# Patient Record
Sex: MALE | NOT HISPANIC OR LATINO | Employment: OTHER | URBAN - METROPOLITAN AREA
[De-identification: names, ages, dates, MRNs, and addresses within clinical notes are randomized per-mention and may not be internally consistent; named-entity substitution may affect disease eponyms.]

---

## 2019-06-04 ENCOUNTER — OFFICE VISIT (OUTPATIENT)
Dept: ORTHOPEDICS | Facility: CLINIC | Age: 74
End: 2019-06-04
Payer: COMMERCIAL

## 2019-06-04 DIAGNOSIS — M54.50 ACUTE BILATERAL LOW BACK PAIN WITHOUT SCIATICA: Primary | ICD-10-CM

## 2019-06-04 RX ORDER — PREDNISONE 20 MG/1
40 TABLET ORAL DAILY
Qty: 10 TABLET | Refills: 0 | Status: SHIPPED | OUTPATIENT
Start: 2019-06-04 | End: 2019-06-09

## 2019-06-04 RX ORDER — TIZANIDINE HYDROCHLORIDE 4 MG/1
4 CAPSULE, GELATIN COATED ORAL 3 TIMES DAILY PRN
Qty: 30 CAPSULE | Refills: 1 | Status: SHIPPED | OUTPATIENT
Start: 2019-06-04 | End: 2019-07-09

## 2019-06-04 NOTE — LETTER
6/4/2019       RE: Rogelio Marshall  [address]  [facility] [address]     Dear Colleague,    Thank you for referring your patient, Rogelio Marshall, to the [facility] SPORTS AND ORTHOPAEDIC WALK IN CLINIC at [facility]. Please see a copy of my visit note below.         NEW PATIENT INTAKE QUESTIONNAIRE  SPORTS & ORTHOPEDIC WALK-IN 6/4/2019    Primary Care Physician: Dr. Stevens    Where are the majority of your medical records? P    Reason for Visit:    What part of your body is injured / painful?  bilateral low back    What caused the injury /pain? Unsure    How long ago did your injury occur or pain begin? 2 weeks ago    What are your most bothersome symptoms? Pain    How would you characterize your symptom? aching and sharp    What makes your symptoms better? Rest    What makes your symptoms worse? Other: turning over while sleeping    Have you been previously seen for this problem? No    Medical History:    Medical History: NA    Have you had surgery on this body part before? No    Medications: none    Allergies: No known drug allergies    Family History of Medical Problems: NA    Previous Surgeries: NA    Social History:    Occupation: NA    Handedness: Right    Exercise: walking and biking    Review of Systems:    Have you recently had a a fever, chills, weight loss? No    Do you have any vision problems? No    Do you have any chest pain or edema? No    Do you have any shortness of breath or wheezing?  No    Do you have stomach problems? No    Do you have any numbness or focal weakness? No    Do you have diabetes? No    Do you have problems with bleeding or clotting? No    Do you have an rashes or other skin lesions? No    Communication:    How did you hear about us? return    Who else should know about this visit? NA       CHIEF COMPLAINT:  Pain of the Lower Back       HISTORY OF PRESENT ILLNESS  Mr. Marshall is a pleasant 74 year old year old male who presents to  clinic today with low back pain.  Rogelio experienced the slow onset of acute low back pain about 2 to 3 weeks ago, no clear inciting event that he can recall.  Pain is in his bilateral low back, radiating down from the inferior aspect of both hips.  Worse with forward bending, he denies any radiation down his legs.  He experienced a episode like this before, this resolved on its own.      Additional history: as documented    MEDICAL HISTORY  Patient Active Problem List   Diagnosis     Hypertension     Hyperlipidemia       Current Outpatient Medications   Medication Sig Dispense Refill     predniSONE (DELTASONE) 20 MG tablet Take 2 tablets (40 mg) by mouth daily for 5 days 10 tablet 0     tiZANidine (ZANAFLEX) 4 MG capsule Take 1 capsule (4 mg) by mouth 3 times daily as needed for muscle spasms 30 capsule 1       No Known Allergies    Family History   Problem Relation Age of Onset     C.A.D. Father          MI age 69     Hypertension Sister         obese     Family History Negative Brother        Additional medical/Social/Surgical histories reviewed in EPIC and updated as appropriate.        PHYSICAL EXAM  There were no vitals filed for this visit.  General  - normal appearance, in no obvious distress  CV  - normal peripheral perfusion  Pulm  - normal respiratory pattern, non-labored  Musculoskeletal - lumbar spine  - stance: slow to rise and sit  - inspection: normal bone and joint alignment, no obvious scoliosis  - palpation: bilateral lumbar paravertebral tenderness  - ROM: pain with bilateral rotation, flexion past 30 deg, extension  - strength: lower extremities 5/5 in all planes  Neuro  - patellar and Achilles DTRs 2+ bilaterally, no sensory or motor deficit, grossly normal coordination, normal muscle tone  Skin  - no ecchymosis, erythema, warmth, or induration, no obvious rash  Psych  - interactive, appropriate, normal mood and affect    ASSESSMENT & PLAN  Mr. Marshall is a 74 year old year old male who presents  to clinic today with acute low back pain.    We discussed conservative treatment for low back pain which includes physical therapy, muscle relaxers, anti-inflammatories.  I did prescribe him prednisone and tizanidine, he can take the tizanidine at night as helpful.  I am also referring him to physical therapy.    If no improvement in the next few weeks I would consider imaging.    It was a pleasure seeing Rogelio today.    Wan Victoria DO, CAQSM  Primary Care Sports Medicine

## 2019-06-04 NOTE — PROGRESS NOTES
NEW PATIENT INTAKE QUESTIONNAIRE  SPORTS & ORTHOPEDIC WALK-IN 6/4/2019    Primary Care Physician: Dr. Stevens    Where are the majority of your medical records? [facility]    Reason for Visit:    What part of your body is injured / painful?  bilateral low back    What caused the injury /pain? Unsure    How long ago did your injury occur or pain begin? 2 weeks ago    What are your most bothersome symptoms? Pain    How would you characterize your symptom? aching and sharp    What makes your symptoms better? Rest    What makes your symptoms worse? Other: turning over while sleeping    Have you been previously seen for this problem? No    Medical History:    Medical History: NA    Have you had surgery on this body part before? No    Medications: none    Allergies: No known drug allergies    Family History of Medical Problems: NA    Previous Surgeries: NA    Social History:    Occupation: NA    Handedness: Right    Exercise: walking and biking    Review of Systems:    Have you recently had a a fever, chills, weight loss? No    Do you have any vision problems? No    Do you have any chest pain or edema? No    Do you have any shortness of breath or wheezing?  No    Do you have stomach problems? No    Do you have any numbness or focal weakness? No    Do you have diabetes? No    Do you have problems with bleeding or clotting? No    Do you have an rashes or other skin lesions? No    Communication:    How did you hear about us? return    Who else should know about this visit? NA       CHIEF COMPLAINT:  Pain of the Lower Back       HISTORY OF PRESENT ILLNESS  Mr. Marshall is a pleasant 74 year old year old male who presents to clinic today with low back pain.  Rogelio experienced the slow onset of acute low back pain about 2 to 3 weeks ago, no clear inciting event that he can recall.  Pain is in his bilateral low back, radiating down from the inferior aspect of both hips.  Worse with forward bending, he denies any radiation down his  legs.  He experienced a episode like this before, this resolved on its own.      Additional history: as documented    MEDICAL HISTORY  Patient Active Problem List   Diagnosis     Hypertension     Hyperlipidemia       Current Outpatient Medications   Medication Sig Dispense Refill     predniSONE (DELTASONE) 20 MG tablet Take 2 tablets (40 mg) by mouth daily for 5 days 10 tablet 0     tiZANidine (ZANAFLEX) 4 MG capsule Take 1 capsule (4 mg) by mouth 3 times daily as needed for muscle spasms 30 capsule 1       No Known Allergies    Family History   Problem Relation Age of Onset     C.A.D. Father          MI age 69     Hypertension Sister         obese     Family History Negative Brother        Additional medical/Social/Surgical histories reviewed in [facility] and updated as appropriate.        PHYSICAL EXAM  There were no vitals filed for this visit.  General  - normal appearance, in no obvious distress  CV  - normal peripheral perfusion  Pulm  - normal respiratory pattern, non-labored  Musculoskeletal - lumbar spine  - stance: slow to rise and sit  - inspection: normal bone and joint alignment, no obvious scoliosis  - palpation: bilateral lumbar paravertebral tenderness  - ROM: pain with bilateral rotation, flexion past 30 deg, extension  - strength: lower extremities 5/5 in all planes  Neuro  - patellar and Achilles DTRs 2+ bilaterally, no sensory or motor deficit, grossly normal coordination, normal muscle tone  Skin  - no ecchymosis, erythema, warmth, or induration, no obvious rash  Psych  - interactive, appropriate, normal mood and affect             ASSESSMENT & PLAN  Mr. Marshall is a 74 year old year old male who presents to clinic today with acute low back pain.    We discussed conservative treatment for low back pain which includes physical therapy, muscle relaxers, anti-inflammatories.  I did prescribe him prednisone and tizanidine, he can take the tizanidine at night as helpful.  I am also referring him to  physical therapy.    If no improvement in the next few weeks I would consider imaging.    It was a pleasure seeing Rogelio today.    Wan Victoria DO, CAQSM  Primary Care Sports Medicine

## 2019-06-09 ENCOUNTER — ANCILLARY PROCEDURE (OUTPATIENT)
Dept: GENERAL RADIOLOGY | Facility: CLINIC | Age: 74
End: 2019-06-09
Attending: FAMILY MEDICINE
Payer: COMMERCIAL

## 2019-06-09 ENCOUNTER — OFFICE VISIT (OUTPATIENT)
Dept: ORTHOPEDICS | Facility: CLINIC | Age: 74
End: 2019-06-09
Payer: COMMERCIAL

## 2019-06-09 VITALS
SYSTOLIC BLOOD PRESSURE: 167 MMHG | BODY MASS INDEX: 26.75 KG/M2 | HEART RATE: 98 BPM | DIASTOLIC BLOOD PRESSURE: 95 MMHG | HEIGHT: 63 IN | WEIGHT: 151 LBS

## 2019-06-09 DIAGNOSIS — M54.50 ACUTE BILATERAL LOW BACK PAIN WITHOUT SCIATICA: Primary | ICD-10-CM

## 2019-06-09 DIAGNOSIS — M54.50 ACUTE BILATERAL LOW BACK PAIN WITHOUT SCIATICA: ICD-10-CM

## 2019-06-09 RX ORDER — DICLOFENAC SODIUM 75 MG/1
75 TABLET, DELAYED RELEASE ORAL 2 TIMES DAILY
Qty: 20 TABLET | Refills: 0 | Status: CANCELLED | OUTPATIENT
Start: 2019-06-09

## 2019-06-09 RX ORDER — DICLOFENAC SODIUM 75 MG/1
75 TABLET, DELAYED RELEASE ORAL 2 TIMES DAILY
Qty: 20 TABLET | Refills: 0 | Status: SHIPPED | OUTPATIENT
Start: 2019-06-09 | End: 2019-07-09

## 2019-06-09 ASSESSMENT — MIFFLIN-ST. JEOR: SCORE: 1330.06

## 2019-06-09 NOTE — LETTER
6/9/2019       RE: Rogelio Marshall  [address]  [facility] [address]     Dear Colleague,    Thank you for referring your patient, Rogelio Marshall, to the [facility] SPORTS AND ORTHOPAEDIC WALK IN CLINIC at [facility]. Please see a copy of my visit note below.          SPORTS & ORTHOPEDIC WALK-IN FOLLOW-UP VISIT 6/9/2019    Interval History: Pain has worsened/remained the same. Until last night, he has been able to sleep. Any position that requires the activation of his lumbar paraspinals will illicit pain. Even while sleeping, activating the paraspinals to turn over is painful    Interval History:     Follow up reason: He has taken his 2 medications, but they were not doing enough so he took acetaminophen in addition    Date of injury: 2.5 weeks ago    Date last seen: 6/4/19    Following Therapeutic Plan: Yes     Pain: Worsening    Function: Unchanged    Medical History:    Any recent changes to your medical history? No    Any new medication prescribed since last visit? No    Review of Systems:    Do you have fever, chills, weight loss? No    Do you have any vision problems? No    Do you have any chest pain or edema? No    Do you have any shortness of breath or wheezing?  No    Do you have stomach problems? No    Do you have any numbness or focal weakness? No    Do you have diabetes? No    Do you have problems with bleeding or clotting? No    Do you have an rashes or other skin lesions? No       ESTABLISHED PATIENT FOLLOW-UP:  RECHECK (LBP that is bilateral)    HISTORY OF PRESENT ILLNESS  Mr. Marshall is a pleasant 74 year old year old male who presents to clinic today for follow-up of acute low back pain. Patient notes pain began about 3 weeks ago.  No acute injury.  Aching/sharp in nature. Improved by rest.  No radicular symptoms, numbness or tingling. Saw Dr. Victoria on 6/4/19 who prescribed tizanidine and steroid medications.  He also has PT appt scheduled not completed.   "Severe exacerbation last night. Low back pain severe.  Felt like this ameliorated overnight, however.  Worried he would have to visit ED.    Notes he completed medrol pack and tizanidine, unsure but does not feel improved.    Additional medical/Social/Surgical histories reviewed in [facility] and updated as appropriate.    REVIEW OF SYSTEMS (6/9/2019)  CONSTITUTIONAL: Denies fever and weight loss  GASTROINTESTINAL: Denies abdominal pain, nausea, vomiting  MUSCULOSKELETAL: See HPI  SKIN: Denies any recent rash or lesion  NEUROLOGICAL: Denies numbness or focal weakness     PHYSICAL EXAM  BP (!) 167/95   Pulse 98   Ht 1.6 m (5' 3\")   Wt 68.5 kg (151 lb)   BMI 26.75 kg/m       General  - normal appearance, in no obvious distress  CV  - normal peripheral perfusion  Pulm  - normal respiratory pattern, non-labored  Musculoskeletal - lumbar spine  - stance: slow to rise and sit  - inspection: normal bone and joint alignment, no obvious scoliosis  - palpation: bilateral lumbar paravertebral tenderness  - ROM: pain with bilateral rotation, flexion past 30 deg, extension  - strength: lower extremities 5/5 in all planes  Neuro  - patellar and Achilles DTRs 2+ bilaterally, no sensory or motor deficit, grossly normal coordination, normal muscle tone  Skin  - no ecchymosis, erythema, warmth, or induration, no obvious rash  Psych  - interactive, appropriate, normal mood and affect    IMAGING : XR lumbar 3v. Final results and radiologist's interpretation, available in the [facility] health record. Images were reviewed with the patient/family members in the office today. My personal interpretation of the performed imaging is lumbar spondylosis. Moderate to severe disc narrowing L4-5 and L5-S1, listhesis of L5 on S1.     ASSESSMENT & PLAN  Mr. Marshall is a 74 year old year old male who presents to clinic today worsening bilateral low back pain without radiculopathy. Likely acute lumbar and QL spasm secondary to underlying degenerative " changes.    -Physical therapy referral  -Heat to low back discussed  -Diclofenac BID x 1 week  -Tizanidine at bedtime  -Acetaminophen PRN  -Follow up 2 weeks    It was a pleasure seeing Gabrielle Brooks DO, CAQSM  Primary Care Sports Medicine

## 2019-06-09 NOTE — PROGRESS NOTES
SPORTS & ORTHOPEDIC WALK-IN FOLLOW-UP VISIT 6/9/2019    Interval History: Pain has worsened/remained the same. Until last night, he has been able to sleep. Any position that requires the activation of his lumbar paraspinals will illicit pain. Even while sleeping, activating the paraspinals to turn over is painful    Interval History:     Follow up reason: He has taken his 2 medications, but they were not doing enough so he took acetaminophen in addition    Date of injury: 2.5 weeks ago    Date last seen: 6/4/19    Following Therapeutic Plan: Yes     Pain: Worsening    Function: Unchanged    Medical History:    Any recent changes to your medical history? No    Any new medication prescribed since last visit? No    Review of Systems:    Do you have fever, chills, weight loss? No    Do you have any vision problems? No    Do you have any chest pain or edema? No    Do you have any shortness of breath or wheezing?  No    Do you have stomach problems? No    Do you have any numbness or focal weakness? No    Do you have diabetes? No    Do you have problems with bleeding or clotting? No    Do you have an rashes or other skin lesions? No

## 2019-06-09 NOTE — PROGRESS NOTES
"ESTABLISHED PATIENT FOLLOW-UP:  RECHECK (LBP that is bilateral)       HISTORY OF PRESENT ILLNESS  Mr. Marshall is a pleasant 74 year old year old male who presents to clinic today for follow-up of acute low back pain. Patient notes pain began about 3 weeks ago.  No acute injury.  Aching/sharp in nature. Improved by rest.  No radicular symptoms, numbness or tingling. Saw Dr. Victoria on 6/4/19 who prescribed tizanidine and steroid medications.  He also has PT appt scheduled not completed.  Severe exacerbation last night. Low back pain severe.  Felt like this ameliorated overnight, however.  Worried he would have to visit ED.    Notes he completed medrol pack and tizanidine, unsure but does not feel improved.    Additional medical/Social/Surgical histories reviewed in [facility] and updated as appropriate.    REVIEW OF SYSTEMS (6/9/2019)  CONSTITUTIONAL: Denies fever and weight loss  GASTROINTESTINAL: Denies abdominal pain, nausea, vomiting  MUSCULOSKELETAL: See HPI  SKIN: Denies any recent rash or lesion  NEUROLOGICAL: Denies numbness or focal weakness     PHYSICAL EXAM  BP (!) 167/95   Pulse 98   Ht 1.6 m (5' 3\")   Wt 68.5 kg (151 lb)   BMI 26.75 kg/m      General  - normal appearance, in no obvious distress  CV  - normal peripheral perfusion  Pulm  - normal respiratory pattern, non-labored  Musculoskeletal - lumbar spine  - stance: slow to rise and sit  - inspection: normal bone and joint alignment, no obvious scoliosis  - palpation: bilateral lumbar paravertebral tenderness  - ROM: pain with bilateral rotation, flexion past 30 deg, extension  - strength: lower extremities 5/5 in all planes  Neuro  - patellar and Achilles DTRs 2+ bilaterally, no sensory or motor deficit, grossly normal coordination, normal muscle tone  Skin  - no ecchymosis, erythema, warmth, or induration, no obvious rash  Psych  - interactive, appropriate, normal mood and affect    IMAGING : XR lumbar 3v. Final results and radiologist's interpretation, " available in the [facility] health record. Images were reviewed with the patient/family members in the office today. My personal interpretation of the performed imaging is lumbar spondylosis. Moderate to severe disc narrowing L4-5 and L5-S1, listhesis of L5 on S1.     ASSESSMENT & PLAN  Mr. Marshall is a 74 year old year old male who presents to clinic today worsening bilateral low back pain without radiculopathy. Likely acute lumbar and QL spasm secondary to underlying degenerative changes.    -Physical therapy referral  -Heat to low back discussed  -Diclofenac BID x 1 week  -Tizanidine at bedtime  -Acetaminophen PRN  -Follow up 2 weeks    It was a pleasure seeing Gabrielle Brooks DO, CAQSM  Primary Care Sports Medicine

## 2019-06-09 NOTE — PATIENT INSTRUCTIONS
1. HEAT - Heating pad or thermacare heat wrap  2. Diclofenac twice/day  3. Physical therapy  4 Tizanidine one pill at night only  5. May still use acetaminophen    Low back pain     What is low back pain?    Low back pain is pain and stiffness in the lower back.  It is one of the most common reasons people miss work.      How does it occur?    Low back pain is usually caused when a ligament or muscle holding a vertebra in its proper position is strained.  Vertebrae are bones that make up the spinal column through which the spinal cord passes.  When these muscles or ligaments become weak, the spine loses its ability, resulting in pain.  Because nerves reach all parts of the body from the spinal cord, back problems can lead to pain or weakness in almost any part of the body.      Low back pain can occur if your job involves lifting and carrying heavy objects, or if you spend a lot of time sitting or standing in one position or bending over.  It can be caused by a fall or by unusually strenuous exercise.  It can be brought on by the tension and stress that causes headaches in some people.  It can even be brought on by violent sneezing or coughing.      People who are overweight may have low back pain because of the added stress on their back.      Back pain may occur when the muscles, joints, bones and connective tissues in the back become inflamed as a result of an infection or an immune system problem.  Arthritic disorders as well as some congenital and degenerative conditions may cause back pain.      Back pain accompanied by loss of bladder or bowel control, difficulty moving your legs, or numbness or tingling in your arms or legs may indicate an injury to your spine and nerves, which requires immediate medical treatment.      What are the symptoms?    Symptoms include:      Pain in the back or legs    Stiffness and limited motion    The pain may be continuous or may occur in certain positions.  It may be  aggravated by coughing, sneezing, bending, twisting, or straining during a bowel movement. The pain may occur in only one spot or may spread to other areas, most commonly down the buttocks and into the back of the thigh.     A low back strain typically does not produce pain past the knee into the calf or foot.  Tingling and numbness in the calf or foot may indicate a herniated disk or pinched nerve.      How is it diagnosed?    Your healthcare provider will review your medical history and examine you.  He or she may order X-rays.  In certain situations, a myelogram, CT scan, or MRI may be ordered.    How is it treated?    The early stages of back pain with muscle spasms should be treated with ice packs for 20-30 minutes every 4 to 6 hours for the first 2 to 3 days. You m ay lie on a frozen gel pack, crushed ice, or a bag of frozen peas.    The following are always to treat low back pain:      After the initial injury, applying heat from a heating pad or hot water bottle    Resting in bed on a firm mattress.  Often it helps to lie on your back with your knees raised.  However, isome people prefer to lie on their side with their knees bent.      Taking aspirin, ibuprofen, or other anti-inflammatory medications; muscle relaxants; or other pain medications if recommended by your healthcare provider (adults aged 65 years and older should not take non-steroidal anti-inflammatory medicine for more than 7 days without their healthcare provider's approval).    Having your back massaged by a trained person    Having traction, if recommended by your provider    Wearing a belt or corset to support your back    Talking with a counselor, if your back pain is related to tension caused by emotional problems.    Beginning a program of physical therapy, or exercising on your own.  Begin a regular exercise program to gently stretch and strengthen your muscles as soon as you can.  Your healthcare provider or physical therapist can  recommend exercises that will not only help you feel better but will strengthen your muscles and help avoid back trouble later.      When the pain subsides, ask your healthcare provider about starting an exercise program such as the following:       Exercise moderately every day, using stretching and warm-up exercises suggested by your provider or physical therapist.    Exercise vigorously for about 30 minutes two or three times a week by walking, swimming, using a stationary bicycle, or doing low-impact aerobics.    Participating regularly in an exercise program will not only help your back, it will also help keep you healthier overall.     How long will the effects last?      The effects of back pain last as long as the cause exists or until your body recovers from the strain, usually a day or two but sometimes weeks.     How can I take care of myself?    In addition to the treatment described above, keep in mind these suggestions:      Use an electric heating pad on a low setting (or a hot water bottle wrapped in a towel to avoid burning yourself) for 20 to 30 minutes.  Don't let the heating pad get too hot, and don't fall asleep with it.  You could get a burn.     Try putting an ice pack wrapped in a towel on your back for 20 minutes, one to four times a day.  Set an alarm to avoid frostbite from using the ice pack too long.    Put a pillow under your knees when you are lying down.    Sleep without a pillow under your head.    Lose weight if you are overweight.    Practice good posture.  Stand with your head up, shoulders straight and chest forward, weight balanced evenly on both feet, and pelvis tucked in.     Pain is the best way to  the pace you should set in increasing your activity and exercise.  Minor discomfort, stiffness, soreness, and mild aches need not interfere with activity.  However, limit your activity temporarily if:      your symptoms return    the pain increases when you are more  active    the pain increases within 24 hours after a new or higher level of activity    When can I return to my sport or activity?    The goal of rehabilitation is to return you to your sport or activity as soon as safely possible. If you return too soon you may worsen your injury, which could lead to permanent damage.  Everyone recovers from injury at different rate.  Return to your sport will be determined by how soon your back recovers, not by how many days or weeks it has been since your injury occurred.  In general, the longer you have symptoms before you start treatment, the longer it will take to get better.      It is important that you have fully recovered from your low back pain before you return to your sport or any strenuous activity.  You must be able to have the same range of motion that you had before your injury.  You must be able to run, jump and twist without pain.      What can I do to help prevent low back pain?    You can reduce the strain on your back by doing the following:      Don't push with your arms when you move a heavy object.  Turn around and push backwards so the strain is taken by your legs.     Whenever you sit, sit in a straight-backed chair and hold your spine against the back of the chair.     Bend your knees and hips and keep your back straight when you lift a heavy object.     Avoid lifting heavy objects higher than your waist    Hold packages you carry close to your body, with your arms bent.    Use a footrest for one foot when you stand or sit in one spot for a long time.  This keeps your back straight.    Bend your knees when you bend over.    Sit close to the pedals when you drive and use your seat belt and a hard backrest or pillow.     Lie on your side with your knees bent when you sleep or rest.  It may help to put a pillow between your knees.    Put a pillow under your knees when you sleep on your back.    Raise the foot of the bed 8 inches to discourage sleeping on  your stomach unless you have other problems that require that you keep your head elevated.      To rest your back, hold each of these positions for 5 minutes or longer:  Lie on your back, bend your knees, and put pillows under your knees.    Lie on your back, put a pillow under your neck, bend your knees to a 90-degree angle, and put your lower legs and feet on a chair.    Lie on your back, bend your knees, and bring one knee up to your chest and hold it there.  Repeat with the other knee, then bring both knees to your chest.  When holding your knee to your chest, grab your thigh rather than your lower leg to avoid over flexing your knee.           Exercises that stretch and strengthen the muscles of your abdomen and spine can help prevent back problems. Strong back and abdominal muscles help you keep good posture, with your spine in its correct position.    If your muscles are tight, take a warm shower or bath before doing the exercises. Exercise on a rug or mat. Wear loose clothing. Don t wear shoes. Stop doing any exercise that causes pain until you have talked with your healthcare provider.    Ask your provider or physical therapist to help you develop an exercise program. Ask your provider how many times a week you need to do the exercises. Remember to start slowly.    Excerpts from: The Sports Medicine Patient Advisor 3rd edition. Copyright 2010 [employer].

## 2019-06-10 ENCOUNTER — THERAPY VISIT (OUTPATIENT)
Dept: PHYSICAL THERAPY | Facility: CLINIC | Age: 74
End: 2019-06-10
Attending: FAMILY MEDICINE
Payer: COMMERCIAL

## 2019-06-10 DIAGNOSIS — M54.50 ACUTE BILATERAL LOW BACK PAIN WITHOUT SCIATICA: ICD-10-CM

## 2019-06-10 PROCEDURE — 97530 THERAPEUTIC ACTIVITIES: CPT | Mod: GP | Performed by: PHYSICAL THERAPIST

## 2019-06-10 PROCEDURE — 97110 THERAPEUTIC EXERCISES: CPT | Mod: GP | Performed by: PHYSICAL THERAPIST

## 2019-06-10 PROCEDURE — 97161 PT EVAL LOW COMPLEX 20 MIN: CPT | Mod: GP | Performed by: PHYSICAL THERAPIST

## 2019-06-10 NOTE — PROGRESS NOTES
[facility] Initial Evaluation  Subjective:    Rogelio Marshall is a 74 year old male with a lumbar condition.  Condition occurred with:  Insidious onset.    This is a new condition  5/21/19.    Patient reports pain:  Lumbar spine right and lumbar spine left.    Pain is described as aching and is constant   Associated symptoms:  Loss of motion/stiffness and loss of strength. Pain is worse in the A.M..  Symptoms are exacerbated by lying down, sitting and other (transition from sitting to standing) and relieved by NSAID's.  Since onset symptoms are gradually worsening.  Special tests:  X-ray.      General health as reported by patient is good.        Current medications:  Muscle relaxants and anti-inflammatory.  Current occupation is Retired.            Red flags:  None as reported by the patient.                        Objective:  System    Physical Exam    General     ROS  Posture: Lumbar flexion, rounded shoulders, forward head, thoracic kyphosis when seated.  Lumbar ROM:   ROM Pain?   Flexion Hands to ankles Yes: bilateral lumbar   Extension WNL No   R rotation WNL No   L rotation WNL No   R side bending Hand to mid thigh Yes: lumbar   L side bending Hand to mid thigh Yes: lumbar     Repeated Motions:  Flexion-  worsening  Extension- not done    Lumbar Myotomes/Strength:   Left Right   L1-L2 (hip flexion) 5/5 5/5   L3 (knee extension) 5/5 5/5   L4 (ankle dorsiflexion) 5/5 5/5   L5 (ankle eversion) 5/5 5/5   S1 (plantar flexion) 5/5 5/5   Hip extension 4+/5 4+/5   Hip abduction 4+/5 4+/5     Lumbar dermatomes:   Sensation?   L3 (medial knee) Normal   L4 (medial ankle) Normal   L5 (great toe) Normal   S1 (lateral foot) Normal     Palpation: Not assessed  Joint mobility:  Lumbar PA mobs- Not tested  Sacral PA mobs- Not tested  Special Tests:  Prone Instability: Not done  SLR: Not done  Slump test: Not done    Intense pain with log roll and transitioning from sidelying to sitting and sitting to  standing.    Assessment/Plan:    Patient is a 74 year old male with lumbar complaints.    Patient has the following significant findings with corresponding treatment plan.                Diagnosis 1:  Low back pain without radiculopathy  Pain -  hot/cold therapy, US, electric stimulation, mechanical traction, manual therapy, self management, education, directional preference exercise and home program  Decreased strength - therapeutic exercise, therapeutic activities and home program    Therapy Evaluation Codes:   1) History comprised of:   Personal factors that impact the plan of care:      Age.    Comorbidity factors that impact the plan of care are:      None.     Medications impacting care: Muscle relaxant.  2) Examination of Body Systems comprised of:   Body structures and functions that impact the plan of care:      Lumbar spine.   Activity limitations that impact the plan of care are:      Squatting/kneeling.  3) Clinical presentation characteristics are:   Stable/Uncomplicated.  4) Decision-Making    Low complexity using standardized patient assessment instrument and/or measureable assessment of functional outcome.  Cumulative Therapy Evaluation is: Low complexity.    Previous and current functional limitations:  (See Goal Flow Sheet for this information)    Short term and Long term goals: (See Goal Flow Sheet for this information)     Communication ability:  Patient appears to be able to clearly communicate and understand verbal and written communication and follow directions correctly.  Treatment Explanation - The following has been discussed with the patient:   RX ordered/plan of care  Anticipated outcomes  Possible risks and side effects  This patient would benefit from PT intervention to resume normal activities.   Rehab potential is good.    Frequency:  1 X week, once daily  Duration:  for 8 weeks  Discharge Plan:  Achieve all LTG.  Independent in home treatment program.  Reach maximal therapeutic  benefit.    Please refer to the daily flowsheet for treatment today, total treatment time and time spent performing 1:1 timed codes.

## 2019-06-12 ENCOUNTER — DOCUMENTATION ONLY (OUTPATIENT)
Dept: CARE COORDINATION | Facility: CLINIC | Age: 74
End: 2019-06-12

## 2019-06-17 ENCOUNTER — THERAPY VISIT (OUTPATIENT)
Dept: PHYSICAL THERAPY | Facility: CLINIC | Age: 74
End: 2019-06-17
Payer: COMMERCIAL

## 2019-06-17 DIAGNOSIS — M54.50 ACUTE BILATERAL LOW BACK PAIN WITHOUT SCIATICA: ICD-10-CM

## 2019-06-17 PROCEDURE — 97530 THERAPEUTIC ACTIVITIES: CPT | Mod: GP | Performed by: PHYSICAL THERAPIST

## 2019-06-17 PROCEDURE — 97110 THERAPEUTIC EXERCISES: CPT | Mod: GP | Performed by: PHYSICAL THERAPIST

## 2019-06-17 PROCEDURE — 97112 NEUROMUSCULAR REEDUCATION: CPT | Mod: GP | Performed by: PHYSICAL THERAPIST

## 2019-06-18 ENCOUNTER — OFFICE VISIT (OUTPATIENT)
Dept: ORTHOPEDICS | Facility: CLINIC | Age: 74
End: 2019-06-18
Payer: COMMERCIAL

## 2019-06-18 VITALS
WEIGHT: 151 LBS | DIASTOLIC BLOOD PRESSURE: 112 MMHG | HEIGHT: 63 IN | BODY MASS INDEX: 26.75 KG/M2 | SYSTOLIC BLOOD PRESSURE: 163 MMHG | HEART RATE: 86 BPM

## 2019-06-18 DIAGNOSIS — M54.50 ACUTE BILATERAL LOW BACK PAIN WITHOUT SCIATICA: Primary | ICD-10-CM

## 2019-06-18 RX ORDER — TRAMADOL HYDROCHLORIDE 50 MG/1
50 TABLET ORAL
Qty: 18 TABLET | Refills: 0 | Status: ON HOLD | OUTPATIENT
Start: 2019-06-18 | End: 2020-10-13

## 2019-06-18 ASSESSMENT — MIFFLIN-ST. JEOR: SCORE: 1330.06

## 2019-06-18 NOTE — LETTER
6/18/2019       RE: Rogelio Marshall  [address]  [facility] [id number]     Dear Colleague,    Thank you for referring your patient, Rogelio Marshall, to the [facility]TH[facility]ALK [facility] CLINIC at [facility]. Please see a copy of my visit note below.          SPORTS & ORTHOPEDIC WALK-IN FOLLOW-UP VISIT 6/18/2019    Sleeping is still quite difficult. Wakes up frequently.   Has been to 2 PT visits.   Heat made Sx worse.     Interval History:     Follow up reason: LBP (1 wk follow up)    Date of injury: 5/20/19  Is when he noticed the pain    Date last seen: 6/9/19    Following Therapeutic Plan: Yes     Pain: Unchanged (worse at night)    Function: Unchanged    Medical History:    Any recent changes to your medical history? No    Any new medication prescribed since last visit? No    Review of Systems:    Do you have fever, chills, weight loss? No    Do you have any vision problems? No    Do you have any chest pain or edema? No    Do you have any shortness of breath or wheezing?  No    Do you have stomach problems? No    Do you have any numbness or focal weakness? No    Do you have diabetes? No    Do you have problems with bleeding or clotting? No    Do you have an rashes or other skin lesions? No             ESTABLISHED PATIENT FOLLOW-UP:  Pain and RECHECK of the Lower Back and RECHECK (Back pain)       HISTORY OF PRESENT ILLNESS  Mr. Marshall is a pleasant 74 year old year old male who presents to clinic today for follow-up of bilateral low back pain without radiculopathy.    Follow up reason: LBP (1 wk follow up) persisting  Date of injury: 5/20/19  Is when he noticed the pain  Date last seen: 6/9/19  Following Therapeutic Plan: Yes   Pain: Unchanged (worse at night)  Function: Unchanged  Interval History: Patient has been participating in PT. X 2 visits thus far and compliant with exercises.  No appreciable improvement or worsening.  Does use ibuprofen now that  "diclofenac /tizanidine finished.  Takes with acetaminophen at times.  Notes he has been sleeping on a blow up mattress which leaks heavily, woke up and it was completely deflated.  This is on the floor.  Working to purchase new mattress via [email or web address] nearby.  No radicular symptoms. No weakness.    Additional medical/Social/Surgical histories reviewed in [facility] and updated as appropriate.    REVIEW OF SYSTEMS (6/18/2019)  CONSTITUTIONAL: Denies fever and weight loss  GASTROINTESTINAL: Denies abdominal pain, nausea, vomiting  MUSCULOSKELETAL: See HPI  SKIN: Denies any recent rash or lesion  NEUROLOGICAL: Denies numbness or focal weakness     PHYSICAL EXAM  BP (!) 163/112   Pulse 86   Ht 1.6 m (5' 3\")   Wt 68.5 kg (151 lb)   BMI 26.75 kg/m       General  - normal appearance, in no obvious distress  CV  - normal peripheral perfusion  Pulm  - normal respiratory pattern, non-labored  Musculoskeletal - lumbar spine  - stance: slow to rise and sit  - inspection: normal bone and joint alignment, thoracic kyphosis  - palpation: bilateral lumbar paravertebral tenderness /QL tenderness  - ROM: pain with bilateral rotation, flexion past 30 deg, extension, no pain with side bending or rotation  - strength: lower extremities 5/5 in all planes  Neuro  - patellar and Achilles DTRs 2+ bilaterally, no sensory or motor deficit, grossly normal coordination, normal muscle tone  Skin  - no ecchymosis, erythema, warmth, or induration, no obvious rash  Psych  - interactive, appropriate, normal mood and affect     ASSESSMENT & PLAN  Mr. Marshall is a 74 year old year old male who presents to clinic today for reevaluation of low back pain without radiculopathy. Suspected MSK with underlying facet degeneration.  I still think he will be a very good candidate for PT despite improvement in first two visits.  Encouraged continued PT.  Will adjust at bedtime medication adding tramadol to improve restful sleep.  Reiterated that he needs to " find a bed with appropriate lumbar support.    1. Acetaminophen 500 mg every 6 hours  2. Ibuprofen 400 mg every 6 hours  3. Tramadol prior to bed (30 min)  4. Continue PT/HEP  5. Purchase a new bed with lumbar support  6. Follow up 3 weeks    It was a pleasure seeing Gabrielle Brooks DO, [facility]  Primary Care Sports Medicine

## 2019-06-18 NOTE — PROGRESS NOTES
"ESTABLISHED PATIENT FOLLOW-UP:  Pain and RECHECK of the Lower Back and RECHECK (Back pain)       HISTORY OF PRESENT ILLNESS  Mr. Marshall is a pleasant 74 year old year old male who presents to clinic today for follow-up of bilateral low back pain without radiculopathy.    Follow up reason: LBP (1 wk follow up) persisting  Date of injury: 5/20/19  Is when he noticed the pain  Date last seen: 6/9/19  Following Therapeutic Plan: Yes   Pain: Unchanged (worse at night)  Function: Unchanged  Interval History: Patient has been participating in PT. X 2 visits thus far and compliant with exercises.  No appreciable improvement or worsening.  Does use ibuprofen now that diclofenac /tizanidine finished.  Takes with acetaminophen at times.  Notes he has been sleeping on a blow up mattress which leaks heavily, woke up and it was completely deflated.  This is on the floor.  Working to purchase new mattress via [email or web address] nearby.  No radicular symptoms. No weakness.    Additional medical/Social/Surgical histories reviewed in [facility] and updated as appropriate.    REVIEW OF SYSTEMS (6/18/2019)  CONSTITUTIONAL: Denies fever and weight loss  GASTROINTESTINAL: Denies abdominal pain, nausea, vomiting  MUSCULOSKELETAL: See HPI  SKIN: Denies any recent rash or lesion  NEUROLOGICAL: Denies numbness or focal weakness     PHYSICAL EXAM  BP (!) 163/112   Pulse 86   Ht 1.6 m (5' 3\")   Wt 68.5 kg (151 lb)   BMI 26.75 kg/m      General  - normal appearance, in no obvious distress  CV  - normal peripheral perfusion  Pulm  - normal respiratory pattern, non-labored  Musculoskeletal - lumbar spine  - stance: slow to rise and sit  - inspection: normal bone and joint alignment, thoracic kyphosis  - palpation: bilateral lumbar paravertebral tenderness/QL tenderness  - ROM: pain with bilateral rotation, flexion past 30 deg, extension, no pain with side bending or rotation  - strength: lower extremities 5/5 in all planes  Neuro  - patellar and " Achilles DTRs 2+ bilaterally, no sensory or motor deficit, grossly normal coordination, normal muscle tone  Skin  - no ecchymosis, erythema, warmth, or induration, no obvious rash  Psych  - interactive, appropriate, normal mood and affect     ASSESSMENT & PLAN  Mr. Marshall is a 74 year old year old male who presents to clinic today for reevaluation of low back pain without radiculopathy. Suspected MSK with underlying facet degeneration.  I still think he will be a very good candidate for PT despite improvement in first two visits.  Encouraged continued PT.  Will adjust at bedtime medication adding tramadol to improve restful sleep.  Reiterated that he needs to find a bed with appropriate lumbar support.    1. Acetaminophen 500 mg every 6 hours  2. Ibuprofen 400 mg every 6 hours  3. Tramadol prior to bed (30 min)  4. Continue PT/HEP  5. Purchase a new bed with lumbar support  6. Follow up 3 weeks    It was a pleasure seeing Gabrielle Brooks DO, [facility]  Primary Care Sports Medicine

## 2019-06-18 NOTE — PROGRESS NOTES
SPORTS & ORTHOPEDIC WALK-IN FOLLOW-UP VISIT 6/18/2019    Sleeping is still quite difficult. Wakes up frequently.   Has been to 2 PT visits.   Heat made Sx worse.     Interval History:     Follow up reason: LBP (1 wk follow up)    Date of injury: 5/20/19  Is when he noticed the pain    Date last seen: 6/9/19    Following Therapeutic Plan: Yes     Pain: Unchanged (worse at night)    Function: Unchanged    Medical History:    Any recent changes to your medical history? No    Any new medication prescribed since last visit? No    Review of Systems:    Do you have fever, chills, weight loss? No    Do you have any vision problems? No    Do you have any chest pain or edema? No    Do you have any shortness of breath or wheezing?  No    Do you have stomach problems? No    Do you have any numbness or focal weakness? No    Do you have diabetes? No    Do you have problems with bleeding or clotting? No    Do you have an rashes or other skin lesions? No

## 2019-06-18 NOTE — PATIENT INSTRUCTIONS
1. Acetaminophen 500 mg every 6 hours  2. Ibuprofen 400 mg every 6 hours  3. Tramadol prior to bed (30 min)  4. Continue PT  5. Purchase a new bed with lumbar support  6. Follow up 3 weeks

## 2019-06-26 ENCOUNTER — THERAPY VISIT (OUTPATIENT)
Dept: PHYSICAL THERAPY | Facility: CLINIC | Age: 74
End: 2019-06-26
Payer: COMMERCIAL

## 2019-06-26 DIAGNOSIS — M54.50 ACUTE BILATERAL LOW BACK PAIN WITHOUT SCIATICA: ICD-10-CM

## 2019-06-26 PROCEDURE — 97530 THERAPEUTIC ACTIVITIES: CPT | Mod: GP | Performed by: PHYSICAL THERAPIST

## 2019-06-26 PROCEDURE — 97110 THERAPEUTIC EXERCISES: CPT | Mod: GP | Performed by: PHYSICAL THERAPIST

## 2019-07-03 ENCOUNTER — THERAPY VISIT (OUTPATIENT)
Dept: PHYSICAL THERAPY | Facility: CLINIC | Age: 74
End: 2019-07-03
Payer: COMMERCIAL

## 2019-07-03 DIAGNOSIS — M54.50 ACUTE BILATERAL LOW BACK PAIN WITHOUT SCIATICA: ICD-10-CM

## 2019-07-03 PROCEDURE — 97530 THERAPEUTIC ACTIVITIES: CPT | Mod: GP | Performed by: PHYSICAL THERAPIST

## 2019-07-03 PROCEDURE — 97110 THERAPEUTIC EXERCISES: CPT | Mod: GP | Performed by: PHYSICAL THERAPIST

## 2019-07-09 ENCOUNTER — OFFICE VISIT (OUTPATIENT)
Dept: ORTHOPEDICS | Facility: CLINIC | Age: 74
End: 2019-07-09
Payer: COMMERCIAL

## 2019-07-09 VITALS
HEIGHT: 63 IN | BODY MASS INDEX: 24.98 KG/M2 | DIASTOLIC BLOOD PRESSURE: 112 MMHG | SYSTOLIC BLOOD PRESSURE: 163 MMHG | HEART RATE: 86 BPM | WEIGHT: 141 LBS

## 2019-07-09 DIAGNOSIS — M54.50 ACUTE BILATERAL LOW BACK PAIN WITHOUT SCIATICA: Primary | ICD-10-CM

## 2019-07-09 RX ORDER — TIZANIDINE HYDROCHLORIDE 4 MG/1
4 CAPSULE, GELATIN COATED ORAL
Qty: 15 CAPSULE | Refills: 1 | Status: ON HOLD | OUTPATIENT
Start: 2019-07-09 | End: 2020-10-13

## 2019-07-09 RX ORDER — DICLOFENAC SODIUM 75 MG/1
75 TABLET, DELAYED RELEASE ORAL 2 TIMES DAILY
Qty: 20 TABLET | Refills: 0 | Status: ON HOLD | OUTPATIENT
Start: 2019-07-09 | End: 2020-10-13

## 2019-07-09 ASSESSMENT — MIFFLIN-ST. JEOR: SCORE: 1284.7

## 2019-07-09 NOTE — PROGRESS NOTES
SPORTS & ORTHOPEDIC WALK-IN FOLLOW-UP VISIT 7/9/2019    Trying to use pain pills less.   The pain has changed from the original location. Original back muscle issues seem to feel better, but now he has trouble walking and notices the pain in another location.   Notices pain on the R side below his rib cage, as well as towards the front of his stomach (transverse abdominus area or hip flexor muscles)    Interval History:     Follow up reason: LBP has remained    Date of injury: NA    Date last seen: 6/18/19    Following Therapeutic Plan: Yes     Pain: Unchanged (different)    Function: Unchanged     Medical History:    Any recent changes to your medical history? No    Any new medication prescribed since last visit? No    Review of Systems:    Do you have fever, chills, weight loss? No    Do you have any vision problems? No    Do you have any chest pain or edema? No    Do you have any shortness of breath or wheezing?  No    Do you have stomach problems? No    Do you have any numbness or focal weakness? No    Do you have diabetes? No    Do you have problems with bleeding or clotting? No    Do you have an rashes or other skin lesions? No

## 2019-07-09 NOTE — PATIENT INSTRUCTIONS
Icy hot patch with lidocaine 4%  Continue Physical Therapy with Ashley  Back off on painful exercises involving stomach muscles  Try ice therapy; Ice packs or peas

## 2019-07-09 NOTE — LETTER
"7/9/2019       RE: Rogelio Marshall  [address]  [facility] [address]     Dear Colleague,    Thank you for referring your patient, Rogelio Marshall, to the [facility] SPORTS AND ORTHOPAEDIC WALK IN CLINIC at [facility]. Please see a copy of my visit note below.    ESTABLISHED PATIENT FOLLOW-UP:  Follow Up of the Lower Back    HISTORY OF PRESENT ILLNESS  Mr. Marshall is a pleasant 74 year old year old male who presents to clinic today for follow-up of low back pain.     Date of injury: 5/20/19  Date last seen: 6/9/19  Following Therapeutic Plan: Yes  Pain: Improving  Function: Improved walking capacity  Interval History:Patient has had additional two visits of PT.  Has been progressing.  Few exercises involving core work that do cause pain including resistance bands - holding in hand and flexing core.  Notes improvement of low back pain.  Easier to get out of bed.  Increased right sided flank pain however only with ambulation- prolonged walking.  Immediately resolves with resting, leaning on bicycle rack, or laying.  No pain at rest or when not engaging core muscles.  No longer needing any tramadol.    Purchased a new bed/box spring from [employer].  Has been helpful and no longer using inflatable mattress.    Additional medical/Social/Surgical histories reviewed in [employer] and updated as appropriate.    REVIEW OF SYSTEMS (7/9/2019)  CONSTITUTIONAL: Denies fever and weight loss  GASTROINTESTINAL: Denies abdominal pain, nausea, vomiting  MUSCULOSKELETAL: See HPI  SKIN: Denies any recent rash or lesion  NEUROLOGICAL: Denies numbness or focal weakness     PHYSICAL EXAM  BP (!) 163/112   Pulse 86   Ht 1.6 m (5' 3\")   Wt 64 kg (141 lb)   BMI 24.98 kg/m       General  - normal appearance, in no obvious distress  CV  - normal peripheral perfusion  Pulm  - normal respiratory pattern, non-labored  ABD  -Protuberant abdomen. Nontender to deep palpation, no guarding, no " rididity.  Musculoskeletal - lumbar spine  - stance: slow to rise and sit  - inspection: normal bone and joint alignment, thoracic kyphosis  - palpation: No tenderness to palpation of lumbar paraspinals or quadratus muscles. Focal region of tenderness to palpation at right lateral flank along external abdominal obliques.  Superficially tender.    - ROM: Painless bilateral rotation, flexion, extension, side bending or rotation. Limited range of motion however remains.  - strength: lower extremities 5/5 in all planes  Neuro  - patellar and Achilles DTRs 2+ bilaterally, no sensory or motor deficit, grossly normal coordination, normal muscle tone  Skin  - no ecchymosis, erythema, warmth, or induration, no obvious rash  Psych  - interactive, appropriate, normal mood and affect     ASSESSMENT & PLAN  Mr. Marshall is a 74 year old year old male who presents to clinic today for reevaluation of low back pain without radiculopathy. Continues to improve and progressing in PT as expected. Discussed continuing conservative measures. Additional strain of external abdominal obliquis.  Discussed following up PCP if pain persists without walking; possible intraabdominal pathology. Will consider MRI if no resolution in the future.    1. Acetaminophen PRN  2. Consider icy hot with lidocaine patch for oblique  3. Avoid painful core work   4. Continue PT/HEP  5. Ice therapy discussed  6. Follow up PRN 6 weeks    It was a pleasure seeing Gabrielle Brooks DO, [facility]  [facility] Medicine          SPORTS & ORTHOPEDIC WALK-IN FOLLOW-UP VISIT 7/9/2019    Trying to use pain pills less.   The pain has changed from the original location. Original back muscle issues seem to feel better, but now he has trouble walking and notices the pain in another location.   Notices pain on the R side below his rib cage, as well as towards the front of his stomach (transverse abdominus area or hip flexor muscles)    Interval History:     Follow up  reason: LBP has remained    Date of injury: NA    Date last seen: 6/18/19    Following Therapeutic Plan: Yes     Pain: Unchanged (different)    Function: Unchanged     Medical History:    Any recent changes to your medical history? No    Any new medication prescribed since last visit? No    Review of Systems:    Do you have fever, chills, weight loss? No    Do you have any vision problems? No    Do you have any chest pain or edema? No    Do you have any shortness of breath or wheezing?  No    Do you have stomach problems? No    Do you have any numbness or focal weakness? No    Do you have diabetes? No    Do you have problems with bleeding or clotting? No    Do you have an rashes or other skin lesions? No

## 2019-07-24 DIAGNOSIS — M54.50 ACUTE BILATERAL LOW BACK PAIN WITHOUT SCIATICA: ICD-10-CM

## 2019-08-19 PROBLEM — M54.50 ACUTE BILATERAL LOW BACK PAIN WITHOUT SCIATICA: Status: RESOLVED | Noted: 2019-06-10 | Resolved: 2019-08-19

## 2019-10-03 ENCOUNTER — HEALTH MAINTENANCE LETTER (OUTPATIENT)
Age: 74
End: 2019-10-03

## 2019-11-15 ENCOUNTER — OFFICE VISIT (OUTPATIENT)
Dept: ORTHOPEDICS | Facility: CLINIC | Age: 74
End: 2019-11-15
Payer: COMMERCIAL

## 2019-11-15 ENCOUNTER — ANCILLARY PROCEDURE (OUTPATIENT)
Dept: GENERAL RADIOLOGY | Facility: CLINIC | Age: 74
End: 2019-11-15
Attending: FAMILY MEDICINE
Payer: COMMERCIAL

## 2019-11-15 VITALS — BODY MASS INDEX: 25.34 KG/M2 | WEIGHT: 143 LBS | HEIGHT: 63 IN

## 2019-11-15 DIAGNOSIS — R07.81 RIB PAIN ON RIGHT SIDE: ICD-10-CM

## 2019-11-15 DIAGNOSIS — R07.81 RIB PAIN ON RIGHT SIDE: Primary | ICD-10-CM

## 2019-11-15 ASSESSMENT — MIFFLIN-ST. JEOR: SCORE: 1293.77

## 2019-11-15 NOTE — PATIENT INSTRUCTIONS
"1. Ice to right ribs  2. Avoid provoking activity / rolling over  3. Stretches  4. Diclofenac twice a day  5. Muscle relaxant at night (Tizanidine 4mg)    Follow up if persisting x 2 weeks.    What is rib dysfunction?    Twelve sets of ribs come together in the front at the breastbone (sternum) and in the back at each of the thoracic spine vertebrae to form the \"rib cage\".  The ribs move like a \"bucket handle\" with each breath we take.  When the ribs don't move, or move incorrectly, it's called rib dysfunction. Classic symptoms associated with rib dysfunctions include tenderness and pain in the ribs and/or pain with breathing.  The pain is usually located along the line of a specific rib or at the rib's junction with the thoracic spine vertebrae or at the sternum.      What causes injuries to this tissue?    Rib dysfunctions can occur because of falls, trauma, a hard cough or sneeze, and from muscle strains.     What is the best treatment approach?    Physical therapy can be helpful in determining the level of rib dysfunction and is often helpful in alleviating the pain.  More severe cases of rib dysfunction can benefit from injections to decrease pain or a consult with a physician (usually an orthopedic doctor) for diagnostic imaging (X-ray) to rule out a fracture.  "

## 2019-11-15 NOTE — PROGRESS NOTES
SPORTS & ORTHOPEDIC WALK-IN VISIT 11/15/2019    Primary Care Physician: Dr. Stevens    11/11/19 - When he began to notice R sided rib pain, thought that it was related to some chores he did earlier.  Has done light low back stretches, is concerned that he may have developed pain in his ribs due to this.    Does not recall a fall or injury to the right side of his ribs.    Reason for visit:     What part of your body is injured / painful?  right ribs    What caused the injury /pain? No inciting event     How long ago did your injury occur or pain begin? several days ago    What are your most bothersome symptoms? Pain    How would you characterize your symptom?  aching, stabbing and sharp    What makes your symptoms better? Rest    What makes your symptoms worse? Movement    Have you been previously seen for this problem? No    Medical History:    Any recent changes to your medical history? No    Any new medication prescribed since last visit? No    Have you had surgery on this body part before? No    Social History:    Occupation: hobbyist     Handedness: Right    Exercise: walking    Review of Systems:    Do you have fever, chills, weight loss? No    Do you have any vision problems? No    Do you have any chest pain or edema? No    Do you have any shortness of breath or wheezing?  No    Do you have stomach problems? No    Do you have any numbness or focal weakness? No    Do you have diabetes? No    Do you have problems with bleeding or clotting? No    Do you have an rashes or other skin lesions? No

## 2019-11-15 NOTE — LETTER
"11/15/2019       RE: Rogelio Marshall  [address]  [facility] [address]     Dear Colleague,    Thank you for referring your patient, Rogelio Marshall, to the [facility] SPORTS AND ORTHOPAEDIC WALK IN CLINIC at [facility]. Please see a copy of my visit note below.    [facility]  Orthopedics  Wan Brooks, DO  11/15/2019     Name: Rogelio Marshall  MRN: [id number]  Age: 74 year old  : 1945  Referring provider: Referred Self     Chief Complaint: Consult (Right sided rib pain)     Date of Injury: 11/15/2019    History of Present Illness:   Rogelio Marshall is a 74 year old male who presents today for evaluation. Patient states that he was experiencing mild right rib pain for the last few days, and then today he was walking and felt a \"crack\" in the right lateral ribs and endorsed subsequent pleuritic chest pain with deep inhalations. The patient also notes that he has been doing lumbar exercises where he has to roll side-to-side in his bed.  No burning or numbness. No other chest pain or shortness of breath. Of note, the patient still has about 10 diclofenacpills at home.    Review of Systems:   A 10-point review of systems was obtained and is negative except for as noted in the HPI.     Medications:     Current Outpatient Medications:      diclofenac (VOLTAREN) 75 MG EC tablet, Take 1 tablet (75 mg) by mouth 2 times daily, Disp: 20 tablet, Rfl: 0     tiZANidine (ZANAFLEX) 4 MG capsule, Take 1 capsule (4 mg) by mouth nightly as needed for muscle spasms, Disp: 15 capsule, Rfl: 1     traMADol (ULTRAM) 50 MG tablet, Take 1 tablet (50 mg) by mouth nightly as needed for breakthrough pain, Disp: 18 tablet, Rfl: 0    Allergies:  Patient has no known allergies.     Past Medical History:  HDL  HTN    Past Surgical History:  Hernia repair, inguinal RT/LT     Social History:  He denies tobacco use and admits to moderate alcohol use, 21 drinks a week.   Denies drug use.    Family " "History:  CAD: Father  HTN: Sister    Physical Examination:  Height 1.6 m (5' 3\"), weight 64.9 kg (143 lb).  General  - normal appearance, in no obvious distress  CV  - normal peripheral perfusion  Pulm  - normal respiratory pattern, non-labored, CTABL no W/R/R  Musculoskeletal - rib cage   - stance: normal gait without limp  - inspection: normal bone and joint alignment, no obvious scoliosis  - palpation: Non tender at right rib angles posteriorly. Exquisite tenderness to palpation at rib 6 at the mid-axillary line. No tenderness in the thoracic paraspinal region.   - ROM: No pain with resisted arm extension. Pain with left rotation. normal and painless flexion, extension, left and right sidebending of  thoracic spine  Neuro  - no sensory or motor deficit, grossly normal coordination, normal muscle tone  Skin  - no erythema, warmth, or induration, no obvious rash  Psych  - interactive, appropriate, normal mood and affect     Imaging:   Radiographs of the ribs and chest - 3 views (11/15/2019)  No evidence of fracture or lesion on the rib.     I have independently reviewed the above imaging studies; the results were discussed with the patient.     Assessment:   74 year old male with right-sided rib pain. Radiographs demonstrated no evidence of fracture. With no history of falls or trauma, I feel that rib dysfunction is more likely. I advised that he stops performing his lumbar exercises that require him to roll over onto that rib.     Plan:   -Prescribed Tizanidine QHS  -Take at-home diclofenac already has RX  -Provided at-home stretches  -Ice the area four times daily  -For any significant worsening chest pain or shortness of breath the patient should go to the emergency department.     I, Wan Brooks DO, have reviewed the above note and agree with the scribe's notation as written.    Scribe Disclosure:  I, Marcelo Andrade, am serving as a scribe to document services personally performed by Wan Brooks DO at this " visit, based upon the provider's statements to me. All documentation has been reviewed by the aforementioned provider prior to being entered into the official medical record.           SPORTS & ORTHOPEDIC WALK-IN VISIT 11/15/2019    Primary Care Physician: Dr. Stevens    11/11/19 - When he began to notice R sided rib pain, thought that it was related to some chores he did earlier.  Has done light low back stretches, is concerned that he may have developed pain in his ribs due to this.    Does not recall a fall or injury to the right side of his ribs.    Reason for visit:     What part of your body is injured / painful?  right ribs    What caused the injury /pain? No inciting event     How long ago did your injury occur or pain begin? several days ago    What are your most bothersome symptoms? Pain    How would you characterize your symptom?  aching, stabbing and sharp    What makes your symptoms better? Rest    What makes your symptoms worse? Movement    Have you been previously seen for this problem? No    Medical History:    Any recent changes to your medical history? No    Any new medication prescribed since last visit? No    Have you had surgery on this body part before? No    Social History:    Occupation: hobbyist     Handedness: Right    Exercise: walking    Review of Systems:    Do you have fever, chills, weight loss? No    Do you have any vision problems? No    Do you have any chest pain or edema? No    Do you have any shortness of breath or wheezing?  No    Do you have stomach problems? No    Do you have any numbness or focal weakness? No    Do you have diabetes? No    Do you have problems with bleeding or clotting? No    Do you have an rashes or other skin lesions? No

## 2019-11-15 NOTE — PROGRESS NOTES
"[facility]  Orthopedics  Wan Brooks, DO  11/15/2019     Name: Rogelio Marshall  MRN: [id number]  Age: 74 year old  : 1945  Referring provider: Referred Self     Chief Complaint: Consult (Right sided rib pain)     Date of Injury: 11/15/2019    History of Present Illness:   Rogelio Marshall is a 74 year old male who presents today for evaluation. Patient states that he was experiencing mild right rib pain for the last few days, and then today he was walking and felt a \"crack\" in the right lateral ribs and endorsed subsequent pleuritic chest pain with deep inhalations. The patient also notes that he has been doing lumbar exercises where he has to roll side-to-side in his bed.  No burning or numbness. No other chest pain or shortness of breath. Of note, the patient still has about 10 diclofenacpills at home.    Review of Systems:   A 10-point review of systems was obtained and is negative except for as noted in the HPI.     Medications:     Current Outpatient Medications:      diclofenac (VOLTAREN) 75 MG EC tablet, Take 1 tablet (75 mg) by mouth 2 times daily, Disp: 20 tablet, Rfl: 0     tiZANidine (ZANAFLEX) 4 MG capsule, Take 1 capsule (4 mg) by mouth nightly as needed for muscle spasms, Disp: 15 capsule, Rfl: 1     traMADol (ULTRAM) 50 MG tablet, Take 1 tablet (50 mg) by mouth nightly as needed for breakthrough pain, Disp: 18 tablet, Rfl: 0    Allergies:  Patient has no known allergies.     Past Medical History:  HDL  HTN    Past Surgical History:  Hernia repair, inguinal RT/LT     Social History:  He denies tobacco use and admits to moderate alcohol use, 21 drinks a week.   Denies drug use.    Family History:  CAD: Father  HTN: Sister    Physical Examination:  Height 1.6 m (5' 3\"), weight 64.9 kg (143 lb).  General  - normal appearance, in no obvious distress  CV  - normal peripheral perfusion  Pulm  - normal respiratory pattern, non-labored, CTABL no W/R/R  Musculoskeletal - rib cage   - stance: normal " gait without limp  - inspection: normal bone and joint alignment, no obvious scoliosis  - palpation: Non tender at right rib angles posteriorly. Exquisite tenderness to palpation at rib 6 at the mid-axillary line. No tenderness in the thoracic paraspinal region.   - ROM: No pain with resisted arm extension. Pain with left rotation. normal and painless flexion, extension, left and right sidebending of  thoracic spine  Neuro  - no sensory or motor deficit, grossly normal coordination, normal muscle tone  Skin  - no erythema, warmth, or induration, no obvious rash  Psych  - interactive, appropriate, normal mood and affect     Imaging:   Radiographs of the ribs and chest - 3 views (11/15/2019)  No evidence of fracture or lesion on the rib.     I have independently reviewed the above imaging studies; the results were discussed with the patient.     Assessment:   74 year old male with right-sided rib pain. Radiographs demonstrated no evidence of fracture. With no history of falls or trauma, I feel that rib dysfunction is more likely. I advised that he stops performing his lumbar exercises that require him to roll over onto that rib.     Plan:   -Prescribed Tizanidine QHS  -Take at-home diclofenac already has RX  -Provided at-home stretches  -Ice the area four times daily  -For any significant worsening chest pain or shortness of breath the patient should go to the emergency department.     IWan DO, have reviewed the above note and agree with the scribe's notation as written.    Scribe Disclosure:  Marcelo TREVIÑO, am serving as a scribe to document services personally performed by Wan Brooks DO at this visit, based upon the provider's statements to me. All documentation has been reviewed by the aforementioned provider prior to being entered into the official medical record.

## 2020-02-08 ENCOUNTER — HEALTH MAINTENANCE LETTER (OUTPATIENT)
Age: 75
End: 2020-02-08

## 2020-09-14 ENCOUNTER — OFFICE VISIT (OUTPATIENT)
Dept: ORTHOPEDICS | Facility: CLINIC | Age: 75
End: 2020-09-14
Payer: COMMERCIAL

## 2020-09-14 VITALS — WEIGHT: 145 LBS | HEIGHT: 61 IN | BODY MASS INDEX: 27.38 KG/M2

## 2020-09-14 DIAGNOSIS — M84.48XA PATHOLOGICAL FRACTURE OF LUMBAR VERTEBRA, INITIAL ENCOUNTER: Primary | ICD-10-CM

## 2020-09-14 DIAGNOSIS — M54.50 ACUTE BILATERAL LOW BACK PAIN WITHOUT SCIATICA: ICD-10-CM

## 2020-09-14 RX ORDER — TRAMADOL HYDROCHLORIDE 50 MG/1
50 TABLET ORAL EVERY 6 HOURS PRN
Qty: 20 TABLET | Refills: 0 | Status: ON HOLD | OUTPATIENT
Start: 2020-09-14 | End: 2020-10-13

## 2020-09-14 ASSESSMENT — PAIN SCALES - GENERAL: PAINLEVEL: SEVERE PAIN (6)

## 2020-09-14 ASSESSMENT — MIFFLIN-ST. JEOR: SCORE: 1266.1

## 2020-09-14 NOTE — LETTER
9/14/2020         RE: Rogelio Marshall  [address]  [facility] [address]        Dear Colleague,    Thank you for referring your patient, Rogelio Marshall, to the [facility] SPORTS AND ORTHOPAEDIC WALK IN CLINIC. Please see a copy of my visit note below.          SPORTS & ORTHOPEDIC WALK-IN VISIT 9/14/2020    Primary Care Physician:      Reason for visit:     What part of your body is injured / painful?  bilateral low back    What caused the injury /pain? No inciting event     How long ago did your injury occur or pain begin? several years ago    What are your most bothersome symptoms? Pain    How would you characterize your symptom?  Aching/dull/ sometimes spasms    What makes your symptoms better? nothing    What makes your symptoms worse? Movement    Have you been previously seen for this problem? No    Medical History:    Any recent changes to your medical history? No    Any new medication prescribed since last visit? No    Have you had surgery on this body part before? No    Social History:    Occupation: retired    Handedness: Right    Exercise: None    Review of Systems:    Do you have fever, chills, weight loss? No    Do you have any vision problems? No    Do you have any chest pain or edema? No    Do you have any shortness of breath or wheezing?  No    Do you have stomach problems? No    Do you have any numbness or focal weakness? No    Do you have diabetes? No    Do you have problems with bleeding or clotting? No    Do you have an rashes or other skin lesions? No           ESTABLISHED PATIENT ENCOUNTER:  Pain of the Lower Back and Back Pain (low back pain chronicly over 1 year)       HISTORY OF PRESENT ILLNESS  Mr. Marshall is a pleasant 75 year old year old male past medical history of HLD, HTN who presents to clinic today for discussion of acute on chronic low back pain.  He was seen and evaluated recently at [facility] where MRI was ordered.  He notes acutely worsening lumbar back  "pain in the past several months.  Limited flexion and movement.  Difficulty getting up from seated position. Denies radicular symptoms into lower legs. Denies weakness, numbness, or bowel/bladder dysfunction.    He has been seen in our office in 2019 for low back pain.  XR revealing degenerative changes.  He was treated with PT at that time and did improve for several months.      What part of your body is injured / painful?  bilateral low back    What caused the injury /pain? No inciting event     How long ago did your injury occur or pain begin? several years ago    What are your most bothersome symptoms? Pain    How would you characterize your symptom?  Aching/dull/ sometimes spasms    What makes your symptoms better? nothing    What makes your symptoms worse? Movement    Have you been previously seen for this problem? No    Additional medical/Social/Surgical histories reviewed in [facility] and updated as appropriate.    REVIEW OF SYSTEMS (9/14/2020)  CONSTITUTIONAL: Denies fever and weight loss  GASTROINTESTINAL: Denies abdominal pain, nausea, vomiting  MUSCULOSKELETAL: See HPI  SKIN: Denies any recent rash or lesion  NEUROLOGICAL: Denies numbness or focal weakness    Past Medical History:   Diagnosis Date     Hyperlipidemia      Hypertension           PHYSICAL EXAM  Ht 1.549 m (5' 1\")   Wt 65.8 kg (145 lb)   BMI 27.40 kg/m      General  - normal appearance, in no obvious distress  HEENT  - conjunctivae not injected, moist mucous membranes  CV  - normal peripheral perfusion  Pulm  - normal respiratory pattern, non-labored  Musculoskeletal - lumbar spine  - stance: slow to rise and sit  - inspection: normal bone and joint alignment, no obvious scoliosis  - palpation: bilateral lumbar paravertebral spasm, tenderness diffusely greatest at region of L3. No tenderness at SI joints /sacrum.  - ROM: pain with bilateral rotation, flexion past 30 deg, extension - limited in all planes  - strength: lower extremities 5/5 in " all planes  - special tests:  (-) straight leg raise bilaterally  (-) slump test  (+) Pain posteriorly with pelvic compression  Neuro  - no sensory or motor deficit, grossly normal coordination, normal muscle tone  Skin  - no ecchymosis, erythema, warmth, or induration, no obvious rash  Psych  - interactive, appropriate, normal mood and affect    IMAGING :  INDICATION:  Low back pain. Spinal stenosis. Neurogenic claudication.    TECHNIQUE:  Noncontrast sagittal and axial T1-T2 and sagittal stir sequences are provided. No comparisons.    FINDINGS:  There is extensive mottled appearance to the marrow space of the visualized thoracolumbar spine. There is notable signal abnormality scattered throughout the sacrum and iliac crests with prominent irregularities seen on the left in the lateral left sacral ala. Serpiginous irregularity seen within the lateral aspect of the left sacral ala that appears worrisome for possible underlying pathologic fracture. Severe compression fractures of L3 and T10 vertebral bodies with mild compression fracture of the superior T12 vertebral body. There is very mild edema within the L3 vertebral body. Overall these fractures all appear to be pathologic in nature. Mild lumbar scoliosis. Mild grade 1 anterolisthesis of L5 on S1 due to chronic bilateral L5 spondylolysis. Remainder of the lumbar spine demonstrates normal overall stature and alignment. Conus is within normal limits.  T9-T10, T2 10-11: Central canal neural foramina are patent.  T11-T12 and T12-L1: No central canal or foraminal narrowing.  L1-2: Unremarkable.  L2-3: Mild to moderate bilateral facet arthropathy is mild retropulsion of posterior fracture fragment results in mild central canal narrowing with no foraminal narrowing.  L3-4: Mild underlying disc bulge results in no significant central canal or foraminal narrowing.  L4-5: Mild broad-based posterior disc bulge moderate bilateral facet arthropathy results in mild right  foraminal narrowing with no left foraminal narrowing. Mild central canal narrowing.   L5-S1:  The anterolisthesis of L5 on S1 with mild unroofing of the intervertebral disc space results in moderate to severe bilateral foraminal narrowing. Moderate to severe central canal narrowing due to prominence of the epidural fat.    IMPRESSION:  1. Extensive marrow signal abnormality scattered throughout the thoracolumbar spine as well as the sacrum and iliac crests that appear most worrisome for diffuse osseous metastasis.  2. Pathologic appearing compression fractures of L3, T12 and L1 with subacute appearing changes of the L3 vertebral body.  3. Suspicious irregularity of the lateral aspect of the left sacral ala that may represent an underlying pathological fracture.  4. Mild grade 1 anterolisthesis of L5 on S1 due to chronic bilateral L5 spondylolysis with resultant moderate to severe bilateral foraminal narrowing and compression of the exiting L5 nerve roots. There is moderate to severe central canal narrowing at this level as well due to extensive prominence of the epidural fat.  5. Milder degenerative changes within the remainder of the lumbar spine as outlined above.    Dictated by Sterling Dale MD @ 9/11/2020 7:08:11 PM     ASSESSMENT & PLAN  Mr. Marshall is a 75 year old year old male who presents to clinic today with acute on chronic low back pain.  MRI has been ordered at [facility] which we have report of today.  Concern for pathologic fractures and bony metastasis, possible fracture of left sacral ala.  Concern for cancer as possible cause of compression fractures and back pain discussed. Will continue work-up with guidance of oncology team.     - Obtain imaging from [facility] as soon as clinic opens tomorrow  - Referral to Dr. Stevens to consider workup of possible cancer diagnosis.  - Tramadol sparingly PRN breakthrough, discussed avoidance with alcohol  - Discuss with spine surgery team regarding need for additional  imaging and pain relieving treatment options such as LSO bracing prior to referral.   -Follow up: [email or web address] if he does not answer phone tomorrow    It was a pleasure seeing Rogelio.    Wan Brooks DO, [facility]  Primary Care Sports Medicine    Addendum:Rogelio's care was reviewed with Dr. Wolf Stevens in office today.  Will work to fit patient in the upcoming weeks to investigate possible cancer diagnosis.    I spent a total of 25 minutes face-to-face with Rogelio Marshall during today's office visit.  Over 50% of this time was spent counseling the patient and/or coordinating care regarding possible diagnosis of cancer and metastatic lesions, pain control and further workup.  See note for details.

## 2020-09-14 NOTE — PROGRESS NOTES
SPORTS & ORTHOPEDIC WALK-IN VISIT 9/14/2020    Primary Care Physician:      Reason for visit:     What part of your body is injured / painful?  bilateral low back    What caused the injury /pain? No inciting event     How long ago did your injury occur or pain begin? several years ago    What are your most bothersome symptoms? Pain    How would you characterize your symptom?  Aching/dull/ sometimes spasms    What makes your symptoms better? nothing    What makes your symptoms worse? Movement    Have you been previously seen for this problem? No    Medical History:    Any recent changes to your medical history? No    Any new medication prescribed since last visit? No    Have you had surgery on this body part before? No    Social History:    Occupation: retired    Handedness: Right    Exercise: None    Review of Systems:    Do you have fever, chills, weight loss? No    Do you have any vision problems? No    Do you have any chest pain or edema? No    Do you have any shortness of breath or wheezing?  No    Do you have stomach problems? No    Do you have any numbness or focal weakness? No    Do you have diabetes? No    Do you have problems with bleeding or clotting? No    Do you have an rashes or other skin lesions? No

## 2020-09-14 NOTE — PROGRESS NOTES
"ESTABLISHED PATIENT ENCOUNTER:  Pain of the Lower Back and Back Pain (low back pain chronicly over 1 year)       HISTORY OF PRESENT ILLNESS  Mr. Marshall is a pleasant 75 year old year old male past medical history of HLD, HTN who presents to clinic today for discussion of acute on chronic low back pain.  He was seen and evaluated recently at [facility] where MRI was ordered.  He notes acutely worsening lumbar back pain in the past several months.  Limited flexion and movement.  Difficulty getting up from seated position. Denies radicular symptoms into lower legs. Denies weakness, numbness, or bowel/bladder dysfunction.    He has been seen in our office in 2019 for low back pain.  XR revealing degenerative changes.  He was treated with PT at that time and did improve for several months.      What part of your body is injured / painful?  bilateral low back    What caused the injury /pain? No inciting event     How long ago did your injury occur or pain begin? several years ago    What are your most bothersome symptoms? Pain    How would you characterize your symptom?  Aching/dull/ sometimes spasms    What makes your symptoms better? nothing    What makes your symptoms worse? Movement    Have you been previously seen for this problem? No    Additional medical/Social/Surgical histories reviewed in [facility] and updated as appropriate.    REVIEW OF SYSTEMS (9/14/2020)  CONSTITUTIONAL: Denies fever and weight loss  GASTROINTESTINAL: Denies abdominal pain, nausea, vomiting  MUSCULOSKELETAL: See HPI  SKIN: Denies any recent rash or lesion  NEUROLOGICAL: Denies numbness or focal weakness    Past Medical History:   Diagnosis Date     Hyperlipidemia      Hypertension           PHYSICAL EXAM  Ht 1.549 m (5' 1\")   Wt 65.8 kg (145 lb)   BMI 27.40 kg/m      General  - normal appearance, in no obvious distress  HEENT  - conjunctivae not injected, moist mucous membranes  CV  - normal peripheral perfusion  Pulm  - normal " respiratory pattern, non-labored  Musculoskeletal - lumbar spine  - stance: slow to rise and sit  - inspection: normal bone and joint alignment, no obvious scoliosis  - palpation: bilateral lumbar paravertebral spasm, tenderness diffusely greatest at region of L3. No tenderness at SI joints /sacrum.  - ROM: pain with bilateral rotation, flexion past 30 deg, extension - limited in all planes  - strength: lower extremities 5/5 in all planes  - special tests:  (-) straight leg raise bilaterally  (-) slump test  (+) Pain posteriorly with pelvic compression  Neuro  - no sensory or motor deficit, grossly normal coordination, normal muscle tone  Skin  - no ecchymosis, erythema, warmth, or induration, no obvious rash  Psych  - interactive, appropriate, normal mood and affect    IMAGING :  INDICATION:  Low back pain. Spinal stenosis. Neurogenic claudication.    TECHNIQUE:  Noncontrast sagittal and axial T1-T2 and sagittal stir sequences are provided. No comparisons.    FINDINGS:  There is extensive mottled appearance to the marrow space of the visualized thoracolumbar spine. There is notable signal abnormality scattered throughout the sacrum and iliac crests with prominent irregularities seen on the left in the lateral left sacral ala. Serpiginous irregularity seen within the lateral aspect of the left sacral ala that appears worrisome for possible underlying pathologic fracture. Severe compression fractures of L3 and T10 vertebral bodies with mild compression fracture of the superior T12 vertebral body. There is very mild edema within the L3 vertebral body. Overall these fractures all appear to be pathologic in nature. Mild lumbar scoliosis. Mild grade 1 anterolisthesis of L5 on S1 due to chronic bilateral L5 spondylolysis. Remainder of the lumbar spine demonstrates normal overall stature and alignment. Conus is within normal limits.  T9-T10, T2 10-11: Central canal neural foramina are patent.  T11-T12 and T12-L1: No  central canal or foraminal narrowing.  L1-2: Unremarkable.  L2-3: Mild to moderate bilateral facet arthropathy is mild retropulsion of posterior fracture fragment results in mild central canal narrowing with no foraminal narrowing.  L3-4: Mild underlying disc bulge results in no significant central canal or foraminal narrowing.  L4-5: Mild broad-based posterior disc bulge moderate bilateral facet arthropathy results in mild right foraminal narrowing with no left foraminal narrowing. Mild central canal narrowing.   L5-S1:  The anterolisthesis of L5 on S1 with mild unroofing of the intervertebral disc space results in moderate to severe bilateral foraminal narrowing. Moderate to severe central canal narrowing due to prominence of the epidural fat.    IMPRESSION:  1. Extensive marrow signal abnormality scattered throughout the thoracolumbar spine as well as the sacrum and iliac crests that appear most worrisome for diffuse osseous metastasis.  2. Pathologic appearing compression fractures of L3, T12 and L1 with subacute appearing changes of the L3 vertebral body.  3. Suspicious irregularity of the lateral aspect of the left sacral ala that may represent an underlying pathological fracture.  4. Mild grade 1 anterolisthesis of L5 on S1 due to chronic bilateral L5 spondylolysis with resultant moderate to severe bilateral foraminal narrowing and compression of the exiting L5 nerve roots. There is moderate to severe central canal narrowing at this level as well due to extensive prominence of the epidural fat.  5. Milder degenerative changes within the remainder of the lumbar spine as outlined above.    Dictated by Sterling Dale MD @ 9/11/2020 7:08:11 PM     ASSESSMENT & PLAN  Mr. Marshall is a 75 year old year old male who presents to clinic today with acute on chronic low back pain.  MRI has been ordered at [facility] which we have report of today.  Concern for pathologic fractures and bony metastasis, possible fracture of left  sacral ala.  Concern for cancer as possible cause of compression fractures and back pain discussed. Will continue work-up with guidance of oncology team.     - Obtain imaging from [facility] as soon as clinic opens tomorrow  - Referral to Dr. Stevens to consider workup of possible cancer diagnosis.  - Tramadol sparingly PRN breakthrough, discussed avoidance with alcohol  - Discuss with spine surgery team regarding need for additional imaging and pain relieving treatment options such as LSO bracing prior to referral.   -Follow up: [email or web address] if he does not answer phone tomorrow    It was a pleasure seeing Rogelio.    Wan Brooks DO, [facility]  Primary Care Sports Medicine    Addendum:Rogelio's care was reviewed with Dr. Wolf Stevens in office today.  Will work to fit patient in the upcoming weeks to investigate possible cancer diagnosis.    I spent a total of 25 minutes face-to-face with Rogelio Marshall during today's office visit.  Over 50% of this time was spent counseling the patient and/or coordinating care regarding possible diagnosis of cancer and metastatic lesions, pain control and further workup.  See note for details.

## 2020-09-15 ENCOUNTER — TELEPHONE (OUTPATIENT)
Dept: INTERNAL MEDICINE | Facility: CLINIC | Age: 75
End: 2020-09-15

## 2020-09-15 NOTE — TELEPHONE ENCOUNTER
Tried to call patient but number listed ([phone]) is not a working number and emergency contact ([phone]) invalid.     Dr. Stevens CHERYL spot on 9/28/20 @ 11:30 is OK, if patient is available.  Dr. Brooks requested that patient see Dr. Stevens for possible rib dysfunction, per Alyssia.

## 2020-09-16 DIAGNOSIS — M54.50 ACUTE BILATERAL LOW BACK PAIN WITHOUT SCIATICA: Primary | ICD-10-CM

## 2020-09-16 NOTE — PROGRESS NOTES
Per Dr. Foley's request, RN placed order for thoracic and lumbar CT.  RN then called patient but the phone number is not a valid one. RN tried to reach out to his sister, also not valid number.   If patient calls back, please get his most updated number and notify him of Dr. Foley's plan and assist patient with the proper scheduling.  Thank you.    Nayeli Mccollum RN

## 2020-09-17 ENCOUNTER — TELEPHONE (OUTPATIENT)
Dept: ORTHOPEDICS | Facility: CLINIC | Age: 75
End: 2020-09-17

## 2020-09-17 NOTE — TELEPHONE ENCOUNTER
RN called and left patient message.  Also e-mailed patient with information.    Nayeli Mccollum RN

## 2020-09-18 ENCOUNTER — TELEPHONE (OUTPATIENT)
Dept: ORTHOPEDICS | Facility: CLINIC | Age: 75
End: 2020-09-18

## 2020-09-18 ENCOUNTER — ANCILLARY PROCEDURE (OUTPATIENT)
Dept: CT IMAGING | Facility: CLINIC | Age: 75
End: 2020-09-18
Attending: ORTHOPAEDIC SURGERY
Payer: COMMERCIAL

## 2020-09-18 DIAGNOSIS — M54.50 ACUTE BILATERAL LOW BACK PAIN WITHOUT SCIATICA: ICD-10-CM

## 2020-09-18 LAB
RADIOLOGIST FLAGS: ABNORMAL
RADIOLOGIST FLAGS: ABNORMAL

## 2020-09-21 NOTE — TELEPHONE ENCOUNTER
RN notified Dr. Foley of this incidental finding and awaiting Dr. Foley's plans.    Nayeli Mccollum RN

## 2020-09-24 ENCOUNTER — MYC MEDICAL ADVICE (OUTPATIENT)
Dept: INTERNAL MEDICINE | Facility: CLINIC | Age: 75
End: 2020-09-24

## 2020-09-24 ENCOUNTER — TELEPHONE (OUTPATIENT)
Dept: INTERNAL MEDICINE | Facility: CLINIC | Age: 75
End: 2020-09-24

## 2020-09-24 NOTE — TELEPHONE ENCOUNTER
Patient contacted clinic via e-mail to confirm appointment on 9/28 at 11:30 am.    Ekaterina Bloom RN ([facility])

## 2020-09-24 NOTE — TELEPHONE ENCOUNTER
Called [phone] and left patient voice message to schedule next available with PCP Dr Stevens per Dr Brooks for workup of possible primary cancer. Phone went to voice message machine. If PCP has no opening, schedule with another provider with sooner openings. Ok'd virtual as well. Send patient MyChart on 9/15 to update phone numbers as patient was active.

## 2020-09-25 NOTE — TELEPHONE ENCOUNTER
RECORDS RECEIVED FROM: low back pain / xrays done / [facility]   DATE RECEIVED: Oct 6, 2020     NOTES STATUS DETAILS   OFFICE NOTE from referring provider Internal    OFFICE NOTE from other specialist N/A    DISCHARGE SUMMARY from hospital N/A    DISCHARGE REPORT from the ER N/A    OPERATIVE REPORT N/A    MEDICATION LIST Internal    IMPLANT RECORD/STICKER N/A    LABS     CBC/DIFF N/A    CULTURES N/A    INJECTIONS DONE IN RADIOLOGY N/A    MRI Received [facility]:  9/11/20 - MRI Spine Lumbar WO   CT SCAN Internal    XRAYS (IMAGES & REPORTS) Internal    TUMOR     PATHOLOGY  Slides & report N/A    09/25/20   8:57 AM   Complete  Kerline Mccollum CMA

## 2020-09-28 ENCOUNTER — OFFICE VISIT (OUTPATIENT)
Dept: INTERNAL MEDICINE | Facility: CLINIC | Age: 75
End: 2020-09-28
Payer: COMMERCIAL

## 2020-09-28 VITALS
BODY MASS INDEX: 27.38 KG/M2 | OXYGEN SATURATION: 100 % | WEIGHT: 145 LBS | SYSTOLIC BLOOD PRESSURE: 182 MMHG | DIASTOLIC BLOOD PRESSURE: 99 MMHG | TEMPERATURE: 98 F | HEIGHT: 61 IN | RESPIRATION RATE: 16 BRPM | HEART RATE: 86 BPM

## 2020-09-28 DIAGNOSIS — M81.0 OSTEOPOROSIS, UNSPECIFIED OSTEOPOROSIS TYPE, UNSPECIFIED PATHOLOGICAL FRACTURE PRESENCE: Primary | ICD-10-CM

## 2020-09-28 DIAGNOSIS — M48.50XS PATHOLOGICAL COMPRESSION FRACTURE OF VERTEBRA, SEQUELA: ICD-10-CM

## 2020-09-28 DIAGNOSIS — R03.0 WHITE COAT SYNDROME WITHOUT DIAGNOSIS OF HYPERTENSION: ICD-10-CM

## 2020-09-28 DIAGNOSIS — R93.89 ABNORMAL FINDINGS ON DIAGNOSTIC IMAGING OF OTHER SPECIFIED BODY STRUCTURES: ICD-10-CM

## 2020-09-28 DIAGNOSIS — R93.7 ABNORMAL CT OF SPINE: ICD-10-CM

## 2020-09-28 RX ORDER — IBUPROFEN 200 MG
200 TABLET ORAL EVERY 4 HOURS PRN
Status: ON HOLD | COMMUNITY
End: 2020-10-13

## 2020-09-28 ASSESSMENT — PAIN SCALES - GENERAL: PAINLEVEL: MILD PAIN (3)

## 2020-09-28 ASSESSMENT — MIFFLIN-ST. JEOR: SCORE: 1266.1

## 2020-09-28 NOTE — PATIENT INSTRUCTIONS
[facility] Medication Refill Request Information:  * Please contact your pharmacy regarding ANY request for medication refills.  ** [facility] Prescription Fax = [phone]  * Please allow 3 business days for routine medication refills.  * Please allow 5 business days for controlled substance medication refills.     [facility] Test Result notification information:  *You will be notified with in 7-10 days of your appointment day regarding the results of your test.  If you are on MyChart you will be notified as soon as the provider has reviewed the results and signed off on them.    [facility]: [phone]

## 2020-09-28 NOTE — PROGRESS NOTES
HPI  75-year-old presents today because of abnormal CT scan.  He reports a 15-month history of back pain.  This is been progressive in severity and certainly interferes with his movement and ambulation.  He has to move and stand slowly and gingerly because of the pain.  Pain is largely confined to the back does not radiate into the legs.  He did have an injury in which he fell couple of months ago.  He has been seen by chiropractic physical therapy and orthopedics.  Despite this he has had no improvement in pain and the recent CT scan showed extensive osteoporotic compression fractures with suggestion of possible myeloma.  Otherwise he is doing well he has no fever chills sweats abdominal pain rash or other complaints.  He has had no improvement in the pain with tramadol or tizanidine but does feel that ibuprofen offers him the best relief.  Past Medical History:   Diagnosis Date     Hyperlipidemia      Hypertension      Past Surgical History:   Procedure Laterality Date     HERNIA REPAIR, INGUINAL RT/LT      needed post-op intestinal repair     ORIF left clavical       Family History   Problem Relation Age of Onset     C.A.D. Father          MI age 69     Hypertension Sister         obese     Family History Negative Brother      Social History     Socioeconomic History     Marital status: Single     Spouse name: None     Number of children: None     Years of education: None     Highest education level: None   Occupational History     Occupation: retired   Social Needs     Financial resource strain: None     Food insecurity     Worry: None     Inability: None     Transportation needs     Medical: None     Non-medical: None   Tobacco Use     Smoking status: Former Smoker     Packs/day: 0.10     Years: 30.00     Pack years: 3.00     Types: Cigarettes     Smokeless tobacco: Never Used   Substance and Sexual Activity     Alcohol use: Yes     Alcohol/week: 17.5 standard drinks     Types: 21 drink(s) per week     Drug  "use: None     Sexual activity: None   Lifestyle     Physical activity     Days per week: None     Minutes per session: None     Stress: None   Relationships     Social connections     Talks on phone: None     Gets together: None     Attends [employer] service: None     Active member of club or organization: None     Attends meetings of clubs or organizations: None     Relationship status: None     Intimate partner violence     Fear of current or ex partner: None     Emotionally abused: None     Physically abused: None     Forced sexual activity: None   Other Topics Concern      Service Not Asked     Blood Transfusions Not Asked     Caffeine Concern Not Asked     Occupational Exposure Not Asked     Hobby Hazards Not Asked     Sleep Concern Not Asked     Stress Concern Not Asked     Weight Concern Not Asked     Special Diet Not Asked     Back Care Not Asked     Exercise Yes     Comment: bikes daily     Bike Helmet Not Asked     Seat Belt Not Asked     Self-Exams Not Asked   Social History Narrative     None       Exam:  BP (!) 182/99   Pulse 86   Temp 98  F (36.7  C) (Oral)   Resp 16   Ht 1.549 m (5' 1\")   Wt 65.8 kg (145 lb)   SpO2 100%   BMI 27.40 kg/m    145 lbs 0 oz  Physical Exam   The patient is alert, oriented with a clear sensorium.   Skin shows no lesions or rashes and good turgor.   Head is normocephalic and atraumatic.   Eyes show PERRLA   Ears show normal lt TM, cerumen on right.   Mouth shows clear oral mucosa.   Neck shows no nodes, thyromegaly or bruits.   Back is tender over the lower lumbar spine with restricted flexion and extension related to pain negative straight leg raising bilaterally.  Lungs are clear to percussion and auscultation.   Heart shows normal S1 and S2 without murmur or gallop.   Abdomen is obese, soft and nontender with large reducible umbilical hernia without masses or organomegaly.   Genitalia show normal testes. No evidence of inguinal hernia.  Rectal shows small " smooth prostate without nodules or masses.  Extremities show no edema and no evidence of active synovitis.   Neurologic examination shows cranial nerves II-XII intact. Motor shows 5/5 strength. Reflexes are symmetric.       ASSESSMENT  1 extensive compression fractures possible pathologic compression fractures  2 hypertension needs further evaluation  3 hyperlipidemia    Plan  Reevaluate him for myeloma with a bone survey urine protein electrophoresis and immunopheresis.  On the have him get a 24-hour blood pressure monitor.    This note was completed using Dragon voice recognition software.      Wolf Stevens MD  General Internal Medicine  [facility]  [phone]

## 2020-09-28 NOTE — NURSING NOTE
Chief Complaint   Patient presents with     Back Pain     Patient comes in to discuss lower back pain.          Conor Ley MA on 9/28/2020 at 11:49 AM

## 2020-09-29 ENCOUNTER — HOSPITAL ENCOUNTER (OUTPATIENT)
Dept: GENERAL RADIOLOGY | Facility: CLINIC | Age: 75
End: 2020-09-29
Attending: INTERNAL MEDICINE
Payer: COMMERCIAL

## 2020-09-29 ENCOUNTER — HOSPITAL ENCOUNTER (OUTPATIENT)
Dept: CARDIOLOGY | Facility: CLINIC | Age: 75
End: 2020-09-29
Attending: INTERNAL MEDICINE
Payer: COMMERCIAL

## 2020-09-29 DIAGNOSIS — R93.7 ABNORMAL CT OF SPINE: ICD-10-CM

## 2020-09-29 DIAGNOSIS — R03.0 WHITE COAT SYNDROME WITHOUT DIAGNOSIS OF HYPERTENSION: ICD-10-CM

## 2020-09-29 PROCEDURE — 93786 AMBL BP MNTR W/SW REC ONLY: CPT

## 2020-09-29 PROCEDURE — 77075 RADEX OSSEOUS SURVEY COMPL: CPT

## 2020-09-29 PROCEDURE — 93790 AMBL BP MNTR W/SW I&R: CPT | Performed by: INTERNAL MEDICINE

## 2020-10-03 DIAGNOSIS — C90.00 MULTIPLE MYELOMA, REMISSION STATUS UNSPECIFIED (H): Primary | ICD-10-CM

## 2020-10-05 DIAGNOSIS — M81.0 OSTEOPOROSIS, UNSPECIFIED OSTEOPOROSIS TYPE, UNSPECIFIED PATHOLOGICAL FRACTURE PRESENCE: ICD-10-CM

## 2020-10-05 DIAGNOSIS — R93.89 ABNORMAL FINDINGS ON DIAGNOSTIC IMAGING OF OTHER SPECIFIED BODY STRUCTURES: ICD-10-CM

## 2020-10-05 DIAGNOSIS — M48.50XS PATHOLOGICAL COMPRESSION FRACTURE OF VERTEBRA, SEQUELA: ICD-10-CM

## 2020-10-05 DIAGNOSIS — M54.50 ACUTE BILATERAL LOW BACK PAIN WITHOUT SCIATICA: Primary | ICD-10-CM

## 2020-10-05 DIAGNOSIS — R03.0 WHITE COAT SYNDROME WITHOUT DIAGNOSIS OF HYPERTENSION: ICD-10-CM

## 2020-10-05 LAB
ALBUMIN SERPL-MCNC: 3 G/DL (ref 3.4–5)
ALP SERPL-CCNC: 84 U/L (ref 40–150)
ALT SERPL W P-5'-P-CCNC: 18 U/L (ref 0–70)
ANION GAP SERPL CALCULATED.3IONS-SCNC: 8 MMOL/L (ref 3–14)
AST SERPL W P-5'-P-CCNC: 22 U/L (ref 0–45)
BASOPHILS # BLD AUTO: 0.1 10E9/L (ref 0–0.2)
BASOPHILS NFR BLD AUTO: 0.8 %
BILIRUB SERPL-MCNC: 0.4 MG/DL (ref 0.2–1.3)
BUN SERPL-MCNC: 38 MG/DL (ref 7–30)
CALCIUM SERPL-MCNC: 9 MG/DL (ref 8.5–10.1)
CHLORIDE SERPL-SCNC: 111 MMOL/L (ref 94–109)
CO2 SERPL-SCNC: 15 MMOL/L (ref 20–32)
CREAT SERPL-MCNC: 1.72 MG/DL (ref 0.66–1.25)
CRP SERPL-MCNC: 23.3 MG/L (ref 0–8)
DIFFERENTIAL METHOD BLD: ABNORMAL
EOSINOPHIL # BLD AUTO: 0.1 10E9/L (ref 0–0.7)
EOSINOPHIL NFR BLD AUTO: 2 %
ERYTHROCYTE [DISTWIDTH] IN BLOOD BY AUTOMATED COUNT: 12.8 % (ref 10–15)
ERYTHROCYTE [SEDIMENTATION RATE] IN BLOOD BY WESTERGREN METHOD: 17 MM/H (ref 0–20)
GFR SERPL CREATININE-BSD FRML MDRD: 38 ML/MIN/{1.73_M2}
GLUCOSE SERPL-MCNC: 96 MG/DL (ref 70–99)
HCT VFR BLD AUTO: 35.8 % (ref 40–53)
HGB BLD-MCNC: 11.7 G/DL (ref 13.3–17.7)
IMM GRANULOCYTES # BLD: 0 10E9/L (ref 0–0.4)
IMM GRANULOCYTES NFR BLD: 0.6 %
LYMPHOCYTES # BLD AUTO: 1.3 10E9/L (ref 0.8–5.3)
LYMPHOCYTES NFR BLD AUTO: 20.2 %
MCH RBC QN AUTO: 32.9 PG (ref 26.5–33)
MCHC RBC AUTO-ENTMCNC: 32.7 G/DL (ref 31.5–36.5)
MCV RBC AUTO: 101 FL (ref 78–100)
MONOCYTES # BLD AUTO: 0.4 10E9/L (ref 0–1.3)
MONOCYTES NFR BLD AUTO: 5.6 %
NEUTROPHILS # BLD AUTO: 4.7 10E9/L (ref 1.6–8.3)
NEUTROPHILS NFR BLD AUTO: 70.8 %
NRBC # BLD AUTO: 0 10*3/UL
NRBC BLD AUTO-RTO: 0 /100
PLATELET # BLD AUTO: 354 10E9/L (ref 150–450)
POTASSIUM SERPL-SCNC: 3.6 MMOL/L (ref 3.4–5.3)
PROT SERPL-MCNC: 12.1 G/DL (ref 6.8–8.8)
RBC # BLD AUTO: 3.56 10E12/L (ref 4.4–5.9)
SODIUM SERPL-SCNC: 134 MMOL/L (ref 133–144)
TSH SERPL DL<=0.005 MIU/L-ACNC: 2.69 MU/L (ref 0.4–4)
WBC # BLD AUTO: 6.6 10E9/L (ref 4–11)

## 2020-10-05 PROCEDURE — 99000 SPECIMEN HANDLING OFFICE-LAB: CPT | Performed by: PATHOLOGY

## 2020-10-05 PROCEDURE — 84166 PROTEIN E-PHORESIS/URINE/CSF: CPT | Mod: 90 | Performed by: PATHOLOGY

## 2020-10-05 PROCEDURE — 82784 ASSAY IGA/IGD/IGG/IGM EACH: CPT | Mod: 90 | Performed by: PATHOLOGY

## 2020-10-05 PROCEDURE — 36415 COLL VENOUS BLD VENIPUNCTURE: CPT | Performed by: PATHOLOGY

## 2020-10-05 PROCEDURE — 84270 ASSAY OF SEX HORMONE GLOBUL: CPT | Mod: 90 | Performed by: PATHOLOGY

## 2020-10-05 PROCEDURE — 99N1036 PR STATISTIC TOTAL PROTEIN: Mod: 90 | Performed by: PATHOLOGY

## 2020-10-05 PROCEDURE — 86334 IMMUNOFIX E-PHORESIS SERUM: CPT | Mod: 90 | Performed by: PATHOLOGY

## 2020-10-05 PROCEDURE — 84403 ASSAY OF TOTAL TESTOSTERONE: CPT | Mod: 90 | Performed by: PATHOLOGY

## 2020-10-05 PROCEDURE — 86335 IMMUNFIX E-PHORSIS/URINE/CSF: CPT | Mod: 90 | Performed by: PATHOLOGY

## 2020-10-05 PROCEDURE — 84443 ASSAY THYROID STIM HORMONE: CPT | Performed by: PATHOLOGY

## 2020-10-05 PROCEDURE — 80053 COMPREHEN METABOLIC PANEL: CPT | Performed by: PATHOLOGY

## 2020-10-05 PROCEDURE — 82306 VITAMIN D 25 HYDROXY: CPT | Mod: 90 | Performed by: PATHOLOGY

## 2020-10-05 PROCEDURE — 84165 PROTEIN E-PHORESIS SERUM: CPT | Mod: 90 | Performed by: PATHOLOGY

## 2020-10-05 PROCEDURE — 86140 C-REACTIVE PROTEIN: CPT | Performed by: PATHOLOGY

## 2020-10-05 PROCEDURE — 85652 RBC SED RATE AUTOMATED: CPT | Performed by: PATHOLOGY

## 2020-10-05 PROCEDURE — 85025 COMPLETE CBC W/AUTO DIFF WBC: CPT | Mod: GZ | Performed by: PATHOLOGY

## 2020-10-05 PROCEDURE — 83883 ASSAY NEPHELOMETRY NOT SPEC: CPT | Mod: 90 | Performed by: PATHOLOGY

## 2020-10-06 ENCOUNTER — PRE VISIT (OUTPATIENT)
Dept: ORTHOPEDICS | Facility: CLINIC | Age: 75
End: 2020-10-06

## 2020-10-06 ENCOUNTER — OFFICE VISIT (OUTPATIENT)
Dept: ORTHOPEDICS | Facility: CLINIC | Age: 75
End: 2020-10-06
Payer: COMMERCIAL

## 2020-10-06 ENCOUNTER — ANCILLARY PROCEDURE (OUTPATIENT)
Dept: GENERAL RADIOLOGY | Facility: CLINIC | Age: 75
End: 2020-10-06
Attending: ORTHOPAEDIC SURGERY
Payer: COMMERCIAL

## 2020-10-06 DIAGNOSIS — M54.50 ACUTE BILATERAL LOW BACK PAIN WITHOUT SCIATICA: Primary | ICD-10-CM

## 2020-10-06 DIAGNOSIS — Z13.820 SCREENING FOR OSTEOPOROSIS: ICD-10-CM

## 2020-10-06 DIAGNOSIS — M81.0 AGE-RELATED OSTEOPOROSIS WITHOUT CURRENT PATHOLOGICAL FRACTURE: ICD-10-CM

## 2020-10-06 LAB
ALBUMIN MFR UR ELPH: 14.5 %
ALBUMIN SERPL ELPH-MCNC: 4.2 G/DL (ref 3.7–5.1)
ALPHA1 GLOB MFR UR ELPH: 12 %
ALPHA1 GLOB SERPL ELPH-MCNC: 0.4 G/DL (ref 0.2–0.4)
ALPHA2 GLOB MFR UR ELPH: 24.7 %
ALPHA2 GLOB SERPL ELPH-MCNC: 0.9 G/DL (ref 0.5–0.9)
B-GLOBULIN MFR UR ELPH: 29.1 %
B-GLOBULIN SERPL ELPH-MCNC: 0.8 G/DL (ref 0.6–1)
DEPRECATED CALCIDIOL+CALCIFEROL SERPL-MC: >155 UG/L (ref 20–75)
GAMMA GLOB MFR UR ELPH: 19.7 %
GAMMA GLOB SERPL ELPH-MCNC: 5.8 G/DL (ref 0.7–1.6)
IGA SERPL-MCNC: 37 MG/DL (ref 84–499)
IGG SERPL-MCNC: 7353 MG/DL (ref 610–1616)
IGM SERPL-MCNC: 17 MG/DL (ref 35–242)
M PROTEIN MFR UR ELPH: 16.2 %
M PROTEIN SERPL ELPH-MCNC: 5.3 G/DL
PROT ELPH PNL UR ELPH: NORMAL
PROT PATTERN SERPL ELPH-IMP: ABNORMAL
PROT PATTERN SERPL IFE-IMP: ABNORMAL
PROT PATTERN UR ELPH-IMP: ABNORMAL
TOTAL LIGHT CHAINS UR: NORMAL

## 2020-10-06 PROCEDURE — 72110 X-RAY EXAM L-2 SPINE 4/>VWS: CPT | Performed by: RADIOLOGY

## 2020-10-06 PROCEDURE — 99204 OFFICE O/P NEW MOD 45 MIN: CPT | Performed by: ORTHOPAEDIC SURGERY

## 2020-10-06 NOTE — NURSING NOTE
Reason For Visit:   Chief Complaint   Patient presents with     Consult     low back pain for about a year and a half.        Primary MD: Wolf Stevens  Ref. MD: Self     ?  No  Date of surgery: none   Type of surgery: none .  Smoker: No  Request smoking cessation information: No    There were no vitals taken for this visit.         Oswestry (RANDA) Questionnaire    OSWESTRY DISABILITY INDEX 6/10/2019   Count 8   Sum 10   Oswestry Score (%) 25   Some recent data might be hidden            Neck Disability Index (NDI) Questionnaire    No flowsheet data found.                Promis 10 Assessment    No flowsheet data found.             Jorge Luis Landaverde, ATC

## 2020-10-06 NOTE — PROGRESS NOTES
Spine Surgery Consultation    REFERRING PHYSICIAN: No ref. provider found   PRIMARY CARE PHYSICIAN: Wolf Stevens           Chief Complaint:   Consult (low back pain for about a year and a half. )      History of Present Illness:  Symptom Profile Including: location of symptoms, onset, severity, exacerbating/alleviating factors, previous treatments:        Rogelio Marshall is a 75 year old male who presents today for diffuse back pains which have been worsening over the last few months.  He feels like it is difficult to stand and walk.  He saw my partner Dr. Garcia couple of weeks ago who obtained MRI and CT scan which are concerning for pathologic fractures.  He saw Dr. Stevens in internal medicine about a week and a half or week ago in late September, and a work-up has been initiated for possible multiple myeloma.    On my interview today his main concern is back pain and difficulty standing and walking.  He denies any new onset weakness numbness tingling or bowel or bladder issues or radicular complaints.         Past Medical History:     Past Medical History:   Diagnosis Date     Hyperlipidemia      Hypertension             Past Surgical History:     Past Surgical History:   Procedure Laterality Date     HERNIA REPAIR, INGUINAL RT/LT      needed post-op intestinal repair     ORIF left clavical              Social History:     Social History     Tobacco Use     Smoking status: Former Smoker     Packs/day: 0.10     Years: 30.00     Pack years: 3.00     Types: Cigarettes     Smokeless tobacco: Never Used   Substance Use Topics     Alcohol use: Yes     Alcohol/week: 17.5 standard drinks     Types: 21 drink(s) per week            Family History:     Family History   Problem Relation Age of Onset     C.A.D. Father          MI age 69     Hypertension Sister         obese     Family History Negative Brother             Allergies:   No Known Allergies         Medications:     Current Outpatient  Medications   Medication     diclofenac (VOLTAREN) 75 MG EC tablet     ibuprofen (ADVIL/MOTRIN) 200 MG tablet     tiZANidine (ZANAFLEX) 4 MG capsule     tiZANidine (ZANAFLEX) 4 MG tablet     traMADol (ULTRAM) 50 MG tablet     traMADol (ULTRAM) 50 MG tablet     No current facility-administered medications for this visit.              Review of Systems:     A 10 point ROS was performed and reviewed. Specific responses to these questions are noted at the end of the document.         Physical Exam:   Vitals: There were no vitals taken for this visit.  Constitutional: awake, alert, cooperative, no apparent distress, appears stated age.  Frail appearing.  Eyes: The sclera are white.  Ears, Nose, Throat: The trachea is midline.  Psychiatric: The patient has a normal affect.  Respiratory: breathing non-labored  Cardiovascular: The extremities are warm and perfused.  Skin: no obvious rashes or lesions.  Musculoskeletal, Neurologic, and Spine:          Lumbar Spine:    Appearance - No gross stepoffs or deformities    Motor -     L2-3: Hip flexion 5/5 R and 5/5 L strength          L3/4:  Knee extension R 5/5 and L 5/5 strength         L4/5:  Foot dorsiflexion R 5/5 L 5/5 and       EHL dorsiflexion R 5/5 L 5/5 strength         S1:  Plantarflexion/Peroneal Muscles  R 5/5 and L 5/5 strength    Sensation: intact to light touch L3-S1 distribution BLE    Hip Exam:  No pain with hip log roll and no tenderness over the greater trochanters.    Alignment:  Patient stands with a positive standing sagittal balance.           Imaging:   We ordered and independently reviewed new radiographs at this clinic visit. The results were discussed with the patient.  Findings include:      Lumbar radiographs October 6, 2020 show multiple compression fractures noted at L3 L2 T12 T11 and T10.    CT scan thoracic and lumbar spine shows multiple pathologic appearing compression fractures    MRI September 11, 2020 shows multiple pathologic appearing  compression fractures             Assessment and Plan:   Assessment:  75 year old male with multiple pathologic appearing compression fractures and back pain, no obvious neurologic compromise.     Plan:  1. The patient is appropriately being worked up for possible oncologic diagnosis by his primary medical team.  I told him I would reach out to them.  Due to the diffuse nature of his multiple fractures I do not think there is a good surgical option at this time.  Instrumented fusion would have almost certain instrumentation failure due to his poor bone quality, and I do not think vertebroplasty is a good option given the fact that there are multiple fractures diffusely throughout the spine.  So I do not think surgery is an option for him at this time.  I am going to refer him to the pain clinic for nonoperative management.  I am also going to provide a referral to Dr. Preston for bone health evaluation and suspected osteoporosis due to his pathologic diagnosis.      Respectfully,  Yo Foley MD  Spine Surgery  [facility]

## 2020-10-06 NOTE — LETTER
10/6/2020         RE: Rogelio Marshall  [address]  [place] [address]        Dear Colleague,    Thank you for referring your patient, Rogelio Marshall, to the [facility] [place]. Please see a copy of my visit note below.    Spine Surgery Consultation    REFERRING PHYSICIAN: No ref. provider found   PRIMARY CARE PHYSICIAN: Wolf Stevens           Chief Complaint:   Consult (low back pain for about a year and a half. )      History of Present Illness:  Symptom Profile Including: location of symptoms, onset, severity, exacerbating/alleviating factors, previous treatments:        Rogelio Marshall is a 75 year old male who presents today for diffuse back pains which have been worsening over the last few months.  He feels like it is difficult to stand and walk.  He saw my partner Dr. Garcia couple of weeks ago who obtained MRI and CT scan which are concerning for pathologic fractures.  He saw Dr. Stevens in internal medicine about a week and a half or week ago in late September, and a work-up has been initiated for possible multiple myeloma.    On my interview today his main concern is back pain and difficulty standing and walking.  He denies any new onset weakness numbness tingling or bowel or bladder issues or radicular complaints.         Past Medical History:     Past Medical History:   Diagnosis Date     Hyperlipidemia      Hypertension             Past Surgical History:     Past Surgical History:   Procedure Laterality Date     HERNIA REPAIR, INGUINAL RT/LT      needed post-op intestinal repair     ORIF left clavical              Social History:     Social History     Tobacco Use     Smoking status: Former Smoker     Packs/day: 0.10     Years: 30.00     Pack years: 3.00     Types: Cigarettes     Smokeless tobacco: Never Used   Substance Use Topics     Alcohol use: Yes     Alcohol/week: 17.5 standard drinks     Types: 21 drink(s) per week            Family  History:     Family History   Problem Relation Age of Onset     LONI Father          MI age 69     Hypertension Sister         obese     Family History Negative Brother             Allergies:   No Known Allergies         Medications:     Current Outpatient Medications   Medication     diclofenac (VOLTAREN) 75 MG EC tablet     ibuprofen (ADVIL/MOTRIN) 200 MG tablet     tiZANidine (ZANAFLEX) 4 MG capsule     tiZANidine (ZANAFLEX) 4 MG tablet     traMADol (ULTRAM) 50 MG tablet     traMADol (ULTRAM) 50 MG tablet     No current facility-administered medications for this visit.              Review of Systems:     A 10 point ROS was performed and reviewed. Specific responses to these questions are noted at the end of the document.         Physical Exam:   Vitals: There were no vitals taken for this visit.  Constitutional: awake, alert, cooperative, no apparent distress, appears stated age.  Frail appearing.  Eyes: The sclera are white.  Ears, Nose, Throat: The trachea is midline.  Psychiatric: The patient has a normal affect.  Respiratory: breathing non-labored  Cardiovascular: The extremities are warm and perfused.  Skin: no obvious rashes or lesions.  Musculoskeletal, Neurologic, and Spine:          Lumbar Spine:    Appearance - No gross stepoffs or deformities    Motor -     L2-3: Hip flexion 5/5 R and 5/5 L strength          L3/4:  Knee extension R 5/5 and L 5/5 strength         L4/5:  Foot dorsiflexion R 5/5 L 5/5 and       EHL dorsiflexion R 5/5 L 5/5 strength         S1:  Plantarflexion/Peroneal Muscles  R 5/5 and L 5/5 strength    Sensation: intact to light touch L3-S1 distribution BLE    Hip Exam:  No pain with hip log roll and no tenderness over the greater trochanters.    Alignment:  Patient stands with a positive standing sagittal balance.           Imaging:   We ordered and independently reviewed new radiographs at this clinic visit. The results were discussed with the patient.  Findings  include:      Lumbar radiographs October 6, 2020 show multiple compression fractures noted at L3 L2 T12 T11 and T10.    CT scan thoracic and lumbar spine shows multiple pathologic appearing compression fractures    MRI September 11, 2020 shows multiple pathologic appearing compression fractures             Assessment and Plan:   Assessment:  75 year old male with multiple pathologic appearing compression fractures and back pain, no obvious neurologic compromise.     Plan:  1. The patient is appropriately being worked up for possible oncologic diagnosis by his primary medical team.  I told him I would reach out to them.  Due to the diffuse nature of his multiple fractures I do not think there is a good surgical option at this time.  Instrumented fusion would have almost certain instrumentation failure due to his poor bone quality, and I do not think vertebroplasty is a good option given the fact that there are multiple fractures diffusely throughout the spine.  So I do not think surgery is an option for him at this time.  I am going to refer him to the pain clinic for nonoperative management.  I am also going to provide a referral to Dr. Preston for bone health evaluation and suspected osteoporosis due to his pathologic diagnosis.      Respectfully,  Yo Foley MD  Spine Surgery  [facility]

## 2020-10-07 ENCOUNTER — ANCILLARY PROCEDURE (OUTPATIENT)
Dept: BONE DENSITY | Facility: CLINIC | Age: 75
End: 2020-10-07
Attending: ORTHOPAEDIC SURGERY
Payer: COMMERCIAL

## 2020-10-07 ENCOUNTER — PATIENT OUTREACH (OUTPATIENT)
Dept: ONCOLOGY | Facility: CLINIC | Age: 75
End: 2020-10-07

## 2020-10-07 DIAGNOSIS — Z13.820 SCREENING FOR OSTEOPOROSIS: ICD-10-CM

## 2020-10-07 DIAGNOSIS — M81.0 AGE-RELATED OSTEOPOROSIS WITHOUT CURRENT PATHOLOGICAL FRACTURE: ICD-10-CM

## 2020-10-07 LAB
SHBG SERPL-SCNC: 49 NMOL/L (ref 11–80)
TESTOST FREE SERPL-MCNC: 5.5 NG/DL (ref 4.7–24.4)
TESTOST SERPL-MCNC: 356 NG/DL (ref 240–950)

## 2020-10-07 PROCEDURE — 77080 DXA BONE DENSITY AXIAL: CPT | Performed by: INTERNAL MEDICINE

## 2020-10-07 PROCEDURE — 99207 DX WRIST/HEEL/RADIUS: CPT | Performed by: ORTHOPAEDIC SURGERY

## 2020-10-07 NOTE — PROGRESS NOTES
New Patient Oncology Nurse Navigator Note     Referring provider:   Wolf Stevens MD [facility]     Referring Clinic/Organization: [facility]     Referred to: Malignant Hematology    Requested provider (if applicable): First available - did not specify     Referral Received: 10/03/20       Evaluation for : multiple myeloma     Clinical History (per Nurse review of records provided):  recent labs and imaging, back pain    Clinical Assessment / Barriers to Care (Per Nurse):    OUTGOING CALLS x 5. Unable to reach pt or emergency contact, none of the numbers are working numbers. [email or web address] message sent to pt re: same, requesting updated phone numbers or for him to call to schedule, number below.      Records Location: [facility]     Referral updates and Plan:   October 7, 2020 needs onc appt asap, unable to reach pt. [email or web address] message to pt and routing to referring provider.   Callback number below.    Lala Rodas, RN, BSN, OCN  RN Care Coordinator / Oncology Nurse Navigator   [facility]  [phone]

## 2020-10-08 ENCOUNTER — TELEPHONE (OUTPATIENT)
Dept: ONCOLOGY | Facility: CLINIC | Age: 75
End: 2020-10-08

## 2020-10-08 NOTE — TELEPHONE ENCOUNTER
I called both patients number and his sister who is the emergency contact and none of the numbers are working. Will send a letter.

## 2020-10-09 ENCOUNTER — HOSPITAL ENCOUNTER (OUTPATIENT)
Facility: CLINIC | Age: 75
Setting detail: OBSERVATION
Discharge: NURSING, REHAB OR LONG-TERM CARE | End: 2020-10-13
Attending: FAMILY MEDICINE | Admitting: FAMILY MEDICINE
Payer: COMMERCIAL

## 2020-10-09 DIAGNOSIS — R26.2 DIFFICULTY WALKING: ICD-10-CM

## 2020-10-09 DIAGNOSIS — I10 ESSENTIAL HYPERTENSION, BENIGN: ICD-10-CM

## 2020-10-09 DIAGNOSIS — M48.56XA COLLAPSE OF LUMBAR VERTEBRA, INITIAL ENCOUNTER (H): ICD-10-CM

## 2020-10-09 DIAGNOSIS — R79.89 ELEVATED SERUM CREATININE: ICD-10-CM

## 2020-10-09 DIAGNOSIS — Z20.828 CONTACT WITH AND (SUSPECTED) EXPOSURE TO OTHER VIRAL COMMUNICABLE DISEASES: ICD-10-CM

## 2020-10-09 DIAGNOSIS — M54.50 CHRONIC MIDLINE LOW BACK PAIN WITHOUT SCIATICA: ICD-10-CM

## 2020-10-09 DIAGNOSIS — C90.00 MULTIPLE MYELOMA NOT HAVING ACHIEVED REMISSION (H): ICD-10-CM

## 2020-10-09 DIAGNOSIS — M48.50XS PATHOLOGIC COMPRESSION FRACTURE OF SPINE, SEQUELA: ICD-10-CM

## 2020-10-09 DIAGNOSIS — G89.29 CHRONIC MIDLINE LOW BACK PAIN WITHOUT SCIATICA: ICD-10-CM

## 2020-10-09 DIAGNOSIS — N17.9 AKI (ACUTE KIDNEY INJURY) (H): ICD-10-CM

## 2020-10-09 LAB
ALBUMIN SERPL-MCNC: 2.9 G/DL (ref 3.4–5)
ALBUMIN UR-MCNC: 30 MG/DL
ALP SERPL-CCNC: 88 U/L (ref 40–150)
ALT SERPL W P-5'-P-CCNC: 16 U/L (ref 0–70)
ANION GAP SERPL CALCULATED.3IONS-SCNC: 12 MMOL/L (ref 3–14)
APPEARANCE UR: CLEAR
APTT PPP: 29 SEC (ref 22–37)
AST SERPL W P-5'-P-CCNC: 25 U/L (ref 0–45)
BASOPHILS # BLD AUTO: 0 10E9/L (ref 0–0.2)
BASOPHILS NFR BLD AUTO: 0.6 %
BILIRUB SERPL-MCNC: 0.4 MG/DL (ref 0.2–1.3)
BILIRUB UR QL STRIP: NEGATIVE
BUN SERPL-MCNC: 43 MG/DL (ref 7–30)
CALCIUM SERPL-MCNC: 9.3 MG/DL (ref 8.5–10.1)
CHLORIDE SERPL-SCNC: 113 MMOL/L (ref 94–109)
CK SERPL-CCNC: 74 U/L (ref 30–300)
CO2 SERPL-SCNC: 15 MMOL/L (ref 20–32)
COLOR UR AUTO: YELLOW
CREAT SERPL-MCNC: 2.19 MG/DL (ref 0.66–1.25)
CRP SERPL-MCNC: 28 MG/L (ref 0–8)
DIFFERENTIAL METHOD BLD: ABNORMAL
EOSINOPHIL # BLD AUTO: 0 10E9/L (ref 0–0.7)
EOSINOPHIL NFR BLD AUTO: 0.6 %
ERYTHROCYTE [DISTWIDTH] IN BLOOD BY AUTOMATED COUNT: 12.8 % (ref 10–15)
ERYTHROCYTE [SEDIMENTATION RATE] IN BLOOD BY WESTERGREN METHOD: 15 MM/H (ref 0–20)
GFR SERPL CREATININE-BSD FRML MDRD: 29 ML/MIN/{1.73_M2}
GLUCOSE SERPL-MCNC: 74 MG/DL (ref 70–99)
GLUCOSE UR STRIP-MCNC: NEGATIVE MG/DL
HCT VFR BLD AUTO: 32.1 % (ref 40–53)
HGB BLD-MCNC: 10.4 G/DL (ref 13.3–17.7)
HGB UR QL STRIP: ABNORMAL
HYALINE CASTS #/AREA URNS LPF: 1 /LPF (ref 0–2)
IMM GRANULOCYTES # BLD: 0 10E9/L (ref 0–0.4)
IMM GRANULOCYTES NFR BLD: 0.5 %
INR PPP: 1.14 (ref 0.86–1.14)
KETONES UR STRIP-MCNC: 40 MG/DL
LEUKOCYTE ESTERASE UR QL STRIP: NEGATIVE
LYMPHOCYTES # BLD AUTO: 1 10E9/L (ref 0.8–5.3)
LYMPHOCYTES NFR BLD AUTO: 15.9 %
MAGNESIUM SERPL-MCNC: 2.2 MG/DL (ref 1.6–2.3)
MCH RBC QN AUTO: 32.8 PG (ref 26.5–33)
MCHC RBC AUTO-ENTMCNC: 32.4 G/DL (ref 31.5–36.5)
MCV RBC AUTO: 101 FL (ref 78–100)
MONOCYTES # BLD AUTO: 0.3 10E9/L (ref 0–1.3)
MONOCYTES NFR BLD AUTO: 5.3 %
MUCOUS THREADS #/AREA URNS LPF: PRESENT /LPF
NEUTROPHILS # BLD AUTO: 4.9 10E9/L (ref 1.6–8.3)
NEUTROPHILS NFR BLD AUTO: 77.1 %
NITRATE UR QL: NEGATIVE
NRBC # BLD AUTO: 0 10*3/UL
NRBC BLD AUTO-RTO: 0 /100
PH UR STRIP: 5.5 PH (ref 5–7)
PHOSPHATE SERPL-MCNC: 4.1 MG/DL (ref 2.5–4.5)
PLATELET # BLD AUTO: 308 10E9/L (ref 150–450)
POTASSIUM SERPL-SCNC: 3.7 MMOL/L (ref 3.4–5.3)
PROT SERPL-MCNC: 12 G/DL (ref 6.8–8.8)
RBC # BLD AUTO: 3.17 10E12/L (ref 4.4–5.9)
RBC #/AREA URNS AUTO: <1 /HPF (ref 0–2)
SODIUM SERPL-SCNC: 140 MMOL/L (ref 133–144)
SOURCE: ABNORMAL
SP GR UR STRIP: 1.01 (ref 1–1.03)
SQUAMOUS #/AREA URNS AUTO: 1 /HPF (ref 0–1)
TRANS CELLS #/AREA URNS HPF: <1 /HPF (ref 0–1)
UROBILINOGEN UR STRIP-MCNC: NORMAL MG/DL (ref 0–2)
WBC # BLD AUTO: 6.4 10E9/L (ref 4–11)
WBC #/AREA URNS AUTO: 3 /HPF (ref 0–5)

## 2020-10-09 PROCEDURE — 86140 C-REACTIVE PROTEIN: CPT | Performed by: FAMILY MEDICINE

## 2020-10-09 PROCEDURE — 83735 ASSAY OF MAGNESIUM: CPT | Performed by: FAMILY MEDICINE

## 2020-10-09 PROCEDURE — 999N000127 HC STATISTIC PERIPHERAL IV START W US GUIDANCE

## 2020-10-09 PROCEDURE — 96361 HYDRATE IV INFUSION ADD-ON: CPT

## 2020-10-09 PROCEDURE — 250N000013 HC RX MED GY IP 250 OP 250 PS 637: Performed by: NURSE PRACTITIONER

## 2020-10-09 PROCEDURE — 81001 URINALYSIS AUTO W/SCOPE: CPT | Performed by: FAMILY MEDICINE

## 2020-10-09 PROCEDURE — 85610 PROTHROMBIN TIME: CPT | Performed by: FAMILY MEDICINE

## 2020-10-09 PROCEDURE — G0378 HOSPITAL OBSERVATION PER HR: HCPCS

## 2020-10-09 PROCEDURE — U0003 INFECTIOUS AGENT DETECTION BY NUCLEIC ACID (DNA OR RNA); SEVERE ACUTE RESPIRATORY SYNDROME CORONAVIRUS 2 (SARS-COV-2) (CORONAVIRUS DISEASE [COVID-19]), AMPLIFIED PROBE TECHNIQUE, MAKING USE OF HIGH THROUGHPUT TECHNOLOGIES AS DESCRIBED BY CMS-2020-01-R: HCPCS | Performed by: NURSE PRACTITIONER

## 2020-10-09 PROCEDURE — 84100 ASSAY OF PHOSPHORUS: CPT | Performed by: FAMILY MEDICINE

## 2020-10-09 PROCEDURE — 80053 COMPREHEN METABOLIC PANEL: CPT | Performed by: FAMILY MEDICINE

## 2020-10-09 PROCEDURE — 85025 COMPLETE CBC W/AUTO DIFF WBC: CPT | Performed by: FAMILY MEDICINE

## 2020-10-09 PROCEDURE — 82550 ASSAY OF CK (CPK): CPT | Performed by: FAMILY MEDICINE

## 2020-10-09 PROCEDURE — 258N000003 HC RX IP 258 OP 636: Performed by: NURSE PRACTITIONER

## 2020-10-09 PROCEDURE — 99220 PR INITIAL OBSERVATION CARE,LEVEL III: CPT | Performed by: NURSE PRACTITIONER

## 2020-10-09 PROCEDURE — C9803 HOPD COVID-19 SPEC COLLECT: HCPCS | Performed by: FAMILY MEDICINE

## 2020-10-09 PROCEDURE — 85652 RBC SED RATE AUTOMATED: CPT | Performed by: FAMILY MEDICINE

## 2020-10-09 PROCEDURE — 99285 EMERGENCY DEPT VISIT HI MDM: CPT | Mod: 25 | Performed by: FAMILY MEDICINE

## 2020-10-09 PROCEDURE — 85730 THROMBOPLASTIN TIME PARTIAL: CPT | Performed by: FAMILY MEDICINE

## 2020-10-09 RX ORDER — IBUPROFEN 600 MG/1
600 TABLET, FILM COATED ORAL EVERY 4 HOURS PRN
Status: CANCELLED | OUTPATIENT
Start: 2020-10-09

## 2020-10-09 RX ORDER — OXYCODONE HCL 5 MG/5 ML
5 SOLUTION, ORAL ORAL EVERY 4 HOURS PRN
Status: DISCONTINUED | OUTPATIENT
Start: 2020-10-09 | End: 2020-10-09

## 2020-10-09 RX ORDER — METHOCARBAMOL 500 MG/1
500 TABLET, FILM COATED ORAL 4 TIMES DAILY
Status: DISCONTINUED | OUTPATIENT
Start: 2020-10-09 | End: 2020-10-13 | Stop reason: HOSPADM

## 2020-10-09 RX ORDER — NALOXONE HYDROCHLORIDE 0.4 MG/ML
.1-.4 INJECTION, SOLUTION INTRAMUSCULAR; INTRAVENOUS; SUBCUTANEOUS
Status: DISCONTINUED | OUTPATIENT
Start: 2020-10-09 | End: 2020-10-13 | Stop reason: HOSPADM

## 2020-10-09 RX ORDER — ACETAMINOPHEN 650 MG/1
650 SUPPOSITORY RECTAL EVERY 4 HOURS PRN
Status: DISCONTINUED | OUTPATIENT
Start: 2020-10-09 | End: 2020-10-13 | Stop reason: HOSPADM

## 2020-10-09 RX ORDER — LIDOCAINE 4 G/G
2 PATCH TOPICAL
Status: DISCONTINUED | OUTPATIENT
Start: 2020-10-09 | End: 2020-10-13 | Stop reason: HOSPADM

## 2020-10-09 RX ORDER — SODIUM CHLORIDE 9 MG/ML
INJECTION, SOLUTION INTRAVENOUS CONTINUOUS
Status: DISCONTINUED | OUTPATIENT
Start: 2020-10-09 | End: 2020-10-10

## 2020-10-09 RX ORDER — ACETAMINOPHEN 325 MG/1
975 TABLET ORAL EVERY 4 HOURS
Status: DISCONTINUED | OUTPATIENT
Start: 2020-10-09 | End: 2020-10-09

## 2020-10-09 RX ORDER — ONDANSETRON 4 MG/1
4 TABLET, ORALLY DISINTEGRATING ORAL EVERY 6 HOURS PRN
Status: DISCONTINUED | OUTPATIENT
Start: 2020-10-09 | End: 2020-10-13 | Stop reason: HOSPADM

## 2020-10-09 RX ORDER — OXYCODONE HYDROCHLORIDE 5 MG/1
5 TABLET ORAL EVERY 4 HOURS PRN
Status: DISCONTINUED | OUTPATIENT
Start: 2020-10-09 | End: 2020-10-13 | Stop reason: HOSPADM

## 2020-10-09 RX ORDER — GABAPENTIN 100 MG/1
100 CAPSULE ORAL 3 TIMES DAILY
Status: DISCONTINUED | OUTPATIENT
Start: 2020-10-09 | End: 2020-10-13 | Stop reason: HOSPADM

## 2020-10-09 RX ORDER — ONDANSETRON 2 MG/ML
4 INJECTION INTRAMUSCULAR; INTRAVENOUS EVERY 6 HOURS PRN
Status: DISCONTINUED | OUTPATIENT
Start: 2020-10-09 | End: 2020-10-13 | Stop reason: HOSPADM

## 2020-10-09 RX ORDER — TRAMADOL HYDROCHLORIDE 50 MG/1
50-100 TABLET ORAL EVERY 6 HOURS PRN
Status: DISCONTINUED | OUTPATIENT
Start: 2020-10-09 | End: 2020-10-09

## 2020-10-09 RX ORDER — ACETAMINOPHEN 325 MG/1
975 TABLET ORAL EVERY 6 HOURS
Status: DISCONTINUED | OUTPATIENT
Start: 2020-10-09 | End: 2020-10-11

## 2020-10-09 RX ORDER — LIDOCAINE 40 MG/G
CREAM TOPICAL
Status: DISCONTINUED | OUTPATIENT
Start: 2020-10-09 | End: 2020-10-13 | Stop reason: HOSPADM

## 2020-10-09 RX ADMIN — METHOCARBAMOL 500 MG: 500 TABLET, FILM COATED ORAL at 20:19

## 2020-10-09 RX ADMIN — METHOCARBAMOL 500 MG: 500 TABLET, FILM COATED ORAL at 16:39

## 2020-10-09 RX ADMIN — SODIUM CHLORIDE: 9 INJECTION, SOLUTION INTRAVENOUS at 17:42

## 2020-10-09 RX ADMIN — LIDOCAINE 2 PATCH: 560 PATCH PERCUTANEOUS; TOPICAL; TRANSDERMAL at 16:40

## 2020-10-09 RX ADMIN — GABAPENTIN 100 MG: 100 CAPSULE ORAL at 16:39

## 2020-10-09 RX ADMIN — ACETAMINOPHEN 975 MG: 325 TABLET, FILM COATED ORAL at 22:26

## 2020-10-09 RX ADMIN — GABAPENTIN 100 MG: 100 CAPSULE ORAL at 20:19

## 2020-10-09 RX ADMIN — ACETAMINOPHEN 975 MG: 325 TABLET, FILM COATED ORAL at 16:39

## 2020-10-09 ASSESSMENT — ENCOUNTER SYMPTOMS
BLOOD IN STOOL: 0
ACTIVITY CHANGE: 1
TROUBLE SWALLOWING: 0
EYE REDNESS: 0
HEADACHES: 0
ARTHRALGIAS: 1
COLOR CHANGE: 0
FATIGUE: 1
FEVER: 0
BRUISES/BLEEDS EASILY: 0
CONFUSION: 0
ABDOMINAL PAIN: 0
WEAKNESS: 1
DECREASED CONCENTRATION: 1
DIFFICULTY URINATING: 0
NECK STIFFNESS: 0
DYSPHORIC MOOD: 1
CONSTIPATION: 0
SHORTNESS OF BREATH: 0
BACK PAIN: 1

## 2020-10-09 ASSESSMENT — MIFFLIN-ST. JEOR
SCORE: 1243.42
SCORE: 1218.38

## 2020-10-09 NOTE — H&P
[facility]     History and Physical - Emergency Department Observation Unit,       Date of Admission:  10/9/2020    Assessment & Plan   Rogelio Marshall is a 75 year old male admitted on 10/9/2020. He has a history of past medical history significant for hypertension, hyperlipidemia and previous SBO (2010) presents to ED with inability to perform ADLS and manage back pain      1. Diffuse back pain:   2. Multiple pathologic appearing compression fractures:Patient has been worked up for his back pain for 1 1/2 years.ost recent CT scan showed extensive osteoporotic compression fractures with suggestion of possible myeloma. He was seen by Spine surgeon, Dr. Foley who reported due to the patient's poor bone quality, he is not a good candidate for surgery. The patient is working with his PCP for further diagnosis of his multiple pathologic appearing compression fractures. He has been managing his pain with his primary care doctor as well as orthopedics. He has trialed Ultram and Tizanadine without improvement. He reports not trialing this medications for very long. Hereports taking Ibuprofen every 6 hours with relief. He has been moving and stand slowly and gingerly because of the pain. He has been using a jacobo to get around in his house as well as furniture crawling.  Pain is in his lower back occasionally radiate into his hips when he's walking. He states that the pain is relieved by laying flat.  He denies any radiation of pain to other areas of his body.  He denies any fever or bladder changes. He notes decrease in appetite and has not had normal bowel movements due to both the pain and not eating.  In the ED: Vitals:BP:170/980 Pulse: 94Temp: 98.4 Resp: 94 SP02:100 %Labs: Na 140, K 3.7 Cr: 2.19 (1.72), GFR 29, BUN 43, LFTS normal, CRP 28, BG 74, WBC 6.4, Hgb 10.4, Plt 308, INR 1.14,   Medications: none Imaging: none Consults: none Plan:Admit to ED observation for pain  "control and also further evaluation assessment of home needs for ambulation and will continue recommended outpatient work-up for etiologies of these fractures that may be pathological for either osteoporosis or potentially some other oncological disorder.  Discussed pain management with pain team and recommended the following:  - Scheduled Tylenol 975 mg q 4 hours  - Stop Ibuprofen due to HALLIE  - Lidocaine patches alternate with Menthol patches  - Gabapentin 100mg TID (titrate slowly)   - Robaxin 500mg QID  - Oxycodone 5 mg q 4 hours prn  - PT/OT  - SW consult  - Ua pending  - COVID pending    3. HTN: Has not started on any medication. Will trial pain management to assess if blood pressure is related to pain.     4. HALLIE: May be related to possible MM diagnosis/Poor oral intake.  - NS at 100/hr  -stop Ibuprofen  - Repeat BMP in am    5. Decreased appetite: Patient reports decrease in appetite. Attributes this to Ibuprofen. Denies any heartburn.   - Nutrition consult  - Zofran prn     Diet:   Regular diet/ IVF  DVT Prophylaxis: Low Risk/Ambulatory with no VTE prophylaxis indicated  Dangelo Catheter: not present  Code Status:   Full         Disposition Plan   Expected discharge: Tomorrow, recommended to prior living arrangement once adequate pain management/ tolerating PO medications.  Entered: JIMBO King CNP 10/09/2020, 2:27 PM     The patient's care was discussed with the Bedside Nurse, Patient and Dr. Matthews Team.    JIMBO King CNP  [facility]   Contact information available via [facility] Paging/Directory      ______________________________________________________________________    Chief Complaint   Lower back pain    History is obtained from the patient    History of Present Illness   Per ED note, \"The history is provided by the patient and medical records.      Rogelio Marshall is a 75 year old male past medical history significant for hypertension, " "hyperlipidemia and previous SBO (2010).  The patient's medical record, on 10/6/2020, the patient was seen by an orthopedic specialist, Dr. Foley, stating that he has had diffuse back pain which is been worsening over the last few months but has had them for the last few years.  He states that it is difficult to stand and walk.  A few weeks ago, the patient had an MRI and CT which are concerning for pathologic fractures.  He saw Dr. Stevens in internal medicine about a week and a half or week ago in late September, and a work-up has been initiated for possible multiple myeloma.  During his orthopedic visit he was referred to oncology for further evaluation as they do not believe there is a good surgical option at this time.  Over the last 2 days, he states that it has been even harder to ambulate and continue managing his ADLs.  He states that he wants resources to help him with ambulation and explore further options to improve his symptoms.  He states that the pain is relieved by laying flat.  He denies any radiation of pain to other areas of his body.  He denies any fever or bowel changes.     Today, the patient presents to the ED for evaluation of his continuing back pain and difficulty ambulating.  He states that he wants resources in his home and further evaluation to determine the cause of his back pain and treatment options along with pain management.\"    Imaging history:     10/7/20 DX Hip/Pelvis/Spine completed      9/29/20 Bone survey shows: Extensive lytic lesions throughout the axial and    appendicular skeleton compatible with multiple myeloma. Multiple  thoracic and lumbar compression deformities better seen on recent CT.      9/29/20 CT thoracic/lumbar spine: 1. Extensively osteolytic appearance of the spine, pelvis, and ribs   with largest lesion in the left sacral ala, highly suspicious for multiple myeloma.Multiple pathologic thoracic and lumbar vertebral compression  deformities including a mild " L2 superior endplate compression deformity that is new since 2020. Multilevel thoracic and lumbar  spondylosis, greatest at L5-S1 where there is severe bilateral neural foraminal stenosis.      Review of Systems    The 10 point Review of Systems is negative other than noted in the HPI or here. Back pain    Past Medical History    I have reviewed this patient's medical history and updated it with pertinent information if needed.   Past Medical History:   Diagnosis Date     Hyperlipidemia      Hypertension        Past Surgical History   I have reviewed this patient's surgical history and updated it with pertinent information if needed.  Past Surgical History:   Procedure Laterality Date     HERNIA REPAIR, INGUINAL RT/LT      needed post-op intestinal repair     ORIF left clavical         Social History   I have reviewed this patient's social history and updated it with pertinent information if needed.  Social History     Tobacco Use     Smoking status: Former Smoker     Packs/day: 0.10     Years: 30.00     Pack years: 3.00     Types: Cigarettes     Smokeless tobacco: Never Used   Substance Use Topics     Alcohol use: Yes     Alcohol/week: 17.5 standard drinks     Types: 21 drink(s) per week     Drug use: None       Family History   I have reviewed this patient's family history and updated it with pertinent information if needed.  Family History   Problem Relation Age of Onset     C.A.D. Father          MI age 69     Hypertension Sister         obese     Family History Negative Brother        Prior to Admission Medications   Prior to Admission Medications   Prescriptions Last Dose Informant Patient Reported? Taking?   diclofenac (VOLTAREN) 75 MG EC tablet   No No   Sig: Take 1 tablet (75 mg) by mouth 2 times daily   Patient not taking: Reported on 2020   ibuprofen (ADVIL/MOTRIN) 200 MG tablet 10/9/2020 at Unknown time  Yes Yes   Sig: Take 200 mg by mouth every 4 hours as needed for mild pain   tiZANidine  (ZANAFLEX) 4 MG capsule   No No   Sig: Take 1 capsule (4 mg) by mouth nightly as needed for muscle spasms   Patient not taking: Reported on 9/14/2020   tiZANidine (ZANAFLEX) 4 MG tablet   No No   Sig: Take 1 tablet (4 mg) by mouth 3 times daily   Patient not taking: Reported on 9/14/2020   traMADol (ULTRAM) 50 MG tablet   No No   Sig: Take 1 tablet (50 mg) by mouth nightly as needed for breakthrough pain   Patient not taking: Reported on 9/14/2020   traMADol (ULTRAM) 50 MG tablet 10/9/2020 at Unknown time  No Yes   Sig: Take 1 tablet (50 mg) by mouth every 6 hours as needed for breakthrough pain      Facility-Administered Medications: None     Allergies   No Known Allergies    Physical Exam   Vital Signs: Temp: 98.7  F (37.1  C) Temp src: Oral BP: (!) 173/99 Pulse: 97   Resp: 16 SpO2: 95 % O2 Device: None (Room air)    Weight: 140 lbs 0 oz    Constitutional: healthy, alert and no distress   Head: Normocephalic. No masses, lesions, tenderness or abnormalities   Neck: Neck supple. No adenopathy. Thyroid symmetric, normal size,, Carotids without bruits.   ENT: ENT exam normal, no neck nodes or sinus tenderness   Cardiovascular: RRR. No murmurs, clicks gallops or rub   Respiratory: . Good diaphragmatic excursion. Lungs clear   Gastrointestinal: Abdomen soft,nontender. BS normal. No masses, organomegaly.   : Deferred   Musculoskeletal: extremities normal- no gross deformities noted, gait normal and normal muscle tone   Skin: no suspicious lesions or rashes   Neurologic: Gait normal. Reflexes normal and symmetric. Sensation grossly WNL.   Psychiatric: mentation appears normal and affect normal/bright   Hematologic/Lymphatic/Immunologic: normal ant/post cervical, axillary, supraclavicular and inguinal     Data   Data reviewed today: I reviewed all medications, new labs and imaging results over the last 24 hours.    Recent Labs   Lab 10/09/20  1405 10/05/20  1500   WBC 6.4 6.6   HGB 10.4* 11.7*   * 101*     354   INR 1.14  --     134   POTASSIUM 3.7 3.6   CHLORIDE 113* 111*   CO2 15* 15*   BUN 43* 38*   CR 2.19* 1.72*   ANIONGAP 12 8   MIRI 9.3 9.0   GLC 74 96   ALBUMIN 2.9* 3.0*   PROTTOTAL 12.0* 12.1*   BILITOTAL 0.4 0.4   ALKPHOS 88 84   ALT 16 18   AST 25 22     Most Recent 3 CBC's:  Recent Labs   Lab Test 10/09/20  1405 10/05/20  1500   WBC 6.4 6.6   HGB 10.4* 11.7*   * 101*    354     Most Recent 3 BMP's:  Recent Labs   Lab Test 10/09/20  1405 10/05/20  1500    134   POTASSIUM 3.7 3.6   CHLORIDE 113* 111*   CO2 15* 15*   BUN 43* 38*   CR 2.19* 1.72*   ANIONGAP 12 8   MIRI 9.3 9.0   GLC 74 96     Most Recent 2 LFT's:  Recent Labs   Lab Test 10/09/20  1405 10/05/20  1500   AST 25 22   ALT 16 18   ALKPHOS 88 84   BILITOTAL 0.4 0.4     No results found for this or any previous visit (from the past 24 hour(s)).

## 2020-10-09 NOTE — PLAN OF CARE
"Observation Goals    - Adequate pain control on oral analgesia: Not Met, pt on scheduled tylenol, scheduled Robaxin, Scheduled Gabapentin, Lidocaine patches on lower back.      - Vital Signs normal or at patient baseline: Not met, BP elevated. Provider aware likely related to pain. BP improved slightly with pain medication administration    - Tolerating oral intake to maintain hydration: In progress, IV hydration running     - Diagnostic tests and consults completed and resulted: Not Met, PT/OT consult, pain consult, SW consult, Nutrition consult pending     - Cleared for discharge from consultants' standpoint if involved in care: Not Met    - Safe disposition plan has been identified: Not Met    - Return to baseline functional status: Not Met, pt states pain has affected his ability to perform ADL's     BP (!) 152/86 (BP Location: Left arm)   Pulse 94   Temp 98.4  F (36.9  C) (Oral)   Resp 17   Ht 1.549 m (5' 1\")   Wt 61 kg (134 lb 7.7 oz)   SpO2 100%   BMI 25.41 kg/m        "

## 2020-10-09 NOTE — ED PROVIDER NOTES
ED Provider Note  [facility]      History     Chief Complaint   Patient presents with     Back Pain     Back pain secondary to bone cancer diagnosis. Difficulty functioning/ambulating at home.     The history is provided by the patient and medical records.     Rogelio Marshall is a 75 year old male past medical history significant for hypertension, hyperlipidemia and previous SBO (2010).  The patient's medical record, on 10/6/2020, the patient was seen by an orthopedic specialist, Dr. Foley, stating that he has had diffuse back pain which is been worsening over the last few months but has had them for the last few years.  He states that it is difficult to stand and walk.  A few weeks ago, the patient had an MRI and CT which are concerning for pathologic fractures.  He saw Dr. Stevens in internal medicine about a week and a half or week ago in late September, and a work-up has been initiated for possible multiple myeloma.  During his orthopedic visit he was referred to oncology for further evaluation as they do not believe there is a good surgical option at this time.  Over the last 2 days, he states that it has been even harder to ambulate and continue managing his ADLs.  He states that he wants resources to help him with ambulation and explore further options to improve his symptoms.  He states that the pain is relieved by laying flat.  He denies any radiation of pain to other areas of his body.  He denies any fever or bowel changes.    Today, the patient presents to the ED for evaluation of his continuing back pain and difficulty ambulating.  He states that he wants resources in his home and further evaluation to determine the cause of his back pain and treatment options along with pain management.    Past Medical History  Past Medical History:   Diagnosis Date     Hyperlipidemia      Hypertension      Past Surgical History:   Procedure Laterality Date     HERNIA REPAIR, INGUINAL  "RT/LT      needed post-op intestinal repair     ORIF left clavical       No current outpatient medications on file.    No Known Allergies  Family History  Family History   Problem Relation Age of Onset     C.A.D. Father          MI age 69     Hypertension Sister         obese     Family History Negative Brother      Social History   Social History     Tobacco Use     Smoking status: Former Smoker     Packs/day: 0.10     Years: 30.00     Pack years: 3.00     Types: Cigarettes     Smokeless tobacco: Never Used   Substance Use Topics     Alcohol use: Yes     Alcohol/week: 17.5 standard drinks     Types: 21 drink(s) per week     Drug use: None      Past medical history, past surgical history, medications, allergies, family history, and social history were reviewed with the patient. No additional pertinent items.       Review of Systems   Constitutional: Positive for activity change and fatigue. Negative for fever.        Positive for decrease in ambulation.   HENT: Negative for congestion and trouble swallowing.    Eyes: Negative for redness.   Respiratory: Negative for shortness of breath.    Cardiovascular: Negative for chest pain.   Gastrointestinal: Negative for abdominal pain, blood in stool and constipation.   Genitourinary: Negative for difficulty urinating.   Musculoskeletal: Positive for arthralgias, back pain and gait problem. Negative for neck stiffness.   Skin: Negative for color change and rash.   Allergic/Immunologic: Negative for immunocompromised state.   Neurological: Positive for weakness. Negative for syncope and headaches.   Hematological: Does not bruise/bleed easily.   Psychiatric/Behavioral: Positive for decreased concentration and dysphoric mood. Negative for confusion.   All other systems reviewed and are negative.    Physical Exam   BP: (!) 168/96  Pulse: 105  Temp: 98.7  F (37.1  C)  Resp: 16  Height: 154.9 cm (5' 1\")  Weight: 63.5 kg (140 lb)  SpO2: 93 %  Physical Exam  Vitals signs and " nursing note reviewed.   Constitutional:       General: He is in acute distress.      Appearance: He is well-developed. He is not ill-appearing or diaphoretic.      Comments: In general patient is comfortable lying flat pain only with movement   HENT:      Head: Normocephalic and atraumatic.      Nose: Nose normal.      Mouth/Throat:      Pharynx: Oropharynx is clear.   Eyes:      General: No scleral icterus.     Extraocular Movements: Extraocular movements intact.      Conjunctiva/sclera: Conjunctivae normal.      Pupils: Pupils are equal, round, and reactive to light.   Neck:      Musculoskeletal: Normal range of motion and neck supple. No neck rigidity or muscular tenderness.   Cardiovascular:      Rate and Rhythm: Normal rate.   Pulmonary:      Effort: No respiratory distress.   Abdominal:      General: There is no distension.      Palpations: Abdomen is soft. There is no mass.      Tenderness: There is no abdominal tenderness.   Musculoskeletal:         General: Tenderness present.      Comments: Pain exacerbated low back with raising of legs   Skin:     General: Skin is warm and dry.      Capillary Refill: Capillary refill takes less than 2 seconds.      Findings: No rash.   Neurological:      General: No focal deficit present.      Mental Status: He is alert and oriented to person, place, and time. Mental status is at baseline.   Psychiatric:      Comments: Mildly flattened otherwise appropriate           ED Course      Procedures         Patient value in the ER otherwise pleasant.  Discussed with medicine also feels this point patient is here primarily for pain control and assistance with home activities and will continue outpatient evaluation of the cause potentially oncology related of his back pain.  Labs are drawn reviewed reviewed although somewhat pending.  Patient's creatinine noted to be 2.19 along with a BUN of 43..  Other labs reviewed below.  Plan at this point will be admitted to ED observation  for adequate pain control and further evaluation for ambulation and home assistance needed.  Patient at this point can continue outpatient work-up with his primary doctor regarding etiology etc. agrees with plan.                     Results for orders placed or performed during the hospital encounter of 10/09/20   CBC with platelets differential     Status: Abnormal   Result Value Ref Range    WBC 6.4 4.0 - 11.0 10e9/L    RBC Count 3.17 (L) 4.4 - 5.9 10e12/L    Hemoglobin 10.4 (L) 13.3 - 17.7 g/dL    Hematocrit 32.1 (L) 40.0 - 53.0 %     (H) 78 - 100 fl    MCH 32.8 26.5 - 33.0 pg    MCHC 32.4 31.5 - 36.5 g/dL    RDW 12.8 10.0 - 15.0 %    Platelet Count 308 150 - 450 10e9/L    Diff Method Automated Method     % Neutrophils 77.1 %    % Lymphocytes 15.9 %    % Monocytes 5.3 %    % Eosinophils 0.6 %    % Basophils 0.6 %    % Immature Granulocytes 0.5 %    Nucleated RBCs 0 0 /100    Absolute Neutrophil 4.9 1.6 - 8.3 10e9/L    Absolute Lymphocytes 1.0 0.8 - 5.3 10e9/L    Absolute Monocytes 0.3 0.0 - 1.3 10e9/L    Absolute Eosinophils 0.0 0.0 - 0.7 10e9/L    Absolute Basophils 0.0 0.0 - 0.2 10e9/L    Abs Immature Granulocytes 0.0 0 - 0.4 10e9/L    Absolute Nucleated RBC 0.0    CRP inflammation     Status: Abnormal   Result Value Ref Range    CRP Inflammation 28.0 (H) 0.0 - 8.0 mg/L   Erythrocyte sedimentation rate auto     Status: None   Result Value Ref Range    Sed Rate 15 0 - 20 mm/h   INR     Status: None   Result Value Ref Range    INR 1.14 0.86 - 1.14   Partial thromboplastin time     Status: None   Result Value Ref Range    PTT 29 22 - 37 sec   Magnesium     Status: None   Result Value Ref Range    Magnesium 2.2 1.6 - 2.3 mg/dL   Comprehensive metabolic panel     Status: Abnormal   Result Value Ref Range    Sodium 140 133 - 144 mmol/L    Potassium 3.7 3.4 - 5.3 mmol/L    Chloride 113 (H) 94 - 109 mmol/L    Carbon Dioxide 15 (L) 20 - 32 mmol/L    Anion Gap 12 3 - 14 mmol/L    Glucose 74 70 - 99 mg/dL    Urea  Nitrogen 43 (H) 7 - 30 mg/dL    Creatinine 2.19 (H) 0.66 - 1.25 mg/dL    GFR Estimate 29 (L) >60 mL/min/[1.73_m2]    GFR Estimate If Black 33 (L) >60 mL/min/[1.73_m2]    Calcium 9.3 8.5 - 10.1 mg/dL    Bilirubin Total 0.4 0.2 - 1.3 mg/dL    Albumin 2.9 (L) 3.4 - 5.0 g/dL    Protein Total 12.0 (H) 6.8 - 8.8 g/dL    Alkaline Phosphatase 88 40 - 150 U/L    ALT 16 0 - 70 U/L    AST 25 0 - 45 U/L   Phosphorus     Status: None   Result Value Ref Range    Phosphorus 4.1 2.5 - 4.5 mg/dL   CK total     Status: None   Result Value Ref Range    CK Total 74 30 - 300 U/L   UA with Microscopic reflex to Culture     Status: Abnormal    Specimen: Urine Midstream; Midstream Urine   Result Value Ref Range    Color Urine Yellow     Appearance Urine Clear     Glucose Urine Negative NEG^Negative mg/dL    Bilirubin Urine Negative NEG^Negative    Ketones Urine 40 (A) NEG^Negative mg/dL    Specific Gravity Urine 1.015 1.003 - 1.035    Blood Urine Trace (A) NEG^Negative    pH Urine 5.5 5.0 - 7.0 pH    Protein Albumin Urine 30 (A) NEG^Negative mg/dL    Urobilinogen mg/dL Normal 0.0 - 2.0 mg/dL    Nitrite Urine Negative NEG^Negative    Leukocyte Esterase Urine Negative NEG^Negative    Source Midstream Urine     WBC Urine 3 0 - 5 /HPF    RBC Urine <1 0 - 2 /HPF    Squamous Epithelial /HPF Urine 1 0 - 1 /HPF    Transitional Epi <1 0 - 1 /HPF    Mucous Urine Present (A) NEG^Negative /LPF    Hyaline Casts 1 0 - 2 /LPF     Medications   lidocaine 1 % 0.1-1 mL (has no administration in time range)   lidocaine (LMX4) cream (has no administration in time range)   sodium chloride (PF) 0.9% PF flush 3 mL (has no administration in time range)   sodium chloride (PF) 0.9% PF flush 3 mL (3 mLs Intracatheter Not Given 10/9/20 7618)   lidocaine 1 % 0.1-1 mL (has no administration in time range)   lidocaine (LMX4) cream (has no administration in time range)   sodium chloride (PF) 0.9% PF flush 3 mL (has no administration in time range)   sodium chloride (PF)  0.9% PF flush 3 mL (3 mLs Intracatheter Given 10/9/20 1641)   ondansetron (ZOFRAN-ODT) ODT tab 4 mg (has no administration in time range)     Or   ondansetron (ZOFRAN) injection 4 mg (has no administration in time range)   acetaminophen (TYLENOL) Suppository 650 mg (has no administration in time range)   naloxone (NARCAN) injection 0.1-0.4 mg (has no administration in time range)   Lidocaine (LIDOCARE) 4 % Patch 2 patch (2 patches Transdermal Patch/Med Applied 10/9/20 1640)     And   lidocaine patch in PLACE ( Transdermal Patch in Place 10/9/20 1641)   methocarbamol (ROBAXIN) tablet 500 mg (500 mg Oral Given 10/9/20 1639)   gabapentin (NEURONTIN) capsule 100 mg (100 mg Oral Given 10/9/20 1639)   menthol (ICY HOT) 5 % patch 1 patch (has no administration in time range)     And   menthol (ICY HOT) Patch in Place (has no administration in time range)   sodium chloride 0.9% infusion ( Intravenous New Bag 10/9/20 1742)   oxyCODONE (ROXICODONE) tablet 5 mg (has no administration in time range)   acetaminophen (TYLENOL) tablet 975 mg (has no administration in time range)        Assessments & Plan (with Medical Decision Making)  75-year-old male with pathologic compression fractures increasing back pain difficulty ambulating not bowel or bladder weakness issues.  Patient currently being worked up for potential oncological cause.  Presented the ER with difficulties ambulating at this point discussed with medicine will plan admit to ED observation for pain control and also further evaluation assessment of home needs for ambulation and will continue recommend work-up for etiologies of these fractures that may be pathological for either osteoporosis or potentially some other oncological disorder.  patient agrees with plan.             I have reviewed the nursing notes. I have reviewed the findings, diagnosis, plan and need for follow up with the patient.    Current Discharge Medication List          Final diagnoses:   Chronic  midline low back pain without sciatica   Difficulty walking   Pathologic compression fracture of spine, sequela   Elevated serum creatinine       --  I, Michele Ahuja, am serving as a trained medical scribe to document services personally performed by Peter Matthews MD, based on the provider's statements to me.     IPeter MD, was physically present and have reviewed and verified the accuracy of this note documented by Michele Ahuja.    Peter Matthews MD  [facility] EMERGENCY DEPARTMENT  10/9/2020    This note was created at least in part by the use of dragon voice dictation system. Inadvertent typographical errors may still exist.  Peter Matthews MD.    Patient evaluated in the emergency department during the COVID-19 pandemic period. Careful attention to patients safety was addressed throughout the evaluation. Evaluation and treatment management was initiated with disposition made efficiently and appropriate as possible to minimize any risk of potential exposure to patient during this evaluation.       Peter Matthews MD  10/09/20 1914

## 2020-10-10 ENCOUNTER — APPOINTMENT (OUTPATIENT)
Dept: CT IMAGING | Facility: CLINIC | Age: 75
End: 2020-10-10
Attending: NURSE PRACTITIONER
Payer: COMMERCIAL

## 2020-10-10 ENCOUNTER — APPOINTMENT (OUTPATIENT)
Dept: PHYSICAL THERAPY | Facility: CLINIC | Age: 75
End: 2020-10-10
Attending: NURSE PRACTITIONER
Payer: COMMERCIAL

## 2020-10-10 LAB
ANION GAP SERPL CALCULATED.3IONS-SCNC: 5 MMOL/L (ref 3–14)
BASOPHILS # BLD AUTO: 0.1 10E9/L (ref 0–0.2)
BASOPHILS NFR BLD AUTO: 0.8 %
BUN SERPL-MCNC: 33 MG/DL (ref 7–30)
CALCIUM SERPL-MCNC: 7.2 MG/DL (ref 8.5–10.1)
CHLORIDE SERPL-SCNC: 120 MMOL/L (ref 94–109)
CO2 SERPL-SCNC: 16 MMOL/L (ref 20–32)
CREAT SERPL-MCNC: 1.56 MG/DL (ref 0.66–1.25)
DIFFERENTIAL METHOD BLD: ABNORMAL
EOSINOPHIL # BLD AUTO: 0.3 10E9/L (ref 0–0.7)
EOSINOPHIL NFR BLD AUTO: 4.4 %
ERYTHROCYTE [DISTWIDTH] IN BLOOD BY AUTOMATED COUNT: 12.9 % (ref 10–15)
GFR SERPL CREATININE-BSD FRML MDRD: 43 ML/MIN/{1.73_M2}
GLUCOSE SERPL-MCNC: 70 MG/DL (ref 70–99)
HCT VFR BLD AUTO: 29.5 % (ref 40–53)
HGB BLD-MCNC: 9.5 G/DL (ref 13.3–17.7)
IMM GRANULOCYTES # BLD: 0 10E9/L (ref 0–0.4)
IMM GRANULOCYTES NFR BLD: 0.3 %
LABORATORY COMMENT REPORT: NORMAL
LDH SERPL L TO P-CCNC: 149 U/L (ref 85–227)
LYMPHOCYTES # BLD AUTO: 1.1 10E9/L (ref 0.8–5.3)
LYMPHOCYTES NFR BLD AUTO: 19 %
MCH RBC QN AUTO: 32.8 PG (ref 26.5–33)
MCHC RBC AUTO-ENTMCNC: 32.2 G/DL (ref 31.5–36.5)
MCV RBC AUTO: 102 FL (ref 78–100)
MONOCYTES # BLD AUTO: 0.5 10E9/L (ref 0–1.3)
MONOCYTES NFR BLD AUTO: 7.6 %
NEUTROPHILS # BLD AUTO: 4 10E9/L (ref 1.6–8.3)
NEUTROPHILS NFR BLD AUTO: 67.9 %
NRBC # BLD AUTO: 0 10*3/UL
NRBC BLD AUTO-RTO: 0 /100
PLATELET # BLD AUTO: 266 10E9/L (ref 150–450)
POTASSIUM SERPL-SCNC: 3 MMOL/L (ref 3.4–5.3)
POTASSIUM SERPL-SCNC: 3.8 MMOL/L (ref 3.4–5.3)
RBC # BLD AUTO: 2.9 10E12/L (ref 4.4–5.9)
SARS-COV-2 RNA SPEC QL NAA+PROBE: NEGATIVE
SARS-COV-2 RNA SPEC QL NAA+PROBE: NORMAL
SODIUM SERPL-SCNC: 141 MMOL/L (ref 133–144)
SPECIMEN SOURCE: NORMAL
SPECIMEN SOURCE: NORMAL
WBC # BLD AUTO: 5.9 10E9/L (ref 4–11)

## 2020-10-10 PROCEDURE — 96361 HYDRATE IV INFUSION ADD-ON: CPT

## 2020-10-10 PROCEDURE — 250N000011 HC RX IP 250 OP 636: Performed by: NURSE PRACTITIONER

## 2020-10-10 PROCEDURE — 36415 COLL VENOUS BLD VENIPUNCTURE: CPT | Performed by: NURSE PRACTITIONER

## 2020-10-10 PROCEDURE — 250N000013 HC RX MED GY IP 250 OP 250 PS 637: Performed by: NURSE PRACTITIONER

## 2020-10-10 PROCEDURE — 82232 ASSAY OF BETA-2 PROTEIN: CPT | Performed by: NURSE PRACTITIONER

## 2020-10-10 PROCEDURE — 99226 PR SUBSEQUENT OBSERVATION CARE,LEVEL III: CPT | Performed by: FAMILY MEDICINE

## 2020-10-10 PROCEDURE — G0378 HOSPITAL OBSERVATION PER HR: HCPCS

## 2020-10-10 PROCEDURE — 36415 COLL VENOUS BLD VENIPUNCTURE: CPT | Performed by: EMERGENCY MEDICINE

## 2020-10-10 PROCEDURE — 85025 COMPLETE CBC W/AUTO DIFF WBC: CPT | Performed by: NURSE PRACTITIONER

## 2020-10-10 PROCEDURE — 258N000003 HC RX IP 258 OP 636: Performed by: NURSE PRACTITIONER

## 2020-10-10 PROCEDURE — 72131 CT LUMBAR SPINE W/O DYE: CPT | Mod: 26 | Performed by: RADIOLOGY

## 2020-10-10 PROCEDURE — 72128 CT CHEST SPINE W/O DYE: CPT

## 2020-10-10 PROCEDURE — 80048 BASIC METABOLIC PNL TOTAL CA: CPT | Performed by: NURSE PRACTITIONER

## 2020-10-10 PROCEDURE — 84132 ASSAY OF SERUM POTASSIUM: CPT | Performed by: EMERGENCY MEDICINE

## 2020-10-10 PROCEDURE — 83615 LACTATE (LD) (LDH) ENZYME: CPT | Performed by: NURSE PRACTITIONER

## 2020-10-10 PROCEDURE — 72128 CT CHEST SPINE W/O DYE: CPT | Mod: 26 | Performed by: RADIOLOGY

## 2020-10-10 PROCEDURE — 72131 CT LUMBAR SPINE W/O DYE: CPT

## 2020-10-10 PROCEDURE — 97161 PT EVAL LOW COMPLEX 20 MIN: CPT | Mod: GP | Performed by: PHYSICAL THERAPIST

## 2020-10-10 RX ORDER — POTASSIUM CHLORIDE 29.8 MG/ML
20 INJECTION INTRAVENOUS
Status: DISCONTINUED | OUTPATIENT
Start: 2020-10-10 | End: 2020-10-12

## 2020-10-10 RX ORDER — POLYETHYLENE GLYCOL 3350 17 G/17G
17 POWDER, FOR SOLUTION ORAL DAILY
Status: DISCONTINUED | OUTPATIENT
Start: 2020-10-10 | End: 2020-10-13 | Stop reason: HOSPADM

## 2020-10-10 RX ORDER — POTASSIUM CHLORIDE 1.5 G/1.58G
20-40 POWDER, FOR SOLUTION ORAL
Status: DISCONTINUED | OUTPATIENT
Start: 2020-10-10 | End: 2020-10-12

## 2020-10-10 RX ORDER — POTASSIUM CL/LIDO/0.9 % NACL 10MEQ/0.1L
10 INTRAVENOUS SOLUTION, PIGGYBACK (ML) INTRAVENOUS
Status: DISCONTINUED | OUTPATIENT
Start: 2020-10-10 | End: 2020-10-12

## 2020-10-10 RX ORDER — POTASSIUM CHLORIDE 7.45 MG/ML
10 INJECTION INTRAVENOUS
Status: DISCONTINUED | OUTPATIENT
Start: 2020-10-10 | End: 2020-10-12

## 2020-10-10 RX ORDER — CALCIUM CARBONATE 500(1250)
500 TABLET ORAL
Status: DISCONTINUED | OUTPATIENT
Start: 2020-10-10 | End: 2020-10-13 | Stop reason: HOSPADM

## 2020-10-10 RX ORDER — AMOXICILLIN 250 MG
2 CAPSULE ORAL 2 TIMES DAILY
Status: DISCONTINUED | OUTPATIENT
Start: 2020-10-10 | End: 2020-10-13 | Stop reason: HOSPADM

## 2020-10-10 RX ORDER — POTASSIUM CHLORIDE 750 MG/1
20-40 TABLET, EXTENDED RELEASE ORAL
Status: DISCONTINUED | OUTPATIENT
Start: 2020-10-10 | End: 2020-10-12

## 2020-10-10 RX ORDER — SODIUM CHLORIDE, SODIUM LACTATE, POTASSIUM CHLORIDE, CALCIUM CHLORIDE 600; 310; 30; 20 MG/100ML; MG/100ML; MG/100ML; MG/100ML
INJECTION, SOLUTION INTRAVENOUS CONTINUOUS
Status: DISCONTINUED | OUTPATIENT
Start: 2020-10-10 | End: 2020-10-13 | Stop reason: HOSPADM

## 2020-10-10 RX ADMIN — OXYCODONE HYDROCHLORIDE 5 MG: 5 TABLET ORAL at 17:10

## 2020-10-10 RX ADMIN — SODIUM CHLORIDE: 9 INJECTION, SOLUTION INTRAVENOUS at 03:13

## 2020-10-10 RX ADMIN — POTASSIUM CHLORIDE 40 MEQ: 750 TABLET, EXTENDED RELEASE ORAL at 10:15

## 2020-10-10 RX ADMIN — GABAPENTIN 100 MG: 100 CAPSULE ORAL at 19:59

## 2020-10-10 RX ADMIN — METHOCARBAMOL 500 MG: 500 TABLET, FILM COATED ORAL at 16:47

## 2020-10-10 RX ADMIN — GABAPENTIN 100 MG: 100 CAPSULE ORAL at 08:02

## 2020-10-10 RX ADMIN — ACETAMINOPHEN 975 MG: 325 TABLET, FILM COATED ORAL at 23:54

## 2020-10-10 RX ADMIN — METHOCARBAMOL 500 MG: 500 TABLET, FILM COATED ORAL at 08:01

## 2020-10-10 RX ADMIN — SODIUM CHLORIDE, POTASSIUM CHLORIDE, SODIUM LACTATE AND CALCIUM CHLORIDE: 600; 310; 30; 20 INJECTION, SOLUTION INTRAVENOUS at 22:30

## 2020-10-10 RX ADMIN — POTASSIUM CHLORIDE 20 MEQ: 750 TABLET, EXTENDED RELEASE ORAL at 11:39

## 2020-10-10 RX ADMIN — ACETAMINOPHEN 975 MG: 325 TABLET, FILM COATED ORAL at 11:39

## 2020-10-10 RX ADMIN — ACETAMINOPHEN 975 MG: 325 TABLET, FILM COATED ORAL at 06:07

## 2020-10-10 RX ADMIN — CALCIUM 500 MG: 500 TABLET ORAL at 11:54

## 2020-10-10 RX ADMIN — DOCUSATE SODIUM 50 MG AND SENNOSIDES 8.6 MG 2 TABLET: 8.6; 5 TABLET, FILM COATED ORAL at 20:00

## 2020-10-10 RX ADMIN — METHOCARBAMOL 500 MG: 500 TABLET, FILM COATED ORAL at 11:54

## 2020-10-10 RX ADMIN — CALCIUM 500 MG: 500 TABLET ORAL at 18:28

## 2020-10-10 RX ADMIN — DOCUSATE SODIUM 50 MG AND SENNOSIDES 8.6 MG 2 TABLET: 8.6; 5 TABLET, FILM COATED ORAL at 11:54

## 2020-10-10 RX ADMIN — SODIUM CHLORIDE, POTASSIUM CHLORIDE, SODIUM LACTATE AND CALCIUM CHLORIDE: 600; 310; 30; 20 INJECTION, SOLUTION INTRAVENOUS at 10:16

## 2020-10-10 RX ADMIN — PAMIDRONATE DISODIUM 60 MG: 9 INJECTION INTRAVENOUS at 11:40

## 2020-10-10 RX ADMIN — GABAPENTIN 100 MG: 100 CAPSULE ORAL at 14:08

## 2020-10-10 RX ADMIN — ACETAMINOPHEN 975 MG: 325 TABLET, FILM COATED ORAL at 16:47

## 2020-10-10 RX ADMIN — METHOCARBAMOL 500 MG: 500 TABLET, FILM COATED ORAL at 19:59

## 2020-10-10 RX ADMIN — OXYCODONE HYDROCHLORIDE 5 MG: 5 TABLET ORAL at 08:13

## 2020-10-10 NOTE — PROGRESS NOTES
"S: 72-year-old male admitted to observation by myself yesterday.  Patient has increasing lower back pain most likely due to multiple myeloma but no definitive diagnosis yet.  Patient presented for pain control and more assistance at home for ambulation issues etc. neurologically intact otherwise.  On medications etc. patient still having a fair amount of pain with movement in the low back area no bowel or bladder problems etc.  No fever chills otherwise.  Patient was able to sleep last night though he states he is able to lay flat and roll to the side but when he tries to sit up he has extreme amount of pain.  O:BP (!) 148/82   Pulse 63   Temp 97.8  F (36.6  C) (Oral)   Resp 16   Ht 1.549 m (5' 1\")   Wt 61 kg (134 lb 7.7 oz)   SpO2 99%   BMI 25.41 kg/m    In general patient though alert and oriented is not writhing in pain at this point his abdomen is benign and soft normal cardiopulmonary status.  Patient complains of lower back pain localized without radicular symptoms neurologically intact otherwise at this point.    Results for orders placed or performed during the hospital encounter of 10/09/20   CBC with platelets differential     Status: Abnormal   Result Value Ref Range    WBC 6.4 4.0 - 11.0 10e9/L    RBC Count 3.17 (L) 4.4 - 5.9 10e12/L    Hemoglobin 10.4 (L) 13.3 - 17.7 g/dL    Hematocrit 32.1 (L) 40.0 - 53.0 %     (H) 78 - 100 fl    MCH 32.8 26.5 - 33.0 pg    MCHC 32.4 31.5 - 36.5 g/dL    RDW 12.8 10.0 - 15.0 %    Platelet Count 308 150 - 450 10e9/L    Diff Method Automated Method     % Neutrophils 77.1 %    % Lymphocytes 15.9 %    % Monocytes 5.3 %    % Eosinophils 0.6 %    % Basophils 0.6 %    % Immature Granulocytes 0.5 %    Nucleated RBCs 0 0 /100    Absolute Neutrophil 4.9 1.6 - 8.3 10e9/L    Absolute Lymphocytes 1.0 0.8 - 5.3 10e9/L    Absolute Monocytes 0.3 0.0 - 1.3 10e9/L    Absolute Eosinophils 0.0 0.0 - 0.7 10e9/L    Absolute Basophils 0.0 0.0 - 0.2 10e9/L    Abs Immature " Granulocytes 0.0 0 - 0.4 10e9/L    Absolute Nucleated RBC 0.0    CRP inflammation     Status: Abnormal   Result Value Ref Range    CRP Inflammation 28.0 (H) 0.0 - 8.0 mg/L   Erythrocyte sedimentation rate auto     Status: None   Result Value Ref Range    Sed Rate 15 0 - 20 mm/h   INR     Status: None   Result Value Ref Range    INR 1.14 0.86 - 1.14   Partial thromboplastin time     Status: None   Result Value Ref Range    PTT 29 22 - 37 sec   Magnesium     Status: None   Result Value Ref Range    Magnesium 2.2 1.6 - 2.3 mg/dL   Comprehensive metabolic panel     Status: Abnormal   Result Value Ref Range    Sodium 140 133 - 144 mmol/L    Potassium 3.7 3.4 - 5.3 mmol/L    Chloride 113 (H) 94 - 109 mmol/L    Carbon Dioxide 15 (L) 20 - 32 mmol/L    Anion Gap 12 3 - 14 mmol/L    Glucose 74 70 - 99 mg/dL    Urea Nitrogen 43 (H) 7 - 30 mg/dL    Creatinine 2.19 (H) 0.66 - 1.25 mg/dL    GFR Estimate 29 (L) >60 mL/min/[1.73_m2]    GFR Estimate If Black 33 (L) >60 mL/min/[1.73_m2]    Calcium 9.3 8.5 - 10.1 mg/dL    Bilirubin Total 0.4 0.2 - 1.3 mg/dL    Albumin 2.9 (L) 3.4 - 5.0 g/dL    Protein Total 12.0 (H) 6.8 - 8.8 g/dL    Alkaline Phosphatase 88 40 - 150 U/L    ALT 16 0 - 70 U/L    AST 25 0 - 45 U/L   Phosphorus     Status: None   Result Value Ref Range    Phosphorus 4.1 2.5 - 4.5 mg/dL   CK total     Status: None   Result Value Ref Range    CK Total 74 30 - 300 U/L   UA with Microscopic reflex to Culture     Status: Abnormal    Specimen: Urine Midstream; Midstream Urine   Result Value Ref Range    Color Urine Yellow     Appearance Urine Clear     Glucose Urine Negative NEG^Negative mg/dL    Bilirubin Urine Negative NEG^Negative    Ketones Urine 40 (A) NEG^Negative mg/dL    Specific Gravity Urine 1.015 1.003 - 1.035    Blood Urine Trace (A) NEG^Negative    pH Urine 5.5 5.0 - 7.0 pH    Protein Albumin Urine 30 (A) NEG^Negative mg/dL    Urobilinogen mg/dL Normal 0.0 - 2.0 mg/dL    Nitrite Urine Negative NEG^Negative     Leukocyte Esterase Urine Negative NEG^Negative    Source Midstream Urine     WBC Urine 3 0 - 5 /HPF    RBC Urine <1 0 - 2 /HPF    Squamous Epithelial /HPF Urine 1 0 - 1 /HPF    Transitional Epi <1 0 - 1 /HPF    Mucous Urine Present (A) NEG^Negative /LPF    Hyaline Casts 1 0 - 2 /LPF   Asymptomatic COVID-19 Virus (Coronavirus) by PCR     Status: None    Specimen: Nasopharyngeal   Result Value Ref Range    COVID-19 Virus PCR to [facility] - Source Nasopharyngeal     COVID-19 Virus PCR to [facility] - Result       Test received-See reflex to IDDL test SARS CoV2 (COVID-19) Virus RT-PCR   CBC with platelets differential     Status: Abnormal   Result Value Ref Range    WBC 5.9 4.0 - 11.0 10e9/L    RBC Count 2.90 (L) 4.4 - 5.9 10e12/L    Hemoglobin 9.5 (L) 13.3 - 17.7 g/dL    Hematocrit 29.5 (L) 40.0 - 53.0 %     (H) 78 - 100 fl    MCH 32.8 26.5 - 33.0 pg    MCHC 32.2 31.5 - 36.5 g/dL    RDW 12.9 10.0 - 15.0 %    Platelet Count 266 150 - 450 10e9/L    Diff Method Automated Method     % Neutrophils 67.9 %    % Lymphocytes 19.0 %    % Monocytes 7.6 %    % Eosinophils 4.4 %    % Basophils 0.8 %    % Immature Granulocytes 0.3 %    Nucleated RBCs 0 0 /100    Absolute Neutrophil 4.0 1.6 - 8.3 10e9/L    Absolute Lymphocytes 1.1 0.8 - 5.3 10e9/L    Absolute Monocytes 0.5 0.0 - 1.3 10e9/L    Absolute Eosinophils 0.3 0.0 - 0.7 10e9/L    Absolute Basophils 0.1 0.0 - 0.2 10e9/L    Abs Immature Granulocytes 0.0 0 - 0.4 10e9/L    Absolute Nucleated RBC 0.0    Basic metabolic panel     Status: Abnormal   Result Value Ref Range    Sodium 141 133 - 144 mmol/L    Potassium 3.0 (L) 3.4 - 5.3 mmol/L    Chloride 120 (H) 94 - 109 mmol/L    Carbon Dioxide 16 (L) 20 - 32 mmol/L    Anion Gap 5 3 - 14 mmol/L    Glucose 70 70 - 99 mg/dL    Urea Nitrogen 33 (H) 7 - 30 mg/dL    Creatinine 1.56 (H) 0.66 - 1.25 mg/dL    GFR Estimate 43 (L) >60 mL/min/[1.73_m2]    GFR Estimate If Black 50 (L) >60 mL/min/[1.73_m2]    Calcium 7.2 (L) 8.5 - 10.1 mg/dL    Hematology Adult IP Consult: Patient to be seen: Routine within 24 hrs; Call back #: [phone]; Back Pain, concern for pathologic fracture, Being worked up for MM as out-patient; Consultant may enter orders: Yes     Status: None ()    Rafita Hurtado MD     10/10/2020  9:58 AM  Brief note,     I was paged from his nurse practitioner this morning to discuss   his dispo plan.  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  SPEP with immunofixation showed M spike related to   IgG-kappa light chain.    Patient presented to the ED for back pain and hematology was   paged to help in his management.  Patient had a referral to hematology/[place] [facility]  Scheduling tried to call him to set up an   appointment however the could not get a hold of him.    I looked at his chart and I reviewed his labs and I talked to my   attending.  Bone biopsy is needed to confirm the diagnosis   however it is not urgent procedure and likely will be on Tuesday.    For now if patient wants to go home:  Please to give 1 dose of biphosphonate (pamidronate or   Zometa/zoledronic acid) in addition to calcium 500 mg 3 times   daily.  Patient has to call clinic on Monday morning to set up an   appointment  Also, we recommend Neurosurgery to see him in the ED if not done   already.     Please call us if any questions or concerns!    Rafita Gaona MD  Hematology&Oncology Fellow  Pager: [phone]           A: Low back pain pathological fractures with compression fracture most likely concern for multiple myeloma.  Not definitive diagnosis yet.     Elevated creatinine now improved with hydration.  P: We will continue pain management also have PT evaluate the patient will discuss with oncology regarding further recommendations.  Would consider other things such as possible bracing to help symptoms versus other treatment and then  further recommendations for follow-up work-up for still definitive diagnosis.

## 2020-10-10 NOTE — PROGRESS NOTES
"   10/10/20 1000   Quick Adds   Type of Visit Initial PT Evaluation       Present no   Living Environment   People in home alone   Current Living Arrangements apartment   Home Accessibility no concerns   Transportation Anticipated car, drives self   Self-Care   Usual Activity Tolerance fair   Current Activity Tolerance poor   Regular Exercise No   Equipment Currently Used at Home none  (uses a 4 wheeled \"cart\" for gait when pain is bad)   Disability/Function   Hearing Difficulty or Deaf no   Difficulty Communicating no   Difficulty Eating/Swallowing no   Walking or Climbing Stairs Difficulty yes   Dressing/Bathing Difficulty yes   Toileting yes   Fall history within last six months no   Change in Functional Status Since Onset of Current Illness/Injury   (difficulty with ADLs prior to increased pain/admit)   General Information   Onset of Illness/Injury or Date of Surgery 10/09/20   Referring Physician Monika Dean CNP   Patient/Family Therapy Goals Statement (PT) states he cannot care for himself at home   Pertinent History of Current Problem (include personal factors and/or comorbidities that impact the POC) Rogelio Marshall is a 75 year old male admitted on 10/9/2020. He has a history of past medical history significant for hypertension, hyperlipidemia and previous SBO (2010) presents to ED with inability to perform ADLS and manage back pain   Cognition   Orientation Status (Cognition) oriented x 4   Pain Assessment   Patient Currently in Pain Yes, see Vital Sign flowsheet   Posture    Posture Forward head position;Protracted shoulders   Range of Motion (ROM)   ROM Comment no functional deficits observed   Strength   Strength Comments B LE grossly at least 4/5   Bed Mobility   Rolling Right [place] (Bed Mobility) minimum assist (75% patient effort)   Supine-Sit [place] (Bed Mobility) minimum assist (75% patient effort)   Sit-Supine [place] (Bed Mobility) minimum assist (75% " "patient effort)   Assistive Device (Bed Mobility) bed rails   Sit-Stand Transfer   Sit-Stand [place] (Transfers) contact guard;other (see comments)  (needed UE support of FWW)   Gait/Stairs (Locomotion)   [place] Level (Gait) minimum assist (75% patient effort)   Assistive Device (Gait) walker, front-wheeled   Distance in Feet (Required for LE Total Joints) 30   Comment (Gait/Stairs) very slow helen, decreased step length   Balance   Balance Comments needs UE support to maintain standing balance   Sensory Examination   Sensory Perception Comments no deficits observed   Clinical Impression   Criteria for Skilled Therapeutic Intervention evaluation only   PT Diagnosis (PT) impaired functional mobility in setting of pain and deconditioning   Influenced by the following impairments pain, impaired balance, decreased activity tolerance   Functional limitations due to impairments impaired transfers, impaired gait   Clinical Presentation Evolving/Changing   Clinical Presentation Rationale clinical judgment   Clinical Decision Making (Complexity) low complexity   Therapy Frequency (PT) Evaluation only   Risk & Benefits of therapy have been explained evaluation/treatment results reviewed   PT Discharge Planning    PT Discharge Recommendation (DC Rec) Transitional Care Facility   PT Rationale for DC Rec dependence with functional mobility, falls risk, inability to care for himself    PT Brief overview of current status  patient required min assist to transfer in/out of bed. ambulation limited to 30' with FWW and min assist.  took patient 15-20 min and min assist to transfer OOB, ambulate 30' and return to supine.  patient states he had increasing difficulty taking care of himself well before pain brought him to ED.    [facility] AM-PAC  \"6 Clicks\" V.2 Basic Mobility Inpatient Short Form   1. Turning from your back to your side while in a flat bed without using bedrails? 3 - A Little   2. Moving from lying on " your back to sitting on the side of a flat bed without using bedrails? 3 - A Little   3. Moving to and from a bed to a chair (including a wheelchair)? 3 - A Little   4. Standing up from a chair using your arms (e.g., wheelchair, or bedside chair)? 3 - A Little   5. To walk in hospital room? 3 - A Little   6. Climbing 3-5 steps with a railing? 2 - A Lot   Basic Mobility Raw Score (Score out of 24.Lower scores equate to lower levels of function) 17   Total Evaluation Time   Total Evaluation Time (Minutes) 35

## 2020-10-10 NOTE — PROGRESS NOTES
[facility]  Emergency Department Observation Unit Daily Progress Note          Assessment & Plan:   Rogelio Marshall is a 75 year old male admitted on 10/9/2020. He has a history of past medical history significant for hypertension, hyperlipidemia and previous SBO (2010) presents to ED with inability to perform ADLS and manage back pain      ##Diffuse back pain:   ##Multiple pathologic appearing compression fractures:  ##Susptect New MM:   ##Disposition:   Patient has been worked up for his back pain for 1 1/2 years.  CT scan showed extensive osteoporotic compression fractures with suggestion of possible myeloma. He was seen by Spine surgeon, Dr. Foley who reported due to the patient's poor bone quality, he is not a good candidate for surgery. The patient is working with his PCP for further diagnosis of his multiple pathologic appearing compression fractures. He has been managing his pain with his primary care doctor as well as orthopedics. He has trialed Ultram and Tizanadine without improvement. He has been moving and stand slowly.  Pain is in his lower back occasionally radiate into his hips when he's walking. Pain improved by laying flat.  He denies any radiation of pain to other areas of his body.  No bowel or bladder changes. He notes decrease in appetite and has not had normal bowel movements due to both the pain and not eating.  HD stable, afebrile. He is admitted to observation for pain control and also further evaluation assessment of home needs for ambulation and will continue recommended outpatient work-up for etiologies of these fractures that may be pathological for either osteoporosis or potentially some other oncological disorder. Discussed with Hematology who recommended Calcium 500 mg's TID, pamidronate 60-90 mg's IV x 1, bone marrow biopsy early next week, and NSG consult.  Previous ANUP discussed pain management with pain team. This morning he notes ongoing  "pain but has not yet tried oxycodone     -Scheduled Tylenol 975 mg q 4 hours  - Hold Ibuprofen due to HALLIE  - Lidocaine patches alternate with Menthol patches  - Gabapentin 100mg TID (titrate slowly)   - Robaxin 500mg QID  - Oxycodone 5 mg q 4 hours prn  - PT/OT  - SW consult  - COVID pending  - NSG consult   - Senna and Miralax   - Hematology consulted, appreciate assist      ##HTN: Has not started on any medication. Will trial pain management to assess if blood pressure is related to pain.      ##HALLIE: Creatinine 0.76 on 7/13--> Creatinine 1.72 on 10/5--> 2.19 in the ED and 1.56 this am.  May be related to possible MM diagnosis/Poor oral intake.  - NS at 100/hr  - Trend  BMP in am     ##Decreased appetite: Patient reports decrease in appetite. Attributes this to Ibuprofen. Denies any heartburn.   - Nutrition consult  - Zofran prn    FEN: ADAT  Lines: PIV  Prophylaxis: Early ambulation          Consults:   Hematology  NSG  PT  OT  SW         Discharge Planning:   Pending pain management and PT/OT evaluation         Interval History:   Resting in bed. Notes ongoing back pain. Has not yet tried oxycodone.     ROS:   Constitutional: No fevers/chills.   Cardiovascular: No chest pain   Respiratory: No cough or SOB.   GI: No abdominal pain. No N/V.      : No urinary complaints.   Musculoskeletal: Denies pain.           Physical Exam:   BP (!) 148/82   Pulse 63   Temp 97.8  F (36.6  C) (Oral)   Resp 16   Ht 1.549 m (5' 1\")   Wt 61 kg (134 lb 7.7 oz)   SpO2 99%   BMI 25.41 kg/m       GENERAL: Alert and oriented x 3. NAD.   HEENT: Anicteric sclera. Mucous membranes moist.   CV: RRR. S1, S2. No murmurs appreciated.   RESPIRATORY: Effort normal. Lungs CTAB with no wheezing, rales, rhonchi.   GI: Abdomen soft and non distended with normoactive bowel sounds present in all quadrants. No tenderness, rebound, guarding.   NEUROLOGICAL: No focal deficits. Moves all extremities.    EXTREMITIES: No peripheral edema. Intact " bilateral pedal pulses.   SKIN: No jaundice. No rashes.     Medication list reviewed.   Today's labs and imaging were reviewed.     JIMBO Francisco, CNP  Emergency Department Observation Unit    Results for orders placed or performed during the hospital encounter of 10/09/20   CBC with platelets differential     Status: Abnormal   Result Value Ref Range    WBC 6.4 4.0 - 11.0 10e9/L    RBC Count 3.17 (L) 4.4 - 5.9 10e12/L    Hemoglobin 10.4 (L) 13.3 - 17.7 g/dL    Hematocrit 32.1 (L) 40.0 - 53.0 %     (H) 78 - 100 fl    MCH 32.8 26.5 - 33.0 pg    MCHC 32.4 31.5 - 36.5 g/dL    RDW 12.8 10.0 - 15.0 %    Platelet Count 308 150 - 450 10e9/L    Diff Method Automated Method     % Neutrophils 77.1 %    % Lymphocytes 15.9 %    % Monocytes 5.3 %    % Eosinophils 0.6 %    % Basophils 0.6 %    % Immature Granulocytes 0.5 %    Nucleated RBCs 0 0 /100    Absolute Neutrophil 4.9 1.6 - 8.3 10e9/L    Absolute Lymphocytes 1.0 0.8 - 5.3 10e9/L    Absolute Monocytes 0.3 0.0 - 1.3 10e9/L    Absolute Eosinophils 0.0 0.0 - 0.7 10e9/L    Absolute Basophils 0.0 0.0 - 0.2 10e9/L    Abs Immature Granulocytes 0.0 0 - 0.4 10e9/L    Absolute Nucleated RBC 0.0    CRP inflammation     Status: Abnormal   Result Value Ref Range    CRP Inflammation 28.0 (H) 0.0 - 8.0 mg/L   Erythrocyte sedimentation rate auto     Status: None   Result Value Ref Range    Sed Rate 15 0 - 20 mm/h   INR     Status: None   Result Value Ref Range    INR 1.14 0.86 - 1.14   Partial thromboplastin time     Status: None   Result Value Ref Range    PTT 29 22 - 37 sec   Magnesium     Status: None   Result Value Ref Range    Magnesium 2.2 1.6 - 2.3 mg/dL   Comprehensive metabolic panel     Status: Abnormal   Result Value Ref Range    Sodium 140 133 - 144 mmol/L    Potassium 3.7 3.4 - 5.3 mmol/L    Chloride 113 (H) 94 - 109 mmol/L    Carbon Dioxide 15 (L) 20 - 32 mmol/L    Anion Gap 12 3 - 14 mmol/L    Glucose 74 70 - 99 mg/dL    Urea Nitrogen 43 (H) 7 - 30 mg/dL     Creatinine 2.19 (H) 0.66 - 1.25 mg/dL    GFR Estimate 29 (L) >60 mL/min/[1.73_m2]    GFR Estimate If Black 33 (L) >60 mL/min/[1.73_m2]    Calcium 9.3 8.5 - 10.1 mg/dL    Bilirubin Total 0.4 0.2 - 1.3 mg/dL    Albumin 2.9 (L) 3.4 - 5.0 g/dL    Protein Total 12.0 (H) 6.8 - 8.8 g/dL    Alkaline Phosphatase 88 40 - 150 U/L    ALT 16 0 - 70 U/L    AST 25 0 - 45 U/L   Phosphorus     Status: None   Result Value Ref Range    Phosphorus 4.1 2.5 - 4.5 mg/dL   CK total     Status: None   Result Value Ref Range    CK Total 74 30 - 300 U/L   UA with Microscopic reflex to Culture     Status: Abnormal    Specimen: Urine Midstream; Midstream Urine   Result Value Ref Range    Color Urine Yellow     Appearance Urine Clear     Glucose Urine Negative NEG^Negative mg/dL    Bilirubin Urine Negative NEG^Negative    Ketones Urine 40 (A) NEG^Negative mg/dL    Specific Gravity Urine 1.015 1.003 - 1.035    Blood Urine Trace (A) NEG^Negative    pH Urine 5.5 5.0 - 7.0 pH    Protein Albumin Urine 30 (A) NEG^Negative mg/dL    Urobilinogen mg/dL Normal 0.0 - 2.0 mg/dL    Nitrite Urine Negative NEG^Negative    Leukocyte Esterase Urine Negative NEG^Negative    Source Midstream Urine     WBC Urine 3 0 - 5 /HPF    RBC Urine <1 0 - 2 /HPF    Squamous Epithelial /HPF Urine 1 0 - 1 /HPF    Transitional Epi <1 0 - 1 /HPF    Mucous Urine Present (A) NEG^Negative /LPF    Hyaline Casts 1 0 - 2 /LPF   Asymptomatic COVID-19 Virus (Coronavirus) by PCR     Status: None    Specimen: Nasopharyngeal   Result Value Ref Range    COVID-19 Virus PCR to [facility] - Source Nasopharyngeal     COVID-19 Virus PCR to [facility] - Result       Test received-See reflex to IDDL test SARS CoV2 (COVID-19) Virus RT-PCR   CBC with platelets differential     Status: Abnormal   Result Value Ref Range    WBC 5.9 4.0 - 11.0 10e9/L    RBC Count 2.90 (L) 4.4 - 5.9 10e12/L    Hemoglobin 9.5 (L) 13.3 - 17.7 g/dL    Hematocrit 29.5 (L) 40.0 - 53.0 %     (H) 78 - 100 fl    MCH 32.8 26.5 -  33.0 pg    MCHC 32.2 31.5 - 36.5 g/dL    RDW 12.9 10.0 - 15.0 %    Platelet Count 266 150 - 450 10e9/L    Diff Method Automated Method     % Neutrophils 67.9 %    % Lymphocytes 19.0 %    % Monocytes 7.6 %    % Eosinophils 4.4 %    % Basophils 0.8 %    % Immature Granulocytes 0.3 %    Nucleated RBCs 0 0 /100    Absolute Neutrophil 4.0 1.6 - 8.3 10e9/L    Absolute Lymphocytes 1.1 0.8 - 5.3 10e9/L    Absolute Monocytes 0.5 0.0 - 1.3 10e9/L    Absolute Eosinophils 0.3 0.0 - 0.7 10e9/L    Absolute Basophils 0.1 0.0 - 0.2 10e9/L    Abs Immature Granulocytes 0.0 0 - 0.4 10e9/L    Absolute Nucleated RBC 0.0    Basic metabolic panel     Status: Abnormal   Result Value Ref Range    Sodium 141 133 - 144 mmol/L    Potassium 3.0 (L) 3.4 - 5.3 mmol/L    Chloride 120 (H) 94 - 109 mmol/L    Carbon Dioxide 16 (L) 20 - 32 mmol/L    Anion Gap 5 3 - 14 mmol/L    Glucose 70 70 - 99 mg/dL    Urea Nitrogen 33 (H) 7 - 30 mg/dL    Creatinine 1.56 (H) 0.66 - 1.25 mg/dL    GFR Estimate 43 (L) >60 mL/min/[1.73_m2]    GFR Estimate If Black 50 (L) >60 mL/min/[1.73_m2]    Calcium 7.2 (L) 8.5 - 10.1 mg/dL   Hematology Adult IP Consult: Patient to be seen: Routine within 24 hrs; Call back #: [phone]; Back Pain, concern for pathologic fracture, Being worked up for MM as out-patient; Consultant may enter orders: Yes     Status: None ()    Narrative    Rafita Gaona MD     10/10/2020  9:58 AM  Brief note,     I was paged from his nurse practitioner this morning to discuss   his dispo plan.  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  SPEP with immunofixation showed M spike related to   IgG-kappa light chain.    Patient presented to the ED for back pain and hematology was   paged to help in his management.  Patient had a referral to hematology/oncology at [facility].  Scheduling tried to call him to set up an   appointment however the could not  get a hold of him.    I looked at his chart and I reviewed his labs and I talked to my   attending.  Bone biopsy is needed to confirm the diagnosis   however it is not urgent procedure and likely will be on Tuesday.    For now if patient wants to go home:  Please to give 1 dose of biphosphonate (pamidronate or   Zometa/zoledronic acid) in addition to calcium 500 mg 3 times   daily.  Patient has to call clinic on Monday morning to set up an   appointment  Also, we recommend Neurosurgery to see him in the ED if not done   already.     Please call us if any questions or concerns!    Rafita Gaona MD  Hematology&Oncology Fellow  Pager: [phone]

## 2020-10-10 NOTE — CONSULTS
"Care Management Initial Consult    General Information  Assessment completed with:: Patient,    Type of CM/SW Visit: Initial Assessment           Reason for Consult: discharge planning  Advance Care Planning:   Pt indicates he has no family or friends or anyone to speak on his behalf.  He states that healthcare staff had tried to reach his sister, [place], because they could not get a hold of him and her phone number was not viable.       Communication Assessment  Patient's communication style: spoken language (English or Bilingual)  Hearing Difficulty or Deaf: no      Cognitive  Cognitive/Neuro/Behavioral: WDL                      Living Environment:   People in home: alone     Current living Arrangements: apartment     (TCU recommended and pt is agreeable).  [facility].  This is an independent apartment for folks who are low-income.  Owned by [employer].    Family/Social Support:  Care provided by:    Provides care for:    Marital Status: Single  Who is your support system?: None     Description of Support System: Uninvolved  Description of Support System: Uninvolved  Lacks adequate physical care        Description of Support System: Uninvolved  Support Assessment: Lacks adequate physical care    Current Resources:   Skilled Home Care Services:  NA  Community Resources:  Pt states he has a [facility]  but does not know name or number.    Equipment currently used at home: none(uses a 4 wheeled \"cart\" for gait when pain is bad)    Employment:  Employment Status: retired          Lifestyle & Psychosocial Needs:        Socioeconomic History     Marital status: Single     Spouse name: Not on file     Number of children: Not on file     Years of education: Not on file     Highest education level: Not on file   Occupational History     Occupation: retired     Tobacco Use     Smoking status: Former Smoker     Packs/day: 0.10     Years: 30.00     Pack years: " 3.00     Types: Cigarettes     Smokeless tobacco: Never Used   Substance and Sexual Activity     Alcohol use: Yes     Alcohol/week: 17.5 standard drinks     Types: 21 drink(s) per week       Functional Status:  Prior to admission patient needed assistance:   Pt states he was having difficulty with walking and caring for himself at home.    Mental Health Status:  .WDL except  unkempt               Additional Information:  Pt flagged for needing a MOON form completed as well as SW consulted for discharge planning.    SW called pt over the phone, introduced self, role and reason for the call/visit.  Discussed that writer is calling rather than seeing him in person to reduce amount of staff in/out of his room.  He engaged easily over the phone but tells writer that he prefers in-person visits whenever possible.      Pt lives alone in an independent living apartment.  He tells writer he has no family or friends; no one to speak on his behalf.  He states that a couple of [facility]  have called him over the phone but he admits to not engaging with them as he prefers in-person visits.  We discussed that pt may be eligible for in-home supports that are arranged for him by the .  Pt voices understanding and admits that he was really unaware of their role.    We discussed PT's recommendation for a TCU.  Pt is not familiar with a TCU so informed pt of TCU level of care and locations.  He voices agreement to this recommendation stating that the reason he came to the hospital is because he was unable to care for himself at home.    The plan is for writer to give pt a list of TCU's for pt to review and then writer will call pt later.    Writer gave pt a list of TCU's from Medicare's website via pt's nurse, Katie.      1615:  SHAYNE called pt in his room.  He asks for more TCU options.  He's not sure which to choose as he's really not familiar with any.  Pt asks for more information about area TCU's.  SHAYNE  "will give pt an expanded list from Medicare.gov as well as print out some info pages from the [facility] book that have more detailed information about each facility.    1700:  SW received page from pt's nurse, Katie.  Katie states that Oncology just met with pt and shared with him the diagnosis of bone cancer.  Pt has questions for SW in relation to the SNF's.  SW met with pt at his bedside.  He confirms he was just given the diagnosis of bone cancer and states \"I might not live up to 5 years.\"  He states that the Oncologist has recommended a bone biopsy at the [facility] and states he will need help making this appointment as well as transport to and from the biopsy.  Writer reassured pt that staff can assist pt in making this appointment as well as that medical transport is available (and covered by insurance).  Writer stated that the SNF staff can arrange transport for him as well as arrange any other appointments.    He states that he is anticipating that his condition may not get any better thus he is thinking he may need long-term care.  He is also thinking about having his brother be his 'executor of his estate'.  Pt tells writer that his brother, Dutch Marshall lives in [place], [place], and was the executor of his parent's estate so \"he knows how to do it\".  Pt also states he does not want to contact his brother himself but rather wants a staff person to contact his brother, update him and ask him to be the executor of his estate.    Pt states he (pt) has been estranged from his siblings for sometime.  He states he does not have \"family loyalty\" and that he was the \"rebel\" in the family.    Pt states he feels his pain regimen needs to be improved before feeling ready to discharge.  He states this latest pain med he took was \"the best one yet\".    SW informed pt that SW will see him tomorrow to choose 3-5 SNF's to make referrals to.    SW updated Rafy, NP, as well as wonder if " Palliative Consult for pain management and goals of care is appropriate?  Writer did not bring this up with the pt in my interaction today but thought it may benefit him.  He reports that his only support his healthcare staff at this time and given his reflective nature, he may benefit from a holistic approach to care.    Of note, quick search on the Internet produces result for Dutch Marshall.  [address], [address]  [place]  [address]  [phone]      MORGAN Foster  Social Work Services  Emergency Department & Obs  [phone] phone  [phone] pager  8:00am-8:30pm Mon-Sat    On-call pager, [phone], 4:00pm to midnight        MITESH JIMENEZ

## 2020-10-10 NOTE — PLAN OF CARE
OT OBS    OT holding, PT evaluated and rec TCU as pt reports pain and deficit with completing transfers and ADLs safely.  Limited activity tolerance for IADLs such as cooking.   Will hold OT evaluation for now as pt is observation and appropriate disc recommendations have been made.

## 2020-10-10 NOTE — PLAN OF CARE
"- Adequate pain control on oral analgesia: No  - Vital Signs normal or at patient baseline: Yes  - Tolerating oral intake to maintain hydration: In progress  - Diagnostic tests and consults completed and resulted: No  - Cleared for discharge from consultants' standpoint if involved in care: No  - Safe disposition plan has been identified: No  - Return to baseline functional status: No    BP (!) 149/80 (BP Location: Right arm)   Pulse 89   Temp 98.4  F (36.9  C) (Oral)   Resp 18   Ht 1.549 m (5' 1\")   Wt 61 kg (134 lb 7.7 oz)   SpO2 100%   BMI 25.41 kg/m      "

## 2020-10-10 NOTE — PROGRESS NOTES
CLINICAL NUTRITION SERVICES - ASSESSMENT NOTE     Nutrition Prescription    RECOMMENDATIONS FOR MDs/PROVIDERS TO ORDER:  --Recommend a MVI with minerals supplement.    Malnutrition Status:    Severe malnutrition in the context of acute illness    Recommendations already ordered by Registered Dietitian (RD):  --Boost Plus chocolate TID between meals; also ordered 2 to come up for pt to try/keep in room.  --Encouraged small, more frequent nutrient-dense meals.    Future/Additional Recommendations:  --Provided handout in discharge instructions for increasing kcal/protein.  --Monitor PO intake, supplement tolerance, weight trends.      REASON FOR ASSESSMENT  Rogelio Marshall is a/an 75 year old male assessed by the dietitian for Provider Order - Decreased appetite, i.e eating one egg a day, working up for cancer diagnosis.    NUTRITION HISTORY  PMH HTN, HLD, and previous SBO (2010) presents to ED with inability to perform ADLS and manage back pain. CT scan showed extensive osteoporotic compression fractures with suggestion of possible myeloma. Patient reports decrease in appetite. Attributes this to Ibuprofen.    Brief hematology note in for consult, bone biopsy scheduled for Tuesday to confirm diagnosis.     GI: constipation, unsure of last BM.    Visited with pt over the phone. Pt states he has been taking ibuprofen regularly due to back pain and is wondering if this is causing pt's reduced appetite. Discussed how pain can reduce appetite. Pt reports cooking and walking has been more and more difficult, which is a reason he came to the hospital. As a result, pt has been eating less. Pt states his appetite is a little better in the hospital and reports eating 50% of the chicken stir joseph and 100% of potato soup for dinner last night, which is more than he's eaten at a meal in quite some time.    Pt states he is eating more frozen and pre-packaged meals that are easier to prepare. Pt has never tried Ensure or Boost  "supplements. Encouraged pt try incorporating oral nutrition supplements with meals if he's unable to eat/prepare much (re: pt mentioned eating Ramen noodles as they are easy to prepare). Pt agreed to try Boost Plus @ room temperature (doesn't like cold foods/drinks).     CURRENT NUTRITION ORDERS  Diet: regular  Intake/Tolerance: % x 2 meals    LABS  K+ 3.0 (L <- 3.7)  Cr 1.56 (H <- 2.19)  Mg++ 2.2 (WNL)  Phos 4.1 (WNL)  Vit D >155 (H)    MEDICATIONS  Calcium carbonate 500 mg TID  Miralax  Senna-docusate  LR @ 100 ml/hr    ANTHROPOMETRICS  Height: 154.9 cm (5' 1\")   Most Recent Weight: 61 kg (134 lb 7.7 oz)    IBW: 51 kg   BMI: Overweight BMI 25-29.9  Weight History: 7% wt loss x 3-4 weeks - significant   Wt Readings from Last 30 Encounters:   10/09/20 61 kg (134 lb 7.7 oz)   09/28/20 65.8 kg (145 lb)   09/14/20 65.8 kg (145 lb)   11/15/19 64.9 kg (143 lb)   07/09/19 64 kg (141 lb)   06/18/19 68.5 kg (151 lb)   06/09/19 68.5 kg (151 lb)   01/26/16 73.2 kg (161 lb 6.4 oz)   05/24/13 68.5 kg (151 lb 1.6 oz)   12/12/12 69 kg (152 lb 3.2 oz)   09/04/12 67.9 kg (149 lb 12.8 oz)   06/22/12 69.4 kg (153 lb)     Dosing Weight: 61 kg lowest weight this admission    ASSESSED NUTRITION NEEDS  Estimated Energy Needs: 9807-4165 kcals/day (25 - 30 kcals/kg)  Justification: Maintenance  Estimated Protein Needs: 73-92 grams protein/day (1.2 - 1.5 grams of pro/kg)  Justification: Increased needs and Repletion  Estimated Fluid Needs: (1 mL/kcal)   Justification: Maintenance and Per provider pending fluid status    PHYSICAL FINDINGS  Unable to obtain in-person nutrition history or nutrition focused physical assessment (NFPA) from patient as the number of staff going into rooms is restricted to limit exposure and to minimize use of PPE.    MALNUTRITION  % Intake: </= 50% for >/= 5 days (severe)  % Weight Loss: > 5% in 1 month (severe)  Subcutaneous Fat Loss: Unable to assess  Muscle Loss: Unable to assess  Fluid " Accumulation/Edema: none noted  Malnutrition Diagnosis: Severe malnutrition in the context of acute illness    NUTRITION DIAGNOSIS  Inadequate oral intake related to poor appetite and pain inhibiting ability to prepare and cook meals as evidenced by patient report of eating <50% of meals and significant 7% weight loss x 1 month.      INTERVENTIONS  Implementation  Nutrition Education: RD role, high kcal/high protein diet recommendations, oral nutrition supplements, small/frequent meals.    Medical food supplement therapy     Goals  Patient to consume % of nutritionally adequate meal trays TID, or the equivalent with supplements/snacks.     Monitoring/Evaluation  Progress toward goals will be monitored and evaluated per protocol.    Cat He, MS, RD, LD, CNSC  Weekend Pager: [phone]  7A RD Pager: [phone]

## 2020-10-10 NOTE — PLAN OF CARE
- Adequate pain control on oral analgesia. Somewhat. Reports 3/10 pain at rest.   - Vital Signs normal or at patient baseline. Yes  - Tolerating oral intake to maintain hydration. Yes  - Diagnostic tests and consults completed and resulted. Yes. Oncology recommends bone marrow biopsy outpatient  - Cleared for discharge from consultants' standpoint if involved in care. No  - Safe disposition plan has been identified. No. PT recommending TCU, social work provided patient with list of TCU options.   - Return to baseline functional status. Yes    VSS on RA. 3/10 lower back pain at rest. Scheduled medications and oxycodone 5mg x2. CT lumbar and thoracic spine performed. Lactated ringers infusing 100ml/hr. 3.0 K replaced per protocol, recheck 3.8. Tolerating regular diet. No BM, senna given in AM. PT consulted, recommending TCU. Social work providing patient with TCU options. Will continue with POC, meeting observation goals.

## 2020-10-10 NOTE — CONSULTS
Brief note,     I was paged from his nurse practitioner this morning to discuss his dispo plan.  Briefly, Rogelio Marshall is a 75-year-old gentleman with lytic lesions on his axial skeleton on CT scan 9/18/2020 suspicious to multiple myeloma.  SPEP with immunofixation showed M spike related to IgG-kappa light chain.    Patient presented to the ED for back pain and hematology was paged to help in his management.  Patient had a referral to hematology/oncology at [facility].  Scheduling tried to call him to set up an appointment however the could not get a hold of him.    I looked at his chart and I reviewed his labs and I talked to my attending.  Bone biopsy is needed to confirm the diagnosis however it is not urgent procedure and likely will be on Tuesday.    For now if patient wants to go home:  Please to give 1 dose of biphosphonate (pamidronate or Zometa/zoledronic acid) in addition to calcium 500 mg 3 times daily.  Patient has to call clinic on Monday morning to set up an appointment  Also, we recommend Neurosurgery to see him in the ED if not done already.     Please call us if any questions or concerns!    Rafita Gaona MD  Hematology&Oncology Fellow  Pager: [phone]

## 2020-10-10 NOTE — DISCHARGE INSTRUCTIONS
Suggestions for Increasing Calories and Protein  -Several small meals a day are easier to eat and digest than three large ones. Space meals about 2 to 3 hours apart to maximize comfort.  -Stop eating 2 to 3 hours before bed and sleep with your head elevated if gastric reflux (GERD) and heartburn are Problems.  -Do not eat your favorite foods if you are feeling bad. Save them for when you feel good!  -Eat breakfast-type foods at any meal. Eggs are usually easy to eat and are great any time of the day. (The same goes for pancakes and waffles.)  -Eat when you feel hungry. Most people have the greatest appetite in the morning because they have not eaten all night. If this is the best meal for you, then pile on those calories and other nutrients in the morning and at lunch. Then you can have a smaller dinner without losing total calories for the day.  -Eat leftovers or nutritious snacks in the afternoon and early evening to round out your day.  -Try homemade or commercially prepared nutrition bars and puddings, as well as calorie- and protein-rich liquid nutritional supplements.    Save Room for Calorie-Rich Food!  -Drink most fluids between meals instead of with meals. (It is fine to have a sip to help swallow food at meal time.) Fluids (which usually have fewer calories and nutrients than solid food) can take up valuable space in your stomach.    Copyright Academy of Nutrition and Dietetics. This handout may be duplicated for client education. Page 2/2    Foods Recommended  High-Protein Foods  Milk products  -Add cheese to toast, crackers, sandwiches, baked potatoes, vegetables, soups, noodles, meat, and fruit. Use reduced-fat (2%) or whole milk in place of water when cooking cereal and cream soups. Include cream sauces on vegetables and pasta. Add powdered milk to cream soups and mashed potatoes.     Eggs   -Have hard-cooked eggs readily available in the refrigerator. Chop and add to salads, casseroles, soups, and  vegetables. Make a quick egg salad. All eggs should be well cooked to avoid the risk of harmful bacteria.    Meats, poultry, and fish  -Add leftover cooked meats to soups, casseroles, salads, and omelets. Make dip by mixing diced, chopped, or shredded meat with sour cream and spices.    Beans, legumes, nuts, and seeds  -Sprinkle nuts and seeds on cereals, fruit, and desserts such as ice cream, pudding, and custard. Also serve nuts and seeds on vegetables, salads, and pasta. Spread peanut butter on toast, bread, English muffins, and fruit, or blend it in a milk shake. Add beans and peas to salads, soups, casseroles, and vegetable dishes.    High-Calorie Foods  Butter, margarine, and oils  -Melt butter or margarine over potatoes, rice, pasta, and cooked vegetables. Add melted butter or margarine into soups and casseroles and spread on bread for sandwiches before spreading sandwich spread or peanut butter. Sauté or stir-joseph vegetables, meats, chicken and fish such as shrimp/scallops in olive or canola oil. A variety of oils add calories and can be used to marinate meat, chicken, or fish.    Milk products  -Add whipping cream to desserts, pancakes, waffles, fruit, and hot chocolate, and fold it into soups and casseroles. Add sour cream to baked potatoes and vegetables.     Salad dressing  -Use regular (not low-fat or diet) mayonnaise and salad dressing on sandwiches and in dips with vegetables and fruit.    Sweets   -Add jelly and honey to bread and crackers. Add jam to fruit and ice cream and as a topping over cake.    Oral Nutrition Supplements:  Look for any that contain at least 250-350 calories and 10+ grams of protein       --Examples: Boost Plus, Ensure Plus, Ensure Enlive, or any store-brand equivalent.  Products can be purchased online or from most grocery stores and super centers ([employer], [address]).        --You can also try [place] Instant Breakfast mixed with 2% or whole milk, or have  chocolate pudding in between meals.

## 2020-10-10 NOTE — PLAN OF CARE
- Adequate pain control on oral analgesia. Yes  - Vital Signs normal or at patient baseline. Yes  - Tolerating oral intake to maintain hydration. Yes  - Diagnostic tests and consults completed and resulted. Yes  - Cleared for discharge from consultants' standpoint if involved in care. No  - Safe disposition plan has been identified. No  - Return to baseline functional status. Yes

## 2020-10-10 NOTE — CONSULTS
[facility]       NEUROSURGERY CONSULTATION NOTE    This consultation was requested by the ED Obs service.    Reason for Consultation: Back pain    HPI: Rogelio Marshall is a 75 year old male who was seen for back pain in the setting of known compression fractures; he is being worked up for multiple myeloma. He states he has had the same pain that started about a year and a half ago and has progressively gotten worse. It feels like a gnawing pain that is in his lower back. It has slowly progressed to the point where it is affecting his mobility and ADLs. He has seen ortho spine as an outpatient for his known compression fractures. He has no other neuro concerns, outside of his back pain.      PAST MEDICAL HISTORY:   Past Medical History:   Diagnosis Date     Hyperlipidemia      Hypertension        PAST SURGICAL HISTORY:   Past Surgical History:   Procedure Laterality Date     HERNIA REPAIR, INGUINAL RT/LT      needed post-op intestinal repair     ORIF left clavical         FAMILY HISTORY:   Family History   Problem Relation Age of Onset     C.A.D. Father          MI age 69     Hypertension Sister         obese     Family History Negative Brother        SOCIAL HISTORY:   Social History     Tobacco Use     Smoking status: Former Smoker     Packs/day: 0.10     Years: 30.00     Pack years: 3.00     Types: Cigarettes     Smokeless tobacco: Never Used   Substance Use Topics     Alcohol use: Yes     Alcohol/week: 17.5 standard drinks     Types: 21 drink(s) per week       MEDICATIONS:  Medications Prior to Admission   Medication Sig Dispense Refill Last Dose     ibuprofen (ADVIL/MOTRIN) 200 MG tablet Take 200 mg by mouth every 4 hours as needed for mild pain   10/9/2020 at Unknown time     traMADol (ULTRAM) 50 MG tablet Take 1 tablet (50 mg) by mouth every 6 hours as needed for breakthrough pain 20 tablet 0 10/9/2020 at Unknown time     diclofenac (VOLTAREN) 75 MG EC tablet  "Take 1 tablet (75 mg) by mouth 2 times daily (Patient not taking: Reported on 9/14/2020) 20 tablet 0      tiZANidine (ZANAFLEX) 4 MG capsule Take 1 capsule (4 mg) by mouth nightly as needed for muscle spasms (Patient not taking: Reported on 9/14/2020) 15 capsule 1      tiZANidine (ZANAFLEX) 4 MG tablet Take 1 tablet (4 mg) by mouth 3 times daily (Patient not taking: Reported on 9/14/2020) 10 tablet 0      traMADol (ULTRAM) 50 MG tablet Take 1 tablet (50 mg) by mouth nightly as needed for breakthrough pain (Patient not taking: Reported on 9/14/2020) 18 tablet 0        Allergies:  No Known Allergies    ROS: 10 point ROS were all negative except for pertinent positives noted in my HPI.    Physical exam:   Blood pressure (!) 157/87, pulse 71, temperature 98.8  F (37.1  C), temperature source Oral, resp. rate 16, height 1.549 m (5' 1\"), weight 61 kg (134 lb 7.7 oz), SpO2 98 %.  CV: HR and BP as noted above  PULM: breathing comfortably  ABD: soft, non-distended  NEUROLOGIC:  -- Awake; Alert; oriented x 3  -- Follows commands briskly  -- Speech fluent, spontaneous. No aphasia or dysarthria.  -- no gaze preference. No apparent hemineglect.  Cranial Nerves:  -- PERRL 3-2mm bilat and brisk, extraocular movements intact  -- face symmetrical, tongue midline  -- palate elevates symmetrically, uvula midline  -- hearing grossly intact bilat  -- Trapezii 5/5 strength bilat symmetric    Motor:  Normal bulk / tone; no tremor, rigidity, or bradykinesia.  No muscle wasting or fasciculations     Delt Bi Tri Hand Flexion/  Extension Iliopsoas Quadriceps Hamstrings Tibialis Anterior Gastroc    C5 C6 C7 C8/T1 L2 L3 L4-S1 L4 S1   R 5 5 5 5 5 5 5 5 5   L 5 5 5 5 5 5 5 5 5   Sensory:  intact to LT x 4 extremities; low back pain worse when pressing on the paraspinal area in his lower back, not worse by pressing midline.    Reflexes:     Bi BR Katie Pat Ach Bab    C5-6 C6 UMN L2-4 S1 UMN   R 2+ 2+ Norm 2+ 2+ Norm   L 2+ 2+ Norm 2+ 2+ Norm "     Gait: Deferred      LABS:  Recent Labs   Lab 10/10/20  0758 10/09/20  1405 10/05/20  1500    140 134   POTASSIUM 3.0* 3.7 3.6   CHLORIDE 120* 113* 111*   CO2 16* 15* 15*   ANIONGAP 5 12 8   GLC 70 74 96   BUN 33* 43* 38*   CR 1.56* 2.19* 1.72*   MIRI 7.2* 9.3 9.0       Recent Labs   Lab 10/10/20  0758   WBC 5.9   RBC 2.90*   HGB 9.5*   HCT 29.5*   *   MCH 32.8   MCHC 32.2   RDW 12.9          IMAGING:  None this admission    ASSESSMENT:  75 year old male multiple compression fractures who has been assessed by Dr. Foley of the orthopedic service. He has progressive low back pain, and no new neurologic deficits. Intact on exam.    RECOMMENDATIONS:  No neurosurgical intervention indicated at this time   CT lumbar and thoracic spine to assess for changes compared to the 9/18 scans    The patient was discussed with Dr. Mendoza, neurosurgery chief resident, and Dr. Comer, neurosurgery staff, and they agree with the above.    Abdelrahman Lee MD  Neurosurgery Resident, PGY-2    Please contact neurosurgery resident on call with questions.    Dial * * [phone], enter [phone] when prompted.

## 2020-10-11 LAB
ALBUMIN SERPL-MCNC: 2.2 G/DL (ref 3.4–5)
ALP SERPL-CCNC: 70 U/L (ref 40–150)
ALT SERPL W P-5'-P-CCNC: 12 U/L (ref 0–70)
ANION GAP SERPL CALCULATED.3IONS-SCNC: 7 MMOL/L (ref 3–14)
AST SERPL W P-5'-P-CCNC: 19 U/L (ref 0–45)
BASOPHILS # BLD AUTO: 0 10E9/L (ref 0–0.2)
BASOPHILS NFR BLD AUTO: 0.7 %
BILIRUB SERPL-MCNC: 0.4 MG/DL (ref 0.2–1.3)
BUN SERPL-MCNC: 20 MG/DL (ref 7–30)
CALCIUM SERPL-MCNC: 8.5 MG/DL (ref 8.5–10.1)
CHLORIDE SERPL-SCNC: 115 MMOL/L (ref 94–109)
CO2 SERPL-SCNC: 17 MMOL/L (ref 20–32)
CREAT SERPL-MCNC: 1.1 MG/DL (ref 0.66–1.25)
DIFFERENTIAL METHOD BLD: ABNORMAL
EOSINOPHIL # BLD AUTO: 0.2 10E9/L (ref 0–0.7)
EOSINOPHIL NFR BLD AUTO: 4.4 %
ERYTHROCYTE [DISTWIDTH] IN BLOOD BY AUTOMATED COUNT: 13 % (ref 10–15)
GFR SERPL CREATININE-BSD FRML MDRD: 66 ML/MIN/{1.73_M2}
GLUCOSE SERPL-MCNC: 86 MG/DL (ref 70–99)
HCT VFR BLD AUTO: 30.5 % (ref 40–53)
HGB BLD-MCNC: 10.1 G/DL (ref 13.3–17.7)
IMM GRANULOCYTES # BLD: 0 10E9/L (ref 0–0.4)
IMM GRANULOCYTES NFR BLD: 0.4 %
LYMPHOCYTES # BLD AUTO: 0.6 10E9/L (ref 0.8–5.3)
LYMPHOCYTES NFR BLD AUTO: 10.7 %
MAGNESIUM SERPL-MCNC: 1.6 MG/DL (ref 1.6–2.3)
MCH RBC QN AUTO: 33.4 PG (ref 26.5–33)
MCHC RBC AUTO-ENTMCNC: 33.1 G/DL (ref 31.5–36.5)
MCV RBC AUTO: 101 FL (ref 78–100)
MONOCYTES # BLD AUTO: 0.3 10E9/L (ref 0–1.3)
MONOCYTES NFR BLD AUTO: 5 %
NEUTROPHILS # BLD AUTO: 4.3 10E9/L (ref 1.6–8.3)
NEUTROPHILS NFR BLD AUTO: 78.8 %
NRBC # BLD AUTO: 0 10*3/UL
NRBC BLD AUTO-RTO: 0 /100
PHOSPHATE SERPL-MCNC: 2.2 MG/DL (ref 2.5–4.5)
PLATELET # BLD AUTO: 270 10E9/L (ref 150–450)
POTASSIUM SERPL-SCNC: 3.9 MMOL/L (ref 3.4–5.3)
PROT SERPL-MCNC: 9.4 G/DL (ref 6.8–8.8)
RBC # BLD AUTO: 3.02 10E12/L (ref 4.4–5.9)
SODIUM SERPL-SCNC: 139 MMOL/L (ref 133–144)
WBC # BLD AUTO: 5.4 10E9/L (ref 4–11)

## 2020-10-11 PROCEDURE — 999N000111 HC STATISTIC OT IP EVAL DEFER: Performed by: OCCUPATIONAL THERAPIST

## 2020-10-11 PROCEDURE — 80053 COMPREHEN METABOLIC PANEL: CPT | Performed by: PHYSICIAN ASSISTANT

## 2020-10-11 PROCEDURE — 250N000013 HC RX MED GY IP 250 OP 250 PS 637: Performed by: NURSE PRACTITIONER

## 2020-10-11 PROCEDURE — 84100 ASSAY OF PHOSPHORUS: CPT | Performed by: PHYSICIAN ASSISTANT

## 2020-10-11 PROCEDURE — 85025 COMPLETE CBC W/AUTO DIFF WBC: CPT | Performed by: PHYSICIAN ASSISTANT

## 2020-10-11 PROCEDURE — 258N000003 HC RX IP 258 OP 636: Performed by: NURSE PRACTITIONER

## 2020-10-11 PROCEDURE — G0378 HOSPITAL OBSERVATION PER HR: HCPCS

## 2020-10-11 PROCEDURE — 99225 PR SUBSEQUENT OBSERVATION CARE,LEVEL II: CPT | Performed by: INTERNAL MEDICINE

## 2020-10-11 PROCEDURE — 36415 COLL VENOUS BLD VENIPUNCTURE: CPT | Performed by: PHYSICIAN ASSISTANT

## 2020-10-11 PROCEDURE — 999N000127 HC STATISTIC PERIPHERAL IV START W US GUIDANCE

## 2020-10-11 PROCEDURE — 83735 ASSAY OF MAGNESIUM: CPT | Performed by: PHYSICIAN ASSISTANT

## 2020-10-11 RX ORDER — ACETAMINOPHEN 325 MG/1
975 TABLET ORAL 3 TIMES DAILY
Status: DISCONTINUED | OUTPATIENT
Start: 2020-10-11 | End: 2020-10-13 | Stop reason: HOSPADM

## 2020-10-11 RX ADMIN — METHOCARBAMOL 500 MG: 500 TABLET, FILM COATED ORAL at 13:03

## 2020-10-11 RX ADMIN — GABAPENTIN 100 MG: 100 CAPSULE ORAL at 19:58

## 2020-10-11 RX ADMIN — LIDOCAINE 2 PATCH: 560 PATCH PERCUTANEOUS; TOPICAL; TRANSDERMAL at 07:48

## 2020-10-11 RX ADMIN — DOCUSATE SODIUM 50 MG AND SENNOSIDES 8.6 MG 2 TABLET: 8.6; 5 TABLET, FILM COATED ORAL at 07:48

## 2020-10-11 RX ADMIN — POLYETHYLENE GLYCOL 3350 17 G: 17 POWDER, FOR SOLUTION ORAL at 07:48

## 2020-10-11 RX ADMIN — ACETAMINOPHEN 975 MG: 325 TABLET, FILM COATED ORAL at 06:30

## 2020-10-11 RX ADMIN — CALCIUM 500 MG: 500 TABLET ORAL at 07:48

## 2020-10-11 RX ADMIN — OXYCODONE HYDROCHLORIDE 5 MG: 5 TABLET ORAL at 06:30

## 2020-10-11 RX ADMIN — GABAPENTIN 100 MG: 100 CAPSULE ORAL at 07:48

## 2020-10-11 RX ADMIN — CALCIUM 500 MG: 500 TABLET ORAL at 13:03

## 2020-10-11 RX ADMIN — OXYCODONE HYDROCHLORIDE 5 MG: 5 TABLET ORAL at 15:54

## 2020-10-11 RX ADMIN — METHOCARBAMOL 500 MG: 500 TABLET, FILM COATED ORAL at 19:58

## 2020-10-11 RX ADMIN — GABAPENTIN 100 MG: 100 CAPSULE ORAL at 13:03

## 2020-10-11 RX ADMIN — SODIUM CHLORIDE, POTASSIUM CHLORIDE, SODIUM LACTATE AND CALCIUM CHLORIDE: 600; 310; 30; 20 INJECTION, SOLUTION INTRAVENOUS at 07:54

## 2020-10-11 RX ADMIN — ACETAMINOPHEN 975 MG: 325 TABLET, FILM COATED ORAL at 16:06

## 2020-10-11 RX ADMIN — METHOCARBAMOL 500 MG: 500 TABLET, FILM COATED ORAL at 18:56

## 2020-10-11 RX ADMIN — SODIUM CHLORIDE, POTASSIUM CHLORIDE, SODIUM LACTATE AND CALCIUM CHLORIDE: 600; 310; 30; 20 INJECTION, SOLUTION INTRAVENOUS at 13:08

## 2020-10-11 RX ADMIN — METHOCARBAMOL 500 MG: 500 TABLET, FILM COATED ORAL at 07:48

## 2020-10-11 RX ADMIN — OXYCODONE HYDROCHLORIDE 5 MG: 5 TABLET ORAL at 19:58

## 2020-10-11 RX ADMIN — SODIUM CHLORIDE, POTASSIUM CHLORIDE, SODIUM LACTATE AND CALCIUM CHLORIDE: 600; 310; 30; 20 INJECTION, SOLUTION INTRAVENOUS at 18:58

## 2020-10-11 RX ADMIN — ACETAMINOPHEN 975 MG: 325 TABLET, FILM COATED ORAL at 19:58

## 2020-10-11 RX ADMIN — CALCIUM 500 MG: 500 TABLET ORAL at 18:58

## 2020-10-11 ASSESSMENT — ENCOUNTER SYMPTOMS
PAIN SEVERITY NOW: MILD
SUBJECTIVE PATIENT PAIN CONTROL: WELL CONTROLLED
DIETARY ISSUES: ADEQUATE INTAKE
SUBJECTIVE PAIN PROGRESSION: IMPROVING
ACTIVITY IMPAIRMENT: IMPAIRED DUE TO PAIN

## 2020-10-11 NOTE — PROGRESS NOTES
"Care Management Discharge Note    Discharge Planning:  Expected Discharge Date:  10/12     Concerns to be Addressed:   Discharge planning      Anticipated Discharge Disposition:  TCU tbd      Patient/family educated on Medicare website which has current facility and service quality ratings:  yes  Referrals Placed by CM/SW:  Yaron Hicks                                                         [facility]    Education Provided on the Discharge Plan:  SW discussed SNF and TCU, the differences between them and answered questions about assisted living. Pt has a wish for a  private room, which SW informed him was unlikely. Pt prefers quiet, and enjoys reading.   Patient/Family in Agreement with the Plan:  Pt states he is in agreement with TCU and/or SNF stay.     Disposition Comments:  TCU/SNF to be determined. Referrals sent.     Selected Continued Care - Admitted Since 10/9/2020    No services have been selected for the patient.         Additional Information:  Pt has built up a rapport with his apt manager, who encouraged pt to be seen in ED. Pt wishes to wait for contact to be initiated with his brother until a further time. Pt stated \"maybe in a week or so \"      Simona Perez [facility]  Social Work On call   [phone]  4a, 4c, 4e, 5a, 5b Pager [phone]  6a,6b,6c,6d Pager [phone]  5c,7a,7b,7c,7d Pager [phone]    1600-midnight  Pager [phone]    "

## 2020-10-11 NOTE — PROGRESS NOTES
[id number]    Neuro: A&Ox4.   Cardiac: Afebrile, VSS.   Respiratory: RA   GI/: Voiding spontaneously. 1 BM this shift.   Diet/appetite: Tolerating high call protein diet. Denies nausea.  Activity: Up with 1 assist and walker to commode     Pain: . C/o back pain- oxy available, scheduled robaxin and tylenol administered  Skin: No new deficits noted.  Lines: PIV infusing MIVF at 100cc/hr   Replacement: Replaced K today, recheck 3.8.     Pt states that he doesn't have any support system, and would possibly like for a  to get in contact with his brother to become POA for him. He stated that he is embarrassed to call him and tell him he has an incurable disease. Pt states he will be willing to talk to his home  that he is connected with.   Pt has been resting comfortably throughout shift, will continue to monitor and follow plan of care.

## 2020-10-11 NOTE — PROGRESS NOTES
"Rogelio Marshall is a 75 year old male patient.  1. Chronic midline low back pain without sciatica    2. Difficulty walking    3. Pathologic compression fracture of spine, sequela    4. Elevated serum creatinine      Past Medical History:   Diagnosis Date     Hyperlipidemia      Hypertension      No current outpatient medications on file.     No Known Allergies  Active Problems:    Difficulty walking    Pathologic compression fracture of spine, sequela    Chronic midline low back pain without sciatica    Blood pressure (!) 160/99, pulse 77, temperature 99  F (37.2  C), temperature source Oral, resp. rate 18, height 1.549 m (5' 1\"), weight 61 kg (134 lb 7.7 oz), SpO2 100 %.    Subjective:  Symptoms:  Improved.  (Back pain due to pathologic fx).    Diet:  Adequate intake.    Activity level: Impaired due to pain.    Pain:  He complains of pain that is mild.  He reports pain is improving.  Pain is well controlled.      Objective:  General Appearance:  Comfortable.    Vital signs: (most recent): Blood pressure (!) 160/99, pulse 77, temperature 99  F (37.2  C), temperature source Oral, resp. rate 18, height 1.549 m (5' 1\"), weight 61 kg (134 lb 7.7 oz), SpO2 100 %.  Vital signs are normal.    Output: Producing urine.    HEENT: Normal HEENT exam.    Lungs:  Normal effort and normal respiratory rate.  Breath sounds clear to auscultation.    Heart: Normal rate.  Regular rhythm.  S1 normal and S2 normal.    Abdomen: Abdomen is soft.  Bowel sounds are normal.   There is no abdominal tenderness.     Extremities: Normal range of motion.    Pulses: Distal pulses are intact.    Neurological: Patient is alert.    Pupils:  Pupils are equal, round, and reactive to light.    Skin:  Warm.      Assessment:    Condition: In stable condition.  Improving.   (75 yom presents with back pain and declining ADL, found to have multiple pathologic fxs and further work up Dx'ed to have Myeloma. Heme input appreciated-awaiting bone marrow bx/asp. " Pain controled with narcotic pain meds.     Awaiting TCU placement.).       Marya Alcala MD, MD  10/11/2020    The pt was seen and examined by myself. The case was reviewed and the plan was discussed with the ANUP.

## 2020-10-11 NOTE — PROGRESS NOTES
I spoke with SUSANA Reyes from ED Obs today.  Fortunately, their pain plan appears to be succeeding (she has just seen Rogelio); and will plan to cancel intpt/obs consult as see as outpatient.    Hill Foley MD  Palliative Care  pager [phone]    Inpatient Team Consult Pager [phone] (answered 8am-430pm M-F) - ok to text page via [employer] / After-Hours Answering Service [phone] / Palliative Clinic in the [facility] at the [facility] - [phone] (scheduling); [phone] (triage).

## 2020-10-11 NOTE — PROGRESS NOTES
"OBS Goals:    Adequate pain control on oral analgesia: Met: Scheduled Tylenol and prn Oxy.     - Vital Signs normal or at patient baseline. Met, BP (!) 148/81 (BP Location: Right arm)   Pulse 69   Temp 98  F (36.7  C) (Oral)   Resp 18   Ht 1.549 m (5' 1\")   Wt 61 kg (134 lb 7.7 oz)   SpO2 98%   BMI 25.41 kg/m      - Tolerating oral intake to maintain hydration. Met    - Diagnostic tests and consults completed and resulted. Met. Oncology recommends bone marrow biopsy outpatient. Palliative care consult in place.    - Cleared for discharge from consultants' standpoint if involved in care. Not met       - Safe disposition plan has been identified: Pending. PT recommending TCU, social work provided patient with list of TCU options.     - Return to baseline functional status. Met  "

## 2020-10-11 NOTE — PROGRESS NOTES
"OBS Goals:    Adequate pain control on oral analgesia: Met: Scheduled Tylenol, robaxin, lidocaine patches and prn Oxy.     - Vital Signs normal or at patient baseline. Met, BP (!) 160/99 (BP Location: Right arm)   Pulse 77   Temp 99  F (37.2  C) (Oral)   Resp 18   Ht 1.549 m (5' 1\")   Wt 61 kg (134 lb 7.7 oz)   SpO2 100%   BMI 25.41 kg/m      - Tolerating oral intake to maintain hydration. Met, also on IVMF    - Diagnostic tests and consults completed and resulted. Met. Oncology recommends bone marrow biopsy outpatient. Palliative care consult in place.    - Cleared for discharge from consultants' standpoint if involved in care. Not met       - Safe disposition plan has been identified: Pending. PT recommending TCU, social work provided patient with list of TCU options.     - Return to baseline functional status. Not met  "

## 2020-10-11 NOTE — PROGRESS NOTES
[facility]  Emergency Department Observation Unit Daily Progress Note          Assessment & Plan:   Rogelio Marshall is a 75 year old male with past medical history significant for hypertension, hyperlipidemia and previous SBO (2010) presents to ED with inability to perform ADLS and manage back pain.       ##Diffuse back pain:   ##Multiple pathologic compression fractures:  ##New Diagnosis MM:   ##Disposition:   Patient has been worked up by PCP for back pain. CT T and L Spine 9/18 showed extensive osteoporotic compression fractures with suggestion of possible myeloma. He was seen by Spine surgeon, Dr. Foley who reported due to the patient's poor bone quality, he is not a good candidate for surgery. PTA the patient was working with his PCP for further diagnosis of his multiple pathologic appearing compression fractures. However, presented to the ED with intractable pain and inability to perform ADL's. No bowel or bladder changes. HD stable, afebrile. He is admitted to observation for pain control and safe disposition. Hematology was consulted yesterday who did discuss new diagnosis with the patient. Hematology recommended Calcium 500 mg's TID, pamidronate 60-90 mg's IV x 1, bone marrow biopsy early next week, and NSG consult.  NSG recommended repeat CT, which appears stable. Also, consider brace for comfort. Previous ANUP discussed pain management with pain team on 10/9. This morning he notes his pain has improved.    -Resume Scheduled Tylenol 975 mg TID   - Hold Ibuprofen due to HALLIE (although improving)  - Lidocaine patches alternate with Menthol patches  - Gabapentin 100mg TID (titrate slowly)   - Robaxin 500mg QID  - Oxycodone 5 mg q 4 hours prn  - PT reccommended TCU, the patient is concerned he might need SNF after TCU   - s/p pamidronate 60 mg's IV (10/10)  - Palliative care consult placed, discussed with team who agree with current pain regimen. Given good pain control  "can defer for out-patient  - SW consulted to assist with TCU placement   - COVID is negative   - NSG consulted and have signed off, appreciate assist  - Senna and Miralax   - Hematology consulted, appreciate assist   - Orthotics consult to trial brace for comfort      ##HTN: Has not started on any medication. Continue pain management, if remains elevated as pain improved consider medications.     ##HALLIE: Creatinine 0.76 on 7/13--> Creatinine 1.72 on 10/5--> 2.19 (10/9)--> 1.56 (10/10)--> 1.10 (10/11). Suspect that this is related to MM diagnosis and Poor oral intake.  - LR at 100/hr  - Trend  BMP in am     ##Decreased appetite: Patient reports decrease in appetite. Attributes this to Ibuprofen. Denies any heartburn.   - Nutrition consulted  - Zofran prn    FEN: ADAT  Lines: PIV  Prophylaxis: Early ambulation, did order Lovenox. However, with BM biopsy likely early next week, will hold, SCD while in bed         Consults:   Hematology  NSG  PT  OT  SW  Nutrition  Orthotics         Discharge Planning:   Pending pain management and PT/OT evaluation         Interval History:   Resting in bed. Notes improved back pain.     ROS:   Constitutional: No fevers/chills.   Cardiovascular: No chest pain   Respiratory: No cough or SOB.   GI: No abdominal pain. No N/V.      : No urinary complaints.             Physical Exam:   BP (!) 160/99 (BP Location: Right arm)   Pulse 77   Temp 99  F (37.2  C) (Oral)   Resp 18   Ht 1.549 m (5' 1\")   Wt 61 kg (134 lb 7.7 oz)   SpO2 100%   BMI 25.41 kg/m       GENERAL: Alert and oriented x 3. NAD.   HEENT: Anicteric sclera. Mucous membranes moist.   CV: RRR. S1, S2. No murmurs appreciated.   RESPIRATORY: Effort normal. Lungs CTAB with no wheezing, rales, rhonchi.   GI: Abdomen soft and non distended with normoactive bowel sounds present in all quadrants. No tenderness, rebound, guarding.   NEUROLOGICAL: No focal deficits. Moves all extremities.    EXTREMITIES: No peripheral edema. Intact " bilateral pedal pulses.   SKIN: No jaundice. No rashes.     Medication list reviewed.   Today's labs and imaging were reviewed.     JIMBO Francisco, CNP  Emergency Department Observation Unit    Results for orders placed or performed during the hospital encounter of 10/09/20   CT Lumbar Spine w/o Contrast     Status: None    Narrative    Thoracic and Lumbar spine CT without contrast    History: Diffuse back pain and previous finding concerning for  multiple myeloma, neurosurgery evaluating for changes.    Comparison: CT thoracic and lumbar spine dated 9/18/2020    Technique: Axial, coronal, and sagittal multiplanar reconstructions  obtained from acquisition of thoracic and lumbar spine CT scan .    Findings:  Thoracic spine:   Diffuse osteolytic appearance of the spine and bilateral ribs. The  dextrosclerosis centered at lower thoracic spine. No significant  change in dehiscence of T5 anterior cortex. Unchanged greater than 90%  height loss in T10 vertebral body with vertebra plana appearance.  Compression deformity of T12 upper endplate and T1 anterior vertebral  body, also unchanged.    There is no acute fracture or subluxation.     There is no prevertebral mass or edema. There is no high-grade spinal  canal or foraminal stenosis at any level. No soft tissue abnormality  in the visualized paraspinous tissues anteriorly. Overall no  significant change from 9/18/2020.    Lumbar Spine:  Again noted extensive osteolytic appearance of lumbar sacral spine and  pelvic bones.   Coronal levoscoliosis centered at mid lumbar spine. Unchanged grade 1  anterolisthesis of L5 on S1. There is no acute fracture or  subluxation. There are 5 type lumbar vertebra, coming down from T1.     Multilevel decompression deformity most prominent at L2 and L3 with  approximately 30% and 80% vertebral body height loss, overall not  significantly changed from prior.     Multilevel degenerative changes. On a level by level basis;     L1-L2:  Superior L2 compression deformity. Bilateral facet hypertrophy  and ligamentum flavum thickening. Mild to moderate spinal canal  stenosis. Mild bilateral neural foraminal narrowing.    L2-L3:  Severe L3 compression deformity. Bilateral facet hypertrophy  and ligamentum flavum thickening. Mild spinal canal narrowing. Mild  bilateral neural foraminal narrowing.    L3-L4: Posterior disc bulge. Bilateral facet hypertrophy and  ligamentum flavum thickening. Mild spinal canal narrowing. Mild to  moderate bilateral neural foraminal stenosis.    L4-L5: Posterior disc bulge, bilateral facet hypertrophy, and  ligamentum flavum thickening. Mild spinal canal narrowing. Moderate  right and mild left neural foraminal stenosis.    L5-S1: Circumferential disc osteophyte complex and bilateral facet  hypertrophy. Mild spinal canal narrowing. Severe bilateral neural  foraminal stenosis.    The visualized adjacent paraspinous tissues are grossly within normal  limits.  Degenerative changes of the lumbar spine including osteophytic  spurring and endplate irregularity and sclerosis changes.       Impression    Impression:   1.  Since 9/18/2020, there is no significant change in diffuse  osteolytic appearance of spine, pelvis and ribs, suspicious for  multiple myeloma. No significant change in compression deformities in  the thoracic and lumbar spine. No new fracture.  2. Lumbar spondylosis most prominent at L5-S1 with severe bilateral  neuroforaminal stenosis.    I have personally reviewed the examination and initial interpretation  and I agree with the findings.    LIBRA NEUMANN MD   CT Thoracic Spine w/o Contrast     Status: None    Narrative    Thoracic and Lumbar spine CT without contrast    History: Diffuse back pain and previous finding concerning for  multiple myeloma, neurosurgery evaluating for changes.    Comparison: CT thoracic and lumbar spine dated 9/18/2020    Technique: Axial, coronal, and sagittal multiplanar  reconstructions  obtained from acquisition of thoracic and lumbar spine CT scan .    Findings:  Thoracic spine:   Diffuse osteolytic appearance of the spine and bilateral ribs. The  dextrosclerosis centered at lower thoracic spine. No significant  change in dehiscence of T5 anterior cortex. Unchanged greater than 90%  height loss in T10 vertebral body with vertebra plana appearance.  Compression deformity of T12 upper endplate and T1 anterior vertebral  body, also unchanged.    There is no acute fracture or subluxation.     There is no prevertebral mass or edema. There is no high-grade spinal  canal or foraminal stenosis at any level. No soft tissue abnormality  in the visualized paraspinous tissues anteriorly. Overall no  significant change from 9/18/2020.    Lumbar Spine:  Again noted extensive osteolytic appearance of lumbar sacral spine and  pelvic bones.   Coronal levoscoliosis centered at mid lumbar spine. Unchanged grade 1  anterolisthesis of L5 on S1. There is no acute fracture or  subluxation. There are 5 type lumbar vertebra, coming down from T1.     Multilevel decompression deformity most prominent at L2 and L3 with  approximately 30% and 80% vertebral body height loss, overall not  significantly changed from prior.     Multilevel degenerative changes. On a level by level basis;     L1-L2: Superior L2 compression deformity. Bilateral facet hypertrophy  and ligamentum flavum thickening. Mild to moderate spinal canal  stenosis. Mild bilateral neural foraminal narrowing.    L2-L3:  Severe L3 compression deformity. Bilateral facet hypertrophy  and ligamentum flavum thickening. Mild spinal canal narrowing. Mild  bilateral neural foraminal narrowing.    L3-L4: Posterior disc bulge. Bilateral facet hypertrophy and  ligamentum flavum thickening. Mild spinal canal narrowing. Mild to  moderate bilateral neural foraminal stenosis.    L4-L5: Posterior disc bulge, bilateral facet hypertrophy, and  ligamentum flavum  thickening. Mild spinal canal narrowing. Moderate  right and mild left neural foraminal stenosis.    L5-S1: Circumferential disc osteophyte complex and bilateral facet  hypertrophy. Mild spinal canal narrowing. Severe bilateral neural  foraminal stenosis.    The visualized adjacent paraspinous tissues are grossly within normal  limits.  Degenerative changes of the lumbar spine including osteophytic  spurring and endplate irregularity and sclerosis changes.       Impression    Impression:   1.  Since 9/18/2020, there is no significant change in diffuse  osteolytic appearance of spine, pelvis and ribs, suspicious for  multiple myeloma. No significant change in compression deformities in  the thoracic and lumbar spine. No new fracture.  2. Lumbar spondylosis most prominent at L5-S1 with severe bilateral  neuroforaminal stenosis.    I have personally reviewed the examination and initial interpretation  and I agree with the findings.    LIBRA NEUMANN MD   CBC with platelets differential     Status: Abnormal   Result Value Ref Range    WBC 6.4 4.0 - 11.0 10e9/L    RBC Count 3.17 (L) 4.4 - 5.9 10e12/L    Hemoglobin 10.4 (L) 13.3 - 17.7 g/dL    Hematocrit 32.1 (L) 40.0 - 53.0 %     (H) 78 - 100 fl    MCH 32.8 26.5 - 33.0 pg    MCHC 32.4 31.5 - 36.5 g/dL    RDW 12.8 10.0 - 15.0 %    Platelet Count 308 150 - 450 10e9/L    Diff Method Automated Method     % Neutrophils 77.1 %    % Lymphocytes 15.9 %    % Monocytes 5.3 %    % Eosinophils 0.6 %    % Basophils 0.6 %    % Immature Granulocytes 0.5 %    Nucleated RBCs 0 0 /100    Absolute Neutrophil 4.9 1.6 - 8.3 10e9/L    Absolute Lymphocytes 1.0 0.8 - 5.3 10e9/L    Absolute Monocytes 0.3 0.0 - 1.3 10e9/L    Absolute Eosinophils 0.0 0.0 - 0.7 10e9/L    Absolute Basophils 0.0 0.0 - 0.2 10e9/L    Abs Immature Granulocytes 0.0 0 - 0.4 10e9/L    Absolute Nucleated RBC 0.0    CRP inflammation     Status: Abnormal   Result Value Ref Range    CRP Inflammation 28.0 (H) 0.0 - 8.0  mg/L   Erythrocyte sedimentation rate auto     Status: None   Result Value Ref Range    Sed Rate 15 0 - 20 mm/h   INR     Status: None   Result Value Ref Range    INR 1.14 0.86 - 1.14   Partial thromboplastin time     Status: None   Result Value Ref Range    PTT 29 22 - 37 sec   Magnesium     Status: None   Result Value Ref Range    Magnesium 2.2 1.6 - 2.3 mg/dL   Comprehensive metabolic panel     Status: Abnormal   Result Value Ref Range    Sodium 140 133 - 144 mmol/L    Potassium 3.7 3.4 - 5.3 mmol/L    Chloride 113 (H) 94 - 109 mmol/L    Carbon Dioxide 15 (L) 20 - 32 mmol/L    Anion Gap 12 3 - 14 mmol/L    Glucose 74 70 - 99 mg/dL    Urea Nitrogen 43 (H) 7 - 30 mg/dL    Creatinine 2.19 (H) 0.66 - 1.25 mg/dL    GFR Estimate 29 (L) >60 mL/min/[1.73_m2]    GFR Estimate If Black 33 (L) >60 mL/min/[1.73_m2]    Calcium 9.3 8.5 - 10.1 mg/dL    Bilirubin Total 0.4 0.2 - 1.3 mg/dL    Albumin 2.9 (L) 3.4 - 5.0 g/dL    Protein Total 12.0 (H) 6.8 - 8.8 g/dL    Alkaline Phosphatase 88 40 - 150 U/L    ALT 16 0 - 70 U/L    AST 25 0 - 45 U/L   Phosphorus     Status: None   Result Value Ref Range    Phosphorus 4.1 2.5 - 4.5 mg/dL   CK total     Status: None   Result Value Ref Range    CK Total 74 30 - 300 U/L   UA with Microscopic reflex to Culture     Status: Abnormal    Specimen: Urine Midstream; Midstream Urine   Result Value Ref Range    Color Urine Yellow     Appearance Urine Clear     Glucose Urine Negative NEG^Negative mg/dL    Bilirubin Urine Negative NEG^Negative    Ketones Urine 40 (A) NEG^Negative mg/dL    Specific Gravity Urine 1.015 1.003 - 1.035    Blood Urine Trace (A) NEG^Negative    pH Urine 5.5 5.0 - 7.0 pH    Protein Albumin Urine 30 (A) NEG^Negative mg/dL    Urobilinogen mg/dL Normal 0.0 - 2.0 mg/dL    Nitrite Urine Negative NEG^Negative    Leukocyte Esterase Urine Negative NEG^Negative    Source Midstream Urine     WBC Urine 3 0 - 5 /HPF    RBC Urine <1 0 - 2 /HPF    Squamous Epithelial /HPF Urine 1 0 - 1  /HPF    Transitional Epi <1 0 - 1 /HPF    Mucous Urine Present (A) NEG^Negative /LPF    Hyaline Casts 1 0 - 2 /LPF   Asymptomatic COVID-19 Virus (Coronavirus) by PCR     Status: None    Specimen: Nasopharyngeal   Result Value Ref Range    COVID-19 Virus PCR to [facility] - Source Nasopharyngeal     COVID-19 Virus PCR to [facility] - Result       Test received-See reflex to IDDL test SARS CoV2 (COVID-19) Virus RT-PCR   SARS-CoV-2 COVID-19 Virus (Coronavirus) RT-PCR Nasopharyngeal     Status: None    Specimen: Nasopharyngeal   Result Value Ref Range    SARS-CoV-2 Virus Specimen Source Nasopharyngeal     SARS-CoV-2 PCR Result NEGATIVE     SARS-CoV-2 PCR Comment       Testing was performed using the [employer] SARS-CoV-2 Assay on the [employer] Instrument System.   Additional information about this Emergency Use Authorization (EUA) assay can be found via   the Lab Guide.     CBC with platelets differential     Status: Abnormal   Result Value Ref Range    WBC 5.9 4.0 - 11.0 10e9/L    RBC Count 2.90 (L) 4.4 - 5.9 10e12/L    Hemoglobin 9.5 (L) 13.3 - 17.7 g/dL    Hematocrit 29.5 (L) 40.0 - 53.0 %     (H) 78 - 100 fl    MCH 32.8 26.5 - 33.0 pg    MCHC 32.2 31.5 - 36.5 g/dL    RDW 12.9 10.0 - 15.0 %    Platelet Count 266 150 - 450 10e9/L    Diff Method Automated Method     % Neutrophils 67.9 %    % Lymphocytes 19.0 %    % Monocytes 7.6 %    % Eosinophils 4.4 %    % Basophils 0.8 %    % Immature Granulocytes 0.3 %    Nucleated RBCs 0 0 /100    Absolute Neutrophil 4.0 1.6 - 8.3 10e9/L    Absolute Lymphocytes 1.1 0.8 - 5.3 10e9/L    Absolute Monocytes 0.5 0.0 - 1.3 10e9/L    Absolute Eosinophils 0.3 0.0 - 0.7 10e9/L    Absolute Basophils 0.1 0.0 - 0.2 10e9/L    Abs Immature Granulocytes 0.0 0 - 0.4 10e9/L    Absolute Nucleated RBC 0.0    Basic metabolic panel     Status: Abnormal   Result Value Ref Range    Sodium 141 133 - 144 mmol/L    Potassium 3.0 (L) 3.4 - 5.3 mmol/L    Chloride 120 (H) 94 - 109 mmol/L    Carbon Dioxide 16 (L)  20 - 32 mmol/L    Anion Gap 5 3 - 14 mmol/L    Glucose 70 70 - 99 mg/dL    Urea Nitrogen 33 (H) 7 - 30 mg/dL    Creatinine 1.56 (H) 0.66 - 1.25 mg/dL    GFR Estimate 43 (L) >60 mL/min/[1.73_m2]    GFR Estimate If Black 50 (L) >60 mL/min/[1.73_m2]    Calcium 7.2 (L) 8.5 - 10.1 mg/dL   Potassium     Status: None   Result Value Ref Range    Potassium 3.8 3.4 - 5.3 mmol/L   Lactate Dehydrogenase     Status: None   Result Value Ref Range    Lactate Dehydrogenase 149 85 - 227 U/L   Comprehensive metabolic panel     Status: Abnormal   Result Value Ref Range    Sodium 139 133 - 144 mmol/L    Potassium 3.9 3.4 - 5.3 mmol/L    Chloride 115 (H) 94 - 109 mmol/L    Carbon Dioxide 17 (L) 20 - 32 mmol/L    Anion Gap 7 3 - 14 mmol/L    Glucose 86 70 - 99 mg/dL    Urea Nitrogen 20 7 - 30 mg/dL    Creatinine 1.10 0.66 - 1.25 mg/dL    GFR Estimate 66 >60 mL/min/[1.73_m2]    GFR Estimate If Black 76 >60 mL/min/[1.73_m2]    Calcium 8.5 8.5 - 10.1 mg/dL    Bilirubin Total 0.4 0.2 - 1.3 mg/dL    Albumin 2.2 (L) 3.4 - 5.0 g/dL    Protein Total 9.4 (H) 6.8 - 8.8 g/dL    Alkaline Phosphatase 70 40 - 150 U/L    ALT 12 0 - 70 U/L    AST 19 0 - 45 U/L   CBC with platelets differential     Status: Abnormal   Result Value Ref Range    WBC 5.4 4.0 - 11.0 10e9/L    RBC Count 3.02 (L) 4.4 - 5.9 10e12/L    Hemoglobin 10.1 (L) 13.3 - 17.7 g/dL    Hematocrit 30.5 (L) 40.0 - 53.0 %     (H) 78 - 100 fl    MCH 33.4 (H) 26.5 - 33.0 pg    MCHC 33.1 31.5 - 36.5 g/dL    RDW 13.0 10.0 - 15.0 %    Platelet Count 270 150 - 450 10e9/L    Diff Method Automated Method     % Neutrophils 78.8 %    % Lymphocytes 10.7 %    % Monocytes 5.0 %    % Eosinophils 4.4 %    % Basophils 0.7 %    % Immature Granulocytes 0.4 %    Nucleated RBCs 0 0 /100    Absolute Neutrophil 4.3 1.6 - 8.3 10e9/L    Absolute Lymphocytes 0.6 (L) 0.8 - 5.3 10e9/L    Absolute Monocytes 0.3 0.0 - 1.3 10e9/L    Absolute Eosinophils 0.2 0.0 - 0.7 10e9/L    Absolute Basophils 0.0 0.0 - 0.2 10e9/L  "   Abs Immature Granulocytes 0.0 0 - 0.4 10e9/L    Absolute Nucleated RBC 0.0    Magnesium     Status: None   Result Value Ref Range    Magnesium 1.6 1.6 - 2.3 mg/dL   Phosphorus     Status: Abnormal   Result Value Ref Range    Phosphorus 2.2 (L) 2.5 - 4.5 mg/dL   Social Work IP Consult     Status: None ()    Jos Carroll, MSW     10/10/2020  6:18 PM  Care Management Initial Consult    General Information  Assessment completed with:: Patient,    Type of CM/SW Visit: Initial Assessment           Reason for Consult: discharge planning  Advance Care Planning:   Pt indicates he has no family or friends   or anyone to speak on his behalf.  He states that healthcare   staff had tried to reach his sister, [place], because they could   not get a hold of him and her phone number was not viable.       Communication Assessment  Patient's communication style: spoken language (English or   Bilingual)  Hearing Difficulty or Deaf: no      Cognitive  Cognitive/Neuro/Behavioral: WDL                      Living Environment:   People in home: alone     Current living Arrangements: apartment     (TCU recommended and   pt is agreeable).  [facility].  This is an independent apartment for folks who are   low-income.  Owned by [employer].    Family/Social Support:  Care provided by:    Provides care for:    Marital Status: Single  Who is your support system?: None     Description of Support System: Uninvolved  Description of Support   System: Uninvolved  Lacks adequate physical care        Description of Support System: Uninvolved  Support Assessment: Lacks adequate physical care    Current Resources:   Skilled Home Care Services:  NA  Community Resources:  Pt states he has a [facility]    but does not know name or number.    Equipment currently used at home: none(uses a 4 wheeled \"cart\"   for gait when pain is bad)    Employment:  Employment Status: retired   "        Lifestyle & Psychosocial Needs:        Socioeconomic History     Marital status: Single     Spouse name: Not on file     Number of children: Not on file     Years of education: Not on file     Highest education level: Not on file   Occupational History     Occupation: retired     Tobacco Use     Smoking status: Former Smoker     Packs/day: 0.10     Years: 30.00     Pack years: 3.00     Types: Cigarettes     Smokeless tobacco: Never Used   Substance and Sexual Activity     Alcohol use: Yes     Alcohol/week: 17.5 standard drinks     Types: 21 drink(s) per week       Functional Status:  Prior to admission patient needed assistance:   Pt states he was   having difficulty with walking and caring for himself at home.    Mental Health Status:  .WDL except  unkempt               Additional Information:  Pt flagged for needing a MOON form completed as well as SW   consulted for discharge planning.    SW called pt over the phone, introduced self, role and reason for   the call/visit.  Discussed that writer is calling rather than   seeing him in person to reduce amount of staff in/out of his   room.  He engaged easily over the phone but tells writer that he   prefers in-person visits whenever possible.      Pt lives alone in an independent living apartment.  He tells   writer he has no family or friends; no one to speak on his   behalf.  He states that a couple of [facility]  have   called him over the phone but he admits to not engaging with them   as he prefers in-person visits.  We discussed that pt may be   eligible for in-home supports that are arranged for him by the   .  Pt voices understanding and admits that he was   really unaware of their role.    We discussed PT's recommendation for a TCU.  Pt is not familiar   with a TCU so informed pt of TCU level of care and locations.  He   voices agreement to this recommendation stating that the reason   he came to the hospital is because he was  "unable to care for   himself at home.    The plan is for writer to give pt a list of TCU's for pt to   review and then writer will call pt later.    Writer gave pt a list of TCU's from Medicare's website via pt's   nurse, Katie.      1615:  SW called pt in his room.  He asks for more TCU options.    He's not sure which to choose as he's really not familiar with   any.  Pt asks for more information about area TCU's.  SW will   give pt an expanded list from Medicare.gov as well as print out   some info pages from the [facility] book that have more   detailed information about each facility.    1700:  SW received page from pt's nurse, Katie.  Katie   states that Oncology just met with pt and shared with him the   diagnosis of bone cancer.  Pt has questions for SW in relation to   the SNF's.  SW met with pt at his bedside.  He confirms he was just given the   diagnosis of bone cancer and states \"I might not live up to 5   years.\"  He states that the Oncologist has recommended a bone   biopsy at the [facility] and states he will need   help making this appointment as well as transport to and from the   biopsy.  Writer reassured pt that staff can assist pt in making   this appointment as well as that medical transport is available   (and covered by insurance).  Writer stated that the SNF staff can   arrange transport for him as well as arrange any other   appointments.    He states that he is anticipating that his condition may not get   any better thus he is thinking he may need long-term care.  He is   also thinking about having his brother be his 'executor of his   estate'.  Pt tells writer that his brother, Dutch Marshall   lives in [place], [place], and was the executor of his parent's estate   so \"he knows how to do it\".  Pt also states he does not want to   contact his brother himself but rather wants a staff person to   contact his brother, update him and ask him to be the executor " "of   his estate.    Pt states he (pt) has been estranged from his siblings for   sometime.  He states he does not have \"family loyalty\" and that   he was the \"rebel\" in the family.    Pt states he feels his pain regimen needs to be improved before   feeling ready to discharge.  He states this latest pain med he   took was \"the best one yet\".    SW informed pt that SW will see him tomorrow to choose 3-5 SNF's   to make referrals to.    SHAYNE updated SUSANA Hillman, as well as wonder if Palliative Consult   for pain management and goals of care is appropriate?  Writer did   not bring this up with the pt in my interaction today but thought   it may benefit him.  He reports that his only support his   healthcare staff at this time and given his reflective nature, he   may benefit from a holistic approach to care.    Of note, quick search on the Internet produces result for Dutch Marshall.  [address], [address]  [place]  [address]  [phone]      MORGAN Foster  Social Work Services  Emergency Department & Obs  [phone] phone  [phone] pager  8:00am-8:30pm Mon-Sat    On-call pager, [phone], 4:00pm to midnight        MITESH JIMENEZ         Hematology Adult IP Consult: Patient to be seen: Routine within 24 hrs; Call back #: [phone]; Back Pain, concern for pathologic fracture, Being worked up for MM as out-patient; Consultant may enter orders: Yes     Status: None ()    Narrative    Rafita Gaona MD     10/10/2020  9:58 AM  Brief note,     I was paged from his nurse practitioner this morning to discuss   his dispo plan.  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  SPEP with immunofixation showed M spike related to   IgG-kappa light chain.    Patient presented to the ED for back pain and hematology was   paged to help in his management.  Patient had a referral to hematology/oncology at [facility]. "  Scheduling tried to call him to set up an   appointment however the could not get a hold of him.    I looked at his chart and I reviewed his labs and I talked to my   attending.  Bone biopsy is needed to confirm the diagnosis   however it is not urgent procedure and likely will be on Tuesday.    For now if patient wants to go home:  Please to give 1 dose of biphosphonate (pamidronate or   Zometa/zoledronic acid) in addition to calcium 500 mg 3 times   daily.  Patient has to call clinic on Monday morning to set up an   appointment  Also, we recommend Neurosurgery to see him in the ED if not done   already.     Please call us if any questions or concerns!    Rafita Gaona MD  Hematology&Oncology Fellow  Pager: [phone]       Neurosurgery Adult IP Consult: Patient to be seen: Routine within 24 hrs; Call back #: [phone]; Back pain, Suspected new diagnosis MM, multiple thoracic and lumbar compression deformities; Consultant may enter orders: Yes; Requesting provider? Othe...     Status: None ()    Narrative    Abdelrahman Lee MD     10/10/2020  9:15 PM  [facility]       NEUROSURGERY CONSULTATION NOTE    This consultation was requested by the ED Obs service.    Reason for Consultation: Back pain    HPI: Rogelio Marshall is a 75 year old male who was seen for back   pain in the setting of known compression fractures; he is being   worked up for multiple myeloma. He states he has had the same   pain that started about a year and a half ago and has   progressively gotten worse. It feels like a gnawing pain that is   in his lower back. It has slowly progressed to the point where it   is affecting his mobility and ADLs. He has seen ortho spine as an   outpatient for his known compression fractures. He has no other   neuro concerns, outside of his back pain.      PAST MEDICAL HISTORY:   Past Medical History:   Diagnosis Date     Hyperlipidemia      Hypertension         PAST SURGICAL HISTORY:   Past Surgical History:   Procedure Laterality Date     HERNIA REPAIR, INGUINAL RT/LT      needed post-op intestinal repair     ORIF left clavical         FAMILY HISTORY:   Family History   Problem Relation Age of Onset     C.A.D. Father          MI age 69     Hypertension Sister         obese     Family History Negative Brother        SOCIAL HISTORY:   Social History     Tobacco Use     Smoking status: Former Smoker     Packs/day: 0.10     Years: 30.00     Pack years: 3.00     Types: Cigarettes     Smokeless tobacco: Never Used   Substance Use Topics     Alcohol use: Yes     Alcohol/week: 17.5 standard drinks     Types: 21 drink(s) per week       MEDICATIONS:  Medications Prior to Admission   Medication Sig Dispense Refill Last Dose     ibuprofen (ADVIL/MOTRIN) 200 MG tablet Take 200 mg by mouth   every 4 hours as needed for mild pain   10/9/2020 at Unknown time       traMADol (ULTRAM) 50 MG tablet Take 1 tablet (50 mg) by mouth   every 6 hours as needed for breakthrough pain 20 tablet 0   10/9/2020 at Unknown time     diclofenac (VOLTAREN) 75 MG EC tablet Take 1 tablet (75 mg) by   mouth 2 times daily (Patient not taking: Reported on 2020)   20 tablet 0      tiZANidine (ZANAFLEX) 4 MG capsule Take 1 capsule (4 mg) by   mouth nightly as needed for muscle spasms (Patient not taking:   Reported on 2020) 15 capsule 1      tiZANidine (ZANAFLEX) 4 MG tablet Take 1 tablet (4 mg) by mouth   3 times daily (Patient not taking: Reported on 2020) 10   tablet 0      traMADol (ULTRAM) 50 MG tablet Take 1 tablet (50 mg) by mouth   nightly as needed for breakthrough pain (Patient not taking:   Reported on 2020) 18 tablet 0        Allergies:  No Known Allergies    ROS: 10 point ROS were all negative except for pertinent   positives noted in my HPI.    Physical exam:   Blood pressure (!) 157/87, pulse 71, temperature 98.8  F (37.1    C), temperature source Oral, resp. rate 16,  "height 1.549 m (5'   1\"), weight 61 kg (134 lb 7.7 oz), SpO2 98 %.  CV: HR and BP as noted above  PULM: breathing comfortably  ABD: soft, non-distended  NEUROLOGIC:  -- Awake; Alert; oriented x 3  -- Follows commands briskly  -- Speech fluent, spontaneous. No aphasia or dysarthria.  -- no gaze preference. No apparent hemineglect.  Cranial Nerves:  -- PERRL 3-2mm bilat and brisk, extraocular movements intact  -- face symmetrical, tongue midline  -- palate elevates symmetrically, uvula midline  -- hearing grossly intact bilat  -- Trapezii 5/5 strength bilat symmetric    Motor:  Normal bulk / tone; no tremor, rigidity, or bradykinesia.  No   muscle wasting or fasciculations     Delt Bi Tri Hand Flexion/  Extension Iliopsoas Quadriceps Hamstrings Tibialis Anterior   Gastroc    C5 C6 C7 C8/T1 L2 L3 L4-S1 L4 S1   R 5 5 5 5 5 5 5 5 5   L 5 5 5 5 5 5 5 5 5   Sensory:  intact to LT x 4 extremities; low back pain worse when   pressing on the paraspinal area in his lower back, not worse by   pressing midline.    Reflexes:     Bi BR Katie Pat Ach Bab    C5-6 C6 UMN L2-4 S1 UMN   R 2+ 2+ Norm 2+ 2+ Norm   L 2+ 2+ Norm 2+ 2+ Norm     Gait: Deferred      LABS:  Recent Labs   Lab 10/10/20  0758 10/09/20  1405 10/05/20  1500    140 134   POTASSIUM 3.0* 3.7 3.6   CHLORIDE 120* 113* 111*   CO2 16* 15* 15*   ANIONGAP 5 12 8   GLC 70 74 96   BUN 33* 43* 38*   CR 1.56* 2.19* 1.72*   MIRI 7.2* 9.3 9.0       Recent Labs   Lab 10/10/20  0758   WBC 5.9   RBC 2.90*   HGB 9.5*   HCT 29.5*   *   MCH 32.8   MCHC 32.2   RDW 12.9          IMAGING:  None this admission    ASSESSMENT:  75 year old male multiple compression fractures who has been   assessed by Dr. Foley of the orthopedic service. He has   progressive low back pain, and no new neurologic deficits. Intact   on exam.    RECOMMENDATIONS:  No neurosurgical intervention indicated at this time   CT lumbar and thoracic spine to assess for changes compared to   the 9/18 " scans    The patient was discussed with Dr. Mendoza, neurosurgery chief   resident, and Dr. Comer, neurosurgery staff, and they agree with   the above.    Abdelrahman Lee MD  Neurosurgery Resident, PGY-2    Please contact neurosurgery resident on call with questions.    Dial * * [phone], enter [phone] when prompted.

## 2020-10-11 NOTE — PLAN OF CARE
Deferring OT consult.  Acknowledge order placed for OT eval and treat.  Upon chart review and discussion with PT no IP OT needs at this time. PT evaled and recommended  TCU. Given discharge plan has been established and LOS may be short, OT will hold on eval and skilled intervention at this time.  If LOS increases or functional status further declines, please re-consult OT. Completing orders

## 2020-10-11 NOTE — PROGRESS NOTES
OBS Goals:    - Adequate pain control on oral analgesia: Met: only requiring PO oxy    - Vital Signs normal or at patient baseline. Met  - Tolerating oral intake to maintain hydration. Met  - Diagnostic tests and consults completed and resulted. Met. Oncology recommends bone marrow biopsy outpatient  - Cleared for discharge from consultants' standpoint if involved in care. Not met   - Safe disposition plan has been identified: Pending. PT recommending TCU, social work provided patient with list of TCU options.   - Return to baseline functional status. Met

## 2020-10-11 NOTE — PROGRESS NOTES
"OBS Goals:    Adequate pain control on oral analgesia: Met: Scheduled Tylenol, robaxin, lidocaine patches and prn Oxy.     - Vital Signs normal or at patient baseline. Met, BP (!) 159/89 (BP Location: Right arm)   Pulse 71   Temp 98.8  F (37.1  C) (Oral)   Resp 18   Ht 1.549 m (5' 1\")   Wt 61 kg (134 lb 7.7 oz)   SpO2 100%   BMI 25.41 kg/m      - Tolerating oral intake to maintain hydration. Met, also on IVMF    - Diagnostic tests and consults completed and resulted. Met. Oncology recommends bone marrow biopsy outpatient. Palliative care consult in place.    - Cleared for discharge from consultants' standpoint if involved in care. Not met       - Safe disposition plan has been identified: Pending. PT recommending TCU, social work provided patient with list of TCU options.     - Return to baseline functional status. Not met  "

## 2020-10-11 NOTE — PROGRESS NOTES
OBS Goals:    Adequate pain control on oral analgesia: Met: only requiring PO oxy  and on scheduled Tylenol     - Vital Signs normal or at patient baseline. Met    - Tolerating oral intake to maintain hydration. Met    - Diagnostic tests and consults completed and resulted. Met. Oncology recommends bone marrow biopsy outpatient    - Cleared for discharge from consultants' standpoint if involved in care. Not met      - Safe disposition plan has been identified: Pending. PT recommending TCU, social work provided patient with list of TCU options.     - Return to baseline functional status. Met

## 2020-10-11 NOTE — PROGRESS NOTES
Brief Neurosurgery Progress Note    No neurosurgical intervention needed. Repeat CT scans stable. Defer to Dr. Foley's note for management of the fractures. Reinforcing the recommendation to improve bone health and osteoporosis. Defer to primary team for pain management. Can consider brace for comfort. Neurosurgery to sign off at this time.    Abdelrahman Lee MD  Neurosurgery Resident, PGY-2    Please contact neurosurgery resident on call with questions.    Dial * * [phone], enter [phone] when prompted.

## 2020-10-12 LAB
ANION GAP SERPL CALCULATED.3IONS-SCNC: 4 MMOL/L (ref 3–14)
B2 MICROGLOB SERPL-MCNC: 3.6 MG/L
BUN SERPL-MCNC: 10 MG/DL (ref 7–30)
CALCIUM SERPL-MCNC: 8 MG/DL (ref 8.5–10.1)
CHLORIDE SERPL-SCNC: 110 MMOL/L (ref 94–109)
CO2 SERPL-SCNC: 24 MMOL/L (ref 20–32)
CREAT SERPL-MCNC: 1.05 MG/DL (ref 0.66–1.25)
ERYTHROCYTE [DISTWIDTH] IN BLOOD BY AUTOMATED COUNT: 12.9 % (ref 10–15)
GFR SERPL CREATININE-BSD FRML MDRD: 70 ML/MIN/{1.73_M2}
GLUCOSE SERPL-MCNC: 123 MG/DL (ref 70–99)
HCT VFR BLD AUTO: 28.1 % (ref 40–53)
HGB BLD-MCNC: 9.4 G/DL (ref 13.3–17.7)
MAGNESIUM SERPL-MCNC: 1.3 MG/DL (ref 1.6–2.3)
MCH RBC QN AUTO: 32.9 PG (ref 26.5–33)
MCHC RBC AUTO-ENTMCNC: 33.5 G/DL (ref 31.5–36.5)
MCV RBC AUTO: 98 FL (ref 78–100)
PHOSPHATE SERPL-MCNC: 2 MG/DL (ref 2.5–4.5)
PLATELET # BLD AUTO: 241 10E9/L (ref 150–450)
POTASSIUM SERPL-SCNC: 3.2 MMOL/L (ref 3.4–5.3)
RBC # BLD AUTO: 2.86 10E12/L (ref 4.4–5.9)
SODIUM SERPL-SCNC: 138 MMOL/L (ref 133–144)
WBC # BLD AUTO: 4.4 10E9/L (ref 4–11)

## 2020-10-12 PROCEDURE — 85027 COMPLETE CBC AUTOMATED: CPT | Performed by: NURSE PRACTITIONER

## 2020-10-12 PROCEDURE — 36415 COLL VENOUS BLD VENIPUNCTURE: CPT | Performed by: NURSE PRACTITIONER

## 2020-10-12 PROCEDURE — 84100 ASSAY OF PHOSPHORUS: CPT | Performed by: NURSE PRACTITIONER

## 2020-10-12 PROCEDURE — 250N000009 HC RX 250: Performed by: NURSE PRACTITIONER

## 2020-10-12 PROCEDURE — 96374 THER/PROPH/DIAG INJ IV PUSH: CPT

## 2020-10-12 PROCEDURE — 99224 PR SUBSEQUENT OBSERVATION CARE,LEVEL I: CPT | Performed by: NURSE PRACTITIONER

## 2020-10-12 PROCEDURE — 999N000128 HC STATISTIC PERIPHERAL IV START W/O US GUIDANCE

## 2020-10-12 PROCEDURE — 80048 BASIC METABOLIC PNL TOTAL CA: CPT | Performed by: NURSE PRACTITIONER

## 2020-10-12 PROCEDURE — 250N000011 HC RX IP 250 OP 636: Performed by: NURSE PRACTITIONER

## 2020-10-12 PROCEDURE — 96375 TX/PRO/DX INJ NEW DRUG ADDON: CPT

## 2020-10-12 PROCEDURE — G0378 HOSPITAL OBSERVATION PER HR: HCPCS

## 2020-10-12 PROCEDURE — 83735 ASSAY OF MAGNESIUM: CPT | Performed by: NURSE PRACTITIONER

## 2020-10-12 PROCEDURE — 258N000003 HC RX IP 258 OP 636: Performed by: NURSE PRACTITIONER

## 2020-10-12 PROCEDURE — 250N000013 HC RX MED GY IP 250 OP 250 PS 637: Performed by: NURSE PRACTITIONER

## 2020-10-12 RX ORDER — IBUPROFEN 600 MG/1
600 TABLET, FILM COATED ORAL EVERY 6 HOURS PRN
Status: DISCONTINUED | OUTPATIENT
Start: 2020-10-12 | End: 2020-10-12

## 2020-10-12 RX ORDER — IBUPROFEN 600 MG/1
600 TABLET, FILM COATED ORAL EVERY 6 HOURS
Status: DISCONTINUED | OUTPATIENT
Start: 2020-10-12 | End: 2020-10-13 | Stop reason: HOSPADM

## 2020-10-12 RX ORDER — POTASSIUM CHLORIDE 750 MG/1
20-40 TABLET, EXTENDED RELEASE ORAL
Status: DISCONTINUED | OUTPATIENT
Start: 2020-10-12 | End: 2020-10-13 | Stop reason: HOSPADM

## 2020-10-12 RX ORDER — POTASSIUM CHLORIDE 1.5 G/1.58G
20-40 POWDER, FOR SOLUTION ORAL
Status: DISCONTINUED | OUTPATIENT
Start: 2020-10-12 | End: 2020-10-13 | Stop reason: HOSPADM

## 2020-10-12 RX ORDER — POTASSIUM CHLORIDE 29.8 MG/ML
20 INJECTION INTRAVENOUS
Status: DISCONTINUED | OUTPATIENT
Start: 2020-10-12 | End: 2020-10-13 | Stop reason: HOSPADM

## 2020-10-12 RX ORDER — POTASSIUM CHLORIDE 7.45 MG/ML
10 INJECTION INTRAVENOUS
Status: DISCONTINUED | OUTPATIENT
Start: 2020-10-12 | End: 2020-10-13 | Stop reason: HOSPADM

## 2020-10-12 RX ORDER — MAGNESIUM SULFATE HEPTAHYDRATE 40 MG/ML
4 INJECTION, SOLUTION INTRAVENOUS EVERY 4 HOURS PRN
Status: DISCONTINUED | OUTPATIENT
Start: 2020-10-12 | End: 2020-10-13 | Stop reason: HOSPADM

## 2020-10-12 RX ORDER — POTASSIUM CL/LIDO/0.9 % NACL 10MEQ/0.1L
10 INTRAVENOUS SOLUTION, PIGGYBACK (ML) INTRAVENOUS
Status: DISCONTINUED | OUTPATIENT
Start: 2020-10-12 | End: 2020-10-13 | Stop reason: HOSPADM

## 2020-10-12 RX ADMIN — SODIUM CHLORIDE, POTASSIUM CHLORIDE, SODIUM LACTATE AND CALCIUM CHLORIDE: 600; 310; 30; 20 INJECTION, SOLUTION INTRAVENOUS at 05:12

## 2020-10-12 RX ADMIN — MAGNESIUM SULFATE IN WATER 4 G: 40 INJECTION, SOLUTION INTRAVENOUS at 13:42

## 2020-10-12 RX ADMIN — OXYCODONE HYDROCHLORIDE 5 MG: 5 TABLET ORAL at 02:33

## 2020-10-12 RX ADMIN — CALCIUM 500 MG: 500 TABLET ORAL at 13:03

## 2020-10-12 RX ADMIN — IBUPROFEN 600 MG: 600 TABLET ORAL at 22:18

## 2020-10-12 RX ADMIN — IBUPROFEN 600 MG: 600 TABLET ORAL at 10:22

## 2020-10-12 RX ADMIN — POTASSIUM CHLORIDE 40 MEQ: 750 TABLET, EXTENDED RELEASE ORAL at 13:41

## 2020-10-12 RX ADMIN — ACETAMINOPHEN 975 MG: 325 TABLET, FILM COATED ORAL at 07:42

## 2020-10-12 RX ADMIN — ACETAMINOPHEN 975 MG: 325 TABLET, FILM COATED ORAL at 19:53

## 2020-10-12 RX ADMIN — SODIUM CHLORIDE, POTASSIUM CHLORIDE, SODIUM LACTATE AND CALCIUM CHLORIDE: 600; 310; 30; 20 INJECTION, SOLUTION INTRAVENOUS at 22:26

## 2020-10-12 RX ADMIN — CALCIUM 500 MG: 500 TABLET ORAL at 07:41

## 2020-10-12 RX ADMIN — OXYCODONE HYDROCHLORIDE 5 MG: 5 TABLET ORAL at 07:40

## 2020-10-12 RX ADMIN — CALCIUM 500 MG: 500 TABLET ORAL at 19:53

## 2020-10-12 RX ADMIN — OXYCODONE HYDROCHLORIDE 5 MG: 5 TABLET ORAL at 19:53

## 2020-10-12 RX ADMIN — LIDOCAINE 2 PATCH: 560 PATCH PERCUTANEOUS; TOPICAL; TRANSDERMAL at 10:22

## 2020-10-12 RX ADMIN — GABAPENTIN 100 MG: 100 CAPSULE ORAL at 07:41

## 2020-10-12 RX ADMIN — POTASSIUM CHLORIDE 20 MEQ: 750 TABLET, EXTENDED RELEASE ORAL at 15:56

## 2020-10-12 RX ADMIN — ACETAMINOPHEN 975 MG: 325 TABLET, FILM COATED ORAL at 14:17

## 2020-10-12 RX ADMIN — METHOCARBAMOL 500 MG: 500 TABLET, FILM COATED ORAL at 07:40

## 2020-10-12 RX ADMIN — METHOCARBAMOL 500 MG: 500 TABLET, FILM COATED ORAL at 13:03

## 2020-10-12 RX ADMIN — GABAPENTIN 100 MG: 100 CAPSULE ORAL at 19:54

## 2020-10-12 RX ADMIN — IBUPROFEN 600 MG: 600 TABLET ORAL at 15:56

## 2020-10-12 RX ADMIN — METHOCARBAMOL 500 MG: 500 TABLET, FILM COATED ORAL at 19:53

## 2020-10-12 RX ADMIN — OXYCODONE HYDROCHLORIDE 5 MG: 5 TABLET ORAL at 13:03

## 2020-10-12 RX ADMIN — METHOCARBAMOL 500 MG: 500 TABLET, FILM COATED ORAL at 15:56

## 2020-10-12 RX ADMIN — GABAPENTIN 100 MG: 100 CAPSULE ORAL at 14:17

## 2020-10-12 RX ADMIN — POTASSIUM PHOSPHATE, MONOBASIC AND POTASSIUM PHOSPHATE, DIBASIC 15 MMOL: 224; 236 INJECTION, SOLUTION INTRAVENOUS at 15:49

## 2020-10-12 ASSESSMENT — ENCOUNTER SYMPTOMS
SUBJECTIVE PATIENT PAIN CONTROL: WELL CONTROLLED
DIETARY ISSUES: ADEQUATE INTAKE
PAIN SEVERITY NOW: MILD
SUBJECTIVE PAIN PROGRESSION: IMPROVING
ACTIVITY IMPAIRMENT: IMPAIRED DUE TO PAIN

## 2020-10-12 NOTE — PLAN OF CARE
Outpatient/Observation goals to be met before discharge home:     - Adequate pain control on oral analgesia. -met, feels that oxycodone and tylenol are helping to control the pain  - Vital Signs normal or at patient baseline. -met  - Tolerating oral intake to maintain hydration. -tolerating PO intake, LR running @ 100ml/hr  - Diagnostic tests and consults completed and resulted -met  - Cleared for discharge from consultants' standpoint if involved in care -met, awaiting TCU   - Safe disposition plan has been identified -met, awaiting placement at TCU  - Return to baseline functional status -not met, feels pain is more controlled and is able to ambulate but needs ongoing pain management/rehab     -pt is A&O, able to make needs known, walked in the hallways today, voiding and had bm today x2, abdomen soft and non tender, ate ~75% of dinner and drinking fluids, able to turn ind but does require some assistance repositioning in bed, up with a walker and 1-2 assist, pain is at a manageable level with scheduled tylenol and prn oxycodone, VSS on RA, will continue to monitor

## 2020-10-12 NOTE — CONSULTS
Care Management Follow Up Note    Length of Stay (days) 1    Patient plan of care discussed at Interdisciplinary Rounds: yes  Expected Discharge Date:    Concerns to be Addressed: all concerns addressed in this encounter, care coordination/care conferences, discharge planning  Issues in scheduling Hem/Onc follow up appointment    Anticipated Discharge Disposition: SNF - Please refer to  note for additional information -  Anticipated Discharge Services: Outpatient follow up    Anticipated Discharge DME:  None    Plan:  Notified by SUSANA Valente ED/OBS that care team anticipates discharge today however pt will need to have hem/onc follow up.  Hem/Onc saw pt while in ED/OBS and noted that clinic attempted to reach pt however was not succesful.  Per appointment notes Hem/Onc clinc attempted to reach patient however unable to reach pt via #'s listed and a [facility] chart message was sent with no response.    Spoke with Jose Francisco  who confirmed appointment for 11/4 but will send a message to the team requesting review for a sooner appointment.          Spoke with pt via phone.  Introduced RNCC role.  Per pt he has a cellphone but no landline.  Pt confirmed his cellphone # is [phone].  Per pt he doesn't use his cellphone often.  Pt confirmed he has access to mychart but again does not generally access the mychart option.  Pt informed writer that he will discharge to a rehab facility today and is concerned that no one will be able to reach him about his appointment.  Writer agreed to update outpatient hem/onc clinic of anticipated plan for discharge.  Per  notes pt will discharge to [facility] [phone].     Reviewed above with SUSANA Valente.  Pt will need bone marrow biopsy done prior to hem/onc appointment.  CTS handoff sent to assist patient in coordinating  Outpatient follow up and biopsy.      Charlotte Bennett RN

## 2020-10-12 NOTE — PLAN OF CARE
"Observation goals - Acute Traumatic pain PRIOR TO DISCHARGE     Comments: List all goals to be met before discharge home:   - Adequate pain control on oral analgesia. YES  - Vital Signs normal or at patient baseline. YES /80 (BP Location: Right arm)   Pulse 79   Temp 98.5  F (36.9  C) (Oral)   Resp 16   Ht 1.549 m (5' 1\")   Wt 61 kg (134 lb 7.7 oz)   SpO2 96%   BMI 25.41 kg/m         - Tolerating oral intake to maintain hydration. YES: has IVF running also  - Diagnostic tests and consults completed and resulted YES  - Cleared for discharge from consultants' standpoint if involved in care YES: TCU accepted, ride at 1100 tomorrow  - Safe disposition plan has been identified YES: waiting on TCU placement  - Return to baseline functional status YES: pt is at his current baseline, will need continued PT and rehab  - Nurse to notify provider when observation goals have been met and patient is ready for discharge.       "

## 2020-10-12 NOTE — PLAN OF CARE
"Observation goals - Acute Traumatic pain PRIOR TO DISCHARGE     Comments: List all goals to be met before discharge home:   - Adequate pain control on oral analgesia. YES  - Vital Signs normal or at patient baseline. PENDING: pt slightly hypertensive BP (!) 160/74 (BP Location: Right arm)   Pulse 87   Temp 99.8  F (37.7  C) (Oral)   Resp 16   Ht 1.549 m (5' 1\")   Wt 61 kg (134 lb 7.7 oz)   SpO2 98%   BMI 25.41 kg/m       - Tolerating oral intake to maintain hydration. YES: has IVF running  - Diagnostic tests and consults completed and resulted YES  - Cleared for discharge from consultants' standpoint if involved in care YES: TCU placement  - Safe disposition plan has been identified YES: waiting on TCU placement  - Return to baseline functional status PENDING  - Nurse to notify provider when observation goals have been met and patient is ready for discharge.       "

## 2020-10-12 NOTE — PLAN OF CARE
"     Observation goals - Acute Traumatic pain PRIOR TO DISCHARGE     Comments: List all goals to be met before discharge home:   - Adequate pain control on oral analgesia. YES  - Vital Signs normal or at patient baseline. YES /80 (BP Location: Right arm)   Pulse 79   Temp 98.5  F (36.9  C) (Oral)   Resp 16   Ht 1.549 m (5' 1\")   Wt 61 kg (134 lb 7.7 oz)   SpO2 96%   BMI 25.41 kg/m          - Tolerating oral intake to maintain hydration. YES: has IVF running also  - Diagnostic tests and consults completed and resulted YES  - Cleared for discharge from consultants' standpoint if involved in care YES: TCU accepted, ride at 1100 tomorrow  - Safe disposition plan has been identified YES: waiting on TCU placement  - Return to baseline functional status YES: pt is at his current baseline, will need continued PT and rehab  - Nurse to notify provider when observation goals have been met and patient is ready for discharge.           "

## 2020-10-12 NOTE — PLAN OF CARE
Outpatient/Observation goals to be met before discharge home:     - Adequate pain control on oral analgesia. -met, pt feels that oxycodone and tylenol are helping to control the pain  - Vital Signs normal or at patient baseline. -met  - Tolerating oral intake to maintain hydration. -tolerating PO intake, LR running @ 100ml/hr  - Diagnostic tests and consults completed and resulted -met  - Cleared for discharge from consultants' standpoint if involved in care -met, awaiting TCU   - Safe disposition plan has been identified -met, awaiting placement at TCU  - Return to baseline functional status -not met, feels pain is more controlled and is able to ambulate but needs ongoing pain management/rehab     -pt is A&O, able to make needs known, walked in the hallways today, voiding and had bm today x2, abdomen soft and non tender, ate ~75% of dinner and drinking fluids, able to turn ind but does require some assistance repositioning in bed, up with a walker and 1-2 assist, pain is at a manageable level with scheduled tylenol and prn oxycodone, BP intermittently elevated but stable and with pain meds has improved, will continue to monitor

## 2020-10-12 NOTE — PROGRESS NOTES
Care Management Follow Up Note    Length of Stay (days) 1    Patient plan of care discussed at Interdisciplinary Rounds: yes  Expected Discharge Date:  10/12/20  Concerns to be Addressed: TCU placement    Anticipated Discharge Disposition:  TCU TBD  Anticipated Discharge Services:  Transportation  Anticipated Discharge DME:  NA    Plan:  SW called [facility] [phone] and left VM with admissions requesting phone call back. SW received phone call from Greta who will review referral.   SW called [facility] [phone] and spoke with admissions. Per admissions, they do not have anything available.   SW called [facility] [phone] and they are reviewing referral.   SW called [facility] [phone] and spoke with admissions. They do not have any available beds and anticipate there will be beds available midweek if pt is still looking for TCU at that time.   SHAYNE called [facility] [phone] and per VM there are not any available beds, however SW left message requesting call back regarding bed availability.     Addend 1015: SW received phone call from Rosey in admissions at [facility] stating they are able to accept for admission to TCU, however Rosey will call this SW once it is known if they can accept today or tomorrow pending other admissions to facility.    Addend 1100: SHAYNE met with pt at bedside to introduce self and role on treatment team. SW updated pt on discharge plan. Pt is in agreement with discharge plan. Pt reports he is supposed to have a a bone biopsy tomorrow.   SHAYNE updated ED OBS ANUP re: bone biopsy.  SW received phone call from Rosey in admissions at [facility] stating they can accept pt for admission tomorrow and would prefer an early morning discharge around 11:00. SHAYNE will confirm with Rosey once ride is arranged.   PAS completed: [id number]    SW received phone call from pt's Rahul Burrell [phone] requesting phone call  back.   SHAYNE called Ashanti anita to update her on the discharge.   SW called [facility] [phone], wheelchair ride arranged for tomorrow 10/13/20 at 11:00.     SW received VM from admissions requesting pt's social security number.   SW met with pt to update him on the discharge plan. Pt is in agreement. Pt provided SW with social security number.   SW called [facility] admissions [phone] and provided discharge time and social security number.     MITESH Tinsley, [facility]  Adult Acute Care   Ph:[phone]  Pager [phone]

## 2020-10-12 NOTE — PLAN OF CARE
Outpatient/Observation goals to be met before discharge home:     - Adequate pain control on oral analgesia. -met, pt feels that oxycodone and tylenol are helping to control the pain  - Vital Signs normal or at patient baseline. -met  - Tolerating oral intake to maintain hydration. -tolerating PO intake, LR running @ 100ml/hr  - Diagnostic tests and consults completed and resulted -met  - Cleared for discharge from consultants' standpoint if involved in care -met, awaiting TCU   - Safe disposition plan has been identified -met, awaiting placement at TCU  - Return to baseline functional status -not met, feels pain is more controlled and is able to ambulate but needs ongoing pain management/rehab

## 2020-10-12 NOTE — PROGRESS NOTES
[facility]     Medicine Progress Note - Emergency Department Observation Unit       Date of Admission:  10/9/2020  Assessment and plan:   Rogelio Marshall is a 75 year old male with past medical history significant for hypertension, hyperlipidemia and previous SBO (2010) presents to ED with inability to perform ADLS and manage back pain.         ##Diffuse back pain:   ##Multiple pathologic compression fractures:  ##New Diagnosis MM:   ##Disposition:   Patient has been worked up by PCP for back pain. CT T and L Spine 9/18 showed extensive osteoporotic compression fractures with suggestion of possible myeloma. He was seen by Spine surgeon, Dr. Foley who reported due to the patient's poor bone quality, he is not a good candidate for surgery. PTA the patient was working with his PCP for further diagnosis of his multiple pathologic appearing compression fractures. However, presented to the ED with intractable pain and inability to perform ADL's. No bowel or bladder changes. HD stable, afebrile. Hematology was consulted 10/11 who did discuss new diagnosis with the patient. Hematology recommended Calcium 500 mg's TID, pamidronate 60-90 mg's IV x 1, bone marrow biopsy early next week, and NSG consult.  NSG recommended repeat CT, which appears stable. Also, consider brace for comfort. Discussed pain management with pain team on 10/9. This morning he notes his pain has improved.  Bone biopsy is needed to confirm the diagnosis however it is not urgent procedure and likely will be on Tuesday. Hematology consulted and recommended  1 dose of biphosphonate (pamidronate or Zometa/zoledronic acid) given 10/10 in addition to calcium 500 mg 3 times daily. Patient to call clinic on Monday morning to set up an appointment for hematology.Orthotics consulted and patient declined brace.  Palliative care consult placed, discussed with team who agree with current pain regimen. Given good pain  control can defer for outpatient   -Resume Scheduled Tylenol 975 mg TID and Ibuprofen 600 mg q 6 hours  - Lidocaine patches alternate with Menthol patches  - Gabapentin 100mg TID (titrate slowly)   - Robaxin 500mg QID  - Oxycodone 5 mg q 4 hours prn  - PT recommended TCU,   - s/p pamidronate 60 mg's IV (10/10)  calcium 500 mg 3 times daily  - SW consulted to assist with TCU placement   - COVID is negative   - Senna and Miralax      ##HTN: Appears related to pain as BP is in normal range when pain is managed.   ##HALLIE: Creatinine 0.76 on 7/13--> Creatinine 1.72 on 10/5--> 2.19 (10/9)--> 1.56 (10/10)--> 1.10 (10/11) today 1.05  - LR at 100/hr       ##Decreased appetite: Patient reports decrease in appetite. He was seen by nutrition who recommended: Boost Plus chocolate TID between meals; also ordered 2 to come up for pt to try/keep in room.Encouraged small, more frequent nutrient-dense meals.  - Zofran prn         Diet: Regular Diet Adult  Snacks/Supplements Adult: Boost Plus; Between Meals    DVT Prophylaxis: Low Risk/Ambulatory with no VTE prophylaxis indicated  Dangelo Catheter: not present  Code Status: Full Code           Disposition Plan   Expected discharge: Tomorrow, recommended to transitional care unit once safe disposition plan/ TCU bed available.  Entered: JIMBO King CNP 10/12/2020, 10:59 AM       The patient's care was discussed with the Attending Physician, Dr. Alcala, Bedside Nurse, Care Coordinator/ and Patient.    JIMBO King CNP  [facility]   Contact information available via [facility] Paging/Directory    ______________________________________________________________________    Interval History   Pain management overnight    Data reviewed today: I reviewed all medications, new labs and imaging results over the last 24 hours.    Physical Exam   Vital Signs: Temp: 99.8  F (37.7  C) Temp src: Oral BP: (!) 155/84 Pulse: 88   Resp:  16 SpO2: 98 % O2 Device: None (Room air)    Weight: 134 lbs 7.69 oz  GENERAL: Alert and oriented x 3. NAD.   HEENT: Anicteric sclera. Mucous membranes moist.   CV: RRR. S1, S2. No murmurs appreciated.   RESPIRATORY: Effort normal. Lungs CTAB with no wheezing, rales, rhonchi.   GI: Abdomen soft and non distended with normoactive bowel sounds present in all quadrants. No tenderness, rebound, guarding.   NEUROLOGICAL: No focal deficits. Moves all extremities.    EXTREMITIES: No peripheral edema. Intact bilateral pedal pulses.   SKIN: No jaundice. No rashes.     Data   Recent Labs   Lab 10/12/20  1152 10/11/20  0708 10/10/20  1615 10/10/20  0758 10/09/20  1405   WBC 4.4 5.4  --  5.9 6.4   HGB 9.4* 10.1*  --  9.5* 10.4*   MCV 98 101*  --  102* 101*    270  --  266 308   INR  --   --   --   --  1.14    139  --  141 140   POTASSIUM 3.2* 3.9 3.8 3.0* 3.7   CHLORIDE 110* 115*  --  120* 113*   CO2 24 17*  --  16* 15*   BUN 10 20  --  33* 43*   CR 1.05 1.10  --  1.56* 2.19*   ANIONGAP 4 7  --  5 12   MIRI 8.0* 8.5  --  7.2* 9.3   * 86  --  70 74   ALBUMIN  --  2.2*  --   --  2.9*   PROTTOTAL  --  9.4*  --   --  12.0*   BILITOTAL  --  0.4  --   --  0.4   ALKPHOS  --  70  --   --  88   ALT  --  12  --   --  16   AST  --  19  --   --  25     No results found for this or any previous visit (from the past 24 hour(s)).  Medications     lactated ringers 100 mL/hr at 10/12/20 0512       acetaminophen  975 mg Oral TID     calcium carbonate (OS-MIRI) 500 mg (elemental) tablet  500 mg Oral TID w/meals     gabapentin  100 mg Oral TID     ibuprofen  600 mg Oral Q6H     lidocaine  2 patch Transdermal Q24H    And     lidocaine   Transdermal Q8H     menthol  1 patch Topical Q24H    And     menthol   Transdermal Q8H     methocarbamol  500 mg Oral 4x Daily     polyethylene glycol  17 g Oral Daily     senna-docusate  2 tablet Oral BID     sodium chloride (PF)  3 mL Intracatheter Q8H     sodium chloride (PF)  3 mL Intracatheter  Q8H

## 2020-10-12 NOTE — PROGRESS NOTES
I saw patient today in [facility]. Patient declined the TLSO at this time. I did not provide a brace at this time. Patient and I did discuss bracing options.   Filiberto Liu     used

## 2020-10-12 NOTE — PROGRESS NOTES
"Rogelio Marshall is a 75 year old male patient.  1. Multiple myeloma not having achieved remission (H)    2. Chronic midline low back pain without sciatica    3. Difficulty walking    4. Pathologic compression fracture of spine, sequela    5. Elevated serum creatinine      Past Medical History:   Diagnosis Date     Hyperlipidemia      Hypertension      No current outpatient medications on file.     No Known Allergies  Active Problems:    Difficulty walking    Pathologic compression fracture of spine, sequela    Chronic midline low back pain without sciatica    Blood pressure (!) 160/74, pulse 87, temperature 99.8  F (37.7  C), temperature source Oral, resp. rate 16, height 1.549 m (5' 1\"), weight 61 kg (134 lb 7.7 oz), SpO2 98 %.    Subjective:  Symptoms:  Improved.  (Back pain).    Diet:  Adequate intake.    Activity level: Impaired due to pain.    Pain:  He complains of pain that is mild.  He reports pain is improving.  Pain is well controlled.      Objective:  General Appearance:  Comfortable.    Vital signs: (most recent): Blood pressure (!) 160/74, pulse 87, temperature 99.8  F (37.7  C), temperature source Oral, resp. rate 16, height 1.549 m (5' 1\"), weight 61 kg (134 lb 7.7 oz), SpO2 98 %.  Vital signs are normal.    Output: Producing urine.    HEENT: Normal HEENT exam.    Lungs:  Normal effort and normal respiratory rate.  Breath sounds clear to auscultation.    Heart: Normal rate.  Regular rhythm.  S1 normal and S2 normal.    Abdomen: Abdomen is soft.  Bowel sounds are normal.   There is no abdominal tenderness.     Extremities: Normal range of motion.    Pulses: Distal pulses are intact.    Neurological: Patient is alert.    Pupils:  Pupils are equal, round, and reactive to light.    Skin:  Warm.      Assessment:    Condition: In stable condition.  Improving.   (75 yom with newly suspected Myeloma, BM pending, improving pain control, ambulate with walker.    Awaiting TCU placement.).       Marya Alcala" MD GIULIANO  10/12/2020    The pt was seen and examined by myself. The case was reviewed and the plan was discussed with the ANUP.

## 2020-10-13 ENCOUNTER — AMBULATORY - HEALTHEAST (OUTPATIENT)
Dept: GERIATRICS | Facility: CLINIC | Age: 75
End: 2020-10-13

## 2020-10-13 ENCOUNTER — PATIENT OUTREACH (OUTPATIENT)
Dept: CARE COORDINATION | Facility: CLINIC | Age: 75
End: 2020-10-13

## 2020-10-13 VITALS
HEIGHT: 61 IN | WEIGHT: 134.48 LBS | RESPIRATION RATE: 16 BRPM | BODY MASS INDEX: 25.39 KG/M2 | HEART RATE: 68 BPM | TEMPERATURE: 97.9 F | SYSTOLIC BLOOD PRESSURE: 133 MMHG | OXYGEN SATURATION: 99 % | DIASTOLIC BLOOD PRESSURE: 93 MMHG

## 2020-10-13 DIAGNOSIS — D47.2 MONOCLONAL GAMMOPATHY: ICD-10-CM

## 2020-10-13 DIAGNOSIS — M48.50XS PATHOLOGIC COMPRESSION FRACTURE OF SPINE, SEQUELA: Primary | ICD-10-CM

## 2020-10-13 LAB
MAGNESIUM SERPL-MCNC: 2.4 MG/DL (ref 1.6–2.3)
PHOSPHATE SERPL-MCNC: 2.7 MG/DL (ref 2.5–4.5)
POTASSIUM SERPL-SCNC: 4.4 MMOL/L (ref 3.4–5.3)

## 2020-10-13 PROCEDURE — 83735 ASSAY OF MAGNESIUM: CPT | Performed by: NURSE PRACTITIONER

## 2020-10-13 PROCEDURE — 250N000013 HC RX MED GY IP 250 OP 250 PS 637: Performed by: NURSE PRACTITIONER

## 2020-10-13 PROCEDURE — 36415 COLL VENOUS BLD VENIPUNCTURE: CPT | Performed by: NURSE PRACTITIONER

## 2020-10-13 PROCEDURE — 258N000003 HC RX IP 258 OP 636: Performed by: NURSE PRACTITIONER

## 2020-10-13 PROCEDURE — G0378 HOSPITAL OBSERVATION PER HR: HCPCS

## 2020-10-13 PROCEDURE — 84100 ASSAY OF PHOSPHORUS: CPT | Performed by: NURSE PRACTITIONER

## 2020-10-13 PROCEDURE — 84132 ASSAY OF SERUM POTASSIUM: CPT | Performed by: NURSE PRACTITIONER

## 2020-10-13 PROCEDURE — 99217 PR OBSERVATION CARE DISCHARGE: CPT | Performed by: INTERNAL MEDICINE

## 2020-10-13 RX ORDER — CALCIUM CARBONATE 500(1250)
500 TABLET ORAL
OUTPATIENT
Start: 2020-10-13 | End: 2024-08-08

## 2020-10-13 RX ORDER — ACETAMINOPHEN 325 MG/1
975 TABLET ORAL 3 TIMES DAILY
DISCHARGE
Start: 2020-10-13 | End: 2020-12-08

## 2020-10-13 RX ORDER — METHOCARBAMOL 500 MG/1
500 TABLET, FILM COATED ORAL 4 TIMES DAILY
DISCHARGE
Start: 2020-10-13 | End: 2020-12-08

## 2020-10-13 RX ORDER — GABAPENTIN 100 MG/1
100 CAPSULE ORAL 3 TIMES DAILY
Status: ON HOLD | DISCHARGE
Start: 2020-10-13 | End: 2020-12-04

## 2020-10-13 RX ORDER — CALCIUM CARBONATE 500(1250)
500 TABLET ORAL
DISCHARGE
Start: 2020-10-13 | End: 2020-10-13

## 2020-10-13 RX ORDER — METHOCARBAMOL 500 MG/1
500 TABLET, FILM COATED ORAL 4 TIMES DAILY
DISCHARGE
Start: 2020-10-13 | End: 2020-10-13

## 2020-10-13 RX ORDER — LIDOCAINE 4 G/G
2 PATCH TOPICAL EVERY 24 HOURS
DISCHARGE
Start: 2020-10-13 | End: 2020-10-13

## 2020-10-13 RX ORDER — POLYETHYLENE GLYCOL 3350 17 G/17G
17 POWDER, FOR SOLUTION ORAL DAILY
DISCHARGE
Start: 2020-10-14

## 2020-10-13 RX ORDER — POLYETHYLENE GLYCOL 3350 17 G/17G
17 POWDER, FOR SOLUTION ORAL DAILY
DISCHARGE
Start: 2020-10-14 | End: 2020-10-13

## 2020-10-13 RX ORDER — ACETAMINOPHEN 325 MG/1
975 TABLET ORAL 3 TIMES DAILY
DISCHARGE
Start: 2020-10-13 | End: 2020-10-13

## 2020-10-13 RX ORDER — IBUPROFEN 600 MG/1
600 TABLET, FILM COATED ORAL EVERY 6 HOURS
DISCHARGE
Start: 2020-10-13 | End: 2020-10-13

## 2020-10-13 RX ORDER — OXYCODONE HYDROCHLORIDE 5 MG/1
5 TABLET ORAL EVERY 4 HOURS PRN
Qty: 20 TABLET | Refills: 0 | Status: SHIPPED | OUTPATIENT
Start: 2020-10-13 | End: 2020-10-13

## 2020-10-13 RX ORDER — LIDOCAINE 4 G/G
2 PATCH TOPICAL EVERY 24 HOURS
Status: ON HOLD | DISCHARGE
Start: 2020-10-13 | End: 2020-11-27

## 2020-10-13 RX ORDER — IBUPROFEN 600 MG/1
600 TABLET, FILM COATED ORAL EVERY 6 HOURS
DISCHARGE
Start: 2020-10-13 | End: 2021-02-17

## 2020-10-13 RX ORDER — OXYCODONE HYDROCHLORIDE 5 MG/1
5 TABLET ORAL EVERY 4 HOURS PRN
Qty: 20 TABLET | Refills: 0 | Status: SHIPPED | DISCHARGE
Start: 2020-10-13 | End: 2020-11-26

## 2020-10-13 RX ORDER — GABAPENTIN 100 MG/1
100 CAPSULE ORAL 3 TIMES DAILY
DISCHARGE
Start: 2020-10-13 | End: 2020-10-13

## 2020-10-13 RX ORDER — CALCIUM CARBONATE 500(1250)
500 TABLET ORAL
DISCHARGE
Start: 2020-10-13 | End: 2020-12-08

## 2020-10-13 RX ORDER — ONDANSETRON 4 MG/1
4 TABLET, ORALLY DISINTEGRATING ORAL EVERY 6 HOURS PRN
DISCHARGE
Start: 2020-10-13

## 2020-10-13 RX ADMIN — GABAPENTIN 100 MG: 100 CAPSULE ORAL at 07:46

## 2020-10-13 RX ADMIN — METHOCARBAMOL 500 MG: 500 TABLET, FILM COATED ORAL at 07:46

## 2020-10-13 RX ADMIN — IBUPROFEN 600 MG: 600 TABLET ORAL at 04:39

## 2020-10-13 RX ADMIN — DOCUSATE SODIUM 50 MG AND SENNOSIDES 8.6 MG 2 TABLET: 8.6; 5 TABLET, FILM COATED ORAL at 07:46

## 2020-10-13 RX ADMIN — LIDOCAINE 2 PATCH: 560 PATCH PERCUTANEOUS; TOPICAL; TRANSDERMAL at 07:39

## 2020-10-13 RX ADMIN — SODIUM CHLORIDE, POTASSIUM CHLORIDE, SODIUM LACTATE AND CALCIUM CHLORIDE: 600; 310; 30; 20 INJECTION, SOLUTION INTRAVENOUS at 07:37

## 2020-10-13 RX ADMIN — OXYCODONE HYDROCHLORIDE 5 MG: 5 TABLET ORAL at 10:52

## 2020-10-13 RX ADMIN — OXYCODONE HYDROCHLORIDE 5 MG: 5 TABLET ORAL at 06:51

## 2020-10-13 RX ADMIN — POLYETHYLENE GLYCOL 3350 17 G: 17 POWDER, FOR SOLUTION ORAL at 07:42

## 2020-10-13 RX ADMIN — ACETAMINOPHEN 975 MG: 325 TABLET, FILM COATED ORAL at 07:46

## 2020-10-13 RX ADMIN — IBUPROFEN 600 MG: 600 TABLET ORAL at 09:40

## 2020-10-13 RX ADMIN — OXYCODONE HYDROCHLORIDE 5 MG: 5 TABLET ORAL at 01:03

## 2020-10-13 RX ADMIN — CALCIUM 500 MG: 500 TABLET ORAL at 07:46

## 2020-10-13 ASSESSMENT — ENCOUNTER SYMPTOMS
SUBJECTIVE PAIN PROGRESSION: IMPROVING
ACTIVITY IMPAIRMENT: IMPAIRED DUE TO PAIN
PAIN SEVERITY NOW: MILD
SUBJECTIVE PATIENT PAIN CONTROL: WELL CONTROLLED
DIETARY ISSUES: ADEQUATE INTAKE

## 2020-10-13 NOTE — PLAN OF CARE
Observation Goals:  - Adequate pain control on oral analgesia. Yes  - Vital Signs normal or at patient baseline. Yes  - Tolerating oral intake to maintain hydration. Yes. Also receiving iv fluids  - Diagnostic tests and consults completed and resulted. No  - Cleared for discharge from consultants' standpoint if involved in care. Yes  - Safe disposition plan has been identified. Yes  - Return to baseline functional status. Yes

## 2020-10-13 NOTE — DISCHARGE SUMMARY
[facility]   Hospitalist Discharge Summary      Date of Admission:  10/9/2020  Date of Discharge:  10/13/2020  Discharging Provider: JIMBO King CNP  Discharge Team: Emergency Department Observation Unit    Discharge Diagnoses   MM  Pain    Follow-ups Needed After Discharge   Follow-up Appointments     Follow Up and recommended labs and tests      Follow up with your primary care doctor in one week for hospital follow   up. Repeat CBC and BMP    Follow up with hematology as scheduled on November 4th.         {Additional follow-up instructions/to-do's for PCP    :Hospital follow up    Unresulted Labs Ordered in the Past 30 Days of this Admission     No orders found from 9/9/2020 to 10/10/2020.      These results will be followed up by PCP    Discharge Disposition   Discharged to home  Condition at discharge: Stable      Hospital Course   Rogelio Marshall is a 75 year old male with past medical history significant for hypertension, hyperlipidemia and previous SBO (2010) presents to ED with inability to perform ADLS and manage back pain.         ##Diffuse back pain:   ##Multiple pathologic compression fractures:  ##New Diagnosis MM:   ##Disposition:   Patient has been worked up by PCP for back pain. CT T and L Spine 9/18 showed extensive osteoporotic compression fractures with suggestion of possible myeloma. He was seen by Spine surgeon, Dr. Foley who reported due to the patient's poor bone quality, he is not a good candidate for surgery. PTA the patient was working with his PCP for further diagnosis of his multiple pathologic appearing compression fractures. However, presented to the ED with intractable pain and inability to perform ADL's. No bowel or bladder changes. HD stable, afebrile. Hematology was consulted 10/11 who did discuss new diagnosis with the patient. Hematology recommended Calcium 500 mg's TID, pamidronate 60-90 mg's IV x 1, bone marrow biopsy  early next week, and NSG consult.  NSG recommended repeat CT, which appears stable. Also, consider brace for comfort. Discussed pain management with pain team on 10/9. This morning he notes his pain has improved.  Bone biopsy is needed to confirm the diagnosis however it is not urgent procedure and likely will be on Tuesday. Hematology consulted and recommended  1 dose of biphosphonate (pamidronate or Zometa/zoledronic acid) given 10/10 in addition to calcium 500 mg 3 times daily. Patient to call clinic on Monday morning to set up an appointment for hematology.Orthotics consulted and patient declined brace.  Palliative care consult placed, discussed with team who agree with current pain regimen. Given good pain control can defer for outpatient. Resume Scheduled Tylenol 975 mg TID and Ibuprofen 600 mg q 6 hours. Lidocaine patches alternate with Menthol patches Gabapentin 100mg TID Robaxin 500mg QID Oxycodone 5 mg q 4 hours prn PT recommended TCU and patient was accepted. s/p pamidronate 60 mg's IV (10/10)- calcium 500 mg 3 times daily.  COVID is negative. Patient was continued on stool softeners.        ##HTN: Appears related to pain as BP is in normal range when pain is managed.   ##HALLIE: Creatinine 0.76 on 7/13--> Creatinine 1.72 on 10/5--> 2.19 (10/9)--> 1.56 (10/10)--> 1.10 (10/11) 10/12) 1.05          ##Decreased appetite: Patient reports decrease in appetite. He was seen by nutrition who recommended: Boost Plus chocolate TID between meals; also ordered 2 to come up for pt to try/keep in room.Encouraged small, more frequent nutrient-dense meals.  - Zofran prn            Consultations This Hospital Stay   PAIN MANAGEMENT ADULT IP CONSULT  PHYSICAL THERAPY ADULT IP CONSULT  PHYSICAL THERAPY ADULT IP CONSULT  OCCUPATIONAL THERAPY ADULT IP CONSULT  NUTRITION SERVICES ADULT IP CONSULT  SOCIAL WORK IP CONSULT  VASCULAR ACCESS CARE ADULT IP CONSULT  HEMATOLOGY ADULT IP CONSULT  NEUROSURGERY ADULT IP CONSULT  PALLIATIVE  CARE ADULT IP CONSULT  VASCULAR ACCESS CARE ADULT IP CONSULT  ORTHOSIS BRACE IP CONSULT  VASCULAR ACCESS CARE ADULT IP CONSULT  CARE MANAGEMENT / SOCIAL WORK IP CONSULT  PHYSICAL THERAPY ADULT IP CONSULT  OCCUPATIONAL THERAPY ADULT IP CONSULT    Code Status   Full Code    Time Spent on this Encounter   I, JIMBO King CNP, personally saw the patient today and spent less than or equal to 30 minutes discharging this patient.       JIMBO King CNP  [facility] [address] [facility]  [address] [place] [id number]  Phone: [phone]  Fax: [phone]  ______________________________________________________________________    Physical Exam   Vital Signs: Temp: 97.9  F (36.6  C) Temp src: Oral BP: (!) 133/93 Pulse: 68   Resp: 16 SpO2: 99 % O2 Device: None (Room air)    Weight: 134 lbs 7.69 oz  GENERAL: Alert and oriented x 3. NAD.   HEENT: Anicteric sclera. Mucous membranes moist.   CV: RRR. S1, S2. No murmurs appreciated.   RESPIRATORY: Effort normal. Lungs CTAB with no wheezing, rales, rhonchi.   GI: Abdomen soft and non distended with normoactive bowel sounds present in all quadrants. No tenderness, rebound, guarding.   NEUROLOGICAL: No focal deficits. Moves all extremities.    EXTREMITIES: No peripheral edema. Intact bilateral pedal pulses.   SKIN: No jaundice. No rashes.        Primary Care Physician   Wolf Stevens    Discharge Orders      BONE MARROW BIOPSY     CBC with platelets    Last Lab Result: Hemoglobin (g/dL)       Date                     Value                 10/12/2020               9.4 (L)          ----------     Basic metabolic panel     Palliative Care Referral      Care Coordination Referral      General info for SNF    Length of Stay Estimate: Short Term Care: Estimated # of Days <30  Condition at Discharge: Stable  Level of care:skilled   Rehabilitation Potential: Excellent  Admission H&P remains valid and up-to-date: Yes  Recent  Chemotherapy: N/A  Use Nursing Home Standing Orders: Yes     Mantoux instructions    Give two-step Mantoux (PPD) Per Facility Policy Yes     Activity - Up with assistive device     Reason for your hospital stay    Pain from  extensive osteoporotic compression fractures with suggestion of possible myeloma.     Follow Up and recommended labs and tests    Follow up with your primary care doctor in one week for hospital follow up. Repeat CBC and BMP    Follow up with hematology as scheduled on November 4th.     Physical Therapy Adult Consult    Evaluate and treat as clinically indicated.    Reason:   extensive osteoporotic compression fractures with suggestion of possible myeloma.     Occupational Therapy Adult Consult    Evaluate and treat as clinically indicated.    Reason:   extensive osteoporotic compression fractures with suggestion of possible myeloma.     Fall precautions     Advance Diet as Tolerated    Follow this diet upon discharge: Regular       Significant Results and Procedures   Results for orders placed or performed during the hospital encounter of 10/09/20   CT Lumbar Spine w/o Contrast    Narrative    Thoracic and Lumbar spine CT without contrast    History: Diffuse back pain and previous finding concerning for  multiple myeloma, neurosurgery evaluating for changes.    Comparison: CT thoracic and lumbar spine dated 9/18/2020    Technique: Axial, coronal, and sagittal multiplanar reconstructions  obtained from acquisition of thoracic and lumbar spine CT scan .    Findings:  Thoracic spine:   Diffuse osteolytic appearance of the spine and bilateral ribs. The  dextrosclerosis centered at lower thoracic spine. No significant  change in dehiscence of T5 anterior cortex. Unchanged greater than 90%  height loss in T10 vertebral body with vertebra plana appearance.  Compression deformity of T12 upper endplate and T1 anterior vertebral  body, also unchanged.    There is no acute fracture or subluxation.      There is no prevertebral mass or edema. There is no high-grade spinal  canal or foraminal stenosis at any level. No soft tissue abnormality  in the visualized paraspinous tissues anteriorly. Overall no  significant change from 9/18/2020.    Lumbar Spine:  Again noted extensive osteolytic appearance of lumbar sacral spine and  pelvic bones.   Coronal levoscoliosis centered at mid lumbar spine. Unchanged grade 1  anterolisthesis of L5 on S1. There is no acute fracture or  subluxation. There are 5 type lumbar vertebra, coming down from T1.     Multilevel decompression deformity most prominent at L2 and L3 with  approximately 30% and 80% vertebral body height loss, overall not  significantly changed from prior.     Multilevel degenerative changes. On a level by level basis;     L1-L2: Superior L2 compression deformity. Bilateral facet hypertrophy  and ligamentum flavum thickening. Mild to moderate spinal canal  stenosis. Mild bilateral neural foraminal narrowing.    L2-L3:  Severe L3 compression deformity. Bilateral facet hypertrophy  and ligamentum flavum thickening. Mild spinal canal narrowing. Mild  bilateral neural foraminal narrowing.    L3-L4: Posterior disc bulge. Bilateral facet hypertrophy and  ligamentum flavum thickening. Mild spinal canal narrowing. Mild to  moderate bilateral neural foraminal stenosis.    L4-L5: Posterior disc bulge, bilateral facet hypertrophy, and  ligamentum flavum thickening. Mild spinal canal narrowing. Moderate  right and mild left neural foraminal stenosis.    L5-S1: Circumferential disc osteophyte complex and bilateral facet  hypertrophy. Mild spinal canal narrowing. Severe bilateral neural  foraminal stenosis.    The visualized adjacent paraspinous tissues are grossly within normal  limits.  Degenerative changes of the lumbar spine including osteophytic  spurring and endplate irregularity and sclerosis changes.       Impression    Impression:   1.  Since 9/18/2020, there is no  significant change in diffuse  osteolytic appearance of spine, pelvis and ribs, suspicious for  multiple myeloma. No significant change in compression deformities in  the thoracic and lumbar spine. No new fracture.  2. Lumbar spondylosis most prominent at L5-S1 with severe bilateral  neuroforaminal stenosis.    I have personally reviewed the examination and initial interpretation  and I agree with the findings.    LIBRA NEUMANN MD   CT Thoracic Spine w/o Contrast    Narrative    Thoracic and Lumbar spine CT without contrast    History: Diffuse back pain and previous finding concerning for  multiple myeloma, neurosurgery evaluating for changes.    Comparison: CT thoracic and lumbar spine dated 9/18/2020    Technique: Axial, coronal, and sagittal multiplanar reconstructions  obtained from acquisition of thoracic and lumbar spine CT scan .    Findings:  Thoracic spine:   Diffuse osteolytic appearance of the spine and bilateral ribs. The  dextrosclerosis centered at lower thoracic spine. No significant  change in dehiscence of T5 anterior cortex. Unchanged greater than 90%  height loss in T10 vertebral body with vertebra plana appearance.  Compression deformity of T12 upper endplate and T1 anterior vertebral  body, also unchanged.    There is no acute fracture or subluxation.     There is no prevertebral mass or edema. There is no high-grade spinal  canal or foraminal stenosis at any level. No soft tissue abnormality  in the visualized paraspinous tissues anteriorly. Overall no  significant change from 9/18/2020.    Lumbar Spine:  Again noted extensive osteolytic appearance of lumbar sacral spine and  pelvic bones.   Coronal levoscoliosis centered at mid lumbar spine. Unchanged grade 1  anterolisthesis of L5 on S1. There is no acute fracture or  subluxation. There are 5 type lumbar vertebra, coming down from T1.     Multilevel decompression deformity most prominent at L2 and L3 with  approximately 30% and 80%  vertebral body height loss, overall not  significantly changed from prior.     Multilevel degenerative changes. On a level by level basis;     L1-L2: Superior L2 compression deformity. Bilateral facet hypertrophy  and ligamentum flavum thickening. Mild to moderate spinal canal  stenosis. Mild bilateral neural foraminal narrowing.    L2-L3:  Severe L3 compression deformity. Bilateral facet hypertrophy  and ligamentum flavum thickening. Mild spinal canal narrowing. Mild  bilateral neural foraminal narrowing.    L3-L4: Posterior disc bulge. Bilateral facet hypertrophy and  ligamentum flavum thickening. Mild spinal canal narrowing. Mild to  moderate bilateral neural foraminal stenosis.    L4-L5: Posterior disc bulge, bilateral facet hypertrophy, and  ligamentum flavum thickening. Mild spinal canal narrowing. Moderate  right and mild left neural foraminal stenosis.    L5-S1: Circumferential disc osteophyte complex and bilateral facet  hypertrophy. Mild spinal canal narrowing. Severe bilateral neural  foraminal stenosis.    The visualized adjacent paraspinous tissues are grossly within normal  limits.  Degenerative changes of the lumbar spine including osteophytic  spurring and endplate irregularity and sclerosis changes.       Impression    Impression:   1.  Since 9/18/2020, there is no significant change in diffuse  osteolytic appearance of spine, pelvis and ribs, suspicious for  multiple myeloma. No significant change in compression deformities in  the thoracic and lumbar spine. No new fracture.  2. Lumbar spondylosis most prominent at L5-S1 with severe bilateral  neuroforaminal stenosis.    I have personally reviewed the examination and initial interpretation  and I agree with the findings.    LIBRA NEUMANN MD       Discharge Medications   Discharge Medication List as of 10/13/2020 10:00 AM      START taking these medications    Details   ondansetron (ZOFRAN-ODT) 4 MG ODT tab Take 1 tablet (4 mg) by mouth every 6  hours as needed for nausea or vomiting, Transitional      acetaminophen (TYLENOL) 325 MG tablet Take 3 tablets (975 mg) by mouth 3 times daily, Transitional      calcium carbonate 500 mg, elemental, (OSCAL) 500 MG tablet Take 1 tablet (500 mg) by mouth 3 times daily (with meals), Transitional      gabapentin (NEURONTIN) 100 MG capsule Take 1 capsule (100 mg) by mouth 3 times daily, Transitional      !! ibuprofen (ADVIL/MOTRIN) 600 MG tablet Take 1 tablet (600 mg) by mouth every 6 hours, Transitional      Lidocaine (LIDOCARE) 4 % Patch Place 2 patches onto the skin every 24 hours To prevent lidocaine toxicity, patient should be patch free for 12 hrs daily.Transitional      menthol (ICY HOT) 5 % PTCH Apply 1 patch topically every 24 hours Alternate with Lidocaine patch, Transitional      methocarbamol (ROBAXIN) 500 MG tablet Take 1 tablet (500 mg) by mouth 4 times daily, Transitional      oxyCODONE (ROXICODONE) 5 MG tablet Take 1 tablet (5 mg) by mouth every 4 hours as needed for moderate to severe pain, Disp-20 tablet, R-0, Local Print      polyethylene glycol (MIRALAX) 17 g packet Take 17 g by mouth daily, Transitional       !! - Potential duplicate medications found. Please discuss with provider.      CONTINUE these medications which have NOT CHANGED    Details   diclofenac (VOLTAREN) 75 MG EC tablet Take 1 tablet (75 mg) by mouth 2 times daily, Disp-20 tablet, R-0, E-Prescribe      !! ibuprofen (ADVIL/MOTRIN) 200 MG tablet Take 200 mg by mouth every 4 hours as needed for mild pain, Historical      tiZANidine (ZANAFLEX) 4 MG capsule Take 1 capsule (4 mg) by mouth nightly as needed for muscle spasms, Disp-15 capsule, R-1, E-Prescribe      tiZANidine (ZANAFLEX) 4 MG tablet Take 1 tablet (4 mg) by mouth 3 times daily, Disp-10 tablet, R-0, E-Prescribe      !! traMADol (ULTRAM) 50 MG tablet Take 1 tablet (50 mg) by mouth every 6 hours as needed for breakthrough pain, Disp-20 tablet,R-0, E-Prescribe      !! traMADol  (ULTRAM) 50 MG tablet Take 1 tablet (50 mg) by mouth nightly as needed for breakthrough pain, Disp-18 tablet, R-0, Local Print       !! - Potential duplicate medications found. Please discuss with provider.        Allergies   No Known Allergies

## 2020-10-13 NOTE — PROGRESS NOTES
This patient is part of a shared saving program and qualifies for care coordination through their primary care clinic.  Will route message to the primary care/family medicine clinic pool for further follow-up .        Anticipated Discharge Disposition: [address] [address] ph: [phone] f: [phone]     Patient will be discharged to care facility  No further calls were made at this time

## 2020-10-13 NOTE — PROGRESS NOTES
Patient will need to schedule a hospital follow up appointment. Sis Andre LPN 10/13/2020 10:19 AM

## 2020-10-13 NOTE — PROGRESS NOTES
Care Management Discharge Note    Discharge Planning:  Expected Discharge Date: 10/13/20     Concerns to be Addressed: all concerns addressed in this encounter, care coordination/care conferences, discharge planning  Issues in scheduling Hem/Onc follow up appointment    Anticipated Discharge Disposition: [address] [place] [address] ph: [phone] f: [phone]   Anticipated Discharge Services: Transportation  Anticipated Discharge DME:  NA    Patient/family educated on Medicare website which has current facility and service quality ratings: YES  Referrals Placed by CM/SW: See previous note  Education Provided on the Discharge Plan: YES  Patient/Family in Agreement with the Plan:  YES    Pre-Admission Screening (PAS) online form has been completed.  The Level of Care (LOC) is:  Determined  Confirmation Code is:  [id number]  Patient/caregiver informed of referral to [facility] for Pre-Admission Screening for skilled nursing facility (SNF) placement and to expect a phone call post discharge from SNF.     Additional Services/Equipment Arranged:  SHAYNE arranged [employer] [phone] w/c ride for 1100.      Patient / Family response to discharge plan:  Pt is in agreement with the discharge plan.      Persons notified of above discharge plan:  Pt, bedside RN, charge RN, NST, ED OBS ANUP, and TCU admissions.     Staff Discharge Instructions:  Please fax discharge orders and signed hard scripts for any controlled substances.  Please print a packet and send with patient.     CTS Handoff completed:  YES    Medicare Notice of Rights provided to the patient/family:  NO - on OBS not needed    MITESH Tinsley, [facility]  Adult Acute Care   Ph:[phone]  Pager [phone]

## 2020-10-13 NOTE — PROGRESS NOTES
Rogelio Marshall is a 75 year old male with past medical history significant for hypertension, hyperlipidemia and previous SBO (2010) presents to ED with inability to perform ADLS and manage back pain.         ##Diffuse back pain:   ##Multiple pathologic compression fractures:  ##New Diagnosis MM:   ##Disposition:   Patient has been worked up by PCP for back pain. CT T and L Spine 9/18 showed extensive osteoporotic compression fractures with suggestion of possible myeloma. He was seen by Spine surgeon, Dr. Foley who reported due to the patient's poor bone quality, he is not a good candidate for surgery. PTA the patient was working with his PCP for further diagnosis of his multiple pathologic appearing compression fractures. However, presented to the ED with intractable pain and inability to perform ADL's. No bowel or bladder changes. HD stable, afebrile. Hematology was consulted 10/11 who did discuss new diagnosis with the patient. Hematology recommended Calcium 500 mg's TID, pamidronate 60-90 mg's IV x 1, bone marrow biopsy early next week, and NSG consult.  NSG recommended repeat CT, which appears stable. Also, consider brace for comfort. Discussed pain management with pain team on 10/9. This morning he notes his pain has improved.  Bone biopsy is needed to confirm the diagnosis however it is not urgent procedure and likely will be on Tuesday. Hematology consulted and recommended  1 dose of biphosphonate (pamidronate or Zometa/zoledronic acid) given 10/10 in addition to calcium 500 mg 3 times daily. Patient to call clinic on Monday morning to set up an appointment for hematology.Orthotics consulted and patient declined brace.  Palliative care consult placed, discussed with team who agree with current pain regimen. Given good pain control can defer for outpatient   -Resume Scheduled Tylenol 975 mg TID and Ibuprofen 600 mg q 6 hours  - Lidocaine patches alternate with Menthol patches  - Gabapentin 100mg TID  (titrate slowly)   - Robaxin 500mg QID  - Oxycodone 5 mg q 4 hours prn  - PT recommended TCU  - s/p pamidronate 60 mg's IV (10/10)- calcium 500 mg 3 times daily  - SW consulted to assist with TCU placement   - COVID is negative   - Senna and Miralax      ##HTN: Appears related to pain as BP is in normal range when pain is managed.   ##HALLIE: Creatinine 0.76 on 7/13--> Creatinine 1.72 on 10/5--> 2.19 (10/9)--> 1.56 (10/10)--> 1.10 (10/11) today 1.05  - LR at 100/hr        ##Decreased appetite: Patient reports decrease in appetite. He was seen by nutrition who recommended: Boost Plus chocolate TID between meals; also ordered 2 to come up for pt to try/keep in room.Encouraged small, more frequent nutrient-dense meals.  - Zofran prn

## 2020-10-13 NOTE — PLAN OF CARE
- Adequate pain control on oral analgesia. Yes  - Vital Signs normal or at patient baseline. Yes  - Tolerating oral intake to maintain hydration. Yes. Also receiving iv fluids  - Diagnostic tests and consults completed and resulted. No  - Cleared for discharge from consultants' standpoint if involved in care. No  - Safe disposition plan has been identified. Yes  - Return to baseline functional status. Yes

## 2020-10-13 NOTE — PROGRESS NOTES
Pt was given Oxycodone prior to discharge per request to help with pain/discomfort with the transfer. Belongings packed and had it ready, security called to bring pt's meds. EMS came and to take pt to TCU, belongings and meds given as well as the admission packet, IV removed. Pt discharged.

## 2020-10-15 ENCOUNTER — AMBULATORY - HEALTHEAST (OUTPATIENT)
Dept: ADMINISTRATIVE | Facility: CLINIC | Age: 75
End: 2020-10-15

## 2020-10-15 ENCOUNTER — OFFICE VISIT - HEALTHEAST (OUTPATIENT)
Dept: GERIATRICS | Facility: CLINIC | Age: 75
End: 2020-10-15

## 2020-10-15 DIAGNOSIS — I10 ESSENTIAL HYPERTENSION: ICD-10-CM

## 2020-10-15 DIAGNOSIS — C90.00 MULTIPLE MYELOMA NOT HAVING ACHIEVED REMISSION (H): ICD-10-CM

## 2020-10-15 DIAGNOSIS — K59.03 DRUG-INDUCED CONSTIPATION: ICD-10-CM

## 2020-10-15 DIAGNOSIS — E78.5 HYPERLIPIDEMIA, UNSPECIFIED HYPERLIPIDEMIA TYPE: ICD-10-CM

## 2020-10-15 DIAGNOSIS — M48.50XS PATHOLOGIC COMPRESSION FRACTURE OF SPINE, SEQUELA: ICD-10-CM

## 2020-10-15 DIAGNOSIS — R63.0 LOSS OF APPETITE: ICD-10-CM

## 2020-10-15 DIAGNOSIS — G89.29 CHRONIC MIDLINE LOW BACK PAIN WITHOUT SCIATICA: ICD-10-CM

## 2020-10-15 DIAGNOSIS — M54.50 CHRONIC MIDLINE LOW BACK PAIN WITHOUT SCIATICA: ICD-10-CM

## 2020-10-15 ASSESSMENT — MIFFLIN-ST. JEOR: SCORE: 1233.43

## 2020-10-19 ENCOUNTER — RECORDS - HEALTHEAST (OUTPATIENT)
Dept: LAB | Facility: CLINIC | Age: 75
End: 2020-10-19

## 2020-10-19 ENCOUNTER — TELEPHONE (OUTPATIENT)
Dept: ORTHOPEDICS | Facility: CLINIC | Age: 75
End: 2020-10-19

## 2020-10-19 ENCOUNTER — OFFICE VISIT - HEALTHEAST (OUTPATIENT)
Dept: GERIATRICS | Facility: CLINIC | Age: 75
End: 2020-10-19

## 2020-10-19 DIAGNOSIS — M48.50XS PATHOLOGIC COMPRESSION FRACTURE OF SPINE, SEQUELA: ICD-10-CM

## 2020-10-19 DIAGNOSIS — R53.1 WEAKNESS: ICD-10-CM

## 2020-10-19 DIAGNOSIS — I10 ESSENTIAL HYPERTENSION: ICD-10-CM

## 2020-10-19 DIAGNOSIS — K59.00 CONSTIPATION, UNSPECIFIED CONSTIPATION TYPE: ICD-10-CM

## 2020-10-19 ASSESSMENT — MIFFLIN-ST. JEOR: SCORE: 1237.51

## 2020-10-19 NOTE — TELEPHONE ENCOUNTER
RN called and left a message to patient. Patient has a history of not answering his phone.   RN received request from Dr. Foley to reach out to patient that he needs to see his oncology for a follow up with Dr. Quiroga.  RN expressed that to patient in the voicemail and that patient has an upcoming visit with oncology on 10/23. RN impressed upon to patient that he does not miss these visits.    Nayeli Mccollum RN

## 2020-10-20 ENCOUNTER — COMMUNICATION - HEALTHEAST (OUTPATIENT)
Dept: GERIATRICS | Facility: CLINIC | Age: 75
End: 2020-10-20

## 2020-10-20 DIAGNOSIS — G89.29 CHRONIC MIDLINE LOW BACK PAIN WITHOUT SCIATICA: ICD-10-CM

## 2020-10-20 DIAGNOSIS — M54.50 CHRONIC MIDLINE LOW BACK PAIN WITHOUT SCIATICA: ICD-10-CM

## 2020-10-20 LAB
ANION GAP SERPL CALCULATED.3IONS-SCNC: 6 MMOL/L (ref 5–18)
BUN SERPL-MCNC: 23 MG/DL (ref 8–28)
CALCIUM SERPL-MCNC: 8.5 MG/DL (ref 8.5–10.5)
CHLORIDE BLD-SCNC: 107 MMOL/L (ref 98–107)
CO2 SERPL-SCNC: 21 MMOL/L (ref 22–31)
CREAT SERPL-MCNC: 1.24 MG/DL (ref 0.7–1.3)
ERYTHROCYTE [DISTWIDTH] IN BLOOD BY AUTOMATED COUNT: 13.6 % (ref 11–14.5)
GFR SERPL CREATININE-BSD FRML MDRD: 57 ML/MIN/1.73M2
GLUCOSE BLD-MCNC: 83 MG/DL (ref 70–125)
HCT VFR BLD AUTO: 32.2 % (ref 40–54)
HGB BLD-MCNC: 10.4 G/DL (ref 14–18)
MCH RBC QN AUTO: 32.7 PG (ref 27–34)
MCHC RBC AUTO-ENTMCNC: 32.3 G/DL (ref 32–36)
MCV RBC AUTO: 101 FL (ref 80–100)
PLATELET # BLD AUTO: 456 THOU/UL (ref 140–440)
PMV BLD AUTO: 9.5 FL (ref 8.5–12.5)
POTASSIUM BLD-SCNC: 4 MMOL/L (ref 3.5–5)
RBC # BLD AUTO: 3.18 MILL/UL (ref 4.4–6.2)
SODIUM SERPL-SCNC: 134 MMOL/L (ref 136–145)
WBC: 6.2 THOU/UL (ref 4–11)

## 2020-10-22 ENCOUNTER — OFFICE VISIT - HEALTHEAST (OUTPATIENT)
Dept: GERIATRICS | Facility: CLINIC | Age: 75
End: 2020-10-22

## 2020-10-22 DIAGNOSIS — M48.50XS PATHOLOGIC COMPRESSION FRACTURE OF SPINE, SEQUELA: ICD-10-CM

## 2020-10-22 DIAGNOSIS — R53.1 WEAKNESS: ICD-10-CM

## 2020-10-22 DIAGNOSIS — C90.00 MULTIPLE MYELOMA NOT HAVING ACHIEVED REMISSION (H): ICD-10-CM

## 2020-10-22 DIAGNOSIS — K59.03 DRUG-INDUCED CONSTIPATION: ICD-10-CM

## 2020-10-22 ASSESSMENT — MIFFLIN-ST. JEOR: SCORE: 1210.29

## 2020-10-22 NOTE — PROGRESS NOTES
BMT ONC Adult Bone Marrow Biopsy Procedure Note  October 22, 2020  There were no vitals taken for this visit.     Learning needs assessment complete within 12 months? 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 Informed consent was obtained. Following the administration of Midazolam as pre-medication, patient was placed in the prone position and prepped and draped in a sterile manner. Approximately 10 cc of 1% Lidocaine was used over the left posterior iliac spine. Following this a 3 mm incision was made. Trephine bone marrow core(s) was (were) obtained from the [facility]. Bone marrow aspirates were obtained from the [facility]. Aspirates were sent for morphology, immunophenotyping, cytogenetics and molecular diagnostics Process/HOLD DNA. A total of approximately 30 ml of marrow was aspirated. Following this procedure a sterile dressing was applied to the bone marrow biopsy site(s). The patient was placed in the supine position to maintain pressure on the biopsy site. Post-procedure wound care instructions were given.     Complications: NO    Pre-procedural pain: 0 out of 10 on the numeric pain rating scale.     Procedural pain: 1 out of 10 on the numeric pain rating scale.     Post-procedural pain assessment: 0 out of 10 on the numeric pain rating scale.     Interventions: NO    Length of procedure:20 minutes or less      Procedure performed by: Dayan Velasco PA-C

## 2020-10-23 ENCOUNTER — OFFICE VISIT - HEALTHEAST (OUTPATIENT)
Dept: GERIATRICS | Facility: CLINIC | Age: 75
End: 2020-10-23

## 2020-10-23 ENCOUNTER — OFFICE VISIT (OUTPATIENT)
Dept: ONCOLOGY | Facility: CLINIC | Age: 75
End: 2020-10-23
Attending: PHYSICIAN ASSISTANT
Payer: COMMERCIAL

## 2020-10-23 ENCOUNTER — APPOINTMENT (OUTPATIENT)
Dept: LAB | Facility: CLINIC | Age: 75
End: 2020-10-23
Attending: INTERNAL MEDICINE
Payer: COMMERCIAL

## 2020-10-23 VITALS
DIASTOLIC BLOOD PRESSURE: 79 MMHG | HEART RATE: 76 BPM | OXYGEN SATURATION: 95 % | BODY MASS INDEX: 25.89 KG/M2 | SYSTOLIC BLOOD PRESSURE: 135 MMHG | RESPIRATION RATE: 18 BRPM | TEMPERATURE: 98 F | WEIGHT: 137 LBS

## 2020-10-23 DIAGNOSIS — C90.00 MULTIPLE MYELOMA NOT HAVING ACHIEVED REMISSION (H): ICD-10-CM

## 2020-10-23 DIAGNOSIS — D47.2 MONOCLONAL GAMMOPATHY: ICD-10-CM

## 2020-10-23 DIAGNOSIS — M48.50XS PATHOLOGIC COMPRESSION FRACTURE OF SPINE, SEQUELA: ICD-10-CM

## 2020-10-23 LAB
ANION GAP SERPL CALCULATED.3IONS-SCNC: 2 MMOL/L (ref 3–14)
BUN SERPL-MCNC: 20 MG/DL (ref 7–30)
CALCIUM SERPL-MCNC: 7.5 MG/DL (ref 8.5–10.1)
CHLORIDE SERPL-SCNC: 110 MMOL/L (ref 94–109)
CO2 SERPL-SCNC: 22 MMOL/L (ref 20–32)
COPATH REPORT: NORMAL
CREAT SERPL-MCNC: 1.14 MG/DL (ref 0.66–1.25)
ERYTHROCYTE [DISTWIDTH] IN BLOOD BY AUTOMATED COUNT: 13.7 % (ref 10–15)
GFR SERPL CREATININE-BSD FRML MDRD: 63 ML/MIN/{1.73_M2}
GLUCOSE SERPL-MCNC: 108 MG/DL (ref 70–99)
HCT VFR BLD AUTO: 33.4 % (ref 40–53)
HGB BLD-MCNC: 11.1 G/DL (ref 13.3–17.7)
MCH RBC QN AUTO: 33.1 PG (ref 26.5–33)
MCHC RBC AUTO-ENTMCNC: 33.2 G/DL (ref 31.5–36.5)
MCV RBC AUTO: 100 FL (ref 78–100)
PLATELET # BLD AUTO: 496 10E9/L (ref 150–450)
POTASSIUM SERPL-SCNC: 4 MMOL/L (ref 3.4–5.3)
RBC # BLD AUTO: 3.35 10E12/L (ref 4.4–5.9)
SODIUM SERPL-SCNC: 135 MMOL/L (ref 133–144)
WBC # BLD AUTO: 5.3 10E9/L (ref 4–11)

## 2020-10-23 PROCEDURE — 85097 BONE MARROW INTERPRETATION: CPT | Performed by: PATHOLOGY

## 2020-10-23 PROCEDURE — 88313 SPECIAL STAINS GROUP 2: CPT | Mod: 26 | Performed by: PATHOLOGY

## 2020-10-23 PROCEDURE — 85027 COMPLETE CBC AUTOMATED: CPT | Performed by: NURSE PRACTITIONER

## 2020-10-23 PROCEDURE — 88275 CYTOGENETICS 100-300: CPT | Mod: TC,91 | Performed by: INTERNAL MEDICINE

## 2020-10-23 PROCEDURE — 88237 TISSUE CULTURE BONE MARROW: CPT | Mod: TC | Performed by: INTERNAL MEDICINE

## 2020-10-23 PROCEDURE — 38222 DX BONE MARROW BX & ASPIR: CPT | Performed by: INTERNAL MEDICINE

## 2020-10-23 PROCEDURE — 88264 CHROMOSOME ANALYSIS 20-25: CPT | Mod: TC | Performed by: INTERNAL MEDICINE

## 2020-10-23 PROCEDURE — 88184 FLOWCYTOMETRY/ TC 1 MARKER: CPT | Mod: TC | Performed by: INTERNAL MEDICINE

## 2020-10-23 PROCEDURE — 88341 IMHCHEM/IMCYTCHM EA ADD ANTB: CPT | Mod: TC | Performed by: INTERNAL MEDICINE

## 2020-10-23 PROCEDURE — 250N000011 HC RX IP 250 OP 636: Performed by: PHYSICIAN ASSISTANT

## 2020-10-23 PROCEDURE — 88311 DECALCIFY TISSUE: CPT | Mod: TC | Performed by: INTERNAL MEDICINE

## 2020-10-23 PROCEDURE — 88185 FLOWCYTOMETRY/TC ADD-ON: CPT | Mod: TC | Performed by: INTERNAL MEDICINE

## 2020-10-23 PROCEDURE — 999N001126 HC STATISTIC BONE MARROW INTERP TC 85097: Performed by: INTERNAL MEDICINE

## 2020-10-23 PROCEDURE — 88342 IMHCHEM/IMCYTCHM 1ST ANTB: CPT | Mod: 26 | Performed by: PATHOLOGY

## 2020-10-23 PROCEDURE — 88311 DECALCIFY TISSUE: CPT | Mod: 26 | Performed by: PATHOLOGY

## 2020-10-23 PROCEDURE — 88341 IMHCHEM/IMCYTCHM EA ADD ANTB: CPT | Mod: 26 | Performed by: PATHOLOGY

## 2020-10-23 PROCEDURE — 88271 CYTOGENETICS DNA PROBE: CPT | Performed by: INTERNAL MEDICINE

## 2020-10-23 PROCEDURE — 80048 BASIC METABOLIC PNL TOTAL CA: CPT | Performed by: NURSE PRACTITIONER

## 2020-10-23 PROCEDURE — 85060 BLOOD SMEAR INTERPRETATION: CPT | Performed by: PATHOLOGY

## 2020-10-23 PROCEDURE — 88342 IMHCHEM/IMCYTCHM 1ST ANTB: CPT | Mod: TC,XU | Performed by: INTERNAL MEDICINE

## 2020-10-23 PROCEDURE — 999N001135 HC STATISTIC FLOW INT 2-8 ABY TC 88187: Performed by: INTERNAL MEDICINE

## 2020-10-23 PROCEDURE — 96374 THER/PROPH/DIAG INJ IV PUSH: CPT | Mod: 59

## 2020-10-23 PROCEDURE — 88161 CYTOPATH SMEAR OTHER SOURCE: CPT | Mod: TC | Performed by: INTERNAL MEDICINE

## 2020-10-23 PROCEDURE — 38222 DX BONE MARROW BX & ASPIR: CPT | Performed by: PHYSICIAN ASSISTANT

## 2020-10-23 PROCEDURE — 88305 TISSUE EXAM BY PATHOLOGIST: CPT | Mod: 26 | Performed by: PATHOLOGY

## 2020-10-23 PROCEDURE — 88291 CYTO/MOLECULAR REPORT: CPT | Performed by: MEDICAL GENETICS

## 2020-10-23 PROCEDURE — 999N001086 HC STATISTIC MORPHOLOGY W/INTERP HEMEPATH TC 85060: Performed by: INTERNAL MEDICINE

## 2020-10-23 PROCEDURE — 999N001022 HC STATISTIC H-SPHEME PROCESS B/S: Performed by: INTERNAL MEDICINE

## 2020-10-23 PROCEDURE — 88305 TISSUE EXAM BY PATHOLOGIST: CPT | Mod: TC | Performed by: INTERNAL MEDICINE

## 2020-10-23 PROCEDURE — 999N001102 HC STATISTIC DNA PROCESS AND HOLD: Performed by: INTERNAL MEDICINE

## 2020-10-23 PROCEDURE — 88280 CHROMOSOME KARYOTYPE STUDY: CPT | Mod: TC | Performed by: INTERNAL MEDICINE

## 2020-10-23 PROCEDURE — 88187 FLOWCYTOMETRY/READ 2-8: CPT | Mod: 26 | Performed by: PHYSICIAN ASSISTANT

## 2020-10-23 PROCEDURE — 88313 SPECIAL STAINS GROUP 2: CPT | Mod: TC | Performed by: INTERNAL MEDICINE

## 2020-10-23 RX ORDER — AMOXICILLIN 250 MG
1 CAPSULE ORAL DAILY PRN
COMMUNITY

## 2020-10-23 RX ORDER — HYDROCHLOROTHIAZIDE 12.5 MG/1
12.5 CAPSULE ORAL DAILY
COMMUNITY
End: 2021-02-17

## 2020-10-23 RX ORDER — OMEPRAZOLE 10 MG/1
10 CAPSULE, DELAYED RELEASE ORAL DAILY
COMMUNITY
End: 2021-01-13 | Stop reason: DRUGHIGH

## 2020-10-23 RX ADMIN — MIDAZOLAM HYDROCHLORIDE 1 MG: 2 INJECTION, SOLUTION INTRAMUSCULAR; INTRAVENOUS at 08:56

## 2020-10-23 ASSESSMENT — PAIN SCALES - GENERAL: PAINLEVEL: MILD PAIN (3)

## 2020-10-23 NOTE — NURSING NOTE
Patient supine for 30 minutes following biopsy. After 30 minutes, dressing clean, dry and intact. Vital signs stable. See DOC flow sheets for details. Left ambulatory with family member.    Provider order received to administer Versed 1mg IVP as premed for BMBX. Procedural consent discussed and pt's signature obtained.  Allergies reviewed.  PT currently alert and oriented to plan of care.  Pt lying prone in stretcher.  Call light w/in reach.  Provider and  at bedside.    Drug Administration Record    Drug Name: Versed  Dose: 1mg  Route Administered: IV  NDC#: [id number]  Amount of waste (mL): 1ml  Reason for waste: single dose vial

## 2020-10-23 NOTE — LETTER
10/23/2020         RE: Rogelio Marshall  [address]  [facility] [address]        Dear Colleague,    Thank you for referring your patient, Rogelio Marshall, to the [facility]. Please see a copy of my visit note below.    BMT ONC Adult Bone Marrow Biopsy Procedure Note  October 22, 2020  There were no vitals taken for this visit.     Learning needs assessment complete within 12 months? Unknown     DIAGNOSIS: Suspected multiple myleoma     PROCEDURE: Unilateral Bone Marrow Biopsy and Unilateral Aspirate    LOCATION: [facility] 2nd Floor    Patient s identification was positively verified by verbal identification and invasive procedure safety checklist was completed. Informed consent was obtained. Following the administration of Midazolam as pre-medication, patient was placed in the prone position and prepped and draped in a sterile manner. Approximately 10 cc of 1% Lidocaine was used over the left posterior iliac spine. Following this a 3 mm incision was made. Trephine bone marrow core(s) was (were) obtained from the [facility]. Bone marrow aspirates were obtained from the [facility]. Aspirates were sent for morphology, immunophenotyping, cytogenetics and molecular diagnostics Process/HOLD DNA. A total of approximately 30 ml of marrow was aspirated. Following this procedure a sterile dressing was applied to the bone marrow biopsy site(s). The patient was placed in the supine position to maintain pressure on the biopsy site. Post-procedure wound care instructions were given.     Complications: NO    Pre-procedural pain: 0 out of 10 on the numeric pain rating scale.     Procedural pain: 1 out of 10 on the numeric pain rating scale.     Post-procedural pain assessment: 0 out of 10 on the numeric pain rating scale.     Interventions: NO    Length of procedure:20 minutes or less      Procedure performed by: Dayan Velasco PA-C         Again, thank you for allowing me to participate in the care of  your patient.        Sincerely,        Dayan Velasco PA-C

## 2020-10-25 ENCOUNTER — RECORDS - HEALTHEAST (OUTPATIENT)
Dept: LAB | Facility: CLINIC | Age: 75
End: 2020-10-25

## 2020-10-26 ENCOUNTER — OFFICE VISIT - HEALTHEAST (OUTPATIENT)
Dept: GERIATRICS | Facility: CLINIC | Age: 75
End: 2020-10-26

## 2020-10-26 DIAGNOSIS — M48.50XS PATHOLOGIC COMPRESSION FRACTURE OF SPINE, SEQUELA: ICD-10-CM

## 2020-10-26 DIAGNOSIS — I10 ESSENTIAL HYPERTENSION: ICD-10-CM

## 2020-10-26 DIAGNOSIS — R53.1 WEAKNESS: ICD-10-CM

## 2020-10-26 DIAGNOSIS — C90.00 MULTIPLE MYELOMA NOT HAVING ACHIEVED REMISSION (H): ICD-10-CM

## 2020-10-26 LAB
ERYTHROCYTE [DISTWIDTH] IN BLOOD BY AUTOMATED COUNT: 13.5 % (ref 11–14.5)
HCT VFR BLD AUTO: 28.3 % (ref 40–54)
HGB BLD-MCNC: 9.2 G/DL (ref 14–18)
MCH RBC QN AUTO: 33.2 PG (ref 27–34)
MCHC RBC AUTO-ENTMCNC: 32.5 G/DL (ref 32–36)
MCV RBC AUTO: 102 FL (ref 80–100)
PLATELET # BLD AUTO: 412 THOU/UL (ref 140–440)
PMV BLD AUTO: 9.9 FL (ref 8.5–12.5)
RBC # BLD AUTO: 2.77 MILL/UL (ref 4.4–6.2)
WBC: 5.4 THOU/UL (ref 4–11)

## 2020-10-26 ASSESSMENT — MIFFLIN-ST. JEOR: SCORE: 1210.29

## 2020-10-27 LAB — COPATH REPORT: NORMAL

## 2020-10-28 ENCOUNTER — RECORDS - HEALTHEAST (OUTPATIENT)
Dept: LAB | Facility: CLINIC | Age: 75
End: 2020-10-28

## 2020-10-28 LAB — COPATH REPORT: NORMAL

## 2020-10-28 NOTE — TELEPHONE ENCOUNTER
DIAGNOSIS: Acute bilateral low back pain without sciatica referred by Dr. Foley from    APPOINTMENT DATE: 11/11/2020   NOTES STATUS DETAILS   OFFICE NOTE from referring provider [facility]:  10/6/20 - ORTHO OV with Dr. Foley   OFFICE NOTE from other specialist Care Everywhere/[facility]:  10/23/20 - ONC OV with Dr. Velasco  9/28/20 - PCC OV with Dr. Stevens  9/14/20 - SPRT OV with Dr. Brooks  6/4/19 - SPRT OV with Dr. Julien Velásquez - Rehab:  6/10/19 to 8/19/19 - PT OV with Ashley Edwards PT    [facility]:  10/15/20 to 10/26/20 - Skilled Care OVs with Jessica Evangelista NP   DISCHARGE SUMMARY from hospital Internal [facility]:  10/13/20 - Admission Discharge with Dr. Her   DISCHARGE REPORT from the ER N/A    OPERATIVE REPORT N/A    MEDICATION LIST Internal    MRI Received [facility]:  9/11/20 - MRI Lumbar Spine   CT SCAN [facility]:  10/10/20 - CT Lumbar Spine  10/10/20 - CT Thoracic Spine  9/18/20 - CT Thoracic Spine  9/18/20 - CT Lumbar Spine   XRAYS (IMAGES & REPORTS) [facility]:  10/6/20 - XR Lumbar Spine  6/9/19 - XR Lumbar Spine

## 2020-10-29 ENCOUNTER — MEDICAL CORRESPONDENCE (OUTPATIENT)
Dept: HEALTH INFORMATION MANAGEMENT | Facility: CLINIC | Age: 75
End: 2020-10-29

## 2020-10-29 LAB
ANION GAP SERPL CALCULATED.3IONS-SCNC: 5 MMOL/L (ref 5–18)
BUN SERPL-MCNC: 25 MG/DL (ref 8–28)
CALCIUM SERPL-MCNC: 8.3 MG/DL (ref 8.5–10.5)
CHLORIDE BLD-SCNC: 108 MMOL/L (ref 98–107)
CO2 SERPL-SCNC: 21 MMOL/L (ref 22–31)
CREAT SERPL-MCNC: 1.31 MG/DL (ref 0.7–1.3)
GFR SERPL CREATININE-BSD FRML MDRD: 54 ML/MIN/1.73M2
GLUCOSE BLD-MCNC: 72 MG/DL (ref 70–125)
POTASSIUM BLD-SCNC: 4.1 MMOL/L (ref 3.5–5)
SODIUM SERPL-SCNC: 134 MMOL/L (ref 136–145)

## 2020-11-01 NOTE — PROGRESS NOTES
Malignant Hematology Consult    Reason for consult: Myeloma         Assessment and Recommendation:   74 yo man with multiple myeloma.  He has not had his M protein checked in blood and we should do that prior to initiating treatment. I doubt he would be a candidate for ASCT as he is 75 and his performance status has been impacted by his fractures. I plan to treat with VRD. We discussed that possible side effects include infections, low blood counts, blood clots, shingles reactivation, secondary cancers, mood disturbances, hyperglycemia, and insomnia.     Levofloxacin 250 mg every day,  mg every day, and acyclovir 400 mg BID for prophylaxis.I told him not to take his vitamins at the same time as levofloxacin.    He also is at high risk for more compression fractures. Vitamin D was replete when checked last month. He got a dose of pamidronate. He needs a dental evaluation to get started on zometa in the future. He will see dentistry as he has not had a routine check up for awhile.    Angela Sweeney MD PhD             History of Present Illness:   This patient is a 75 year old man with lower back pain over a year. He has known compression fractures and bone scan reveals multiple lytic lesions throughout his appendicular and axial skeleton. His Beta 2 microglobulin was 3.6 on 10/10/2020. Kappa chains were 73.6 on 10/5/2020 with K/L ratio of 89.9. M spike of 16.2 in urine on 10/10. Bone marrow biopsy on 10/23/2020 showed trilineage hematopoiesis and 50-60% kappa restricted plasma cells. Flow cytometry showed 20 % plasma cells which express CD19, CD38, CD45 and monotypic cytoplasmic kappa immunoglobulin light chains but lack CD20 and CD56. Cytogenetics pending. FISH shows IGH-CCND1 fusion (81%; 30% had loss of IGH component from one fusion signal).      Today he reports he is feeling well overall. He mostly wanted to discuss the supplements he is taking, glucosamine, Vit K2 ,Zinc/Mg, Milk thistle (Sillimarin) and  "his prior career in visual arts. He is using a walker due to his compression fractures.He is still wearing a brace for stability. He denies any new bone pains, lumps, or bumps.             Past Medical History:   HTN  HLD  Compression fractures          Past Surgical History:    ORIF left clavicle  Inguinal hernia repair         Social History:   Worked in visual arts  Former tobacco smoker         Family History:   Father- of heart attack  No blood cancers in family         Review of Systems:     A comprehensive ROS was performed and is negative except as noted above         Physical Exam:     BP (!) 155/93   Pulse 105   Temp 97.7  F (36.5  C) (Oral)   Resp 18   Ht 1.549 m (5' 1\")   Wt 63.3 kg (139 lb 8 oz)   SpO2 97%   BMI 26.36 kg/m    Constitutional: NAD  Eyes: no scleral icterus  ENT: no oral lesions, right upper maxilla has a missing tooth  Lymph: no cervical, supraclavicular, axillary LAD  Pulm: CTAB  CV: RRR, no m/r/g  GI: bowel sounds present, soft and nontender to palpation  MSK: No edema in the extremities  Neuro: alert, conversant, normal gait  Psych: appropriate mood and affect         Data:   CT lumbar spine 10/10/2020  1.  Since 2020, there is no significant change in diffuse  osteolytic appearance of spine, pelvis and ribs, suspicious for  multiple myeloma. No significant change in compression deformities in  the thoracic and lumbar spine. No new fracture.  2. Lumbar spondylosis most prominent at L5-S1 with severe bilateral  neuroforaminal stenosis.    Bone scan 2020  IMPRESSION: Extensive lytic lesions throughout the axial and  appendicular skeleton compatible with multiple myeloma. Multiple  thoracic and lumbar compression deformities better seen on recent CT.      Angela Sweeney MD    "

## 2020-11-02 ENCOUNTER — OFFICE VISIT - HEALTHEAST (OUTPATIENT)
Dept: GERIATRICS | Facility: CLINIC | Age: 75
End: 2020-11-02

## 2020-11-02 DIAGNOSIS — M54.50 CHRONIC MIDLINE LOW BACK PAIN WITHOUT SCIATICA: ICD-10-CM

## 2020-11-02 DIAGNOSIS — M48.50XS PATHOLOGIC COMPRESSION FRACTURE OF SPINE, SEQUELA: ICD-10-CM

## 2020-11-02 DIAGNOSIS — F10.20 UNCOMPLICATED ALCOHOL DEPENDENCE (H): ICD-10-CM

## 2020-11-02 DIAGNOSIS — G89.29 CHRONIC MIDLINE LOW BACK PAIN WITHOUT SCIATICA: ICD-10-CM

## 2020-11-02 DIAGNOSIS — C90.00 MULTIPLE MYELOMA NOT HAVING ACHIEVED REMISSION (H): ICD-10-CM

## 2020-11-02 DIAGNOSIS — I10 ESSENTIAL HYPERTENSION: ICD-10-CM

## 2020-11-02 LAB — COPATH REPORT: NORMAL

## 2020-11-02 ASSESSMENT — MIFFLIN-ST. JEOR: SCORE: 1213.92

## 2020-11-02 NOTE — PROGRESS NOTES
Telephone call to the client's nursing home for initial health risk assessment.  An  was not needed.    Current situation/living environment  Client is currently in a TCU after a hospitalization for increase in back pain. Client was found to most likely have multiple myeloma and was discharged to a TCU where he has been for the past 3 weeks. He will be going home 11/3/20 and has follow-ups with PCP and oncology.    Activities of daily living (ADL)/instrumental activities of daily living (IADL) and functional issues  Client needs help with the following ADL's: dressing  Client needs help with the following IADL's: shopping, cooking, housekeeping and laundry  Client states he is unable to perform the above due to back pain. Client was working with OT to be able to get his shoes and socks on by himself. He is agreeable to homemaking services for assistance with IADLs. He is also agreeable to home delivered meals.       Health concerns for today  New multiple myeloma diagnosis and increased back pain. Client has established care with oncology.   Has patient fallen 2 or more times in the last year? No  Has patient fallen with injury in the last year? No    Cognition/mental health  Client is A&O x3. He denies any MH symptoms or diagnoses.    STARS/Med Adherence  Client is non-compliant with the following quality measures: Colon cancer screening  Comments: education efforts    Client's Plan of Care consists of:  Homemaker services (8 hours per week), Life line and Specialized supplies and equipment (HHS, bath bench, over the bed table)    Ashanti Zepeda RN  [facility] Care Coordinator  [phone]

## 2020-11-04 ENCOUNTER — AMBULATORY - HEALTHEAST (OUTPATIENT)
Dept: GERIATRICS | Facility: CLINIC | Age: 75
End: 2020-11-04

## 2020-11-04 ENCOUNTER — TELEPHONE (OUTPATIENT)
Dept: ONCOLOGY | Facility: CLINIC | Age: 75
End: 2020-11-04

## 2020-11-04 ENCOUNTER — ONCOLOGY VISIT (OUTPATIENT)
Dept: ONCOLOGY | Facility: CLINIC | Age: 75
End: 2020-11-04
Attending: INTERNAL MEDICINE
Payer: COMMERCIAL

## 2020-11-04 VITALS
HEIGHT: 61 IN | SYSTOLIC BLOOD PRESSURE: 155 MMHG | HEART RATE: 105 BPM | DIASTOLIC BLOOD PRESSURE: 93 MMHG | TEMPERATURE: 97.7 F | WEIGHT: 139.5 LBS | BODY MASS INDEX: 26.34 KG/M2 | RESPIRATION RATE: 18 BRPM | OXYGEN SATURATION: 97 %

## 2020-11-04 DIAGNOSIS — C90.00 MULTIPLE MYELOMA, REMISSION STATUS UNSPECIFIED (H): Primary | ICD-10-CM

## 2020-11-04 LAB
ALBUMIN SERPL-MCNC: 3.2 G/DL (ref 3.4–5)
ALP SERPL-CCNC: 114 U/L (ref 40–150)
ALT SERPL W P-5'-P-CCNC: <21 U/L (ref 0–70)
ANION GAP SERPL CALCULATED.3IONS-SCNC: 5 MMOL/L (ref 3–14)
AST SERPL W P-5'-P-CCNC: 22 U/L (ref 0–45)
BASOPHILS # BLD AUTO: 0.1 10E9/L (ref 0–0.2)
BASOPHILS NFR BLD AUTO: 1 %
BILIRUB SERPL-MCNC: <0.4 MG/DL (ref 0.2–1.3)
BUN SERPL-MCNC: 33 MG/DL (ref 7–30)
CALCIUM SERPL-MCNC: 8.8 MG/DL (ref 8.5–10.1)
CHLORIDE SERPL-SCNC: 110 MMOL/L (ref 94–109)
CO2 SERPL-SCNC: 21 MMOL/L (ref 20–32)
CREAT SERPL-MCNC: 1.44 MG/DL (ref 0.66–1.25)
DIFFERENTIAL METHOD BLD: ABNORMAL
EOSINOPHIL # BLD AUTO: 0.2 10E9/L (ref 0–0.7)
EOSINOPHIL NFR BLD AUTO: 2.6 %
ERYTHROCYTE [DISTWIDTH] IN BLOOD BY AUTOMATED COUNT: 14 % (ref 10–15)
GFR SERPL CREATININE-BSD FRML MDRD: 48 ML/MIN/{1.73_M2}
GLUCOSE SERPL-MCNC: 103 MG/DL (ref 70–99)
HCT VFR BLD AUTO: 33.5 % (ref 40–53)
HGB BLD-MCNC: 10.2 G/DL (ref 13.3–17.7)
IMM GRANULOCYTES # BLD: 0 10E9/L (ref 0–0.4)
IMM GRANULOCYTES NFR BLD: 0.5 %
LYMPHOCYTES # BLD AUTO: 1.6 10E9/L (ref 0.8–5.3)
LYMPHOCYTES NFR BLD AUTO: 25.3 %
MCH RBC QN AUTO: 33.7 PG (ref 26.5–33)
MCHC RBC AUTO-ENTMCNC: 30.4 G/DL (ref 31.5–36.5)
MCV RBC AUTO: 111 FL (ref 78–100)
MONOCYTES # BLD AUTO: 0.5 10E9/L (ref 0–1.3)
MONOCYTES NFR BLD AUTO: 8.2 %
NEUTROPHILS # BLD AUTO: 3.9 10E9/L (ref 1.6–8.3)
NEUTROPHILS NFR BLD AUTO: 62.4 %
NRBC # BLD AUTO: 0 10*3/UL
NRBC BLD AUTO-RTO: 0 /100
PLATELET # BLD AUTO: 378 10E9/L (ref 150–450)
POTASSIUM SERPL-SCNC: 4.2 MMOL/L (ref 3.4–5.3)
PROT SERPL-MCNC: 11.3 G/DL (ref 6.8–8.8)
RBC # BLD AUTO: 3.03 10E12/L (ref 4.4–5.9)
SODIUM SERPL-SCNC: 136 MMOL/L (ref 133–144)
WBC # BLD AUTO: 6.2 10E9/L (ref 4–11)

## 2020-11-04 PROCEDURE — 84165 PROTEIN E-PHORESIS SERUM: CPT | Mod: 26 | Performed by: STUDENT IN AN ORGANIZED HEALTH CARE EDUCATION/TRAINING PROGRAM

## 2020-11-04 PROCEDURE — 82784 ASSAY IGA/IGD/IGG/IGM EACH: CPT | Performed by: STUDENT IN AN ORGANIZED HEALTH CARE EDUCATION/TRAINING PROGRAM

## 2020-11-04 PROCEDURE — G0463 HOSPITAL OUTPT CLINIC VISIT: HCPCS

## 2020-11-04 PROCEDURE — 86334 IMMUNOFIX E-PHORESIS SERUM: CPT | Performed by: STUDENT IN AN ORGANIZED HEALTH CARE EDUCATION/TRAINING PROGRAM

## 2020-11-04 PROCEDURE — 80053 COMPREHEN METABOLIC PANEL: CPT | Performed by: STUDENT IN AN ORGANIZED HEALTH CARE EDUCATION/TRAINING PROGRAM

## 2020-11-04 PROCEDURE — 84165 PROTEIN E-PHORESIS SERUM: CPT | Mod: TC | Performed by: STUDENT IN AN ORGANIZED HEALTH CARE EDUCATION/TRAINING PROGRAM

## 2020-11-04 PROCEDURE — 86334 IMMUNOFIX E-PHORESIS SERUM: CPT | Mod: TC | Performed by: STUDENT IN AN ORGANIZED HEALTH CARE EDUCATION/TRAINING PROGRAM

## 2020-11-04 PROCEDURE — 36415 COLL VENOUS BLD VENIPUNCTURE: CPT

## 2020-11-04 PROCEDURE — 99205 OFFICE O/P NEW HI 60 MIN: CPT | Performed by: STUDENT IN AN ORGANIZED HEALTH CARE EDUCATION/TRAINING PROGRAM

## 2020-11-04 PROCEDURE — 999N001036 HC STATISTIC TOTAL PROTEIN: Performed by: STUDENT IN AN ORGANIZED HEALTH CARE EDUCATION/TRAINING PROGRAM

## 2020-11-04 PROCEDURE — 85025 COMPLETE CBC W/AUTO DIFF WBC: CPT | Performed by: STUDENT IN AN ORGANIZED HEALTH CARE EDUCATION/TRAINING PROGRAM

## 2020-11-04 RX ORDER — NALOXONE HYDROCHLORIDE 0.4 MG/ML
.1-.4 INJECTION, SOLUTION INTRAMUSCULAR; INTRAVENOUS; SUBCUTANEOUS
Status: CANCELLED | OUTPATIENT
Start: 2020-11-29

## 2020-11-04 RX ORDER — SODIUM CHLORIDE 9 MG/ML
1000 INJECTION, SOLUTION INTRAVENOUS CONTINUOUS PRN
Status: CANCELLED
Start: 2020-11-11

## 2020-11-04 RX ORDER — LORAZEPAM 2 MG/ML
0.5 INJECTION INTRAMUSCULAR EVERY 4 HOURS PRN
Status: CANCELLED
Start: 2020-11-23

## 2020-11-04 RX ORDER — SODIUM CHLORIDE 9 MG/ML
1000 INJECTION, SOLUTION INTRAVENOUS CONTINUOUS PRN
Status: CANCELLED
Start: 2020-11-26

## 2020-11-04 RX ORDER — SODIUM CHLORIDE 9 MG/ML
1000 INJECTION, SOLUTION INTRAVENOUS CONTINUOUS PRN
Status: CANCELLED
Start: 2020-11-23

## 2020-11-04 RX ORDER — ALBUTEROL SULFATE 90 UG/1
1-2 AEROSOL, METERED RESPIRATORY (INHALATION)
Status: CANCELLED
Start: 2020-11-11

## 2020-11-04 RX ORDER — EPINEPHRINE 1 MG/ML
0.3 INJECTION, SOLUTION INTRAMUSCULAR; SUBCUTANEOUS EVERY 5 MIN PRN
Status: CANCELLED | OUTPATIENT
Start: 2020-11-26

## 2020-11-04 RX ORDER — DIPHENHYDRAMINE HYDROCHLORIDE 50 MG/ML
50 INJECTION INTRAMUSCULAR; INTRAVENOUS
Status: CANCELLED
Start: 2020-11-11

## 2020-11-04 RX ORDER — ALBUTEROL SULFATE 0.83 MG/ML
2.5 SOLUTION RESPIRATORY (INHALATION)
Status: CANCELLED | OUTPATIENT
Start: 2020-11-29

## 2020-11-04 RX ORDER — MEPERIDINE HYDROCHLORIDE 25 MG/ML
25 INJECTION INTRAMUSCULAR; INTRAVENOUS; SUBCUTANEOUS EVERY 30 MIN PRN
Status: CANCELLED | OUTPATIENT
Start: 2020-11-29

## 2020-11-04 RX ORDER — SODIUM CHLORIDE 9 MG/ML
1000 INJECTION, SOLUTION INTRAVENOUS CONTINUOUS PRN
Status: CANCELLED
Start: 2020-11-29

## 2020-11-04 RX ORDER — METHYLPREDNISOLONE SODIUM SUCCINATE 125 MG/2ML
125 INJECTION, POWDER, LYOPHILIZED, FOR SOLUTION INTRAMUSCULAR; INTRAVENOUS
Status: CANCELLED
Start: 2020-11-29

## 2020-11-04 RX ORDER — LORAZEPAM 2 MG/ML
0.5 INJECTION INTRAMUSCULAR EVERY 4 HOURS PRN
Status: CANCELLED
Start: 2020-11-11

## 2020-11-04 RX ORDER — NALOXONE HYDROCHLORIDE 0.4 MG/ML
.1-.4 INJECTION, SOLUTION INTRAMUSCULAR; INTRAVENOUS; SUBCUTANEOUS
Status: CANCELLED | OUTPATIENT
Start: 2020-11-26

## 2020-11-04 RX ORDER — DIPHENHYDRAMINE HYDROCHLORIDE 50 MG/ML
50 INJECTION INTRAMUSCULAR; INTRAVENOUS
Status: CANCELLED
Start: 2020-11-26

## 2020-11-04 RX ORDER — ALBUTEROL SULFATE 90 UG/1
1-2 AEROSOL, METERED RESPIRATORY (INHALATION)
Status: CANCELLED
Start: 2020-11-23

## 2020-11-04 RX ORDER — ALBUTEROL SULFATE 90 UG/1
1-2 AEROSOL, METERED RESPIRATORY (INHALATION)
Status: CANCELLED
Start: 2020-11-26

## 2020-11-04 RX ORDER — ALBUTEROL SULFATE 90 UG/1
1-2 AEROSOL, METERED RESPIRATORY (INHALATION)
Status: CANCELLED
Start: 2020-11-29

## 2020-11-04 RX ORDER — MEPERIDINE HYDROCHLORIDE 25 MG/ML
25 INJECTION INTRAMUSCULAR; INTRAVENOUS; SUBCUTANEOUS EVERY 30 MIN PRN
Status: CANCELLED | OUTPATIENT
Start: 2020-11-26

## 2020-11-04 RX ORDER — EPINEPHRINE 1 MG/ML
0.3 INJECTION, SOLUTION INTRAMUSCULAR; SUBCUTANEOUS EVERY 5 MIN PRN
Status: CANCELLED | OUTPATIENT
Start: 2020-11-23

## 2020-11-04 RX ORDER — OXYCODONE HYDROCHLORIDE 5 MG/1
5 TABLET ORAL EVERY 6 HOURS PRN
Qty: 60 TABLET | Refills: 0 | Status: ON HOLD | OUTPATIENT
Start: 2020-11-04 | End: 2020-12-04

## 2020-11-04 RX ORDER — LORAZEPAM 2 MG/ML
0.5 INJECTION INTRAMUSCULAR EVERY 4 HOURS PRN
Status: CANCELLED
Start: 2020-11-26

## 2020-11-04 RX ORDER — NALOXONE HYDROCHLORIDE 0.4 MG/ML
.1-.4 INJECTION, SOLUTION INTRAMUSCULAR; INTRAVENOUS; SUBCUTANEOUS
Status: CANCELLED | OUTPATIENT
Start: 2020-11-11

## 2020-11-04 RX ORDER — MEPERIDINE HYDROCHLORIDE 25 MG/ML
25 INJECTION INTRAMUSCULAR; INTRAVENOUS; SUBCUTANEOUS EVERY 30 MIN PRN
Status: CANCELLED | OUTPATIENT
Start: 2020-11-23

## 2020-11-04 RX ORDER — LORAZEPAM 2 MG/ML
0.5 INJECTION INTRAMUSCULAR EVERY 4 HOURS PRN
Status: CANCELLED
Start: 2020-11-29

## 2020-11-04 RX ORDER — EPINEPHRINE 1 MG/ML
0.3 INJECTION, SOLUTION INTRAMUSCULAR; SUBCUTANEOUS EVERY 5 MIN PRN
Status: CANCELLED | OUTPATIENT
Start: 2020-11-11

## 2020-11-04 RX ORDER — DIPHENHYDRAMINE HYDROCHLORIDE 50 MG/ML
50 INJECTION INTRAMUSCULAR; INTRAVENOUS
Status: CANCELLED
Start: 2020-11-23

## 2020-11-04 RX ORDER — ALBUTEROL SULFATE 0.83 MG/ML
2.5 SOLUTION RESPIRATORY (INHALATION)
Status: CANCELLED | OUTPATIENT
Start: 2020-11-11

## 2020-11-04 RX ORDER — POLYETHYLENE GLYCOL 3350 17 G/17G
POWDER, FOR SOLUTION ORAL DAILY
COMMUNITY
Start: 2020-11-03 | End: 2020-11-04

## 2020-11-04 RX ORDER — METHYLPREDNISOLONE SODIUM SUCCINATE 125 MG/2ML
125 INJECTION, POWDER, LYOPHILIZED, FOR SOLUTION INTRAMUSCULAR; INTRAVENOUS
Status: CANCELLED
Start: 2020-11-26

## 2020-11-04 RX ORDER — METHYLPREDNISOLONE SODIUM SUCCINATE 125 MG/2ML
125 INJECTION, POWDER, LYOPHILIZED, FOR SOLUTION INTRAMUSCULAR; INTRAVENOUS
Status: CANCELLED
Start: 2020-11-11

## 2020-11-04 RX ORDER — MEPERIDINE HYDROCHLORIDE 25 MG/ML
25 INJECTION INTRAMUSCULAR; INTRAVENOUS; SUBCUTANEOUS EVERY 30 MIN PRN
Status: CANCELLED | OUTPATIENT
Start: 2020-11-11

## 2020-11-04 RX ORDER — EPINEPHRINE 1 MG/ML
0.3 INJECTION, SOLUTION INTRAMUSCULAR; SUBCUTANEOUS EVERY 5 MIN PRN
Status: CANCELLED | OUTPATIENT
Start: 2020-11-29

## 2020-11-04 RX ORDER — ONDANSETRON 4 MG/1
TABLET, FILM COATED ORAL PRN
COMMUNITY
Start: 2020-11-03 | End: 2020-11-04

## 2020-11-04 RX ORDER — METHYLPREDNISOLONE SODIUM SUCCINATE 125 MG/2ML
125 INJECTION, POWDER, LYOPHILIZED, FOR SOLUTION INTRAMUSCULAR; INTRAVENOUS
Status: CANCELLED
Start: 2020-11-23

## 2020-11-04 RX ORDER — ALBUTEROL SULFATE 0.83 MG/ML
2.5 SOLUTION RESPIRATORY (INHALATION)
Status: CANCELLED | OUTPATIENT
Start: 2020-11-26

## 2020-11-04 RX ORDER — ACYCLOVIR 400 MG/1
400 TABLET ORAL EVERY 12 HOURS
Qty: 60 TABLET | Refills: 4 | Status: SHIPPED | OUTPATIENT
Start: 2020-11-04 | End: 2020-11-26

## 2020-11-04 RX ORDER — NALOXONE HYDROCHLORIDE 0.4 MG/ML
.1-.4 INJECTION, SOLUTION INTRAMUSCULAR; INTRAVENOUS; SUBCUTANEOUS
Status: CANCELLED | OUTPATIENT
Start: 2020-11-23

## 2020-11-04 RX ORDER — ALBUTEROL SULFATE 0.83 MG/ML
2.5 SOLUTION RESPIRATORY (INHALATION)
Status: CANCELLED | OUTPATIENT
Start: 2020-11-23

## 2020-11-04 RX ORDER — DIPHENHYDRAMINE HYDROCHLORIDE 50 MG/ML
50 INJECTION INTRAMUSCULAR; INTRAVENOUS
Status: CANCELLED
Start: 2020-11-29

## 2020-11-04 RX ORDER — LEVOFLOXACIN 250 MG/1
250 TABLET, FILM COATED ORAL DAILY
Qty: 30 TABLET | Refills: 4 | Status: ON HOLD | OUTPATIENT
Start: 2020-11-04 | End: 2020-12-04

## 2020-11-04 RX ORDER — ASPIRIN 325 MG
325 TABLET ORAL DAILY
Qty: 30 TABLET | Refills: 4 | Status: SHIPPED | OUTPATIENT
Start: 2020-11-04 | End: 2021-01-13

## 2020-11-04 ASSESSMENT — MIFFLIN-ST. JEOR: SCORE: 1241.15

## 2020-11-04 NOTE — TELEPHONE ENCOUNTER
PA Initiation    Medication: Revlimid - Submitted  Insurance Company: [facility] - Phone [phone] Fax [phone]  Pharmacy Filling the Rx:    Filling Pharmacy Phone:    Filling Pharmacy Fax:    Start Date: 11/4/2020        Codie Hart CPhT  [facility]  Oncology Pharmacy Liaison  [email or web address]  Phone: [phone]  Fax: [phone]

## 2020-11-04 NOTE — LETTER
11/4/2020         RE: Rogelio Marshall  [address]  [facility] [address]        Dear Colleague,    Thank you for referring your patient, Rogelio Marshall, to the [facility]. Please see a copy of my visit note below.      Malignant Hematology Consult    Reason for consult: Myeloma         Assessment and Recommendation:   76 yo man with multiple myeloma.  He has not had his M protein checked in blood and we should do that prior to initiating treatment. I doubt he would be a candidate for ASCT as he is 75 and his performance status has been impacted by his fractures. I plan to treat with VRD. We discussed that possible side effects include infections, low blood counts, blood clots, shingles reactivation, secondary cancers, mood disturbances, hyperglycemia, and insomnia.     Levofloxacin 250 mg every day,  mg every day, and acyclovir 400 mg BID for prophylaxis.I told him not to take his vitamins at the same time as levofloxacin.    He also is at high risk for more compression fractures. Vitamin D was replete when checked last month. He got a dose of pamidronate. He needs a dental evaluation to get started on zometa in the future. He will see dentistry as he has not had a routine check up for awhile.    Angela Sweeney MD PhD             History of Present Illness:   This patient is a 75 year old man with lower back pain over a year. He has known compression fractures and bone scan reveals multiple lytic lesions throughout his appendicular and axial skeleton. His Beta 2 microglobulin was 3.6 on 10/10/2020. Kappa chains were 73.6 on 10/5/2020 with K/L ratio of 89.9. M spike of 16.2 in urine on 10/10. Bone marrow biopsy on 10/23/2020 showed trilineage hematopoiesis and 50-60% kappa restricted plasma cells. Flow cytometry showed 20 % plasma cells which express CD19, CD38, CD45 and monotypic cytoplasmic kappa immunoglobulin light chains but lack CD20 and CD56. Cytogenetics  "pending. FISH shows IGH-CCND1 fusion (81%; 30% had loss of IGH component from one fusion signal).      Today he reports he is feeling well overall. He mostly wanted to discuss the supplements he is taking, glucosamine, Vit K2 ,Zinc/Mg, Milk thistle (Sillimarin) and his prior career in visual arts. He is using a walker due to his compression fractures.He is still wearing a brace for stability. He denies any new bone pains, lumps, or bumps.             Past Medical History:   HTN  HLD  Compression fractures          Past Surgical History:    ORIF left clavicle  Inguinal hernia repair         Social History:   Worked in visual arts  Former tobacco smoker         Family History:   Father- of heart attack  No blood cancers in family         Review of Systems:     A comprehensive ROS was performed and is negative except as noted above         Physical Exam:     BP (!) 155/93   Pulse 105   Temp 97.7  F (36.5  C) (Oral)   Resp 18   Ht 1.549 m (5' 1\")   Wt 63.3 kg (139 lb 8 oz)   SpO2 97%   BMI 26.36 kg/m    Constitutional: NAD  Eyes: no scleral icterus  ENT: no oral lesions, right upper maxilla has a missing tooth  Lymph: no cervical, supraclavicular, axillary LAD  Pulm: CTAB  CV: RRR, no m/r/g  GI: bowel sounds present, soft and nontender to palpation  MSK: No edema in the extremities  Neuro: alert, conversant, normal gait  Psych: appropriate mood and affect         Data:   CT lumbar spine 10/10/2020  1.  Since 2020, there is no significant change in diffuse  osteolytic appearance of spine, pelvis and ribs, suspicious for  multiple myeloma. No significant change in compression deformities in  the thoracic and lumbar spine. No new fracture.  2. Lumbar spondylosis most prominent at L5-S1 with severe bilateral  neuroforaminal stenosis.    Bone scan 2020  IMPRESSION: Extensive lytic lesions throughout the axial and  appendicular skeleton compatible with multiple myeloma. Multiple  thoracic and lumbar " compression deformities better seen on recent CT.      Angela Sweeney MD        Again, thank you for allowing me to participate in the care of your patient.        Sincerely,        Angela Sweeney MD

## 2020-11-04 NOTE — TELEPHONE ENCOUNTER
Prior Authorization Approval    Authorization Effective Date: 11/4/2020  Authorization Expiration Date: 11/4/2023  Medication: Revlimid - APPROVED  Approved Dose/Quantity:     Reference #:     Insurance Company: [employer] - Phone [phone] Fax [phone]  Expected CoPay:       CoPay Card Available:      Foundation Assistance Needed:    Which Pharmacy is filling the prescription (Not needed for infusion/clinic administered):    Pharmacy Notified:    Patient Notified:          Codie Hart CPhT  [facility]  Oncology Pharmacy Liaison  [email or web address]  Phone: [phone]  Fax: [phone]

## 2020-11-04 NOTE — NURSING NOTE
"  Oncology Rooming Note    November 4, 2020 1:07 PM   Rogelio Marshall is a 75 year old male who presents for:    Chief Complaint   Patient presents with     New Patient     Multiple myeloma, remission status unspecified (H)      Initial Vitals: BP (!) 155/93   Pulse 105   Temp 97.7  F (36.5  C) (Oral)   Resp 18   Ht 1.549 m (5' 1\")   Wt 63.3 kg (139 lb 8 oz)   SpO2 97%   BMI 26.36 kg/m   Estimated body mass index is 26.36 kg/m  as calculated from the following:    Height as of this encounter: 1.549 m (5' 1\").    Weight as of this encounter: 63.3 kg (139 lb 8 oz). Body surface area is 1.65 meters squared.  Data Unavailable Comment: Data Unavailable   No LMP for male patient.  Allergies reviewed: Yes  Medications reviewed: Yes    Medications: Medication refills not needed today.  Pharmacy name entered into [employer]:    [facility] [place], [place] - [address]  WRITTEN PRESCRIPTION REQUESTED  [facility] [address] IN [employer] - CLOSED - [place], [place] - [address] [place] [address]    Clinical concerns: New patient.       Lala Saha MA            "

## 2020-11-04 NOTE — NURSING NOTE
[facility] by [facility], received and sent to lab for processing.    Alyssia Vu CMA ([facility])

## 2020-11-05 ENCOUNTER — TELEPHONE (OUTPATIENT)
Dept: INTERNAL MEDICINE | Facility: CLINIC | Age: 75
End: 2020-11-05

## 2020-11-05 ENCOUNTER — MEDICAL CORRESPONDENCE (OUTPATIENT)
Dept: HEALTH INFORMATION MANAGEMENT | Facility: CLINIC | Age: 75
End: 2020-11-05

## 2020-11-05 LAB
ALBUMIN SERPL ELPH-MCNC: 4.4 G/DL (ref 3.7–5.1)
ALPHA1 GLOB SERPL ELPH-MCNC: 0.4 G/DL (ref 0.2–0.4)
ALPHA2 GLOB SERPL ELPH-MCNC: 0.8 G/DL (ref 0.5–0.9)
B-GLOBULIN SERPL ELPH-MCNC: 0.8 G/DL (ref 0.6–1)
GAMMA GLOB SERPL ELPH-MCNC: 4.9 G/DL (ref 0.7–1.6)
IGA SERPL-MCNC: 24 MG/DL (ref 84–499)
IGG SERPL-MCNC: 6205 MG/DL (ref 610–1616)
IGM SERPL-MCNC: 13 MG/DL (ref 35–242)
M PROTEIN SERPL ELPH-MCNC: 4.6 G/DL
PROT PATTERN SERPL ELPH-IMP: ABNORMAL
PROT PATTERN SERPL IFE-IMP: ABNORMAL

## 2020-11-05 NOTE — TELEPHONE ENCOUNTER
[facility] now requests orders and shares plan of care/discharge summaries for some patients through [employer].  Please REPLY TO THIS MESSAGE OR ROUTE BACK TO THE AUTHOR in order to give authorization for orders when needed.  This is considered a verbal order, you will still receive a faxed copy of orders for signature.  Thank you for your assistance in improving collaboration for our patients.    ORDERS REQUESTED    Skilled nurse 1x/week x 1, 2x/week x 2, 1x/week x 2, 3 PRN  OT and PT to eval and treat    Please address folowing medication concern:    1. Patient does not have prescribed Prilosec 10 mg every day at start of care, would you like to prescribed? Patient uses [facility].    FY regarding the following medications:    1. Patient reports not using tinazidine any longer  2. Patient using 200 mg ibuprofen instead of ordered 600 mg to equil equivilant  3. Patient not taking any medications as directed since return home, writer assisted with medication set up and will continue to assess compliance    Cristine Bermudez RN

## 2020-11-06 ENCOUNTER — PATIENT OUTREACH (OUTPATIENT)
Dept: ONCOLOGY | Facility: CLINIC | Age: 75
End: 2020-11-06

## 2020-11-06 NOTE — TELEPHONE ENCOUNTER
Ok for home care order listed below. Patient will need to keep appointment on 11/9/20 to discuss medications and to also get medication refills, as the patient has been in the hospital and skilled care since we last saw patient and we do not have up to date record of patients medication list and what patient is or is not taking, so we are not able to advise on patients medication until appointment with provider in [facility].   Sis Andre LPN 11/6/2020 12:29 PM

## 2020-11-06 NOTE — PROGRESS NOTES
Oncology RN Care Coordination Note:     Dr. Sweeney requested assistance with a Disability Parking Certificate for Mr. Marshall.  This writer completed the form with the exception of his 's license.  I attempted to reach Rogelio on his mobile and home phone numbers, he didn't answer either phone.  His cell phone voicemail isn't set up and the home phone voicemail is full.  I was unable to reach Rogelio at this time.      When Dr. Sweeney completes this form, we will mail it to Rogelio with the instructions to add his 's license number to it.      Cristine Redding RN BSN   [facility]  Nurse Coordinator

## 2020-11-07 ENCOUNTER — HEALTH MAINTENANCE LETTER (OUTPATIENT)
Age: 75
End: 2020-11-07

## 2020-11-09 ENCOUNTER — OFFICE VISIT (OUTPATIENT)
Dept: INTERNAL MEDICINE | Facility: CLINIC | Age: 75
End: 2020-11-09
Payer: COMMERCIAL

## 2020-11-09 VITALS
DIASTOLIC BLOOD PRESSURE: 95 MMHG | BODY MASS INDEX: 26.45 KG/M2 | RESPIRATION RATE: 16 BRPM | HEART RATE: 92 BPM | SYSTOLIC BLOOD PRESSURE: 158 MMHG | OXYGEN SATURATION: 99 % | HEIGHT: 61 IN | WEIGHT: 140.1 LBS | TEMPERATURE: 98 F

## 2020-11-09 DIAGNOSIS — M48.50XS PATHOLOGICAL COMPRESSION FRACTURE OF VERTEBRA, SEQUELA: ICD-10-CM

## 2020-11-09 DIAGNOSIS — C90.00 MULTIPLE MYELOMA, REMISSION STATUS UNSPECIFIED (H): Primary | ICD-10-CM

## 2020-11-09 DIAGNOSIS — I10 ESSENTIAL HYPERTENSION: ICD-10-CM

## 2020-11-09 PROCEDURE — 99214 OFFICE O/P EST MOD 30 MIN: CPT | Performed by: INTERNAL MEDICINE

## 2020-11-09 RX ORDER — LISINOPRIL 20 MG/1
20 TABLET ORAL DAILY
Qty: 30 TABLET | Refills: 3 | Status: SHIPPED | OUTPATIENT
Start: 2020-11-09 | End: 2021-04-07

## 2020-11-09 ASSESSMENT — MIFFLIN-ST. JEOR: SCORE: 1243.87

## 2020-11-09 ASSESSMENT — PAIN SCALES - GENERAL: PAINLEVEL: MILD PAIN (3)

## 2020-11-09 NOTE — NURSING NOTE
Chief Complaint   Patient presents with     Back Pain     Patient comes in to discuss low back pain.         Conor Ley MA on 11/9/2020 at 10:34 AM

## 2020-11-09 NOTE — PATIENT INSTRUCTIONS
Do not take your vitamins at the same time as you take the levofloxacin.    Please make an appointment to see the dentist.        [facility] Medication Refill Request Information:  * Please contact your pharmacy regarding ANY request for medication refills.  ** [facility] Prescription Fax = [phone]  * Please allow 3 business days for routine medication refills.  * Please allow 5 business days for controlled substance medication refills.     [facility] Test Result notification information:  *You will be notified with in 7-10 days of your appointment day regarding the results of your test.  If you are on MyChart you will be notified as soon as the provider has reviewed the results and signed off on them.    [facility]: [phone]

## 2020-11-09 NOTE — PROGRESS NOTES
Chief complaint:  Rogelio Marshall is a 75 year old male presents for   Chief Complaint   Patient presents with     Back Pain     Patient comes in to discuss low back pain.      SUBJECTIVE:    Rogelio is a 75 year old male with past history of hyperlipidemia and hypertension who presents for follow-up after a TCU stay for multiple vertebral fractures secondary to multiple myeloma.     He presents today for the following concerns:     1. Multiple Myeloma   Rogelio saw his oncologist (Dr. Sweeney) 5 days ago where they discussed a plan to start him on VRD therapy with supplemental viral/clotting/bacterial prophylaxis of acyclovir, aspirin, and levofloxacin. He has not yet started his oncology therapy regimen, but desires to start as soon as possible.     2. Vertebral Fractures   Rogelio states that he feels that his pain from his vertebral fractures is well-controlled. He is taking oxycodone (1x/daily), tylenol, acetaminophen, and gabapentin. He also believes that PT and OT have helped with his pain as well as returning to activities of daily living. He is using a walker for mobilization. He is interested in having a handicap permit.     3. Blood Pressure   During his TCU stay, Rogelio had consistently elevated blood pressures. Since his stay, he has not been checking his blood pressures at home. He was prescribed hydrochlorothiazide while inpatient, which he states he is taking regularly. Rogelio denies any headaches, vision changes, or chest pain.     Medications and allergies were reviewed by me today.     Review Of Systems  Eyes: no vision change   Cardiovascular: no chest pain   Musculoskeletal: significant back pain   Neuro: no headache   Psych: no sleep problems    Patient Active Problem List    Diagnosis Date Noted     Multiple myeloma, remission status unspecified (H) 11/04/2020     Priority: Medium     Difficulty walking 10/09/2020     Priority: Medium     Pathologic compression fracture of spine, sequela 10/09/2020      "Priority: Medium     Chronic midline low back pain without sciatica 10/09/2020     Priority: Medium     Hyperlipidemia      Priority: Medium     Hypertension      Priority: Medium       PHYSICAL EXAM:  BP (!) 158/95   Pulse 92   Temp 98  F (36.7  C) (Oral)   Resp 16   Ht 1.549 m (5' 1\")   Wt 63.5 kg (140 lb 1.6 oz)   SpO2 99%   BMI 26.47 kg/m    Constitutional: no distress, comfortable, pleasant   Eyes: anicteric   Musculoskeletal: uses worker to get around   Skin: no concerning lesions, no jaundice   Psychological: appropriate mood       ASSESSMENT/PLAN:  Rogelio was seen today for back pain.    Diagnoses and all orders for this visit:    Essential hypertension  Rogelio's blood pressure is not adequately controlled with hydrochlorothiazide as evidenced by repeat high blood pressures at recent visits and blood pressure today of 158/95. Will continue hydrochlorothiazide and add lisinopril.   -     lisinopril (ZESTRIL) 20 MG tablet; Take 1 tablet (20 mg) by mouth daily    Pathological compression fracture of vertebra, sequela  Discussed seeing pain specialist for pain management. Referral already in--encouraged Rogelio to follow-up. Completed form for handicap parking.     Multiple myeloma, remission status unspecified (H)  Advised to start prophylaxis of acyclovir, aspirin, and levofloxacin. Encouraged to see a dentist before starting Zometa. Advised to follow-up with oncology for VRD treatment.       Katja Wong, MS3     I, Filiberto Mata, was present with the medical student who participated in the service and in the documentation of the note.  I have verified the history and personally performed the physical exam and medical decision making.  I agree with the assessment and plan of care as documented in the note.        Filiberto Mata MD, FACP      "

## 2020-11-10 ENCOUNTER — TELEPHONE (OUTPATIENT)
Dept: ONCOLOGY | Facility: CLINIC | Age: 75
End: 2020-11-10

## 2020-11-10 ENCOUNTER — TELEPHONE (OUTPATIENT)
Dept: INTERNAL MEDICINE | Facility: CLINIC | Age: 75
End: 2020-11-10

## 2020-11-10 DIAGNOSIS — Z79.899 ENCOUNTER FOR LONG-TERM (CURRENT) USE OF MEDICATIONS: ICD-10-CM

## 2020-11-10 DIAGNOSIS — C90.00 MULTIPLE MYELOMA, REMISSION STATUS UNSPECIFIED (H): Primary | ICD-10-CM

## 2020-11-10 RX ORDER — LENALIDOMIDE 25 MG/1
25 CAPSULE ORAL DAILY
Qty: 14 CAPSULE | Refills: 0 | Status: ON HOLD | OUTPATIENT
Start: 2020-11-10 | End: 2020-12-04

## 2020-11-10 RX ORDER — DEXAMETHASONE 4 MG/1
40 TABLET ORAL
Qty: 30 TABLET | Refills: 0 | Status: ON HOLD | OUTPATIENT
Start: 2020-11-10 | End: 2020-12-04

## 2020-11-10 NOTE — TELEPHONE ENCOUNTER
Oral Chemotherapy Monitoring Program   Medication: Revlimid  Rx: 25mg PO daily on days 1 through 14 of 14 day cycle   Auth #: [id number]   Risk Category: Adult Male  Routine survey questions reviewed.   Rx to be Escribed to [facility]    Codie Hart  [facility]  Oncology Pharmacy Liaison   [email or web address]  [phone] (phone)  [phone] (fax)

## 2020-11-10 NOTE — TELEPHONE ENCOUNTER
Requesting OT home care orders 1wk3 to address HEP, ADLs/IADLs and DME needs.    [facility] now requests orders and shares plan of care/discharge summaries for some patients through [employer].  Please REPLY TO THIS MESSAGE OR ROUTE BACK TO THE AUTHOR in order to give authorization for orders when needed.  This is considered a verbal order, you will still receive a faxed copy of orders for signature.  Thank you for your assistance in improving collaboration for our patients.    Agree  Wolf Stevens MD

## 2020-11-10 NOTE — TELEPHONE ENCOUNTER
"Oral Chemotherapy Monitoring Program    Lab Monitoring Plan  CBC with Velcade injections, and q2wks for 3 months, then monthly, CMP monthly  Subjective/Objective:  Rogelio Marshall is a 75 year old male contacted by phone for an initial visit for oral chemotherapy education.  Pt is scheduled to start VRD on 11/16/2020.    ORAL CHEMOTHERAPY 11/10/2020   Assessment Type New Teach   Diagnosis Code Multiple Myeloma   Providers Dr. Sweeney   Clinic Name[facility]   Drug Name Revlimid (Lenalidomide)   Dose 25 mg   Current Schedule Daily   Cycle Details 2 weeks on, 1 week off   Planned next cycle start date 11/16/2020   Any new drug interactions? No   Is the dose as ordered appropriate for the patient? Yes       Last PHQ-2 Score on record:   PHQ-2 ( 1999 Pfizer) 9/28/2020 11/15/2019   Q1: Little interest or pleasure in doing things 0 0   Q2: Feeling down, depressed or hopeless 0 0   PHQ-2 Score 0 0     Vitals:  BP:   BP Readings from Last 1 Encounters:   11/09/20 (!) 158/95     Wt Readings from Last 1 Encounters:   11/09/20 63.5 kg (140 lb 1.6 oz)     Estimated body surface area is 1.65 meters squared as calculated from the following:    Height as of 11/9/20: 1.549 m (5' 1\").    Weight as of 11/9/20: 63.5 kg (140 lb 1.6 oz).    Labs:  _  Result Component Current Result Ref Range   Sodium 136 (11/4/2020) 133 - 144 mmol/L     _  Result Component Current Result Ref Range   Potassium 4.2 (11/4/2020) 3.4 - 5.3 mmol/L     _  Result Component Current Result Ref Range   Calcium 8.8 (11/4/2020) 8.5 - 10.1 mg/dL     _  Result Component Current Result Ref Range   Magnesium 2.4 (H) (10/13/2020) 1.6 - 2.3 mg/dL     _  Result Component Current Result Ref Range   Phosphorus 2.7 (10/13/2020) 2.5 - 4.5 mg/dL     _  Result Component Current Result Ref Range   Albumin 3.2 (L) (11/4/2020) 3.4 - 5.0 g/dL     _  Result Component Current Result Ref Range   Urea Nitrogen 33 (H) (11/4/2020) 7 - 30 mg/dL     _  Result Component Current Result " Ref Range   Creatinine 1.44 (H) (11/4/2020) 0.66 - 1.25 mg/dL     _  Result Component Current Result Ref Range   AST 22 (11/4/2020) 0 - 45 U/L     _  Result Component Current Result Ref Range   ALT <21 (11/4/2020) 0 - 70 U/L     _  Result Component Current Result Ref Range   Bilirubin Total <0.4 (11/4/2020) 0.2 - 1.3 mg/dL     _  Result Component Current Result Ref Range   WBC 6.2 (11/4/2020) 4.0 - 11.0 10e9/L     _  Result Component Current Result Ref Range   Hemoglobin 10.2 (L) (11/4/2020) 13.3 - 17.7 g/dL     _  Result Component Current Result Ref Range   Platelet Count 378 (11/4/2020) 150 - 450 10e9/L     _  Result Component Current Result Ref Range   Absolute Neutrophil 3.9 (11/4/2020) 1.6 - 8.3 10e9/L     Assessment:  Patient is appropriate to start therapy.    Plan:  Basic chemotherapy teaching was reviewed with the patient including indication, start date of therapy, dose, administration, adverse effects, missed doses, food and drug interactions, monitoring, side effect management, office contact information, and safe handling. Written materials were provided and all questions answered.    Follow-Up:  11/16 Labs  11/23 initial assessment     Grace Myhre, PharmD  Hematology/Oncology Pharmacist  [facility]  [facility]  [phone]

## 2020-11-11 ENCOUNTER — PRE VISIT (OUTPATIENT)
Dept: ORTHOPEDICS | Facility: CLINIC | Age: 75
End: 2020-11-11

## 2020-11-12 DIAGNOSIS — C90.00 MULTIPLE MYELOMA, REMISSION STATUS UNSPECIFIED (H): ICD-10-CM

## 2020-11-14 DIAGNOSIS — C90.00 MULTIPLE MYELOMA, REMISSION STATUS UNSPECIFIED (H): ICD-10-CM

## 2020-11-14 DIAGNOSIS — Z79.899 ENCOUNTER FOR LONG-TERM (CURRENT) USE OF MEDICATIONS: ICD-10-CM

## 2020-11-14 LAB
ALBUMIN SERPL-MCNC: 3.2 G/DL (ref 3.4–5)
ALP SERPL-CCNC: 84 U/L (ref 40–150)
ALT SERPL W P-5'-P-CCNC: 16 U/L (ref 0–70)
ANION GAP SERPL CALCULATED.3IONS-SCNC: 3 MMOL/L (ref 3–14)
AST SERPL W P-5'-P-CCNC: 17 U/L (ref 0–45)
BASOPHILS # BLD AUTO: 0.1 10E9/L (ref 0–0.2)
BASOPHILS NFR BLD AUTO: 0.7 %
BILIRUB SERPL-MCNC: 0.4 MG/DL (ref 0.2–1.3)
BUN SERPL-MCNC: 35 MG/DL (ref 7–30)
CALCIUM SERPL-MCNC: 8.7 MG/DL (ref 8.5–10.1)
CHLORIDE SERPL-SCNC: 103 MMOL/L (ref 94–109)
CO2 SERPL-SCNC: 24 MMOL/L (ref 20–32)
CREAT SERPL-MCNC: 1.28 MG/DL (ref 0.66–1.25)
DIFFERENTIAL METHOD BLD: ABNORMAL
EOSINOPHIL # BLD AUTO: 0.5 10E9/L (ref 0–0.7)
EOSINOPHIL NFR BLD AUTO: 7.2 %
ERYTHROCYTE [DISTWIDTH] IN BLOOD BY AUTOMATED COUNT: 13.7 % (ref 10–15)
GFR SERPL CREATININE-BSD FRML MDRD: 55 ML/MIN/{1.73_M2}
GLUCOSE SERPL-MCNC: 107 MG/DL (ref 70–99)
HCT VFR BLD AUTO: 30.6 % (ref 40–53)
HGB BLD-MCNC: 9.8 G/DL (ref 13.3–17.7)
IMM GRANULOCYTES # BLD: 0 10E9/L (ref 0–0.4)
IMM GRANULOCYTES NFR BLD: 0.6 %
LYMPHOCYTES # BLD AUTO: 2.5 10E9/L (ref 0.8–5.3)
LYMPHOCYTES NFR BLD AUTO: 36.3 %
MCH RBC QN AUTO: 33.3 PG (ref 26.5–33)
MCHC RBC AUTO-ENTMCNC: 32 G/DL (ref 31.5–36.5)
MCV RBC AUTO: 104 FL (ref 78–100)
MONOCYTES # BLD AUTO: 0.7 10E9/L (ref 0–1.3)
MONOCYTES NFR BLD AUTO: 9.6 %
NEUTROPHILS # BLD AUTO: 3.2 10E9/L (ref 1.6–8.3)
NEUTROPHILS NFR BLD AUTO: 45.6 %
NRBC # BLD AUTO: 0 10*3/UL
NRBC BLD AUTO-RTO: 0 /100
PLATELET # BLD AUTO: 387 10E9/L (ref 150–450)
POTASSIUM SERPL-SCNC: 4 MMOL/L (ref 3.4–5.3)
PROT SERPL-MCNC: 11.7 G/DL (ref 6.8–8.8)
RBC # BLD AUTO: 2.94 10E12/L (ref 4.4–5.9)
SODIUM SERPL-SCNC: 130 MMOL/L (ref 133–144)
WBC # BLD AUTO: 7 10E9/L (ref 4–11)

## 2020-11-14 PROCEDURE — 36415 COLL VENOUS BLD VENIPUNCTURE: CPT | Performed by: PATHOLOGY

## 2020-11-14 PROCEDURE — 80053 COMPREHEN METABOLIC PANEL: CPT | Performed by: PATHOLOGY

## 2020-11-14 PROCEDURE — 85025 COMPLETE CBC W/AUTO DIFF WBC: CPT | Performed by: PATHOLOGY

## 2020-11-16 ENCOUNTER — MYC MEDICAL ADVICE (OUTPATIENT)
Dept: ONCOLOGY | Facility: CLINIC | Age: 75
End: 2020-11-16

## 2020-11-16 LAB — COPATH REPORT: NORMAL

## 2020-11-18 ENCOUNTER — PATIENT OUTREACH (OUTPATIENT)
Dept: ONCOLOGY | Facility: CLINIC | Age: 75
End: 2020-11-18

## 2020-11-18 NOTE — PROGRESS NOTES
Oncology RN Care Coordination Note:     Patient left a voicemail for Rogelio requesting he call the clinic back.  He missed his first day of Velcade earlier this week and he cancelled his labs via [employer] earlier today stating he had conflicting appointments.      Writer requested he call the clinic back to discuss rescheduling the lab appointment today.     Cristine Redding RN BSN   [facility]  Nurse Coordinator

## 2020-11-19 ENCOUNTER — APPOINTMENT (OUTPATIENT)
Dept: LAB | Facility: CLINIC | Age: 75
End: 2020-11-19
Attending: STUDENT IN AN ORGANIZED HEALTH CARE EDUCATION/TRAINING PROGRAM
Payer: COMMERCIAL

## 2020-11-19 ENCOUNTER — INFUSION THERAPY VISIT (OUTPATIENT)
Dept: ONCOLOGY | Facility: CLINIC | Age: 75
End: 2020-11-19
Attending: STUDENT IN AN ORGANIZED HEALTH CARE EDUCATION/TRAINING PROGRAM
Payer: COMMERCIAL

## 2020-11-19 VITALS
WEIGHT: 145 LBS | BODY MASS INDEX: 27.4 KG/M2 | SYSTOLIC BLOOD PRESSURE: 158 MMHG | RESPIRATION RATE: 16 BRPM | DIASTOLIC BLOOD PRESSURE: 95 MMHG | HEART RATE: 83 BPM | TEMPERATURE: 97.8 F | OXYGEN SATURATION: 97 %

## 2020-11-19 DIAGNOSIS — C90.00 MULTIPLE MYELOMA, REMISSION STATUS UNSPECIFIED (H): Primary | ICD-10-CM

## 2020-11-19 DIAGNOSIS — Z79.899 ENCOUNTER FOR LONG-TERM (CURRENT) USE OF MEDICATIONS: ICD-10-CM

## 2020-11-19 LAB
ALBUMIN SERPL-MCNC: 3 G/DL (ref 3.4–5)
ALP SERPL-CCNC: 78 U/L (ref 40–150)
ALT SERPL W P-5'-P-CCNC: 14 U/L (ref 0–70)
ANION GAP SERPL CALCULATED.3IONS-SCNC: 4 MMOL/L (ref 3–14)
AST SERPL W P-5'-P-CCNC: 15 U/L (ref 0–45)
BASOPHILS # BLD AUTO: 0.1 10E9/L (ref 0–0.2)
BASOPHILS NFR BLD AUTO: 0.6 %
BILIRUB SERPL-MCNC: 0.4 MG/DL (ref 0.2–1.3)
BUN SERPL-MCNC: 28 MG/DL (ref 7–30)
CALCIUM SERPL-MCNC: 9.4 MG/DL (ref 8.5–10.1)
CHLORIDE SERPL-SCNC: 104 MMOL/L (ref 94–109)
CO2 SERPL-SCNC: 25 MMOL/L (ref 20–32)
CREAT SERPL-MCNC: 1.28 MG/DL (ref 0.66–1.25)
DIFFERENTIAL METHOD BLD: ABNORMAL
EOSINOPHIL # BLD AUTO: 0.2 10E9/L (ref 0–0.7)
EOSINOPHIL NFR BLD AUTO: 2.8 %
ERYTHROCYTE [DISTWIDTH] IN BLOOD BY AUTOMATED COUNT: 14.3 % (ref 10–15)
GFR SERPL CREATININE-BSD FRML MDRD: 55 ML/MIN/{1.73_M2}
GLUCOSE SERPL-MCNC: 100 MG/DL (ref 70–99)
HCT VFR BLD AUTO: 27.8 % (ref 40–53)
HGB BLD-MCNC: 9 G/DL (ref 13.3–17.7)
IMM GRANULOCYTES # BLD: 0.1 10E9/L (ref 0–0.4)
IMM GRANULOCYTES NFR BLD: 0.6 %
LYMPHOCYTES # BLD AUTO: 2.1 10E9/L (ref 0.8–5.3)
LYMPHOCYTES NFR BLD AUTO: 26 %
MCH RBC QN AUTO: 33.2 PG (ref 26.5–33)
MCHC RBC AUTO-ENTMCNC: 32.4 G/DL (ref 31.5–36.5)
MCV RBC AUTO: 103 FL (ref 78–100)
MONOCYTES # BLD AUTO: 0.6 10E9/L (ref 0–1.3)
MONOCYTES NFR BLD AUTO: 6.8 %
NEUTROPHILS # BLD AUTO: 5.1 10E9/L (ref 1.6–8.3)
NEUTROPHILS NFR BLD AUTO: 63.2 %
NRBC # BLD AUTO: 0 10*3/UL
NRBC BLD AUTO-RTO: 0 /100
PLATELET # BLD AUTO: 360 10E9/L (ref 150–450)
POTASSIUM SERPL-SCNC: 4.1 MMOL/L (ref 3.4–5.3)
PROT SERPL-MCNC: 11.2 G/DL (ref 6.8–8.8)
RBC # BLD AUTO: 2.71 10E12/L (ref 4.4–5.9)
SODIUM SERPL-SCNC: 132 MMOL/L (ref 133–144)
WBC # BLD AUTO: 8.1 10E9/L (ref 4–11)

## 2020-11-19 PROCEDURE — 250N000011 HC RX IP 250 OP 636: Mod: JW | Performed by: STUDENT IN AN ORGANIZED HEALTH CARE EDUCATION/TRAINING PROGRAM

## 2020-11-19 PROCEDURE — 85025 COMPLETE CBC W/AUTO DIFF WBC: CPT | Performed by: STUDENT IN AN ORGANIZED HEALTH CARE EDUCATION/TRAINING PROGRAM

## 2020-11-19 PROCEDURE — 80053 COMPREHEN METABOLIC PANEL: CPT | Performed by: STUDENT IN AN ORGANIZED HEALTH CARE EDUCATION/TRAINING PROGRAM

## 2020-11-19 PROCEDURE — 36415 COLL VENOUS BLD VENIPUNCTURE: CPT

## 2020-11-19 PROCEDURE — 96401 CHEMO ANTI-NEOPL SQ/IM: CPT

## 2020-11-19 RX ORDER — PROCHLORPERAZINE MALEATE 10 MG
10 TABLET ORAL EVERY 6 HOURS PRN
Qty: 30 TABLET | Refills: 3 | Status: SHIPPED | OUTPATIENT
Start: 2020-11-19 | End: 2020-12-09

## 2020-11-19 RX ORDER — ACYCLOVIR 400 MG/1
400 TABLET ORAL 2 TIMES DAILY
Qty: 60 TABLET | Refills: 5 | Status: SHIPPED | OUTPATIENT
Start: 2020-11-19 | End: 2020-12-09

## 2020-11-19 RX ORDER — LORAZEPAM 0.5 MG/1
0.5 TABLET ORAL EVERY 4 HOURS PRN
Qty: 30 TABLET | Refills: 3 | Status: SHIPPED | OUTPATIENT
Start: 2020-11-19 | End: 2020-12-09

## 2020-11-19 RX ADMIN — BORTEZOMIB 2.1 MG: 3.5 INJECTION, POWDER, LYOPHILIZED, FOR SOLUTION INTRAVENOUS; SUBCUTANEOUS at 17:00

## 2020-11-19 ASSESSMENT — PAIN SCALES - GENERAL: PAINLEVEL: MILD PAIN (3)

## 2020-11-19 NOTE — NURSING NOTE
Chief Complaint   Patient presents with     Blood Draw     Labs drawn via  by RN in lab. VS taken.      Labs drawn via venipuncture. Vital signs taken. Checked into next appointment.   Brittney Bedoya RN

## 2020-11-19 NOTE — PATIENT INSTRUCTIONS
Contact Numbers    [facility] [place]: [phone]  [facility] [place] and after hours / weekends / holidays: [phone]      Please call the triage or after hours line if you experience a temperature greater than or equal to 100.4, shaking chills, have uncontrolled nausea, vomiting and/or diarrhea, dizziness, shortness of breath, chest pain, bleeding, unexplained bruising, or if you have any other new/concerning symptoms, questions or concerns.      If you are having any concerning symptoms or wish to speak to a provider before your next infusion visit, please call your care coordinator or triage to notify them so we can adequately serve you.     If you need a refill on a narcotic prescription or other medication, please call before your infusion appointment.       November 2020 Sunday Monday Tuesday Wednesday Thursday Friday Saturday   1     2     3     4    UMP NEW  12:45 PM   (60 min.)   Angela Sweeney MD   [facility] 5     6     7       8     9    UMP RETURN  10:15 AM   (30 min.)   Filiberto Mata MD   [facility] [place] 10     11     12    COVID PCR   3:30 PM   (10 min.)    LAB   [facility] [place] 13     14    LAB  11:30 AM   (15 min.)    LAB   [facility] [place]   15     16    UMP ONC INFUSION 60   3:00 PM   (60 min.)    ONCOLOGY INFUSION   [facility] 17     18     19    LAB PERIPHERAL   3:30 PM   (15 min.)   [facility] DRAW   [facility]    UMP ONC INFUSION 60   3:30 PM   (60 min.)    ONCOLOGY INFUSION   [facility] 20     21       22     23    UMP ONC INFUSION 60   9:30 AM   (60 min.)    ONCOLOGY INFUSION   [facility] 24     25     26     27     28    LAB   9:00 AM   (15 min.)    LAB   [facility]   29     30    UMP ONC INFUSION 60   2:30 PM   (60 min.)     ONCOLOGY INFUSION   [facility]                                      December 2020 Sunday Monday Tuesday Wednesday Thursday Friday Saturday             1     2     3     4     5       6     7     8     9    TELEPHONE VISIT RETURN   3:00 PM   (30 min.)   Angela Sweeney MD   [facility] 10     11     12       13     14     15     16     17     18     19       20     21     22     23     24     25     26       27     28     29     30     31                              Recent Results (from the past 24 hour(s))   Comprehensive metabolic panel    Collection Time: 11/19/20  3:36 PM   Result Value Ref Range    Sodium 132 (L) 133 - 144 mmol/L    Potassium 4.1 3.4 - 5.3 mmol/L    Chloride 104 94 - 109 mmol/L    Carbon Dioxide 25 20 - 32 mmol/L    Anion Gap 4 3 - 14 mmol/L    Glucose 100 (H) 70 - 99 mg/dL    Urea Nitrogen 28 7 - 30 mg/dL    Creatinine 1.28 (H) 0.66 - 1.25 mg/dL    GFR Estimate 55 (L) >60 mL/min/[1.73_m2]    GFR Estimate If Black 64 >60 mL/min/[1.73_m2]    Calcium 9.4 8.5 - 10.1 mg/dL    Bilirubin Total 0.4 0.2 - 1.3 mg/dL    Albumin 3.0 (L) 3.4 - 5.0 g/dL    Protein Total 11.2 (H) 6.8 - 8.8 g/dL    Alkaline Phosphatase 78 40 - 150 U/L    ALT 14 0 - 70 U/L    AST 15 0 - 45 U/L   *CBC with platelets differential    Collection Time: 11/19/20  3:36 PM   Result Value Ref Range    WBC 8.1 4.0 - 11.0 10e9/L    RBC Count 2.71 (L) 4.4 - 5.9 10e12/L    Hemoglobin 9.0 (L) 13.3 - 17.7 g/dL    Hematocrit 27.8 (L) 40.0 - 53.0 %     (H) 78 - 100 fl    MCH 33.2 (H) 26.5 - 33.0 pg    MCHC 32.4 31.5 - 36.5 g/dL    RDW 14.3 10.0 - 15.0 %    Platelet Count 360 150 - 450 10e9/L    Diff Method Automated Method     % Neutrophils 63.2 %    % Lymphocytes 26.0 %    % Monocytes 6.8 %    % Eosinophils 2.8 %    % Basophils 0.6 %    % Immature Granulocytes 0.6 %    Nucleated RBCs 0 0 /100    Absolute Neutrophil 5.1 1.6 - 8.3 10e9/L    Absolute Lymphocytes 2.1 0.8 -  5.3 10e9/L    Absolute Monocytes 0.6 0.0 - 1.3 10e9/L    Absolute Eosinophils 0.2 0.0 - 0.7 10e9/L    Absolute Basophils 0.1 0.0 - 0.2 10e9/L    Abs Immature Granulocytes 0.1 0 - 0.4 10e9/L    Absolute Nucleated RBC 0.0

## 2020-11-19 NOTE — PROGRESS NOTES
"Infusion Nursing Note:  Rogelio Marshall presents today for Cycle 1 Day 1 Velcade.    Patient seen by provider today: No   present during visit today: Not Applicable.    Note: Patient new to oncology infusion room and is receiving Velcade for the first time. Pt oriented to infusion room and call light. New patient teaching done previously. Pt received new pt folder prior to coming to infusion. Writer reinforced chemotherapy teaching/side effects and schedule. Patient with many questions today about chemotherapy regimen, oral medication schedule/purpose, goals of therapy. This RN educated pt regarding Velcade and provided written handout from [employer]. Pharmacy to infusion bay to provide education regarding all new oral medications. Patient instructed to talk with provider regarding goals of therapy and future monitoring. Patient verbalized understanding of all information given today and reported feeling \"better\" about starting this therapy after questions were answered.    Patient instructed to call triage with questions/concerns or if he/she has chills and/or temperature greater than or equal to 100.4. Triage number: [phone]; After hours, weekends, or holidays, call [phone] and ask for oncology doctor on call. This RN provided pt with a thermometer today.     Pain: mild pain in low back, denies need for intervention from this RN    Intravenous Access:  Labs drawn without difficulty.    Treatment Conditions:  Lab Results   Component Value Date    HGB 9.0 11/19/2020     Lab Results   Component Value Date    WBC 8.1 11/19/2020      Lab Results   Component Value Date    ANEU 5.1 11/19/2020     Lab Results   Component Value Date     11/19/2020      Lab Results   Component Value Date     11/19/2020                   Lab Results   Component Value Date    POTASSIUM 4.1 11/19/2020           Lab Results   Component Value Date                  Lab Results   Component Value Date    CR " 1.28 11/19/2020                   Lab Results   Component Value Date    MIRI 9.4 11/19/2020                Lab Results   Component Value Date    BILITOTAL 0.4 11/19/2020           Lab Results   Component Value Date    ALBUMIN 3.0 11/19/2020                    Lab Results   Component Value Date    ALT 14 11/19/2020           Lab Results   Component Value Date    AST 15 11/19/2020       Results reviewed, labs MET treatment parameters, ok to proceed with treatment.      Post Infusion Assessment:  Patient tolerated injection to right abdomen without incident.       Discharge Plan:   Prescription refills given for Ativan, Compazine, Decadron, acyclovir. Revlimid will be coming from [facility].  Copy of AVS reviewed with patient and/or family.  Patient will return 11/23 for next appointment. Future appointments beyond 11/23 need to be rescheduled due to pt missing Day 1 appt earlier this week. Inbox sent to Cristine Redding RNCC, to follow up with pt regarding future schedule.   Patient discharged in stable condition accompanied by: self.  Departure Mode: Ambulatory with walker.    ROBIN HU RN

## 2020-11-22 ENCOUNTER — TELEPHONE (OUTPATIENT)
Dept: INTERNAL MEDICINE | Facility: CLINIC | Age: 75
End: 2020-11-22

## 2020-11-22 NOTE — TELEPHONE ENCOUNTER
[facility] now requests orders and shares plan of care/discharge summaries for some patients through [employer].  Please REPLY TO THIS MESSAGE OR ROUTE BACK TO THE AUTHOR in order to give authorization for orders when needed.  This is considered a verbal order, you will still receive a faxed copy of orders for signature.  Thank you for your assistance in improving collaboration for our patients.    ORDER    Dr Stevens,     I saw the patient on Friday 11/20/20 for his weekly medication set up. The patient has Omeprazole 10mg- take 1 capsule daily on his medication list that he reported was prescribed when he was in the hospital. He states that he never took the medication since he was home and does not feel that he needs it.     Patient is asking for approval to discontinue medication and removing from his medication list. Are you ok with this request?    Thank you,   Praveena BAILON LPN  [phone]  [email or web address]    Agree  Wolf Stevens MD

## 2020-11-23 ENCOUNTER — INFUSION THERAPY VISIT (OUTPATIENT)
Dept: ONCOLOGY | Facility: CLINIC | Age: 75
DRG: 542 | End: 2020-11-23
Attending: STUDENT IN AN ORGANIZED HEALTH CARE EDUCATION/TRAINING PROGRAM
Payer: COMMERCIAL

## 2020-11-23 ENCOUNTER — TELEPHONE (OUTPATIENT)
Dept: ONCOLOGY | Facility: CLINIC | Age: 75
End: 2020-11-23

## 2020-11-23 VITALS
HEART RATE: 96 BPM | OXYGEN SATURATION: 99 % | RESPIRATION RATE: 18 BRPM | TEMPERATURE: 97.6 F | DIASTOLIC BLOOD PRESSURE: 80 MMHG | SYSTOLIC BLOOD PRESSURE: 126 MMHG

## 2020-11-23 DIAGNOSIS — C90.00 MULTIPLE MYELOMA, REMISSION STATUS UNSPECIFIED (H): Primary | ICD-10-CM

## 2020-11-23 PROCEDURE — 99207 PR NO BILLABLE SERVICE THIS VISIT: CPT

## 2020-11-23 PROCEDURE — 250N000011 HC RX IP 250 OP 636: Mod: JW | Performed by: STUDENT IN AN ORGANIZED HEALTH CARE EDUCATION/TRAINING PROGRAM

## 2020-11-23 PROCEDURE — 96401 CHEMO ANTI-NEOPL SQ/IM: CPT

## 2020-11-23 RX ADMIN — BORTEZOMIB 2.1 MG: 3.5 INJECTION, POWDER, LYOPHILIZED, FOR SOLUTION INTRAVENOUS; SUBCUTANEOUS at 10:22

## 2020-11-23 NOTE — PROGRESS NOTES
Infusion Nursing Note:  Rogelio Marshall presents today for C1D4 Velcade subcutaneous.    Patient seen by provider today: No   present during visit today: Not Applicable.    Note: Patient reports feeling well today. Denies fever/chills. Denies nausea/vomiting nor chest and abdominal discomfort. No new concerns made. Otherwise well.        Intravenous Access:  No Intravenous access/labs at this visit.    Treatment Conditions:  Not Applicable.    TORB: 11/23/20/Angela Sweeney Md/Irina Griffni RN/ Keep lab appointment on Saturday 11/28, for CBC.     Post Infusion Assessment:  Patient tolerated one Velcade  injection without incident.  Site patent and intact, free from redness, edema or discomfort.  No evidence of extravasations.       Discharge Plan:   Patient declined prescription refills. Has enough supply of Revlimid.   Discharge instructions reviewed with: Patient.  Patient and/or family verbalized understanding of discharge instructions and all questions answered.  Copy of AVS reviewed with patient and/or family.  Patient will return 11/26/20 for next appointment.  Patient discharged in stable condition accompanied by: self.  Departure Mode: Ambulatory and walker.  Face to Face time: 5.    GEORGE MORIN, OSVALDO

## 2020-11-23 NOTE — PATIENT INSTRUCTIONS
Contact Numbers  [facility]: [phone]    After Hours:  [phone]  Triage: [phone]    Please call the [facility] Triage line if you experience a temperature greater than or equal to 100.5, shaking chills, have uncontrolled nausea, vomiting and/or diarrhea, dizziness, shortness of breath, chest pain, bleeding, unexplained bruising, or if you have any other new/concerning symptoms, questions or concerns.     If it is after hours, weekends, or holidays, please call the main hospital  at  [phone] and ask to speak to the Oncology doctor on call.     If you are having any concerning symptoms or wish to speak to a provider before your next infusion visit, please call your care coordinator or triage to notify them so we can adequately serve you.     If you need a refill on a narcotic prescription or other medication, please call triage before your infusion appointment.         November 2020 Sunday Monday Tuesday Wednesday Thursday Friday Saturday   1     2     3     4    UMP NEW  12:45 PM   (60 min.)   Angela Sweeney MD   [facility] 5     6     7       8     9    UMP RETURN  10:15 AM   (30 min.)   Filiberto Mata MD   [facility] [place] 10     11     12    COVID PCR   3:30 PM   (10 min.)    LAB   [facility] [place] 13     14    LAB  11:30 AM   (15 min.)    LAB   [facility]   15     16    UMP ONC INFUSION 60   3:00 PM   (60 min.)   [facility] ONCOLOGY INFUSION   [facility] 17     18     19    LAB PERIPHERAL   3:30 PM   (15 min.)   [facility] LAB DRAW   [facility]    UMP ONC INFUSION 60   3:30 PM   (60 min.)    ONCOLOGY INFUSION   [facility] 20     21       22     23    UMP ONC INFUSION 60   9:30 AM   (60 min.)    ONCOLOGY INFUSION   [facility] 24     25     26    UMP  ONC INFUSION 60   9:00 AM   (60 min.)   UC ONCOLOGY INFUSION   [facility] 27     28    LAB   9:00 AM   (15 min.)    LAB   [facility] [place]   29     30    [facility] ONC INFUSION 60   2:30 PM   (60 min.)    ONCOLOGY INFUSION   [facility]                                      December 2020 Sunday Monday Tuesday Wednesday Thursday Friday Saturday             1     2     3     4     5       6     7     8     9    TELEPHONE VISIT RETURN   3:00 PM   (30 min.)   Angela Sweeney MD   [facility] 10     11     12       13     14     15     16     17     18     19       20     21     22     23     24     25     26       27     28     29     30     31                               Lab Results:  No results found for this or any previous visit (from the past 12 hour(s)).

## 2020-11-23 NOTE — TELEPHONE ENCOUNTER
Oral Chemotherapy Monitoring Program     Placed call to patient in follow up of Revlimid oral chemotherapy. Patient missed his first day of Velcade on 11/16, and then received it on 11/19.  Thus, he should not have started the Revlimid until 11/19.  Attempted to reach patient for additional clarification regarding his dosing schedule, to determine when he started taking the Revlimid, and to answer any questions he may have about his regimen.    Left message requesting call back. No drug names were mentioned. Will update when response received.     Grace Myhre, PharmD  Hematology/Oncology Clinical Pharmacist  [facility]  [facility]  [phone]

## 2020-11-24 ENCOUNTER — MEDICAL CORRESPONDENCE (OUTPATIENT)
Dept: HEALTH INFORMATION MANAGEMENT | Facility: CLINIC | Age: 75
End: 2020-11-24

## 2020-11-24 ENCOUNTER — TELEPHONE (OUTPATIENT)
Dept: ONCOLOGY | Facility: CLINIC | Age: 75
End: 2020-11-24

## 2020-11-24 DIAGNOSIS — C90.00 MULTIPLE MYELOMA, REMISSION STATUS UNSPECIFIED (H): Primary | ICD-10-CM

## 2020-11-24 PROCEDURE — G0180 MD CERTIFICATION HHA PATIENT: HCPCS | Performed by: INTERNAL MEDICINE

## 2020-11-24 RX ORDER — METHYLPREDNISOLONE SODIUM SUCCINATE 125 MG/2ML
125 INJECTION, POWDER, LYOPHILIZED, FOR SOLUTION INTRAMUSCULAR; INTRAVENOUS
Status: CANCELLED
Start: 2020-12-20

## 2020-11-24 RX ORDER — SODIUM CHLORIDE 9 MG/ML
1000 INJECTION, SOLUTION INTRAVENOUS CONTINUOUS PRN
Status: CANCELLED
Start: 2020-12-17

## 2020-11-24 RX ORDER — DIPHENHYDRAMINE HYDROCHLORIDE 50 MG/ML
50 INJECTION INTRAMUSCULAR; INTRAVENOUS
Status: CANCELLED
Start: 2020-12-17

## 2020-11-24 RX ORDER — ALBUTEROL SULFATE 0.83 MG/ML
2.5 SOLUTION RESPIRATORY (INHALATION)
Status: CANCELLED | OUTPATIENT
Start: 2020-12-13

## 2020-11-24 RX ORDER — MEPERIDINE HYDROCHLORIDE 25 MG/ML
25 INJECTION INTRAMUSCULAR; INTRAVENOUS; SUBCUTANEOUS EVERY 30 MIN PRN
Status: CANCELLED | OUTPATIENT
Start: 2020-12-17

## 2020-11-24 RX ORDER — MEPERIDINE HYDROCHLORIDE 25 MG/ML
25 INJECTION INTRAMUSCULAR; INTRAVENOUS; SUBCUTANEOUS EVERY 30 MIN PRN
Status: CANCELLED | OUTPATIENT
Start: 2020-12-20

## 2020-11-24 RX ORDER — DIPHENHYDRAMINE HYDROCHLORIDE 50 MG/ML
50 INJECTION INTRAMUSCULAR; INTRAVENOUS
Status: CANCELLED
Start: 2020-12-20

## 2020-11-24 RX ORDER — DIPHENHYDRAMINE HYDROCHLORIDE 50 MG/ML
50 INJECTION INTRAMUSCULAR; INTRAVENOUS
Status: CANCELLED
Start: 2020-12-10

## 2020-11-24 RX ORDER — SODIUM CHLORIDE 9 MG/ML
1000 INJECTION, SOLUTION INTRAVENOUS CONTINUOUS PRN
Status: CANCELLED
Start: 2020-12-20

## 2020-11-24 RX ORDER — EPINEPHRINE 1 MG/ML
0.3 INJECTION, SOLUTION INTRAMUSCULAR; SUBCUTANEOUS EVERY 5 MIN PRN
Status: CANCELLED | OUTPATIENT
Start: 2020-12-10

## 2020-11-24 RX ORDER — LORAZEPAM 2 MG/ML
0.5 INJECTION INTRAMUSCULAR EVERY 4 HOURS PRN
Status: CANCELLED
Start: 2020-12-17

## 2020-11-24 RX ORDER — NALOXONE HYDROCHLORIDE 0.4 MG/ML
.1-.4 INJECTION, SOLUTION INTRAMUSCULAR; INTRAVENOUS; SUBCUTANEOUS
Status: CANCELLED | OUTPATIENT
Start: 2020-12-20

## 2020-11-24 RX ORDER — NALOXONE HYDROCHLORIDE 0.4 MG/ML
.1-.4 INJECTION, SOLUTION INTRAMUSCULAR; INTRAVENOUS; SUBCUTANEOUS
Status: CANCELLED | OUTPATIENT
Start: 2020-12-13

## 2020-11-24 RX ORDER — ALBUTEROL SULFATE 90 UG/1
1-2 AEROSOL, METERED RESPIRATORY (INHALATION)
Status: CANCELLED
Start: 2020-12-10

## 2020-11-24 RX ORDER — EPINEPHRINE 1 MG/ML
0.3 INJECTION, SOLUTION INTRAMUSCULAR; SUBCUTANEOUS EVERY 5 MIN PRN
Status: CANCELLED | OUTPATIENT
Start: 2020-12-17

## 2020-11-24 RX ORDER — NALOXONE HYDROCHLORIDE 0.4 MG/ML
.1-.4 INJECTION, SOLUTION INTRAMUSCULAR; INTRAVENOUS; SUBCUTANEOUS
Status: CANCELLED | OUTPATIENT
Start: 2020-12-10

## 2020-11-24 RX ORDER — METHYLPREDNISOLONE SODIUM SUCCINATE 125 MG/2ML
125 INJECTION, POWDER, LYOPHILIZED, FOR SOLUTION INTRAMUSCULAR; INTRAVENOUS
Status: CANCELLED
Start: 2020-12-13

## 2020-11-24 RX ORDER — ALBUTEROL SULFATE 0.83 MG/ML
2.5 SOLUTION RESPIRATORY (INHALATION)
Status: CANCELLED | OUTPATIENT
Start: 2020-12-20

## 2020-11-24 RX ORDER — MEPERIDINE HYDROCHLORIDE 25 MG/ML
25 INJECTION INTRAMUSCULAR; INTRAVENOUS; SUBCUTANEOUS EVERY 30 MIN PRN
Status: CANCELLED | OUTPATIENT
Start: 2020-12-10

## 2020-11-24 RX ORDER — ALBUTEROL SULFATE 0.83 MG/ML
2.5 SOLUTION RESPIRATORY (INHALATION)
Status: CANCELLED | OUTPATIENT
Start: 2020-12-10

## 2020-11-24 RX ORDER — EPINEPHRINE 1 MG/ML
0.3 INJECTION, SOLUTION INTRAMUSCULAR; SUBCUTANEOUS EVERY 5 MIN PRN
Status: CANCELLED | OUTPATIENT
Start: 2020-12-13

## 2020-11-24 RX ORDER — MEPERIDINE HYDROCHLORIDE 25 MG/ML
25 INJECTION INTRAMUSCULAR; INTRAVENOUS; SUBCUTANEOUS EVERY 30 MIN PRN
Status: CANCELLED | OUTPATIENT
Start: 2020-12-13

## 2020-11-24 RX ORDER — ALBUTEROL SULFATE 90 UG/1
1-2 AEROSOL, METERED RESPIRATORY (INHALATION)
Status: CANCELLED
Start: 2020-12-13

## 2020-11-24 RX ORDER — LORAZEPAM 2 MG/ML
0.5 INJECTION INTRAMUSCULAR EVERY 4 HOURS PRN
Status: CANCELLED
Start: 2020-12-10

## 2020-11-24 RX ORDER — ALBUTEROL SULFATE 90 UG/1
1-2 AEROSOL, METERED RESPIRATORY (INHALATION)
Status: CANCELLED
Start: 2020-12-20

## 2020-11-24 RX ORDER — LORAZEPAM 2 MG/ML
0.5 INJECTION INTRAMUSCULAR EVERY 4 HOURS PRN
Status: CANCELLED
Start: 2020-12-20

## 2020-11-24 RX ORDER — METHYLPREDNISOLONE SODIUM SUCCINATE 125 MG/2ML
125 INJECTION, POWDER, LYOPHILIZED, FOR SOLUTION INTRAMUSCULAR; INTRAVENOUS
Status: CANCELLED
Start: 2020-12-10

## 2020-11-24 RX ORDER — METHYLPREDNISOLONE SODIUM SUCCINATE 125 MG/2ML
125 INJECTION, POWDER, LYOPHILIZED, FOR SOLUTION INTRAMUSCULAR; INTRAVENOUS
Status: CANCELLED
Start: 2020-12-17

## 2020-11-24 RX ORDER — EPINEPHRINE 1 MG/ML
0.3 INJECTION, SOLUTION INTRAMUSCULAR; SUBCUTANEOUS EVERY 5 MIN PRN
Status: CANCELLED | OUTPATIENT
Start: 2020-12-20

## 2020-11-24 RX ORDER — ALBUTEROL SULFATE 90 UG/1
1-2 AEROSOL, METERED RESPIRATORY (INHALATION)
Status: CANCELLED
Start: 2020-12-17

## 2020-11-24 RX ORDER — SODIUM CHLORIDE 9 MG/ML
1000 INJECTION, SOLUTION INTRAVENOUS CONTINUOUS PRN
Status: CANCELLED
Start: 2020-12-13

## 2020-11-24 RX ORDER — ALBUTEROL SULFATE 0.83 MG/ML
2.5 SOLUTION RESPIRATORY (INHALATION)
Status: CANCELLED | OUTPATIENT
Start: 2020-12-17

## 2020-11-24 RX ORDER — DIPHENHYDRAMINE HYDROCHLORIDE 50 MG/ML
50 INJECTION INTRAMUSCULAR; INTRAVENOUS
Status: CANCELLED
Start: 2020-12-13

## 2020-11-24 RX ORDER — SODIUM CHLORIDE 9 MG/ML
1000 INJECTION, SOLUTION INTRAVENOUS CONTINUOUS PRN
Status: CANCELLED
Start: 2020-12-10

## 2020-11-24 RX ORDER — LORAZEPAM 2 MG/ML
0.5 INJECTION INTRAMUSCULAR EVERY 4 HOURS PRN
Status: CANCELLED
Start: 2020-12-13

## 2020-11-24 RX ORDER — NALOXONE HYDROCHLORIDE 0.4 MG/ML
.1-.4 INJECTION, SOLUTION INTRAMUSCULAR; INTRAVENOUS; SUBCUTANEOUS
Status: CANCELLED | OUTPATIENT
Start: 2020-12-17

## 2020-11-24 NOTE — ORAL ONC MGMT
Oral Chemotherapy Monitoring Program    No clinically significant DDI found, drugs checked 11/24/2020 include: Acetaminophen -Acyclovir (Systemic) -Aspirin -Calcium Carbonate -Cholecalciferol -DexAMETHasone (Systemic) -Gabapentin -HydroCHLOROthiazide -Ibuprofen -Lenalidomide -LevoFLOXacin (Systemic) -Lidocaine (Systemic) -Lisinopril -LORazepam -Mentholatum [OTC] -Methocarbamol -Omeprazole -Ondansetron -OxyCODONE -Polyethylene Glycol 3350 -Prochlorperazine -Senna -Docusate -TiZANidine      Subjective/Objective:  Rogelio Marshall is a 75 year old male contacted by phone for a follow-up visit for oral chemotherapy.     ORAL CHEMOTHERAPY 11/10/2020 11/16/2020 11/24/2020   Assessment Type New Teach Lab Monitoring Initial Follow up   Diagnosis Code Multiple Myeloma Multiple Myeloma Multiple Myeloma   Providers Dr. Patel Sweeney   [facility]   Drug Name Revlimid (Lenalidomide) Revlimid (Lenalidomide) Revlimid (Lenalidomide)   Dose 25 mg 25 mg 25 mg   Current Schedule Daily Daily Daily   Cycle Details 2 weeks on, 1 week off 2 weeks on, 1 week off 2 weeks on, 1 week off   Start Date of Last Cycle - - 11/17/2020   Planned next cycle start date 11/16/2020 - -   Doses missed in last 2 weeks - - 0   Adherence Assessment - - Adherent   Adverse Effects - - Constipation   Constipation - - Resolved due to intervention   Pharmacist Intervention(constipation) - - Yes   Intervention(s) - - Patient education;OTC recommendation   Any new drug interactions? No - No   Is the dose as ordered appropriate for the patient? Yes - Yes       Last PHQ-2 Score on record:   PHQ-2 ( 1999 Pfizer) 9/28/2020 11/15/2019   Q1: Little interest or pleasure in doing things 0 0   Q2: Feeling down, depressed or hopeless 0 0   PHQ-2 Score 0 0       Vitals:  BP:   BP Readings from Last 1 Encounters:   11/23/20 126/80     Wt Readings from Last 1 Encounters:   11/19/20 65.8 kg (145 lb)     Estimated body surface  "area is 1.68 meters squared as calculated from the following:    Height as of 11/9/20: 1.549 m (5' 1\").    Weight as of 11/19/20: 65.8 kg (145 lb).    Labs:  _  Result Component Current Result Ref Range   Sodium 132 (L) (11/19/2020) 133 - 144 mmol/L     _  Result Component Current Result Ref Range   Potassium 4.1 (11/19/2020) 3.4 - 5.3 mmol/L     _  Result Component Current Result Ref Range   Calcium 9.4 (11/19/2020) 8.5 - 10.1 mg/dL     No results found for Mag within last 30 days.     No results found for Phos within last 30 days.     _  Result Component Current Result Ref Range   Albumin 3.0 (L) (11/19/2020) 3.4 - 5.0 g/dL     _  Result Component Current Result Ref Range   Urea Nitrogen 28 (11/19/2020) 7 - 30 mg/dL     _  Result Component Current Result Ref Range   Creatinine 1.28 (H) (11/19/2020) 0.66 - 1.25 mg/dL     _  Result Component Current Result Ref Range   AST 15 (11/19/2020) 0 - 45 U/L     _  Result Component Current Result Ref Range   ALT 14 (11/19/2020) 0 - 70 U/L     _  Result Component Current Result Ref Range   Bilirubin Total 0.4 (11/19/2020) 0.2 - 1.3 mg/dL     _  Result Component Current Result Ref Range   WBC 8.1 (11/19/2020) 4.0 - 11.0 10e9/L     _  Result Component Current Result Ref Range   Hemoglobin 9.0 (L) (11/19/2020) 13.3 - 17.7 g/dL     _  Result Component Current Result Ref Range   Platelet Count 360 (11/19/2020) 150 - 450 10e9/L     _  Result Component Current Result Ref Range   Absolute Neutrophil 5.1 (11/19/2020) 1.6 - 8.3 10e9/L         Assessment/Plan:  Rogelio said he started taking Revlimid on 11/17/2020. On around 11/20/2020 he started to have some constipation and he started taking Senna which resolved it. He denied any rash or nausea. He said he is not sure how much water he drinks per day.     Plan to continue Revlimid. Recommended Rogelio drink 64 ounces of water per day and walk for 30 minutes per day. Rogelio expressed understanding and agreement with this plan. He thanked me for " the call.    Follow-Up:  Pending office visit on 12/9/2020.    Refill Due:  For start of cycle 2, pending office visit on 12/9/2020.    Hill ParnellD  [facility]  [phone]  November 24, 2020

## 2020-11-25 NOTE — PROGRESS NOTES
Infusion Nursing Note:  Rogelio Marshall presents today for Day 8 Cycle 1 Velcade  Patient seen by provider today: No   present during visit today: Not Applicable.    Note: Patient presents to infusion feeling ok. Yesterday, patient states he developed decreased appetite, abdominal ache, and hiccups after eating lunch. Symptoms since then have improved. No nausea or vomiting noted. Otherwise, patient denies acute complaints or concerns needing to be addressed today. Specifically, patient denies s/s of infection such as fever, shortness of breath, cough, chest pain, sore throat, or changes in taste/smell. Patient states he is taking his 40mg Decadron weekly with specific dates on 11/16 and 11/23    Patient will have labs drawn on 11/26 with Day 8 Velcade and patient is scheduled for a lab only appointment on 11/28    TORB. 11/25/2020. 1500. Dr. Sweeney. Jori Delvin RN. Cancel 11/28 lab only appointment and draw labs prior to 11/30 Day 11 appointment.  Labs are to be done x2 in each cycle.     Day 1 Velcade on 11/19 and Day 8 Velcade 11/26 labs were drawn.    IB message received on 11/25/2020 at 1538 from Dr. Sweeney: No labs needed on 11/30.    11/30 lab appointment canceled.  Labs are ordered Day 1 and Day 8 per treatment plan for next cycle.         Intravenous Access:  No Intravenous access at this visit.    Treatment Conditions:  Lab Results   Component Value Date    HGB 9.5 11/26/2020     Lab Results   Component Value Date    WBC 10.5 11/26/2020      Lab Results   Component Value Date    ANEU 7.7 11/26/2020     Lab Results   Component Value Date     11/26/2020      Results reviewed, labs MET treatment parameters, ok to proceed with treatment.      Post Infusion Assessment:  Patient tolerated 1 subcutaneous Velcade injection via RLQ of abdomen without incident.       Discharge Plan:   Patient declined prescription refills.  Discharge instructions reviewed with: Patient.  Patient and/or family  verbalized understanding of discharge instructions and all questions answered.  Copy of AVS reviewed with patient and/or family.  Patient will return 11/30 for next appointment.  Patient discharged in stable condition accompanied by: self.  Departure Mode: Ambulatory.  Face to Face time: 0 minutes.    Jori Devlin RN

## 2020-11-26 ENCOUNTER — APPOINTMENT (OUTPATIENT)
Dept: CT IMAGING | Facility: CLINIC | Age: 75
DRG: 542 | End: 2020-11-26
Attending: EMERGENCY MEDICINE
Payer: COMMERCIAL

## 2020-11-26 ENCOUNTER — DOCUMENTATION ONLY (OUTPATIENT)
Dept: FAMILY MEDICINE | Facility: CLINIC | Age: 75
End: 2020-11-26

## 2020-11-26 ENCOUNTER — APPOINTMENT (OUTPATIENT)
Dept: GENERAL RADIOLOGY | Facility: CLINIC | Age: 75
DRG: 542 | End: 2020-11-26
Attending: EMERGENCY MEDICINE
Payer: COMMERCIAL

## 2020-11-26 ENCOUNTER — HOSPITAL ENCOUNTER (INPATIENT)
Facility: CLINIC | Age: 75
LOS: 8 days | Discharge: HOME HEALTH | DRG: 542 | End: 2020-12-04
Attending: EMERGENCY MEDICINE | Admitting: SURGERY
Payer: COMMERCIAL

## 2020-11-26 ENCOUNTER — INFUSION THERAPY VISIT (OUTPATIENT)
Dept: ONCOLOGY | Facility: CLINIC | Age: 75
DRG: 542 | End: 2020-11-26
Attending: STUDENT IN AN ORGANIZED HEALTH CARE EDUCATION/TRAINING PROGRAM
Payer: COMMERCIAL

## 2020-11-26 VITALS
WEIGHT: 144.4 LBS | DIASTOLIC BLOOD PRESSURE: 90 MMHG | TEMPERATURE: 98 F | BODY MASS INDEX: 27.28 KG/M2 | RESPIRATION RATE: 16 BRPM | OXYGEN SATURATION: 97 % | SYSTOLIC BLOOD PRESSURE: 152 MMHG | HEART RATE: 97 BPM

## 2020-11-26 DIAGNOSIS — C90.00 MULTIPLE MYELOMA, REMISSION STATUS UNSPECIFIED (H): ICD-10-CM

## 2020-11-26 DIAGNOSIS — S22.009A CLOSED FRACTURE OF THORACIC VERTEBRA, UNSPECIFIED FRACTURE MORPHOLOGY, UNSPECIFIED THORACIC VERTEBRAL LEVEL, INITIAL ENCOUNTER (H): ICD-10-CM

## 2020-11-26 DIAGNOSIS — Z20.828 CONTACT WITH AND (SUSPECTED) EXPOSURE TO OTHER VIRAL COMMUNICABLE DISEASES: ICD-10-CM

## 2020-11-26 DIAGNOSIS — R21 RASH AND NONSPECIFIC SKIN ERUPTION: ICD-10-CM

## 2020-11-26 DIAGNOSIS — J18.9 PNEUMONIA OF BOTH LUNGS DUE TO INFECTIOUS ORGANISM, UNSPECIFIED PART OF LUNG: ICD-10-CM

## 2020-11-26 DIAGNOSIS — C90.00 MULTIPLE MYELOMA NOT HAVING ACHIEVED REMISSION (H): ICD-10-CM

## 2020-11-26 DIAGNOSIS — Z79.899 ENCOUNTER FOR LONG-TERM (CURRENT) USE OF MEDICATIONS: ICD-10-CM

## 2020-11-26 DIAGNOSIS — S22.41XA CLOSED FRACTURE OF MULTIPLE RIBS OF RIGHT SIDE, INITIAL ENCOUNTER: Primary | ICD-10-CM

## 2020-11-26 DIAGNOSIS — C90.00 MULTIPLE MYELOMA, REMISSION STATUS UNSPECIFIED (H): Primary | ICD-10-CM

## 2020-11-26 DIAGNOSIS — M48.50XS PATHOLOGIC COMPRESSION FRACTURE OF SPINE, SEQUELA: ICD-10-CM
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
## 2020-11-26 DIAGNOSIS — R55 SYNCOPE, UNSPECIFIED SYNCOPE TYPE: ICD-10-CM

## 2020-11-26 LAB
ALBUMIN SERPL-MCNC: 2.7 G/DL (ref 3.4–5)
ALP SERPL-CCNC: 83 U/L (ref 40–150)
ALT SERPL W P-5'-P-CCNC: 22 U/L (ref 0–70)
ANION GAP SERPL CALCULATED.3IONS-SCNC: 7 MMOL/L (ref 3–14)
AST SERPL W P-5'-P-CCNC: 4 U/L (ref 0–45)
BASOPHILS # BLD AUTO: 0 10E9/L (ref 0–0.2)
BASOPHILS # BLD AUTO: 0 10E9/L (ref 0–0.2)
BASOPHILS NFR BLD AUTO: 0.2 %
BASOPHILS NFR BLD AUTO: 0.2 %
BILIRUB SERPL-MCNC: 0.3 MG/DL (ref 0.2–1.3)
BUN SERPL-MCNC: 44 MG/DL (ref 7–30)
CALCIUM SERPL-MCNC: 8.9 MG/DL (ref 8.5–10.1)
CHLORIDE SERPL-SCNC: 107 MMOL/L (ref 94–109)
CO2 SERPL-SCNC: 22 MMOL/L (ref 20–32)
CREAT SERPL-MCNC: 1.59 MG/DL (ref 0.66–1.25)
DIFFERENTIAL METHOD BLD: ABNORMAL
DIFFERENTIAL METHOD BLD: ABNORMAL
EOSINOPHIL # BLD AUTO: 0.1 10E9/L (ref 0–0.7)
EOSINOPHIL # BLD AUTO: 0.2 10E9/L (ref 0–0.7)
EOSINOPHIL NFR BLD AUTO: 0.7 %
EOSINOPHIL NFR BLD AUTO: 3 %
ERYTHROCYTE [DISTWIDTH] IN BLOOD BY AUTOMATED COUNT: 14.7 % (ref 10–15)
ERYTHROCYTE [DISTWIDTH] IN BLOOD BY AUTOMATED COUNT: 15 % (ref 10–15)
GFR SERPL CREATININE-BSD FRML MDRD: 42 ML/MIN/{1.73_M2}
GLUCOSE SERPL-MCNC: 106 MG/DL (ref 70–99)
HCT VFR BLD AUTO: 29.7 % (ref 40–53)
HCT VFR BLD AUTO: 29.9 % (ref 40–53)
HGB BLD-MCNC: 9.3 G/DL (ref 13.3–17.7)
HGB BLD-MCNC: 9.5 G/DL (ref 13.3–17.7)
IMM GRANULOCYTES # BLD: 0.1 10E9/L (ref 0–0.4)
IMM GRANULOCYTES # BLD: 0.5 10E9/L (ref 0–0.4)
IMM GRANULOCYTES NFR BLD: 1.6 %
IMM GRANULOCYTES NFR BLD: 4.7 %
LYMPHOCYTES # BLD AUTO: 0.4 10E9/L (ref 0.8–5.3)
LYMPHOCYTES # BLD AUTO: 1.6 10E9/L (ref 0.8–5.3)
LYMPHOCYTES NFR BLD AUTO: 15.6 %
LYMPHOCYTES NFR BLD AUTO: 7.7 %
MCH RBC QN AUTO: 33.1 PG (ref 26.5–33)
MCH RBC QN AUTO: 33.3 PG (ref 26.5–33)
MCHC RBC AUTO-ENTMCNC: 31.1 G/DL (ref 31.5–36.5)
MCHC RBC AUTO-ENTMCNC: 32 G/DL (ref 31.5–36.5)
MCV RBC AUTO: 104 FL (ref 78–100)
MCV RBC AUTO: 106 FL (ref 78–100)
MONOCYTES # BLD AUTO: 0.1 10E9/L (ref 0–1.3)
MONOCYTES # BLD AUTO: 0.6 10E9/L (ref 0–1.3)
MONOCYTES NFR BLD AUTO: 1.3 %
MONOCYTES NFR BLD AUTO: 5.9 %
NEUTROPHILS # BLD AUTO: 4.8 10E9/L (ref 1.6–8.3)
NEUTROPHILS # BLD AUTO: 7.7 10E9/L (ref 1.6–8.3)
NEUTROPHILS NFR BLD AUTO: 72.9 %
NEUTROPHILS NFR BLD AUTO: 86.2 %
NRBC # BLD AUTO: 0 10*3/UL
NRBC # BLD AUTO: 0 10*3/UL
NRBC BLD AUTO-RTO: 0 /100
NRBC BLD AUTO-RTO: 1 /100
PLATELET # BLD AUTO: 239 10E9/L (ref 150–450)
PLATELET # BLD AUTO: 266 10E9/L (ref 150–450)
POTASSIUM SERPL-SCNC: 3.5 MMOL/L (ref 3.4–5.3)
PROT SERPL-MCNC: 9 G/DL (ref 6.8–8.8)
RBC # BLD AUTO: 2.81 10E12/L (ref 4.4–5.9)
RBC # BLD AUTO: 2.85 10E12/L (ref 4.4–5.9)
SODIUM SERPL-SCNC: 136 MMOL/L (ref 133–144)
TROPONIN I SERPL-MCNC: <0.015 UG/L (ref 0–0.04)
WBC # BLD AUTO: 10.5 10E9/L (ref 4–11)
WBC # BLD AUTO: 5.6 10E9/L (ref 4–11)

## 2020-11-26 PROCEDURE — 74177 CT ABD & PELVIS W/CONTRAST: CPT | Mod: 26 | Performed by: RADIOLOGY

## 2020-11-26 PROCEDURE — 93308 TTE F-UP OR LMTD: CPT | Performed by: EMERGENCY MEDICINE

## 2020-11-26 PROCEDURE — 93308 TTE F-UP OR LMTD: CPT | Mod: 26 | Performed by: EMERGENCY MEDICINE

## 2020-11-26 PROCEDURE — U0003 INFECTIOUS AGENT DETECTION BY NUCLEIC ACID (DNA OR RNA); SEVERE ACUTE RESPIRATORY SYNDROME CORONAVIRUS 2 (SARS-COV-2) (CORONAVIRUS DISEASE [COVID-19]), AMPLIFIED PROBE TECHNIQUE, MAKING USE OF HIGH THROUGHPUT TECHNOLOGIES AS DESCRIBED BY CMS-2020-01-R: HCPCS | Performed by: EMERGENCY MEDICINE

## 2020-11-26 PROCEDURE — 93010 ELECTROCARDIOGRAM REPORT: CPT | Performed by: EMERGENCY MEDICINE

## 2020-11-26 PROCEDURE — 99207 CT THORACIC SPINE W/O CONTRAST: CPT | Mod: 26 | Performed by: RADIOLOGY

## 2020-11-26 PROCEDURE — 72131 CT LUMBAR SPINE W/O DYE: CPT | Mod: 26 | Performed by: RADIOLOGY

## 2020-11-26 PROCEDURE — 76604 US EXAM CHEST: CPT | Performed by: EMERGENCY MEDICINE

## 2020-11-26 PROCEDURE — 250N000013 HC RX MED GY IP 250 OP 250 PS 637: Performed by: EMERGENCY MEDICINE

## 2020-11-26 PROCEDURE — 71045 X-RAY EXAM CHEST 1 VIEW: CPT

## 2020-11-26 PROCEDURE — 71260 CT THORAX DX C+: CPT

## 2020-11-26 PROCEDURE — 71045 X-RAY EXAM CHEST 1 VIEW: CPT | Mod: 26 | Performed by: RADIOLOGY

## 2020-11-26 PROCEDURE — 80053 COMPREHEN METABOLIC PANEL: CPT | Performed by: EMERGENCY MEDICINE

## 2020-11-26 PROCEDURE — 84484 ASSAY OF TROPONIN QUANT: CPT | Performed by: EMERGENCY MEDICINE

## 2020-11-26 PROCEDURE — 76705 ECHO EXAM OF ABDOMEN: CPT | Performed by: EMERGENCY MEDICINE

## 2020-11-26 PROCEDURE — 76705 ECHO EXAM OF ABDOMEN: CPT | Mod: 26 | Performed by: EMERGENCY MEDICINE

## 2020-11-26 PROCEDURE — 93005 ELECTROCARDIOGRAM TRACING: CPT | Performed by: EMERGENCY MEDICINE

## 2020-11-26 PROCEDURE — 72072 X-RAY EXAM THORAC SPINE 3VWS: CPT

## 2020-11-26 PROCEDURE — 72131 CT LUMBAR SPINE W/O DYE: CPT

## 2020-11-26 PROCEDURE — 99285 EMERGENCY DEPT VISIT HI MDM: CPT | Mod: 25 | Performed by: EMERGENCY MEDICINE

## 2020-11-26 PROCEDURE — 120N000002 HC R&B MED SURG/OB UMMC

## 2020-11-26 PROCEDURE — 250N000011 HC RX IP 250 OP 636: Performed by: STUDENT IN AN ORGANIZED HEALTH CARE EDUCATION/TRAINING PROGRAM

## 2020-11-26 PROCEDURE — 85025 COMPLETE CBC W/AUTO DIFF WBC: CPT | Performed by: EMERGENCY MEDICINE

## 2020-11-26 PROCEDURE — 83605 ASSAY OF LACTIC ACID: CPT | Performed by: EMERGENCY MEDICINE

## 2020-11-26 PROCEDURE — 72125 CT NECK SPINE W/O DYE: CPT | Mod: 26 | Performed by: STUDENT IN AN ORGANIZED HEALTH CARE EDUCATION/TRAINING PROGRAM

## 2020-11-26 PROCEDURE — 70450 CT HEAD/BRAIN W/O DYE: CPT | Mod: 26 | Performed by: STUDENT IN AN ORGANIZED HEALTH CARE EDUCATION/TRAINING PROGRAM

## 2020-11-26 PROCEDURE — 76604 US EXAM CHEST: CPT | Mod: 26 | Performed by: EMERGENCY MEDICINE

## 2020-11-26 PROCEDURE — 72072 X-RAY EXAM THORAC SPINE 3VWS: CPT | Mod: 26 | Performed by: RADIOLOGY

## 2020-11-26 PROCEDURE — 72125 CT NECK SPINE W/O DYE: CPT

## 2020-11-26 PROCEDURE — 70450 CT HEAD/BRAIN W/O DYE: CPT

## 2020-11-26 PROCEDURE — C9803 HOPD COVID-19 SPEC COLLECT: HCPCS | Performed by: EMERGENCY MEDICINE

## 2020-11-26 PROCEDURE — 85025 COMPLETE CBC W/AUTO DIFF WBC: CPT | Performed by: STUDENT IN AN ORGANIZED HEALTH CARE EDUCATION/TRAINING PROGRAM

## 2020-11-26 PROCEDURE — 72128 CT CHEST SPINE W/O DYE: CPT

## 2020-11-26 PROCEDURE — 250N000011 HC RX IP 250 OP 636: Performed by: EMERGENCY MEDICINE

## 2020-11-26 PROCEDURE — 71260 CT THORAX DX C+: CPT | Mod: 26 | Performed by: RADIOLOGY

## 2020-11-26 RX ORDER — ACETAMINOPHEN 325 MG/1
650 TABLET ORAL ONCE
Status: COMPLETED | OUTPATIENT
Start: 2020-11-26 | End: 2020-11-26

## 2020-11-26 RX ORDER — IOPAMIDOL 755 MG/ML
85 INJECTION, SOLUTION INTRAVASCULAR ONCE
Status: COMPLETED | OUTPATIENT
Start: 2020-11-26 | End: 2020-11-26

## 2020-11-26 RX ORDER — HYDROMORPHONE HYDROCHLORIDE 1 MG/ML
0.3 INJECTION, SOLUTION INTRAMUSCULAR; INTRAVENOUS; SUBCUTANEOUS ONCE
Status: DISCONTINUED | OUTPATIENT
Start: 2020-11-26 | End: 2020-12-04 | Stop reason: HOSPADM

## 2020-11-26 RX ADMIN — BORTEZOMIB 2.1 MG: 3.5 INJECTION, POWDER, LYOPHILIZED, FOR SOLUTION INTRAVENOUS; SUBCUTANEOUS at 09:59

## 2020-11-26 RX ADMIN — ACETAMINOPHEN 650 MG: 325 TABLET, FILM COATED ORAL at 22:23

## 2020-11-26 RX ADMIN — IOPAMIDOL 85 ML: 755 INJECTION, SOLUTION INTRAVENOUS at 23:08

## 2020-11-26 ASSESSMENT — ENCOUNTER SYMPTOMS
NAUSEA: 1
VOMITING: 1
HEADACHES: 0
LIGHT-HEADEDNESS: 1
BACK PAIN: 1

## 2020-11-26 ASSESSMENT — MIFFLIN-ST. JEOR: SCORE: 1266.1

## 2020-11-26 NOTE — PATIENT INSTRUCTIONS
[facility] Triage and after hours / weekends / holidays:  [phone]    Please call the triage or after hours line if you experience a temperature greater than or equal to 100.5, shaking chills, have uncontrolled nausea, vomiting and/or diarrhea, dizziness, shortness of breath, chest pain, bleeding, unexplained bruising, or if you have any other new/concerning symptoms, questions or concerns.      If you are having any concerning symptoms or wish to speak to a provider before your next infusion visit, please call your care coordinator or triage to notify them so we can adequately serve you.     If you need a refill on a narcotic prescription or other medication, please call before your infusion appointment.                 November 2020 Sunday Monday Tuesday Wednesday Thursday Friday Saturday   1     2     3     4    [facility] NEW  12:45 PM   (60 min.)   Angela Sweeney MD   [facility] 5     6     7       8     9    UMP RETURN  10:15 AM   (30 min.)   Filiberto Mata MD   [facility] [place] 10     11     12    COVID PCR   3:30 PM   (10 min.)    LAB   [facility] [place] 13     14    LAB  11:30 AM   (15 min.)    LAB   [facility] [place]   15     16    UMP ONC INFUSION 60   3:00 PM   (60 min.)    ONCOLOGY INFUSION   [facility] 17     18     19    LAB PERIPHERAL   3:30 PM   (15 min.)   [facility] LAB DRAW   [facility]    [facility] ONC INFUSION 60   3:30 PM   (60 min.)    ONCOLOGY INFUSION   [facility] 20     21       22     23    [facility] ONC INFUSION 60   9:30 AM   (60 min.)    ONCOLOGY INFUSION   [facility] 24     25     26    [facility] ONC INFUSION 60   9:00 AM   (60 min.)    ONCOLOGY INFUSION   [facility] 27     28       29     30    LAB PERIPHERAL   2:00 PM   (15 min.)   [facility]  LAB DRAW   [facility]    [facility] ONC INFUSION 60   2:30 PM   (60 min.)   [facility] ONCOLOGY INFUSION   [facility]                                      December 2020 Sunday Monday Tuesday Wednesday Thursday Friday Saturday             1     2     3     4     5       6     7     8     9    LAB PERIPHERAL  10:45 AM   (15 min.)   [facility] DRAW   [facility]    TELEPHONE VISIT RETURN   3:00 PM   (30 min.)   Angela Sweeney MD   [facility] 10    [facility] ONC INFUSION 60   1:00 PM   (60 min.)   [facility] ONCOLOGY INFUSION   [facility] 11     12       13     14    [facility] ONC INFUSION 60   9:30 AM   (60 min.)   [facility] ONCOLOGY INFUSION   [facility] 15     16     17     18     19       20     21     22     23     24     25     26       27     28     29     30     31                               Lab Results:  Recent Results (from the past 12 hour(s))   CBC with platelets differential    Collection Time: 11/26/20  9:17 AM   Result Value Ref Range    WBC 10.5 4.0 - 11.0 10e9/L    RBC Count 2.85 (L) 4.4 - 5.9 10e12/L    Hemoglobin 9.5 (L) 13.3 - 17.7 g/dL    Hematocrit 29.7 (L) 40.0 - 53.0 %     (H) 78 - 100 fl    MCH 33.3 (H) 26.5 - 33.0 pg    MCHC 32.0 31.5 - 36.5 g/dL    RDW 14.7 10.0 - 15.0 %    Platelet Count 266 150 - 450 10e9/L    Diff Method Automated Method     % Neutrophils 72.9 %    % Lymphocytes 15.6 %    % Monocytes 5.9 %    % Eosinophils 0.7 %    % Basophils 0.2 %    % Immature Granulocytes 4.7 %    Nucleated RBCs 0 0 /100    Absolute Neutrophil 7.7 1.6 - 8.3 10e9/L    Absolute Lymphocytes 1.6 0.8 - 5.3 10e9/L    Absolute Monocytes 0.6 0.0 - 1.3 10e9/L    Absolute Eosinophils 0.1 0.0 - 0.7 10e9/L    Absolute Basophils 0.0 0.0 - 0.2 10e9/L    Abs Immature Granulocytes 0.5 (H) 0 - 0.4 10e9/L    Absolute Nucleated RBC 0.0

## 2020-11-26 NOTE — LETTER
Health Information Management Services               Recipient:  [facility] Admissions        Sender:  MITESH Chau, LICSW. Phone: [phone], Pager: [phone]        Date: December 2, 2020  Patient Name:  Rogelio Marshall  Routing Message:    Please assess JEROMY for your TCU, he could be ready for discharge Friday. I am waiting to get details on his chemo plan for sure so will let you know when I have that.         The documents accompanying this notice contain confidential information belonging to the sender.  This information is intended only for the use of the individual or entity named above.  The authorized recipient of this information is prohibited from disclosing this information to any other party and is required to destroy the information after its stated need has been fulfilled, unless otherwise required by state law.      If you are not the intended recipient, you are hereby notified that any disclosure, copy, distribution or action taken in reliance on the contents of these documents is strictly prohibited.  If you have received this document in error, please return it by fax to [phone] with a note on the cover sheet explaining why you are returning it (e.g. not your patient, not your provider, etc.).  If you need further assistance, please call [facility] Centralized Transcription at [phone].  Documents may also be returned by mail to [employer], , [address], [place], [place] [address].

## 2020-11-27 ENCOUNTER — ANESTHESIA EVENT (OUTPATIENT)
Dept: MEDSURG UNIT | Facility: CLINIC | Age: 75
DRG: 542 | End: 2020-11-27
Payer: COMMERCIAL

## 2020-11-27 ENCOUNTER — ANCILLARY PROCEDURE (OUTPATIENT)
Dept: ULTRASOUND IMAGING | Facility: CLINIC | Age: 75
End: 2020-11-27
Payer: COMMERCIAL

## 2020-11-27 ENCOUNTER — ANESTHESIA (OUTPATIENT)
Dept: MEDSURG UNIT | Facility: CLINIC | Age: 75
DRG: 542 | End: 2020-11-27
Payer: COMMERCIAL

## 2020-11-27 LAB
ANION GAP SERPL CALCULATED.3IONS-SCNC: 5 MMOL/L (ref 3–14)
BUN SERPL-MCNC: 37 MG/DL (ref 7–30)
CALCIUM SERPL-MCNC: 7.4 MG/DL (ref 8.5–10.1)
CHLORIDE SERPL-SCNC: 114 MMOL/L (ref 94–109)
CO2 SERPL-SCNC: 20 MMOL/L (ref 20–32)
CREAT SERPL-MCNC: 1.35 MG/DL (ref 0.66–1.25)
GFR SERPL CREATININE-BSD FRML MDRD: 51 ML/MIN/{1.73_M2}
GLUCOSE SERPL-MCNC: 87 MG/DL (ref 70–99)
INTERPRETATION ECG - MUSE: NORMAL
LABORATORY COMMENT REPORT: NORMAL
LACTATE BLD-SCNC: 2 MMOL/L (ref 0.7–2)
MAGNESIUM SERPL-MCNC: 2 MG/DL (ref 1.6–2.3)
PHOSPHATE SERPL-MCNC: 3.3 MG/DL (ref 2.5–4.5)
POTASSIUM SERPL-SCNC: 3.9 MMOL/L (ref 3.4–5.3)
SARS-COV-2 RNA SPEC QL NAA+PROBE: NEGATIVE
SARS-COV-2 RNA SPEC QL NAA+PROBE: NORMAL
SODIUM SERPL-SCNC: 140 MMOL/L (ref 133–144)
SPECIMEN SOURCE: NORMAL
SPECIMEN SOURCE: NORMAL

## 2020-11-27 PROCEDURE — 250N000013 HC RX MED GY IP 250 OP 250 PS 637: Performed by: PHYSICIAN ASSISTANT

## 2020-11-27 PROCEDURE — 271N000002 HC RX 271: Performed by: STUDENT IN AN ORGANIZED HEALTH CARE EDUCATION/TRAINING PROGRAM

## 2020-11-27 PROCEDURE — 999N000157 HC STATISTIC RCP TIME EA 10 MIN

## 2020-11-27 PROCEDURE — 99221 1ST HOSP IP/OBS SF/LOW 40: CPT | Mod: AI | Performed by: SURGERY

## 2020-11-27 PROCEDURE — 258N000003 HC RX IP 258 OP 636: Performed by: EMERGENCY MEDICINE

## 2020-11-27 PROCEDURE — 250N000011 HC RX IP 250 OP 636: Performed by: STUDENT IN AN ORGANIZED HEALTH CARE EDUCATION/TRAINING PROGRAM

## 2020-11-27 PROCEDURE — 99222 1ST HOSP IP/OBS MODERATE 55: CPT | Mod: GC | Performed by: INTERNAL MEDICINE

## 2020-11-27 PROCEDURE — 250N000013 HC RX MED GY IP 250 OP 250 PS 637: Performed by: STUDENT IN AN ORGANIZED HEALTH CARE EDUCATION/TRAINING PROGRAM

## 2020-11-27 PROCEDURE — 250N000011 HC RX IP 250 OP 636

## 2020-11-27 PROCEDURE — 83735 ASSAY OF MAGNESIUM: CPT | Performed by: STUDENT IN AN ORGANIZED HEALTH CARE EDUCATION/TRAINING PROGRAM

## 2020-11-27 PROCEDURE — 250N000009 HC RX 250: Performed by: STUDENT IN AN ORGANIZED HEALTH CARE EDUCATION/TRAINING PROGRAM

## 2020-11-27 PROCEDURE — 258N000003 HC RX IP 258 OP 636: Performed by: STUDENT IN AN ORGANIZED HEALTH CARE EDUCATION/TRAINING PROGRAM

## 2020-11-27 PROCEDURE — 36415 COLL VENOUS BLD VENIPUNCTURE: CPT | Performed by: STUDENT IN AN ORGANIZED HEALTH CARE EDUCATION/TRAINING PROGRAM

## 2020-11-27 PROCEDURE — 120N000002 HC R&B MED SURG/OB UMMC

## 2020-11-27 PROCEDURE — 80048 BASIC METABOLIC PNL TOTAL CA: CPT | Performed by: STUDENT IN AN ORGANIZED HEALTH CARE EDUCATION/TRAINING PROGRAM

## 2020-11-27 PROCEDURE — 84100 ASSAY OF PHOSPHORUS: CPT | Performed by: STUDENT IN AN ORGANIZED HEALTH CARE EDUCATION/TRAINING PROGRAM

## 2020-11-27 PROCEDURE — 99232 SBSQ HOSP IP/OBS MODERATE 35: CPT | Performed by: PHYSICIAN ASSISTANT

## 2020-11-27 PROCEDURE — 250N000013 HC RX MED GY IP 250 OP 250 PS 637: Performed by: SURGERY

## 2020-11-27 PROCEDURE — 96360 HYDRATION IV INFUSION INIT: CPT | Mod: 59 | Performed by: EMERGENCY MEDICINE

## 2020-11-27 RX ORDER — AMOXICILLIN 250 MG
1-2 CAPSULE ORAL 2 TIMES DAILY PRN
Status: DISCONTINUED | OUTPATIENT
Start: 2020-11-27 | End: 2020-11-28

## 2020-11-27 RX ORDER — LEVOFLOXACIN 250 MG/1
250 TABLET, FILM COATED ORAL DAILY
Status: DISCONTINUED | OUTPATIENT
Start: 2020-11-27 | End: 2020-12-04 | Stop reason: HOSPADM

## 2020-11-27 RX ORDER — POLYETHYLENE GLYCOL 3350 17 G/17G
17 POWDER, FOR SOLUTION ORAL DAILY
Status: DISCONTINUED | OUTPATIENT
Start: 2020-11-27 | End: 2020-12-04 | Stop reason: HOSPADM

## 2020-11-27 RX ORDER — LIDOCAINE 4 G/G
1 PATCH TOPICAL EVERY 24 HOURS
Status: DISCONTINUED | OUTPATIENT
Start: 2020-11-27 | End: 2020-11-27 | Stop reason: ALTCHOICE

## 2020-11-27 RX ORDER — OXYCODONE HYDROCHLORIDE 10 MG/1
10-20 TABLET ORAL EVERY 4 HOURS PRN
Status: DISCONTINUED | OUTPATIENT
Start: 2020-11-27 | End: 2020-12-02

## 2020-11-27 RX ORDER — ONDANSETRON 4 MG/1
4 TABLET, ORALLY DISINTEGRATING ORAL EVERY 6 HOURS PRN
Status: DISCONTINUED | OUTPATIENT
Start: 2020-11-27 | End: 2020-12-04 | Stop reason: HOSPADM

## 2020-11-27 RX ORDER — ACETAMINOPHEN 325 MG/1
975 TABLET ORAL EVERY 8 HOURS
Status: DISCONTINUED | OUTPATIENT
Start: 2020-11-27 | End: 2020-12-04 | Stop reason: HOSPADM

## 2020-11-27 RX ORDER — LIDOCAINE 4 G/G
1-3 PATCH TOPICAL EVERY 24 HOURS
Status: DISCONTINUED | OUTPATIENT
Start: 2020-11-27 | End: 2020-12-04 | Stop reason: HOSPADM

## 2020-11-27 RX ORDER — ACETAMINOPHEN 325 MG/1
975 TABLET ORAL EVERY 8 HOURS
Status: DISCONTINUED | OUTPATIENT
Start: 2020-11-27 | End: 2020-11-27

## 2020-11-27 RX ORDER — BUPIVACAINE HYDROCHLORIDE 2.5 MG/ML
INJECTION, SOLUTION EPIDURAL; INFILTRATION; INTRACAUDAL PRN
OUTPATIENT
Start: 2020-11-27

## 2020-11-27 RX ORDER — DEXTROSE MONOHYDRATE, SODIUM CHLORIDE, AND POTASSIUM CHLORIDE 50; 1.49; 4.5 G/1000ML; G/1000ML; G/1000ML
INJECTION, SOLUTION INTRAVENOUS CONTINUOUS
Status: DISCONTINUED | OUTPATIENT
Start: 2020-11-27 | End: 2020-12-01

## 2020-11-27 RX ORDER — FENTANYL CITRATE 50 UG/ML
25-50 INJECTION, SOLUTION INTRAMUSCULAR; INTRAVENOUS
Status: COMPLETED | OUTPATIENT
Start: 2020-11-27 | End: 2020-11-27

## 2020-11-27 RX ORDER — GABAPENTIN 100 MG/1
100 CAPSULE ORAL 3 TIMES DAILY
Status: DISCONTINUED | OUTPATIENT
Start: 2020-11-27 | End: 2020-11-27

## 2020-11-27 RX ORDER — METHOCARBAMOL 500 MG/1
500 TABLET, FILM COATED ORAL EVERY 6 HOURS PRN
Status: ACTIVE | OUTPATIENT
Start: 2020-11-27 | End: 2020-11-30

## 2020-11-27 RX ORDER — NALOXONE HYDROCHLORIDE 0.4 MG/ML
.1-.4 INJECTION, SOLUTION INTRAMUSCULAR; INTRAVENOUS; SUBCUTANEOUS
Status: DISCONTINUED | OUTPATIENT
Start: 2020-11-27 | End: 2020-12-04 | Stop reason: HOSPADM

## 2020-11-27 RX ORDER — OMEPRAZOLE 10 MG/1
10 CAPSULE, DELAYED RELEASE ORAL DAILY
Status: DISCONTINUED | OUTPATIENT
Start: 2020-11-27 | End: 2020-12-04 | Stop reason: HOSPADM

## 2020-11-27 RX ORDER — VITAMIN B COMPLEX
25 TABLET ORAL DAILY
Status: DISCONTINUED | OUTPATIENT
Start: 2020-11-27 | End: 2020-12-04 | Stop reason: HOSPADM

## 2020-11-27 RX ORDER — CALCIUM CARBONATE 500(1250)
500 TABLET ORAL
Status: DISCONTINUED | OUTPATIENT
Start: 2020-11-27 | End: 2020-12-04 | Stop reason: HOSPADM

## 2020-11-27 RX ORDER — METHOCARBAMOL 500 MG/1
500 TABLET, FILM COATED ORAL 4 TIMES DAILY
Status: DISCONTINUED | OUTPATIENT
Start: 2020-11-27 | End: 2020-12-04 | Stop reason: HOSPADM

## 2020-11-27 RX ORDER — GABAPENTIN 100 MG/1
100 CAPSULE ORAL 3 TIMES DAILY
Status: DISCONTINUED | OUTPATIENT
Start: 2020-11-27 | End: 2020-11-28

## 2020-11-27 RX ORDER — HYDROCHLOROTHIAZIDE 12.5 MG/1
12.5 CAPSULE ORAL DAILY
Status: DISCONTINUED | OUTPATIENT
Start: 2020-11-27 | End: 2020-12-04 | Stop reason: HOSPADM

## 2020-11-27 RX ORDER — LORAZEPAM 0.5 MG/1
0.5 TABLET ORAL EVERY 4 HOURS PRN
Status: DISCONTINUED | OUTPATIENT
Start: 2020-11-27 | End: 2020-12-04 | Stop reason: HOSPADM

## 2020-11-27 RX ORDER — FENTANYL CITRATE 50 UG/ML
INJECTION, SOLUTION INTRAMUSCULAR; INTRAVENOUS
Status: COMPLETED
Start: 2020-11-27 | End: 2020-11-27

## 2020-11-27 RX ORDER — ACYCLOVIR 400 MG/1
400 TABLET ORAL 2 TIMES DAILY
Status: DISCONTINUED | OUTPATIENT
Start: 2020-11-27 | End: 2020-12-04 | Stop reason: HOSPADM

## 2020-11-27 RX ADMIN — BUPIVACAINE HYDROCHLORIDE 30 ML: 2.5 INJECTION, SOLUTION EPIDURAL; INFILTRATION; INTRACAUDAL; PERINEURAL at 11:05

## 2020-11-27 RX ADMIN — SODIUM CHLORIDE 1000 ML: 9 INJECTION, SOLUTION INTRAVENOUS at 03:25

## 2020-11-27 RX ADMIN — GABAPENTIN 100 MG: 100 CAPSULE ORAL at 13:40

## 2020-11-27 RX ADMIN — POTASSIUM CHLORIDE, DEXTROSE MONOHYDRATE AND SODIUM CHLORIDE: 150; 5; 450 INJECTION, SOLUTION INTRAVENOUS at 04:57

## 2020-11-27 RX ADMIN — OXYCODONE HYDROCHLORIDE 10 MG: 10 TABLET ORAL at 21:46

## 2020-11-27 RX ADMIN — LIDOCAINE 1 PATCH: 560 PATCH PERCUTANEOUS; TOPICAL; TRANSDERMAL at 05:38

## 2020-11-27 RX ADMIN — ACYCLOVIR 400 MG: 400 TABLET ORAL at 20:27

## 2020-11-27 RX ADMIN — METHOCARBAMOL 500 MG: 500 TABLET, FILM COATED ORAL at 13:41

## 2020-11-27 RX ADMIN — LIDOCAINE 1 PATCH: 560 PATCH PERCUTANEOUS; TOPICAL; TRANSDERMAL at 13:51

## 2020-11-27 RX ADMIN — ENOXAPARIN SODIUM 30 MG: 30 INJECTION SUBCUTANEOUS at 12:20

## 2020-11-27 RX ADMIN — METHOCARBAMOL 500 MG: 500 TABLET, FILM COATED ORAL at 20:26

## 2020-11-27 RX ADMIN — ACETAMINOPHEN 975 MG: 325 TABLET, FILM COATED ORAL at 20:26

## 2020-11-27 RX ADMIN — SODIUM CHLORIDE 1000 ML: 9 INJECTION, SOLUTION INTRAVENOUS at 01:03

## 2020-11-27 RX ADMIN — LEVOFLOXACIN 250 MG: 250 TABLET, FILM COATED ORAL at 15:07

## 2020-11-27 RX ADMIN — CALCIUM 500 MG: 500 TABLET ORAL at 17:41

## 2020-11-27 RX ADMIN — ACETAMINOPHEN 975 MG: 325 TABLET, FILM COATED ORAL at 04:06

## 2020-11-27 RX ADMIN — ENOXAPARIN SODIUM 30 MG: 30 INJECTION SUBCUTANEOUS at 20:26

## 2020-11-27 RX ADMIN — Medication 2.5 MG: at 08:36

## 2020-11-27 RX ADMIN — GABAPENTIN 100 MG: 100 CAPSULE ORAL at 20:26

## 2020-11-27 RX ADMIN — ACETAMINOPHEN 975 MG: 325 TABLET, FILM COATED ORAL at 12:19

## 2020-11-27 RX ADMIN — Medication 25 MCG: at 13:41

## 2020-11-27 RX ADMIN — POLYETHYLENE GLYCOL 3350 17 G: 17 POWDER, FOR SOLUTION ORAL at 13:44

## 2020-11-27 RX ADMIN — Medication 2.5 MG: at 04:06

## 2020-11-27 RX ADMIN — HYDROCHLOROTHIAZIDE 12.5 MG: 12.5 CAPSULE, GELATIN COATED ORAL at 13:41

## 2020-11-27 RX ADMIN — OMEPRAZOLE 10 MG: 10 CAPSULE, DELAYED RELEASE ORAL at 14:06

## 2020-11-27 RX ADMIN — CALCIUM 500 MG: 500 TABLET ORAL at 13:41

## 2020-11-27 RX ADMIN — FENTANYL CITRATE 25 MCG: 50 INJECTION, SOLUTION INTRAMUSCULAR; INTRAVENOUS at 10:45

## 2020-11-27 RX ADMIN — Medication: at 15:38

## 2020-11-27 RX ADMIN — FENTANYL CITRATE 25 MCG: 50 INJECTION, SOLUTION INTRAMUSCULAR; INTRAVENOUS at 10:55

## 2020-11-27 RX ADMIN — METHOCARBAMOL 500 MG: 500 TABLET, FILM COATED ORAL at 17:41

## 2020-11-27 RX ADMIN — GABAPENTIN 100 MG: 100 CAPSULE ORAL at 08:36

## 2020-11-27 ASSESSMENT — ACTIVITIES OF DAILY LIVING (ADL)
ADLS_ACUITY_SCORE: 19
ADLS_ACUITY_SCORE: 18
ADLS_ACUITY_SCORE: 22

## 2020-11-27 NOTE — ED PROVIDER NOTES
History     Chief Complaint   Patient presents with     Fall     The history is provided by the patient and medical records.     Rogelio Marshall is a 75-year-old male with a past medical history notable for multiple myeloma status post Revlimid on 11/17/2020 who presents the ED with back pain after fall.  Patient reports that he felt the urge to vomit.  He went to the bathroom but before having any emesis, he felt very lightheaded.  He states that he fell backwards, is unsure if he fully lost consciousness.  He did strike his head and his back.  He notes severe upper back pain since the fall.  This is near the midline but slightly toward the right.  No headache, nausea somewhat improved since the syncopal episode.  Patient denies other areas of injury.  He reports fairly normal state of health in the past few days.      This part of the medical record was transcribed by Michele Ahuja Medical Scribe, from a dictation done by Henrique Francois MD.     Past Medical History  Past Medical History:   Diagnosis Date     Hyperlipidemia      Hypertension      Past Surgical History:   Procedure Laterality Date     HERNIA REPAIR, INGUINAL RT/LT      needed post-op intestinal repair     ORIF left clavical            acetaminophen (TYLENOL) 325 MG tablet       acyclovir (ZOVIRAX) 400 MG tablet       aspirin (ASA) 325 MG tablet       calcium carbonate 500 mg, elemental, (OSCAL) 500 MG tablet       cholecalciferol (VITAMIN D3) 25 mcg (1000 units) capsule       dexamethasone (DECADRON) 4 MG tablet       gabapentin (NEURONTIN) 100 MG capsule       hydrochlorothiazide (MICROZIDE) 12.5 MG capsule       ibuprofen (ADVIL/MOTRIN) 600 MG tablet       LENalidomide (REVLIMID) 25 MG CAPS capsule       levofloxacin (LEVAQUIN) 250 MG tablet       Lidocaine (LIDOCARE) 4 % Patch       lisinopril (ZESTRIL) 20 MG tablet       LORazepam (ATIVAN) 0.5 MG tablet       menthol (ICY HOT) 5 % PTCH       methocarbamol (ROBAXIN) 500 MG tablet        "omeprazole (PRILOSEC) 10 MG DR capsule       ondansetron (ZOFRAN-ODT) 4 MG ODT tab       oxyCODONE (ROXICODONE) 5 MG tablet       polyethylene glycol (MIRALAX) 17 g packet       prochlorperazine (COMPAZINE) 10 MG tablet       senna-docusate (SENOKOT-S/PERICOLACE) 8.6-50 MG tablet       tiZANidine (ZANAFLEX) 4 MG tablet      Allergies   Allergen Reactions     Other Drug Allergy (See Comments)      tobasco sauce ---caused red spots      Family History  Family History   Problem Relation Age of Onset     C.A.D. Father          MI age 69     Hypertension Sister         obese     Family History Negative Brother      Social History   Social History     Tobacco Use     Smoking status: Former Smoker     Packs/day: 0.10     Years: 30.00     Pack years: 3.00     Types: Cigarettes     Smokeless tobacco: Never Used   Substance Use Topics     Alcohol use: Yes     Alcohol/week: 17.5 standard drinks     Types: 21 drink(s) per week     Drug use: None      Past medical history, past surgical history, medications, allergies, family history, and social history were reviewed with the patient. No additional pertinent items.      Review of Systems   Gastrointestinal: Positive for nausea and vomiting.   Musculoskeletal: Positive for back pain.   Neurological: Positive for light-headedness. Negative for headaches.   All other systems reviewed and are negative.    A complete review of systems was performed with pertinent positives and negatives noted in the HPI, and all other systems negative.    Physical Exam   BP: (!) 79/46  Pulse: 80  Temp: 97.7  F (36.5  C)  Resp: 18  Height: 154.9 cm (5' 1\")  Weight: 65.8 kg (145 lb)  SpO2: 93 %    Physical Exam  General: uncomfortable appearing. Appears stated age.   HENT: MMM, no oropharyngeal lesions. No scalp hematoma.   Eyes: PERRL, normal sclerae  Neck: non-tender, supple  Cardio: regular rate. Regular rhythm. Extremities well perfused  Resp: Normal work of breathing, clear breath " sounds  Chest/Back: no visual signs of trauma, tenderness of right thoracic paraspinal area > midline.   Abdomen: no tenderness, non-distended, no rebound, no guarding  Neuro: alert and fully oriented. CN II-XII grossly intact. Grossly normal strength and sensation in all extremities.   MSK: no deformities.   Integumentary/Skin: no rash visualized, normal color  Psych: normal affect, normal behavior    ED Course      Procedures  Results for orders placed during the hospital encounter of 11/26/20   POC US ECHO LIMITED    Impression Limited Bedside ED Cardiac Ultrasound  PROCEDURE: PERFORMED BY: Dr. Henrique Francois MD  INDICATIONS/SYMPTOM:  syncope  PROBE: Cardiac phased array probe  BODY LOCATION: Chest (cardiac)  FINDINGS: The ultrasound was performed utilizing the subcostal, parasternal long axis, parasternal short axis and apical 4 chamber views.  Grossly normal LV contractility. No RV dilation visualized. No pericardial effusion visualized but with very limited subxiphoid views due to poor patient tolerance. IVC grossly normal in diameter with ~50% respiratory variability.   INTERPRETATION:    Grossly normal LV contractility. No pericardial effusion visualized. No RV dilation. IVC size and variability consistent with normovolemia.   IMAGE DOCUMENTATION: Images were archived to PACs system.    Limited Bedside eFAST Ultrasound:  PROCEDURE: PERFORMED BY: Dr. Henrique Francois MD  INDICATIONS:  Trauma, suspicion of intraperitoneal hemorrhage and/or pneumothorax  PROBE:  Phased array probe  BODY LOCATION: Abdomen, bilateral lung, cardiac  FINDINGS: No free fluid in the peritoneum visualized, no fluid at the lung bases visualized, no pericardial effusion visualized, bilateral lung sliding intact.   INTERPRETATION: No ultrasound evidence of hemoperitoneum, hemothorax, pneumothorax, nor pericardial effusion.   IMAGE DOCUMENTATION: Images were archived to PACs system.               EKG Interpretation:      Interpreted  by Henrique Francois MD  Time reviewed: 2021  Symptoms at time of EKG: chest pain   Rhythm: normal sinus   Rate: Normal  Axis: Normal  Ectopy: none  Conduction: normal  ST Segments/ T Waves: No acute ischemic changes  Q Waves: III  Comparison to prior: No old EKG available    Clinical Impression: Q and T inversion isolated to III, no other signs of acute ischemia            Labs Ordered and Resulted from Time of ED Arrival Up to the Time of Departure from the ED   CBC WITH PLATELETS DIFFERENTIAL - Abnormal; Notable for the following components:       Result Value    RBC Count 2.81 (*)     Hemoglobin 9.3 (*)     Hematocrit 29.9 (*)      (*)     MCH 33.1 (*)     MCHC 31.1 (*)     Nucleated RBCs 1 (*)     Absolute Lymphocytes 0.4 (*)     All other components within normal limits   COMPREHENSIVE METABOLIC PANEL - Abnormal; Notable for the following components:    Glucose 106 (*)     Urea Nitrogen 44 (*)     Creatinine 1.59 (*)     GFR Estimate 42 (*)     GFR Estimate If Black 49 (*)     Albumin 2.7 (*)     Protein Total 9.0 (*)     All other components within normal limits   TROPONIN I   LACTIC ACID WHOLE BLOOD   COVID-19 VIRUS (CORONAVIRUS) BY PCR   SARS-COV-2 (COVID-19) VIRUS RT-PCR   CARDIAC CONTINUOUS MONITORING     CT Chest/Abdomen/Pelvis w Contrast   Final Result   IMPRESSION:   1.  Extensive osteolytic skeletal lesions concerning for myeloma or diffuse metastatic disease.   2.  Pathologic fractures right third through fifth ribs and multiple thoracolumbar vertebral body fractures.   3.  Trace right pleural effusion and subsegmental atelectasis in the bases.   4.  Atherosclerotic disease.   5.  Sigmoid diverticulosis.      Lumbar spine CT w/o contrast   Final Result   IMPRESSION:      Thoracic spine:   1.  Extensive lucencies throughout the thoracic osseous structures compatible with patient's diagnosis of multiple myeloma. No acute thoracic spine fracture.      2.  Stable mild compressions of T1, T5 and  marked stable chronic compressions of T10 and T12.      3.  No high-grade canal narrowing.      4.  Right third through fifth rib fractures. Correlate with chest CT.      5.  Marked bilateral T10-T11 and left T11-T12 foraminal narrowing.      Lumbar spine:   1.  Extensive lucencies throughout the lumbar and pelvic osseous structures compatible with patient's diagnosis of multiple myeloma. No acute lumbar spine fracture or significant change compared to 10/10/2020.      2.  Stable moderate chronic L2 and marked chronic L3 compressions.      3.  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      4.  Marked L4-L5 and L5-S1 degenerative disc disease.      5.  Moderate to marked lower lumbar degenerative foraminal narrowing.         CT Thoracic Spine w/o Contrast   Final Result   IMPRESSION:      Thoracic spine:   1.  Extensive lucencies throughout the thoracic osseous structures compatible with patient's diagnosis of multiple myeloma. No acute thoracic spine fracture.      2.  Stable mild compressions of T1, T5 and marked stable chronic compressions of T10 and T12.      3.  No high-grade canal narrowing.      4.  Right third through fifth rib fractures. Correlate with chest CT.      5.  Marked bilateral T10-T11 and left T11-T12 foraminal narrowing.      Lumbar spine:   1.  Extensive lucencies throughout the lumbar and pelvic osseous structures compatible with patient's diagnosis of multiple myeloma. No acute lumbar spine fracture or significant change compared to 10/10/2020.      2.  Stable moderate chronic L2 and marked chronic L3 compressions.      3.  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      4.  Marked L4-L5 and L5-S1 degenerative disc disease.      5.  Moderate to marked lower lumbar degenerative foraminal narrowing.         Cervical spine CT w/o contrast   Final Result   Impression:      Diffuse lytic lesions throughout the cervical spine and occipital bone   compatible  with underlying multiple myeloma. No overt acute fracture   or subluxation.   2. Bilateral moderate to severe foraminal narrowing at C5-C6.      HERMILA YU MD      Head CT w/o contrast   Final Result   Impression:    1. No acute intracranial pathology.   2. Multiple radiolucent foci throughout the calvarium consistent with   known history of multiple myeloma. No acute fracture.      I have personally reviewed the examination and initial interpretation   and I agree with the findings.      HERMILA YU MD      XR Chest 1 View   Final Result   Impression:    1. Newly visualized mildly displaced fractures of the lateral right   fourth and fifth ribs with suspected additional mildly displaced   fracture of the posterior right fourth rib.   2. Grossly stable compression deformities of the T10 and T12 vertebral   bodies.   3. Abnormal osteolytic appearance of the bones is better demonstrated   on recent thoracic spine CT.   4. Minimal left basilar opacities likely represent atelectasis.      EDITH SPIVEY MD      Thoracic spine XR, 3 views   Final Result   IMPRESSION: There are 12 rib-bearing thoracic vertebral segments. A severe T10 compression fracture deformity has undergone further compression compared to chest x-ray 11/15/2019. There is vertebral plana configuration. There is a moderate superior    endplate T12 compression deformity which is also more conspicuous than on comparison exam.      The thoracic vertebral bodies at the cervicothoracic junction and involving the upper thoracic spine are difficult to visualize given technique and extreme osteopenia. No other definite evidence of thoracic compression fracture, however.      There is mild kyphosis noted at the T10 level.      Additionally, there is evidence of moderate superior endplate L2 compression deformity which has developed since prior chest radiograph.      Mild dextroconvex thoracolumbar scoliosis.       POC US ECHO LIMITED   Final Result    Limited Bedside ED Cardiac Ultrasound   PROCEDURE: PERFORMED BY: Dr. Henrique Francois MD   INDICATIONS/SYMPTOM:  syncope   PROBE: Cardiac phased array probe   BODY LOCATION: Chest (cardiac)   FINDINGS: The ultrasound was performed utilizing the subcostal, parasternal long axis, parasternal short axis and apical 4 chamber views.   Grossly normal LV contractility. No RV dilation visualized. No pericardial effusion visualized but with very limited subxiphoid views due to poor patient tolerance. IVC grossly normal in diameter with ~50% respiratory variability.    INTERPRETATION:    Grossly normal LV contractility. No pericardial effusion visualized. No RV dilation. IVC size and variability consistent with normovolemia.    IMAGE DOCUMENTATION: Images were archived to PACs system.      Limited Bedside eFAST Ultrasound:   PROCEDURE: PERFORMED BY: Dr. Henrique Francois MD   INDICATIONS:  Trauma, suspicion of intraperitoneal hemorrhage and/or pneumothorax   PROBE:  Phased array probe   BODY LOCATION: Abdomen, bilateral lung, cardiac   FINDINGS: No free fluid in the peritoneum visualized, no fluid at the lung bases visualized, no pericardial effusion visualized, bilateral lung sliding intact.    INTERPRETATION: No ultrasound evidence of hemoperitoneum, hemothorax, pneumothorax, nor pericardial effusion.    IMAGE DOCUMENTATION: Images were archived to PACs system.                Assessments & Plan (with Medical Decision Making)   Patient presenting with thoracic back pain in the context of a fall due to his syncopal episode and multiple myeloma.  Patient did have one episode of vomiting while in the ED.  Exam notable for thoracic midline and right paraspinal tenderness.  Nurse notes reviewed.  Initial differential diagnosis includes not limited to fracture, contusion, intracranial hemorrhage, paroxysmal arrhythmia, syncope, ACS.    CT head and C-spine demonstrate no acute pathology.  X-rays of the chest and thoracic spine  demonstrates R 4th and 5th rib fractures, progression of thoracic spinal compression fractures. eFAST negative for hemoperitoneum, hemothorax, pneumothorax, and hemopericardium.     EKG shows normal sinus rhythm without evidence of acute ischemia, troponin negative.  Hemoglobin at baseline - 9.3.  Electrolytes unremarkable, creatinine 1.59, mildly above baseline of 1.3-1.4.    This part of the medical record was transcribed by Ricky Graham Scribe, from a dictation done by Henrique Francois MD.     Trauma and neurosurgery were consulted. NSG recommended CT T and L spines. CT C/A/P with T and L spines ordered. Trauma recommended admission to their service.     In the ED, the patient's symptoms were managed with hydromorphone, with improvement in symptoms upon reassessment.     Patient had some borderline hypotensive BPs. This is unlikely to be due to the traumatic injuries given the imaging findings. BP was fluid responses, 1 L NS given x2.     After counseling on the diagnosis, work-up, and treatment plan, the patient was admitted to trauma.      Final diagnoses:   Closed fracture of multiple ribs of right side, initial encounter   Closed fracture of thoracic vertebra, unspecified fracture morphology, unspecified thoracic vertebral level, initial encounter (H)   Syncope, unspecified syncope type     New Prescriptions    No medications on file       --  Henrique Francois MD   Emergency Medicine   [facility] EMERGENCY DEPARTMENT  11/26/2020     Henrique Francois MD  11/27/20 0225

## 2020-11-27 NOTE — ED NOTES
[facility]    ED Nurse to Floor Handoff     Rogelio Marshall is a 75 year old male who speaks English and lives alone,  in a home  They arrived in the ED by ambulance from home    ED Chief Complaint: Fall    ED Dx;   Final diagnoses:   Closed fracture of multiple ribs of right side, initial encounter   Closed fracture of thoracic vertebra, unspecified fracture morphology, unspecified thoracic vertebral level, initial encounter (H)   Syncope, unspecified syncope type         Needed?: No    Allergies:   Allergies   Allergen Reactions     Other Drug Allergy (See Comments)      tobasco sauce ---caused red spots    .  Past Medical Hx:   Past Medical History:   Diagnosis Date     Hyperlipidemia      Hypertension       Baseline Mental status: WDL  Current Mental Status changes: at basesline    Infection present or suspected this encounter: no  Sepsis suspected: No  Isolation type: No active isolations  Patient tested for COVID 19 prior to admission: YES     Activity level - Baseline/Home:  Walker  Activity Level - Current:   Unknown    Bariatric equipment needed?: No    In the ED these meds were given:   Medications   HYDROmorphone (PF) (DILAUDID) injection 0.3 mg (has no administration in time range)   acetaminophen (TYLENOL) tablet 650 mg (650 mg Oral Given 11/26/20 2223)   iopamidol (ISOVUE-370) solution 85 mL (85 mLs Intravenous Given 11/26/20 2308)   sodium chloride (PF) 0.9% PF flush 90 mL (90 mLs Intravenous Given 11/26/20 2309)       Drips running?  No    Home pump  No    Current LDAs  Peripheral IV 11/26/20 Right Upper forearm (Active)   Site Assessment WDL 11/26/20 2003   Line Status Saline locked 11/26/20 2003   Number of days: 0       Labs results:   Labs Ordered and Resulted from Time of ED Arrival Up to the Time of Departure from the ED   CBC WITH PLATELETS DIFFERENTIAL - Abnormal; Notable for the following components:       Result Value    RBC Count  2.81 (*)     Hemoglobin 9.3 (*)     Hematocrit 29.9 (*)      (*)     MCH 33.1 (*)     MCHC 31.1 (*)     Nucleated RBCs 1 (*)     Absolute Lymphocytes 0.4 (*)     All other components within normal limits   COMPREHENSIVE METABOLIC PANEL - Abnormal; Notable for the following components:    Glucose 106 (*)     Urea Nitrogen 44 (*)     Creatinine 1.59 (*)     GFR Estimate 42 (*)     GFR Estimate If Black 49 (*)     Albumin 2.7 (*)     Protein Total 9.0 (*)     All other components within normal limits   TROPONIN I   LACTIC ACID WHOLE BLOOD   COVID-19 VIRUS (CORONAVIRUS) BY PCR   CARDIAC CONTINUOUS MONITORING       Imaging Studies:   Recent Results (from the past 24 hour(s))   Thoracic spine XR, 3 views    Narrative    EXAM: XR THORACIC SPINE 3 VW  LOCATION: [facility]  DATE/TIME: 11/26/2020 8:23 PM    INDICATION: Fall with back pain.  COMPARISON: CT thoracic spine 10/10/2020 is not currently available. Chest x-ray 11/15/2019.      Impression    IMPRESSION: There are 12 rib-bearing thoracic vertebral segments. A severe T10 compression fracture deformity has undergone further compression compared to chest x-ray 11/15/2019. There is vertebral plana configuration. There is a moderate superior   endplate T12 compression deformity which is also more conspicuous than on comparison exam.    The thoracic vertebral bodies at the cervicothoracic junction and involving the upper thoracic spine are difficult to visualize given technique and extreme osteopenia. No other definite evidence of thoracic compression fracture, however.    There is mild kyphosis noted at the T10 level.    Additionally, there is evidence of moderate superior endplate L2 compression deformity which has developed since prior chest radiograph.    Mild dextroconvex thoracolumbar scoliosis.    XR Chest 1 View    Narrative    Exam: XR CHEST 1 VW, 11/26/2020 9:18 PM    Indication: Fall, R thoracic back pain, maximal just right of  midline    Comparison: 11/15/2019    Findings:   A single supine AP view of the chest was obtained with the patient  rotated. The cardiomediastinal silhouette is unchanged. Mildly  decreased lung volumes. No pneumothorax. Biapical fibrosis. Increased  left basilar opacities. Newly visualized minimally displaced fractures  of the lateral fourth and fifth ribs, with questionable additional  fracture the posterior right fourth rib. Unchanged osteopenic/lytic  appearance of the bones with compression deformities of the T10 and  T12 vertebral bodies. Surgical screw is seen in the lateral left  clavicle. The visualized upper abdomen is unremarkable.      Impression    Impression:   1. Newly visualized mildly displaced fractures of the lateral right  fourth and fifth ribs with suspected additional mildly displaced  fracture of the posterior right fourth rib.  2. Grossly stable compression deformities of the T10 and T12 vertebral  bodies.  3. Abnormal osteolytic appearance of the bones is better demonstrated  on recent thoracic spine CT.  4. Minimal left basilar opacities likely represent atelectasis.    EDITH SPIVEY MD   Head CT w/o contrast    Narrative    CT HEAD W/O CONTRAST 11/26/2020 9:28 PM    Provided History: fall, head injury, distracting injury  ICD-10:    Comparison: None.    Technique: Using multidetector thin collimation helical acquisition  technique, axial, coronal and sagittal CT images from the skull base  to the vertex were obtained without intravenous contrast.     Findings:      No intracranial hemorrhage. No mass effect. No midline shift. No  extra-axial fluid collection. The gray to white matter differentiation  of the cerebral hemispheres is preserved. Ventricles are proportionate  to the sulci. No sulcal effacement..  The basal cisterns are patent.    Thickening of the maxillary sinuses, left frontal sinus and ethmoid  air cells. The mastoid air cells are clear. Orbits appear  unremarkable.    Multiple radiolucent foci throughout the calvarium consistent with  history of multiple myeloma. No acute fracture.      Impression    Impression:   1. No acute intracranial pathology.  2. Multiple radiolucent foci throughout the calvarium consistent with  known history of multiple myeloma. No acute fracture.    I have personally reviewed the examination and initial interpretation  and I agree with the findings.    HERMILA YU MD   Cervical spine CT w/o contrast    Narrative    CT CERVICAL SPINE W/O CONTRAST 11/26/2020 9:29 PM    History: fall, occipital head injury, distracting injury, fall,  occipital head injury, distracting injury    Comparison:  Thoracic and lumbar spine CT 10/10/2020    Technique: Using multidetector thin collimation helical acquisition  technique, axial, coronal and sagittal 2-3 mm thickness CT images of  the cervical spine were obtained without intravenous contrast.    Findings:  Diffuse demineralization the cervical spine. Diffuse  punched-out lesions throughout the cervical spine and occipital lobes  compatible with multiple myeloma. Normal alignment. No obvious  vertebral body height loss or pathologic fracture. Disc height  narrowing at C3-4, C5-6 and C6-7.     The lateral masses of C1 appear normally aligned on C2. The normal  cervical lordotic curvature is preserved. Cervical vertebral alignment  is within normal limits. There is no evidence of fracture or  significant prevertebral soft tissue swelling.  No significant disc  height narrowing at any level.    Findings on a level by level basis are as follows:    C2-3:  No spinal canal or neuroforaminal stenosis.    C3-4:  Left degenerative facet hypertrophy and uncovertebral joint  hypertrophy. Moderate stenosis of the left neural foramen. Mild  narrowing of the right neural foramen. Patent spinal canal. C4-5:  No  spinal canal or neuroforaminal stenosis.    C5-6:  Bilateral facet hypertrophy and uncovertebral joint  "hypertrophy  moderate to severe narrowing of the neural foramina without spinal  canal stenosis. Spinal canal or neuroforaminal stenosis.    C6-7:  Mild foraminal narrowing bilaterally secondary to uncovertebral  joint hypertrophy. No spinal canal narrowing.    C7-T1: No spinal canal or neuroforaminal stenosis.    No abnormality is noted of the visualized paraspinous tissues.      Impression    Impression:    Diffuse lytic lesions throughout the cervical spine and occipital bone  compatible with underlying multiple myeloma. No overt acute fracture  or subluxation.  2. Bilateral moderate to severe foraminal narrowing at C5-C6.    HERMILA YU MD       Recent vital signs:   BP 91/55   Pulse 98   Temp 97.7  F (36.5  C) (Oral)   Resp 23   Ht 1.549 m (5' 1\")   Wt 65.8 kg (145 lb)   SpO2 96%   BMI 27.40 kg/m      Promise Coma Scale Score: 15 (11/26/20 2222)       Cardiac Rhythm: Normal Sinus  Pt needs tele? yes  Skin/wound Issues: None    Code Status: Full Code    Pain control: fair    Nausea control: fair    Abnormal labs/tests/findings requiring intervention: see results, CT    Family present during ED course? No   Family Comments/Social Situation comments:     Tasks needing completion: None    Marquise Parisi, RN  [phone] [facility]      "

## 2020-11-27 NOTE — PLAN OF CARE
"Blood pressure 114/52, pulse 85, temperature 96.7  F (35.9  C), temperature source Oral, resp. rate 18, height 1.549 m (5' 1\"), weight 65.8 kg (145 lb), SpO2 98 %.    Status:Admitted 11/26 w/ T10 compression fracture, Superior endplate T12 compression. Hx; Multiple Myolemma .  Neuro:A/O x 4. Calm and cooperative with cares.  Pain: Shoulder and neck pain  managed with schedule Robaxin, Tylenol, and Continuous Ropivacaine 14ml/hr.   GI/: Good po intake. Need assistant with meals d/t pain at times. Voiding adequate amount using urinal.  Skin: Intact  Lines: PIV MIVF infusing at 100cc/hr.  Drain/device:Epidural catheter site CDI, no leaking noted. No s/s of toxicity. VSS.  Activity: Up as tolerate. Increase HOB slowly per patient tolerance.  Plan: Continue to monitor pain and intervene as ordered. Update trauma team with any changed or concerns.      Problem: Adult Inpatient Plan of Care  Goal: Absence of Hospital-Acquired Illness or Injury  Intervention: Identify and Manage Fall Risk  Recent Flowsheet Documentation  Taken 11/27/2020 0830 by Linnea Hilliard, RN  Safety Promotion/Fall Prevention: (bed rest. no activity) --     Problem: Adult Inpatient Plan of Care  Goal: Absence of Hospital-Acquired Illness or Injury  Intervention: Prevent Skin Injury  Recent Flowsheet Documentation  Taken 11/27/2020 0830 by Linnea Hilliard, RN  Body Position: log-rolled     "

## 2020-11-27 NOTE — CONSULTS
Hematology Consult Note   Date of Service: 11/27/2020    Patient: Rogelio Marshall  MRN: [id number]  Admission Date: 11/26/2020  Hospital Day # 1   Primary Outpatient Hematologist: Dr. Mukherjee    Reason for Consult: MM      History of Present Illness:    76 y/o M w/ hx of HTN, HLD and multiple myeloma, s/p C1D8 Valcade on 11/26/2020 presents to the [facility] ED after a fall, resulted with right upper thoracic pain. Patient lives alone. He had bad appetite and had not been eating/drinking well. He went to shop yesterday for some cookies. He then has some nausea and took 1 Zofran then vomited it out. He took another nausea medication which is likely compazine. He had a fainting feeling but did not loss consciousness.  He went to bathroom because he did not feel well with nausea feeling. He noticed that he did not have the strength to get up from his position leaning over the toilette and he fell backward onto his back. He denies any fever, chills, no bowel/urine problem.     He was noticed to be hypotensive with BP as low as 62/41, improving with NS bolus. He is on IVF and feels better.     He is admitted to trauma service with CT findings of lucid bone lesion throughout the whole spine.   C spine- No overt acute fracture or subluxation.  Thoracic spine: T1, T5, T10 and T12 compression fracture, and right third through fifth rib fractures.   Lumbar spine: moderate chronic L2 and marked chronic L3 compressions.    Hematologic History:  This patient is a 75 year old man with lower back pain over a year. He has known compression fractures and bone scan reveals multiple lytic lesions throughout his appendicular and axial skeleton.    - His Beta 2 microglobulin was 3.6 on 10/10/2020.    - Kappa chains were 73.6 on 10/5/2020 with K/L ratio of 89.9. M spike of 16.2 in urine on 10/10.    - Bone marrow biopsy on 10/23/2020 showed trilineage hematopoiesis and 50-60% kappa restricted plasma cells.    - Flow cytometry showed 20 %  plasma cells which express CD19, CD38, CD45 and monotypic cytoplasmic kappa immunoglobulin light chains but lack CD20 and CD56. Cytogenetics pending.    - FISH shows IGH-CCND1 fusion (81%; 30% had loss of IGH component from one fusion signal).      He was started on  VRD on    - Velcade 2.1mg days 1, 4, 8, 11   - Revlimid 25 mg daily for 14 days on, 7 days off   - Dexamethasone 40mg Days 1, 8, 15.    - so far he received Day 8 Cycle 1 Velcade on 11/26/2020    Review of Systems: Pertinent positive and negative systems described in HPI; the remainder of the 14 systems are negative    Past Medical History:  Past Medical History:   Diagnosis Date     Hyperlipidemia      Hypertension        Past Surgical History:  Past Surgical History:   Procedure Laterality Date     HERNIA REPAIR, INGUINAL RT/LT      needed post-op intestinal repair     ORIF left clavical         Social History:  Social History     Socioeconomic History     Marital status: Single     Spouse name: None     Number of children: None     Years of education: None     Highest education level: None   Occupational History     Occupation: retired   Social Needs     Financial resource strain: None     Food insecurity     Worry: None     Inability: None     Transportation needs     Medical: None     Non-medical: None   Tobacco Use     Smoking status: Former Smoker     Packs/day: 0.10     Years: 30.00     Pack years: 3.00     Types: Cigarettes     Smokeless tobacco: Never Used   Substance and Sexual Activity     Alcohol use: Yes     Alcohol/week: 17.5 standard drinks     Types: 21 drink(s) per week     Drug use: None     Sexual activity: None   Lifestyle     Physical activity     Days per week: None     Minutes per session: None     Stress: None   Relationships     Social connections     Talks on phone: None     Gets together: None     Attends Orthodox service: None     Active member of club or organization: None     Attends meetings of clubs or organizations:  None     Relationship status: None     Intimate partner violence     Fear of current or ex partner: None     Emotionally abused: None     Physically abused: None     Forced sexual activity: None   Other Topics Concern      Service Not Asked     Blood Transfusions Not Asked     Caffeine Concern Not Asked     Occupational Exposure Not Asked     Hobby Hazards Not Asked     Sleep Concern Not Asked     Stress Concern Not Asked     Weight Concern Not Asked     Special Diet Not Asked     Back Care Not Asked     Exercise Yes     Comment: bikes daily     Bike Helmet Not Asked     Seat Belt Not Asked     Self-Exams Not Asked   Social History Narrative     None        Family History  Family History   Problem Relation Age of Onset     C.A.D. Father          MI age 69     Hypertension Sister         obese     Family History Negative Brother        Outpatient Medications:       [COMPLETED] bortezomib (VELCADE) subcutaneous inj 2.1 mg         acetaminophen (TYLENOL) 325 MG tablet, Take 3 tablets (975 mg) by mouth 3 times daily       acyclovir (ZOVIRAX) 400 MG tablet, Take 1 tablet (400 mg) by mouth 2 times daily Viral Prophylaxis.       aspirin (ASA) 325 MG tablet, Take 1 tablet (325 mg) by mouth daily       calcium carbonate 500 mg, elemental, (OSCAL) 500 MG tablet, Take 1 tablet (500 mg) by mouth 3 times daily (with meals)       cholecalciferol (VITAMIN D3) 25 mcg (1000 units) capsule, Take 1 capsule by mouth daily       dexamethasone (DECADRON) 4 MG tablet, Take 10 tablets (40 mg) by mouth every 7 days for 3 doses Days 1, 8, and 15.       gabapentin (NEURONTIN) 100 MG capsule, Take 1 capsule (100 mg) by mouth 3 times daily       hydrochlorothiazide (MICROZIDE) 12.5 MG capsule, Take 12.5 mg by mouth daily       ibuprofen (ADVIL/MOTRIN) 600 MG tablet, Take 1 tablet (600 mg) by mouth every 6 hours       LENalidomide (REVLIMID) 25 MG CAPS capsule, Take 1 capsule (25 mg) by mouth daily for 14 days Days 1 through 14.       " levofloxacin (LEVAQUIN) 250 MG tablet, Take 1 tablet (250 mg) by mouth daily       Lidocaine (LIDOCARE) 4 % Patch, Place 2 patches onto the skin every 24 hours Alternate with Icy hot patch. To prevent lidocaine toxicity, patient should be patch free for 12 hrs daily. (Patient not taking: Reported on 11/4/2020)       lisinopril (ZESTRIL) 20 MG tablet, Take 1 tablet (20 mg) by mouth daily       LORazepam (ATIVAN) 0.5 MG tablet, Take 1 tablet (0.5 mg) by mouth every 4 hours as needed (Anxiety, Nausea/Vomiting or Sleep) (Patient not taking: Reported on 11/26/2020)       menthol (ICY HOT) 5 % PTCH, Apply 1 patch topically every 24 hours Alternate with Lidocaine patch (Patient not taking: Reported on 11/26/2020)       methocarbamol (ROBAXIN) 500 MG tablet, Take 1 tablet (500 mg) by mouth 4 times daily       omeprazole (PRILOSEC) 10 MG DR capsule, Take 10 mg by mouth daily       ondansetron (ZOFRAN-ODT) 4 MG ODT tab, Take 1 tablet (4 mg) by mouth every 6 hours as needed for nausea or vomiting (Patient not taking: Reported on 11/4/2020)       oxyCODONE (ROXICODONE) 5 MG tablet, Take 1 tablet (5 mg) by mouth every 6 hours as needed for severe pain       polyethylene glycol (MIRALAX) 17 g packet, Take 17 g by mouth daily       prochlorperazine (COMPAZINE) 10 MG tablet, Take 1 tablet (10 mg) by mouth every 6 hours as needed (Nausea/Vomiting) (Patient not taking: Reported on 11/26/2020)       senna-docusate (SENOKOT-S/PERICOLACE) 8.6-50 MG tablet, Take 1 tablet by mouth       tiZANidine (ZANAFLEX) 4 MG tablet, Take 4 mg by mouth nightly as needed         Physical Exam:    BP 96/59 (BP Location: Left arm)   Pulse 82   Temp 96.8  F (36  C) (Oral)   Resp 18   Ht 1.549 m (5' 1\")   Wt 65.8 kg (145 lb)   SpO2 100%   BMI 27.40 kg/m    Gen: Well appearing, in NAD  CV: Normal rate, regular rhythm. No m/r/g  Pulm: CTABL  Abd: Soft, nt/nd  Ext: No lower extremity edema  Skin: dry   Neuro: Alert and answering questions " appropriately.     Labs & Studies: I personally reviewed the following studies:  ROUTINE LABS (Last four results):  CMP  Recent Labs   Lab 11/26/20 2006      POTASSIUM 3.5   CHLORIDE 107   CO2 22   ANIONGAP 7   *   BUN 44*   CR 1.59*   GFRESTIMATED 42*   GFRESTBLACK 49*   MIRI 8.9   PROTTOTAL 9.0*   ALBUMIN 2.7*   BILITOTAL 0.3   ALKPHOS 83   AST 4   ALT 22     CBC  Recent Labs   Lab 11/26/20 2006 11/26/20  0917   WBC 5.6 10.5   RBC 2.81* 2.85*   HGB 9.3* 9.5*   HCT 29.9* 29.7*   * 104*   MCH 33.1* 33.3*   MCHC 31.1* 32.0   RDW 15.0 14.7    266     INRNo lab results found in last 7 days.    Assessment & Plan:   74 y/o M w/ hx of HTN, HLD and multiple myeloma, s/p C1D8 Valcade on 11/26/2020 presents to the [facility] ED after a fall, resulted with right upper thoracic pain, from new right third through fifth rib fractures. He has multiple old compression fracture including T1, T5, T10 and T12, L2 and L3.     #  Fall: based on the history patient is likely has orthostatic hypotension/dehydration  from poor po intake and possible side effect from Compazine. The risk of infection is low since he is neutropenic. He had CT chest which did not show acute change. We could check blood culture and UA.     # N/V due to chemo: resolved now. Patient is instructed to take zofran prior to his chemo day and use is prn and avoid Compazine.     # MM: as to his MM treatment, he only received C1D8 Valcade yesterday. He did not carry his Revlimid with him. I tried to call his apartment wo success. His RN was instructed to keep calling and once we have Revlimid I will order it. It will also be ok to hold Revlimid if it not available sooner since he has been ill and Revlimid is known to have central nervous system side effect including: Fatigue (11% to 34%), dizziness (20%), and headache (9% to 20%).   - his chemo plan is    - Velcade 2.1mg days 1, 4, 8, 11 (due on 11/29)   - Revlimid 25 mg daily for 14 days on, 7  days off   - Dexamethasone 40mg Days 1, 8, 15 (due on 12/3)   - so far he received Day 8 Cycle 1 Velcade on 11/26/2020    Recommendations:   - check blood culture x2 and UA  - check orthostatic hypotension q8h  - continue IVF with goal of correct hypotension  - pain control per primary team  - we will help with f/u plan when he is close to be discharged.     Patient was seen and plan of care was discussed with attending physician Dr. Yuen.     We will continue to follow up with you. Please don't hesitate to contact the Fellow On-Call with questions.    Artie Wilson MD, PhD  Hematology/Oncology   Pager: [phone]    I have seen, interviewed, and examined the patient independently.  I have reviewed the vital signs and labs.  This note reflects my assessment and plan.    Adm after unexplained fall and has hypotension    1. He does not have revlimid with him and we can not provide it in hospital. However, given fall and hypotension, would HOLD REVLIMID anyhow.    2. BP improving with fluids, which is reassuring. Would consider blood cultures and urine cultures to r/o infection.    Kizzy Yuen MD/PhD

## 2020-11-27 NOTE — PROGRESS NOTES
"CLINICAL NUTRITION SERVICES - ASSESSMENT NOTE     Nutrition Prescription    RECOMMENDATIONS FOR MDs/PROVIDERS TO ORDER:  Consider re-checking vitamin D levels.  Recent levels in October were elevated (>155).  Toxic vitamin D levels were associated with more falls and fractures in some studies.     Recommendations already ordered by Registered Dietitian (RD):  Boost Plus chocolate with meals    Future/Additional Recommendations:  Encourage ordering meals TID.       REASON FOR ASSESSMENT  Rogelio Mrashall is a/an 75 year old male assessed by the dietitian for Provider Order - trauma patient with multiple rib fractures    NUTRITION HISTORY  Patient seen last month during hospital admission, tried chocolate Boost Plus TID with meals.     CURRENT NUTRITION ORDERS  Diet: Regular    IVF with D5 @ 100 ml/hr - providing 120 grams dextrose, 408 kcal/day  Intake/Tolerance: Good PO intake noted.  Needing some assistance with meals d/t pain.     LABS  Vitamin D >155 (elevated) - 10/5/20    MEDICATIONS  Vitamin D3   Calcium carbonate 500 mg TID  Miralax, Senokot    ANTHROPOMETRICS  Height: 154.9 cm (5' 1\")  Most Recent Weight: 65.8 kg (145 lb)    IBW: 50.9 kg  BMI: Overweight BMI 25-29.9  Weight History: Weight fairly stable over the last year per chart review.  Dosing Weight: 55 kg (adj wt based on IBW of 50.9 kg and actual wt of 65.8 kg)    ASSESSED NUTRITION NEEDS  Estimated Energy Needs: 1298-5851 kcals/day (25 - 30 kcals/kg)  Justification: Maintenance  Estimated Protein Needs: 55-66 grams protein/day (1 - 1.2 grams of pro/kg)  Justification: Maintenance and Repletion  Estimated Fluid Needs: 1 mL/kcal   Justification: Maintenance    PHYSICAL FINDINGS  See malnutrition section below.    MALNUTRITION  % Intake: Unable to assess  % Weight Loss: None noted  Subcutaneous Fat Loss: Unable to assess  Muscle Loss: Unable to assess  Fluid Accumulation/Edema: Unable to assess  Malnutrition Diagnosis: Unable to determine due to " incomplete information    NUTRITION DIAGNOSIS  Predicted inadequate nutrient intake (kcal/pro) related to pain associated with rib fractures    INTERVENTIONS  Implementation  Nutrition Education: Unable to complete due to patient busy with nursing cares and later sleeping during visit.    Medical food supplement therapy     Goals  Patient to consume % of nutritionally adequate meal trays TID, or the equivalent with supplements/snacks.     Monitoring/Evaluation  Progress toward goals will be monitored and evaluated per protocol.    Lala Dumont MS, RD, LD  Pager [phone]

## 2020-11-27 NOTE — ANESTHESIA PREPROCEDURE EVALUATION
"Anesthesia Pre-Procedure Evaluation    Patient: Rogelio Marshall   MRN:     [id number] Gender:   male   Age:    75 year old :      1945        Preoperative Diagnosis: * No pre-op diagnosis entered *   * No procedures listed *     LABS:  CBC:   Lab Results   Component Value Date    WBC 5.6 2020    WBC 10.5 2020    HGB 9.3 (L) 2020    HGB 9.5 (L) 2020    HCT 29.9 (L) 2020    HCT 29.7 (L) 2020     2020     2020     BMP:   Lab Results   Component Value Date     2020     2020    POTASSIUM 3.9 2020    POTASSIUM 3.5 2020    CHLORIDE 114 (H) 2020    CHLORIDE 107 2020    CO2 20 2020    CO2 22 2020    BUN 37 (H) 2020    BUN 44 (H) 2020    CR 1.35 (H) 2020    CR 1.59 (H) 2020    GLC 87 2020     (H) 2020     COAGS:   Lab Results   Component Value Date    PTT 29 10/09/2020    INR 1.14 10/09/2020     POC: No results found for: BGM, HCG, HCGS  OTHER:   Lab Results   Component Value Date    LACT 2.0 2020    MIRI 7.4 (L) 2020    PHOS 3.3 2020    MAG 2.0 2020    ALBUMIN 2.7 (L) 2020    PROTTOTAL 9.0 (H) 2020    ALT 22 2020    AST 4 2020    ALKPHOS 83 2020    BILITOTAL 0.3 2020    TSH 2.69 10/05/2020    CRP 28.0 (H) 10/09/2020    SED 15 10/09/2020        Preop Vitals    BP Readings from Last 3 Encounters:   20 103/55   20 (!) 152/90   20 126/80    Pulse Readings from Last 3 Encounters:   20 78   20 97   20 96      Resp Readings from Last 3 Encounters:   20 18   20 16   20 18    SpO2 Readings from Last 3 Encounters:   20 96%   20 97%   20 99%      Temp Readings from Last 1 Encounters:   20 36  C (96.8  F) (Oral)    Ht Readings from Last 1 Encounters:   20 1.549 m (5' 1\")      Wt Readings from Last 1 Encounters:   20 65.8 " "kg (145 lb)    Estimated body mass index is 27.4 kg/m  as calculated from the following:    Height as of this encounter: 1.549 m (5' 1\").    Weight as of this encounter: 65.8 kg (145 lb).     LDA:  Peripheral IV 11/26/20 Right Upper forearm (Active)   Site Assessment WDL 11/27/20 0530   Line Status Infusing 11/27/20 0530   Phlebitis Scale 0-->no symptoms 11/27/20 0530   Infiltration Scale 0 11/27/20 0530   Infiltration Site Treatment Method  None 11/27/20 0530   If infiltrated, was a Vesicant infusing? No 11/27/20 0530   Number of days: 1        Past Medical History:   Diagnosis Date     Hyperlipidemia      Hypertension       Past Surgical History:   Procedure Laterality Date     HERNIA REPAIR, INGUINAL RT/LT      needed post-op intestinal repair     ORIF left clavical        Allergies   Allergen Reactions     Other Drug Allergy (See Comments)      tobasco sauce ---caused red spots         Anesthesia Evaluation     . Pt has had prior anesthetic.            ROS/MED HX    ENT/Pulmonary:  - neg pulmonary ROS     Neurologic:  - neg neurologic ROS     Cardiovascular:     (+) hypertension----. : . . . :. .       METS/Exercise Tolerance:     Hematologic:  - neg hematologic  ROS       Musculoskeletal:   (+)  other musculoskeletal- Right T3-5 rib fractures, thoracolumbar vertebral body fractures      GI/Hepatic:  - neg GI/hepatic ROS       Renal/Genitourinary:     (+) chronic renal disease, type: ARF,       Endo:  - neg endo ROS       Psychiatric:  - neg psychiatric ROS       Infectious Disease: Comment: COVID pending - neg infectious disease ROS       Malignancy:   (+) Malignancy   Multiple myeloma        Other:    (+) no H/O Chronic Pain,no other significant disability                    [facility]AL[facility] AN PHYSICAL EXAM    Assessment:   ASA SCORE: 3    H&P: History and physical reviewed and following examination; no interval change.   Smoking Status:  Non-Smoker/Unknown        Plan:   Anes. Type:  Peripheral Nerve Block     Block " Details: Catheter; E. Spinae   Pre-Medication: None   Induction:  N/a   Airway: Native Airway   Access/Monitoring: PIV   Maintenance: N/a     Postop Plan:   Postop Pain: Regional  Postop Sedation/Airway: Not planned  Disposition: Inpatient/Admit     PONV Management:  NO PONV Prophylaxis Required     CONSENT: Direct conversation   Plan and risks discussed with: Patient   Blood Products: Consent Deferred (Minimal Blood Loss)                   Ledy Alegria MD

## 2020-11-27 NOTE — PROGRESS NOTES
Neurosurgery progress note    S: denies back pain    O:  Temp:  [96.8  F (36  C)-97.7  F (36.5  C)] 96.8  F (36  C)  Pulse:  [] 78  Resp:  [13-30] 18  BP: ()/(41-62) 103/55  SpO2:  [91 %-100 %] 96 %    Exam:  Motor:  Normal bulk / tone; no tremor, rigidity, or bradykinesia.  No muscle wasting or fasciculations       Delt Bi Tri Hand Flexion/  Extension Iliopsoas Quadriceps Hamstrings Tibialis Anterior Gastroc     C5 C6 C7 C8/T1 L2 L3 L4-S1 L4 S1   R 5 5 5 5 5 5 5 5 5   L 5 5 5 5 5 5 5 5 5   Sensory:  intact to LT x 4 extremities         Reflexes: no clonus       Bi BR Katie Pat Bab     C5-6 C6 UMN L2-4 UMN   R 2+ 2+ Norm 2+ Norm   L 2+ 2+ Norm 2+ Norm      ASSESSMENT:  76 y/o M with know spinal compression fractures in the setting of osteopenia and multiple myeloma presents with progression of vertebral compression fractures and rip fractures after a fall but neurologically intact.        RECOMMENDATIONS:  - No neurosurgical intervention indicated at this time   - pain management for rip fractures.  - no brace needed given chronic nature of compression fractures and what appears to be asymptomatic statust  - osteopenia treatment  - MM workup  - Mri C spine wo when able        Deacon Omlaley MD  Neurosurgery    Please contact neurosurgery resident on call with questions.    Dial * * [phone], enter [phone] when prompted

## 2020-11-27 NOTE — PLAN OF CARE
"/58   Pulse 96   Temp 97.4  F (36.3  C) (Oral)   Resp 18   Ht 1.549 m (5' 1\")   Wt 65.8 kg (145 lb)   SpO2 97%   BMI 27.40 kg/m      [id number]   Status: Hx of Multiple Myolemma admitted w/ T10 compression fracture, Superior endplate T12 compression  Neuros: A&O x3, intermittent confused to time. CMS intact, pulses weak but palpable in BLE   Cardiac: WDL. Intermittently tachy   Respiratory: Shortness of breath w/ deep inhalation   GI/: Voiding spontaneously in urinal. +Bs, +Flatus. Last BM 11/25  Diet/Nausea: Regular diet, no nausea  Skin: Skin assessment performed w/ Tahmina SALDANA RN. Noted punctures on abdomen. Redness blanchable on back   Lines: R PIV infusing MIVF @ 100 mL/hr. 1 L infused upon arrival     Labs: Reviewed   Pain: R shoulder + Rib cage pain   Activity: T&L precautions followed. Log rolled w/ repositioning w/ okay from MD for slight elevation in bed height   Plan: Continue plan of care    "

## 2020-11-27 NOTE — PLAN OF CARE
PT orders received for PT evaluation and treatment: rib fracture.  Per nursing pt not appropriate for PT evaluation today and pt is currently on strict bedrest.  Will initiate PT evaluation when pt is appropriate.

## 2020-11-27 NOTE — CONSULTS
[facility]       NEUROSURGERY CONSULTATION    This consultation was requested by Dr. Francois from the ED service.      Reason for Consultation: fall and spinal compression fracture    HPI:  76 y/o M w/ hx of HTN, HLD and multiple myeloma presents to the [facility] ED after a fall, now complaining of right upper thoracic pain. The patient states that he was going to the bathroom because he did not feel well. He noticed that he did not have the strength to get up from his position leaning over the toilette and he fell backward onto his back. He does not think that he lost consciousness.  He now endorses right thoracic pain that worsens with inhalation and exhalation. It also substantially increases with movement. Tylenol, which he received in the ED, had only minimal impact.      He denies headaches, weakness, LOC, numbness/weakness/paresthesias in extremities, changes in sensation, taste, smell, nor trouble speaking or other neurologic symptoms.    PAST MEDICAL HISTORY:   Past Medical History:   Diagnosis Date     Hyperlipidemia      Hypertension        PAST SURGICAL HISTORY:   Past Surgical History:   Procedure Laterality Date     HERNIA REPAIR, INGUINAL RT/LT      needed post-op intestinal repair     ORIF left clavical         FAMILY HISTORY:   Family History   Problem Relation Age of Onset     C.A.D. Father          MI age 69     Hypertension Sister         obese     Family History Negative Brother        SOCIAL HISTORY:   Social History     Tobacco Use     Smoking status: Former Smoker     Packs/day: 0.10     Years: 30.00     Pack years: 3.00     Types: Cigarettes     Smokeless tobacco: Never Used   Substance Use Topics     Alcohol use: Yes     Alcohol/week: 17.5 standard drinks     Types: 21 drink(s) per week       MEDICATIONS:  No current outpatient medications on file.       Allergies:  Allergies   Allergen Reactions     Other Drug Allergy (See Comments)       "tobasco sauce ---caused red spots        ROS: 10 point ROS of systems including Constitutional, Eyes, Respiratory, Cardiovascular, Gastroenterology, Genitourinary, Integumentary, Muscularskeletal, Psychiatric were all negative except for pertinent positives noted in my HPI.    Physical exam:   Blood pressure 96/59, pulse 82, temperature 96.8  F (36  C), temperature source Oral, resp. rate 18, height 1.549 m (5' 1\"), weight 65.8 kg (145 lb), SpO2 100 %.  General: awake and alert  HEENT: atraumatic; no neck pain on palpation, can rotate head freely without pain  PULM: breathing comfortably on room air  NEUROLOGIC:  -- Awake; Alert; oriented x 3  -- Follows commands briskly  -- Speech fluent, spontaneous. No aphasia or dysarthria.  -- no gaze preference. No apparent hemineglect.  Cranial Nerves:  -- PERRL 3-2mm bilat and brisk, extraocular movements intact  -- face symmetrical, tongue midline  -- sensory V1-V3 intact bilaterally  -- palate elevates symmetrically, uvula midline  -- hearing grossly intact bilat  -- Trapezii 5/5 strength bilat symmetric    Motor:  Normal bulk / tone; no tremor, rigidity, or bradykinesia.  No muscle wasting or fasciculations     Delt Bi Tri Hand Flexion/  Extension Iliopsoas Quadriceps Hamstrings Tibialis Anterior Gastroc    C5 C6 C7 C8/T1 L2 L3 L4-S1 L4 S1   R 5 5 5 5 5 5 5 5 5   L 5 5 5 5 5 5 5 5 5   Sensory:  intact to LT x 4 extremities       Reflexes: no clonus       Bi BR Katie Pat Bab     C5-6 C6 UMN L2-4 UMN   R 2+ 2+ Norm 2+ Norm   L 2+ 2+ Norm 2+ Norm      Gait: deferred      IMAGING:  Head CT 11/26/2020  Impression:   1. No acute intracranial pathology.  2. Multiple radiolucent foci throughout the calvarium consistent with known history of multiple myeloma. No acute fracture.      Cervical CT 11/26/2020  Impression: Diffuse lytic lesions throughout the cervical spine and occipital bone compatible with underlying multiple myeloma. No overt acute fracture  or subluxation.  2. " Bilateral moderate to severe foraminal narrowing at C5-C6.    Thoracic spine XR 11/26/2020  IMPRESSION: There are 12 rib-bearing thoracic vertebral segments. A severe T10 compression fracture deformity has undergone further compression compared to chest x-ray 11/15/2019. There is vertebral plana configuration. There is a moderate superior endplate T12 compression deformity which is also more conspicuous than on comparison exam.     The thoracic vertebral bodies at the cervicothoracic junction and involving the upper thoracic spine are difficult to visualize given technique and extreme osteopenia. No other definite evidence of thoracic compression fracture, however.     There is mild kyphosis noted at the T10 level.     Additionally, there is evidence of moderate superior endplate L2 compression deformity which has developed since prior chest radiograph.     Mild dextroconvex thoracolumbar scoliosis.       Thoracic/Lumbar CT 11/26/2020  IMPRESSION:  Thoracic spine:  1.  Extensive lucencies throughout the thoracic osseous structures compatible with patient's diagnosis of multiple myeloma. No acute thoracic spine fracture.     2.  Stable mild compressions of T1, T5 and marked stable chronic compressions of T10 and T12.     3.  No high-grade canal narrowing.     4.  Right third through fifth rib fractures. Correlate with chest CT.     5.  Marked bilateral T10-T11 and left T11-T12 foraminal narrowing.     Lumbar spine:  1.  Extensive lucencies throughout the lumbar and pelvic osseous structures compatible with patient's diagnosis of multiple myeloma. No acute lumbar spine fracture or significant change compared to 10/10/2020.     2.  Stable moderate chronic L2 and marked chronic L3 compressions.     3.  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     4.  Marked L4-L5 and L5-S1 degenerative disc disease.     5.  Moderate to marked lower lumbar degenerative foraminal narrowing.        LABS:   Last  Comprehensive Metabolic Panel:  Sodium   Date Value Ref Range Status   11/26/2020 136 133 - 144 mmol/L Final     Potassium   Date Value Ref Range Status   11/26/2020 3.5 3.4 - 5.3 mmol/L Final     Chloride   Date Value Ref Range Status   11/26/2020 107 94 - 109 mmol/L Final     Carbon Dioxide   Date Value Ref Range Status   11/26/2020 22 20 - 32 mmol/L Final     Anion Gap   Date Value Ref Range Status   11/26/2020 7 3 - 14 mmol/L Final     Glucose   Date Value Ref Range Status   11/26/2020 106 (H) 70 - 99 mg/dL Final     Urea Nitrogen   Date Value Ref Range Status   11/26/2020 44 (H) 7 - 30 mg/dL Final     Creatinine   Date Value Ref Range Status   11/26/2020 1.59 (H) 0.66 - 1.25 mg/dL Final     GFR Estimate   Date Value Ref Range Status   11/26/2020 42 (L) >60 mL/min/[1.73_m2] Final     Comment:     Non  GFR Calc  Starting 12/18/2018, serum creatinine based estimated GFR (eGFR) will be   calculated using the Chronic Kidney Disease Epidemiology Collaboration   (CKD-EPI) equation.       Calcium   Date Value Ref Range Status   11/26/2020 8.9 8.5 - 10.1 mg/dL Final     Lab Results   Component Value Date    WBC 5.6 11/26/2020     Lab Results   Component Value Date    RBC 2.81 11/26/2020     Lab Results   Component Value Date    HGB 9.3 11/26/2020     Lab Results   Component Value Date    HCT 29.9 11/26/2020     Lab Results   Component Value Date     11/26/2020     Lab Results   Component Value Date    MCH 33.1 11/26/2020     Lab Results   Component Value Date    MCHC 31.1 11/26/2020     Lab Results   Component Value Date    RDW 15.0 11/26/2020     Lab Results   Component Value Date     11/26/2020     INR   Date Value Ref Range Status   10/09/2020 1.14 0.86 - 1.14 Final      PTT   Date Value Ref Range Status   10/09/2020 29 22 - 37 sec Final        ASSESSMENT:  76 y/o M with know spinal compression fractures in the setting of osteopenia and multiple myeloma presents with progression of  vertebral compression fractures and rip fractures after a fall but neurologically intact.      RECOMMENDATIONS:  - No neurosurgical intervention indicated at this time   - pain management for rip fractures.  - no brace needed given chronic nature of compression fractures and what appears to be asymptomatic statust  - osteopenia treatment  - Neurosurgery to sign off      Geoffrey Triplett MD, PhD  Neurosurgery Resident, PGY-2    The patient was discussed with Dr. Bermudez, neurosurgery chief resident.

## 2020-11-27 NOTE — H&P
[facility]     History and Physical / Consult note: Trauma Service       Date of Admission:  11/26/2020    Time of Admission/Consult Request (page/call): 10:30 PM  Time of my evaluation: 10:50 PM  Consulting services:  Neurosurgery - Emergent consult (within 30 mins): Called by ED  Heme Onc    Assessment & Plan   Trauma mechanism :Fall  Time/date of injury:  7:00PM 11/26/2020    Known Injuries:  Mildly displaced fractures of the lateral right fourth and fifth ribs with suspected additional mildly displaced fracture of the posterior right fourth rib.    Other diagnoses:   1. Multiple Myloma  2. T10 compression fracture  3. Superior endplate T12 compression    Plan:  1. Admit to Trauma  2. Follow-up neurosurgery recommendations  3. TLSO for comfort  4. Consult Heme Onc in AM  5. Pain control   6. Supplemental PRN  7. Pulmonary toilet  8. Lovenox   9. Monitor for withdrawal  10. Tertiary exam in AM  11. Follow-up COVID test  12. MIVFs    Code status: Full code    ETOH: no more than 2 beers a night, less recently    Primary Care Physician   Wolf Stevens    Chief Complaint   Pain right chest and back    History is obtained from the patient    History of Present Illness   Rogelio Marshall is a 75 year old male who presents with right sided and posterior chest pain after a fall this evening. He has multiple myeloma S/P Revlimid on 11/17. He felt nauseated, went to the bathroom, felt lightheaded, and fell. He hit his posterior head. Unsure of LOC. Denies headache or blurry vision. Denies current nausea. Denies SOB/CP. He has right sided  and posterior chest pain worse with arm movement.    Past Medical History    I have reviewed this patient's medical history and updated it with pertinent information if needed.   Past Medical History:   Diagnosis Date     Hyperlipidemia      Hypertension      Past Surgical History   I have reviewed this patient's surgical history and  updated it with pertinent information if needed.  Past Surgical History:   Procedure Laterality Date     HERNIA REPAIR, INGUINAL RT/LT      needed post-op intestinal repair     ORIF left clavical       Prior to Admission Medications   Prior to Admission Medications   Prescriptions Last Dose Informant Patient Reported? Taking?   LENalidomide (REVLIMID) 25 MG CAPS capsule   No No   Sig: Take 1 capsule (25 mg) by mouth daily for 14 days Days 1 through 14.   LORazepam (ATIVAN) 0.5 MG tablet   No No   Sig: Take 1 tablet (0.5 mg) by mouth every 4 hours as needed (Anxiety, Nausea/Vomiting or Sleep)   Patient not taking: Reported on 11/26/2020   Lidocaine (LIDOCARE) 4 % Patch   No No   Sig: Place 2 patches onto the skin every 24 hours Alternate with Icy hot patch. To prevent lidocaine toxicity, patient should be patch free for 12 hrs daily.   Patient not taking: Reported on 11/4/2020   acetaminophen (TYLENOL) 325 MG tablet   No No   Sig: Take 3 tablets (975 mg) by mouth 3 times daily   acyclovir (ZOVIRAX) 400 MG tablet   No No   Sig: Take 1 tablet (400 mg) by mouth 2 times daily Viral Prophylaxis.   aspirin (ASA) 325 MG tablet   No No   Sig: Take 1 tablet (325 mg) by mouth daily   calcium carbonate 500 mg, elemental, (OSCAL) 500 MG tablet   No No   Sig: Take 1 tablet (500 mg) by mouth 3 times daily (with meals)   cholecalciferol (VITAMIN D3) 25 mcg (1000 units) capsule   Yes No   Sig: Take 1 capsule by mouth daily   dexamethasone (DECADRON) 4 MG tablet   No No   Sig: Take 10 tablets (40 mg) by mouth every 7 days for 3 doses Days 1, 8, and 15.   gabapentin (NEURONTIN) 100 MG capsule   No No   Sig: Take 1 capsule (100 mg) by mouth 3 times daily   hydrochlorothiazide (MICROZIDE) 12.5 MG capsule   Yes No   Sig: Take 12.5 mg by mouth daily   ibuprofen (ADVIL/MOTRIN) 600 MG tablet   No No   Sig: Take 1 tablet (600 mg) by mouth every 6 hours   levofloxacin (LEVAQUIN) 250 MG tablet   No No   Sig: Take 1 tablet (250 mg) by mouth  daily   lisinopril (ZESTRIL) 20 MG tablet   No No   Sig: Take 1 tablet (20 mg) by mouth daily   menthol (ICY HOT) 5 % PTCH   No No   Sig: Apply 1 patch topically every 24 hours Alternate with Lidocaine patch   Patient not taking: Reported on 11/26/2020   methocarbamol (ROBAXIN) 500 MG tablet   No No   Sig: Take 1 tablet (500 mg) by mouth 4 times daily   omeprazole (PRILOSEC) 10 MG DR capsule   Yes No   Sig: Take 10 mg by mouth daily   ondansetron (ZOFRAN-ODT) 4 MG ODT tab   No No   Sig: Take 1 tablet (4 mg) by mouth every 6 hours as needed for nausea or vomiting   Patient not taking: Reported on 11/4/2020   oxyCODONE (ROXICODONE) 5 MG tablet   No No   Sig: Take 1 tablet (5 mg) by mouth every 6 hours as needed for severe pain   polyethylene glycol (MIRALAX) 17 g packet   No No   Sig: Take 17 g by mouth daily   prochlorperazine (COMPAZINE) 10 MG tablet   No No   Sig: Take 1 tablet (10 mg) by mouth every 6 hours as needed (Nausea/Vomiting)   Patient not taking: Reported on 11/26/2020   senna-docusate (SENOKOT-S/PERICOLACE) 8.6-50 MG tablet   Yes No   Sig: Take 1 tablet by mouth   tiZANidine (ZANAFLEX) 4 MG tablet   Yes No   Sig: Take 4 mg by mouth nightly as needed      Facility-Administered Medications: None     Allergies   Allergies   Allergen Reactions     Other Drug Allergy (See Comments)      tobasco sauce ---caused red spots        Social History   Social History     Socioeconomic History     Marital status: Single     Spouse name: Not on file     Number of children: Not on file     Years of education: Not on file     Highest education level: Not on file   Occupational History     Occupation: retired   Social Needs     Financial resource strain: Not on file     Food insecurity     Worry: Not on file     Inability: Not on file     Transportation needs     Medical: Not on file     Non-medical: Not on file   Tobacco Use     Smoking status: Former Smoker     Packs/day: 0.10     Years: 30.00     Pack years: 3.00      Types: Cigarettes     Smokeless tobacco: Never Used   Substance and Sexual Activity     Alcohol use: Yes     Alcohol/week: 17.5 standard drinks     Types: 21 drink(s) per week     Drug use: Not on file     Sexual activity: Not on file   Lifestyle     Physical activity     Days per week: Not on file     Minutes per session: Not on file     Stress: Not on file   Relationships     Social connections     Talks on phone: Not on file     Gets together: Not on file     Attends [employer] service: Not on file     Active member of club or organization: Not on file     Attends meetings of clubs or organizations: Not on file     Relationship status: Not on file     Intimate partner violence     Fear of current or ex partner: Not on file     Emotionally abused: Not on file     Physically abused: Not on file     Forced sexual activity: Not on file   Other Topics Concern      Service Not Asked     Blood Transfusions Not Asked     Caffeine Concern Not Asked     Occupational Exposure Not Asked     Hobby Hazards Not Asked     Sleep Concern Not Asked     Stress Concern Not Asked     Weight Concern Not Asked     Special Diet Not Asked     Back Care Not Asked     Exercise Yes     Comment: bikes daily     Bike Helmet Not Asked     Seat Belt Not Asked     Self-Exams Not Asked   Social History Narrative     Not on file       Family History   Family history reviewed with patient and is noncontributory.    Review of Systems   CONSTITUTIONAL: No fever, chills, sweats, fatigue   EYES: no visual blurring, no double vision or visual loss  ENT: no decrease in hearing, no tinnitus, no vertigo, no hoarseness  RESPIRATORY: no shortness of breath, no cough, no sputum   CARDIOVASCULAR: no palpitations, no chest  pain, no exertional chest pain or pressure  GASTROINTESTINAL: no nausea or vomiting, or abd pain  GENITOURINARY: no dysuria, no frequency or hesitancy, no hematuria  MUSCULOSKELETAL: no weakness, no redness, no swelling, no joint pain,    SKIN: no rashes, ecchymoses, abrasions or lacerations  NEUROLOGIC: no numbness or tingling of hands, no numbness or tingling  of feet, no syncope, no tremors or weakness  PSYCHIATRIC: no sleep disturbances, no anxiety or depression    Physical Exam   Temp: 97.7  F (36.5  C) Temp src: Oral BP: 91/55 Pulse: 98   Resp: 23 SpO2: 96 % O2 Device: None (Room air)    Vital Signs with Ranges  Temp:  [97.7  F (36.5  C)-98  F (36.7  C)] 97.7  F (36.5  C)  Pulse:  [] 98  Resp:  [16-30] 23  BP: ()/(41-90) 91/55  SpO2:  [91 %-99 %] 96 % 145 lbs 0 oz    Primary Survey:  Airway: patient talking  Breathing: symmetric respiratory effort bilaterally  Circulation: central pulses present and peripheral pulses present  Disability: Pupils - left 4 mm and brisk, right 4 mm and brisk     Promise Coma Scale - Total 15/15  Eye Response (E): 4  4= spontaneous,  3= to verbal/voice, 2=  to pain, 1= No response   Verbal Response (V): 5   5= Orientated, converses,  4= Confused, converses, 3= Inappropriate words,  2= Incomprehensible sounds,  1=No response   Motor Response (M): 6   6= Obeys commands, 5= Localizes to pain, 4= Withdrawal to pain, 3=Fexion to pain, 2= Extension to pain, 1= No response    Secondary Survey:  General: alert, oriented to person, place, time  Head: atraumatic, normocephalic, trachea midline  Eyes: PERRLA, pupils 3mm, EOMI, corneas and conjunctivae clear  Ears: pearly grey bilateral TMs and non-inflamed external ear canals  Nose: nares patent, no drainage, nasal septum non-tender  Mouth/Throat: no exudates or erythema,  no dental tenderness or malocclusions, no tongue lacerations  Neck: no cervical collar present. No midline posterior tenderness, full AROM without pain or tenderness   Chest/Pulmonary: normal respiratory rate and rhythm,  bilateral clear breath sounds, no wheezes, rales or rhonchi, no chest wall tenderness or deformities,   Cardiovascular: S1, S2,  normal and regular rate and rhythm, no  murmurs  Abdomen: soft, non-tender, no guarding, no rebound tenderness and no tenderness to palpation, reducible umbilical hernia NT  :pelvis stable to lateral compression, , no midline tenderness, no sacral tenderness,  no step-offs and no abrasions or contusions  Musculoskel/Extremities: normal extremities, full AROM of major joints without tenderness, edema, erythema, ecchymosis, or abrasions.  Hand: no gross deformities of hands or fingers. Full AROM of hand and fingers in flexion and extension.  strength equal and symmetric.   Skin: no rashes, laceration, ecchymosis, skin warm and dry.   Neuro: PERRLA, alert, oriented x 3. CN II-XII grossly intact. No focal deficits. Strength 5/5 x 4 extremities.  Sensation intact.  Psychiatric: affect/mood normal, cooperative, normal judgement/insight and memory intact  # Pain Assessment:  Current Pain Score 10/13/2020   Patient currently in pain? yes   Pain score (0-10) -   - Rogelio is experiencing pain due to rib fracture. Pain management was discussed and the plan was created in a collaborative fashion.  Rogelio's response to the current recommendations: compliant  - Opioid regimen: percocet   - Response to opioid medications: Reduction of symptoms   - Bowel regimen: senna        Data   UA RESULTS:  Recent Labs   Lab Test 10/09/20  1744   COLOR Yellow   APPEARANCE Clear   URINEGLC Negative   URINEBILI Negative   URINEKETONE 40*   SG 1.015   UBLD Trace*   URINEPH 5.5   PROTEIN 30*   NITRITE Negative   LEUKEST Negative   RBCU <1   WBCU 3      Results for orders placed or performed during the hospital encounter of 11/26/20 (from the past 24 hour(s))   CBC with platelets differential   Result Value Ref Range    WBC 5.6 4.0 - 11.0 10e9/L    RBC Count 2.81 (L) 4.4 - 5.9 10e12/L    Hemoglobin 9.3 (L) 13.3 - 17.7 g/dL    Hematocrit 29.9 (L) 40.0 - 53.0 %     (H) 78 - 100 fl    MCH 33.1 (H) 26.5 - 33.0 pg    MCHC 31.1 (L) 31.5 - 36.5 g/dL    RDW 15.0 10.0 - 15.0 %     Platelet Count 239 150 - 450 10e9/L    Diff Method Automated Method     % Neutrophils 86.2 %    % Lymphocytes 7.7 %    % Monocytes 1.3 %    % Eosinophils 3.0 %    % Basophils 0.2 %    % Immature Granulocytes 1.6 %    Nucleated RBCs 1 (H) 0 /100    Absolute Neutrophil 4.8 1.6 - 8.3 10e9/L    Absolute Lymphocytes 0.4 (L) 0.8 - 5.3 10e9/L    Absolute Monocytes 0.1 0.0 - 1.3 10e9/L    Absolute Eosinophils 0.2 0.0 - 0.7 10e9/L    Absolute Basophils 0.0 0.0 - 0.2 10e9/L    Abs Immature Granulocytes 0.1 0 - 0.4 10e9/L    Absolute Nucleated RBC 0.0    Comprehensive metabolic panel   Result Value Ref Range    Sodium 136 133 - 144 mmol/L    Potassium 3.5 3.4 - 5.3 mmol/L    Chloride 107 94 - 109 mmol/L    Carbon Dioxide 22 20 - 32 mmol/L    Anion Gap 7 3 - 14 mmol/L    Glucose 106 (H) 70 - 99 mg/dL    Urea Nitrogen 44 (H) 7 - 30 mg/dL    Creatinine 1.59 (H) 0.66 - 1.25 mg/dL    GFR Estimate 42 (L) >60 mL/min/[1.73_m2]    GFR Estimate If Black 49 (L) >60 mL/min/[1.73_m2]    Calcium 8.9 8.5 - 10.1 mg/dL    Bilirubin Total 0.3 0.2 - 1.3 mg/dL    Albumin 2.7 (L) 3.4 - 5.0 g/dL    Protein Total 9.0 (H) 6.8 - 8.8 g/dL    Alkaline Phosphatase 83 40 - 150 U/L    ALT 22 0 - 70 U/L    AST 4 0 - 45 U/L   Troponin I   Result Value Ref Range    Troponin I ES <0.015 0.000 - 0.045 ug/L   EKG 12 lead   Result Value Ref Range    Interpretation ECG Click View Image link to view waveform and result    Thoracic spine XR, 3 views    Narrative    EXAM: XR THORACIC SPINE 3 VW  LOCATION: [facility]  DATE/TIME: 11/26/2020 8:23 PM    INDICATION: Fall with back pain.  COMPARISON: CT thoracic spine 10/10/2020 is not currently available. Chest x-ray 11/15/2019.      Impression    IMPRESSION: There are 12 rib-bearing thoracic vertebral segments. A severe T10 compression fracture deformity has undergone further compression compared to chest x-ray 11/15/2019. There is vertebral plana configuration. There is a moderate superior   endplate T12  compression deformity which is also more conspicuous than on comparison exam.    The thoracic vertebral bodies at the cervicothoracic junction and involving the upper thoracic spine are difficult to visualize given technique and extreme osteopenia. No other definite evidence of thoracic compression fracture, however.    There is mild kyphosis noted at the T10 level.    Additionally, there is evidence of moderate superior endplate L2 compression deformity which has developed since prior chest radiograph.    Mild dextroconvex thoracolumbar scoliosis.    XR Chest 1 View    Narrative    Exam: XR CHEST 1 VW, 11/26/2020 9:18 PM    Indication: Fall, R thoracic back pain, maximal just right of midline    Comparison: 11/15/2019    Findings:   A single supine AP view of the chest was obtained with the patient  rotated. The cardiomediastinal silhouette is unchanged. Mildly  decreased lung volumes. No pneumothorax. Biapical fibrosis. Increased  left basilar opacities. Newly visualized minimally displaced fractures  of the lateral fourth and fifth ribs, with questionable additional  fracture the posterior right fourth rib. Unchanged osteopenic/lytic  appearance of the bones with compression deformities of the T10 and  T12 vertebral bodies. Surgical screw is seen in the lateral left  clavicle. The visualized upper abdomen is unremarkable.      Impression    Impression:   1. Newly visualized mildly displaced fractures of the lateral right  fourth and fifth ribs with suspected additional mildly displaced  fracture of the posterior right fourth rib.  2. Grossly stable compression deformities of the T10 and T12 vertebral  bodies.  3. Abnormal osteolytic appearance of the bones is better demonstrated  on recent thoracic spine CT.  4. Minimal left basilar opacities likely represent atelectasis.    EDITH SPIVEY MD   Head CT w/o contrast    Narrative    CT HEAD W/O CONTRAST 11/26/2020 9:28 PM    Provided History: fall, head injury,  distracting injury  ICD-10:    Comparison: None.    Technique: Using multidetector thin collimation helical acquisition  technique, axial, coronal and sagittal CT images from the skull base  to the vertex were obtained without intravenous contrast.     Findings:      No intracranial hemorrhage. No mass effect. No midline shift. No  extra-axial fluid collection. The gray to white matter differentiation  of the cerebral hemispheres is preserved. Ventricles are proportionate  to the sulci. No sulcal effacement..  The basal cisterns are patent.    Thickening of the maxillary sinuses, left frontal sinus and ethmoid  air cells. The mastoid air cells are clear. Orbits appear  unremarkable.   Multiple radiolucent foci throughout the calvarium consistent with  history of multiple myeloma. No acute fracture.      Impression    Impression:   1. No acute intracranial pathology.  2. Multiple radiolucent foci throughout the calvarium consistent with  known history of multiple myeloma. No acute fracture.    I have personally reviewed the examination and initial interpretation  and I agree with the findings.    HERMILA UY MD   Cervical spine CT w/o contrast    Narrative    CT CERVICAL SPINE W/O CONTRAST 11/26/2020 9:29 PM    History: fall, occipital head injury, distracting injury, fall,  occipital head injury, distracting injury    Comparison:  Thoracic and lumbar spine CT 10/10/2020    Technique: Using multidetector thin collimation helical acquisition  technique, axial, coronal and sagittal 2-3 mm thickness CT images of  the cervical spine were obtained without intravenous contrast.    Findings:  Diffuse demineralization the cervical spine. Diffuse  punched-out lesions throughout the cervical spine and occipital lobes  compatible with multiple myeloma. Normal alignment. No obvious  vertebral body height loss or pathologic fracture. Disc height  narrowing at C3-4, C5-6 and C6-7.     The lateral masses of C1 appear normally  aligned on C2. The normal  cervical lordotic curvature is preserved. Cervical vertebral alignment  is within normal limits. There is no evidence of fracture or  significant prevertebral soft tissue swelling.  No significant disc  height narrowing at any level.    Findings on a level by level basis are as follows:    C2-3:  No spinal canal or neuroforaminal stenosis.    C3-4:  Left degenerative facet hypertrophy and uncovertebral joint  hypertrophy. Moderate stenosis of the left neural foramen. Mild  narrowing of the right neural foramen. Patent spinal canal. C4-5:  No  spinal canal or neuroforaminal stenosis.    C5-6:  Bilateral facet hypertrophy and uncovertebral joint hypertrophy  moderate to severe narrowing of the neural foramina without spinal  canal stenosis. Spinal canal or neuroforaminal stenosis.    C6-7:  Mild foraminal narrowing bilaterally secondary to uncovertebral  joint hypertrophy. No spinal canal narrowing.    C7-T1: No spinal canal or neuroforaminal stenosis.    No abnormality is noted of the visualized paraspinous tissues.      Impression    Impression:    Diffuse lytic lesions throughout the cervical spine and occipital bone  compatible with underlying multiple myeloma. No overt acute fracture  or subluxation.  2. Bilateral moderate to severe foraminal narrowing at C5-C6.    HERMILA YU MD       Studies:  Cervical spine CT w/o contrast   Final Result   Impression:      Diffuse lytic lesions throughout the cervical spine and occipital bone   compatible with underlying multiple myeloma. No overt acute fracture   or subluxation.   2. Bilateral moderate to severe foraminal narrowing at C5-C6.      HERMILA YU MD      Head CT w/o contrast   Final Result   Impression:    1. No acute intracranial pathology.   2. Multiple radiolucent foci throughout the calvarium consistent with   known history of multiple myeloma. No acute fracture.      I have personally reviewed the examination and initial  interpretation   and I agree with the findings.      HERMILA YU MD      XR Chest 1 View   Final Result   Impression:    1. Newly visualized mildly displaced fractures of the lateral right   fourth and fifth ribs with suspected additional mildly displaced   fracture of the posterior right fourth rib.   2. Grossly stable compression deformities of the T10 and T12 vertebral   bodies.   3. Abnormal osteolytic appearance of the bones is better demonstrated   on recent thoracic spine CT.   4. Minimal left basilar opacities likely represent atelectasis.      EDITH SPIVEY MD      Thoracic spine XR, 3 views   Final Result   IMPRESSION: There are 12 rib-bearing thoracic vertebral segments. A severe T10 compression fracture deformity has undergone further compression compared to chest x-ray 11/15/2019. There is vertebral plana configuration. There is a moderate superior    endplate T12 compression deformity which is also more conspicuous than on comparison exam.      The thoracic vertebral bodies at the cervicothoracic junction and involving the upper thoracic spine are difficult to visualize given technique and extreme osteopenia. No other definite evidence of thoracic compression fracture, however.      There is mild kyphosis noted at the T10 level.      Additionally, there is evidence of moderate superior endplate L2 compression deformity which has developed since prior chest radiograph.      Mild dextroconvex thoracolumbar scoliosis.       POC US ECHO LIMITED    (Results Pending)   CT Chest/Abdomen/Pelvis w Contrast    (Results Pending)   CT Thoracic Spine w/o Contrast    (Results Pending)   Lumbar spine CT w/o contrast    (Results Pending)     Discussed with trauma staff on call, Dr. Andrzej Koo MD  [facility]

## 2020-11-27 NOTE — PROGRESS NOTES
[facility]   Tertiary Survey Progress Note     Date of Service: 11/27/2020    Trauma Mechanism: Fall   Date of Injury: 11/26/20  Known Injuries:  1. Right 4 & 5 rib fx      Assessment & Plan 76 y/o male s/p fall from standing. On 11/17, he went to the bathroom feeling nauseated. Then he felt lightheaded and fell. Unknown LOC.   Neuro/Pain/Psych:  # Fall   - Head CT 11/26: No intracranial pathology.     # Acute on chronic pain   - continued PTA gabapentin 100mg tid, Robaxin   - Scheduled: Tylenol 975mg, lidoderm, Oxycodone 10-20  - RAPS: ES cath inserted 11/27.     # Anxiety   - continued PTA: ativan 0.5 q4hrs prn,   - Maintain circadian rhythm.  Lights on during the day.  Off at night, minimize cares at night.  OOB during the day.    Pulmonary:  # Rib fractures   - Supplemental oxygen to keep saturation above 92 %.  - Incentive spirometer while awake     Cardiovascular:    # Hypotension in the setting of hypertension, improved with fluids    # Hyperlipidemia   - Monitor hemodynamic status.  - Trop: neg  - Holding PTA: , lisinopril 20    GI/Nutrition:    # GERD  - Continued PTA: omeprazole, Miralax    Renal/ Fluids/Electrolytes:  # HALLIE on CKD  - Creatinine: 1.59. Previous 1.28 on 11/19  - Dex 5% and 0.45%NaCl +KCL 20 for IV fluid hydration.   - Required 2 L bolus since admission   - electrolyte replacement protocol in place.     Endocrine:  # Stress hyperglycemia, mild  - No management indication.      Infectious disease:   - COVID neg 10/9. Test 11/26: pending   - Lactic Acid: 2.0  - WBC: 5.6  - Prophylaxis (see hem): acyclovir 400 bid, levofloxacin     Hematology:    # Multiple Myeloma with extensive osteolytic lesions  - Revlimid, last dose  11/17  - Consult Hem/onc    # Anemia of critical illness    - Hgb 9.3. Monitor and trend.   - Threshold for transfusion if hgb <7.0 or signs/symptoms of hypoperfusion.     # DVT Prophylaxis: Lovenox, 30mg bid      Musculoskeletal:  # s/p ORIF left clavicle   # Chronic T10 compression fx  # Chronic T12 superior endplate compression fracture   # Chronic L2 and L3 compression fracture   # Right Rib 4-5 lateral mildly displaced fx  # Right Rib 4 mildly displaced posterior fx  # Weakness and deconditioning of critical illness   - NSGY: No surgical intervention. Osteopenia tx, no brace needed for asymptomatic. Plan f/u MRI C-spine wo if patient is still here in a couple of days or as an OP.   - Physical and occupational therapy consults.    Skin:  - dilgent cares to prevent skin breakdown and wound formation.      Lines/ tubes/ drains:  - PIV, ES cath    General Cares:    PPI/H2 blocker:  PPI   DVT prophylaxis: Lovenox    Bowel Regimen/Date of last stool: in place   Pulmonary toilet: IS   ETOH screen completed yes               Frailty Score: 2   Lines / drains: Dangelo cath remains 2nd to immobility and need for strict I&O    Code status:  Full      Discharge goals:     Adequate pain management: in process     VSS x24 hours: in process     Hemoglobin stable x 48 hours: in process     Ambulating safely and/or therapy evals complete: in process     Drains/lines removed or plan in place to manage: in process     Teaching done:     Other:  Expected D/C date: TBD      Interval History   No acute events.    Review of Systems   Skin: negative  Eyes: negative  Ears/Nose/Throat: negative  Respiratory: Shortness of breath and dyspnea on exertion,   Cardiovascular: negative  Gastrointestinal: negative  Genitourinary: negative  Musculoskeletal: positive for fracture and lytic bone lesions   Neurologic: negative  Psychiatric: negative  Hematologic/Lymphatic/Immunologic: positive for multiplier my   Endocrine: negative     Physical Exam   [place] Coma Scale - Total 15/15    Frailty Questionnaire: To be done for all patients age 60+  F (Fatigue): Is the patient easily fatigued? YES = 1  R (Resistance): Is the patient unable to walk one flight  of stairs? YES = 1  A (Ambulation): Is the patient unable to walk one block? NO = 0  I  (Illness): Does the patient have more than five illnesses? YES = 1  L (Loss of weight): Has the patient lost more than 5% of weight in the past 6 months. NO = 0  Lost five pounds or more in the last 3 months without trying? AND/OR Unintended weight loss?  Does the patient have difficulty performing housework such as washing windows or scrubbing floors? AND Activity in a typical 24-hour day- No moderate or vigorous activity    Score: 3      Physical Exam  Constitutional: Awake, alert, cooperative, distress from pain.  Eyes: Lids and lashes normal, pupils equal, round and reactive to light, extra ocular muscles intact, sclera clear, conjunctiva normal.  HENT: Normocephalic, atraumatic  Respiratory: No increased work of breathing, pain with inspiration  Cardiovascular:  regular rate and rhythm,   GI: Normal bowel sounds, abdomen soft, non-distended, non-tender, no guarding  Skin:  Normal skin color, no redness, warmth, or swelling, no ecchymosis, no abrasions, and no jaundice.  Musculoskeletal: There is no redness, warmth, or swelling of the joints.  Pedal pulse palpated.  Neurologic: Awake, alert, oriented. Cranial nerves II-XII are grossly intact.  Strength and sensory is intact. No focal deficits.  Neuropsychiatric: Mildly anxious, alert, affect appropriate to situation, oriented, thought process normal.    Temp: 97.4  F (36.3  C) Temp src: Oral BP: 105/58 Pulse: 96   Resp: 18 SpO2: 97 % O2 Device: None (Room air)    Vitals:    11/26/20 2014   Weight: 65.8 kg (145 lb)     Vital Signs with Ranges  Temp:  [97.4  F (36.3  C)-98  F (36.7  C)] 97.4  F (36.3  C)  Pulse:  [] 96  Resp:  [13-30] 18  BP: ()/(41-90) 105/58  SpO2:  [91 %-99 %] 97 %  I/O last 3 completed shifts:  In: 1300 [P.O.:100; I.V.:1200]  Out: 450 [Urine:450]      LLOA Uriostegui  To contact the trauma service use job code pager [phone],   Numeric texts or  alpha text through AMCOM

## 2020-11-27 NOTE — ANESTHESIA PROCEDURE NOTES
Peripheral Nerve Block Procedure Note      Staff -   Anesthesiologist:  Hiren Greenberg MD  Resident/Fellow: Ledy Alegria MD  Performed By: resident and anesthesiologist  Procedure performed by resident/CRNA in presence of a teaching physician.    Location: Floor  Procedure Start/Stop TImes:      11/27/2020 10:45 AM     11/27/2020 11:15 AM    patient identified, IV checked, site marked, risks and benefits discussed, informed consent, monitors and equipment checked, pre-op evaluation and at physician/surgeon's request      Correct Patient: Yes      Correct Position: Yes      Correct Site: Yes      Correct Procedure: Yes      Correct Laterality:  Yes    Site Marked:  Yes  Procedure details:     Procedure:  Erector spinae    ASA:  3    Laterality:  Right    Position:  Left Lateral Decubitus    Sterile Prep: chloraprep, patient draped, mask and sterile gloves      Local skin infiltration:  1% lidocaine    amount (mL):  2    Insertion Site:  T4-5    Needle:  Touhy needle    Needle gauge:  17    Needle length (inches):  3.13    Catheter gauge:  19    Catheter threaded easily: Yes      Threaded to cm at skin:  15    Ultrasound: Yes      Ultrasound used to identify targeted nerve, plexus, or vascular structure and placed a needle adjacent to it      Permanent Image entered into patiient's record      Abnormal pain on injection: No      Blood Aspirated: No      Paresthesias:  No    Bleeding at site: No      Bolus via:  Catheter    Infusion Method:  Continuous Infusion    Blood aspirated via catheter: No      Secured:  Dermabond and Tegaderm    Complications:  None  Assessment/Narrative:     Injection made incrementally with aspirations every (mL):  5     Right erector spinae catheter placement

## 2020-11-27 NOTE — ED TRIAGE NOTES
Pt BIBA from home for fall.  Pt was using the bathroom felt light headed and nauseated when he fell backwards.  Denies LOC.  States nausea subsided after.  Reports R lower back pain.      Hx  Multiple Myelomma, hx fractures, currently on chemo

## 2020-11-28 ENCOUNTER — APPOINTMENT (OUTPATIENT)
Dept: GENERAL RADIOLOGY | Facility: CLINIC | Age: 75
DRG: 542 | End: 2020-11-28
Attending: STUDENT IN AN ORGANIZED HEALTH CARE EDUCATION/TRAINING PROGRAM
Payer: COMMERCIAL

## 2020-11-28 ENCOUNTER — APPOINTMENT (OUTPATIENT)
Dept: PHYSICAL THERAPY | Facility: CLINIC | Age: 75
DRG: 542 | End: 2020-11-28
Attending: STUDENT IN AN ORGANIZED HEALTH CARE EDUCATION/TRAINING PROGRAM
Payer: COMMERCIAL

## 2020-11-28 ENCOUNTER — APPOINTMENT (OUTPATIENT)
Dept: OCCUPATIONAL THERAPY | Facility: CLINIC | Age: 75
DRG: 542 | End: 2020-11-28
Attending: STUDENT IN AN ORGANIZED HEALTH CARE EDUCATION/TRAINING PROGRAM
Payer: COMMERCIAL

## 2020-11-28 LAB
ANION GAP SERPL CALCULATED.3IONS-SCNC: 4 MMOL/L (ref 3–14)
BUN SERPL-MCNC: 21 MG/DL (ref 7–30)
CALCIUM SERPL-MCNC: 7.7 MG/DL (ref 8.5–10.1)
CHLORIDE SERPL-SCNC: 114 MMOL/L (ref 94–109)
CO2 SERPL-SCNC: 20 MMOL/L (ref 20–32)
CREAT SERPL-MCNC: 0.99 MG/DL (ref 0.66–1.25)
GFR SERPL CREATININE-BSD FRML MDRD: 75 ML/MIN/{1.73_M2}
GLUCOSE BLDC GLUCOMTR-MCNC: 101 MG/DL (ref 70–99)
GLUCOSE SERPL-MCNC: 88 MG/DL (ref 70–99)
MAGNESIUM SERPL-MCNC: 2.1 MG/DL (ref 1.6–2.3)
MAGNESIUM SERPL-MCNC: 2.1 MG/DL (ref 1.6–2.3)
PHOSPHATE SERPL-MCNC: 1.4 MG/DL (ref 2.5–4.5)
PHOSPHATE SERPL-MCNC: 2 MG/DL (ref 2.5–4.5)
POTASSIUM SERPL-SCNC: 3.9 MMOL/L (ref 3.4–5.3)
POTASSIUM SERPL-SCNC: 4 MMOL/L (ref 3.4–5.3)
SODIUM SERPL-SCNC: 138 MMOL/L (ref 133–144)

## 2020-11-28 PROCEDURE — 99233 SBSQ HOSP IP/OBS HIGH 50: CPT | Mod: GC | Performed by: INTERNAL MEDICINE

## 2020-11-28 PROCEDURE — 97116 GAIT TRAINING THERAPY: CPT | Mod: GP | Performed by: PHYSICAL THERAPIST

## 2020-11-28 PROCEDURE — 84132 ASSAY OF SERUM POTASSIUM: CPT | Performed by: PHYSICIAN ASSISTANT

## 2020-11-28 PROCEDURE — 83735 ASSAY OF MAGNESIUM: CPT | Performed by: PHYSICIAN ASSISTANT

## 2020-11-28 PROCEDURE — 84100 ASSAY OF PHOSPHORUS: CPT | Performed by: INTERNAL MEDICINE

## 2020-11-28 PROCEDURE — 999N001017 HC STATISTIC GLUCOSE BY METER IP

## 2020-11-28 PROCEDURE — 258N000003 HC RX IP 258 OP 636: Performed by: STUDENT IN AN ORGANIZED HEALTH CARE EDUCATION/TRAINING PROGRAM

## 2020-11-28 PROCEDURE — 120N000002 HC R&B MED SURG/OB UMMC

## 2020-11-28 PROCEDURE — 94150 VITAL CAPACITY TEST: CPT

## 2020-11-28 PROCEDURE — 36415 COLL VENOUS BLD VENIPUNCTURE: CPT | Performed by: PHYSICIAN ASSISTANT

## 2020-11-28 PROCEDURE — 258N000003 HC RX IP 258 OP 636: Performed by: INTERNAL MEDICINE

## 2020-11-28 PROCEDURE — 97165 OT EVAL LOW COMPLEX 30 MIN: CPT | Mod: GO

## 2020-11-28 PROCEDURE — 99232 SBSQ HOSP IP/OBS MODERATE 35: CPT | Performed by: PHYSICIAN ASSISTANT

## 2020-11-28 PROCEDURE — 250N000013 HC RX MED GY IP 250 OP 250 PS 637: Performed by: PHYSICIAN ASSISTANT

## 2020-11-28 PROCEDURE — 97161 PT EVAL LOW COMPLEX 20 MIN: CPT | Mod: GP | Performed by: PHYSICAL THERAPIST

## 2020-11-28 PROCEDURE — 250N000011 HC RX IP 250 OP 636: Performed by: PHYSICIAN ASSISTANT

## 2020-11-28 PROCEDURE — 84100 ASSAY OF PHOSPHORUS: CPT | Performed by: STUDENT IN AN ORGANIZED HEALTH CARE EDUCATION/TRAINING PROGRAM

## 2020-11-28 PROCEDURE — 36415 COLL VENOUS BLD VENIPUNCTURE: CPT | Performed by: STUDENT IN AN ORGANIZED HEALTH CARE EDUCATION/TRAINING PROGRAM

## 2020-11-28 PROCEDURE — 84100 ASSAY OF PHOSPHORUS: CPT | Performed by: PHYSICIAN ASSISTANT

## 2020-11-28 PROCEDURE — 80048 BASIC METABOLIC PNL TOTAL CA: CPT | Performed by: STUDENT IN AN ORGANIZED HEALTH CARE EDUCATION/TRAINING PROGRAM

## 2020-11-28 PROCEDURE — 71045 X-RAY EXAM CHEST 1 VIEW: CPT

## 2020-11-28 PROCEDURE — 97530 THERAPEUTIC ACTIVITIES: CPT | Mod: GO

## 2020-11-28 PROCEDURE — 71045 X-RAY EXAM CHEST 1 VIEW: CPT | Mod: 26 | Performed by: RADIOLOGY

## 2020-11-28 PROCEDURE — 250N000009 HC RX 250: Performed by: INTERNAL MEDICINE

## 2020-11-28 PROCEDURE — 97530 THERAPEUTIC ACTIVITIES: CPT | Mod: GP | Performed by: PHYSICAL THERAPIST

## 2020-11-28 PROCEDURE — 36415 COLL VENOUS BLD VENIPUNCTURE: CPT | Performed by: INTERNAL MEDICINE

## 2020-11-28 PROCEDURE — 83735 ASSAY OF MAGNESIUM: CPT | Performed by: STUDENT IN AN ORGANIZED HEALTH CARE EDUCATION/TRAINING PROGRAM

## 2020-11-28 PROCEDURE — 250N000011 HC RX IP 250 OP 636: Performed by: STUDENT IN AN ORGANIZED HEALTH CARE EDUCATION/TRAINING PROGRAM

## 2020-11-28 PROCEDURE — 250N000013 HC RX MED GY IP 250 OP 250 PS 637: Performed by: SURGERY

## 2020-11-28 PROCEDURE — 999N000157 HC STATISTIC RCP TIME EA 10 MIN

## 2020-11-28 RX ORDER — AMOXICILLIN 250 MG
1-2 CAPSULE ORAL 2 TIMES DAILY
Status: DISCONTINUED | OUTPATIENT
Start: 2020-11-28 | End: 2020-12-04 | Stop reason: HOSPADM

## 2020-11-28 RX ORDER — LISINOPRIL 20 MG/1
20 TABLET ORAL DAILY
Status: DISCONTINUED | OUTPATIENT
Start: 2020-11-28 | End: 2020-12-04 | Stop reason: HOSPADM

## 2020-11-28 RX ORDER — GABAPENTIN 300 MG/1
300 CAPSULE ORAL 3 TIMES DAILY
Status: DISCONTINUED | OUTPATIENT
Start: 2020-11-28 | End: 2020-12-04 | Stop reason: HOSPADM

## 2020-11-28 RX ADMIN — ACYCLOVIR 400 MG: 400 TABLET ORAL at 19:42

## 2020-11-28 RX ADMIN — SENNOSIDES AND DOCUSATE SODIUM 2 TABLET: 8.6; 5 TABLET ORAL at 19:43

## 2020-11-28 RX ADMIN — OXYCODONE HYDROCHLORIDE 10 MG: 10 TABLET ORAL at 01:59

## 2020-11-28 RX ADMIN — Medication 25 MCG: at 09:11

## 2020-11-28 RX ADMIN — POLYETHYLENE GLYCOL 3350 17 G: 17 POWDER, FOR SOLUTION ORAL at 09:12

## 2020-11-28 RX ADMIN — HYDROCHLOROTHIAZIDE 12.5 MG: 12.5 CAPSULE, GELATIN COATED ORAL at 09:11

## 2020-11-28 RX ADMIN — ACETAMINOPHEN 975 MG: 325 TABLET, FILM COATED ORAL at 11:27

## 2020-11-28 RX ADMIN — LEVOFLOXACIN 250 MG: 250 TABLET, FILM COATED ORAL at 09:11

## 2020-11-28 RX ADMIN — ACETAMINOPHEN 975 MG: 325 TABLET, FILM COATED ORAL at 04:40

## 2020-11-28 RX ADMIN — POTASSIUM & SODIUM PHOSPHATES POWDER PACK 280-160-250 MG 1 PACKET: 280-160-250 PACK at 17:35

## 2020-11-28 RX ADMIN — GABAPENTIN 300 MG: 300 CAPSULE ORAL at 19:43

## 2020-11-28 RX ADMIN — ENOXAPARIN SODIUM 30 MG: 30 INJECTION SUBCUTANEOUS at 09:11

## 2020-11-28 RX ADMIN — POTASSIUM CHLORIDE, DEXTROSE MONOHYDRATE AND SODIUM CHLORIDE: 150; 5; 450 INJECTION, SOLUTION INTRAVENOUS at 10:25

## 2020-11-28 RX ADMIN — ACYCLOVIR 400 MG: 400 TABLET ORAL at 09:12

## 2020-11-28 RX ADMIN — ONDANSETRON 4 MG: 4 TABLET, ORALLY DISINTEGRATING ORAL at 13:32

## 2020-11-28 RX ADMIN — METHOCARBAMOL 500 MG: 500 TABLET, FILM COATED ORAL at 09:11

## 2020-11-28 RX ADMIN — METHOCARBAMOL 500 MG: 500 TABLET, FILM COATED ORAL at 11:27

## 2020-11-28 RX ADMIN — OMEPRAZOLE 10 MG: 10 CAPSULE, DELAYED RELEASE ORAL at 09:11

## 2020-11-28 RX ADMIN — LISINOPRIL 20 MG: 20 TABLET ORAL at 11:27

## 2020-11-28 RX ADMIN — ACETAMINOPHEN 975 MG: 325 TABLET, FILM COATED ORAL at 19:43

## 2020-11-28 RX ADMIN — CALCIUM 500 MG: 500 TABLET ORAL at 09:11

## 2020-11-28 RX ADMIN — ENOXAPARIN SODIUM 30 MG: 30 INJECTION SUBCUTANEOUS at 19:43

## 2020-11-28 RX ADMIN — SODIUM PHOSPHATE, MONOBASIC, MONOHYDRATE 15 MMOL: 276; 142 INJECTION, SOLUTION INTRAVENOUS at 13:32

## 2020-11-28 RX ADMIN — POTASSIUM & SODIUM PHOSPHATES POWDER PACK 280-160-250 MG 1 PACKET: 280-160-250 PACK at 11:27

## 2020-11-28 RX ADMIN — METHOCARBAMOL 500 MG: 500 TABLET, FILM COATED ORAL at 19:43

## 2020-11-28 RX ADMIN — OXYCODONE HYDROCHLORIDE 10 MG: 10 TABLET ORAL at 19:42

## 2020-11-28 RX ADMIN — CALCIUM 500 MG: 500 TABLET ORAL at 17:35

## 2020-11-28 RX ADMIN — POTASSIUM CHLORIDE, DEXTROSE MONOHYDRATE AND SODIUM CHLORIDE: 150; 5; 450 INJECTION, SOLUTION INTRAVENOUS at 00:01

## 2020-11-28 RX ADMIN — CALCIUM 500 MG: 500 TABLET ORAL at 11:27

## 2020-11-28 RX ADMIN — SENNOSIDES AND DOCUSATE SODIUM 2 TABLET: 8.6; 5 TABLET ORAL at 11:27

## 2020-11-28 RX ADMIN — GABAPENTIN 300 MG: 300 CAPSULE ORAL at 13:32

## 2020-11-28 RX ADMIN — GABAPENTIN 300 MG: 300 CAPSULE ORAL at 09:11

## 2020-11-28 ASSESSMENT — ACTIVITIES OF DAILY LIVING (ADL)
ADLS_ACUITY_SCORE: 18
ADLS_ACUITY_SCORE: 19
ADLS_ACUITY_SCORE: 18
ADLS_ACUITY_SCORE: 19
ADLS_ACUITY_SCORE: 18
ADLS_ACUITY_SCORE: 19

## 2020-11-28 NOTE — PROGRESS NOTES
REGIONAL ANESTHESIA PAIN SERVICE CONTINUOUS NERVE INFUSION NOTE  Rogelio Marshall is a 75 year old male with thoracic rib and vertebral body fracture after fall with placement of right T4-5 erector spinae (ES) catheter for pain control.    Subjective and Interval History: Overnight events: none.  Patient seen at 0920.  Received bolus at 09:20. Reports pain control improved with nerve block continuous infusion and current analgesics (see below).  Still having significant pain with movement but also notes significant improvement at rest.      Pain Intensity using Numerical Rating Scale (NRS):  3/10 at rest and 10/10 with activity      Antithrombotic/Thrombolytic Therapy ordered:  30 mg Lovenox q12 hr    Analgesic Medications:   Medications related to Pain Management (From now, onward)    Start     Dose/Rate Route Frequency Ordered Stop    11/28/20 0830  gabapentin (NEURONTIN) capsule 300 mg      300 mg Oral 3 TIMES DAILY 11/28/20 0817      11/27/20 1400  lidocaine patch in PLACE       Transdermal EVERY 8 HOURS 11/27/20 1334      11/27/20 1330  Lidocaine (LIDOCARE) 4 % Patch 1-3 patch      1-3 patch  over 12 Hours Transdermal EVERY 24 HOURS 11/27/20 1304      11/27/20 1330  methocarbamol (ROBAXIN) tablet 500 mg      500 mg Oral 4 TIMES DAILY 11/27/20 1306      11/27/20 1330  polyethylene glycol (MIRALAX) Packet 17 g      17 g Oral DAILY 11/27/20 1306      11/27/20 1310  oxyCODONE IR (ROXICODONE) tablet 10-20 mg      10-20 mg Oral EVERY 4 HOURS PRN 11/27/20 1310      11/27/20 1305  senna-docusate (SENOKOT-S/PERICOLACE) 8.6-50 MG per tablet 1-2 tablet      1-2 tablet Oral 2 TIMES DAILY PRN 11/27/20 1306      11/27/20 1304  LORazepam (ATIVAN) tablet 0.5 mg      0.5 mg Oral EVERY 4 HOURS PRN 11/27/20 1306      11/27/20 1200  ropivacaine 0.2% (NAROPIN) 750 mL in ON-Q C-Bloc select flow ([id number] holds 600-750 mL) single cath disposable pump      14 mL/hr  Irrigation CONTINUOUS 11/27/20 1133      11/27/20 0430  acetaminophen  (TYLENOL) tablet 975 mg      975 mg Oral EVERY 8 HOURS 11/27/20 0302      11/27/20 0251  methocarbamol (ROBAXIN) tablet 500 mg      500 mg Oral EVERY 6 HOURS PRN 11/27/20 0251 11/30/20 0250    11/26/20 2015  HYDROmorphone (PF) (DILAUDID) injection 0.3 mg      0.3 mg Intravenous ONCE 11/26/20 2010             Objective:  Lab results  Recent Labs   Lab Test 11/26/20 2006   WBC 5.6   RBC 2.81*   HGB 9.3*   HCT 29.9*   *   MCH 33.1*   MCHC 31.1*   RDW 15.0          Lab Results   Component Value Date    INR 1.14 10/09/2020       Vitals:    Temp:  [35.6  C (96  F)-36.2  C (97.2  F)] 35.6  C (96  F)  Pulse:  [78-97] 78  Resp:  [16-20] 16  BP: ()/(46-78) 138/74  SpO2:  [96 %-100 %] 98 %    Exam:   GEN: alert and no distress  NEURO: moving all extremities spontaneously and to command  SKIN: right erector spinae (ES) catheter site with dressing c/d/i, no tenderness, erythema, heme, edema      Assessment and Plan:     Patient is receiving improved analgesia with current multimodal therapy including right T4-5 erector spinae (ES) catheter with infusion of ropivacaine 0.2% at 14 mL/hr. No evidence of adverse side effects related to local anesthetic.     BOLUS GIVEN @ 09:20, 10 mL 0.25% bupivacaine.  RN in room and aware of monitoring BP q10 min x 3 after bolus.     - continue 0.2% ropivacaine infusion rate at 14 mL/hr  - antithrombotic/thrombolytic therapy Lovenox 30 mg BID ordered. Please contact RAPS (#[phone]) prior to any medication changes  - will continue to follow and adjust as needed    - discussed plan with attending anesthesiologist    Rogelio Messina III, MD  Regional Anesthesia Pain Service  11/28/2020 8:35 AM    RAPS Contact Info (24 hour job code pager is the last 4 digits) For in-house use only:   Job code ID: [email or web address] [id number]   [place] 0586  [facility] 0[address]  [place] phone: dial * * * [phone], enter jobcode ID, then enter call-back number.    Text: Use AMCOM on the Intranet <Paging/Directory> tab and enter  Jobcode ID.   If no call back at any time, contact the hospital  and ask for RAPS attending or backup

## 2020-11-28 NOTE — PROGRESS NOTES
Neurosurgery progress note    S: denies back pain    O:  Temp:  [96  F (35.6  C)-97.2  F (36.2  C)] 96  F (35.6  C)  Pulse:  [78-97] 78  Resp:  [16-20] 16  BP: ()/(46-78) 138/74  SpO2:  [96 %-100 %] 98 %    Exam:  Motor:  Normal bulk / tone; no tremor, rigidity, or bradykinesia.  No muscle wasting or fasciculations       Delt Bi Tri Hand Flexion/  Extension Iliopsoas Quadriceps Hamstrings Tibialis Anterior Gastroc     C5 C6 C7 C8/T1 L2 L3 L4-S1 L4 S1   R 5 5 5 5 5 5 5 5 5   L 5 5 5 5 5 5 5 5 5   Sensory:  intact to LT x 4 extremities         Reflexes: no clonus       Bi BR Katie Pat Bab     C5-6 C6 UMN L2-4 UMN   R 2+ 2+ Norm 2+ Norm   L 2+ 2+ Norm 2+ Norm      ASSESSMENT:  76 y/o M with know spinal compression fractures in the setting of osteopenia and multiple myeloma presents with progression of vertebral compression fractures and rip fractures after a fall but neurologically intact.        RECOMMENDATIONS:  - No neurosurgical intervention indicated at this time   - pain management for rip fractures.  - no brace needed given chronic nature of compression fractures and what appears to be asymptomatic statust  - osteopenia treatment  - MM workup  - Patient expressed his disinterest in cervical MRI  - Neurosurgery to sign off, patient may obtain MRI C spine wo as outpatient and follow up with neurosurgery clinic        Geoffrey Triplett MD, PhD  Neurosurgery Resident PGY-2      Please contact neurosurgery resident on call with questions.    Dial * * [phone], enter [phone] when prompted

## 2020-11-28 NOTE — PROGRESS NOTES
Hematology Consult Note   Date of Service: 11/28/2020    Patient: Rogelio Marshall  MRN: [id number]  Admission Date: 11/26/2020  Hospital Day # 2   Primary Outpatient Hematologist: Dr. Mukherjee    Reason for Consult: MM      History of Present Illness:    76 y/o M w/ hx of HTN, HLD and multiple myeloma, s/p C1D8 Valcade on 11/26/2020 presents to the [facility] ED after a fall, resulted with right upper thoracic pain. Patient lives alone. He had bad appetite and had not been eating/drinking well. He went to shop yesterday for some cookies. He then has some nausea and took 1 Zofran then vomited it out. He took another nausea medication which is likely compazine. He had a fainting feeling but did not loss consciousness.  He went to bathroom because he did not feel well with nausea feeling. He noticed that he did not have the strength to get up from his position leaning over the toilette and he fell backward onto his back. He denies any fever, chills, no bowel/urine problem.     He was noticed to be hypotensive with BP as low as 62/41, improving with NS bolus. He is on IVF and feels better.     He is admitted to trauma service with CT findings of lucid bone lesion throughout the whole spine.   C spine- No overt acute fracture or subluxation.  Thoracic spine: T1, T5, T10 and T12 compression fracture, and right third through fifth rib fractures.   Lumbar spine: moderate chronic L2 and marked chronic L3 compressions.    Hematologic History:  This patient is a 75 year old man with lower back pain over a year. He has known compression fractures and bone scan reveals multiple lytic lesions throughout his appendicular and axial skeleton.    - His Beta 2 microglobulin was 3.6 on 10/10/2020.    - Kappa chains were 73.6 on 10/5/2020 with K/L ratio of 89.9. M spike of 16.2 in urine on 10/10.    - Bone marrow biopsy on 10/23/2020 showed trilineage hematopoiesis and 50-60% kappa restricted plasma cells.    - Flow cytometry showed 20 %  plasma cells which express CD19, CD38, CD45 and monotypic cytoplasmic kappa immunoglobulin light chains but lack CD20 and CD56. Cytogenetics pending.    - FISH shows IGH-CCND1 fusion (81%; 30% had loss of IGH component from one fusion signal).      He was started on  VRD on    - Velcade 2.1mg days 1, 4, 8, 11   - Revlimid 25 mg daily for 14 days on, 7 days off   - Dexamethasone 40mg Days 1, 8, 15.    - so far he received Day 8 Cycle 1 Velcade on 11/26/2020    Interval History: no new complaints today, feels rib pain is well controlled and does not feel nauseated when we saw him today. No further hypotension and BP in good range with fluids    Review of Systems: Pertinent positive and negative systems described in interval history; the remainder of the 14 systems are negative    Past Medical History:  Past Medical History:   Diagnosis Date     Hyperlipidemia      Hypertension        Past Surgical History:  Past Surgical History:   Procedure Laterality Date     HERNIA REPAIR, INGUINAL RT/LT      needed post-op intestinal repair     ORIF left clavical         Social History:  Social History     Socioeconomic History     Marital status: Single     Spouse name: None     Number of children: None     Years of education: None     Highest education level: None   Occupational History     Occupation: retired   Social Needs     Financial resource strain: None     Food insecurity     Worry: None     Inability: None     Transportation needs     Medical: None     Non-medical: None   Tobacco Use     Smoking status: Former Smoker     Packs/day: 0.10     Years: 30.00     Pack years: 3.00     Types: Cigarettes     Smokeless tobacco: Never Used   Substance and Sexual Activity     Alcohol use: Yes     Alcohol/week: 17.5 standard drinks     Types: 21 drink(s) per week     Drug use: None     Sexual activity: None   Lifestyle     Physical activity     Days per week: None     Minutes per session: None     Stress: None   Relationships      Social connections     Talks on phone: None     Gets together: None     Attends Alevism service: None     Active member of club or organization: None     Attends meetings of clubs or organizations: None     Relationship status: None     Intimate partner violence     Fear of current or ex partner: None     Emotionally abused: None     Physically abused: None     Forced sexual activity: None   Other Topics Concern      Service Not Asked     Blood Transfusions Not Asked     Caffeine Concern Not Asked     Occupational Exposure Not Asked     Hobby Hazards Not Asked     Sleep Concern Not Asked     Stress Concern Not Asked     Weight Concern Not Asked     Special Diet Not Asked     Back Care Not Asked     Exercise Yes     Comment: bikes daily     Bike Helmet Not Asked     Seat Belt Not Asked     Self-Exams Not Asked   Social History Narrative     None        Family History  Family History   Problem Relation Age of Onset     C.A.D. Father          MI age 69     Hypertension Sister         obese     Family History Negative Brother        Outpatient Medications:  No current facility-administered medications on file prior to encounter.        acyclovir (ZOVIRAX) 400 MG tablet, Take 1 tablet (400 mg) by mouth 2 times daily Viral Prophylaxis.       aspirin (ASA) 325 MG tablet, Take 1 tablet (325 mg) by mouth daily       calcium carbonate 500 mg, elemental, (OSCAL) 500 MG tablet, Take 1 tablet (500 mg) by mouth 3 times daily (with meals) (Patient taking differently: Take 500 mg by mouth 2 times daily )       cholecalciferol (VITAMIN D3) 25 mcg (1000 units) capsule, Take 1 capsule by mouth daily       gabapentin (NEURONTIN) 100 MG capsule, Take 1 capsule (100 mg) by mouth 3 times daily       hydrochlorothiazide (MICROZIDE) 12.5 MG capsule, Take 12.5 mg by mouth daily       ibuprofen (ADVIL/MOTRIN) 600 MG tablet, Take 1 tablet (600 mg) by mouth every 6 hours       LENalidomide (REVLIMID) 25 MG CAPS capsule, Take 1  "capsule (25 mg) by mouth daily for 14 days Days 1 through 14.       levofloxacin (LEVAQUIN) 250 MG tablet, Take 1 tablet (250 mg) by mouth daily       lisinopril (ZESTRIL) 20 MG tablet, Take 1 tablet (20 mg) by mouth daily       methocarbamol (ROBAXIN) 500 MG tablet, Take 1 tablet (500 mg) by mouth 4 times daily       omeprazole (PRILOSEC) 10 MG DR capsule, Take 10 mg by mouth daily       ondansetron (ZOFRAN-ODT) 4 MG ODT tab, Take 1 tablet (4 mg) by mouth every 6 hours as needed for nausea or vomiting       oxyCODONE (ROXICODONE) 5 MG tablet, Take 1 tablet (5 mg) by mouth every 6 hours as needed for severe pain       polyethylene glycol (MIRALAX) 17 g packet, Take 17 g by mouth daily       senna-docusate (SENOKOT-S/PERICOLACE) 8.6-50 MG tablet, Take 1 tablet by mouth daily as needed        acetaminophen (TYLENOL) 325 MG tablet, Take 3 tablets (975 mg) by mouth 3 times daily       dexamethasone (DECADRON) 4 MG tablet, Take 10 tablets (40 mg) by mouth every 7 days for 3 doses Days 1, 8, and 15.       LORazepam (ATIVAN) 0.5 MG tablet, Take 1 tablet (0.5 mg) by mouth every 4 hours as needed (Anxiety, Nausea/Vomiting or Sleep) (Patient not taking: Reported on 11/26/2020)       prochlorperazine (COMPAZINE) 10 MG tablet, Take 1 tablet (10 mg) by mouth every 6 hours as needed (Nausea/Vomiting) (Patient not taking: Reported on 11/26/2020)         Physical Exam:    BP (!) 161/88 (BP Location: Right arm)   Pulse 92   Temp 95.5  F (35.3  C) (Oral)   Resp 16   Ht 1.549 m (5' 1\")   Wt 65.8 kg (145 lb)   SpO2 97%   BMI 27.40 kg/m    Gen: Well appearing, in NAD  CV: Normal rate, regular rhythm. No m/r/g  Pulm: CTABL  Abd: Soft, nt/nd  Ext: No lower extremity edema  Skin: dry   Neuro: Alert and answering questions appropriately.     Labs & Studies: I personally reviewed the following studies:  ROUTINE LABS (Last four results):  CMP  Recent Labs   Lab 11/28/20  0726 11/27/20  0707 11/26/20 2006    140 136   POTASSIUM " 4.0 3.9 3.5   CHLORIDE 114* 114* 107   CO2 20 20 22   ANIONGAP 4 5 7   GLC 88 87 106*   BUN 21 37* 44*   CR 0.99 1.35* 1.59*   GFRESTIMATED 75 51* 42*   GFRESTBLACK 87 60* 49*   MIRI 7.7* 7.4* 8.9   MAG 2.1 2.0  --    PHOS 2.0* 3.3  --    PROTTOTAL  --   --  9.0*   ALBUMIN  --   --  2.7*   BILITOTAL  --   --  0.3   ALKPHOS  --   --  83   AST  --   --  4   ALT  --   --  22     CBC  Recent Labs   Lab 11/26/20 2006 11/26/20  0917   WBC 5.6 10.5   RBC 2.81* 2.85*   HGB 9.3* 9.5*   HCT 29.9* 29.7*   * 104*   MCH 33.1* 33.3*   MCHC 31.1* 32.0   RDW 15.0 14.7    266     INRNo lab results found in last 7 days.    Assessment & Plan:   74 y/o M w/ hx of HTN, HLD and multiple myeloma, s/p C1D8 Valcade on 11/26/2020 presents to the [facility] ED after a fall, resulted with right upper thoracic pain, from new right third through fifth rib fractures. He has multiple old compression fracture including T1, T5, T10 and T12, L2 and L3.     #  Fall: based on the history patient is likely has orthostatic hypotension/dehydration  from poor po intake and possible side effect from Compazine. The risk of infection is low since he is neutropenic. He had CT chest which did not show acute change. We could check blood culture and UA if hypotensive again, but now with good range for >24 hrs     # N/V due to chemo: resolved now. Patient is instructed to take zofran prior to his chemo day and use is prn and avoid Compazine.     # MM: as to his MM treatment, he only received C1D8 Valcade yesterday. He did not carry his Revlimid with him. I recommend holding revlimid for now. Revlimid is known to have central nervous system side effect including: Fatigue (11% to 34%), dizziness (20%), and headache (9% to 20%). I am concerned that he is having a difficult time tolerating his chemo regimen. I would recomment holding the revlimid and reassessing his treatment plan. However, he is due for velcade tomorrow and she should get that (it is fairly  mild)  - his chemo plan is    - Velcade 2.1mg days 1, 4, 8, 11 (due on 11/29)-I would plan to give this inpatient   - Revlimid 25 mg daily for 14 days on, 7 days off. Hold this for now   - Dexamethasone 40mg Days 1, 8, 15 (due on 12/3)   - so far he received Day 8 Cycle 1 Velcade on 11/26/2020    Recommendations:   Velcade tomorrow  Hold REvlimid  - check blood culture x2 and UA if hypotensive again  - IVF prn hypotension  - pain control per primary team  - we will help with f/u plan when he is close to be discharged.       Kizzy Yuen MD/PhD

## 2020-11-28 NOTE — PHARMACY-ADMISSION MEDICATION HISTORY
"  Admission Medication History Completed by Pharmacy    See [facility] Admission Navigator for allergy information, preferred outpatient pharmacy, prior to admission medications and immunization status.     Medication History Sources:     Patient, Dispense Report    Changes made to PTA medication list (reason):    Added: None    Deleted:     Lidocaine 4% patch - 2 patches daily (patient not taking)    Menthol 5% patch - 1 patch daily (patient not taking)    Tizanidine 4 mg hs prn (old Rx, patient no longer taking)    Changed:     Senna-docusate - added prn    Additional Information:    Patient is knowledgeable about medication use, but notes that he generally takes what the nurse sets up for him. Patient has 3 steps of anti-nausea medications that he had never used prior to yesterday; yesterday, he took the \"first step\" which he believes is ondansetron, but vomited it back up and took a second ondansetron, after which he had his fall. Patient has never used prochlorperazine or lorazepam. Patient is concerned about being off schedule on Revlimid and also mentions being off schedule on dexamethasone, though he noted he is taking 1 week off of dexamethasone following 3 doses. Patient avoids taking calcium carbonate during second meal of the day because he read that it interacts with levofloxacin. Patient started taking omeprazole four days ago. Patient typically only takes oxycodone upon waking up, uses acetaminophen and ibuprofen if he has pain later in the day.    Prior to Admission medications    Medication Sig Last Dose Taking? Auth Provider   acyclovir (ZOVIRAX) 400 MG tablet Take 1 tablet (400 mg) by mouth 2 times daily Viral Prophylaxis. 11/26/2020 Yes Angela Sweeney MD   aspirin (ASA) 325 MG tablet Take 1 tablet (325 mg) by mouth daily 11/26/2020 Yes Angela Sweeney MD   calcium carbonate 500 mg, elemental, (OSCAL) 500 MG tablet Take 1 tablet (500 mg) by mouth 3 times daily (with meals)  Patient taking " differently: Take 500 mg by mouth 2 times daily  11/26/2020 Yes Monika Dean APRN CNP   cholecalciferol (VITAMIN D3) 25 mcg (1000 units) capsule Take 1 capsule by mouth daily 11/26/2020 Yes Reported, Patient   gabapentin (NEURONTIN) 100 MG capsule Take 1 capsule (100 mg) by mouth 3 times daily 11/26/2020 Yes Monika Dean APRN CNP   hydrochlorothiazide (MICROZIDE) 12.5 MG capsule Take 12.5 mg by mouth daily 11/26/2020 Yes Reported, Patient   ibuprofen (ADVIL/MOTRIN) 600 MG tablet Take 1 tablet (600 mg) by mouth every 6 hours 11/26/2020 Yes Monika Dean APRN CNP   LENalidomide (REVLIMID) 25 MG CAPS capsule Take 1 capsule (25 mg) by mouth daily for 14 days Days 1 through 14. 11/26/2020 at Noon Yes Angela Sweeney MD   levofloxacin (LEVAQUIN) 250 MG tablet Take 1 tablet (250 mg) by mouth daily 11/26/2020 at 2 PM Yes Angela Sweeney MD   lisinopril (ZESTRIL) 20 MG tablet Take 1 tablet (20 mg) by mouth daily 11/26/2020 Yes Filiberto Mata MD   methocarbamol (ROBAXIN) 500 MG tablet Take 1 tablet (500 mg) by mouth 4 times daily 11/26/2020 Yes Monika Dean APRN CNP   omeprazole (PRILOSEC) 10 MG DR capsule Take 10 mg by mouth daily 11/26/2020 Yes Reported, Patient   ondansetron (ZOFRAN-ODT) 4 MG ODT tab Take 1 tablet (4 mg) by mouth every 6 hours as needed for nausea or vomiting 11/26/2020 Yes Monika Dean APRN CNP   oxyCODONE (ROXICODONE) 5 MG tablet Take 1 tablet (5 mg) by mouth every 6 hours as needed for severe pain 11/26/2020 at AM Yes Angela Sweeney MD   polyethylene glycol (MIRALAX) 17 g packet Take 17 g by mouth daily 11/26/2020 Yes Monika Dean APRN CNP   senna-docusate (SENOKOT-S/PERICOLACE) 8.6-50 MG tablet Take 1 tablet by mouth daily as needed  Past Month Yes Reported, Patient   acetaminophen (TYLENOL) 325 MG tablet Take 3 tablets (975 mg) by mouth 3 times daily 11/26/2020  Monika Dean APRN CNP   dexamethasone (DECADRON) 4 MG tablet Take 10 tablets (40 mg) by mouth  every 7 days for 3 doses Days 1, 8, and 15.   Angela Sweeney MD   LORazepam (ATIVAN) 0.5 MG tablet Take 1 tablet (0.5 mg) by mouth every 4 hours as needed (Anxiety, Nausea/Vomiting or Sleep)  Patient not taking: Reported on 11/26/2020   Angela Sweeney MD   prochlorperazine (COMPAZINE) 10 MG tablet Take 1 tablet (10 mg) by mouth every 6 hours as needed (Nausea/Vomiting)  Patient not taking: Reported on 11/26/2020   Angela Sweeney MD       Date completed: 11/27/20    Medication history completed by: Krishna Marques

## 2020-11-28 NOTE — PROGRESS NOTES
11/28/20 1415   Quick Adds   Type of Visit Initial PT Evaluation   Living Environment   People in home alone   Current Living Arrangements apartment   Home Accessibility no concerns  (elevator)   Transportation Anticipated car, drives self   Living Environment Comments Pt reports he uses WW at home and in the community; however, when he feels strong some days he feels safe mobilizing w/out AD.    Self-Care   Usual Activity Tolerance moderate   Current Activity Tolerance poor   Regular Exercise No   Equipment Currently Used at Home walker, rolling   Disability/Function   Hearing Difficulty or Deaf no   Wear Glasses or Blind no   Walking or Climbing Stairs Difficulty no   Fall history within last six months yes   Number of times patient has fallen within last six months 1   General Information   Onset of Illness/Injury or Date of Surgery 11/26/20   Referring Physician MD Valentino Koo   Patient/Family Therapy Goals Statement (PT) pain control   Pertinent History of Current Problem (include personal factors and/or comorbidities that impact the POC) 76 y/o M with known spinal compression fractures (T1, T5, T10, T12, L2, L3) in the setting of osteopenia and multiple myeloma presents with progression of vertebral compression fractures and rib fractures (R 3-5th ribs) after a fall.   Existing Precautions/Restrictions fall;spinal   General Observations pt extremely hesistant to try any movement in therapy   Cognition   Orientation Status (Cognition) oriented x 3   Affect/Mental Status (Cognition) anxious   Follows Commands (Cognition) follows one-step commands;delayed response/completion;increased processing time needed;physical/tactile prompts required;repetition of directions required;verbal cues/prompting required   Safety Deficit (Cognition) judgment;insight into deficits/self-awareness   Pain Assessment   Patient Currently in Pain Yes, see Vital Sign flowsheet  (pain in ribs/back controlled at rest)    Integumentary/Edema   Integumentary/Edema Comments no deficits noted   Posture    Posture Comments sits w/ BUE support on bed, forward head   Range of Motion (ROM)   ROM Comment BLE AROM WFL   Strength   Strength Comments pt demos antigravity strength in BLEs - no MMT performed in setting of pain and spine precautions   Bed Mobility   Comment (Bed Mobility) Pt needing step by step instruction in spine precautions and logroll technique w/ min A supine > sit; mod A sit > supine at LEs   Transfers   Transfer Safety Comments Pt needing step by step cues for STS transfer technique to WW w/ CGA, very slow transition noted   Gait/Stairs (Locomotion)   Comment (Gait/Stairs) pt ambulated w/ WW 3 ft total, very tiny steps, hesistant to progress mobility, CGA   Balance   Balance Comments seated balance w/ 1 UE support fair   Clinical Impression   Criteria for Skilled Therapeutic Intervention yes, treatment indicated   PT Diagnosis (PT) impaired functional mobility   Influenced by the following impairments pain, fear of pain, fatigue, anxiety, impaired activity tolerance   Functional limitations due to impairments impaired bed mobility, transfers, gait, stairs   Clinical Presentation Stable/Uncomplicated   Clinical Presentation Rationale medical status   Clinical Decision Making (Complexity) low complexity   Therapy Frequency (PT) Daily   Predicted Duration of Therapy Intervention (days/wks) 12/3/2020   Planned Therapy Interventions (PT) balance training;bed mobility training;gait training;home exercise program;neuromuscular re-education;transfer training;strengthening;stair training   Risk & Benefits of therapy have been explained evaluation/treatment results reviewed;care plan/treatment goals reviewed;risks/benefits reviewed;participants voiced agreement with care plan;participants included;patient   PT Discharge Planning    PT Discharge Recommendation (DC Rec) Transitional Care Facility   PT Rationale for DC Rec pt very  hesistant to move, needs much time and encouragement; lives alone, currently needing Ax1 for safe movement   PT Brief overview of current status  min-mod A for bed mobility via logroll, exit on L side of bed, spine precautions, CGA for transfers, gait 3 ft w/ WW   Total Evaluation Time   Total Evaluation Time (Minutes) 10

## 2020-11-28 NOTE — PLAN OF CARE
"Vital signs:  Temp: 96.2  F (35.7  C) Temp src: Oral BP: 137/77 Pulse: 84   Resp: 20 SpO2: 97 % O2 Device: None (Room air)  Height: 154.9 cm (5' 1\") Weight: 65.8 kg (145 lb)    Per 12 hours: Afebrile. VSS. O2 sats high 90s on RA.   Activity: Patient refused repositioning due to right shoulder pain.   Neuros: A & O x4. Neuro intact.   Cardiac: WDL. Regular rate and rhythm.   Respiratory: LS clear. O2 sats high 90s on RA. Denies SOB at rest. Encouraged patient to hug pillow to chest when coughing or moving.   GI/: Abdomen rounded. BS+, passing flatus, no BM. Voiding frequently small amounts of clear yellow urine.   Diet: On regular diet.   Skin: patient refused skin assessment on back, refused to turn due to pain.   Lines: MIVF infusing via PIV.   Drains: None.   Labs: None.   Pain/nausea: PRN oxycodone 10mg x2, scheduled Tylenol, and scheduled Neurontin for good pain control. On-Q pump infusing at 14 mL/hr. Slept in between cares. Denies nausea.   New changes this shift: None.   Plan: Continue POC. Encouraged to ambulate and reposition.         "

## 2020-11-28 NOTE — PROGRESS NOTES
Patient mentioned that he don't have his wallet and emergency call button. We found the emergency call light in his room but not wallet. Security called and they didn't see his wallet.Charge nurse notified.

## 2020-11-28 NOTE — PROGRESS NOTES
[facility]   Trauma Service Progress Note    Date of Service: 11/28/2020    Trauma Mechanism: Fall   Date of Injury: 11/26/20  Known Injuries:  1. Right 4 & 5 rib fx      Assessment & Plan   74 y/o male s/p fall from standing. On 11/17, he went to the bathroom feeling nauseated. Then he felt lightheaded and fell. Unknown LOC.    Neuro/Pain/Psych:  # Fall   - Head CT 11/26: No intracranial pathology.      # Acute on chronic pain   - continued PTA gabapentin 100mg tid, Robaxin   - Scheduled: Tylenol 975mg, lidoderm,   - Prn: Oxycodone 10-20  - RAPS: ES cath inserted 11/27.      # Anxiety   - continued PTA: ativan 0.5 q4hrs prn,   - Maintain circadian rhythm.  Lights on during the day.  Off at night, minimize cares at night.  OOB during the day.     Pulmonary:  # Rib fractures   - Supplemental oxygen to keep saturation above 92 %.  - Incentive spirometer and flutter valve while awake      Cardiovascular:    # Hypotension in the setting of hypertension, improved with fluids    # Hyperlipidemia   - Monitor hemodynamic status.  - Trop: neg  - Holding PTA: ,   - Restarted Lisinopril 20 today     GI/Nutrition:    # GERD  - Continued PTA: omeprazole, Miralax     Renal/ Fluids/Electrolytes:  # HALLIE on CKD  - Creatinine: 1.59. Previous 1.28 on 11/19  - Dex 5% and 0.45%NaCl +KCL 20 for IV fluid hydration.   - Required 2 L bolus since admission     # Hypophosphatemia   - electrolyte replacement protocol in place.      Endocrine:  # Stress hyperglycemia, mild  - No management indication.       Infectious disease:   - COVID neg 10/9. Test 11/26: pending   - Prophylaxis (see hem): acyclovir 400 bid, levofloxacin      Hematology:    # Multiple Myeloma with extensive osteolytic lesions  - Revlimid, last dose  11/17  - Hem/Onc following      # Anemia of critical illness    - CBC tomorrow.  - Threshold for transfusion if hgb <7.0 or signs/symptoms of hypoperfusion.     # DVT  Prophylaxis: Lovenox 30mg bid      Musculoskeletal:  # s/p ORIF left clavicle   # Chronic T10 compression fx  # Chronic T12 superior endplate compression fracture   # Chronic L2 and L3 compression fracture   # Right Rib 4-5 lateral mildly displaced fx  # Right Rib 4 mildly displaced posterior fx  # Weakness and deconditioning of critical illness   - NSGY: No surgical intervention. Osteopenia tx, no brace needed for asymptomatic. Plan f/u MRI C-spine wo if patient is still here in a couple of days or as an OP.   - Physical and occupational therapy consults.     Skin:  - dilgent cares to prevent skin breakdown and wound formation  Lines/ tubes/ drains:  - PIV, ES cth     General Cares:    PPI/H2 blocker:  Omeprazole    DVT prophylaxis: Lovenox    Bowel Regimen/Date of last stool: In place    Pulmonary toilet: IS, Flutter valve    Lines / drains: Dangelo cath remains 2nd to immobility and need for strict I&O    Code status:  Full Code      Discharge goals:     Adequate pain management: in process     VSS x24 hours: yes    Hemoglobin stable x 48 hours: yes    Ambulating safely and/or therapy evals complete: in process     Drains/lines removed or plan in place to manage: yes    Teaching done: in process     Other:  Expected D/C date: 2 days if pain controlled and able to participate in PT    LOLA Uriostegui  To contact the trauma service use job code pager [phone],   Numeric texts or alpha text through [facility]     Interval History   No acute events overnight. Patient says his pain is controlled at this time. He has not participated in PT/OT yet, will re-evaluate pain management at that time. Able to breath without difficulty. Patient able to eat without difficulty and does not appear anxious today.    ROS x 8 negative with exception of those things listed in interval hx    Physical Exam   Temp: 96.2  F (35.7  C) Temp src: Oral BP: 137/77 Pulse: 84   Resp: 20 SpO2: 97 % O2 Device: None (Room air) Oxygen Delivery: 2  LPM  Vitals:    11/26/20 2014   Weight: 65.8 kg (145 lb)     Vital Signs with Ranges  Temp:  [96.2  F (35.7  C)-97.2  F (36.2  C)] 96.2  F (35.7  C)  Pulse:  [78-97] 84  Resp:  [16-20] 20  BP: ()/(46-78) 137/77  SpO2:  [96 %-100 %] 97 %  I/O last 3 completed shifts:  In: 2878.33 [P.O.:1580; I.V.:1298.33]  Out: 2270 [Urine:2270]    Promise Coma Scale - Total 15/15  Constitutional: Awake, alert, cooperative, no apparent distress.  Eyes: Lids and lashes normal, pupils equal, round and reactive to light, extra ocular muscles intact, sclera clear, conjunctiva normal.  HENT: Normocephalic, atraumatic  Respiratory: No increased work of breathing, no crackles or wheezing.  Cardiovascular:  regular rate and rhythm,   GI: mildly distended, abdomen soft, non-tender, no guarding  Skin:  Normal skin color, no redness, warmth, or swelling, no ecchymosis, no abrasions, and no jaundice.  Musculoskeletal: There is no redness, warmth, or swelling of the joints.  Pedal pulse palpated.  Neurologic: Awake, alert, oriented. Cranial nerves II-XII are grossly intact.  Strength and sensory is intact. No focal deficits.  Neuropsychiatric: Calm, normal eye contact, alert, affect appropriate to situation, oriented, thought process normal.

## 2020-11-29 ENCOUNTER — APPOINTMENT (OUTPATIENT)
Dept: GENERAL RADIOLOGY | Facility: CLINIC | Age: 75
DRG: 542 | End: 2020-11-29
Attending: STUDENT IN AN ORGANIZED HEALTH CARE EDUCATION/TRAINING PROGRAM
Payer: COMMERCIAL

## 2020-11-29 LAB
ALBUMIN SERPL-MCNC: 2 G/DL (ref 3.4–5)
ALBUMIN UR-MCNC: NEGATIVE MG/DL
ALP SERPL-CCNC: 70 U/L (ref 40–150)
ALT SERPL W P-5'-P-CCNC: 22 U/L (ref 0–70)
ANION GAP SERPL CALCULATED.3IONS-SCNC: 4 MMOL/L (ref 3–14)
APPEARANCE UR: CLEAR
AST SERPL W P-5'-P-CCNC: 18 U/L (ref 0–45)
BASOPHILS # BLD AUTO: 0 10E9/L (ref 0–0.2)
BASOPHILS NFR BLD AUTO: 0.2 %
BILIRUB SERPL-MCNC: 0.2 MG/DL (ref 0.2–1.3)
BILIRUB UR QL STRIP: NEGATIVE
BUN SERPL-MCNC: 11 MG/DL (ref 7–30)
CALCIUM SERPL-MCNC: 7.8 MG/DL (ref 8.5–10.1)
CHLORIDE SERPL-SCNC: 113 MMOL/L (ref 94–109)
CO2 SERPL-SCNC: 22 MMOL/L (ref 20–32)
COLOR UR AUTO: YELLOW
CREAT SERPL-MCNC: 0.95 MG/DL (ref 0.66–1.25)
DIFFERENTIAL METHOD BLD: ABNORMAL
EOSINOPHIL # BLD AUTO: 1 10E9/L (ref 0–0.7)
EOSINOPHIL NFR BLD AUTO: 11.9 %
ERYTHROCYTE [DISTWIDTH] IN BLOOD BY AUTOMATED COUNT: 15.2 % (ref 10–15)
ERYTHROCYTE [DISTWIDTH] IN BLOOD BY AUTOMATED COUNT: 15.2 % (ref 10–15)
GFR SERPL CREATININE-BSD FRML MDRD: 79 ML/MIN/{1.73_M2}
GLUCOSE BLDC GLUCOMTR-MCNC: 113 MG/DL (ref 70–99)
GLUCOSE SERPL-MCNC: 89 MG/DL (ref 70–99)
GLUCOSE UR STRIP-MCNC: NEGATIVE MG/DL
HCT VFR BLD AUTO: 24.5 % (ref 40–53)
HCT VFR BLD AUTO: 27.3 % (ref 40–53)
HGB BLD-MCNC: 7.9 G/DL (ref 13.3–17.7)
HGB BLD-MCNC: 8.7 G/DL (ref 13.3–17.7)
HGB UR QL STRIP: NEGATIVE
IMM GRANULOCYTES # BLD: 0.1 10E9/L (ref 0–0.4)
IMM GRANULOCYTES NFR BLD: 0.8 %
KETONES UR STRIP-MCNC: NEGATIVE MG/DL
LACTATE BLD-SCNC: 2 MMOL/L (ref 0.7–2)
LACTATE BLD-SCNC: 2 MMOL/L (ref 0.7–2)
LEUKOCYTE ESTERASE UR QL STRIP: NEGATIVE
LYMPHOCYTES # BLD AUTO: 1.3 10E9/L (ref 0.8–5.3)
LYMPHOCYTES NFR BLD AUTO: 14.5 %
MAGNESIUM SERPL-MCNC: 2 MG/DL (ref 1.6–2.3)
MCH RBC QN AUTO: 34.1 PG (ref 26.5–33)
MCH RBC QN AUTO: 34.1 PG (ref 26.5–33)
MCHC RBC AUTO-ENTMCNC: 31.9 G/DL (ref 31.5–36.5)
MCHC RBC AUTO-ENTMCNC: 32.2 G/DL (ref 31.5–36.5)
MCV RBC AUTO: 106 FL (ref 78–100)
MCV RBC AUTO: 107 FL (ref 78–100)
MONOCYTES # BLD AUTO: 0.8 10E9/L (ref 0–1.3)
MONOCYTES NFR BLD AUTO: 8.9 %
MUCOUS THREADS #/AREA URNS LPF: PRESENT /LPF
NEUTROPHILS # BLD AUTO: 5.6 10E9/L (ref 1.6–8.3)
NEUTROPHILS NFR BLD AUTO: 63.7 %
NITRATE UR QL: NEGATIVE
NRBC # BLD AUTO: 0 10*3/UL
NRBC BLD AUTO-RTO: 0 /100
PH UR STRIP: 5 PH (ref 5–7)
PHOSPHATE SERPL-MCNC: 2.3 MG/DL (ref 2.5–4.5)
PHOSPHATE SERPL-MCNC: 2.8 MG/DL (ref 2.5–4.5)
PHOSPHATE SERPL-MCNC: 3 MG/DL (ref 2.5–4.5)
PLATELET # BLD AUTO: 145 10E9/L (ref 150–450)
PLATELET # BLD AUTO: 147 10E9/L (ref 150–450)
POTASSIUM SERPL-SCNC: 4 MMOL/L (ref 3.4–5.3)
PROT SERPL-MCNC: 7.1 G/DL (ref 6.8–8.8)
RBC # BLD AUTO: 2.32 10E12/L (ref 4.4–5.9)
RBC # BLD AUTO: 2.55 10E12/L (ref 4.4–5.9)
RBC #/AREA URNS AUTO: 3 /HPF (ref 0–2)
SODIUM SERPL-SCNC: 139 MMOL/L (ref 133–144)
SOURCE: ABNORMAL
SP GR UR STRIP: 1.01 (ref 1–1.03)
TRANS CELLS #/AREA URNS HPF: <1 /HPF (ref 0–1)
UROBILINOGEN UR STRIP-MCNC: NORMAL MG/DL (ref 0–2)
WBC # BLD AUTO: 18.6 10E9/L (ref 4–11)
WBC # BLD AUTO: 8.8 10E9/L (ref 4–11)
WBC #/AREA URNS AUTO: 1 /HPF (ref 0–5)

## 2020-11-29 PROCEDURE — 250N000013 HC RX MED GY IP 250 OP 250 PS 637: Performed by: SURGERY

## 2020-11-29 PROCEDURE — 36415 COLL VENOUS BLD VENIPUNCTURE: CPT | Performed by: SURGERY

## 2020-11-29 PROCEDURE — 83605 ASSAY OF LACTIC ACID: CPT | Performed by: SURGERY

## 2020-11-29 PROCEDURE — 250N000011 HC RX IP 250 OP 636: Performed by: PHYSICIAN ASSISTANT

## 2020-11-29 PROCEDURE — 250N000011 HC RX IP 250 OP 636: Performed by: STUDENT IN AN ORGANIZED HEALTH CARE EDUCATION/TRAINING PROGRAM

## 2020-11-29 PROCEDURE — 83605 ASSAY OF LACTIC ACID: CPT | Performed by: STUDENT IN AN ORGANIZED HEALTH CARE EDUCATION/TRAINING PROGRAM

## 2020-11-29 PROCEDURE — 87040 BLOOD CULTURE FOR BACTERIA: CPT | Performed by: STUDENT IN AN ORGANIZED HEALTH CARE EDUCATION/TRAINING PROGRAM

## 2020-11-29 PROCEDURE — 258N000003 HC RX IP 258 OP 636: Performed by: SURGERY

## 2020-11-29 PROCEDURE — 83735 ASSAY OF MAGNESIUM: CPT

## 2020-11-29 PROCEDURE — 85025 COMPLETE CBC W/AUTO DIFF WBC: CPT

## 2020-11-29 PROCEDURE — 93010 ELECTROCARDIOGRAM REPORT: CPT | Performed by: INTERNAL MEDICINE

## 2020-11-29 PROCEDURE — 71045 X-RAY EXAM CHEST 1 VIEW: CPT

## 2020-11-29 PROCEDURE — 250N000011 HC RX IP 250 OP 636: Performed by: SURGERY

## 2020-11-29 PROCEDURE — 258N000003 HC RX IP 258 OP 636: Performed by: STUDENT IN AN ORGANIZED HEALTH CARE EDUCATION/TRAINING PROGRAM

## 2020-11-29 PROCEDURE — 99223 1ST HOSP IP/OBS HIGH 75: CPT | Performed by: INTERNAL MEDICINE

## 2020-11-29 PROCEDURE — 94150 VITAL CAPACITY TEST: CPT

## 2020-11-29 PROCEDURE — 71045 X-RAY EXAM CHEST 1 VIEW: CPT | Mod: 76

## 2020-11-29 PROCEDURE — 93005 ELECTROCARDIOGRAM TRACING: CPT

## 2020-11-29 PROCEDURE — 71045 X-RAY EXAM CHEST 1 VIEW: CPT | Mod: 26 | Performed by: RADIOLOGY

## 2020-11-29 PROCEDURE — 999N001017 HC STATISTIC GLUCOSE BY METER IP

## 2020-11-29 PROCEDURE — 81001 URINALYSIS AUTO W/SCOPE: CPT | Performed by: STUDENT IN AN ORGANIZED HEALTH CARE EDUCATION/TRAINING PROGRAM

## 2020-11-29 PROCEDURE — 999N000157 HC STATISTIC RCP TIME EA 10 MIN

## 2020-11-29 PROCEDURE — 36415 COLL VENOUS BLD VENIPUNCTURE: CPT | Performed by: STUDENT IN AN ORGANIZED HEALTH CARE EDUCATION/TRAINING PROGRAM

## 2020-11-29 PROCEDURE — 84100 ASSAY OF PHOSPHORUS: CPT | Performed by: SURGERY

## 2020-11-29 PROCEDURE — 250N000013 HC RX MED GY IP 250 OP 250 PS 637: Performed by: PHYSICIAN ASSISTANT

## 2020-11-29 PROCEDURE — 84100 ASSAY OF PHOSPHORUS: CPT

## 2020-11-29 PROCEDURE — 80053 COMPREHEN METABOLIC PANEL: CPT

## 2020-11-29 PROCEDURE — 250N000009 HC RX 250: Performed by: SURGERY

## 2020-11-29 PROCEDURE — 85027 COMPLETE CBC AUTOMATED: CPT | Performed by: STUDENT IN AN ORGANIZED HEALTH CARE EDUCATION/TRAINING PROGRAM

## 2020-11-29 PROCEDURE — 120N000002 HC R&B MED SURG/OB UMMC

## 2020-11-29 PROCEDURE — 36415 COLL VENOUS BLD VENIPUNCTURE: CPT

## 2020-11-29 PROCEDURE — 99233 SBSQ HOSP IP/OBS HIGH 50: CPT | Mod: GC | Performed by: INTERNAL MEDICINE

## 2020-11-29 PROCEDURE — 999N000105 HC STATISTIC NO DOCUMENTATION TO SUPPORT CHARGE

## 2020-11-29 RX ORDER — MAGNESIUM SULFATE HEPTAHYDRATE 40 MG/ML
2 INJECTION, SOLUTION INTRAVENOUS ONCE
Status: COMPLETED | OUTPATIENT
Start: 2020-11-29 | End: 2020-11-29

## 2020-11-29 RX ADMIN — HYDROCHLOROTHIAZIDE 12.5 MG: 12.5 CAPSULE, GELATIN COATED ORAL at 08:00

## 2020-11-29 RX ADMIN — ONDANSETRON 4 MG: 4 TABLET, ORALLY DISINTEGRATING ORAL at 17:06

## 2020-11-29 RX ADMIN — POLYETHYLENE GLYCOL 3350 17 G: 17 POWDER, FOR SOLUTION ORAL at 08:01

## 2020-11-29 RX ADMIN — CALCIUM 500 MG: 500 TABLET ORAL at 11:31

## 2020-11-29 RX ADMIN — ENOXAPARIN SODIUM 30 MG: 30 INJECTION SUBCUTANEOUS at 08:01

## 2020-11-29 RX ADMIN — OXYCODONE HYDROCHLORIDE 10 MG: 10 TABLET ORAL at 17:55

## 2020-11-29 RX ADMIN — POTASSIUM & SODIUM PHOSPHATES POWDER PACK 280-160-250 MG 1 PACKET: 280-160-250 PACK at 08:00

## 2020-11-29 RX ADMIN — OXYCODONE HYDROCHLORIDE 10 MG: 10 TABLET ORAL at 00:18

## 2020-11-29 RX ADMIN — SODIUM PHOSPHATE, MONOBASIC, MONOHYDRATE 9 MMOL: 276; 142 INJECTION, SOLUTION INTRAVENOUS at 02:32

## 2020-11-29 RX ADMIN — BORTEZOMIB 2.1 MG: 3.5 INJECTION, POWDER, LYOPHILIZED, FOR SOLUTION INTRAVENOUS; SUBCUTANEOUS at 12:39

## 2020-11-29 RX ADMIN — POTASSIUM CHLORIDE, DEXTROSE MONOHYDRATE AND SODIUM CHLORIDE: 150; 5; 450 INJECTION, SOLUTION INTRAVENOUS at 18:28

## 2020-11-29 RX ADMIN — MAGNESIUM SULFATE IN WATER 2 G: 40 INJECTION, SOLUTION INTRAVENOUS at 09:07

## 2020-11-29 RX ADMIN — ACETAMINOPHEN 975 MG: 325 TABLET, FILM COATED ORAL at 11:31

## 2020-11-29 RX ADMIN — OMEPRAZOLE 10 MG: 10 CAPSULE, DELAYED RELEASE ORAL at 08:00

## 2020-11-29 RX ADMIN — ACYCLOVIR 400 MG: 400 TABLET ORAL at 22:12

## 2020-11-29 RX ADMIN — METHOCARBAMOL 500 MG: 500 TABLET, FILM COATED ORAL at 08:01

## 2020-11-29 RX ADMIN — ACETAMINOPHEN 975 MG: 325 TABLET, FILM COATED ORAL at 20:40

## 2020-11-29 RX ADMIN — OXYCODONE HYDROCHLORIDE 10 MG: 10 TABLET ORAL at 06:57

## 2020-11-29 RX ADMIN — CALCIUM 500 MG: 500 TABLET ORAL at 08:01

## 2020-11-29 RX ADMIN — METHOCARBAMOL 500 MG: 500 TABLET, FILM COATED ORAL at 16:02

## 2020-11-29 RX ADMIN — POTASSIUM CHLORIDE, DEXTROSE MONOHYDRATE AND SODIUM CHLORIDE: 150; 5; 450 INJECTION, SOLUTION INTRAVENOUS at 00:18

## 2020-11-29 RX ADMIN — GABAPENTIN 300 MG: 300 CAPSULE ORAL at 22:11

## 2020-11-29 RX ADMIN — SENNOSIDES AND DOCUSATE SODIUM 2 TABLET: 8.6; 5 TABLET ORAL at 08:00

## 2020-11-29 RX ADMIN — PROCHLORPERAZINE EDISYLATE 5 MG: 5 INJECTION INTRAMUSCULAR; INTRAVENOUS at 18:34

## 2020-11-29 RX ADMIN — GABAPENTIN 300 MG: 300 CAPSULE ORAL at 14:39

## 2020-11-29 RX ADMIN — Medication 25 MCG: at 08:00

## 2020-11-29 RX ADMIN — ACETAMINOPHEN 975 MG: 325 TABLET, FILM COATED ORAL at 05:02

## 2020-11-29 RX ADMIN — ACYCLOVIR 400 MG: 400 TABLET ORAL at 08:01

## 2020-11-29 RX ADMIN — LISINOPRIL 20 MG: 20 TABLET ORAL at 08:01

## 2020-11-29 RX ADMIN — GABAPENTIN 300 MG: 300 CAPSULE ORAL at 08:01

## 2020-11-29 RX ADMIN — METHOCARBAMOL 500 MG: 500 TABLET, FILM COATED ORAL at 11:30

## 2020-11-29 RX ADMIN — LEVOFLOXACIN 250 MG: 250 TABLET, FILM COATED ORAL at 08:00

## 2020-11-29 ASSESSMENT — ACTIVITIES OF DAILY LIVING (ADL)
ADLS_ACUITY_SCORE: 19

## 2020-11-29 NOTE — PLAN OF CARE
"Vital signs:  Temp: 98.2  F (36.8  C) Temp src: Oral BP: (!) 143/82 Pulse: 84   Resp: 18 SpO2: 96 % O2 Device: None (Room air)  Height: 154.9 cm (5' 1\") Weight: 65.8 kg (145 lb)    Per 12 hours: patient afebrile. VSS.    Activity: Transferred to chair and back to bed with assist of one, walker, and transfer belt. Repositioned in bed independently.   Neuros: A & O x4. Neuro intact.   Cardiac: WDL. Regular rate and rhythm.   Respiratory: LS clear. O2 sats high 90s on RA. Denies SOB. Encouraged patient to hug pillow to chest when coughing or moving.   GI/: Abdomen rounded. BS+, passing flatus, no BM. Voiding frequently small amounts of clear yellow urine.   Diet: Ate 90% of dinner. Regular diet.   Skin: Dressings on abdomen c/d/I.   Lines: MIVF infusing via PIV.  Drains: None.   Labs: Phosphorus recheck 2.3, replaced 9mmol. Recheck this AM.   Pain/nausea: PRN oxycodone 10mg x2, scheduled Tylenol, and scheduled Neurontin for good right shoulder/chest pain control. On-Q pump infusing at 14 mL/hr. Slept in between cares. Denies nausea.   New changes this shift: None.   Plan: Continue POC. Encouraged to ambulate and reposition.  "

## 2020-11-29 NOTE — PROGRESS NOTES
[facility]AL [facility]IA PAIN SERV[facility] EPIDURAL NOTE  Rogelio Marshall is a 75 year old male CD #2 T4-T5 Right erector spinae catheter placement for rib fracture pain control.          SUBJECTIVE  Interval History:   Patient feeling well this morning. He does feel some discomfort around the lower scapular border on his right side. No issues taking deep breaths.         Antithrombotic/Thrombolytic Therapy ordered:  Lovenox 30 mg BID     Analgesic Medications:  Medications related to Pain Management (From now, onward)    Start     Dose/Rate Route Frequency Ordered Stop    11/28/20 1100  senna-docusate (SENOKOT-S/PERICOLACE) 8.6-50 MG per tablet 1-2 tablet      1-2 tablet Oral 2 TIMES DAILY 11/28/20 1032      11/28/20 1032  magnesium hydroxide (MILK OF MAGNESIA) suspension 30 mL      30 mL Oral DAILY PRN 11/28/20 1032      11/28/20 0830  gabapentin (NEURONTIN) capsule 300 mg      300 mg Oral 3 TIMES DAILY 11/28/20 0817      11/27/20 1400  lidocaine patch in PLACE       Transdermal EVERY 8 HOURS 11/27/20 1334      11/27/20 1330  Lidocaine (LIDOCARE) 4 % Patch 1-3 patch      1-3 patch  over 12 Hours Transdermal EVERY 24 HOURS 11/27/20 1304      11/27/20 1330  methocarbamol (ROBAXIN) tablet 500 mg      500 mg Oral 4 TIMES DAILY 11/27/20 1306      11/27/20 1330  polyethylene glycol (MIRALAX) Packet 17 g      17 g Oral DAILY 11/27/20 1306      11/27/20 1310  oxyCODONE IR (ROXICODONE) tablet 10-20 mg      10-20 mg Oral EVERY 4 HOURS PRN 11/27/20 1310      11/27/20 1304  LORazepam (ATIVAN) tablet 0.5 mg      0.5 mg Oral EVERY 4 HOURS PRN 11/27/20 1306      11/27/20 1200  ropivacaine 0.2% (NAROPIN) 750 mL in ON-Q C-Bloc select flow ([id number] holds 600-750 mL) single cath disposable pump      14 mL/hr  Irrigation CONTINUOUS 11/27/20 1133      11/27/20 0430  acetaminophen (TYLENOL) tablet 975 mg      975 mg Oral EVERY 8 HOURS 11/27/20 0302      11/27/20 0251  methocarbamol (ROBAXIN) tablet 500 mg      500 mg Oral EVERY 6 HOURS PRN  "11/27/20 0251 11/30/20 0250    11/26/20 2015  HYDROmorphone (PF) (DILAUDID) injection 0.3 mg      0.3 mg Intravenous ONCE 11/26/20 2010             OBJECTIVE  Lab Results:   Recent Labs   Lab Test 11/29/20  0730   WBC 8.8   RBC 2.55*   HGB 8.7*   HCT 27.3*   *   MCH 34.1*   MCHC 31.9   RDW 15.2*   *       Lab Results   Component Value Date    INR 1.14 10/09/2020       Vitals:    Temp:  [35.3  C (95.5  F)-37.3  C (99.1  F)] 37.2  C (98.9  F)  Pulse:  [84-94] 90  Resp:  [16-18] 18  BP: (142-161)/(65-88) 154/77  SpO2:  [95 %-98 %] 97 %  BP (!) 154/77 (BP Location: Left arm)   Pulse 90   Temp 37.2  C (98.9  F) (Oral)   Resp 18   Ht 1.549 m (5' 1\")   Wt 65.8 kg (145 lb)   SpO2 97%   BMI 27.40 kg/m         Exam:   GEN: alert, active and no distress  NEURO/MSK: Strength UE 5/5  and overall symmetric  SKIN: catheter site intact:no tenderness, erythema, heme, edema     ASSESSMENT/PLAN:    Patient is receiving adequate analgesia with current multimodal therapy including T4-5 erector spinae catheter with continuous infusion of Ropivacaine 0.2%  at 14mL/hr.  Motor function intact and adequate sensory block, meeting activity goals.  No evidence of adverse side effects related to local anesthetic.     - 0825 hrs: Bolus of 10 ml PF 0.25% Bupivacaine administered via Right erector spinae catheter.   - continue curren infusion Ropivacaine 0.2% at  14 mL/hour,   - antithrombotic/thrombolytic therapy as ordered. Please contact RAPS (#[phone]) prior to any medication changes  - will continue to follow and adjust as needed    - discussed plan with attending anesthesiologist    Aaron Alexis MD  [facility]est[facility] Pain Service  11/29/2020 9:06 AM    RAPS Contact Info (24 hour job code pager is the last 4 digits) For in-house use only:   [employer] phone: [place] [phone], [place] [phone], [facility] [phone], then enter call-back number.    Text: Use [employer] on the Intranet <Paging/Directory> tab and enter Jobcode ID. "   If no call back at any time, contact the hospital  and ask for RAPS attending or backup

## 2020-11-29 NOTE — PROGRESS NOTES
[facility]   Trauma Service Progress Note    Date of Service: 11/29/2020    Trauma Mechanism: Fall   Date of Injury: 11/26/20  Known Injuries:  1. Right 4 & 5 rib fx     Assessment & Plan  76 y/o male s/p fall from standing. On 11/17, he went to the bathroom feeling nauseated. Then he felt lightheaded and fell. Unknown LOC.    Neuro/Pain/Psych:  # Fall   - Head CT 11/26: No intracranial pathology.      # Acute on chronic pain   - continued PTA gabapentin 100mg tid, Robaxin   - Scheduled: Tylenol 975mg, lidoderm,   - Prn: Oxycodone 10-20  - RAPS: ES cath inserted 11/27.      # Anxiety   - continued PTA: ativan 0.5 q4hrs prn,   - Maintain circadian rhythm.  Lights on during the day.  Off at night, minimize cares at night.  OOB during the day.     Pulmonary:  # Rib fractures   - Supplemental oxygen to keep saturation above 92 %.  - Incentive spirometer and flutter valve while awake      Cardiovascular:    # Hypotension in the setting of hypertension, improved with fluids    # Hyperlipidemia   - Monitor hemodynamic status.  - Trop: neg  - Holding PTA: ,   - Restarted Lisinopril 20 today     GI/Nutrition:    # GERD  - Continued PTA: omeprazole, Miralax     Renal/ Fluids/Electrolytes:  # HALLIE on CKD  - Creatinine: 1.59. Previous 1.28 on 11/19  - Dex 5% and 0.45%NaCl +KCL 20 for IV fluid hydration.   - Required 2 L bolus since admission      # Hypophosphatemia   - electrolyte replacement protocol in place.      Endocrine:  # Stress hyperglycemia, mild  - No management indication.       Infectious disease:   - COVID neg 10/9. Test 11/26: neg   - Prophylaxis (see hem): acyclovir 400 bid, levofloxacin      Hematology:    # Multiple Myeloma with extensive osteolytic lesions  - Hem/Onc managing      # Anemia of critical illness    - Hgb: 8.7.   - Threshold for transfusion if hgb <7.0 or signs/symptoms of hypoperfusion.       # DVT Prophylaxis: Lovenox 30mg  bid      Musculoskeletal:  # s/p ORIF left clavicle   # Chronic T10 compression fx  # Chronic T12 superior endplate compression fracture   # Chronic L2 and L3 compression fracture   # Right Rib 4-5 lateral mildly displaced fx  # Right Rib 4 mildly displaced posterior fx  # Weakness and deconditioning of critical illness   - NSGY: No surgical intervention. Osteopenia tx, no brace needed for asymptomatic. Plan f/u MRI C-spine wo as an OP.   - Physical and occupational therapy consults.     Skin:  - dilgent cares to prevent skin breakdown and wound formation    Lines/ tubes/ drains:  - PIV, ES cath      General Cares:                 PPI/H2 blocker:  Omeprazole                 DVT prophylaxis: Lovenox                 Bowel Regimen/Date of last stool: In place                 Pulmonary toilet: IS, Flutter valve                 Lines / drains: Dangelo cath remains 2nd to immobility and need for strict I&O     Code status:  Full Code                   Discharge goals:     Adequate pain management: in process     VSS x24 hours: yes    Hemoglobin stable x 48 hours: yes    Ambulating safely and/or therapy evals complete: in process     Drains/lines removed or plan in place to manage: yes    Teaching done: in process     Other:  Expected D/C date: 2 days if pain controlled and participate in pt,     LOLA Uriostegui  To contact the trauma service use job code pager [phone],   Numeric texts or alpha text through [facility]     Interval History   Patient reluctant to participate in PT yesterday, and IS on both days of admission. I discussed with Palliative who will discuss this during her evaluation with him today.   During my eval with Dr. Del Toro, we also reemphasized the importance of IS, being OOB, and walking. He verbalized understanding.   ROS x 8 negative with exception of those things listed in interval hx    Physical Exam   Temp: 98.9  F (37.2  C) Temp src: Oral BP: (!) 154/77 Pulse: 90   Resp: 18 SpO2: 97 % O2 Device:  None (Room air)    Vitals:    11/26/20 2014   Weight: 65.8 kg (145 lb)     Vital Signs with Ranges  Temp:  [95.5  F (35.3  C)-99.1  F (37.3  C)] 98.9  F (37.2  C)  Pulse:  [84-94] 90  Resp:  [16-18] 18  BP: (142-161)/(65-88) 154/77  SpO2:  [95 %-98 %] 97 %  I/O last 3 completed shifts:  In: 2365 [P.O.:1360; I.V.:1005]  Out: 2375 [Urine:2375]    Promise Coma Scale - Total 15/15  Constitutional: Awake, alert, cooperative, no apparent distress.  Eyes: Lids and lashes normal, pupils equal, round and reactive to light, extra ocular muscles intact, sclera clear, conjunctiva normal.  HENT: Normocephalic, atraumatic  Respiratory: No increased work of breathing, decreased sound in lower fields. no crackles or wheezing.  Cardiovascular:  regular rate and rhythm,   GI: mildly distended, abdomen soft, non-tender, no guarding  Skin:  Normal skin color, no redness, warmth, or swelling, no ecchymosis, no abrasions, and no jaundice.  Musculoskeletal: There is no redness, warmth, or swelling of the joints.  Pedal pulse palpated.  Neurologic: Awake, alert, oriented. Cranial nerves II-XII are grossly intact.  Strength and sensory is intact. No focal deficits.  Neuropsychiatric: Calm, normal eye contact, alert, affect appropriate to situation, oriented, thought process normal.

## 2020-11-29 NOTE — PLAN OF CARE
VSS ex hypertensive  Neuro: WDL X anxious, rambles  Pain: onQ ball  CMS: intact  Cardiac: WDL  Resp: WDL  GI/: voiding in urinal, -BM  Wound: scattered bruising, multiple rib fractures and compression fractures  Access: PIV infusing phos replacement, recheck ordered  Activity: up asst X1  Nutrition: reg  Plan: Continue to monitor

## 2020-11-29 NOTE — PROGRESS NOTES
Hematology Consult Note   Date of Service: 11/29/2020    Patient: Rogelio Marshall  MRN: [id number]  Admission Date: 11/26/2020  Hospital Day # 3   Primary Outpatient Hematologist: Dr. Banerjee    Reason for Consult: MM      History of Present Illness:    74 y/o M w/ hx of HTN, HLD and multiple myeloma, s/p C1D8 Valcade on 11/26/2020 presents to the [facility] ED after a fall, resulted with right upper thoracic pain. Patient lives alone. He had bad appetite and had not been eating/drinking well. He has some nausea and took 1 Zofran then vomited it out. He had a fainting feeling but did not loss consciousness.  He went to bathroom because he did not feel well with nausea feeling. He noticed that he did not have the strength to get up from his position leaning over the toilette and he fell backward onto his back. He denies any fever, chills, no bowel/urine problem.     He was noticed to be hypotensive with BP as low as 62/41, improving with NS bolus. He is on IVF and feels better.     He is admitted to trauma service with CT findings of lucid bone lesion throughout the whole spine.   C spine- No overt acute fracture or subluxation.  Thoracic spine: T1, T5, T10 and T12 compression fracture, and right third through fifth rib fractures.   Lumbar spine: moderate chronic L2 and marked chronic L3 compressions.    Hematologic History:  This patient is a 75 year old man with lower back pain over a year. He has known compression fractures and bone scan reveals multiple lytic lesions throughout his appendicular and axial skeleton.    - His Beta 2 microglobulin was 3.6 on 10/10/2020.    - Kappa chains were 73.6 on 10/5/2020 with K/L ratio of 89.9. M spike of 16.2 in urine on 10/10.    - Bone marrow biopsy on 10/23/2020 showed trilineage hematopoiesis and 50-60% kappa restricted plasma cells.    - Flow cytometry showed 20 % plasma cells which express CD19, CD38, CD45 and monotypic cytoplasmic kappa immunoglobulin light chains but lack  CD20 and CD56. Cytogenetics pending.    - FISH shows IGH-CCND1 fusion (81%; 30% had loss of IGH component from one fusion signal).      He was started on  VRD on    - Velcade 2.1mg days 1, 4, 8, 11   - Revlimid 25 mg daily for 14 days on, 7 days off   - Dexamethasone 40mg Days 1, 8, 15.    - so far he received Day 8 Cycle 1 Velcade on 11/26/2020    Interval History: he is eating breakfast this morning and is not happy with how his breakfast was served. He was seen by anesthesiology. Afebrile. Pain is not fully controlled. Has 1 BM.    Review of Systems: Pertinent positive and negative systems described in interval history; the remainder of the 14 systems are negative    Past Medical History:  Past Medical History:   Diagnosis Date     Hyperlipidemia      Hypertension        Past Surgical History:  Past Surgical History:   Procedure Laterality Date     HERNIA REPAIR, INGUINAL RT/LT      needed post-op intestinal repair     ORIF left clavical         Social History:  Social History     Socioeconomic History     Marital status: Single     Spouse name: None     Number of children: None     Years of education: None     Highest education level: None   Occupational History     Occupation: retired   Social Needs     Financial resource strain: None     Food insecurity     Worry: None     Inability: None     Transportation needs     Medical: None     Non-medical: None   Tobacco Use     Smoking status: Former Smoker     Packs/day: 0.10     Years: 30.00     Pack years: 3.00     Types: Cigarettes     Smokeless tobacco: Never Used   Substance and Sexual Activity     Alcohol use: Yes     Alcohol/week: 17.5 standard drinks     Types: 21 drink(s) per week     Drug use: None     Sexual activity: None   Lifestyle     Physical activity     Days per week: None     Minutes per session: None     Stress: None   Relationships     Social connections     Talks on phone: None     Gets together: None     Attends Faith service: None      Active member of club or organization: None     Attends meetings of clubs or organizations: None     Relationship status: None     Intimate partner violence     Fear of current or ex partner: None     Emotionally abused: None     Physically abused: None     Forced sexual activity: None   Other Topics Concern      Service Not Asked     Blood Transfusions Not Asked     Caffeine Concern Not Asked     Occupational Exposure Not Asked     Hobby Hazards Not Asked     Sleep Concern Not Asked     Stress Concern Not Asked     Weight Concern Not Asked     Special Diet Not Asked     Back Care Not Asked     Exercise Yes     Comment: bikes daily     Bike Helmet Not Asked     Seat Belt Not Asked     Self-Exams Not Asked   Social History Narrative     None        Family History  Family History   Problem Relation Age of Onset     C.A.D. Father          MI age 69     Hypertension Sister         obese     Family History Negative Brother        Outpatient Medications:  No current facility-administered medications on file prior to encounter.        acyclovir (ZOVIRAX) 400 MG tablet, Take 1 tablet (400 mg) by mouth 2 times daily Viral Prophylaxis.       aspirin (ASA) 325 MG tablet, Take 1 tablet (325 mg) by mouth daily       calcium carbonate 500 mg, elemental, (OSCAL) 500 MG tablet, Take 1 tablet (500 mg) by mouth 3 times daily (with meals) (Patient taking differently: Take 500 mg by mouth 2 times daily )       cholecalciferol (VITAMIN D3) 25 mcg (1000 units) capsule, Take 1 capsule by mouth daily       gabapentin (NEURONTIN) 100 MG capsule, Take 1 capsule (100 mg) by mouth 3 times daily       hydrochlorothiazide (MICROZIDE) 12.5 MG capsule, Take 12.5 mg by mouth daily       ibuprofen (ADVIL/MOTRIN) 600 MG tablet, Take 1 tablet (600 mg) by mouth every 6 hours       LENalidomide (REVLIMID) 25 MG CAPS capsule, Take 1 capsule (25 mg) by mouth daily for 14 days Days 1 through 14.       levofloxacin (LEVAQUIN) 250 MG tablet, Take  "1 tablet (250 mg) by mouth daily       lisinopril (ZESTRIL) 20 MG tablet, Take 1 tablet (20 mg) by mouth daily       methocarbamol (ROBAXIN) 500 MG tablet, Take 1 tablet (500 mg) by mouth 4 times daily       omeprazole (PRILOSEC) 10 MG DR capsule, Take 10 mg by mouth daily       ondansetron (ZOFRAN-ODT) 4 MG ODT tab, Take 1 tablet (4 mg) by mouth every 6 hours as needed for nausea or vomiting       oxyCODONE (ROXICODONE) 5 MG tablet, Take 1 tablet (5 mg) by mouth every 6 hours as needed for severe pain       polyethylene glycol (MIRALAX) 17 g packet, Take 17 g by mouth daily       senna-docusate (SENOKOT-S/PERICOLACE) 8.6-50 MG tablet, Take 1 tablet by mouth daily as needed        acetaminophen (TYLENOL) 325 MG tablet, Take 3 tablets (975 mg) by mouth 3 times daily       dexamethasone (DECADRON) 4 MG tablet, Take 10 tablets (40 mg) by mouth every 7 days for 3 doses Days 1, 8, and 15.       LORazepam (ATIVAN) 0.5 MG tablet, Take 1 tablet (0.5 mg) by mouth every 4 hours as needed (Anxiety, Nausea/Vomiting or Sleep) (Patient not taking: Reported on 11/26/2020)       prochlorperazine (COMPAZINE) 10 MG tablet, Take 1 tablet (10 mg) by mouth every 6 hours as needed (Nausea/Vomiting) (Patient not taking: Reported on 11/26/2020)         Physical Exam:    BP (!) 143/82 (BP Location: Left arm)   Pulse 84   Temp 98.2  F (36.8  C) (Oral)   Resp 18   Ht 1.549 m (5' 1\")   Wt 65.8 kg (145 lb)   SpO2 96%   BMI 27.40 kg/m    Gen: Well appearing, in NAD  CV: Normal rate, regular rhythm. No m/r/g  Pulm: CTABL  Abd: Soft, nt/nd  Ext: No lower extremity edema  Skin: dry   Neuro: Alert and answering questions appropriately.     Labs & Studies: I personally reviewed the following studies:  ROUTINE LABS (Last four results):  CMP  Recent Labs   Lab 11/29/20  0730 11/28/20  2350 11/28/20  1221 11/28/20  0726 11/27/20  0707 11/26/20 2006 11/26/20 2006     --   --  138 140  --  136   POTASSIUM 4.0  --  3.9 4.0 3.9  --  3.5 "   CHLORIDE 113*  --   --  114* 114*  --  107   CO2 22  --   --  20 20  --  22   ANIONGAP 4  --   --  4 5  --  7   GLC 89  --   --  88 87  --  106*   BUN 11  --   --  21 37*  --  44*   CR 0.95  --   --  0.99 1.35*  --  1.59*   GFRESTIMATED 79  --   --  75 51*  --  42*   GFRESTBLACK >90  --   --  87 60*  --  49*   MIRI 7.8*  --   --  7.7* 7.4*  --  8.9   MAG 2.0  --  2.1 2.1 2.0  --   --    PHOS 3.0 2.3* 1.4* 2.0* 3.3   < >  --    PROTTOTAL 7.1  --   --   --   --   --  9.0*   ALBUMIN 2.0*  --   --   --   --   --  2.7*   BILITOTAL 0.2  --   --   --   --   --  0.3   ALKPHOS 70  --   --   --   --   --  83   AST 18  --   --   --   --   --  4   ALT 22  --   --   --   --   --  22    < > = values in this interval not displayed.     CBC  Recent Labs   Lab 11/29/20  0730 11/26/20 2006 11/26/20  0917   WBC 8.8 5.6 10.5   RBC 2.55* 2.81* 2.85*   HGB 8.7* 9.3* 9.5*   HCT 27.3* 29.9* 29.7*   * 106* 104*   MCH 34.1* 33.1* 33.3*   MCHC 31.9 31.1* 32.0   RDW 15.2* 15.0 14.7   * 239 266     INRNo lab results found in last 7 days.    Assessment & Plan:   74 y/o M w/ hx of HTN, HLD and multiple myeloma, s/p C1D8 Valcade on 11/26/2020 presents to the [facility] ED after a fall, resulted with right upper thoracic pain, from new right third through fifth rib fractures. He has multiple stable compression fracture including T1, T5, T10 and T12, L2 and L3.     #  Fall: based on the history patient is likely has orthostatic hypotension/dehydration  from poor po intake. The risk of infection is low since he is neutropenic. He had CT chest which did not show acute change. We could check blood culture and UA if hypotensive again, but now with good range for >24 hrs     # N/V due to chemo: resolved now. Patient is instructed to take zofran prior to his chemo day and use it prn and avoid Compazine (patient changed his story later and stays that he did not take it). He is instructed to bring his medication to clinic nest time to better  clarify the medication.     # MM: as to his MM treatment, he only received C1D8 Valcade. He did not carry his Revlimid with him. Revlimid is known to have central nervous system side effect including: Fatigue (11% to 34%), dizziness (20%), and headache (9% to 20%). I am concerned that he is having a difficult time tolerating his chemo regimen. I would recomment holding the revlimid and reassessing his treatment plan. However, he is due for velcade today and he should get that (it is fairly mild)  - his chemo plan is    - Velcade 2.1mg days 1, 4, 8, 11 (due on 11/29)-I would plan to give this inpatient, arranged   - Revlimid 25 mg daily for 14 days on, 7 days off. Hold this for now   - Dexamethasone 40mg Days 1, 8, 15 (due on 12/3)   - agree with palliative care consult   - Agree with TCU    Recommendations:   Velcade today, arranged  Hold REvlimid  - check blood culture x2 and UA if hypotensive again  - IVF prn hypotension  - pain control per primary team  - we will help with f/u plan when he is close to be discharged.   - primary team is updated.     Artie Wilson MD, PhD  Hematology/Oncology   Pager: [phone]      Patient is seen and discussed with Dr. Yuen, who agrees with the plan.     I have seen, interviewed, and examined the patient independently.  I have reviewed the vital signs and labs.  This note reflects my assessment and plan.    He is looking great, pain is well controlled.    We reviewed today causes for his fall (feeling lousy, most likely due to revlimid, leads to poor po, leads to dehydration, with possible contribution of sedating antiemetic (compazine))    Hold revlimid  Continue velcade, due today. We reviewed this plan with chemo pharmacist, Nessa Lawson, 7D charge nad the patient's primary team. Velcade is unlikely to make him feel miserable, but revlimid is likely to. Will see how he feels today after velcade. Will review MM management plan with primary oncologist tomorrow to consider  revlimid continuation, possible dose reduciton    Kizzy Yuen MD/PhD

## 2020-11-29 NOTE — PLAN OF CARE
VSS   Chemo med given today subq  Neuro: WDL X anxious, rambles  Pain: onQ ball  CMS: intact  Cardiac: WDL  Resp: WDL  GI/: voiding in urinal, +BM  Wound: scattered bruising, multiple rib fractures and compression fractures  Access: PIV infusing   Activity: up asst X1  Nutrition: reg  Plan: Continue to monitor

## 2020-11-29 NOTE — PROVIDER NOTIFICATION
DATE/TIME  (DOT-TD, DOT-NOW) CHEMO CHECK ACTIVITY (REGIMEN & DOSE CHECK, DAY, DOSE #, NAME OF CHEMO #1)  CHEMO DRUG #2  CHEMO DRUG #3 NAME OF RN #1 (USE DOT-ME HERE) NAME OF RN#2 (2ND RN TO LOG IN SEPARATELY)   11/29/2020  10:18 AM   Bortezomib protocol check (day 11)   OSVALDO Walker RN     11/29/2020  12:47 PM   Velcade subcutaneous day 11   Dian Darnell, OSVALDO   (Charo BEGUM)

## 2020-11-29 NOTE — CONSULTS
[facility]  Palliative Care Consultation Note    Patient: Rogelio Marshall  Date of Admission:  11/26/2020    Requesting Clinician / Team: Trauma  Reason for consult: Geriatric Trauma    Recommendations:    Rogelio said he would consider his brother Dutch Marshall to be next of kin and who he would want the doctors to contact if he couldn't make medical decisions.  They haven't been in contact in years.  Encouraged him to involve someone in his life with his medical care    Encouraged to continue using IS, rehab to help regain independence.  He denies symptoms of depression.     Pain currently well-controlled. If nausea continues to be a problem, could consider scheduling a dose of zofran QAM prophylactically    Introduced palliative care, and that we may be helpful in the future if his oncologist wishes us to follow    These recommendations have been discussed with primary team via this note.      Thank you for the opportunity to participate in the care of this patient and family. Our team: does not plan on following further, however do not hesitate to call or re-consult if we can be of further assistance to the patient/family.     During regular M-F work hours -- if you are not sure who specifically to contact -- please contact us by sending a text page to our team consult pager at [phone].    After regular work hours and on weekends/holidays, you can call our answering service at [phone]. Also, who's on call for us is available in [facility] Smart Web.       Assessments:  Rogelio Marshall is a 75 year old male with Hx of Multiple Myeloma recently started on tx, compression fractures, HTN, and HLD admitted 11/27 after fall with new finding of broken ribs.  His pain is well-controlled, and he is accepting of rehab if it is recommended.  He lives alone and is socially isolated - he would accept the medical team contacting his brother Dutch, but he says he won't.  "Encouraged him to participate with rehab and breathing exercises to prevent complications.     Today, the patient was seen for:  Trauma  Multiple Myeloma    Prognosis, Goals, & Planning:      Functional Status just prior to hospitalization: 1 (Restricted in physically strenuous activity but ambulatory and able to carry out work of a light or sedentary nature)      Prognosis, Goals, and/or Advance Care Planning were addressed today: Yes        Summary/Comments:  Spoke about likelihood that rehab will be recommended for his recovery, which he would accept.  He'd like to go where he was before - [facility].  Also has fair understanding of myeloma prognosis - says could live for years, not curable but several treatments possible      Patient's decision making preferences: independently          Patient has decision-making capacity today for complex decisions: Yes            I have concerns about the patient/family's health literacy today: No           Patient has a completed Health Care Directive: No.       Code status: Full Code    Coping, Meaning, & Spirituality:   Mood, coping, and/or meaning in the context of serious illness were addressed today: Yes  Summary/Comments:   - Denies depression or anxiety  - Not spiritual    Social:     Living situation: Alone    Key family / caregivers: Says not in touch with anyone or friends.  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  Says has Fine Arts education background    History of Present Illness:  History gathered today from: patient, medical chart, medical team members    Rogelio Marshall is a 75 year old male with Hx of Multiple Myeloma recently started on tx, compression fractures, HTN, and HLD admitted 11/27 after fall with new finding of broken ribs.  He lives alone and usually walks with a walker.  Had been feeling nauseated at home and lightheaded, stood up in the bathroom and fell.  Has not had falls before.  " Says pain is well controlled.  No nausea currently.  Denies SOB    Key Palliative Symptom Data:  We are not helping to manage these symptoms currently in this patient.      ROS:  Comprehensive ROS is reviewed and is negative except as here & per HPI: none     Past Medical History:  Past Medical History:   Diagnosis Date     Hyperlipidemia      Hypertension         Past Surgical History:  Past Surgical History:   Procedure Laterality Date     HERNIA REPAIR, INGUINAL RT/LT      needed post-op intestinal repair     ORIF left clavical           Family History:  Family History   Problem Relation Age of Onset     C.A.D. Father          MI age 69     Hypertension Sister         obese     Family History Negative Brother         Allergies:  Allergies   Allergen Reactions     Other Drug Allergy (See Comments)      tobasco sauce ---caused red spots         Medications:  I have reviewed this patient's medication profile and medications from this hospitalization.   Noted:  Tylenol 975 mg 3 times a day  Gabapentin 300 mg 3 times a day (increased from 100 mg 3 times a day at home)  Robaxin 500 mg 4 times daily  Oxycodone 10 mg as needed x3    Physical Exam:  Vital Signs: Temp: 98.9  F (37.2  C) Temp src: Oral BP: (!) 154/77 Pulse: 90   Resp: 18 SpO2: 97 % O2 Device: None (Room air)    Weight: 145 lbs 0 oz    Constitutional: Sitting up in bed, comfortable-appearing, in no distress   Eyes: Conjunctiva clear. Sclera anicteric  Cardiovascular: Normal rate, regular rhythm.    Respiratory:  Normal respiratory effort and breath sounds.  No wheezes or rales.   GI:  Soft, normoactive bowel sounds; Non-tender, non-distended  Musculoskeletal: No lower extremity edema.    Neuro: Conversational  Psych: Alert, appropriately interactive, full affect, clear sensorium.   Skin: Warm, no rash    Data reviewed:  Recent imaging reviewed, my comments on pertinents:   CT T and L Spine 20  IMPRESSION:     Thoracic spine:  1.  Extensive  lucencies throughout the thoracic osseous structures compatible with patient's diagnosis of multiple myeloma. No acute thoracic spine fracture.     2.  Stable mild compressions of T1, T5 and marked stable chronic compressions of T10 and T12.     3.  No high-grade canal narrowing.     4.  Right third through fifth rib fractures. Correlate with chest CT.     5.  Marked bilateral T10-T11 and left T11-T12 foraminal narrowing.     Lumbar spine:  1.  Extensive lucencies throughout the lumbar and pelvic osseous structures compatible with patient's diagnosis of multiple myeloma. No acute lumbar spine fracture or significant change compared to 10/10/2020.     2.  Stable moderate chronic L2 and marked chronic L3 compressions.     3.  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     4.  Marked L4-L5 and L5-S1 degenerative disc disease.     5.  Moderate to marked lower lumbar degenerative foraminal narrowing.     Recent lab data reviewed, my comments on pertinents:   Cre 0.95  Alb 2  Normal AST/ALT  Hb 8.7, Plt 145    75 minutes unit time spent in reviewing record, patient examination and counseling, discussion with trauma team and documentation.  >50% spent in counseling re: plan of care, goals of care, understanding and coordinating care with team.     Cat Tate MD  Palliative Medicine  Pager [phone]

## 2020-11-30 ENCOUNTER — APPOINTMENT (OUTPATIENT)
Dept: CT IMAGING | Facility: CLINIC | Age: 75
DRG: 542 | End: 2020-11-30
Attending: PHYSICIAN ASSISTANT
Payer: COMMERCIAL

## 2020-11-30 ENCOUNTER — APPOINTMENT (OUTPATIENT)
Dept: OCCUPATIONAL THERAPY | Facility: CLINIC | Age: 75
DRG: 542 | End: 2020-11-30
Payer: COMMERCIAL

## 2020-11-30 ENCOUNTER — APPOINTMENT (OUTPATIENT)
Dept: GENERAL RADIOLOGY | Facility: CLINIC | Age: 75
DRG: 542 | End: 2020-11-30
Attending: STUDENT IN AN ORGANIZED HEALTH CARE EDUCATION/TRAINING PROGRAM
Payer: COMMERCIAL

## 2020-11-30 LAB
ANION GAP SERPL CALCULATED.3IONS-SCNC: 4 MMOL/L (ref 3–14)
BASOPHILS # BLD AUTO: 0 10E9/L (ref 0–0.2)
BASOPHILS NFR BLD AUTO: 0.1 %
BUN SERPL-MCNC: 15 MG/DL (ref 7–30)
CALCIUM SERPL-MCNC: 7.6 MG/DL (ref 8.5–10.1)
CHLORIDE SERPL-SCNC: 110 MMOL/L (ref 94–109)
CO2 SERPL-SCNC: 22 MMOL/L (ref 20–32)
CREAT SERPL-MCNC: 1.05 MG/DL (ref 0.66–1.25)
DIFFERENTIAL METHOD BLD: ABNORMAL
EOSINOPHIL # BLD AUTO: 0.6 10E9/L (ref 0–0.7)
EOSINOPHIL NFR BLD AUTO: 3.4 %
ERYTHROCYTE [DISTWIDTH] IN BLOOD BY AUTOMATED COUNT: 15.3 % (ref 10–15)
ERYTHROCYTE [DISTWIDTH] IN BLOOD BY AUTOMATED COUNT: 15.3 % (ref 10–15)
GFR SERPL CREATININE-BSD FRML MDRD: 70 ML/MIN/{1.73_M2}
GLUCOSE SERPL-MCNC: 92 MG/DL (ref 70–99)
HCT VFR BLD AUTO: 24 % (ref 40–53)
HCT VFR BLD AUTO: 27.6 % (ref 40–53)
HGB BLD-MCNC: 7.7 G/DL (ref 13.3–17.7)
HGB BLD-MCNC: 8.9 G/DL (ref 13.3–17.7)
IMM GRANULOCYTES # BLD: 0.2 10E9/L (ref 0–0.4)
IMM GRANULOCYTES NFR BLD: 1.3 %
INTERPRETATION ECG - MUSE: NORMAL
LACTATE BLD-SCNC: 1.5 MMOL/L (ref 0.7–2)
LYMPHOCYTES # BLD AUTO: 0.7 10E9/L (ref 0.8–5.3)
LYMPHOCYTES NFR BLD AUTO: 3.9 %
MAGNESIUM SERPL-MCNC: 2.1 MG/DL (ref 1.6–2.3)
MCH RBC QN AUTO: 33.5 PG (ref 26.5–33)
MCH RBC QN AUTO: 34.1 PG (ref 26.5–33)
MCHC RBC AUTO-ENTMCNC: 32.1 G/DL (ref 31.5–36.5)
MCHC RBC AUTO-ENTMCNC: 32.2 G/DL (ref 31.5–36.5)
MCV RBC AUTO: 104 FL (ref 78–100)
MCV RBC AUTO: 106 FL (ref 78–100)
MONOCYTES # BLD AUTO: 0.8 10E9/L (ref 0–1.3)
MONOCYTES NFR BLD AUTO: 4.5 %
NEUTROPHILS # BLD AUTO: 15.5 10E9/L (ref 1.6–8.3)
NEUTROPHILS NFR BLD AUTO: 86.8 %
NRBC # BLD AUTO: 0 10*3/UL
NRBC BLD AUTO-RTO: 0 /100
PHOSPHATE SERPL-MCNC: 2.7 MG/DL (ref 2.5–4.5)
PLATELET # BLD AUTO: 132 10E9/L (ref 150–450)
PLATELET # BLD AUTO: 141 10E9/L (ref 150–450)
POTASSIUM SERPL-SCNC: 3.9 MMOL/L (ref 3.4–5.3)
PROCALCITONIN SERPL-MCNC: 6.6 NG/ML
RBC # BLD AUTO: 2.26 10E12/L (ref 4.4–5.9)
RBC # BLD AUTO: 2.66 10E12/L (ref 4.4–5.9)
SODIUM SERPL-SCNC: 135 MMOL/L (ref 133–144)
WBC # BLD AUTO: 17.5 10E9/L (ref 4–11)
WBC # BLD AUTO: 18.6 10E9/L (ref 4–11)

## 2020-11-30 PROCEDURE — 84145 PROCALCITONIN (PCT): CPT | Performed by: PHYSICIAN ASSISTANT

## 2020-11-30 PROCEDURE — 87040 BLOOD CULTURE FOR BACTERIA: CPT | Performed by: PHYSICIAN ASSISTANT

## 2020-11-30 PROCEDURE — 250N000011 HC RX IP 250 OP 636: Performed by: PHYSICIAN ASSISTANT

## 2020-11-30 PROCEDURE — 71275 CT ANGIOGRAPHY CHEST: CPT | Mod: 26 | Performed by: RADIOLOGY

## 2020-11-30 PROCEDURE — 258N000003 HC RX IP 258 OP 636: Performed by: SURGERY

## 2020-11-30 PROCEDURE — 250N000013 HC RX MED GY IP 250 OP 250 PS 637: Performed by: SURGERY

## 2020-11-30 PROCEDURE — 87040 BLOOD CULTURE FOR BACTERIA: CPT | Performed by: SURGERY

## 2020-11-30 PROCEDURE — 250N000009 HC RX 250: Performed by: STUDENT IN AN ORGANIZED HEALTH CARE EDUCATION/TRAINING PROGRAM

## 2020-11-30 PROCEDURE — 250N000011 HC RX IP 250 OP 636: Performed by: SURGERY

## 2020-11-30 PROCEDURE — 83735 ASSAY OF MAGNESIUM: CPT | Performed by: SURGERY

## 2020-11-30 PROCEDURE — 258N000003 HC RX IP 258 OP 636: Performed by: STUDENT IN AN ORGANIZED HEALTH CARE EDUCATION/TRAINING PROGRAM

## 2020-11-30 PROCEDURE — 84100 ASSAY OF PHOSPHORUS: CPT | Performed by: SURGERY

## 2020-11-30 PROCEDURE — 999N000157 HC STATISTIC RCP TIME EA 10 MIN

## 2020-11-30 PROCEDURE — 71045 X-RAY EXAM CHEST 1 VIEW: CPT

## 2020-11-30 PROCEDURE — 250N000011 HC RX IP 250 OP 636: Performed by: STUDENT IN AN ORGANIZED HEALTH CARE EDUCATION/TRAINING PROGRAM

## 2020-11-30 PROCEDURE — 999N000128 HC STATISTIC PERIPHERAL IV START W/O US GUIDANCE

## 2020-11-30 PROCEDURE — 94150 VITAL CAPACITY TEST: CPT

## 2020-11-30 PROCEDURE — 99232 SBSQ HOSP IP/OBS MODERATE 35: CPT | Performed by: PHYSICIAN ASSISTANT

## 2020-11-30 PROCEDURE — 97535 SELF CARE MNGMENT TRAINING: CPT | Mod: GO

## 2020-11-30 PROCEDURE — 83605 ASSAY OF LACTIC ACID: CPT | Performed by: PHYSICIAN ASSISTANT

## 2020-11-30 PROCEDURE — 80048 BASIC METABOLIC PNL TOTAL CA: CPT | Performed by: SURGERY

## 2020-11-30 PROCEDURE — 71275 CT ANGIOGRAPHY CHEST: CPT

## 2020-11-30 PROCEDURE — 85025 COMPLETE CBC W/AUTO DIFF WBC: CPT | Performed by: SURGERY

## 2020-11-30 PROCEDURE — 999N000105 HC STATISTIC NO DOCUMENTATION TO SUPPORT CHARGE

## 2020-11-30 PROCEDURE — 85027 COMPLETE CBC AUTOMATED: CPT | Performed by: PHYSICIAN ASSISTANT

## 2020-11-30 PROCEDURE — 82565 ASSAY OF CREATININE: CPT | Performed by: STUDENT IN AN ORGANIZED HEALTH CARE EDUCATION/TRAINING PROGRAM

## 2020-11-30 PROCEDURE — 87103 BLOOD FUNGUS CULTURE: CPT | Performed by: SURGERY

## 2020-11-30 PROCEDURE — 120N000003 HC R&B IMCU UMMC

## 2020-11-30 PROCEDURE — 99233 SBSQ HOSP IP/OBS HIGH 50: CPT | Mod: GC | Performed by: INTERNAL MEDICINE

## 2020-11-30 PROCEDURE — 250N000013 HC RX MED GY IP 250 OP 250 PS 637: Performed by: PHYSICIAN ASSISTANT

## 2020-11-30 PROCEDURE — 36415 COLL VENOUS BLD VENIPUNCTURE: CPT | Performed by: PHYSICIAN ASSISTANT

## 2020-11-30 PROCEDURE — 99232 SBSQ HOSP IP/OBS MODERATE 35: CPT | Performed by: ANESTHESIOLOGY

## 2020-11-30 PROCEDURE — 36415 COLL VENOUS BLD VENIPUNCTURE: CPT | Performed by: SURGERY

## 2020-11-30 PROCEDURE — 271N000002 HC RX 271: Performed by: STUDENT IN AN ORGANIZED HEALTH CARE EDUCATION/TRAINING PROGRAM

## 2020-11-30 PROCEDURE — 71045 X-RAY EXAM CHEST 1 VIEW: CPT | Mod: 26 | Performed by: RADIOLOGY

## 2020-11-30 RX ORDER — POSACONAZOLE 100 MG/1
300 TABLET, DELAYED RELEASE ORAL EVERY MORNING
Status: DISCONTINUED | OUTPATIENT
Start: 2020-12-01 | End: 2020-12-01

## 2020-11-30 RX ORDER — IOPAMIDOL 755 MG/ML
60 INJECTION, SOLUTION INTRAVASCULAR ONCE
Status: COMPLETED | OUTPATIENT
Start: 2020-11-30 | End: 2020-11-30

## 2020-11-30 RX ORDER — DIPHENHYDRAMINE HYDROCHLORIDE 50 MG/ML
25 INJECTION INTRAMUSCULAR; INTRAVENOUS EVERY 6 HOURS PRN
Status: DISCONTINUED | OUTPATIENT
Start: 2020-11-30 | End: 2020-12-04 | Stop reason: HOSPADM

## 2020-11-30 RX ORDER — DIPHENHYDRAMINE HCL 25 MG
25 CAPSULE ORAL EVERY 4 HOURS PRN
Status: DISCONTINUED | OUTPATIENT
Start: 2020-11-30 | End: 2020-12-04 | Stop reason: HOSPADM

## 2020-11-30 RX ADMIN — POTASSIUM CHLORIDE, DEXTROSE MONOHYDRATE AND SODIUM CHLORIDE: 150; 5; 450 INJECTION, SOLUTION INTRAVENOUS at 02:30

## 2020-11-30 RX ADMIN — IOPAMIDOL 60 ML: 755 INJECTION, SOLUTION INTRAVENOUS at 10:57

## 2020-11-30 RX ADMIN — CALCIUM 500 MG: 500 TABLET ORAL at 09:39

## 2020-11-30 RX ADMIN — OXYCODONE HYDROCHLORIDE 10 MG: 10 TABLET ORAL at 02:32

## 2020-11-30 RX ADMIN — CEFEPIME 2 G: 2 INJECTION, POWDER, FOR SOLUTION INTRAVENOUS at 10:37

## 2020-11-30 RX ADMIN — ACETAMINOPHEN 975 MG: 325 TABLET, FILM COATED ORAL at 03:47

## 2020-11-30 RX ADMIN — POTASSIUM CHLORIDE, DEXTROSE MONOHYDRATE AND SODIUM CHLORIDE: 150; 5; 450 INJECTION, SOLUTION INTRAVENOUS at 12:36

## 2020-11-30 RX ADMIN — CEFEPIME 2 G: 2 INJECTION, POWDER, FOR SOLUTION INTRAVENOUS at 21:43

## 2020-11-30 RX ADMIN — CALCIUM 500 MG: 500 TABLET ORAL at 12:31

## 2020-11-30 RX ADMIN — VANCOMYCIN HYDROCHLORIDE 1250 MG: 100 INJECTION, POWDER, LYOPHILIZED, FOR SOLUTION INTRAVENOUS at 19:30

## 2020-11-30 RX ADMIN — OXYCODONE HYDROCHLORIDE 10 MG: 10 TABLET ORAL at 10:28

## 2020-11-30 RX ADMIN — LIDOCAINE 1 PATCH: 560 PATCH PERCUTANEOUS; TOPICAL; TRANSDERMAL at 12:34

## 2020-11-30 RX ADMIN — METHOCARBAMOL 500 MG: 500 TABLET, FILM COATED ORAL at 20:26

## 2020-11-30 RX ADMIN — Medication: at 02:20

## 2020-11-30 RX ADMIN — METHOCARBAMOL 500 MG: 500 TABLET, FILM COATED ORAL at 12:32

## 2020-11-30 RX ADMIN — LEVOFLOXACIN 250 MG: 250 TABLET, FILM COATED ORAL at 09:40

## 2020-11-30 RX ADMIN — GABAPENTIN 300 MG: 300 CAPSULE ORAL at 09:38

## 2020-11-30 RX ADMIN — CALCIUM 500 MG: 500 TABLET ORAL at 17:47

## 2020-11-30 RX ADMIN — ACETAMINOPHEN 975 MG: 325 TABLET, FILM COATED ORAL at 20:26

## 2020-11-30 RX ADMIN — METHOCARBAMOL 500 MG: 500 TABLET, FILM COATED ORAL at 17:47

## 2020-11-30 RX ADMIN — LISINOPRIL 20 MG: 20 TABLET ORAL at 09:40

## 2020-11-30 RX ADMIN — ENOXAPARIN SODIUM 30 MG: 30 INJECTION SUBCUTANEOUS at 20:31

## 2020-11-30 RX ADMIN — GABAPENTIN 300 MG: 300 CAPSULE ORAL at 14:40

## 2020-11-30 RX ADMIN — OMEPRAZOLE 10 MG: 10 CAPSULE, DELAYED RELEASE ORAL at 09:39

## 2020-11-30 RX ADMIN — Medication 25 MCG: at 09:39

## 2020-11-30 RX ADMIN — METHOCARBAMOL 500 MG: 500 TABLET, FILM COATED ORAL at 09:38

## 2020-11-30 RX ADMIN — GABAPENTIN 300 MG: 300 CAPSULE ORAL at 20:26

## 2020-11-30 RX ADMIN — ACETAMINOPHEN 975 MG: 325 TABLET, FILM COATED ORAL at 12:31

## 2020-11-30 RX ADMIN — ACYCLOVIR 400 MG: 400 TABLET ORAL at 09:46

## 2020-11-30 RX ADMIN — OXYCODONE HYDROCHLORIDE 10 MG: 10 TABLET ORAL at 21:20

## 2020-11-30 RX ADMIN — ONDANSETRON 4 MG: 4 TABLET, ORALLY DISINTEGRATING ORAL at 15:24

## 2020-11-30 RX ADMIN — DIPHENHYDRAMINE HYDROCHLORIDE 25 MG: 25 CAPSULE ORAL at 16:02

## 2020-11-30 RX ADMIN — ACYCLOVIR 400 MG: 400 TABLET ORAL at 20:26

## 2020-11-30 RX ADMIN — HYDROCHLOROTHIAZIDE 12.5 MG: 12.5 CAPSULE, GELATIN COATED ORAL at 09:39

## 2020-11-30 ASSESSMENT — ACTIVITIES OF DAILY LIVING (ADL)
ADLS_ACUITY_SCORE: 18
ADLS_ACUITY_SCORE: 19
ADLS_ACUITY_SCORE: 19
ADLS_ACUITY_SCORE: 18

## 2020-11-30 NOTE — PROGRESS NOTES
[facility]   Patient is currently open to home care services with [facility]. The patient is currently receiving RN PT OT services. [facility]  and team have been notified of patient admission. [facility] liaison will continue to follow patient during stay. If appropriate provide orders to resume home care at time of discharge.    Misa Chisholm RN BSN  [facility] Liaison  [phone]

## 2020-11-30 NOTE — PROGRESS NOTES
REGIONAL ANESTHESIA PAIN SERVICE CONTINUOUS NERVE INFUSION NOTE  Rogelio Marshall is a 75 year old male with thoracic rib and vertebral body fracture after fall with placement of right T4-5 erector spinae (ES) catheter for pain control.     Subjective and Interval History: Overnight events: none.  Patient seen at 0920.  Received bolus at 09:20. Reports pain control improved with nerve block continuous infusion and current analgesics (see below).  Still having significant pain with movement but also notes significant improvement at rest.       Pain Intensity using Numerical Rating Scale (NRS):  3/10 at rest and 10/10 with activity        Antithrombotic/Thrombolytic Therapy ordered:  30 mg Lovenox q12 hr     Analgesic Medications:               Medications related to Pain Management (From now, onward)     Start     Dose/Rate Route Frequency Ordered Stop     11/28/20 0830   gabapentin (NEURONTIN) capsule 300 mg      300 mg Oral 3 TIMES DAILY 11/28/20 0817       11/27/20 1400   lidocaine patch in PLACE        Transdermal EVERY 8 HOURS 11/27/20 1334       11/27/20 1330   Lidocaine (LIDOCARE) 4 % Patch 1-3 patch      1-3 patch  over 12 Hours Transdermal EVERY 24 HOURS 11/27/20 1304       11/27/20 1330   methocarbamol (ROBAXIN) tablet 500 mg      500 mg Oral 4 TIMES DAILY 11/27/20 1306       11/27/20 1330   polyethylene glycol (MIRALAX) Packet 17 g      17 g Oral DAILY 11/27/20 1306       11/27/20 1310   oxyCODONE IR (ROXICODONE) tablet 10-20 mg      10-20 mg Oral EVERY 4 HOURS PRN 11/27/20 1310       11/27/20 1305   senna-docusate (SENOKOT-S/PERICOLACE) 8.6-50 MG per tablet 1-2 tablet      1-2 tablet Oral 2 TIMES DAILY PRN 11/27/20 1306       11/27/20 1304   LORazepam (ATIVAN) tablet 0.5 mg      0.5 mg Oral EVERY 4 HOURS PRN 11/27/20 1306       11/27/20 1200   ropivacaine 0.2% (NAROPIN) 750 mL in ON-Q C-Bloc select flow ([id number] holds 600-750 mL) single cath disposable pump      14 mL/hr  Irrigation CONTINUOUS 11/27/20  1133       11/27/20 0430   acetaminophen (TYLENOL) tablet 975 mg      975 mg Oral EVERY 8 HOURS 11/27/20 0302       11/27/20 0251   methocarbamol (ROBAXIN) tablet 500 mg      500 mg Oral EVERY 6 HOURS PRN 11/27/20 0251 11/30/20 0250     11/26/20 2015   HYDROmorphone (PF) (DILAUDID) injection 0.3 mg      0.3 mg Intravenous ONCE 11/26/20 2010               Objective:  Lab results      Recent Labs   Lab Test 11/26/20 2006   WBC 5.6   RBC 2.81*   HGB 9.3*   HCT 29.9*   *   MCH 33.1*   MCHC 31.1*   RDW 15.0            Lab Results   Component Value Date     INR 1.14 10/09/2020         Vitals:    Temp:  [35.6  C (96  F)-36.2  C (97.2  F)] 35.6  C (96  F)  Pulse:  [78-97] 78  Resp:  [16-20] 16  BP: ()/(46-78) 138/74  SpO2:  [96 %-100 %] 98 %     Exam:   GEN: alert and no distress  NEURO: moving all extremities spontaneously and to command  SKIN: right erector spinae (ES) catheter site with dressing c/d/i, no tenderness, erythema, heme, edema        Assessment and Plan:     Patient is receiving improved analgesia with current multimodal therapy including right T4-5 erector spinae (ES) catheter with infusion of ropivacaine 0.2% at 14 mL/hr. No evidence of adverse side effects related to local anesthetic.      BOLUS GIVEN @ 09:20, 10 mL 0.25% bupivacaine.  RN in room and aware of monitoring BP q10 min x 3 after bolus.      - continue 0.2% ropivacaine infusion rate at 14 mL/hr  - antithrombotic/thrombolytic therapy Lovenox 30 mg BID ordered. Please contact RAPS (#[phone]) prior to any medication changes  - will continue to follow and adjust as needed     Owen Peralta MD, MD  11/28/20

## 2020-11-30 NOTE — PROGRESS NOTES
[facility]   Trauma Service Progress Note    Date of Service: 11/30/2020    Trauma Mechanism: Fall   Date of Injury: 11/26/20  Known Injuries:  1. Right 4 & 5 rib fx     Assessment & Plan  74 y/o male s/p fall from standing. On 11/17, he went to the bathroom feeling nauseated. Then he felt lightheaded and fell. Unknown LOC.    Neuro/Pain/Psych:  # Fall   - Head CT 11/26: No intracranial pathology.      # Acute on chronic pain   - continued PTA gabapentin 100mg tid, Robaxin   - Scheduled: Tylenol 975mg, lidoderm,   - Prn: Oxycodone 10-20  - RAPS: ES cath inserted 11/27.      # Anxiety   - continued PTA: ativan 0.5 q4hrs prn,   - Maintain circadian rhythm.  Lights on during the day.  Off at night, minimize cares at night.  OOB during the day.     Pulmonary:  # Rib fractures   # possible HAP  - CXR 11/29: Continued left lower lung subsegmental atelectasis. No acute interval changes. Possible trace left pleural effusion.  - CXR 11/30: Stable rib fractures. Patchy opacities possibly representing atelectasis or pulmonary contusion.  - CT PE ordered per Hem recommendation.   - Supplemental oxygen to keep saturation above 92 %.  - Incentive spirometer and flutter valve while awake      Cardiovascular:    # Hypotension in the setting of hypertension, improved with fluids    # Hyperlipidemia   - Monitor hemodynamic status.  - Trop: neg  - Holding PTA: ,   - Restarted Lisinopril 20 today     GI/Nutrition:    # GERD  - Continued PTA: omeprazole, Miralax     Renal/ Fluids/Electrolytes:  # HALLIE on CKD  - Creatinine: 1.59. Previous 1.28 on 11/19  - Dex 5% and 0.45%NaCl +KCL 20 for IV fluid hydration.   - Required 2 L bolus since admission      # Hypophosphatemia   - electrolyte replacement protocol in place.      Endocrine:  - No management indication.       Infectious disease:    - COVID neg 10/9. Test 11/26: neg     # Empirically treating pneumonia   # Leukocytosis   # Febrile,  T max 100.5, improved with Tylenol   # Rigors   - Patient was reluctant to participate in PT and IS first days of admission  - Received Chemotherapy yesterday   - Lactic Acid: 2.0 at 1824 11/29, triggered by septic protocol, RRT assessed with Trauma:   - WBC: 17.5 ? 18.6 ? 8.8    - UA: unremarkable   - Repeat BCx2 ordered before antibiotics today.     Antibiotics:   - Prophylaxis (see hem): acyclovir 400 bid, levofloxacin   - Started Cefepime 2gm IV q8hrs for for pseudomonal coverage.      Hematology:    # Multiple Myeloma with extensive osteolytic lesions  - Hem/Onc managing      # Anemia of critical illness    - Hgb: 7.7   - Threshold for transfusion if hgb <7.0 or signs/symptoms of hypoperfusion.        # DVT Prophylaxis: Lovenox 30mg bid      Musculoskeletal:  # s/p ORIF left clavicle   # Chronic T10 compression fx  # Chronic T12 superior endplate compression fracture   # Chronic L2 and L3 compression fracture   # Right Rib 4-5 lateral mildly displaced fx  # Right Rib 4 mildly displaced posterior fx  # Weakness and deconditioning of critical illness   - NSGY: No surgical intervention. Osteopenia tx, no brace needed for asymptomatic. Plan f/u MRI C-spine wo as an OP.   - Physical and occupational therapy consults.     Skin:  - dilgent cares to prevent skin breakdown and wound formation     Lines/ tubes/ drains:  - PIV, ES cath      General Cares:                 PPI/H2 blocker:  Omeprazole                 DVT prophylaxis: Lovenox                 Bowel Regimen/Date of last stool: In place                 Pulmonary toilet: IS, Flutter valve                 Lines / drains: Dangelo cath remains 2nd to immobility and need for strict I&O     Code status:  Full Code                   Discharge goals:     Adequate pain management: yes     VSS x24 hours: yes    Hemoglobin stable x 48 hours: yes    Ambulating safely and/or therapy evals complete: in process     Drains/lines removed or plan in place to manage: yes    Teaching  "done: in process     Other: Current work-up for infection   Expected D/C date: est 2-3 days to TCU, would like [facility]. Hem/Onc will need to be contacted for post-hosp chem plan.     LOLA Uriostegui  To contact the trauma service use job code pager [phone],   Numeric texts or alpha text through [facility]     Interval History   This morning during exam patient stated that he was feeling good. Pain well controlled. Does not fill hot, but described to me \"tremors\" that sounded like rigors he had last night. This corresponded with a fever of 100.5, which has decreased after Tylenol to 99.   Discussed plan with the pain team and Hem/Onc, to start treating empirically for a pneumonia since it was difficult to get him moving and participating in PT and in using his IS this weekend.     ROS x 8 negative with exception of those things listed in interval hx    Physical Exam   Temp: 99  F (37.2  C) Temp src: Oral BP: 124/69 Pulse: 95   Resp: 18 SpO2: 99 % O2 Device: Nasal cannula Oxygen Delivery: 2 LPM  Vitals:    11/26/20 2014   Weight: 65.8 kg (145 lb)     Vital Signs with Ranges  Temp:  [96.2  F (35.7  C)-103.1  F (39.5  C)] 99  F (37.2  C)  Pulse:  [] 95  Resp:  [18] 18  BP: (107-164)/(55-77) 124/69  SpO2:  [90 %-99 %] 99 %  I/O last 3 completed shifts:  In: 2335 [P.O.:540; I.V.:1795]  Out: 1395 [Urine:1395]    Promise Coma Scale - Total 15/15  Constitutional: Awake, alert, cooperative, no apparent distress.  Eyes: Lids and lashes normal, pupils equal, round and reactive to light, extra ocular muscles intact, sclera clear, conjunctiva normal.  HENT: Normocephalic, atraumatic  Respiratory: No increased work of breathing, decreased sound in lower fields. no crackles or wheezing.  Cardiovascular:  regular rate and rhythm,   GI: mildly distended, abdomen soft, non-tender, no guarding  Skin:  Normal skin color, no redness, warmth, or swelling, no ecchymosis, no abrasions, and no jaundice.  Musculoskeletal: There is no " redness, warmth, or swelling of the joints.  Pedal pulse palpated.  Neurologic: Awake, alert, oriented. Cranial nerves II-XII are grossly intact.  Strength and sensory is intact. No focal deficits.  Neuropsychiatric: Calm, normal eye contact, alert, affect appropriate to situation, oriented, thought process normal.

## 2020-11-30 NOTE — PROGRESS NOTES
REGIONAL ANESTHESIA PAIN SERVICE EPIDURAL NOTE  Rogelio Marshall is a 75 year old male CD #3 T4-T5 Right erector spinae catheter placement for rib fracture pain control.          SUBJECTIVE  Interval History:   Patient feeling well this morning. He does feel some discomfort around the lower scapular border on his right side. No issues taking deep breaths.         Antithrombotic/Thrombolytic Therapy ordered:  Lovenox 30 mg BID     Analgesic Medications:  Medications related to Pain Management (From now, onward)    Start     Dose/Rate Route Frequency Ordered Stop    11/28/20 1100  senna-docusate (SENOKOT-S/PERICOLACE) 8.6-50 MG per tablet 1-2 tablet      1-2 tablet Oral 2 TIMES DAILY 11/28/20 1032      11/28/20 1032  magnesium hydroxide (MILK OF MAGNESIA) suspension 30 mL      30 mL Oral DAILY PRN 11/28/20 1032      11/28/20 0830  gabapentin (NEURONTIN) capsule 300 mg      300 mg Oral 3 TIMES DAILY 11/28/20 0817      11/27/20 1400  lidocaine patch in PLACE       Transdermal EVERY 8 HOURS 11/27/20 1334      11/27/20 1330  Lidocaine (LIDOCARE) 4 % Patch 1-3 patch      1-3 patch  over 12 Hours Transdermal EVERY 24 HOURS 11/27/20 1304      11/27/20 1330  methocarbamol (ROBAXIN) tablet 500 mg      500 mg Oral 4 TIMES DAILY 11/27/20 1306      11/27/20 1330  polyethylene glycol (MIRALAX) Packet 17 g      17 g Oral DAILY 11/27/20 1306      11/27/20 1310  oxyCODONE IR (ROXICODONE) tablet 10-20 mg      10-20 mg Oral EVERY 4 HOURS PRN 11/27/20 1310      11/27/20 1304  LORazepam (ATIVAN) tablet 0.5 mg      0.5 mg Oral EVERY 4 HOURS PRN 11/27/20 1306      11/27/20 1200  ropivacaine 0.2% (NAROPIN) 750 mL in ON-Q C-Bloc select flow ([id number] holds 600-750 mL) single cath disposable pump      14 mL/hr  Irrigation CONTINUOUS 11/27/20 1133      11/27/20 0430  acetaminophen (TYLENOL) tablet 975 mg      975 mg Oral EVERY 8 HOURS 11/27/20 0302      11/27/20 0251  methocarbamol (ROBAXIN) tablet 500 mg      500 mg Oral EVERY 6 HOURS PRN  "11/27/20 0251 11/30/20 0250    11/26/20 2015  HYDROmorphone (PF) (DILAUDID) injection 0.3 mg      0.3 mg Intravenous ONCE 11/26/20 2010             OBJECTIVE  Lab Results:   Recent Labs   Lab Test 11/29/20  0730   WBC 8.8   RBC 2.55*   HGB 8.7*   HCT 27.3*   *   MCH 34.1*   MCHC 31.9   RDW 15.2*   *       Lab Results   Component Value Date    INR 1.14 10/09/2020       Vitals:    Temp:  [35.7  C (96.2  F)-39.5  C (103.1  F)] 37.2  C (99  F)  Pulse:  [] 83  Resp:  [18-20] 20  BP: (107-164)/(55-77) 139/76  SpO2:  [90 %-99 %] 98 %  /76 (BP Location: Left arm)   Pulse 83   Temp 37.2  C (99  F) (Oral)   Resp 20   Ht 1.549 m (5' 1\")   Wt 65.8 kg (145 lb)   SpO2 98%   BMI 27.40 kg/m         Exam:   GEN: alert, active and no distress  NEURO/MSK: Strength UE 5/5  and overall symmetric  SKIN: catheter site intact:no tenderness, erythema, heme, edema     ASSESSMENT/PLAN:    Patient is receiving adequate analgesia with current multimodal therapy including T4-5 erector spinae catheter with continuous infusion of Ropivacaine 0.2%  at 14mL/hr.  Motor function intact and adequate sensory block, meeting activity goals.  No evidence of adverse side effects related to local anesthetic.     - 0905 hrs: Bolus of 10 ml PF 0.25% Bupivacaine administered via Right erector spinae catheter.   - continue curren infusion Ropivacaine 0.2% at  14 mL/hour,   - antithrombotic/thrombolytic therapy as ordered. Please contact RAPS (#[id number]) prior to any medication changes  - will continue to follow and adjust as needed    - discussed plan with attending anesthesiologist    Mike Bolden MD  [facility]ional Anest[facility] Pain Service  9:04 AM  11/30/2020      RAPS Contact Info (24 hour job code pager is the last 4 digits) For in-house use only:   [employer] phone: [place] [phone], [place] [phone], Peds [phone], then enter call-back number.    Text: Use [place] on the Intranet <Paging/Directory> tab and enter Jobcode ID.   If " no call back at any time, contact the hospital  and ask for RAPS attending or backup

## 2020-11-30 NOTE — PROGRESS NOTES
REGIONAL ANESTHESIA PAIN SERVICE EPIDURAL NOTE  Rogelio Marshall is a 75 year old male CD #2 T4-T5 Right erector spinae catheter placement for rib fracture pain control.           SUBJECTIVE  Interval History:   Patient feeling well this morning. He does feel some discomfort around the lower scapular border on his right side. No issues taking deep breaths.            Antithrombotic/Thrombolytic Therapy ordered:  Lovenox 30 mg BID      Analgesic Medications:              Medications related to Pain Management (From now, onward)     Start     Dose/Rate Route Frequency Ordered Stop     11/28/20 1100   senna-docusate (SENOKOT-S/PERICOLACE) 8.6-50 MG per tablet 1-2 tablet      1-2 tablet Oral 2 TIMES DAILY 11/28/20 1032       11/28/20 1032   magnesium hydroxide (MILK OF MAGNESIA) suspension 30 mL      30 mL Oral DAILY PRN 11/28/20 1032       11/28/20 0830   gabapentin (NEURONTIN) capsule 300 mg      300 mg Oral 3 TIMES DAILY 11/28/20 0817       11/27/20 1400   lidocaine patch in PLACE        Transdermal EVERY 8 HOURS 11/27/20 1334       11/27/20 1330   Lidocaine (LIDOCARE) 4 % Patch 1-3 patch      1-3 patch  over 12 Hours Transdermal EVERY 24 HOURS 11/27/20 1304       11/27/20 1330   methocarbamol (ROBAXIN) tablet 500 mg      500 mg Oral 4 TIMES DAILY 11/27/20 1306       11/27/20 1330   polyethylene glycol (MIRALAX) Packet 17 g      17 g Oral DAILY 11/27/20 1306       11/27/20 1310   oxyCODONE IR (ROXICODONE) tablet 10-20 mg      10-20 mg Oral EVERY 4 HOURS PRN 11/27/20 1310       11/27/20 1304   LORazepam (ATIVAN) tablet 0.5 mg      0.5 mg Oral EVERY 4 HOURS PRN 11/27/20 1306       11/27/20 1200   ropivacaine 0.2% (NAROPIN) 750 mL in ON-Q C-Bloc select flow ([id number] holds 600-750 mL) single cath disposable pump      14 mL/hr  Irrigation CONTINUOUS 11/27/20 1133       11/27/20 0430   acetaminophen (TYLENOL) tablet 975 mg      975 mg Oral EVERY 8 HOURS 11/27/20 0302       11/27/20 0251   methocarbamol (ROBAXIN) tablet 500  "mg      500 mg Oral EVERY 6 HOURS PRN 11/27/20 0251 11/30/20 0250     11/26/20 2015   HYDROmorphone (PF) (DILAUDID) injection 0.3 mg      0.3 mg Intravenous ONCE 11/26/20 2010               OBJECTIVE  Lab Results:       Recent Labs   Lab Test 11/29/20  0730   WBC 8.8   RBC 2.55*   HGB 8.7*   HCT 27.3*   *   MCH 34.1*   MCHC 31.9   RDW 15.2*   *               Lab Results   Component Value Date     INR 1.14 10/09/2020         Vitals:              Temp:  [35.3  C (95.5  F)-37.3  C (99.1  F)] 37.2  C (98.9  F)  Pulse:  [84-94] 90  Resp:  [16-18] 18  BP: (142-161)/(65-88) 154/77  SpO2:  [95 %-98 %] 97 %  BP (!) 154/77 (BP Location: Left arm)   Pulse 90   Temp 37.2  C (98.9  F) (Oral)   Resp 18   Ht 1.549 m (5' 1\")   Wt 65.8 kg (145 lb)   SpO2 97%   BMI 27.40 kg/m          Exam:   GEN: alert, active and no distress  NEURO/MSK: Strength UE 5/5  and overall symmetric  SKIN: catheter site intact:no tenderness, erythema, heme, edema       ASSESSMENT/PLAN:    Patient is receiving adequate analgesia with current multimodal therapy including T4-5 erector spinae catheter with continuous infusion of Ropivacaine 0.2%  at 14mL/hr.  Motor function intact and adequate sensory block, meeting activity goals.  No evidence of adverse side effects related to local anesthetic.     - 0825 hrs: Bolus of 10 ml PF 0.25% Bupivacaine administered via Right erector spinae catheter.   - continue curren infusion Ropivacaine 0.2% at  14 mL/hour,   - antithrombotic/thrombolytic therapy as ordered. Please contact [facility] (#[phone]) prior to any medication changes  - will continue to follow and adjust as needed    Owen Peralta MD, MD  11/29/20  "

## 2020-11-30 NOTE — PLAN OF CARE
"Vital signs:  Temp: 100.5  F (38.1  C) Temp src: Oral BP: 124/69 Pulse: 95   Resp: 18 SpO2: 99 % O2 Device: Nasal cannula Oxygen Delivery: 2 LPM Height: 154.9 cm (5' 1\") Weight: 65.8 kg (145 lb)    Per 8 hours: Max temp 100.5, administered scheduled Tylenol. Patient denied feeling feverish. Stated feeling better, recheck temp 99.0.   Activity: Repositioned with assist of 2 in bed.   Neuros: A & O x4. Neuro intact, less lethargic.   Cardiac: Slightly tachy in the 90s- low 100s. Denies chest pain or palpitations.   Respiratory: LS diminished in bases. Encouraged deep breathing and coughing, weak cough. O2 sats high 90s on 2 L/min NC. Denies SOB.   GI/: BS+, passing flatus, no BM this shift. Voiding adequate clear yellow urine in urinal.   Diet: Regular diet.   Skin: Thin fragile skin, dressings c/d/i.   Lines: MIVF infusing per orders via PIV.   Incisions/Drains: None.   Labs: None.   Pain/nausea: PRN oxycodone 10mg x1, Ropivicaine On-Q replaced and infusing at 14 mL/hr effective for right upper chest. Denies nausea.   New changes this shift: None.   Plan: Continue POC.     "

## 2020-11-30 NOTE — PROGRESS NOTES
"Called to bedside for rapid response called for lactic acid of 2.0.  Patient has been febrile and tachycardic. Had chemotherapy earlier today.  Currently denies SOB/CP  Denies N/V    /69 (BP Location: Left arm)   Pulse 95   Temp 99  F (37.2  C) (Oral)   Resp 18   Ht 1.549 m (5' 1\")   Wt 65.8 kg (145 lb)   SpO2 99%   BMI 27.40 kg/m      AAOx3  NAD  CTAB  Tachy  Abd soft     74 y/o male with multiple myeloma s/p fall from standing with rib fractures  -Follow-up CXR  -Follow-up blood cultures  -On MIVF  -Follow-up UA  -Tylenol for fever  "

## 2020-11-30 NOTE — PLAN OF CARE
"BP (!) 146/68 (BP Location: Left arm)   Pulse 103   Temp 99  F (37.2  C) (Oral)   Resp 20   Ht 1.549 m (5' 1\")   Wt 65.8 kg (145 lb)   SpO2 95%   BMI 27.40 kg/m     Patient reports pain is tolerable with On Q pump infusing at 14 ml/hr and Oxycodone 10 mg given x 1.  Abdomen soft with bowel sounds present, patient reports he had a BM this shift. Up with assist of one, gaitbelt, and walker. Voiding adequate amounts of urine. Sputum culture needed. Tolerating diet. Plan to transfer to  for further monitoring.   "

## 2020-11-30 NOTE — PLAN OF CARE
[id number]    Max temp 103.1, R 18, -120, /76, O2 sats 90% RA. Triggered sepsis protocol. Patient lethargic, flushed in face, feeling feverish. Patient aroused to voice, answered questions with eyes closed, performed tasks with repeated requests. Otherwise neuro intact. Denies palpitations or chest pain or SOB. CMS intact. Applied 2 L/min NC, O2 sats increased to above 95%, administered scheduled Tylenol, notified provider regarding status, provider ordered CBC, blood cultures, UA/UC, chest X-ray, EKG, see results. Lactic acid 2.0, rapid response called, provider came to see patient. No new orders at this time. Recheck .5, , /55, 96% 2 L/min NC. MIVF infusing per orders. Held Lovenox due to decreased platelets, provider aware. Held Robaxin due to lethargy. Will continue to monitor.

## 2020-11-30 NOTE — PROGRESS NOTES
Hematology Consult Note   Date of Service: 11/30/2020    Patient: Rogelio Marshall  MRN: [id number]  Admission Date: 11/26/2020  Hospital Day # 4   Primary Outpatient Hematologist: Dr. Banerjee    Reason for Consult: MM      History of Present Illness:    76 y/o M w/ hx of HTN, HLD and multiple myeloma, s/p C1D8 Valcade on 11/26/2020 presents to the [facility] ED after a fall, resulted with right upper thoracic pain. Patient lives alone. He had bad appetite and had not been eating/drinking well. He has some nausea and took 1 Zofran then vomited it out. He had a fainting feeling but did not loss consciousness.  He went to bathroom because he did not feel well with nausea feeling. He noticed that he did not have the strength to get up from his position leaning over the toilette and he fell backward onto his back. He denies any fever, chills, no bowel/urine problem.     He was noticed to be hypotensive with BP as low as 62/41, improving with NS bolus. He is on IVF and feels better.     He is admitted to trauma service with CT findings of lucid bone lesion throughout the whole spine.   C spine- No overt acute fracture or subluxation.  Thoracic spine: T1, T5, T10 and T12 compression fracture, and right third through fifth rib fractures.   Lumbar spine: moderate chronic L2 and marked chronic L3 compressions.    Hematologic History:  This patient is a 75 year old man with lower back pain over a year. He has known compression fractures and bone scan reveals multiple lytic lesions throughout his appendicular and axial skeleton.    - His Beta 2 microglobulin was 3.6 on 10/10/2020.    - Kappa chains were 73.6 on 10/5/2020 with K/L ratio of 89.9. M spike of 16.2 in urine on 10/10.    - Bone marrow biopsy on 10/23/2020 showed trilineage hematopoiesis and 50-60% kappa restricted plasma cells.    - Flow cytometry showed 20 % plasma cells which express CD19, CD38, CD45 and monotypic cytoplasmic kappa immunoglobulin light chains but lack  CD20 and CD56. Cytogenetics pending.    - FISH shows IGH-CCND1 fusion (81%; 30% had loss of IGH component from one fusion signal).      He was started on  VRD on    - Velcade 2.1mg days 1, 4, 8, 11   - Revlimid 25 mg daily for 14 days on, 7 days off   - Dexamethasone 40mg Days 1, 8, 15.    - so far he received Day 8 Cycle 1 Velcade on 11/26/2020    Interval History: patient received Velcade yesterday. He had fever with T max 103.1 and rapid response was called and his lactic acid was 2. He feels chill when he had fever for about 1.5 hours. Otherwise has no diarrhea. Eating ok. Pain is controlled with medication.     Review of Systems: Pertinent positive and negative systems described in interval history; the remainder of the 14 systems are negative    Past Medical History:  Past Medical History:   Diagnosis Date     Hyperlipidemia      Hypertension        Past Surgical History:  Past Surgical History:   Procedure Laterality Date     HERNIA REPAIR, INGUINAL RT/LT      needed post-op intestinal repair     ORIF left clavical         Social History:  Social History     Socioeconomic History     Marital status: Single     Spouse name: None     Number of children: None     Years of education: None     Highest education level: None   Occupational History     Occupation: retired   Social Needs     Financial resource strain: None     Food insecurity     Worry: None     Inability: None     Transportation needs     Medical: None     Non-medical: None   Tobacco Use     Smoking status: Former Smoker     Packs/day: 0.10     Years: 30.00     Pack years: 3.00     Types: Cigarettes     Smokeless tobacco: Never Used   Substance and Sexual Activity     Alcohol use: Yes     Alcohol/week: 17.5 standard drinks     Types: 21 drink(s) per week     Drug use: None     Sexual activity: None   Lifestyle     Physical activity     Days per week: None     Minutes per session: None     Stress: None   Relationships     Social connections     Talks  on phone: None     Gets together: None     Attends [employer] service: None     Active member of club or organization: None     Attends meetings of clubs or organizations: None     Relationship status: None     Intimate partner violence     Fear of current or ex partner: None     Emotionally abused: None     Physically abused: None     Forced sexual activity: None   Other Topics Concern      Service Not Asked     Blood Transfusions Not Asked     Caffeine Concern Not Asked     Occupational Exposure Not Asked     Hobby Hazards Not Asked     Sleep Concern Not Asked     Stress Concern Not Asked     Weight Concern Not Asked     Special Diet Not Asked     Back Care Not Asked     Exercise Yes     Comment: bikes daily     Bike Helmet Not Asked     Seat Belt Not Asked     Self-Exams Not Asked   Social History Narrative     None        Family History  Family History   Problem Relation Age of Onset     C.A.D. Father          MI age 69     Hypertension Sister         obese     Family History Negative Brother        Outpatient Medications:  No current facility-administered medications on file prior to encounter.        acyclovir (ZOVIRAX) 400 MG tablet, Take 1 tablet (400 mg) by mouth 2 times daily Viral Prophylaxis.       aspirin (ASA) 325 MG tablet, Take 1 tablet (325 mg) by mouth daily       calcium carbonate 500 mg, elemental, (OSCAL) 500 MG tablet, Take 1 tablet (500 mg) by mouth 3 times daily (with meals) (Patient taking differently: Take 500 mg by mouth 2 times daily )       cholecalciferol (VITAMIN D3) 25 mcg (1000 units) capsule, Take 1 capsule by mouth daily       gabapentin (NEURONTIN) 100 MG capsule, Take 1 capsule (100 mg) by mouth 3 times daily       hydrochlorothiazide (MICROZIDE) 12.5 MG capsule, Take 12.5 mg by mouth daily       ibuprofen (ADVIL/MOTRIN) 600 MG tablet, Take 1 tablet (600 mg) by mouth every 6 hours       LENalidomide (REVLIMID) 25 MG CAPS capsule, Take 1 capsule (25 mg) by mouth daily  "for 14 days Days 1 through 14.       levofloxacin (LEVAQUIN) 250 MG tablet, Take 1 tablet (250 mg) by mouth daily       lisinopril (ZESTRIL) 20 MG tablet, Take 1 tablet (20 mg) by mouth daily       methocarbamol (ROBAXIN) 500 MG tablet, Take 1 tablet (500 mg) by mouth 4 times daily       omeprazole (PRILOSEC) 10 MG DR capsule, Take 10 mg by mouth daily       ondansetron (ZOFRAN-ODT) 4 MG ODT tab, Take 1 tablet (4 mg) by mouth every 6 hours as needed for nausea or vomiting       oxyCODONE (ROXICODONE) 5 MG tablet, Take 1 tablet (5 mg) by mouth every 6 hours as needed for severe pain       polyethylene glycol (MIRALAX) 17 g packet, Take 17 g by mouth daily       senna-docusate (SENOKOT-S/PERICOLACE) 8.6-50 MG tablet, Take 1 tablet by mouth daily as needed        acetaminophen (TYLENOL) 325 MG tablet, Take 3 tablets (975 mg) by mouth 3 times daily       dexamethasone (DECADRON) 4 MG tablet, Take 10 tablets (40 mg) by mouth every 7 days for 3 doses Days 1, 8, and 15.       LORazepam (ATIVAN) 0.5 MG tablet, Take 1 tablet (0.5 mg) by mouth every 4 hours as needed (Anxiety, Nausea/Vomiting or Sleep) (Patient not taking: Reported on 11/26/2020)       prochlorperazine (COMPAZINE) 10 MG tablet, Take 1 tablet (10 mg) by mouth every 6 hours as needed (Nausea/Vomiting) (Patient not taking: Reported on 11/26/2020)         Physical Exam:    /57 (BP Location: Left arm)   Pulse 83   Temp 99  F (37.2  C) (Oral)   Resp 20   Ht 1.549 m (5' 1\")   Wt 65.8 kg (145 lb)   SpO2 98%   BMI 27.40 kg/m    Gen: Well appearing, in NAD  CV: Normal rate, regular rhythm. No m/r/g  Pulm: CTABL  Abd: Soft, nt/nd  Ext: No lower extremity edema  Skin: dry   Neuro: Alert and answering questions appropriately.     Labs & Studies: I personally reviewed the following studies:  ROUTINE LABS (Last four results):  [facility]  Recent Labs   Lab 11/30/20  0657 11/29/20  1023 11/29/20  0730 11/28/20  2350 11/28/20  1221 11/28/20  0726 11/27/20  0707 " 11/26/20 2006 11/26/20 2006     --  139  --   --  138 140  --  136   POTASSIUM 3.9  --  4.0  --  3.9 4.0 3.9  --  3.5   CHLORIDE 110*  --  113*  --   --  114* 114*  --  107   CO2 22  --  22  --   --  20 20  --  22   ANIONGAP 4  --  4  --   --  4 5  --  7   GLC 92  --  89  --   --  88 87  --  106*   BUN 15  --  11  --   --  21 37*  --  44*   CR 1.05  --  0.95  --   --  0.99 1.35*  --  1.59*   GFRESTIMATED 70  --  79  --   --  75 51*  --  42*   GFRESTBLACK 81  --  >90  --   --  87 60*  --  49*   MIRI 7.6*  --  7.8*  --   --  7.7* 7.4*  --  8.9   MAG 2.1  --  2.0  --  2.1 2.1 2.0   < >  --    PHOS 2.7 2.8 3.0 2.3* 1.4* 2.0* 3.3   < >  --    PROTTOTAL  --   --  7.1  --   --   --   --   --  9.0*   ALBUMIN  --   --  2.0*  --   --   --   --   --  2.7*   BILITOTAL  --   --  0.2  --   --   --   --   --  0.3   ALKPHOS  --   --  70  --   --   --   --   --  83   AST  --   --  18  --   --   --   --   --  4   ALT  --   --  22  --   --   --   --   --  22    < > = values in this interval not displayed.     CBC  Recent Labs   Lab 11/30/20  0657 11/29/20 2246 11/29/20 0730 11/26/20 2006   WBC 17.5* 18.6* 8.8 5.6   RBC 2.26* 2.32* 2.55* 2.81*   HGB 7.7* 7.9* 8.7* 9.3*   HCT 24.0* 24.5* 27.3* 29.9*   * 106* 107* 106*   MCH 34.1* 34.1* 34.1* 33.1*   MCHC 32.1 32.2 31.9 31.1*   RDW 15.3* 15.2* 15.2* 15.0   * 147* 145* 239     INRNo lab results found in last 7 days.    Assessment & Plan:   74 y/o M w/ hx of HTN, HLD and multiple myeloma, s/p C1D8 Valcade on 11/26/2020 presents to the [facility] ED after a fall, resulted with right upper thoracic pain, from new right third through fifth rib fractures. He has multiple stable compression fracture including T1, T5, T10 and T12, L2 and L3.     #  Fall: based on the history patient is likely has orthostatic hypotension/dehydration  from poor po intake. He had CT chest upon admission which did not show acute change.     # Fever with leukocytosis, which is likely due to  infection. CT chest showed no PE and there is no infection but has pleural effusions and lung base atelectasis. UA is clear.   - patient was on acyclovir and Levaquin  - recommends adding cefepime  - check CT angio for fever and, repeat daily blood culture    # N/V due to chemo: recurrent. Patient is instructed to take zofran prior to his chemo day and use it prn and avoid Compazine (patient changed his story later and stays that he did not take it). He is instructed to bring his medication to clinic nest time to better clarify the medication.     # MM: as to his MM treatment. He did not carry his Revlimid with him. Revlimid is known to have central nervous system side effect including: Fatigue (11% to 34%), dizziness (20%), and headache (9% to 20%). I am concerned that he is having a difficult time tolerating his chemo regimen. I would recommend holding the revlimid and reassessing his treatment plan. He was given d11 velcade on 11/29/2020.   - his chemo plan is    - s/p Velcade 2.1mg days 1, 4, 8, 11   - Revlimid 25 mg daily for 14 days on, 7 days off. Hold this for now   - Dexamethasone 40mg Days 1, 8, 15 (due on 12/3)   - agree with palliative care consult   - Agree with TCU    Recommendations:   - daily blood culture   - add cefepime  - IVF prn hypotension  - pain control per primary team  - we will help with f/u plan when he is close to be discharged.   - primary team is updated.     Artie Wilson MD, PhD  Hematology/Oncology   Pager: [phone]      Patient is seen and discussed with Dr. Yuen, who agrees with the plan.     I have seen, interviewed, and examined the patient independently.  I have reviewed the vital signs and labs.  This note reflects my assessment and plan.    He is looking great, pain is well controlled but now with high fever and no localizing signs or evidence of infection. Given active multiple myeloma, immunosuppressive medications, and high fever, would cover with empiric Abx. BC  pending. CXR: I reviewed these images: unrevealing. UA neg    Would do CT PA to r/o PE. Multiple myeloma and his chemo regimen are highly thrombogenic. He has been on outpatient ASA, but could still have PE, especially in setting of recent immobility ( he has been on lovenox prophy)    Kizzy Yuen MD/PhD

## 2020-12-01 ENCOUNTER — APPOINTMENT (OUTPATIENT)
Dept: OCCUPATIONAL THERAPY | Facility: CLINIC | Age: 75
DRG: 542 | End: 2020-12-01
Payer: COMMERCIAL

## 2020-12-01 ENCOUNTER — APPOINTMENT (OUTPATIENT)
Dept: PHYSICAL THERAPY | Facility: CLINIC | Age: 75
DRG: 542 | End: 2020-12-01
Payer: COMMERCIAL

## 2020-12-01 ENCOUNTER — APPOINTMENT (OUTPATIENT)
Dept: GENERAL RADIOLOGY | Facility: CLINIC | Age: 75
DRG: 542 | End: 2020-12-01
Attending: STUDENT IN AN ORGANIZED HEALTH CARE EDUCATION/TRAINING PROGRAM
Payer: COMMERCIAL

## 2020-12-01 LAB
ANION GAP SERPL CALCULATED.3IONS-SCNC: 3 MMOL/L (ref 3–14)
BASOPHILS # BLD AUTO: 0 10E9/L (ref 0–0.2)
BASOPHILS NFR BLD AUTO: 0.2 %
BUN SERPL-MCNC: 12 MG/DL (ref 7–30)
CALCIUM SERPL-MCNC: 7.7 MG/DL (ref 8.5–10.1)
CHLORIDE SERPL-SCNC: 114 MMOL/L (ref 94–109)
CO2 SERPL-SCNC: 21 MMOL/L (ref 20–32)
CREAT SERPL-MCNC: 0.94 MG/DL (ref 0.66–1.25)
DIFFERENTIAL METHOD BLD: ABNORMAL
EOSINOPHIL # BLD AUTO: 0.8 10E9/L (ref 0–0.7)
EOSINOPHIL NFR BLD AUTO: 6.9 %
ERYTHROCYTE [DISTWIDTH] IN BLOOD BY AUTOMATED COUNT: 15.6 % (ref 10–15)
GFR SERPL CREATININE-BSD FRML MDRD: 80 ML/MIN/{1.73_M2}
GLUCOSE SERPL-MCNC: 101 MG/DL (ref 70–99)
HCT VFR BLD AUTO: 26.6 % (ref 40–53)
HGB BLD-MCNC: 8.3 G/DL (ref 13.3–17.7)
IMM GRANULOCYTES # BLD: 0.2 10E9/L (ref 0–0.4)
IMM GRANULOCYTES NFR BLD: 1.4 %
LYMPHOCYTES # BLD AUTO: 1.1 10E9/L (ref 0.8–5.3)
LYMPHOCYTES NFR BLD AUTO: 9.6 %
MAGNESIUM SERPL-MCNC: 1.9 MG/DL (ref 1.6–2.3)
MCH RBC QN AUTO: 33.7 PG (ref 26.5–33)
MCHC RBC AUTO-ENTMCNC: 31.2 G/DL (ref 31.5–36.5)
MCV RBC AUTO: 108 FL (ref 78–100)
MONOCYTES # BLD AUTO: 1.2 10E9/L (ref 0–1.3)
MONOCYTES NFR BLD AUTO: 10.1 %
MRSA DNA SPEC QL NAA+PROBE: NEGATIVE
NEUTROPHILS # BLD AUTO: 8.4 10E9/L (ref 1.6–8.3)
NEUTROPHILS NFR BLD AUTO: 71.8 %
NRBC # BLD AUTO: 0 10*3/UL
NRBC BLD AUTO-RTO: 0 /100
PHOSPHATE SERPL-MCNC: 1.7 MG/DL (ref 2.5–4.5)
PHOSPHATE SERPL-MCNC: 2 MG/DL (ref 2.5–4.5)
PHOSPHATE SERPL-MCNC: 2.3 MG/DL (ref 2.5–4.5)
PHOSPHATE SERPL-MCNC: 2.4 MG/DL (ref 2.5–4.5)
PLATELET # BLD AUTO: 144 10E9/L (ref 150–450)
POTASSIUM SERPL-SCNC: 4.4 MMOL/L (ref 3.4–5.3)
RBC # BLD AUTO: 2.46 10E12/L (ref 4.4–5.9)
SODIUM SERPL-SCNC: 138 MMOL/L (ref 133–144)
SPECIMEN SOURCE: NORMAL
WBC # BLD AUTO: 11.7 10E9/L (ref 4–11)

## 2020-12-01 PROCEDURE — 250N000009 HC RX 250: Performed by: NURSE PRACTITIONER

## 2020-12-01 PROCEDURE — 80048 BASIC METABOLIC PNL TOTAL CA: CPT | Performed by: SURGERY

## 2020-12-01 PROCEDURE — 99232 SBSQ HOSP IP/OBS MODERATE 35: CPT | Performed by: NURSE PRACTITIONER

## 2020-12-01 PROCEDURE — 250N000009 HC RX 250: Performed by: SURGERY

## 2020-12-01 PROCEDURE — 97530 THERAPEUTIC ACTIVITIES: CPT | Mod: GP

## 2020-12-01 PROCEDURE — 250N000013 HC RX MED GY IP 250 OP 250 PS 637: Performed by: PHYSICIAN ASSISTANT

## 2020-12-01 PROCEDURE — 120N000002 HC R&B MED SURG/OB UMMC

## 2020-12-01 PROCEDURE — 250N000009 HC RX 250: Performed by: STUDENT IN AN ORGANIZED HEALTH CARE EDUCATION/TRAINING PROGRAM

## 2020-12-01 PROCEDURE — 83735 ASSAY OF MAGNESIUM: CPT | Performed by: SURGERY

## 2020-12-01 PROCEDURE — 250N000011 HC RX IP 250 OP 636: Performed by: SURGERY

## 2020-12-01 PROCEDURE — 99233 SBSQ HOSP IP/OBS HIGH 50: CPT | Mod: GC | Performed by: INTERNAL MEDICINE

## 2020-12-01 PROCEDURE — 87640 STAPH A DNA AMP PROBE: CPT | Performed by: SURGERY

## 2020-12-01 PROCEDURE — 250N000011 HC RX IP 250 OP 636: Performed by: PHYSICIAN ASSISTANT

## 2020-12-01 PROCEDURE — 71045 X-RAY EXAM CHEST 1 VIEW: CPT | Mod: 26 | Performed by: RADIOLOGY

## 2020-12-01 PROCEDURE — 99232 SBSQ HOSP IP/OBS MODERATE 35: CPT | Performed by: ANESTHESIOLOGY

## 2020-12-01 PROCEDURE — 97535 SELF CARE MNGMENT TRAINING: CPT | Mod: GO

## 2020-12-01 PROCEDURE — 271N000002 HC RX 271: Performed by: STUDENT IN AN ORGANIZED HEALTH CARE EDUCATION/TRAINING PROGRAM

## 2020-12-01 PROCEDURE — 97110 THERAPEUTIC EXERCISES: CPT | Mod: GO

## 2020-12-01 PROCEDURE — 250N000011 HC RX IP 250 OP 636: Performed by: STUDENT IN AN ORGANIZED HEALTH CARE EDUCATION/TRAINING PROGRAM

## 2020-12-01 PROCEDURE — 999N000105 HC STATISTIC NO DOCUMENTATION TO SUPPORT CHARGE

## 2020-12-01 PROCEDURE — 258N000003 HC RX IP 258 OP 636: Performed by: SURGERY

## 2020-12-01 PROCEDURE — 85025 COMPLETE CBC W/AUTO DIFF WBC: CPT | Performed by: INTERNAL MEDICINE

## 2020-12-01 PROCEDURE — 258N000003 HC RX IP 258 OP 636: Performed by: STUDENT IN AN ORGANIZED HEALTH CARE EDUCATION/TRAINING PROGRAM

## 2020-12-01 PROCEDURE — 36415 COLL VENOUS BLD VENIPUNCTURE: CPT | Performed by: INTERNAL MEDICINE

## 2020-12-01 PROCEDURE — 250N000011 HC RX IP 250 OP 636: Performed by: NURSE PRACTITIONER

## 2020-12-01 PROCEDURE — 84100 ASSAY OF PHOSPHORUS: CPT | Performed by: SURGERY

## 2020-12-01 PROCEDURE — 94150 VITAL CAPACITY TEST: CPT

## 2020-12-01 PROCEDURE — 250N000013 HC RX MED GY IP 250 OP 250 PS 637: Performed by: SURGERY

## 2020-12-01 PROCEDURE — 999N000157 HC STATISTIC RCP TIME EA 10 MIN

## 2020-12-01 PROCEDURE — 87641 MR-STAPH DNA AMP PROBE: CPT | Performed by: SURGERY

## 2020-12-01 PROCEDURE — 36415 COLL VENOUS BLD VENIPUNCTURE: CPT | Performed by: SURGERY

## 2020-12-01 PROCEDURE — 71045 X-RAY EXAM CHEST 1 VIEW: CPT

## 2020-12-01 RX ORDER — BUPIVACAINE HYDROCHLORIDE 5 MG/ML
5 INJECTION, SOLUTION EPIDURAL; INTRACAUDAL ONCE
Status: COMPLETED | OUTPATIENT
Start: 2020-12-01 | End: 2020-12-01

## 2020-12-01 RX ORDER — MAGNESIUM SULFATE HEPTAHYDRATE 40 MG/ML
2 INJECTION, SOLUTION INTRAVENOUS ONCE
Status: COMPLETED | OUTPATIENT
Start: 2020-12-01 | End: 2020-12-01

## 2020-12-01 RX ORDER — MEROPENEM 1 G/1
1 INJECTION, POWDER, FOR SOLUTION INTRAVENOUS EVERY 8 HOURS
Status: DISCONTINUED | OUTPATIENT
Start: 2020-12-01 | End: 2020-12-02

## 2020-12-01 RX ADMIN — MAGNESIUM SULFATE IN WATER 2 G: 40 INJECTION, SOLUTION INTRAVENOUS at 05:55

## 2020-12-01 RX ADMIN — MEROPENEM 1 G: 1 INJECTION, POWDER, FOR SOLUTION INTRAVENOUS at 21:08

## 2020-12-01 RX ADMIN — METHOCARBAMOL 500 MG: 500 TABLET, FILM COATED ORAL at 21:06

## 2020-12-01 RX ADMIN — ACETAMINOPHEN 975 MG: 325 TABLET, FILM COATED ORAL at 07:54

## 2020-12-01 RX ADMIN — POTASSIUM CHLORIDE, DEXTROSE MONOHYDRATE AND SODIUM CHLORIDE: 150; 5; 450 INJECTION, SOLUTION INTRAVENOUS at 00:40

## 2020-12-01 RX ADMIN — GABAPENTIN 300 MG: 300 CAPSULE ORAL at 07:56

## 2020-12-01 RX ADMIN — ACYCLOVIR 400 MG: 400 TABLET ORAL at 21:06

## 2020-12-01 RX ADMIN — GABAPENTIN 300 MG: 300 CAPSULE ORAL at 21:06

## 2020-12-01 RX ADMIN — CEFEPIME 2 G: 2 INJECTION, POWDER, FOR SOLUTION INTRAVENOUS at 09:51

## 2020-12-01 RX ADMIN — ACETAMINOPHEN 975 MG: 325 TABLET, FILM COATED ORAL at 15:11

## 2020-12-01 RX ADMIN — LIDOCAINE 2 PATCH: 560 PATCH PERCUTANEOUS; TOPICAL; TRANSDERMAL at 11:40

## 2020-12-01 RX ADMIN — SODIUM PHOSPHATE, MONOBASIC, MONOHYDRATE 9 MMOL: 276; 142 INJECTION, SOLUTION INTRAVENOUS at 08:00

## 2020-12-01 RX ADMIN — BUPIVACAINE HYDROCHLORIDE 25 MG: 5 INJECTION, SOLUTION EPIDURAL; INTRACAUDAL at 08:53

## 2020-12-01 RX ADMIN — Medication 25 MCG: at 07:55

## 2020-12-01 RX ADMIN — ACYCLOVIR 400 MG: 400 TABLET ORAL at 07:55

## 2020-12-01 RX ADMIN — LISINOPRIL 20 MG: 20 TABLET ORAL at 07:55

## 2020-12-01 RX ADMIN — Medication: at 21:10

## 2020-12-01 RX ADMIN — ENOXAPARIN SODIUM 30 MG: 30 INJECTION SUBCUTANEOUS at 21:15

## 2020-12-01 RX ADMIN — HYDROCHLOROTHIAZIDE 12.5 MG: 12.5 CAPSULE, GELATIN COATED ORAL at 07:54

## 2020-12-01 RX ADMIN — SODIUM PHOSPHATE, MONOBASIC, MONOHYDRATE 9 MMOL: 276; 142 INJECTION, SOLUTION INTRAVENOUS at 22:18

## 2020-12-01 RX ADMIN — OMEPRAZOLE 10 MG: 10 CAPSULE, DELAYED RELEASE ORAL at 07:56

## 2020-12-01 RX ADMIN — CALCIUM 500 MG: 500 TABLET ORAL at 19:06

## 2020-12-01 RX ADMIN — METHOCARBAMOL 500 MG: 500 TABLET, FILM COATED ORAL at 07:55

## 2020-12-01 RX ADMIN — GABAPENTIN 300 MG: 300 CAPSULE ORAL at 15:10

## 2020-12-01 RX ADMIN — ENOXAPARIN SODIUM 30 MG: 30 INJECTION SUBCUTANEOUS at 09:51

## 2020-12-01 RX ADMIN — METHOCARBAMOL 500 MG: 500 TABLET, FILM COATED ORAL at 15:10

## 2020-12-01 RX ADMIN — CALCIUM 500 MG: 500 TABLET ORAL at 07:55

## 2020-12-01 RX ADMIN — ONDANSETRON 4 MG: 4 TABLET, ORALLY DISINTEGRATING ORAL at 11:38

## 2020-12-01 RX ADMIN — POSACONAZOLE 300 MG: 100 TABLET, COATED ORAL at 07:55

## 2020-12-01 RX ADMIN — METHOCARBAMOL 500 MG: 500 TABLET, FILM COATED ORAL at 11:38

## 2020-12-01 RX ADMIN — OXYCODONE HYDROCHLORIDE 20 MG: 10 TABLET ORAL at 06:00

## 2020-12-01 RX ADMIN — CALCIUM 500 MG: 500 TABLET ORAL at 11:38

## 2020-12-01 ASSESSMENT — ACTIVITIES OF DAILY LIVING (ADL)
ADLS_ACUITY_SCORE: 18
ADLS_ACUITY_SCORE: 19
DEPENDENT_IADLS:: INDEPENDENT
ADLS_ACUITY_SCORE: 19
ADLS_ACUITY_SCORE: 18
ADLS_ACUITY_SCORE: 19
ADLS_ACUITY_SCORE: 18

## 2020-12-01 ASSESSMENT — MIFFLIN-ST. JEOR: SCORE: 1281.38

## 2020-12-01 NOTE — PROVIDER NOTIFICATION
Provider notified of hives on patient's bilateral chest. Patient is not experiencing shortness of breath or itching.

## 2020-12-01 NOTE — PROGRESS NOTES
[facility]   Trauma Service Progress Note  Date of Service (when I saw the patient): 12/01/2020     Assessment & Plan   Trauma Mechanism: Fall   Date of Injury: 11/26/20  Known Injuries:  1. Right 4 & 5 rib fx   Brief HPI    76 y/o male with a history of multiple myeloma on chemotherapy who was admitted to the trauma service after a ground level fall in at home with rib multiple apparent pathologic bilateral rib fractures and T10, T12 and L2 compression deformities.     Neuro/Pain/Psych:  # Fall   - Head CT 11/26: No intracranial pathology.      # Acute on chronic pain   - continued PTA gabapentin 100mg tid, Robaxin   - Scheduled: Tylenol 975mg, lidoderm,   - Prn: Oxycodone 10-20  - RAPS: ES cath inserted 11/27.      # Anxiety   - continued PTA: ativan 0.5 q4hrs prn,   - Maintain circadian rhythm.  Lights on during the day.  Off at night, minimize cares at night.  OOB during the day.     Pulmonary:  # Rib fractures   # possible HAP  - CXR 11/29: Continued left lower lung subsegmental atelectasis. No acute interval changes. Possible trace left pleural effusion.  - CXR 11/30: Stable rib fractures. Patchy opacities possibly representing atelectasis or pulmonary contusion.  - CT PE ordered and completed per Heme on 11/30 without PE  - Supplemental oxygen to keep saturation above 92 %.  - Incentive spirometer and flutter valve while awake, encourage mobility, splinted cough and acapella     Cardiovascular:    # Hyperlipidemia   - Monitor hemodynamic status.  - Holding PTA: ,   -  Lisinopril 20 daily for HTN     GI/Nutrition:    # GERD  - Continued PTA: omeprazole, Miralax  - Hold bowel regimen for loose stools  - Added Boost breeze supplements     Renal/ Fluids/Electrolytes:  # HALLIE on CKD  # Hypophosphetemia  - Creatinine: 1.59 on admission, improved with IVF hydration  - Replete lytes per protocol     Endocrine:  - No management indication.    Monitor     Infectious  disease:    - COVID neg 10/9. Test 11/26: neg   # Presumed HAP  # Leukocytosis   # Fever 100.5  # Rigors   - Received Chemotherapy 11/29  - 11/29, triggered by sepsis  protocol, with a leukocytosis, lactic 2.0, was started on Vancomycin and Cefepime for presumed HAP, given rib fractures, new supplemental oxygen requirement.  - UA unremarkable.  Plan to obtained daily blood cultures, sputum culture if able to expectorate, fungal blood culture pending.      Antibiotics:   - Prophylaxis (see hem): Acyclovir 400 bid, Levofloxacin (on hold with initiation of Cefepime)   - 11/30 to current: Cefepime 2gm IV q8hrs for for pseudomonal coverage + - - 1/30 to current: Vancomycin  - Posaconazole: Candida Prophylaxis     Hematology:    # Multiple Myeloma with extensive osteolytic lesions  - Hem/Onc following      # Anemia of chronic illness   - Hgb: stable in the 8 range  - Threshold for transfusion if hgb <7.0 or signs/symptoms of hypoperfusion.        # DVT Prophylaxis: Lovenox 30mg bid      Musculoskeletal:  # s/p ORIF left clavicle   # Chronic T10 compression fx  # Chronic T12 superior endplate compression fracture   # Chronic L2 and L3 compression fracture   # Right Rib 4-5 lateral mildly displaced fx  # Right Rib 4 mildly displaced posterior fx  # Weakness and deconditioning   - NSGY: No surgical intervention. Osteopenia tx, no brace needed for asymptomatic.   - Plan f/u MRI C-spine outpatient  - Physical and occupational therapy consults.     Skin:  - dilgent cares to prevent skin breakdown and wound formation     Lines/ tubes/ drains:  - PIV, ES catheter for regional paincontrol     General Cares:                 PPI/H2 blocker:  Omeprazole                 DVT prophylaxis: Lovenox                 Bowel Regimen/Date of last stool:11/29                Pulmonary toilet: IS, Flutter valve                 Lines / drains:PIV     Code status:  Full Code                   Discharge goals:     Adequate pain  management: yes     VSS x24 hours: yes    Hemoglobin stable x 48 hours: yes    Ambulating safely and/or therapy evals complete: yes    Drains/lines removed or plan in place to manage: yes    Teaching done: in process     Other: Current work-up for infection   Expected D/C date:   Transfer to medical floor today  Anticipate TCU stay in 2 days pending overall resp course. Prefers [facility]. Hem/Onc will need to be contacted for post-hosp chem plan.     Interval History   Feels well today although with decreased appetite. Denies shortness of breath, chest or abdominal pain. Reports adequate pain control.  ROS x 8 negative with exception of those things listed in interval hx    Physical Exam   Temp: 97.3  F (36.3  C) Temp src: Oral BP: (!) 143/85 Pulse: 78   Resp: 15 SpO2: 96 % O2 Device: None (Room air) Oxygen Delivery: 2 LPM  Vitals:    11/26/20 2014 12/01/20 0416   Weight: 65.8 kg (145 lb) 67.3 kg (148 lb 5.9 oz)     Vital Signs with Ranges  Temp:  [96.4  F (35.8  C)-100.6  F (38.1  C)] 97.3  F (36.3  C)  Pulse:  [] 78  Resp:  [14-20] 15  BP: (123-163)/(70-90) 143/85  SpO2:  [87 %-99 %] 96 %  I/O last 3 completed shifts:  In: 3378.33 [P.O.:600; I.V.:2778.33]  Out: 3430 [Urine:3430]    [place] Coma Scale - Total 15/15  Eye Response (E): 4   4= spontaneous, 3= to verbal/voice, 2= to pain, 1= No response   Verbal Response (V): 5   5= Orientated, converses, 4= Confused, converses, 3= Inappropriate words, 2= Incomprehensible sounds, 1=No response   Motor Response (M): 6   6= Obeys commands, 5= Localizes to pain, 4= Withdrawal to pain, 3=Fexion to pain, 2= Extension to pain, 1= No response   Constitutional: Awake, alert, cooperative, no apparent distress.  HENT: Normocephalic, atraumatic  Respiratory: No increased work of breathing, decreased sound in lower fields. no crackles or wheezing.  Cardiovascular:  regular rate and rhythm,   GI: mildly distended, abdomen soft, non-tender, no guarding  Skin: Pale, warm  and dry  Musculoskeletal: There is no redness, warmth, or swelling of the joints.  Pedal pulse palpated.  Neurologic: Awake, alert, oriented. Strength and sensory is intact. No focal deficits.  Neuropsychiatric: Calm, normal eye contact, alert, affect appropriate to situation, oriented, thought process normal.    JIMBO Sesay CNP  To contact the trauma service use job code pager [phone],   Numeric texts or alpha text through [facility]

## 2020-12-01 NOTE — PLAN OF CARE
"Neuro: A&Ox4. Forgetful at times. Intermittently flat. Slept for about 8 hours this shift.   Cardiac: SR/ST 's, HR increases with pain. Hypertensive 120-160's/70-80's. Febrile, Tmax 100.6 F.    Respiratory: Sating 90's on 2L NC. LS clear/diminished. Pt able to cough and deep breathe Q1hr independently, but pain gets in the way of IS use.   GI/: Adequate urine output through urinal. No BM this shift, Senna held as pt c/o loose BMs on previous patient care unit.   Diet/appetite: Tolerating regular diet. Eating and drinking well.  Activity:  Assist of 1 with walker.  Pain: Pt reports constant ache in right shoulder/ribs. OnQ pump providing \"moderate relief\" per pt. Scheduled Tylenol and PRN oxy x2 given with some decrease in pain.   Skin: 2 RN skin check complete - blanchable redness behind right ear. Pt arrived on unit with rash across chest which has now spread to abdomen, flank, both arm pits, and BUE.   LDA's: Right PIV TKO/Abx. Left PIV infusing D5 and 0.45% NaCl + KCl @ 100 mL/hr. OnQ pump infusing to right posterior shoulder, site CDI.     Plan: Continue with POC. Phosphorus replacement to be given this AM. Notify primary team with changes.    "

## 2020-12-01 NOTE — PROGRESS NOTES
"Regional Anesthesia Pain Service Nerve Block Daily Progress Note  12/01/20      Rogelio Marshall is a 75 year old male with multiple myeloma recently started on treatment, compression fractures, HTN and HLD admitted 11/26 s/p fall with mildly displaced fracture of the posterior fourth right rib ;Catheter Day #4 placement of right T4-5 erector spinae (ES) catheter on 1127 for analgesia.    Subjective    24 hour Interval History  - No acute events overnight.   - Patient report: pain intensity: \"more comfortable, 4/10\" after receiving oxycodone 20 mg this morning.  He pans to get up in chair and ambulate today.  Tolerating nerve block local anesthetic infusion, acetaminophen, gabapentin, methocarbamol, PRN oxycodone meds as ordered. Denies weakness, paresthesias, circumoral numbness, metallic taste, tinnitus    Antithrombotic or Thrombolytic Therapy ordered:   enoxaparin ANTICOAGULANT (LOVENOX) injection 30 mg, SC, Q12H         Analgesic Medications ordered:  Medications related to Pain Management (From now, onward)    Start     Dose/Rate Route Frequency Ordered Stop    11/30/20 1542  diphenhydrAMINE (BENADRYL) capsule 25 mg      25 mg Oral EVERY 4 HOURS PRN 11/30/20 1544      11/30/20 1541  diphenhydrAMINE (BENADRYL) injection 25 mg      25 mg Intravenous EVERY 6 HOURS PRN 11/30/20 1544      11/28/20 1100  senna-docusate (SENOKOT-S/PERICOLACE) 8.6-50 MG per tablet 1-2 tablet      1-2 tablet Oral 2 TIMES DAILY 11/28/20 1032      11/28/20 1032  magnesium hydroxide (MILK OF MAGNESIA) suspension 30 mL      30 mL Oral DAILY PRN 11/28/20 1032      11/28/20 0830  gabapentin (NEURONTIN) capsule 300 mg      300 mg Oral 3 TIMES DAILY 11/28/20 0817      11/27/20 1400  lidocaine patch in PLACE       Transdermal EVERY 8 HOURS 11/27/20 1334      11/27/20 1330  Lidocaine (LIDOCARE) 4 % Patch 1-3 patch      1-3 patch  over 12 Hours Transdermal EVERY 24 HOURS 11/27/20 1304      11/27/20 1330  methocarbamol (ROBAXIN) tablet 500 mg      " "500 mg Oral 4 TIMES DAILY 11/27/20 1306      11/27/20 1330  polyethylene glycol (MIRALAX) Packet 17 g      17 g Oral DAILY 11/27/20 1306      11/27/20 1310  oxyCODONE IR (ROXICODONE) tablet 10-20 mg      10-20 mg Oral EVERY 4 HOURS PRN 11/27/20 1310      11/27/20 1304  LORazepam (ATIVAN) tablet 0.5 mg      0.5 mg Oral EVERY 4 HOURS PRN 11/27/20 1306      11/27/20 1200  ropivacaine 0.2% (NAROPIN) 750 mL in ON-Q C-Bloc select flow ([id number] holds 600-750 mL) single cath disposable pump      14 mL/hr  Irrigation CONTINUOUS 11/27/20 1133      11/27/20 0430  acetaminophen (TYLENOL) tablet 975 mg      975 mg Oral EVERY 8 HOURS 11/27/20 0302      11/26/20 2015  HYDROmorphone (PF) (DILAUDID) injection 0.3 mg      0.3 mg Intravenous ONCE 11/26/20 2010              Objective  Exam  Vitals:   BP (!) 143/85   Pulse 78   Temp 97.3  F (36.3  C) (Oral)   Resp 15   Ht 1.549 m (5' 1\")   Wt 67.3 kg (148 lb 5.9 oz)   SpO2 96%   BMI 28.03 kg/m        General: Alert, oriented, NAD  MSK/Neuro: Functional Strength upper and lower extremites     Skin: right erector spinae (ES) catheter site with dressing c/d/i, no tenderness, erythema, heme, edema       Labs/Diagnostics  Heme/INR:  Recent Labs   Lab Test 11/30/20  1846 11/30/20  0657   WBC 18.6* 17.5*   RBC 2.66* 2.26*   HGB 8.9* 7.7*   HCT 27.6* 24.0*   * 106*   MCH 33.5* 34.1*   MCHC 32.2 32.1   RDW 15.3* 15.3*   * 132*       Lab Results   Component Value Date    INR 1.14 10/09/2020          Assessment/Plan  ASSESSMENT  Rogelio Marshall is a 75 year old male with multiple myeloma recently started on treatment, compression fractures, HTN and HLD admitted 11/26 s/p fall with mildly displaced fracture of the posterior fourth right rib ;Catheter Day #4 placement of right T4-5 erector spinae (ES) catheter on 1127 for analgesia.    Pain well controlled, tolerating local anesthetic nerve block infusion, scheduled Tylenol, Robaxin and PRN oxycodone.  Motor function intact and " no evidence of adverse side effects related to local anesthetic continuous infusion. Patient is meeting activity goals.  Gets benefit from local anesthetic bolus.      0900 Clinician Local Anesthetic Bolus  VSS  - MEDICATION: PF bupivacaine 0.25% 10 mL via right nerve block catheter  - PROCEDURE: Clinician bolus administered incrementally; negative aspirate.  No symptoms of local anesthetic systemic toxicity (LAST). Remained with and assessed patient for 10 min post-injection. BP, P and MAP stable  - POST-PROCEDURE: Bedside RN aware of need to continue BP, P and MAP monitoring Q 10 min for an additional 20 min. Contact RAPS (jobcode ID [id number]) if experiencing any untoward effects or MAP less than 60     - 0945 Follow up: Patient sitting up in bed with HOB elevated, awake and oriented, reports improve analgesia, pain rating 3/10      PLAN  - Nerve block infusion: continue ropivacaine 0.2% infusion at 14mL/hr, will plan to remove catheter 24 hours prior to discharge    expected change of On-Q device is tomorrow     patient can be evaluated to receive local anesthetic bolus Q 12 hr PRN pain control.  Bedside RN must page RAPS to request bolus    - Anticoagulation: okay to continue enoxaparin 30 mg Q 12 hrs as ordered. Please contact RAPS jobcode pager [phone] before ordering or making any medication changes    -  Follow up: RAPS will continue to follow and adjust as needed    Discussed with attending anesthesiologist     --  JIMBO Mckeon [facility]  Regional Anesthesia Pain Service  12/1/2020 10:31 AM    RAPS Contact Info (24 hour job code pager is the last 4 digits) For in-house use only:   Job code ID: [email or web address] [id number]   [facility] 0599  [facility] 0602  [place] phone: dial * * * [phone], enter jobcode ID, then enter call-back number.    Text: Use [place] on the Intranet <Paging/Directory> tab and enter Jobcode ID.   If no call back at any time, contact the hospital  and ask for RAPS attending or backup

## 2020-12-01 NOTE — PHARMACY-VANCOMYCIN DOSING SERVICE
Pharmacy Vancomycin Initial Note  Date of Service 2020  Patient's  1945  75 year old, male    Indication: [facility] Pneumonia    Current estimated CrCl = Estimated Creatinine Clearance: 51.1 mL/min (based on SCr of 1.05 mg/dL).    Creatinine for last 3 days  2020:  7:26 AM Creatinine 0.99 mg/dL  2020:  7:30 AM Creatinine 0.95 mg/dL  2020:  6:57 AM Creatinine 1.05 mg/dL    Recent Vancomycin Level(s) for last 3 days  No results found for requested labs within last 72 hours.      Vancomycin IV Administrations (past 72 hours)      No vancomycin orders with administrations in past 72 hours.                Nephrotoxins and other renal medications (From now, onward)    Start     Dose/Rate Route Frequency Ordered Stop    20 1830  vancomycin 1250 mg in 0.9% NaCl 250 mL intermittent infusion 1,250 mg      1,250 mg  over 90 Minutes Intravenous EVERY 24 HOURS 20 1812      20 1100  lisinopril (ZESTRIL) tablet 20 mg      20 mg Oral DAILY 20 1030      20 2000  acyclovir (ZOVIRAX) tablet 400 mg      400 mg Oral 2 TIMES DAILY 20 1302            Contrast Orders - past 72 hours (72h ago, onward)    Start     Dose/Rate Route Frequency Ordered Stop    20 1100  iopamidol (ISOVUE-370) solution 60 mL      60 mL Intravenous ONCE 20 1047 20 1057                Plan:   1.  Start vancomycin 1250 mg IV q24h (19mg/kg using actual body wt = 65.8kg).   2.  Goal Trough Level: 15-20 mg/L   3.  Pharmacy will check trough levels as appropriate in 1-3 Days.    4. Serum creatinine levels will be ordered daily for the first week of therapy and at least twice weekly for subsequent weeks.    5. [place] method utilized to dose vancomycin therapy: Method 2    Elham Rosales, PharmD

## 2020-12-01 NOTE — PROGRESS NOTES
"OT: Initial eval on 11/28 11/28/20 1200   Quick Adds   Type of Visit Initial Occupational Therapy Evaluation   Living Environment   People in home alone   Current Living Arrangements apartment   Home Accessibility no concerns   Transportation Anticipated car, drives self   Living Environment Comments Pt reporting living in an apartment by himself. Pt reporting no family/friends nearby to assit with ADLs. Pt reporting IND at baseline, with use of FWW for home and community distances. Pt reporting bathroom with tub shower and no grab bars. Pt had not been utilizing shower previously as he had \"no shower curtain\".    Self-Care   Usual Activity Tolerance moderate   Current Activity Tolerance poor   Equipment Currently Used at Home walker, standard   Disability/Function   Hearing Difficulty or Deaf no   Wear Glasses or Blind no   Concentrating, Remembering or Making Decisions Difficulty no   Difficulty Communicating no   Difficulty Eating/Swallowing no   Walking or Climbing Stairs Difficulty no   Dressing/Bathing Difficulty yes   Toileting no   Fall history within last six months yes   Number of times patient has fallen within last six months 1   Change in Functional Status Since Onset of Current Illness/Injury yes   General Information   Onset of Illness/Injury or Date of Surgery 11/26/20   Referring Physician Valentino Koo MD   Additional Occupational Profile Info/Pertinent History of Current Problem \"76 y/o M w/ hx of HTN, HLD and multiple myeloma presents to the [facility] ED after a fall, now complaining of right upper thoracic pain. The patient states that he was going to the bathroom because he did not feel well. He noticed that he did not have the strength to get up from his position leaning over the toilette and he fell backward onto his back. He does not think that he lost consciousness.   Existing Precautions/Restrictions spinal  (Thoracic/lumbar )   Cognitive Status Examination   Orientation Status " orientation to person, place and time   Visual Perception   Visual Impairment/Limitations WFL   Sensory   Sensory Comments No deficits reported   Range of Motion Comprehensive   Comment, General Range of Motion LUE WFL, RUE not tested due to rib fracture.    Strength Comprehensive (MMT)   Comment, General Manual Muscle Testing (MMT) Assessment LUE WFL, RUE not tested due to rib fracture   Coordination   Coordination Comments No deficits noted   Bed Mobility   Comment (Bed Mobility) Pt log roll in bed x 2 with min A and extra time required due to signitifcant pain.    Transfers   Transfer Comments Not assessed this date, Pt declining OOB activity.    Clinical Impression   Criteria for Skilled Therapeutic Interventions Met (OT) yes   OT Diagnosis Pt is below baseline for ADLs.    OT Problem List-Impairments impacting ADL fear & anxiety;strength;pain;postural control   Assessment of Occupational Performance 3-5 Performance Deficits   Identified Performance Deficits Bathing, dressing, functional transfers, toileting, home management    Planned Therapy Interventions (OT) ADL retraining;strengthening;transfer training;home program guidelines;progressive activity/exercise;risk factor education   Clinical Decision Making Complexity (OT) low complexity   Therapy Frequency (OT) 5x/week   Predicted Duration of Therapy 1 week    Anticipated Equipment Needs Upon Discharge (OT) shower chair  (TBD)   Risks and Benefits of Treatment have been explained. Yes   Patient, Family & other staff in agreement with plan of care Yes   OT Discharge Planning    OT Discharge Recommendation (DC Rec) Transitional Care Facility;home with home care occupational therapy   OT Rationale for DC Rec Pt is below baseline for ADLs and functional mobility, limited by significant pain. Pt declining OOB activity this date, though anticipating that Pt would benefit from further skilled therapy to progress IND pending LOS at hospital.    OT Brief overview of  current status  min A bed mobility, declining OOB activity   Total Evaluation Time (Minutes)   Total Evaluation Time (Minutes) 10

## 2020-12-01 NOTE — PROGRESS NOTES
Transfer  Transferred from:   Via: bed  Reason for transfer: Pt appropriate for 6B- worsened patient condition  Family: N/A  Belongings: Received with pt  Chart: Received with pt  Medications: Meds received from old unit with pt  Code Status verified on armband: yes  2 RN Skin Assessment Completed By: Gwen GAMBOA & Mookie DYKES  Med rec completed: already completed by pharmacy   Bed surface reassessed with algorithm and charted: yes  New bed surface ordered: no    Report received from: ePbbles Lopes, 6B RN  Pt status: A&Ox4, forgetful. Blanchable redness behind right ear. Rash across chest, starting to enter axillary regions bilaterally, and abdomen. Pt febrile - .2 F. Pt c/o shaking/shivers. 2L O2 applied via nasal cannula as pt was 87-90% SpO2 upon transfer. -110's. Hypertensive - 163/85. Denies SOB. Pt reports increase in pain on right shoulder/flank and radiating to right chest below the shoulder, PRN oxy 10 mg given with some relief. Will continue to monitor and notify team with any changes.

## 2020-12-01 NOTE — CONSULTS
Regional Anesthesia Pain Service Consult Note    Consult  Reason: Trauma patient with multiple rib fractures at risk for decompensation  Consult Date: 11/27/20    Briefly, Mr. Marshall is a 75 year old jovial person with multiple myeloma recently started on treatment, compression fractures, HTN and HLD admitted 11/26 s/p fall with mildly displaced fractures of lateral right fourth and fifth ribs, and posterior fourth right rib.     PROCEDURE:  11/27/2020 placement of right T4-5 erector spinae (ES) catheter on 11/27/2020.  Please refer to Anesthesia Procedure Note by Ledy Alegria MD for full details.      RAPS will continue to follow and adjust as needed.  Thank you for the opportunity to participate in the care of Rogelio Marshall    Valentine Floyd, APRN CNP, CNP  Regional Anesthesia Pain Service (RAPS)      RAPS Contact Info (for in-house use only):  Job code ID: [id number]   [facility] [place] [place] 0599  Peds [phone]  [place] phone: dial * * * [phone], enter jobcode ID, then enter call-back number.    Text: Use [place] on the Intranet <Paging/Directory> tab and enter Jobcode ID.   If no call back at any time, contact the hospital  and ask for RAPS attending or backup

## 2020-12-01 NOTE — PLAN OF CARE
Report called to Pebbles RILEY at 1830. Patient alert and oriented x 4. Assist of 1 with walker and gaitbelt. Voids via urinal. Writer noticed rash across bilateral chest and arms at 1530, service notified and later service notified again for chills and blood pressure. PO benadryl given, patient denied shortness of breath. Blood cultures and vancomycin ordered.

## 2020-12-01 NOTE — PLAN OF CARE
Pt A/ox4, but forgetful, pleasant and cooperative.  Pt up with 1 during shift, tolerating activity well during shift after he had received 20mg of oxy this morning at about 0600 and also received a bolus via onQ per pain team at about 0900, pt did not receive any prn pain meds during my shift.  Pt using acapella and incentive spirometer t/o shift.  Phos and mag low this morning replaced per protocol, recheck phos scheduled for 6pm.  VSS during shift, see flowsheets.

## 2020-12-01 NOTE — PROVIDER NOTIFICATION
Paged Trauma service that pt pro-calcitonin came back critical at 6.60. No new orders while blood cultures are in process and pt is already on broad spectrum antibiotics.

## 2020-12-01 NOTE — CONSULTS
Care Management Initial Consult    General Information  Assessment completed with: Rogelio Narvaez  Type of CM/SW Visit: Initial Assessment     Reason for Consult: discharge planning    Communication Assessment  Patient's communication style: spoken language (English or Bilingual)    Hearing Difficulty or Deaf: no   Wear Glasses or Blind: no    Cognitive  Cognitive/Neuro/Behavioral: .WDL except  Level of Consciousness: alert  Arousal Level: opens eyes spontaneously  Orientation: oriented x 4  Mood/Behavior: calm;cooperative;hypoactive (quiet, withdrawn)  Best Language: 0 - No aphasia  Speech: clear    Living Environment:   People in home: alone     Current living Arrangements: apartment      Able to return to prior arrangements:  yes  Living Arrangement Comments: Pt lives at [facility] [address] in [facility]. This is a low income independent living apartment owned by [facility]    Family/Social Support:  Care provided by: self  Provides care for: no one  Marital Status: Single  None           Description of Support System: Uninvolved    Support Assessment: Limited social contact and support;Minimal outside structure and leisure time activities;Difficulty establishing and maintaining relationships    Current Resources:   Skilled Home Care Services:  None  Community Resources: [facility] Worker( with [facility] who does phone visits)  Equipment currently used at home: none  Supplies currently used at home: None    Employment/Financial:  Employment Status: retired        Financial Concerns: No concerns identified      Functional Status:  Prior to admission patient needed assistance:   Dependent ADLs:: Independent  Dependent IADLs:: Independent     Mental Health Status:  Mental Health Status: No Current Concerns       Chemical Dependency Status:  Chemical Dependency Status: No Current Concerns           Values/Beliefs:  Spiritual, Cultural Beliefs, Restorationism Practices, Values that affect care:   Did not  "discuss    Discharge Planning:  Length of Stay (days): 5  Expected Discharge Date: 12/03/20     Concerns to be Addressed: discharge planning   Pt unsure when he will be ready for d/c or what his needs will look like at discharge. He said that if he needs TCU at discharge he is ageeable and would prefer to return to [facility], however, is unsure if he will improve enough to return home upon discharge.  Patient plan of care discussed at interdisciplinary rounds: Yes    Anticipated Discharge Disposition: Transitional Care  Anticipated Discharge Services: None   [facility] [phone]   Anticipated Discharge DME: None    Patient/family educated on Medicare website which has current facility and service quality ratings: (Not at this time; Pt would prefer placement at [facility] since he was there previously and is familiar with the facility.)  Education Provided on the Discharge Plan:  Discharge plan TBD  Patient/Family in Agreement with the Plan: Discharge  Plan TBD    Referrals Placed by CM/SW: (Not at this time)  Private pay costs discussed: Not at this time    Additional Information:  SHAYNE met with Pt at bedside to introduce self, SW role and discuss discharge planning. SW attempted to discuss TCU recommendation for discharge, however, Pt stated that there are more urgent things to discuss at this time. He reports that sometime between when he left his apartment and coming to the hospital he lost his \"card corrales\" with his ID and credit cards. SW suggested Pt make report with Patient Relations and he accepted the phone # to follow up on this. Pt is also very focused on getting some belongings from his apartment (checkbook, camera bag, books) and said that his  previously agreed to do this. SHAYNE encouraged him to contact his  and did provide him with the number per [facility]. SHAYNE also coordinated with ID[facility] and was able to provide a book for reading as pt " "requested.    SW was able to discuss discharge planning briefly. Pt stated that if he requires TCU at discharge he is agreeable and would like to return to [facility] TCU as he was just there recently and had an \"okay\" experience. He reported that he does not feel that he is close to being discharged yet and that things could change in regards to his discharge plan.    Pt reported he has a [facility] SW that he works with but is unhappy that currently they are only doing phone visits. SW empathized with his frustration, but also explained that this is recommended d/t the current pandemic. Pt was not understanding re: this.     SW assured Pt that CM Team will continue to follow and assist with safe discharge planning as appropriate. Pt is being transferred to Unit 7B this afternoon.      MITESH Du, LICSW  6B Intermediate Care Unit   [facility]  Phone: [phone]  Pager: [phone]    "

## 2020-12-01 NOTE — PROGRESS NOTES
Hematology Consult Note   Date of Service: 12/01/2020    Patient: Rogelio Marshall  MRN: [id number]  Admission Date: 11/26/2020  Hospital Day # 5   Primary Outpatient Hematologist: Dr. Banerjee    Reason for Consult: multiple myeloma    History of Present Illness:    76 y/o M w/ hx of HTN, HLD and multiple myeloma, s/p C1D8 Valcade on 11/26/2020 who presents to the [facility] ED after a fall, resulting in right upper thoracic pain. Patient lives alone. Poor appetite and had not been eating/drinking well. He has some nausea and took 1 Zofran then vomited it out. He had a fainting feeling but did not lose consciousness.  He went to the bathroom because he did not feel well with a nauseous feeling. He noticed that he did not have the strength to get up from his position leaning over the toilet and he fell backward onto his back. He denies any fever, chills, no bowel/urine problem.    He was noticed to be hypotensive with BP as low as 62/41, improving with NS bolus. He is on IVF and feels better.     He is admitted to trauma service with CT findings of lucid bone lesion throughout the whole spine.   C spine- No overt acute fracture or subluxation.  Thoracic spine: T1, T5, T10 and T12 compression fracture, and right third through fifth rib fractures.   Lumbar spine: moderate chronic L2 and marked chronic L3 compressions.    Hematologic History:  This patient is a 75 year old man with lower back pain over a year. He has known compression fractures and bone scan reveals multiple lytic lesions throughout his appendicular and axial skeleton.    - His Beta 2 microglobulin was 3.6 on 10/10/2020.    - Kappa chains were 73.6 on 10/5/2020 with K/L ratio of 89.9. M spike of 16.2 in urine on 10/10.    - Bone marrow biopsy on 10/23/2020 showed trilineage hematopoiesis and 50-60% kappa restricted plasma cells.    - Flow cytometry showed 20 % plasma cells which express CD19, CD38, CD45 and monotypic cytoplasmic kappa immunoglobulin light  "chains but lack CD20 and CD56. Cytogenetics pending.   - FISH shows IGH-CCND1 fusion (81%; 30% had loss of IGH component from one fusion signal).      He was started on VRD:   - Velcade 2.1mg days 1, 4, 8, 11   - Revlimid 25 mg daily for 14 days on, 7 days off -- currently holding   - Dexamethasone 40mg Days 1, 8, 15.   He received Day 8 and Day 11 Cycle 1 Velcade on 11/26 and 11/29, respectively  Today (12/1) is Day 13    Interval History:   Spiked a fever to 100.6F again last evening -- was re-cultured. While sleeping, had O2 saturations in the high 80%s range and was briefly placed on 2L O2 by NC. Briefly transferred to intermediate care, but the pt says the plan is for him to transfer back to floor 7 today. Nursing first noticed a rash on his chest yesterday some time after starting the cefepime. It does not bother the pt -- he has no pain or itching at that site. [place] -- was mildly dizzy. Still feeling a little \"shaky\" after coming back to his room -- says he took an anti-nausea medication and will try to eat lunch. Denies abdominal pain. No vomiting.    Review of Systems: Pertinent positive and negative systems described in interval history; the remainder of the 14 systems are negative    Past Medical History:  Past Medical History:   Diagnosis Date     Hyperlipidemia      Hypertension        Past Surgical History:  Past Surgical History:   Procedure Laterality Date     HERNIA REPAIR, INGUINAL RT/LT      needed post-op intestinal repair     ORIF left clavical         Social History:  Social History     Socioeconomic History     Marital status: Single     Spouse name: None     Number of children: None     Years of education: None     Highest education level: None   Occupational History     Occupation: retired   Social Needs     Financial resource strain: None     Food insecurity     Worry: None     Inability: None     Transportation needs     Medical: None     Non-medical: None   Tobacco " Use     Smoking status: Former Smoker     Packs/day: 0.10     Years: 30.00     Pack years: 3.00     Types: Cigarettes     Smokeless tobacco: Never Used   Substance and Sexual Activity     Alcohol use: Yes     Alcohol/week: 17.5 standard drinks     Types: 21 drink(s) per week     Drug use: None     Sexual activity: None   Lifestyle     Physical activity     Days per week: None     Minutes per session: None     Stress: None   Relationships     Social connections     Talks on phone: None     Gets together: None     Attends Gnosticism service: None     Active member of club or organization: None     Attends meetings of clubs or organizations: None     Relationship status: None     Intimate partner violence     Fear of current or ex partner: None     Emotionally abused: None     Physically abused: None     Forced sexual activity: None   Other Topics Concern      Service Not Asked     Blood Transfusions Not Asked     Caffeine Concern Not Asked     Occupational Exposure Not Asked     Hobby Hazards Not Asked     Sleep Concern Not Asked     Stress Concern Not Asked     Weight Concern Not Asked     Special Diet Not Asked     Back Care Not Asked     Exercise Yes     Comment: bikes daily     Bike Helmet Not Asked     Seat Belt Not Asked     Self-Exams Not Asked   Social History Narrative     None        Family History  Family History   Problem Relation Age of Onset     C.A.D. Father          MI age 69     Hypertension Sister         obese     Family History Negative Brother        Outpatient Medications:  No current facility-administered medications on file prior to encounter.        acyclovir (ZOVIRAX) 400 MG tablet, Take 1 tablet (400 mg) by mouth 2 times daily Viral Prophylaxis.       aspirin (ASA) 325 MG tablet, Take 1 tablet (325 mg) by mouth daily       calcium carbonate 500 mg, elemental, (OSCAL) 500 MG tablet, Take 1 tablet (500 mg) by mouth 3 times daily (with meals) (Patient taking differently: Take 500 mg  "by mouth 2 times daily )       cholecalciferol (VITAMIN D3) 25 mcg (1000 units) capsule, Take 1 capsule by mouth daily       gabapentin (NEURONTIN) 100 MG capsule, Take 1 capsule (100 mg) by mouth 3 times daily       hydrochlorothiazide (MICROZIDE) 12.5 MG capsule, Take 12.5 mg by mouth daily       ibuprofen (ADVIL/MOTRIN) 600 MG tablet, Take 1 tablet (600 mg) by mouth every 6 hours       LENalidomide (REVLIMID) 25 MG CAPS capsule, Take 1 capsule (25 mg) by mouth daily for 14 days Days 1 through 14.       levofloxacin (LEVAQUIN) 250 MG tablet, Take 1 tablet (250 mg) by mouth daily       lisinopril (ZESTRIL) 20 MG tablet, Take 1 tablet (20 mg) by mouth daily       methocarbamol (ROBAXIN) 500 MG tablet, Take 1 tablet (500 mg) by mouth 4 times daily       omeprazole (PRILOSEC) 10 MG DR capsule, Take 10 mg by mouth daily       ondansetron (ZOFRAN-ODT) 4 MG ODT tab, Take 1 tablet (4 mg) by mouth every 6 hours as needed for nausea or vomiting       oxyCODONE (ROXICODONE) 5 MG tablet, Take 1 tablet (5 mg) by mouth every 6 hours as needed for severe pain       polyethylene glycol (MIRALAX) 17 g packet, Take 17 g by mouth daily       senna-docusate (SENOKOT-S/PERICOLACE) 8.6-50 MG tablet, Take 1 tablet by mouth daily as needed        acetaminophen (TYLENOL) 325 MG tablet, Take 3 tablets (975 mg) by mouth 3 times daily       dexamethasone (DECADRON) 4 MG tablet, Take 10 tablets (40 mg) by mouth every 7 days for 3 doses Days 1, 8, and 15.       LORazepam (ATIVAN) 0.5 MG tablet, Take 1 tablet (0.5 mg) by mouth every 4 hours as needed (Anxiety, Nausea/Vomiting or Sleep) (Patient not taking: Reported on 11/26/2020)       prochlorperazine (COMPAZINE) 10 MG tablet, Take 1 tablet (10 mg) by mouth every 6 hours as needed (Nausea/Vomiting) (Patient not taking: Reported on 11/26/2020)         Physical Exam:    /78 (BP Location: Left arm)   Pulse 94   Temp 97.3  F (36.3  C) (Oral)   Resp 15   Ht 1.549 m (5' 1\")   Wt 67.3 kg " (148 lb 5.9 oz)   SpO2 94%   BMI 28.03 kg/m    Gen: Well appearing, in NAD  CV: Normal rate, regular rhythm. No m/r/g  Pulm: CTABL  Abd: Soft, nt/nd  Ext: No lower extremity edema  Skin: maculopapular rash over the mid and upper chest bilaterally; non-tender  Neuro: Alert and answering questions appropriately.     Labs & Studies: I personally reviewed the following studies:  ROUTINE LABS (Last four results):  CMP  Recent Labs   Lab 12/01/20  0554 12/01/20  0448 11/30/20  0657 11/29/20  1023 11/29/20  0730 11/28/20  1221 11/28/20  1221 11/28/20  0726 11/26/20 2006 11/26/20 2006   NA  --  138 135  --  139  --   --  138   < > 136   POTASSIUM  --  4.4 3.9  --  4.0  --  3.9 4.0   < > 3.5   CHLORIDE  --  114* 110*  --  113*  --   --  114*   < > 107   CO2  --  21 22  --  22  --   --  20   < > 22   ANIONGAP  --  3 4  --  4  --   --  4   < > 7   GLC  --  101* 92  --  89  --   --  88   < > 106*   BUN  --  12 15  --  11  --   --  21   < > 44*   CR  --  0.94 1.05  --  0.95  --   --  0.99   < > 1.59*   GFRESTIMATED  --  80 70  --  79  --   --  75   < > 42*   GFRESTBLACK  --  >90 81  --  >90  --   --  87   < > 49*   MIRI  --  7.7* 7.6*  --  7.8*  --   --  7.7*   < > 8.9   MAG  --  1.9 2.1  --  2.0  --  2.1 2.1   < >  --    PHOS 1.7* 2.0* 2.7 2.8 3.0   < > 1.4* 2.0*   < >  --    PROTTOTAL  --   --   --   --  7.1  --   --   --   --  9.0*   ALBUMIN  --   --   --   --  2.0*  --   --   --   --  2.7*   BILITOTAL  --   --   --   --  0.2  --   --   --   --  0.3   ALKPHOS  --   --   --   --  70  --   --   --   --  83   AST  --   --   --   --  18  --   --   --   --  4   ALT  --   --   --   --  22  --   --   --   --  22    < > = values in this interval not displayed.     CBC  Recent Labs   Lab 11/30/20  1846 11/30/20  0657 11/29/20  2246 11/29/20  0730   WBC 18.6* 17.5* 18.6* 8.8   RBC 2.66* 2.26* 2.32* 2.55*   HGB 8.9* 7.7* 7.9* 8.7*   HCT 27.6* 24.0* 24.5* 27.3*   * 106* 106* 107*   MCH 33.5* 34.1* 34.1* 34.1*   MCHC 32.2 32.1  32.2 31.9   RDW 15.3* 15.3* 15.2* 15.2*   * 132* 147* 145*     INRNo lab results found in last 7 days.    Assessment & Plan:   76 y/o M w/ hx of HTN, HLD and multiple myeloma, s/p C1D8 Valcade on 11/26/2020 presents to the [facility] ED after a fall, resulting in right upper thoracic pain, from new right third through fifth rib fractures. He has multiple stable compression fracture including T1, T5, T10 and T12, L2 and L3.      #  Fall: based on the history patient is likely has orthostatic hypotension/dehydration from poor po intake. He reports tolerating walking but still gets dizzy. He had CT chest upon admission which did not show acute change in known compression fractures (see above).    # Fever with leukocytosis  # Maculopapular rash  Fever and leukocytosis likely due to infection. Temp as high as ~ 103F. The WBC is trending down after cefepime and vancomycin were started, but no obvious infectious source (clean UA, CT chest w/o PNA). Did have a mild fever (100.6F the evening after ABx were initiated, but afebrile since then). NGTD on blood cultures. CT chest showed no PE (ruled out considering risk for clotting with malignancy) but has pleural effusions and lung base atelectasis. Developed a maculopapular rash on the chest on 11/30 some time after starting cefepime; pt asymptomatic.  - defer ABx to primary team; on cefepime/vanc currently   - his rash could be a drug reaction to cefepime based on the appearance and time course   - unclear why posaconazole was started but he might not require fungal ppx/treatment  - recommend continuing acyclovir ppx    # N/V due to chemo: recurrent. Patient is instructed to take Zofran prior to his chemo day and use it PRN; avoid Compazine. He is instructed to bring his medication to clinic next time to discuss further with primary outpatient oncologist.    # MM: regarding MM treatment, Revlimid is known to have central nervous system side effects that include: fatigue  (11% to 34%), dizziness (20%), and headache (9% to 20%). He might be having a difficult time tolerating his chemo regimen; this could have contributed to his weakness/fall. Recommend holding the Revlimid and reassessing his treatment plan as an outpatient. He was given d11 Velcade on 11/29/2020.   - his chemo plan is    - s/p Velcade 2.1mg days 1, 4, 8, 11   - Revlimid 25 mg daily for 14 days on, 7 days off. Hold this for now   - Dexamethasone 40mg Days 1, 8, 15 (due on 12/3)   - agree with palliative care consult   - agree with TCU    Recommendations:   - daily blood cultures   - continue antibiotics  - IVF PRN for hypotension  - pain control per primary team  - we will help with f/u plan when he is close to discharge    Patient is seen and discussed with Dr. Yuen, who agrees with the plan.     Moise Valdez MD  PGY-2, Internal Medicine  [facility]    I have seen, interviewed, and examined the patient independently.  I have reviewed the vital signs and labs.  This note reflects my assessment and plan.    Rogelio appears well and comfortable this am, but unclear etiology for hypoxia last night. CT unrevealing.   Tm lower now 100.6 on IV Abx. Work up in progress    Velcade completed for this cycle on 11/29, Dex due 12/3. Will review future treatment plan once recovered from this admission.    Kizzy Yuen MD/PhD

## 2020-12-01 NOTE — PLAN OF CARE
"/63 (BP Location: Left arm)   Pulse 81   Temp 96.2  F (35.7  C) (Oral)   Resp 16   Ht 1.549 m (5' 1\")   Wt 67.3 kg (148 lb 5.9 oz)   SpO2 96%   BMI 28.03 kg/m      Transfer from  at shift change (1600).     Neuro: A&Ox4; calls appropriately. Forgetful at times. CMS intact.   Cardiac: WNL  Respiratory: sats mid 90s on RA. Denies SOB.   GI/: +BS, +passing flatus, no BM this shift. Voiding in bedside urinal.   Pain: OnQ infusing at 14 mL/hr. PRN pain meds available.   Diet: Regular, tolerating well.    Skin: Scattered bruising.   IV Access: Right PIV infusing TKO with intermittent abx   Activity: up asst X1, walker and gaitbelt   Labs: phos: 1.7; replaced by 6B. Recheck scheduled for 1800.    Plan: Continue to monitor  "

## 2020-12-02 ENCOUNTER — APPOINTMENT (OUTPATIENT)
Dept: PHYSICAL THERAPY | Facility: CLINIC | Age: 75
DRG: 542 | End: 2020-12-02
Payer: COMMERCIAL

## 2020-12-02 ENCOUNTER — APPOINTMENT (OUTPATIENT)
Dept: GENERAL RADIOLOGY | Facility: CLINIC | Age: 75
DRG: 542 | End: 2020-12-02
Attending: STUDENT IN AN ORGANIZED HEALTH CARE EDUCATION/TRAINING PROGRAM
Payer: COMMERCIAL

## 2020-12-02 ENCOUNTER — APPOINTMENT (OUTPATIENT)
Dept: OCCUPATIONAL THERAPY | Facility: CLINIC | Age: 75
DRG: 542 | End: 2020-12-02
Payer: COMMERCIAL

## 2020-12-02 LAB
ANION GAP SERPL CALCULATED.3IONS-SCNC: 6 MMOL/L (ref 3–14)
BUN SERPL-MCNC: 12 MG/DL (ref 7–30)
CALCIUM SERPL-MCNC: 8.1 MG/DL (ref 8.5–10.1)
CHLORIDE SERPL-SCNC: 109 MMOL/L (ref 94–109)
CO2 SERPL-SCNC: 23 MMOL/L (ref 20–32)
CREAT SERPL-MCNC: 1.06 MG/DL (ref 0.66–1.25)
ERYTHROCYTE [DISTWIDTH] IN BLOOD BY AUTOMATED COUNT: 15.3 % (ref 10–15)
GFR SERPL CREATININE-BSD FRML MDRD: 69 ML/MIN/{1.73_M2}
GLUCOSE SERPL-MCNC: 81 MG/DL (ref 70–99)
HCT VFR BLD AUTO: 26.5 % (ref 40–53)
HGB BLD-MCNC: 8.4 G/DL (ref 13.3–17.7)
MAGNESIUM SERPL-MCNC: 2.1 MG/DL (ref 1.6–2.3)
MCH RBC QN AUTO: 33.3 PG (ref 26.5–33)
MCHC RBC AUTO-ENTMCNC: 31.7 G/DL (ref 31.5–36.5)
MCV RBC AUTO: 105 FL (ref 78–100)
PHOSPHATE SERPL-MCNC: 2.7 MG/DL (ref 2.5–4.5)
PLATELET # BLD AUTO: 149 10E9/L (ref 150–450)
POTASSIUM SERPL-SCNC: 4.2 MMOL/L (ref 3.4–5.3)
PROCALCITONIN SERPL-MCNC: 3.3 NG/ML
RBC # BLD AUTO: 2.52 10E12/L (ref 4.4–5.9)
SODIUM SERPL-SCNC: 137 MMOL/L (ref 133–144)
WBC # BLD AUTO: 6.5 10E9/L (ref 4–11)

## 2020-12-02 PROCEDURE — 250N000013 HC RX MED GY IP 250 OP 250 PS 637: Performed by: PHYSICIAN ASSISTANT

## 2020-12-02 PROCEDURE — 97116 GAIT TRAINING THERAPY: CPT | Mod: GP | Performed by: REHABILITATION PRACTITIONER

## 2020-12-02 PROCEDURE — 97535 SELF CARE MNGMENT TRAINING: CPT | Mod: GO | Performed by: OCCUPATIONAL THERAPIST

## 2020-12-02 PROCEDURE — 71045 X-RAY EXAM CHEST 1 VIEW: CPT

## 2020-12-02 PROCEDURE — 99233 SBSQ HOSP IP/OBS HIGH 50: CPT | Mod: GC | Performed by: INTERNAL MEDICINE

## 2020-12-02 PROCEDURE — 99232 SBSQ HOSP IP/OBS MODERATE 35: CPT | Performed by: NURSE PRACTITIONER

## 2020-12-02 PROCEDURE — 83735 ASSAY OF MAGNESIUM: CPT | Performed by: SURGERY

## 2020-12-02 PROCEDURE — 84100 ASSAY OF PHOSPHORUS: CPT | Performed by: SURGERY

## 2020-12-02 PROCEDURE — 120N000002 HC R&B MED SURG/OB UMMC

## 2020-12-02 PROCEDURE — 84100 ASSAY OF PHOSPHORUS: CPT | Performed by: NURSE PRACTITIONER

## 2020-12-02 PROCEDURE — 85027 COMPLETE CBC AUTOMATED: CPT | Performed by: SURGERY

## 2020-12-02 PROCEDURE — 36415 COLL VENOUS BLD VENIPUNCTURE: CPT | Performed by: SURGERY

## 2020-12-02 PROCEDURE — 97530 THERAPEUTIC ACTIVITIES: CPT | Mod: GP | Performed by: REHABILITATION PRACTITIONER

## 2020-12-02 PROCEDURE — 250N000011 HC RX IP 250 OP 636: Performed by: STUDENT IN AN ORGANIZED HEALTH CARE EDUCATION/TRAINING PROGRAM

## 2020-12-02 PROCEDURE — 250N000013 HC RX MED GY IP 250 OP 250 PS 637: Performed by: SURGERY

## 2020-12-02 PROCEDURE — 99221 1ST HOSP IP/OBS SF/LOW 40: CPT | Mod: GC | Performed by: DERMATOLOGY

## 2020-12-02 PROCEDURE — 250N000009 HC RX 250: Performed by: NURSE PRACTITIONER

## 2020-12-02 PROCEDURE — 999N000157 HC STATISTIC RCP TIME EA 10 MIN

## 2020-12-02 PROCEDURE — 94150 VITAL CAPACITY TEST: CPT

## 2020-12-02 PROCEDURE — 250N000013 HC RX MED GY IP 250 OP 250 PS 637: Performed by: NURSE PRACTITIONER

## 2020-12-02 PROCEDURE — 80048 BASIC METABOLIC PNL TOTAL CA: CPT | Performed by: SURGERY

## 2020-12-02 PROCEDURE — 71045 X-RAY EXAM CHEST 1 VIEW: CPT | Mod: 26 | Performed by: RADIOLOGY

## 2020-12-02 PROCEDURE — 250N000011 HC RX IP 250 OP 636: Performed by: NURSE PRACTITIONER

## 2020-12-02 PROCEDURE — 36415 COLL VENOUS BLD VENIPUNCTURE: CPT | Performed by: NURSE PRACTITIONER

## 2020-12-02 PROCEDURE — 87040 BLOOD CULTURE FOR BACTERIA: CPT | Performed by: NURSE PRACTITIONER

## 2020-12-02 PROCEDURE — 84145 PROCALCITONIN (PCT): CPT | Performed by: NURSE PRACTITIONER

## 2020-12-02 PROCEDURE — 99222 1ST HOSP IP/OBS MODERATE 55: CPT | Performed by: INTERNAL MEDICINE

## 2020-12-02 RX ORDER — OXYCODONE HYDROCHLORIDE 5 MG/1
5-10 TABLET ORAL EVERY 4 HOURS PRN
Status: DISCONTINUED | OUTPATIENT
Start: 2020-12-02 | End: 2020-12-04 | Stop reason: HOSPADM

## 2020-12-02 RX ORDER — OXYCODONE HYDROCHLORIDE 10 MG/1
10-15 TABLET ORAL EVERY 4 HOURS PRN
Status: DISCONTINUED | OUTPATIENT
Start: 2020-12-02 | End: 2020-12-02

## 2020-12-02 RX ORDER — PIPERACILLIN SODIUM, TAZOBACTAM SODIUM 3; .375 G/15ML; G/15ML
3.38 INJECTION, POWDER, LYOPHILIZED, FOR SOLUTION INTRAVENOUS EVERY 6 HOURS
Status: DISCONTINUED | OUTPATIENT
Start: 2020-12-02 | End: 2020-12-03

## 2020-12-02 RX ORDER — DEXAMETHASONE 4 MG/1
40 TABLET ORAL ONCE
Status: COMPLETED | OUTPATIENT
Start: 2020-12-03 | End: 2020-12-03

## 2020-12-02 RX ORDER — BUPIVACAINE HYDROCHLORIDE 5 MG/ML
5 INJECTION, SOLUTION EPIDURAL; INTRACAUDAL ONCE
Status: COMPLETED | OUTPATIENT
Start: 2020-12-02 | End: 2020-12-02

## 2020-12-02 RX ADMIN — OXYCODONE HYDROCHLORIDE 20 MG: 10 TABLET ORAL at 08:32

## 2020-12-02 RX ADMIN — OXYCODONE HYDROCHLORIDE 10 MG: 5 TABLET ORAL at 15:43

## 2020-12-02 RX ADMIN — POLYETHYLENE GLYCOL 3350 17 G: 17 POWDER, FOR SOLUTION ORAL at 08:32

## 2020-12-02 RX ADMIN — METHOCARBAMOL 500 MG: 500 TABLET, FILM COATED ORAL at 09:43

## 2020-12-02 RX ADMIN — ACYCLOVIR 400 MG: 400 TABLET ORAL at 20:07

## 2020-12-02 RX ADMIN — MEROPENEM 1 G: 1 INJECTION, POWDER, FOR SOLUTION INTRAVENOUS at 05:37

## 2020-12-02 RX ADMIN — POTASSIUM & SODIUM PHOSPHATES POWDER PACK 280-160-250 MG 1 PACKET: 280-160-250 PACK at 09:49

## 2020-12-02 RX ADMIN — Medication 25 MCG: at 09:44

## 2020-12-02 RX ADMIN — ENOXAPARIN SODIUM 30 MG: 30 INJECTION SUBCUTANEOUS at 09:45

## 2020-12-02 RX ADMIN — OXYCODONE HYDROCHLORIDE 20 MG: 10 TABLET ORAL at 02:15

## 2020-12-02 RX ADMIN — HYDROCHLOROTHIAZIDE 12.5 MG: 12.5 CAPSULE, GELATIN COATED ORAL at 09:43

## 2020-12-02 RX ADMIN — ACETAMINOPHEN 975 MG: 325 TABLET, FILM COATED ORAL at 09:42

## 2020-12-02 RX ADMIN — CALCIUM 500 MG: 500 TABLET ORAL at 09:44

## 2020-12-02 RX ADMIN — LIDOCAINE 1 PATCH: 560 PATCH PERCUTANEOUS; TOPICAL; TRANSDERMAL at 13:07

## 2020-12-02 RX ADMIN — METHOCARBAMOL 500 MG: 500 TABLET, FILM COATED ORAL at 22:30

## 2020-12-02 RX ADMIN — MEROPENEM 1 G: 1 INJECTION, POWDER, FOR SOLUTION INTRAVENOUS at 14:08

## 2020-12-02 RX ADMIN — CALCIUM 500 MG: 500 TABLET ORAL at 18:41

## 2020-12-02 RX ADMIN — BUPIVACAINE HYDROCHLORIDE 25 MG: 5 INJECTION, SOLUTION EPIDURAL; INTRACAUDAL at 07:46

## 2020-12-02 RX ADMIN — GABAPENTIN 300 MG: 300 CAPSULE ORAL at 09:44

## 2020-12-02 RX ADMIN — POTASSIUM & SODIUM PHOSPHATES POWDER PACK 280-160-250 MG 1 PACKET: 280-160-250 PACK at 15:43

## 2020-12-02 RX ADMIN — POTASSIUM & SODIUM PHOSPHATES POWDER PACK 280-160-250 MG 1 PACKET: 280-160-250 PACK at 13:07

## 2020-12-02 RX ADMIN — OMEPRAZOLE 10 MG: 10 CAPSULE, DELAYED RELEASE ORAL at 09:43

## 2020-12-02 RX ADMIN — OXYCODONE HYDROCHLORIDE 10 MG: 5 TABLET ORAL at 20:07

## 2020-12-02 RX ADMIN — ACYCLOVIR 400 MG: 400 TABLET ORAL at 09:43

## 2020-12-02 RX ADMIN — ENOXAPARIN SODIUM 30 MG: 30 INJECTION SUBCUTANEOUS at 22:30

## 2020-12-02 RX ADMIN — PIPERACILLIN AND TAZOBACTAM 3.38 G: 3; .375 INJECTION, POWDER, LYOPHILIZED, FOR SOLUTION INTRAVENOUS at 22:30

## 2020-12-02 RX ADMIN — METHOCARBAMOL 500 MG: 500 TABLET, FILM COATED ORAL at 14:08

## 2020-12-02 RX ADMIN — POTASSIUM & SODIUM PHOSPHATES POWDER PACK 280-160-250 MG 1 PACKET: 280-160-250 PACK at 20:07

## 2020-12-02 RX ADMIN — GABAPENTIN 300 MG: 300 CAPSULE ORAL at 14:08

## 2020-12-02 RX ADMIN — GABAPENTIN 300 MG: 300 CAPSULE ORAL at 20:07

## 2020-12-02 RX ADMIN — LISINOPRIL 20 MG: 20 TABLET ORAL at 09:42

## 2020-12-02 RX ADMIN — CALCIUM 500 MG: 500 TABLET ORAL at 13:06

## 2020-12-02 RX ADMIN — METHOCARBAMOL 500 MG: 500 TABLET, FILM COATED ORAL at 18:41

## 2020-12-02 RX ADMIN — ACETAMINOPHEN 975 MG: 325 TABLET, FILM COATED ORAL at 15:43

## 2020-12-02 ASSESSMENT — ACTIVITIES OF DAILY LIVING (ADL)
ADLS_ACUITY_SCORE: 17
ADLS_ACUITY_SCORE: 18
ADLS_ACUITY_SCORE: 17

## 2020-12-02 NOTE — PROGRESS NOTES
[facility] Nerve Block Daily Progress Note  12/02/20      Rogelio Marshall is a 75 year old male with multiple myeloma recently started on treatment, compression fractures, HTN and HLD admitted 11/26 s/p fall with mildly displaced fractures of lateral right fourth and fifth ribs, and posterior fourth right rib; Catheter Day #5 placement of right T4-5 erector spinae (ES) catheter on 1127 for analgesia.    Subjective    24 hour Interval History  - No acute events overnight.   - Patient report: right lateral chest and back pain intensity: 4/10, his pain controlled enough for him to tolerate activity with nerve block infusion and oxycodone 20 mg PO Q 3 hr PRN. Denies LE weakness, paresthesias, circumoral numbness, metallic taste, tinnitus  - Has been up to chair and ambulated with assist, voiding spontaneously, tolerating regular diet, denies nausea      Antithrombotic or Thrombolytic Therapy ordered:   enoxaparin ANTICOAGULANT (LOVENOX) injection 30 mg, SC, Q12H         Analgesic Medications ordered:  Medications related to Pain Management (From now, onward)    Start     Dose/Rate Route Frequency Ordered Stop    11/30/20 1542  diphenhydrAMINE (BENADRYL) capsule 25 mg      25 mg Oral EVERY 4 HOURS PRN 11/30/20 1544      11/30/20 1541  diphenhydrAMINE (BENADRYL) injection 25 mg      25 mg Intravenous EVERY 6 HOURS PRN 11/30/20 1544      11/28/20 1100  senna-docusate (SENOKOT-S/PERICOLACE) 8.6-50 MG per tablet 1-2 tablet      1-2 tablet Oral 2 TIMES DAILY 11/28/20 1032      11/28/20 1032  magnesium hydroxide (MILK OF MAGNESIA) suspension 30 mL      30 mL Oral DAILY PRN 11/28/20 1032      11/28/20 0830  gabapentin (NEURONTIN) capsule 300 mg      300 mg Oral 3 TIMES DAILY 11/28/20 0817      11/27/20 1400  lidocaine patch in PLACE       Transdermal EVERY 8 HOURS 11/27/20 1334      11/27/20 1330  Lidocaine (LIDOCARE) 4 % Patch 1-3 patch      1-3 patch  over 12 Hours Transdermal EVERY 24 HOURS 11/27/20  "1304      11/27/20 1330  methocarbamol (ROBAXIN) tablet 500 mg      500 mg Oral 4 TIMES DAILY 11/27/20 1306      11/27/20 1330  polyethylene glycol (MIRALAX) Packet 17 g      17 g Oral DAILY 11/27/20 1306      11/27/20 1310  oxyCODONE IR (ROXICODONE) tablet 10-20 mg      10-20 mg Oral EVERY 4 HOURS PRN 11/27/20 1310      11/27/20 1304  LORazepam (ATIVAN) tablet 0.5 mg      0.5 mg Oral EVERY 4 HOURS PRN 11/27/20 1306      11/27/20 1200  ropivacaine 0.2% (NAROPIN) 750 mL in ON-Q C-Bloc select flow ([id number] holds 600-750 mL) single cath disposable pump      14 mL/hr  Irrigation CONTINUOUS 11/27/20 1133      11/27/20 0430  acetaminophen (TYLENOL) tablet 975 mg      975 mg Oral EVERY 8 HOURS 11/27/20 0302      11/26/20 2015  HYDROmorphone (PF) (DILAUDID) injection 0.3 mg      0.3 mg Intravenous ONCE 11/26/20 2010              Objective  Exam  Vitals:   BP (!) 149/78 (BP Location: Left arm)   Pulse 92   Temp 98.6  F (37  C) (Oral)   Resp 18   Ht 1.549 m (5' 1\")   Wt 67.3 kg (148 lb 5.9 oz)   SpO2 94%   BMI 28.03 kg/m        General: Alert, oriented, NAD  MSK/Neuro: Full Strength upper and lower extremities and overall symmetric.    Skin: right erector spinae (ES) catheter site with dressing c/d/i, no tenderness, erythema, heme, edema       Labs/Diagnostics  Heme/INR:  Recent Labs   Lab Test 12/02/20  0702 12/01/20  1019   WBC 6.5 11.7*   RBC 2.52* 2.46*   HGB 8.4* 8.3*   HCT 26.5* 26.6*   * 108*   MCH 33.3* 33.7*   MCHC 31.7 31.2*   RDW 15.3* 15.6*   * 144*       Lab Results   Component Value Date    INR 1.14 10/09/2020          Assessment/Plan  ASSESSMENT  75 year old male with multiple myeloma admitted 11/26 s/p fall with mildly displaced fractures of lateral right fourth and fifth ribs, and posterior fourth right rib; Catheter Day #5 placement of right T4-5 erector spinae (ES) catheter on 1127 for analgesia.    Pain well controlled, tolerating local anesthetic nerve block infusion, Q 12 hr prn local " anesthetic bolus, and oxycodone PRN.  Motor function intact and adequate sensory block. No evidence of adverse side effects related to local anesthetic continuous infusion. Patient is meeting activity goals.  Receives additional pain control from local anesthetic bolus.      0745 Clinician Local Anesthetic Bolus via nerve right ES catheter  VSS  - MEDICATION: PF bupivacaine 0.25% 10 mL  - PROCEDURE: Clinician bolus administered incrementally; negative aspirate.  No symptoms of local anesthetic systemic toxicity (LAST). Remained with and assessed patient for 10 min post-injection. BP, P and MAP stable  - POST-PROCEDURE: Bedside RN aware of need to continue BP, P and MAP monitoring Q 10 min for an additional 20 min. Contact [facility] (jobcode ID [id number]) if experiencing any untoward effects or MAP less than 60       PLAN  - Nerve block infusion: continue ROpivacaine 0.2% infusion at 14mL/hr, will plan to remove ES catheter at least 24 hours prior to discharge, and at least 8 hrs after last dose of enoxaparin    expected change of On-Q device is 2 days    patient can be evaluated to receive local anesthetic bolus Q 12 hr PRN while catheter in place.  Bedside RN must page [facility] to request bolus    - Anticoagulation: okay to continue enoxaparin 30 mg Q 12 hrs as ordered. Please contact [facility] jobcode pager [phone] before ordering or making any medication changes    -  Follow up: [facility] will continue to follow and adjust as needed    Owen Christensen MD    Department of Anesthesiology  Pain Management Division

## 2020-12-02 NOTE — CONSULTS
GENERAL ID SERVICE CONSULTATION     Patient:  Rogelio Marshall   Date of birth 1945, Medical record number [id number]  Date of Visit:  12/02/2020  Date of Admission: 11/26/2020  Consult Requester:Hill Donnelly MD          Assessment and Recommendations:   ASSESSMENT:    DISCUSSION:   Rogelio Marshall is a  76 y/o male with PMHx of HLD, HTN, multiple myeloma on chemotherapy,  thoracic and lumbar vertebral compression since 9/11/20, admitted on 11/26 for pain management after sustaining multiple rib fractures from a fall. Admission complicated by sepsis (fever, leukocytosis, tachycardia) on day 3 of admission with elevated procal of 6.6. BCx negative to date.  In the setting of rib fractures and atelectasis, he likely developed pneumonia, likely aspiration. He appears well on exam, has defervesced, and leukocytosis has resolved today.    1. Possible aspiration pneumonia in the setting rib fractures and atelectasis    RECOMMENDATION:  1. Discontinue meropenem and switch to Zosyn for presumed aspiration pneumonia in the setting of rib fractures and atelectasis   2. Recommend cover for total of 7 days of antibiotics treatment. Recommend transitioning to Augmentin for PO abx to finish our the 7 days if discharging prior to completion.     Thank you for this consult. ID will continue to follow.     Patient was discussed with Dr. River.     Rosa Maria Chun MD  Internal Medicine and Pediatrics, PGY1  Pager: [phone]    ID Staff Assessment and Plan:   Patient was seen and examined by me with the resident MD. The note above reflects our joint assessment and plan, which I discussed with the resident in detail.   Faye River MD  ID Staff Physician  Pager [phone]      ________________________________________________________________    Consult Question:.  Admission Diagnosis: Closed fracture of multiple ribs of right side, initial encounter [S22.41XA]  Syncope, unspecified syncope type [R55]  Closed fracture of thoracic vertebra,  unspecified fracture morphology, unspecified thoracic vertebral level, initial encounter (H) [S22.009A]         History of Present Illness:     Rogelio Marshall is a  76 y/o male with PMHx of HLD, HTN, multiple myeloma s/p Revlimid on 11/17 and Valacad*on 11/26,  thoracic and lumbar vertebral compression since 9/11/20, admitted on 11/26 after a fall following nausea and lightheadedness. Patient sustained multiple right sided rib fractures and was admitted for pain management. Neurosurgery was consulted however no intervention was recommended. His pain was managed with ropivacaine infusion via T4-T5 erector spinae catheter placed 11/27.     On day of admission, he was afebrile and hemodynamically stable. WBC 5.6 and lactate 2.0. On evening of day 3 (11/29) of admission, patient was noted to be febrile up to 102 and tachycardic to 117 thus triggering sepsis protocol. Labs obtained was notable for leucocytosis of 18.5 (prior was 8.8), lactate 2, UA negative for infection. BCx obtained (no growth to date). CXR with continued left lower lung subsegmental atelectasis and trace left pleural effusion however no acute pathology. Repeat CXR 11/30 AM with Patchy opacities possibly representing atelectasis or pulmonary contusion. Procal initially 6.6 on 11/30 and trended down to 3.3 on 12/2. Due to concern for possible hospital acquired pneumonia, he was started on vancomycin and cefepime. After starting cefepime, he developed a rash and thus was discontinued and replaced with meropenem. Vancomycin was also discontinued. He has been continued on his prophylactic antimicrobial coverage since admission (Acyclovir and levofloxacin)    Antimicrobial history:  Cefepime: 11/30-12/01  Vancomycin: 11/30  Acyclovir and levofloxacin ppx: continued from prior to arrival. On since admission.     This AM:   Rogelio reports doing okay. He does not have any significant concerns. He does have ongoing chest pain at the site of rib fractures, worsened  with minimal movement. He denies overt SOB however feels like his breathing is shallow with activity due to rib pain. He denies any rhinorrhea, cough, or sore throat. He has occasional nausea with associated abdominal discomfort, relieved with antiemetics. He denies any urinary sxs or changes in BM (has loose stools, attributed to bowel regimen).          Review of Systems:   10 point review of systems negative unless noted in HPI above.          Past Medical History:     Past Medical History:   Diagnosis Date     Hyperlipidemia      Hypertension             Past Surgical History:     Past Surgical History:   Procedure Laterality Date     HERNIA REPAIR, INGUINAL RT/LT      needed post-op intestinal repair     ORIF left clavical              Family History:   Reviewed and non-contributory.   Family History   Problem Relation Age of Onset     C.A.D. Father          MI age 69     Hypertension Sister         obese     Family History Negative Brother             Social History:     Social History     Tobacco Use     Smoking status: Former Smoker     Packs/day: 0.10     Years: 30.00     Pack years: 3.00     Types: Cigarettes     Smokeless tobacco: Never Used   Substance Use Topics     Alcohol use: Yes     Alcohol/week: 17.5 standard drinks     Types: 21 drink(s) per week     History   Sexual Activity     Sexual activity: Not on file            Current Medications:       acetaminophen  975 mg Oral Q8H     acyclovir  400 mg Oral BID     calcium carbonate 500 mg (elemental)  500 mg Oral TID w/meals     enoxaparin ANTICOAGULANT  30 mg Subcutaneous Q12H     gabapentin  300 mg Oral TID     hydrochlorothiazide  12.5 mg Oral Daily     HYDROmorphone  0.3 mg Intravenous Once     [Held by provider] levofloxacin  250 mg Oral Daily     Lidocaine  1-3 patch Transdermal Q24H     lidocaine   Transdermal Q8H     lisinopril  20 mg Oral Daily     meropenem  1 g Intravenous Q8H     methocarbamol  500 mg Oral 4x Daily     omeprazole  10  mg Oral Daily     polyethylene glycol  17 g Oral Daily     potassium & sodium phosphates  1 packet Oral 4x Daily     senna-docusate  1-2 tablet Oral BID     Vitamin D3  25 mcg Oral Daily            Allergies:     Allergies   Allergen Reactions     Other Drug Allergy (See Comments)      tobasco sauce ---caused red spots             Physical Exam:   Vitals were reviewed  Patient Vitals for the past 24 hrs:   BP Temp Temp src Pulse Resp SpO2   12/02/20 1253 118/68 96.3  F (35.7  C) Oral 82 18 98 %   12/02/20 0803 (!) 149/78 98.6  F (37  C) Oral 92 18 94 %   12/01/20 2224 (!) 160/87 96.6  F (35.9  C) Oral 88 20 96 %   12/01/20 1555 123/63 96.2  F (35.7  C) Oral 81 16 96 %       Physical Examination:  GENERAL: well appearing, sitting in chair, no acute distress.   HEENT:  Head is normocephalic, atraumatic. Conjunctiva clear. JAI, mmm     NECK:  Supple. No cervical lymphadenopathy  LUNGS:  Breathing is unlabored on room air. Lungs clear to to auscultation bilaterally, no crackles heard. Decreased in lower lung fields.   CARDIOVASCULAR:  Regular rate and rhythm with no murmurs, gallops or rubs.  ABDOMEN:  Normal bowel sounds, soft, nontender.   SKIN:  No acute rashes. Catheter on back, with dressing. Clean, dry, intact. Blanchable rash on chest, back, abdomen and bilateral upper extremity.   NEUROLOGIC:  Grossly nonfocal. Active x4 extremities         Laboratory Data:     Inflammatory Markers    Recent Labs   Lab Test 10/09/20  1405 10/05/20  1500 12/13/12  1242   SED 15 17  --    CRP 28.0* 23.3* 4.1       Hematology Studies    Recent Labs   Lab Test 12/02/20  0702 12/01/20  1019 11/30/20  1846 11/30/20  0657 11/29/20  2246 11/29/20  0730 11/26/20  2006 11/26/20  0917 11/19/20  1536   WBC 6.5 11.7* 18.6* 17.5* 18.6* 8.8 5.6 10.5 8.1   ANEU  --  8.4*  --  15.5*  --  5.6 4.8 7.7 5.1   AEOS  --  0.8*  --  0.6  --  1.0* 0.2 0.1 0.2   HGB 8.4* 8.3* 8.9* 7.7* 7.9* 8.7* 9.3* 9.5* 9.0*   * 108* 104* 106* 106* 107* 106*  104* 103*   * 144* 141* 132* 147* 145* 239 266 360       Metabolic Studies     Recent Labs   Lab Test 12/02/20  0702 12/01/20  0448 11/30/20  0657 11/29/20  0730 11/28/20  1221 11/28/20  0726    138 135 139  --  138   POTASSIUM 4.2 4.4 3.9 4.0 3.9 4.0   CHLORIDE 109 114* 110* 113*  --  114*   CO2 23 21 22 22  --  20   BUN 12 12 15 11  --  21   CR 1.06 0.94 1.05 0.95  --  0.99   GFRESTIMATED 69 80 70 79  --  75       Hepatic Studies    Recent Labs   Lab Test 11/29/20  0730 11/26/20  2006 11/19/20  1536 11/14/20  1137 11/04/20  1436 10/11/20  0708   BILITOTAL 0.2 0.3 0.4 0.4 <0.4 0.4   ALKPHOS 70 83 78 84 114 70   ALBUMIN 2.0* 2.7* 3.0* 3.2* 3.2* 2.2*   AST 18 4 15 17 22 19   ALT 22 22 14 16 <21 12       Microbiology:  Culture Micro   Date Value Ref Range Status   11/30/2020 No growth after 2 days  Preliminary   11/30/2020 No growth after 2 days  Preliminary   11/30/2020 No growth after 2 days  Preliminary   11/30/2020 No growth after 2 days  Preliminary   11/30/2020 No growth after 2 days  Preliminary   11/30/2020 No growth after 2 days  Preliminary   11/29/2020 No growth after 3 days  Preliminary   11/29/2020 No growth after 3 days  Preliminary       Urine Studies    Recent Labs   Lab Test 11/29/20  2140 10/09/20  1744   LEUKEST Negative Negative   WBCU 1 3       Vancomycin Levels  No lab results found.    Invalid input(s): VANCO    Hepatitis B Testing No lab results found.  Hepatitis C Testing   No results found for: HCVAB, HQTG, HCGENO, HCPCR, HQTRNA, HEPRNA  Respiratory Virus Testing    No results found for: RS, FLUAG     Imaging    CT THORACIC SPINE W/O CONTRAST, CT LUMBAR SPINE W/O CONTRAST 9/18/2020  1. Extensively osteolytic appearance of the spine, pelvis, and ribs  with largest lesion in the left sacral ala, highly suspicious for  multiple myeloma.  2. Multiple pathologic thoracic and lumbar vertebral compression  deformities including a mild L2 superior endplate compression  deformity that is  new since 9/11/2020.  3. Multilevel thoracic and lumbar spondylosis, greatest at L5-S1 where  there is severe bilateral neural foraminal stenosis.     XR BONE SURVEY COMPLETE  IMPRESSION: Extensive lytic lesions throughout the axial and  appendicular skeleton compatible with multiple myeloma. Multiple  thoracic and lumbar compression deformities better seen on recent CT.    CXR 11/26  Impression:   1. Newly visualized mildly displaced fractures of the lateral right  fourth and fifth ribs with suspected additional mildly displaced  fracture of the posterior right fourth rib.  2. Grossly stable compression deformities of the T10 and T12 vertebral  bodies.  3. Abnormal osteolytic appearance of the bones is better demonstrated  on recent thoracic spine CT.  4. Minimal left basilar opacities likely represent atelectasis.    CXR 11/26  Impression:   1.  No pneumothorax. Bibasilar streaky opacities may represent  atelectasis.  2.  Right upper rib fractures better evaluated on recent CT scan.    CXR 11/29  Impression:   1.  No pneumothorax. Right rib fractures.   2.  Unchanged left lower lobe atelectasis and possible trace left  pleural effusion.    CXR 11/29  IMPRESSION: Continued left lower lung subsegmental atelectasis. No  acute interval changes. Possible trace left pleural effusion.    CXR 11/30  IMPRESSION: Stable rib fractures. Patchy opacities possibly  representing atelectasis or pulmonary contusion.    12/01 CXR   IMPRESSION:   1. Stable right-sided rib fractures ._  2. Bilateral small pleural effusions with associated left sided  atelectasis  3. Hyperinflation lungs with upper lobe predominant interstitial  changes.  4. Lower thoracic compression fractures, better seen on previous CT of  the chest.

## 2020-12-02 NOTE — DISCHARGE SUMMARY
[facility]     Discharge Summary  Trauma Surgery Service    Date of Admission:  11/26/2020  Date of Discharge:  12/4/2020  Attending Physician: Wolf Pulido MD  Discharging Provider: Jessica Hernández NP  Date of Service (when I saw the patient): 12/04/20    Primary Provider: Wolf Stevens  Primary Care clinic: [address] [place] [id number]  Phone: [phone]  Fax number: [phone]     Discharge Diagnoses   Patient Active Problem List   Diagnosis     Hypertension     Hyperlipidemia     Difficulty walking     Pathologic compression fracture of spine, sequela     Chronic midline low back pain without sciatica     Multiple myeloma, remission status unspecified (H)     Closed fracture of multiple ribs of right side, initial encounter     Syncope, unspecified syncope type     Closed fracture of thoracic vertebra, unspecified fracture morphology, unspecified thoracic vertebral level, initial encounter (H)       Hospital Course     76 y/o male with a history of multiple myeloma on chemotherapy who was admitted to the trauma service after a ground level fall in at home with rib multiple apparent pathologic bilateral rib fractures and T10, T12 and L2 compression deformities.  Hx of HTN, HLD and multiple myeloma, s/p C1D8 [place] on 11/26/2020  He had bad appetite and had not been eating/drinking well.  Nausea, emesis and lightheaded.  He went to bathroom because he did not feel well but did not have the strength to get up from his position leaning over the toilette and he fell backward onto his back.    Traumatic Injury  The patient sustained the above injury as a result of a fall.  The mechanism of injury and factors contributing to the accident were discussed with the patient.  Strategies on how to prevent future accidents were reviewed.  The patient underwent tertiary examination to evaluate for additional injuries.  The systematic review did not find any  other injuries.    Compression Fractures  # Chronic T10 compression fx  # Chronic T12 superior endplate compression fracture   Rogelio Marshall was evaluated by Neurosurgery and managed non-operatively for his fractures. He is weight bearing as tolerated without recommendations for a brace.  Please see summary of most current therapy notes below.       Rib Fractures:  # Right Rib 4-5 lateral mildly displaced fx  # Right Rib 4 mildly displaced posterior fx  In rib fracture management, pulmonary toilet and good pain control allowing for good pulmonary toilet is paramount.  Prior to discharge, his pain was controlled for Rogelio Marshall with scheduled acetaminophen, robaxin, topical pain medications, PRN oral analgesics, PTA gabapentin and a paravertebral catheter was also placed for local nerve block, details below Adequate pain control was achieved with this regimen.  We anticipate that they will taper off this regimen over the next several weeks.     In order to prevent common pulmonary complications found with rib fracture patients, Rogelio Marshall will need to continue with aggressive pulmonary toileting that includes, incentive spirometry, coughing and deep breathing exercises.  We recommend these continue at a minimum of QID for one month after discharge.  Patient has been provided for handout with instructions regarding this.     Our Perioperative Pain Service was consulted for paravertebral catheter placement to assist with pain control.  A paravertebral block is essentially a unilateral block of the spinal nerve, including the dorsal and ventral rami, as well as the sympathetic chain ganglion.  The catheter remains for a maximum of 7 days and Rogelio Marshall has been given instructions on how to discontinue this at home, as well as specific contact information should there be any questions.  The catheter was discontinued without complication prior to discharge.    # Fever with leukocytosis  # Maculopapular  rash  Fever and leukocytosis likely due to infection. Temp as high as ~ 103F. The WBC is trending down after cefepime and vancomycin were started, but no obvious infectious source (clean UA, CT chest w/o PNA). Did have a mild fever (100.6F the evening after ABx were initiated, but afebrile since then). NGTD on blood cultures. CT chest showed no PE (ruled out considering risk for clotting with malignancy) but has pleural effusions and lung base atelectasis. Developed a maculopapular rash on the chest on 11/30 some time after starting cefepime; pt asymptomatic.  When on cefepime/vanc              - his rash could be a drug reaction to cefepime based on the appearance and time course              - unclear why posaconazole was started but he might not require fungal ppx/treatment  - recommend continuing acyclovir ppx    Multiple myeloma  Please see the thorough documentation by the oncology team for details of his treatment past and future.                   Palliative Care Consult  The palliative care team was consulted to assist in the care and future life planning regarding the changes the above injuries have created. Often this sort of injury is a clear marker of general decline in the aged population.  Please see their full consult note for details.  During their time with the patient and family, these are the things they focused on this admission:    Rogelio said he would consider his brother Dutch Marshall to be next of kin and who he would want the doctors to contact if he couldn't make medical decisions.  They haven't been in contact in years.  Encouraged him to involve someone in his life with his medical care    Encouraged to continue using IS, rehab to help regain independence.  He denies symptoms of depression.     Pain currently well-controlled. If nausea continues to be a problem, could consider scheduling a dose of zofran QAM prophylactically    Introduced palliative care, and that we may be helpful in the  future if his oncologist wishes us to follow  Therapy Recommendations:   Current status of physical therapies on discharge:     Current status of occupational therapies on discharge:    Significant Results and Procedures   DATE: 11/27/20  Right erector spinae catheter placement     Pending Results   These results will be followed up by PCP or outpatient oncology  Unresulted Labs Ordered in the Past 30 Days of this Admission     Date and Time Order Name Status Description    12/2/2020 0812 Blood culture Preliminary     12/2/2020 0812 Blood culture Preliminary     11/30/2020 2200 Blood culture Preliminary     11/30/2020 2200 Fungal Culture, Blood Preliminary     11/30/2020 2200 Blood culture Preliminary     11/30/2020 1755 Blood culture Preliminary     11/30/2020 0951 Blood culture Preliminary     11/30/2020 0951 Blood culture Preliminary     11/29/2020 2053 Blood culture Preliminary     11/29/2020 2053 Blood culture Preliminary         Code Status   Full Code    SUBJECTIVE: pt feeling spry.  Pain controled, still with twinges, no sob, ambulating, tolerating diet, ros otherwise negative    Physical Exam   Temp: 98.6  F (37  C) Temp src: Oral BP: (!) 142/77 Pulse: 86   Resp: 16 SpO2: 96 % O2 Device: None (Room air)    Vitals:    11/26/20 2014 12/01/20 0416   Weight: 65.8 kg (145 lb) 67.3 kg (148 lb 5.9 oz)     Vital Signs with Ranges  Temp:  [96.7  F (35.9  C)-98.6  F (37  C)] 98.6  F (37  C)  Pulse:  [77-86] 86  Resp:  [16-18] 16  BP: (142-151)/(76-82) 142/77  SpO2:  [95 %-98 %] 96 %  I/O last 3 completed shifts:  In: 725 [I.V.:725]  Out: 2250 [Urine:2250]    Constitutional: AAA  Eyes: rick  ENT: no deformities  Respiratory: CTA, no crackles  Cardiovascular: RRR, no murmur  GI: soft NT  Genitourinary: no urine to assess  Skin: warm and dry  Musculoskeletal:   Neurologic: oriented, no focal deficits  Neuropsychiatric: good attitude,     Discharge Disposition   Discharged to rehabilitation facility  Condition at  "discharge: Stable  Discharge VS: Blood pressure (!) 142/77, pulse 86, temperature 98.6  F (37  C), temperature source Oral, resp. rate 16, height 1.549 m (5' 1\"), weight 67.3 kg (148 lb 5.9 oz), SpO2 96 %.    Consultations This Hospital Stay   REGIONAL ANESTHESIA PAIN SERVICE ADULT IP CONSULT  OCCUPATIONAL THERAPY ADULT IP CONSULT  PHYSICAL THERAPY ADULT IP CONSULT  ONCOLOGY ADULT IP CONSULT  PALLIATIVE CARE ADULT IP CONSULT    Discharge Orders      Home care nursing referral      Reason for your hospital stay    Weakness/fall  Rib fractures  Chronic compression fractures  Fever/ leukocytosis   MML     Follow Up and recommended labs and tests    Follow up with your primary care provider for continued medical care and hospital follow up in 5-10 days.  You have a phone appointment with Dr. Sweeney, your oncologist, on 12/9/2020. At that point you can discuss with her regarding the plan for your chemotherapy.    Trauma Clinic- as needed  [facility]  [address]oor [address]  [address] [place] [address]   Appointments: [phone]    Oncology/Hematology  [facility]  [address] [place] [address]   Appointments: 12/9     Weight bearing status     Activity - Up ad brett    Walking daily strongly encourged     Full Code     Fall precautions     Advance Diet as Tolerated    Follow this diet upon discharge: Orders Placed This Encounter      Snacks/Supplements Adult: Boost Breeze; Between Meals      Regular Diet Adult     Discharge Medications   Current Discharge Medication List      START taking these medications    Details   amoxicillin-clavulanate (AUGMENTIN) 875-125 MG tablet Take 1 tablet by mouth every 12 hours for 6 days  Qty: 12 tablet, Refills: 0    Associated Diagnoses: Pneumonia of both lungs due to infectious organism, unspecified part of lung      enoxaparin ANTICOAGULANT (LOVENOX) 30 MG/0.3ML syringe Inject 0.3 mLs (30 mg) Subcutaneous every 12 hours  Qty: " 28 Syringe, Refills: 0    Associated Diagnoses: Closed fracture of multiple ribs of right side, initial encounter      triamcinolone (KENALOG) 0.1 % external ointment Apply topically 2 times daily To back rash  Qty: 15 g, Refills: 0    Associated Diagnoses: Rash and nonspecific skin eruption         CONTINUE these medications which have CHANGED    Details   gabapentin (NEURONTIN) 300 MG capsule Take 1 capsule (300 mg) by mouth 3 times daily for 28 days  Qty: 84 capsule, Refills: 0    Associated Diagnoses: Closed fracture of multiple ribs of right side, initial encounter      Lidocaine (LIDOCARE) 4 % Patch Place 1-3 patches onto the skin every 24 hours To prevent lidocaine toxicity, patient should be patch free for 12 hrs daily.  Qty: 30 patch, Refills: 1    Associated Diagnoses: Closed fracture of multiple ribs of right side, initial encounter      oxyCODONE (ROXICODONE) 5 MG tablet Take 1 tablet (5 mg) by mouth every 6 hours as needed for severe pain  Qty: 30 tablet, Refills: 0    Associated Diagnoses: Multiple myeloma, remission status unspecified (H)         CONTINUE these medications which have NOT CHANGED    Details   acyclovir (ZOVIRAX) 400 MG tablet Take 1 tablet (400 mg) by mouth 2 times daily Viral Prophylaxis.  Qty: 60 tablet, Refills: 5    Associated Diagnoses: Multiple myeloma, remission status unspecified (H)      aspirin (ASA) 325 MG tablet Take 1 tablet (325 mg) by mouth daily  Qty: 30 tablet, Refills: 4    Associated Diagnoses: Multiple myeloma, remission status unspecified (H)      calcium carbonate 500 mg, elemental, (OSCAL) 500 MG tablet Take 1 tablet (500 mg) by mouth 3 times daily (with meals)  Qty:      Associated Diagnoses: Pathologic compression fracture of spine, sequela      cholecalciferol (VITAMIN D3) 25 mcg (1000 units) capsule Take 1 capsule by mouth daily      hydrochlorothiazide (MICROZIDE) 12.5 MG capsule Take 12.5 mg by mouth daily      ibuprofen (ADVIL/MOTRIN) 600 MG tablet Take 1  tablet (600 mg) by mouth every 6 hours  Qty:      Associated Diagnoses: Pathologic compression fracture of spine, sequela      lisinopril (ZESTRIL) 20 MG tablet Take 1 tablet (20 mg) by mouth daily  Qty: 30 tablet, Refills: 3    Associated Diagnoses: Essential hypertension      methocarbamol (ROBAXIN) 500 MG tablet Take 1 tablet (500 mg) by mouth 4 times daily  Qty:      Associated Diagnoses: Pathologic compression fracture of spine, sequela      omeprazole (PRILOSEC) 10 MG DR capsule Take 10 mg by mouth daily      ondansetron (ZOFRAN-ODT) 4 MG ODT tab Take 1 tablet (4 mg) by mouth every 6 hours as needed for nausea or vomiting  Qty:      Associated Diagnoses: Pathologic compression fracture of spine, sequela      polyethylene glycol (MIRALAX) 17 g packet Take 17 g by mouth daily  Qty:      Associated Diagnoses: Pathologic compression fracture of spine, sequela      senna-docusate (SENOKOT-S/PERICOLACE) 8.6-50 MG tablet Take 1 tablet by mouth daily as needed       acetaminophen (TYLENOL) 325 MG tablet Take 3 tablets (975 mg) by mouth 3 times daily  Qty:      Associated Diagnoses: Pathologic compression fracture of spine, sequela      LORazepam (ATIVAN) 0.5 MG tablet Take 1 tablet (0.5 mg) by mouth every 4 hours as needed (Anxiety, Nausea/Vomiting or Sleep)  Qty: 30 tablet, Refills: 3    Associated Diagnoses: Multiple myeloma, remission status unspecified (H)      prochlorperazine (COMPAZINE) 10 MG tablet Take 1 tablet (10 mg) by mouth every 6 hours as needed (Nausea/Vomiting)  Qty: 30 tablet, Refills: 3    Associated Diagnoses: Multiple myeloma, remission status unspecified (H)         STOP taking these medications       dexamethasone (DECADRON) 4 MG tablet Comments:   Reason for Stopping:         LENalidomide (REVLIMID) 25 MG CAPS capsule Comments:   Reason for Stopping:         levofloxacin (LEVAQUIN) 250 MG tablet Comments:   Reason for Stopping:             Allergies   Allergies   Allergen Reactions     Other  Drug Allergy (See Comments)      tobasco sauce ---caused red spots      Data   Most Recent 3 CBC's:  Recent Labs   Lab Test 12/03/20  0848 12/02/20  0702 12/01/20  1019   WBC 7.1 6.5 11.7*   HGB 9.1* 8.4* 8.3*   * 105* 108*    149* 144*      Most Recent 3 BMP's:  Recent Labs   Lab Test 12/04/20  0656 12/03/20  0645 12/02/20  0702    139 137   POTASSIUM 3.8 4.2 4.2   CHLORIDE 110* 110* 109   CO2 22 24 23   BUN 15 17 12   CR 0.91 1.22 1.06   ANIONGAP 6 5 6   MIRI 8.4* 8.5 8.1*   * 84 81     Most Recent 2 LFT's:  Recent Labs   Lab Test 11/29/20  0730 11/26/20  2006   AST 18 4   ALT 22 22   ALKPHOS 70 83   BILITOTAL 0.2 0.3       See chart for multiple imaging studies/results done this hospitalization    Time Spent on this Encounter   I, Jessica Hernández, JIMBO MANZO, personally saw the patient today and spent greater than 30 minutes discharging this patient.    We appreciate the opportunity to care for your patient while in the hospital.  Should you have any questions about their injuries or this discharge summary our contact information is below.    Trauma Services  [facility]   Department of Critical Care and Acute Care Surgery  [address] [place] [address] [address]  [place] [address]  Office: [phone]

## 2020-12-02 NOTE — PLAN OF CARE
"/68 (BP Location: Left arm)   Pulse 82   Temp 96.3  F (35.7  C) (Oral)   Resp 18   Ht 1.549 m (5' 1\")   Wt 67.3 kg (148 lb 5.9 oz)   SpO2 98%   BMI 28.03 kg/m       Neuro: A&Ox4; calls appropriately. CMS intact.   Cardiac: WNL  Respiratory: sats mid 90s on RA. Denies SOB.   GI/: +BS, +passing flatus, one BM this shift. Voiding in bedside urinal.   Pain: OnQ infusing at 14 mL/hr, bolus given in am. PRN oxy given this am. Lidocaine patch on right chest x1.   Diet: Regular, tolerating well.    Skin: Scattered bruising. rash on chest and back.   IV Access: Right PIV infusing TKO with intermittent abx   Activity: up asst X1, walker and gaitbelt   Labs: Reviewed.   Plan: Continue to monitor. Waiting on home vs TCU placement.   "

## 2020-12-02 NOTE — PROGRESS NOTES
REGIONAL ANESTHESIA PAIN SERVICE (RAPS) EVALUATION:  - Time: 18:20.   - Evaluation: Patient reports pain intensity with current therapy 4/10 at rest and 6/10 with activity  General: healthy, alert and no distress  Catheter system integrity: intact  Skin: dressing clean, dry and intact    - Assessment/Plan    PAIN: partially controlled     INTERVENTION: Bolus administered    MEDICATION: PF bupivacaine 0.25%, total bolus 10 mL    PROCEDURE: Clinician bolus via right erector spinae nerve block catheter; administered without complication with negative aspirate before and between each mL.    No symptoms of local anesthetic systemic toxicity (LAST). Remained with and assessed patient for 5 min post-injection. BP, P and MAP stable    POST-PROCEDURE: Bedside RN aware of need to continue BP, P and MAP monitoring Q 5 min for 20 min. Contact RAPS (jobcode ID [id number]) if any of the following: patient experiencing any untoward effects, SBP< 90, P < 50 or > 120, MAP < 60     Patient can be evaluated to receive local anesthetic bolus Q 12 hr PRN pain not controlled with continuous infusion.  Bedside nurse must page RAPS to request bolus.    Ledy Alegria MD      RAPS Contact Info (24 hour job code pager is the last 4 digits) For in-house use only:  [employer] phone: Dial * * * [phone], wait for prompt, then enter [phone].  Text: Use [employer] on the Intranet <Paging/Directory> tab and enter Jobcode ID [id number].  If no call back at any time, contact the hospital  and ask for RAPS attending or backup .

## 2020-12-02 NOTE — PROGRESS NOTES
Regional Anesthesia Pain Service Nerve Block Daily Progress Note  12/02/20      Rogelio Marshall is a 75 year old male with multiple myeloma recently started on treatment, compression fractures, HTN and HLD admitted 11/26 s/p fall with mildly displaced fractures of lateral right fourth and fifth ribs, and posterior fourth right rib; Catheter Day #5 placement of right T4-5 erector spinae (ES) catheter on 1127 for analgesia.    Subjective    24 hour Interval History  - No acute events overnight.   - Patient report: right lateral chest and back pain intensity: 4/10, his pain controlled enough for him to tolerate activity with nerve block infusion and oxycodone 20 mg PO Q 3 hr PRN. Denies LE weakness, paresthesias, circumoral numbness, metallic taste, tinnitus  - Has been up to chair and ambulated with assist, voiding spontaneously, tolerating regular diet, denies nausea      Antithrombotic or Thrombolytic Therapy ordered:   enoxaparin ANTICOAGULANT (LOVENOX) injection 30 mg, SC, Q12H         Analgesic Medications ordered:  Medications related to Pain Management (From now, onward)    Start     Dose/Rate Route Frequency Ordered Stop    11/30/20 1542  diphenhydrAMINE (BENADRYL) capsule 25 mg      25 mg Oral EVERY 4 HOURS PRN 11/30/20 1544      11/30/20 1541  diphenhydrAMINE (BENADRYL) injection 25 mg      25 mg Intravenous EVERY 6 HOURS PRN 11/30/20 1544      11/28/20 1100  senna-docusate (SENOKOT-S/PERICOLACE) 8.6-50 MG per tablet 1-2 tablet      1-2 tablet Oral 2 TIMES DAILY 11/28/20 1032      11/28/20 1032  magnesium hydroxide (MILK OF MAGNESIA) suspension 30 mL      30 mL Oral DAILY PRN 11/28/20 1032      11/28/20 0830  gabapentin (NEURONTIN) capsule 300 mg      300 mg Oral 3 TIMES DAILY 11/28/20 0817      11/27/20 1400  lidocaine patch in PLACE       Transdermal EVERY 8 HOURS 11/27/20 1334      11/27/20 1330  Lidocaine (LIDOCARE) 4 % Patch 1-3 patch      1-3 patch  over 12 Hours Transdermal EVERY 24 HOURS 11/27/20  "1304      11/27/20 1330  methocarbamol (ROBAXIN) tablet 500 mg      500 mg Oral 4 TIMES DAILY 11/27/20 1306      11/27/20 1330  polyethylene glycol (MIRALAX) Packet 17 g      17 g Oral DAILY 11/27/20 1306      11/27/20 1310  oxyCODONE IR (ROXICODONE) tablet 10-20 mg      10-20 mg Oral EVERY 4 HOURS PRN 11/27/20 1310      11/27/20 1304  LORazepam (ATIVAN) tablet 0.5 mg      0.5 mg Oral EVERY 4 HOURS PRN 11/27/20 1306      11/27/20 1200  ropivacaine 0.2% (NAROPIN) 750 mL in ON-Q C-Bloc select flow ([id number] holds 600-750 mL) single cath disposable pump      14 mL/hr  Irrigation CONTINUOUS 11/27/20 1133      11/27/20 0430  acetaminophen (TYLENOL) tablet 975 mg      975 mg Oral EVERY 8 HOURS 11/27/20 0302      11/26/20 2015  HYDROmorphone (PF) (DILAUDID) injection 0.3 mg      0.3 mg Intravenous ONCE 11/26/20 2010              Objective  Exam  Vitals:   BP (!) 149/78 (BP Location: Left arm)   Pulse 92   Temp 98.6  F (37  C) (Oral)   Resp 18   Ht 1.549 m (5' 1\")   Wt 67.3 kg (148 lb 5.9 oz)   SpO2 94%   BMI 28.03 kg/m        General: Alert, oriented, NAD  MSK/Neuro: Full Strength upper and lower extremities and overall symmetric.    Skin: right erector spinae (ES) catheter site with dressing c/d/i, no tenderness, erythema, heme, edema       Labs/Diagnostics  Heme/INR:  Recent Labs   Lab Test 12/02/20  0702 12/01/20  1019   WBC 6.5 11.7*   RBC 2.52* 2.46*   HGB 8.4* 8.3*   HCT 26.5* 26.6*   * 108*   MCH 33.3* 33.7*   MCHC 31.7 31.2*   RDW 15.3* 15.6*   * 144*       Lab Results   Component Value Date    INR 1.14 10/09/2020          Assessment/Plan  ASSESSMENT  75 year old male with multiple myeloma admitted 11/26 s/p fall with mildly displaced fractures of lateral right fourth and fifth ribs, and posterior fourth right rib; Catheter Day #5 placement of right T4-5 erector spinae (ES) catheter on 1127 for analgesia.    Pain well controlled, tolerating local anesthetic nerve block infusion, Q 12 hr prn local " anesthetic bolus, and oxycodone PRN.  Motor function intact and adequate sensory block. No evidence of adverse side effects related to local anesthetic continuous infusion. Patient is meeting activity goals.  Receives additional pain control from local anesthetic bolus.      0745 Clinician Local Anesthetic Bolus via nerve right ES catheter  VSS  - MEDICATION: PF bupivacaine 0.25% 10 mL  - PROCEDURE: Clinician bolus administered incrementally; negative aspirate.  No symptoms of local anesthetic systemic toxicity (LAST). Remained with and assessed patient for 10 min post-injection. BP, P and MAP stable  - POST-PROCEDURE: Bedside RN aware of need to continue BP, P and MAP monitoring Q 10 min for an additional 20 min. Contact RAPS (jobcode ID [id number]) if experiencing any untoward effects or MAP less than 60       PLAN  - Nerve block infusion: continue ROpivacaine 0.2% infusion at 14mL/hr, will plan to remove ES catheter at least 24 hours prior to discharge, and at least 8 hrs after last dose of enoxaparin    expected change of On-Q device is 2 days    patient can be evaluated to receive local anesthetic bolus Q 12 hr PRN while catheter in place.  Bedside RN must page RAPS to request bolus    - Anticoagulation: okay to continue enoxaparin 30 mg Q 12 hrs as ordered. Please contact RAPS jobcode pager [phone] before ordering or making any medication changes    -  Follow up: RAPS will continue to follow and adjust as needed    Discussed with attending anesthesiologist     --  JIMBO Mckeon [facility]  Regional Anesthesia Pain Service  12/2/2020     RAPS Contact Info (24 hour job code pager is the last 4 digits) For in-house use only:   Job code ID: [id number]   [facility] [id number]  [facility] 0602  [employer] phone: dial * * * [phone], enter jobcode ID, then enter call-back number.    Text: Use [employer] on the Intranet <Paging/Directory> tab and enter Jobcode ID.   If no call back at any time, contact the hospital  and ask for RAPS  attending or backup

## 2020-12-02 NOTE — PROGRESS NOTES
Hematology Consult Note   Date of Service: 12/02/2020    Patient: Rogelio Marshall  MRN: [id number]  Admission Date: 11/26/2020  Hospital Day # 6   Primary Outpatient Hematologist: Dr. Banerjee    Reason for Consult: multiple myeloma    Assessment & Plan:   76 y/o M w/ hx of HTN, HLD and multiple myeloma, s/p C1D8 Valcade on 11/26/2020 presents to the [facility] ED after a fall, resulting in right upper thoracic pain, from new right third through fifth rib fractures. He has multiple stable compression fracture including T1, T5, T10 and T12, L2 and L3.    # Multiple myeloma  Regarding MM treatment, Revlimid is known to have central nervous system side effects that include: fatigue (11% to 34%), dizziness (20%), and headache (9% to 20%). His chemo regimen could have contributed to his weakness/fall. Recommend holding the Revlimid and reassessing his treatment plan as an outpatient. He was given d11 Velcade on 11/29/2020.  - primary team planning to discharge (possibly Friday) to TCU  - chemo plan:   - s/p Velcade 2.1 mg sub-cu, days 1, 4, 8, 11   - Revlimid 25 mg daily for 14 days on, 7 days off. Hold this for now   - Dexamethasone 40 mg days 1, 8, 15 (due on 12/3) -- we will arrange this  - Will not have any chemo needs at TCU; we will arrange for f/u with primary oncologist when a TCU discharge date and location are established    # Fever with leukocytosis; resolved  # Maculopapular rash (probable drug eruption)  Fever with temp as high as ~ 103F, and leukocytosis. WBC trended down and defervesced on cefepime and vanc -- developed a maculopapular rash and was switched from cefepime to meropenem by the primary team and the rash is now improving. Vancomycin was d/c'ed after MRSA swab returned negative. No obvious infectious source (clean UA, CT chest w/o PNA). Now growth on blood cultures. CT chest showed no PE (high clotting risk due to malignancy), but has pleural effusions and atelectasis.  - defer antibiotic  "management to ID and primary team   - currently on meropenem; would consider de-escalating to oral Augmentin or full-dose levofloxacin   - unclear why posaconazole was started but he might not require fungal ppx/treatment  - recommend continuing acyclovir ppx    #  Fall  Based on the history patient is likely has orthostatic hypotension/dehydration from poor po intake. He is tolerating ambulation, but still gets dizzy/\"queezy.\" He had CT chest upon admission which did not show acute change in known compression fractures (see above).    # N/V due to chemo: recurrent. Patient is instructed to take Zofran prior to his chemo day and use it PRN; avoid Compazine. He is instructed to bring his medication to clinic next time to discuss further with primary outpatient oncologist.    Recommendations:   - okay with de-escalating antibiotics in preparation for discharge if ID recommends this  - pain control per primary and pain teams  - we will arrange his dexamethasone on 12/3    Patient is seen and discussed with Dr. Cordova, who agrees with the plan.     Moise Valdez MD  PGY-2, Internal Medicine  [facility]    ---------------------------    Interval History:   Afebrile for the last 24+ hours. No documented oxygen requirement last night. No vomiting today. Says he ate a \"heavy\" breakfast and now feels a little bloated. He thinks it will pass with time. He has no change in bowel movement pattern. No chest pain/abdominal pain or shortness of breath.    Initial HPI:    \"74 y/o M w/ hx of HTN, HLD and multiple myeloma, s/p C1D8 Valcade on 11/26/2020 who presents to the [facility] ED after a fall, resulting in right upper thoracic pain. Patient lives alone. Poor appetite and had not been eating/drinking well. He has some nausea and took 1 Zofran then vomited it out. He had a fainting feeling but did not lose consciousness.  He went to the bathroom because he did not feel well with a nauseous feeling. He noticed that he did not " "have the strength to get up from his position leaning over the toilet and he fell backward onto his back. He denies any fever, chills, no bowel/urine problem.    He was noticed to be hypotensive with BP as low as 62/41, improving with NS bolus. He is on IVF and feels better.     He is admitted to trauma service with CT findings of lucid bone lesion throughout the whole spine.   C spine- No overt acute fracture or subluxation.  Thoracic spine: T1, T5, T10 and T12 compression fracture, and right third through fifth rib fractures.   Lumbar spine: moderate chronic L2 and marked chronic L3 compressions.\"    Hematologic History:  This patient is a 75 year old man with lower back pain over a year. He has known compression fractures and bone scan reveals multiple lytic lesions throughout his appendicular and axial skeleton.    - His Beta 2 microglobulin was 3.6 on 10/10/2020.    - Kappa chains were 73.6 on 10/5/2020 with K/L ratio of 89.9. M spike of 16.2 in urine on 10/10.    - Bone marrow biopsy on 10/23/2020 showed trilineage hematopoiesis and 50-60% kappa restricted plasma cells.    - Flow cytometry showed 20 % plasma cells which express CD19, CD38, CD45 and monotypic cytoplasmic kappa immunoglobulin light chains but lack CD20 and CD56. Cytogenetics pending.   - FISH shows IGH-CCND1 fusion (81%; 30% had loss of IGH component from one fusion signal).      He was started on VRD:   - Velcade 2.1mg days 1, 4, 8, 11   - Revlimid 25 mg daily for 14 days on, 7 days off -- currently holding   - Dexamethasone 40mg Days 1, 8, 15.   He received Day 8 and Day 11 Cycle 1 Velcade on 11/26 and 11/29, respectively  Today (12/2) is Day 14    Review of Systems: Pertinent positive and negative systems described in interval history; the remainder of the 14 systems are negative    Past Medical History:  Past Medical History:   Diagnosis Date     Hyperlipidemia      Hypertension        Past Surgical History:  Past Surgical History: "   Procedure Laterality Date     HERNIA REPAIR, INGUINAL RT/LT      needed post-op intestinal repair     ORIF left clavical         Social History:  Social History     Socioeconomic History     Marital status: Single     Spouse name: None     Number of children: None     Years of education: None     Highest education level: None   Occupational History     Occupation: retired   Social Needs     Financial resource strain: None     Food insecurity     Worry: None     Inability: None     Transportation needs     Medical: None     Non-medical: None   Tobacco Use     Smoking status: Former Smoker     Packs/day: 0.10     Years: 30.00     Pack years: 3.00     Types: Cigarettes     Smokeless tobacco: Never Used   Substance and Sexual Activity     Alcohol use: Yes     Alcohol/week: 17.5 standard drinks     Types: 21 drink(s) per week     Drug use: None     Sexual activity: None   Lifestyle     Physical activity     Days per week: None     Minutes per session: None     Stress: None   Relationships     Social connections     Talks on phone: None     Gets together: None     Attends Protestant service: None     Active member of club or organization: None     Attends meetings of clubs or organizations: None     Relationship status: None     Intimate partner violence     Fear of current or ex partner: None     Emotionally abused: None     Physically abused: None     Forced sexual activity: None   Other Topics Concern      Service Not Asked     Blood Transfusions Not Asked     Caffeine Concern Not Asked     Occupational Exposure Not Asked     Hobby Hazards Not Asked     Sleep Concern Not Asked     Stress Concern Not Asked     Weight Concern Not Asked     Special Diet Not Asked     Back Care Not Asked     Exercise Yes     Comment: bikes daily     Bike Helmet Not Asked     Seat Belt Not Asked     Self-Exams Not Asked   Social History Narrative     None        Family History  Family History   Problem Relation Age of Onset      DIONI. Father          MI age 69     Hypertension Sister         obese     Family History Negative Brother        Outpatient Medications:  No current facility-administered medications on file prior to encounter.        acyclovir (ZOVIRAX) 400 MG tablet, Take 1 tablet (400 mg) by mouth 2 times daily Viral Prophylaxis.       aspirin (ASA) 325 MG tablet, Take 1 tablet (325 mg) by mouth daily       calcium carbonate 500 mg, elemental, (OSCAL) 500 MG tablet, Take 1 tablet (500 mg) by mouth 3 times daily (with meals) (Patient taking differently: Take 500 mg by mouth 2 times daily )       cholecalciferol (VITAMIN D3) 25 mcg (1000 units) capsule, Take 1 capsule by mouth daily       gabapentin (NEURONTIN) 100 MG capsule, Take 1 capsule (100 mg) by mouth 3 times daily       hydrochlorothiazide (MICROZIDE) 12.5 MG capsule, Take 12.5 mg by mouth daily       ibuprofen (ADVIL/MOTRIN) 600 MG tablet, Take 1 tablet (600 mg) by mouth every 6 hours       LENalidomide (REVLIMID) 25 MG CAPS capsule, Take 1 capsule (25 mg) by mouth daily for 14 days Days 1 through 14.       levofloxacin (LEVAQUIN) 250 MG tablet, Take 1 tablet (250 mg) by mouth daily       lisinopril (ZESTRIL) 20 MG tablet, Take 1 tablet (20 mg) by mouth daily       methocarbamol (ROBAXIN) 500 MG tablet, Take 1 tablet (500 mg) by mouth 4 times daily       omeprazole (PRILOSEC) 10 MG DR capsule, Take 10 mg by mouth daily       ondansetron (ZOFRAN-ODT) 4 MG ODT tab, Take 1 tablet (4 mg) by mouth every 6 hours as needed for nausea or vomiting       oxyCODONE (ROXICODONE) 5 MG tablet, Take 1 tablet (5 mg) by mouth every 6 hours as needed for severe pain       polyethylene glycol (MIRALAX) 17 g packet, Take 17 g by mouth daily       senna-docusate (SENOKOT-S/PERICOLACE) 8.6-50 MG tablet, Take 1 tablet by mouth daily as needed        acetaminophen (TYLENOL) 325 MG tablet, Take 3 tablets (975 mg) by mouth 3 times daily       dexamethasone (DECADRON) 4 MG tablet, Take 10  "tablets (40 mg) by mouth every 7 days for 3 doses Days 1, 8, and 15.       LORazepam (ATIVAN) 0.5 MG tablet, Take 1 tablet (0.5 mg) by mouth every 4 hours as needed (Anxiety, Nausea/Vomiting or Sleep) (Patient not taking: Reported on 11/26/2020)       prochlorperazine (COMPAZINE) 10 MG tablet, Take 1 tablet (10 mg) by mouth every 6 hours as needed (Nausea/Vomiting) (Patient not taking: Reported on 11/26/2020)         Physical Exam:    BP (!) 160/87   Pulse 88   Temp 96.6  F (35.9  C) (Oral)   Resp 20   Ht 1.549 m (5' 1\")   Wt 67.3 kg (148 lb 5.9 oz)   SpO2 96%   BMI 28.03 kg/m    Gen: Well appearing, in NAD   CV: Normal rate, regular rhythm. No m/r/g  Pulm: CTABL  Abd: Soft, nt/nd  Ext: No lower extremity edema  Skin: maculopapular rash over the mid and upper chest bilaterally improving compared to 12/1 exam; non-tender  Neuro: Alert and answering questions appropriately.     Labs & Studies: I personally reviewed the following studies:  ROUTINE LABS (Last four results):  CMP  Recent Labs   Lab 12/01/20  2206 12/01/20  1943 12/01/20  0554 12/01/20  0448 11/30/20  0657 11/29/20  0730 11/29/20  0730 11/28/20  1221 11/28/20  1221 11/28/20  0726 11/26/20 2006 11/26/20 2006   NA  --   --   --  138 135  --  139  --   --  138   < > 136   POTASSIUM  --   --   --  4.4 3.9  --  4.0  --  3.9 4.0   < > 3.5   CHLORIDE  --   --   --  114* 110*  --  113*  --   --  114*   < > 107   CO2  --   --   --  21 22  --  22  --   --  20   < > 22   ANIONGAP  --   --   --  3 4  --  4  --   --  4   < > 7   GLC  --   --   --  101* 92  --  89  --   --  88   < > 106*   BUN  --   --   --  12 15  --  11  --   --  21   < > 44*   CR  --   --   --  0.94 1.05  --  0.95  --   --  0.99   < > 1.59*   GFRESTIMATED  --   --   --  80 70  --  79  --   --  75   < > 42*   GFRESTBLACK  --   --   --  >90 81  --  >90  --   --  87   < > 49*   MIRI  --   --   --  7.7* 7.6*  --  7.8*  --   --  7.7*   < > 8.9   MAG  --   --   --  1.9 2.1  --  2.0  --  2.1 2.1   " < >  --    PHOS 2.3* 2.4* 1.7* 2.0* 2.7   < > 3.0   < > 1.4* 2.0*   < >  --    PROTTOTAL  --   --   --   --   --   --  7.1  --   --   --   --  9.0*   ALBUMIN  --   --   --   --   --   --  2.0*  --   --   --   --  2.7*   BILITOTAL  --   --   --   --   --   --  0.2  --   --   --   --  0.3   ALKPHOS  --   --   --   --   --   --  70  --   --   --   --  83   AST  --   --   --   --   --   --  18  --   --   --   --  4   ALT  --   --   --   --   --   --  22  --   --   --   --  22    < > = values in this interval not displayed.     CBC  Recent Labs   Lab 12/01/20  1019 11/30/20  1846 11/30/20  0657 11/29/20  2246   WBC 11.7* 18.6* 17.5* 18.6*   RBC 2.46* 2.66* 2.26* 2.32*   HGB 8.3* 8.9* 7.7* 7.9*   HCT 26.6* 27.6* 24.0* 24.5*   * 104* 106* 106*   MCH 33.7* 33.5* 34.1* 34.1*   MCHC 31.2* 32.2 32.1 32.2   RDW 15.6* 15.3* 15.3* 15.2*   * 141* 132* 147*     INRNo lab results found in last 7 days.

## 2020-12-02 NOTE — PROGRESS NOTES
[facility]   Trauma Service Progress Note    Date of Service (when I saw the patient): 12/02/2020     Assessment & Plan   Trauma Mechanism: Fall   Date of Injury: 11/26/20  Known Injuries:  1. Right 4 & 5 rib fx   Brief HPI    76 y/o male with a history of multiple myeloma on chemotherapy who was admitted to the trauma service after a ground level fall in at home with rib multiple apparent pathologic bilateral rib fractures and T10, T12 and L2 compression deformities.     Neuro/Pain/Psych:  # Fall   - Head CT 11/26: No intracranial pathology.      # Acute on chronic pain   - continued PTA gabapentin 100mg tid, Robaxin   - Scheduled: Tylenol 975mg, lidoderm,   - Prn: Oxycodone 10-20  - RAPS: ES cath inserted 11/27.      # Anxiety   - continued PTA: ativan 0.5 q4hrs prn,   - Maintain circadian rhythm.  Lights on during the day.  Off at night, minimize cares at night.  OOB during the day.     Pulmonary:  # Rib fractures   # possible HAP  - CXR 11/29: Continued left lower lung subsegmental atelectasis. No acute interval changes. Possible trace left pleural effusion.  - CXR 11/30: Stable rib fractures. Patchy opacities possibly representing atelectasis or pulmonary contusion.  - CT PE ordered and completed per Heme on 11/30 without PE  - Supplemental oxygen to keep saturation above 92 %.  - Incentive spirometer and flutter valve while awake, encourage mobility, splinted cough and acapella     Cardiovascular:    # Hyperlipidemia   - Monitor hemodynamic status.  - Holding PTA: ,   -  Lisinopril 20 daily for HTN     GI/Nutrition:    # GERD  - Continued PTA: omeprazole, Miralax  - Hold bowel regimen for loose stools  - Added Boost breeze supplements     Renal/ Fluids/Electrolytes:  # HALLIE on CKD  # Hypophosphetemia  - Creatinine: 1.59 on admission, improved with IVF hydration  - Replete lytes per protocol     Endocrine:  - No management indication.    Monitor     Infectious  disease:    - COVID neg 10/9. Test 11/26: neg   # Presumed HAP  # Leukocytosis   # Fever 100.5  # Rigors   - Received Chemotherapy 11/29  - 11/29, triggered by sepsis  protocol, with a leukocytosis, lactic 2.0, was started on Vancomycin and Cefepime for presumed HAP, given rib fractures, new supplemental oxygen requirement.  - UA unremarkable.  Plan to obtained daily blood cultures, sputum culture if able to expectorate, fungal blood culture pending.      Antibiotics:   - Prophylaxis (see hem): Acyclovir 400 bid, Levofloxacin (on hold with initiation of Cefepime)   - 11/30 to current: Cefepime 2gm IV q8hrs for for pseudomonal coverage + - - 1/30 to current: Vancomycin  - Posaconazole: Candida Prophylaxis     Hematology:    # Multiple Myeloma with extensive osteolytic lesions  - Hem/Onc following      # Anemia of chronic illness   - Hgb: stable in the 8 range  - Threshold for transfusion if hgb <7.0 or signs/symptoms of hypoperfusion.        # DVT Prophylaxis: Lovenox 30mg bid      Musculoskeletal:  # s/p ORIF left clavicle   # Chronic T10 compression fx  # Chronic T12 superior endplate compression fracture   # Chronic L2 and L3 compression fracture   # Right Rib 4-5 lateral mildly displaced fx  # Right Rib 4 mildly displaced posterior fx  # Weakness and deconditioning   - NSGY: No surgical intervention. Osteopenia tx, no brace needed for asymptomatic.   - Plan f/u MRI C-spine outpatient  - Physical and occupational therapy consults.     Skin:  - dilgent cares to prevent skin breakdown and wound formation     Lines/ tubes/ drains:  - PIV, ES catheter for regional paincontrol     General Cares:                 PPI/H2 blocker:  Omeprazole                 DVT prophylaxis: Lovenox                 Bowel Regimen/Date of last stool:11/29                Pulmonary toilet: IS, Flutter valve                 Lines / drains:PIV     Code status:  Full Code                   Discharge goals:     Adequate pain  management: yes     VSS x24 hours: yes    Hemoglobin stable x 48 hours: yes    Ambulating safely and/or therapy evals complete: yes    Drains/lines removed or plan in place to manage: yes    Teaching done: in process     Other: Current work-up for infection   Expected D/C date: Anticipate Friday pending pain control with removal of continuous regional block, and antibiotic plan.    Interval History   States he feels better today. Appetite is improved today. Reports adequate pain control, denies fevers, chills or shortness of breath.  ROS x 8 negative with exception of those things listed in interval hx    Physical Exam   Temp: 96.3  F (35.7  C) Temp src: Oral BP: 118/68 Pulse: 82   Resp: 18 SpO2: 98 % O2 Device: None (Room air)    Vitals:    11/26/20 2014 12/01/20 0416   Weight: 65.8 kg (145 lb) 67.3 kg (148 lb 5.9 oz)     Vital Signs with Ranges  Temp:  [96.2  F (35.7  C)-98.6  F (37  C)] 96.3  F (35.7  C)  Pulse:  [81-92] 82  Resp:  [16-20] 18  BP: (118-160)/(63-87) 118/68  SpO2:  [94 %-98 %] 98 %  I/O last 3 completed shifts:  In: 1390 [P.O.:660; I.V.:730]  Out: 2350 [Urine:2350]      Constitutional: Awake, alert, cooperative, no apparent distress.  Eyes: Lids and lashes normal, pupils equal, round and reactive to light, extra ocular muscles intact, sclera clear, conjunctiva normal.  ENT: Normocephalic, atraumatic  Respiratory: No increased work of breathing, good air exchange, clear to auscultation bilaterally, no crackles or wheezing.  Cardiovascular:  regular rate and rhythm, normal S1 and S2, no S3 or S4, and no murmur.   GI: Normal bowel sounds, abdomen soft, non-distended, non-tender, no guarding  Skin:  Normal skin color, no redness, warmth, or swelling, no ecchymosis, no abrasions, and no jaundice.  Musculoskeletal: There is no redness, warmth, or swelling of the joints.  Pedal pulse palpated.  Neurologic: Awake, alert, oriented. Cranial nerves II-XII are grossly intact.  Strength and sensory is intact.  No focal deficits.  Neuropsychiatric: Calm, normal eye contact, alert, affect appropriate to situation, oriented, thought process normal.    JIMBO Sesay CNP  To contact the trauma service use job code pager [phone],   Numeric texts or alpha text through [facility]    >>>>>>>>>>>>>>>>>>>>>>>>>>>>>>>>>>>>>>>>>>>>>>>>>>>>>>>>>>>>>>>>>>>>>>>>>>>>>>>>>>>>>>>>>>>>>>>>>>>>>>>    Trauma Staff:    I examined the patient today. I reviewed the patient's trauma history and trauma findings with the members of the Trauma Service today. I discussed the history, the mechanism of injury and the patient's injuries with the members of the Trauma Service today.    A/P: I agree with the assessment and the plan as documented in the Trauma Service members note.  Please see the associated Trauma Service note written by the members note for details.    Wolf Pulido MD, PhD

## 2020-12-02 NOTE — PROGRESS NOTES
Care Management Follow Up    Length of Stay (days): 6    Expected Discharge Date: 12/03/20     Concerns to be Addressed: discharge planning  Pt unsure when he will be ready for d/c or what his needs will look like at discharge. He said that if he needs TCU at discharge he is ageeable and would prefer to [facility], however, is unsure if he will improve enough to return home upon discharge.  Patient plan of care discussed at interdisciplinary rounds: Yes    Anticipated Discharge Disposition: Transitional Care     Anticipated Discharge Services: None  Anticipated Discharge DME: None    Patient/family educated on Medicare website which has current facility and service quality ratings: (Not at this time; Pt would prefer [facility] since he was there previously and is familiar with the facility.)  Education Provided on the Discharge Plan:    Patient/Family in Agreement with the Plan: (TBD)    Referrals Placed by CM/SW: (Not at this time)  Private pay costs discussed: Not applicable    Additional Information:  Pt is a 75 year old male being followed by SHAYNE for TCU placement after falling and sustaining rib fractures.     Pt is currently receiving chemo for a recent diagnosis of cancer. Trauma NP Antelmo reported she is in contact with the Hem/Onc team about pt's chemo plan and was informed that this information is needed for a TCU discharge. Per Antelmo, pt is anticipated to be ready for discharge Friday as pt still has an OnQ in place and an abx plan needs to be established.    Per chart review, pt is requesting a referral to [facility]CU.     SW contacted [facility] (p. [phone], f. [phone], has access to [employer])- left  for admissions, referral sent via [facility], waiting to hear back. SHAYNE later spoke with Rosey in admissions and she anticipates openings Thursday or Friday and will watch for pt's referral.      MITESH Chau, [address]   [phone]  (pager) [phone]  12/2/2020

## 2020-12-02 NOTE — CONSULTS
"[facility] Inpatient Consult Dermatology Note    Impression/Plan:  1. Morbilliform eruption, most consistent with simple drug eruption.  Mr. Marshall's physical exam and history favors simple drug eruption, however the offending medication is not clear at this time because typically this rash occurs 7-10 days after first exposure to a medication and as early as 48h after subsequent exposure. The differential also includes viral exanthem (possibly reactivation of HHV-6 or HHV-7). At this time, there is no concern for a complex drug eruption such as SJS/TEN or DIHS, however, will continue to monitor. There is no urgent need to stop antibiotics at this time if they are clinically indicated (i.e. can treat \"through the rash\"). We expect the rash to continue to evolve over the next few days. Will likely take 2-3 weeks for the rash to clear, and may have post-inflammatory hyperpigmentation that will take months to clear.     - Recommend obtaining EBV, CMV, HHV-6 PCR tests  - Recommend triamcinolone 0.1% ointment BID daily for 2-3 weeks (or until rash clears) to all affected skin except face, axillae, and groin (a 454 g jar can be ordered from pharmacy and left at patient's bedside to be applied twice daily)  - No antihistamines recommended at this time as patient does not complain of pruritus  - Please let us know if patient develops any facial edema, fevers, erosions/ulcers/sores in the mouth/lips, dysphagia, dysuria, or blisters/easy skin fragility as these symptoms are concerning for a complex drug eruption   - Please upload new photos every 24-48 hours so that we may continue to monitor his progression.    Thank you for the dermatology consultation. Please do not hesitate to contact the dermatology resident/faculty on call for any additional questions or concerns. We will continue to follow.     Patient seen and evaluated with attending physician, Dr. Yinka Gunter.    Nelly Stover, DO " (PGY-2)  Dermatology Resident  [facility]  P: [phone]    Attending Note  No mucosal signs to raise concern for SJS/TEN.  Morphology appears most consistent with simple/uncomplicated drug eruption, as patient is well-appearing.  Reasonable to follow daily LFTs, Cr and peripheral blood eos daily or every other day while rash is active to verify no progression to DRESS/DIHS, but otherwise no clear indication to stop any medications at this time.    I have seen and examined this patient and agree with the assessment and plan as documented in the resident's note.    Hill Gunter MD  Dermatology Attending      Dermatology Problem List:  1. Morbilliform drug eruption  - TAC 0.1% ointment BID    Date of Admission: Nov 26, 2020   Encounter Date: 12/02/2020    Reason for Consultation:   concern for possible SJS, developed rash in chest after a dose of cefepime    History of Present Illness:  Mr. Rogelio Marshall is a 75 year old male with past medical history of HTN, HLD, multiple myeloma on chemotherapy with Velcade, Revlimid and dexamethasone who was admitted to the hospital on 11/26/20 status post fall with multiple compression fractures and rib fractures. Dermatology was consulted due to concern for SJS after receiving a dose of cefepime and vancomycin for empiric treatment of fever and leukocytosis 2 days ago. The cefepime was discontinued yesterday and he was started on meropenem. He is unsure of when the rash first appeared because it is not pruritic, not painful and not bothersome to him. He denies ever having a similar rash in the past, also stating that is has never been present during his prior chemotherapy infusions. He also denies ever becoming very sweaty or hot during his infusions.     Past Medical History:   Patient Active Problem List   Diagnosis     Hypertension     Hyperlipidemia     Difficulty walking     Pathologic compression fracture of spine, sequela     Chronic midline low back  pain without sciatica     Multiple myeloma, remission status unspecified (H)     Closed fracture of multiple ribs of right side, initial encounter     Syncope, unspecified syncope type     Closed fracture of thoracic vertebra, unspecified fracture morphology, unspecified thoracic vertebral level, initial encounter (H)     Past Medical History:   Diagnosis Date     Hyperlipidemia      Hypertension      Past Surgical History:   Procedure Laterality Date     HERNIA REPAIR, INGUINAL RT/LT      needed post-op intestinal repair     ORIF left clavical           Social History:  Patient reports that he has quit smoking. His smoking use included cigarettes. He has a 3.00 pack-year smoking history. He has never used smokeless tobacco. He reports current alcohol use of about 17.5 standard drinks of alcohol per week.    Family History:  Family History   Problem Relation Age of Onset     C.A.D. Father          MI age 69     Hypertension Sister         obese     Family History Negative Brother        Medications:  Current Facility-Administered Medications   Medication     - MEDICATION INSTRUCTIONS -     acetaminophen (TYLENOL) tablet 975 mg     acyclovir (ZOVIRAX) tablet 400 mg     calcium carbonate 500 mg (elemental) (OSCAL) tablet 500 mg     diphenhydrAMINE (BENADRYL) capsule 25 mg     diphenhydrAMINE (BENADRYL) injection 25 mg     enoxaparin ANTICOAGULANT (LOVENOX) injection 30 mg     gabapentin (NEURONTIN) capsule 300 mg     hydrochlorothiazide (MICROZIDE) capsule 12.5 mg     HYDROmorphone (PF) (DILAUDID) injection 0.3 mg     [Held by provider] levofloxacin (LEVAQUIN) tablet 250 mg     Lidocaine (LIDOCARE) 4 % Patch 1-3 patch     lidocaine patch in PLACE     lisinopril (ZESTRIL) tablet 20 mg     LORazepam (ATIVAN) tablet 0.5 mg     magnesium hydroxide (MILK OF MAGNESIA) suspension 30 mL     meropenem (MERREM) 1 g vial to attach to  mL bag     methocarbamol (ROBAXIN) tablet 500 mg     naloxone (NARCAN) injection  "0.1-0.4 mg     No Anticoagulation unless approved by Anesthesia Provider.     omeprazole (priLOSEC) CR capsule 10 mg     ondansetron (ZOFRAN-ODT) ODT tab 4 mg     oxyCODONE (ROXICODONE) tablet 5-10 mg     polyethylene glycol (MIRALAX) Packet 17 g     potassium & sodium phosphates (NEUTRA-PHOS) Packet 1 packet     prochlorperazine (COMPAZINE) injection 5 mg     ropivacaine 0.2% (NAROPIN) 750 mL in ON-Q C-Bloc select flow ([id number] holds 600-750 mL) single cath disposable pump     senna-docusate (SENOKOT-S/PERICOLACE) 8.6-50 MG per tablet 1-2 tablet     Vitamin D3 (CHOLECALCIFEROL) tablet 25 mcg     Facility-Administered Medications Ordered in Other Encounters   Medication     bupivacaine 0.25% PF (perineural)          Allergies   Allergen Reactions     Other Drug Allergy (See Comments)      tobasco sauce ---caused red spots          Review of Systems:  -As per HPI    Physical exam:  Vitals: BP (!) 149/78 (BP Location: Left arm)   Pulse 92   Temp 98.6  F (37  C) (Oral)   Resp 18   Ht 1.549 m (5' 1\")   Wt 67.3 kg (148 lb 5.9 oz)   SpO2 94%   BMI 28.03 kg/m    GEN: This is a well developed, well-nourished male in no acute distress, in a pleasant mood.    SKIN: Total skin excluding the undergarment areas was performed. The exam included the head/face, neck, both arms, chest, back, abdomen, both legs, digits and/or nails.   -Barker skin type: I  -Pink macules and scaly papules scattered over the back, chest, abdomen and upper arms, however largely concentrated towards the central chest with a few pustules noted on the anterior chest, however no bullae, vesicles, erosions or ulcerations  -No mucosal or ocular erosions or ulcerations  -No palmar or plantar macules or papules                                  Laboratory:  Results for orders placed or performed during the hospital encounter of 11/26/20 (from the past 24 hour(s))   Care Management / Social Work IP Consult    Narrative    Staci Flores MSW     " 12/1/2020  3:42 PM  Care Management Initial Consult    General Information  Assessment completed with: Rogelio Narvaez  Type of CM/SW Visit: Initial Assessment     Reason for Consult: discharge planning    Communication Assessment  Patient's communication style: spoken language (English or   Bilingual)    Hearing Difficulty or Deaf: no   Wear Glasses or Blind: no    Cognitive  Cognitive/Neuro/Behavioral: .WDL except  Level of Consciousness:   alert  Arousal Level: opens eyes spontaneously  Orientation:   oriented x 4  Mood/Behavior: calm;cooperative;hypoactive (quiet,   withdrawn)  Best Language: 0 - No aphasia  Speech: clear    Living Environment:   People in home: alone     Current living Arrangements: apartment      Able to return to prior arrangements:  yes  Living Arrangement Comments: Pt lives at [address]   [place] [facility]. This is a low income independent living apartment   owned by [facility]    Family/Social Support:  Care provided by: self  Provides care for: no one  Marital Status: Single  None           Description of Support System: Uninvolved    Support Assessment: Limited social contact and support;Minimal   outside structure and leisure time activities;Difficulty   establishing and maintaining relationships    Current Resources:   Skilled Home Care Services:  None  Community Resources: [facility] Worker( with [facility] who   does phone visits)  Equipment currently used at home: none  Supplies currently used at home: None    Employment/Financial:  Employment Status: retired        Financial Concerns: No concerns identified      Functional Status:  Prior to admission patient needed assistance:   Dependent ADLs:: Independent  Dependent IADLs:: Independent     Mental Health Status:  Mental Health Status: No Current Concerns       Chemical Dependency Status:  Chemical Dependency Status: No Current Concerns           Values/Beliefs:  Spiritual, Cultural Beliefs, Islam Practices,  "Values that   affect care:   Did not discuss    Discharge Planning:  Length of Stay (days): 5  Expected Discharge Date: 12/03/20     Concerns to be Addressed: discharge planning   Pt unsure when he will be ready for d/c or what his needs will   look like at discharge. He said that if he needs TCU at discharge   he is ageeable and would prefer to return to [facility],   however, is unsure if he will improve enough to return home upon   discharge.  Patient plan of care discussed at interdisciplinary rounds: Yes    Anticipated Discharge Disposition: Transitional Care  Anticipated Discharge Services: None   [facility] [phone]   Anticipated Discharge DME: None    Patient/family educated on Medicare website which has current   facility and service quality ratings: (Not at this time; Pt would   prefer placement at [facility] since he was there   previously and is familiar with the facility.)  Education Provided on the Discharge Plan:  Discharge plan TBD  Patient/Family in Agreement with the Plan: Discharge  Plan TBD    Referrals Placed by CM/SW: (Not at this time)  Private pay costs discussed: Not at this time    Additional Information:  SHAYNE met with Pt at bedside to introduce self, SW role and discuss   discharge planning. SW attempted to discuss TCU recommendation   for discharge, however, Pt stated that there are more urgent   things to discuss at this time. He reports that sometime between   when he left his apartment and coming to the hospital he lost his   \"card [employer]rales\" with his ID and credit cards. SW suggested Pt make   report with Patient Relations and he accepted the phone # to   follow up on this. Pt is also very focused on getting some   belongings from his apartment (checkbook, camera bag, books) and   said that his  previously agreed to do this. SW   encouraged him to contact his  and did provide   him with the number per google. SHAYNE also " "coordinated with IDT and   was able to provide a book for reading as pt requested.    SW was able to discuss discharge planning briefly. Pt stated that   if he requires TCU at discharge he is agreeable and would like to   return to [facility] TCU as he was just there recently and   had an \"okay\" experience. He reported that he does not feel that   he is close to being discharged yet and that things could change   in regards to his discharge plan.    Pt reported he has a [facility] SW that he works with but is   unhappy that currently they are only doing phone visits. SW   empathized with his frustration, but also explained that this is   recommended d/t the current pandemic. Pt was not understanding   re: this.     SW assured Pt that CM Team will continue to follow and assist   with safe discharge planning as appropriate. Pt is being   transferred to [address] this afternoon.      MITESH Du, LICSW  6B Intermediate Care Unit   [facility]  Phone: [phone]  Pager: [phone]     Phosphorus   Result Value Ref Range    Phosphorus 2.4 (L) 2.5 - 4.5 mg/dL   Phosphorus   Result Value Ref Range    Phosphorus 2.3 (L) 2.5 - 4.5 mg/dL   Basic metabolic panel   Result Value Ref Range    Sodium 137 133 - 144 mmol/L    Potassium 4.2 3.4 - 5.3 mmol/L    Chloride 109 94 - 109 mmol/L    Carbon Dioxide 23 20 - 32 mmol/L    Anion Gap 6 3 - 14 mmol/L    Glucose 81 70 - 99 mg/dL    Urea Nitrogen 12 7 - 30 mg/dL    Creatinine 1.06 0.66 - 1.25 mg/dL    GFR Estimate 69 >60 mL/min/[1.73_m2]    GFR Estimate If Black 80 >60 mL/min/[1.73_m2]    Calcium 8.1 (L) 8.5 - 10.1 mg/dL   Magnesium   Result Value Ref Range    Magnesium 2.1 1.6 - 2.3 mg/dL   CBC with platelets   Result Value Ref Range    WBC 6.5 4.0 - 11.0 10e9/L    RBC Count 2.52 (L) 4.4 - 5.9 10e12/L    Hemoglobin 8.4 (L) 13.3 - 17.7 g/dL    Hematocrit 26.5 (L) 40.0 - 53.0 %     (H) 78 - 100 fl    MCH 33.3 (H) 26.5 - 33.0 pg    MCHC " 31.7 31.5 - 36.5 g/dL    RDW 15.3 (H) 10.0 - 15.0 %    Platelet Count 149 (L) 150 - 450 10e9/L   Procalcitonin   Result Value Ref Range    Procalcitonin 3.30 ng/ml   Phosphorus   Result Value Ref Range    Phosphorus 2.7 2.5 - 4.5 mg/dL   XR Chest Port 1 View    Narrative    Portable chest    INDICATION: Trauma. Patient with rib fractures.    COMPARISON: 12/1/2020    FINDINGS: Multiple right rib fractures again noted. No pneumothorax  obvious pulmonary contusion/pleural effusion. Subsegmental atelectasis  in the left lower lung is noted. Heart size is upper normal. Bones are  osteopenic.      Impression    IMPRESSION: Redemonstration of multiple right rib fractures comment  subsegmental atelectasis in the left upper lung impression borderline  cardiomegaly.    MD Dr. Hill WADE staffed the patient.    Staff Involved:  Resident/Staff

## 2020-12-02 NOTE — PLAN OF CARE
"BP (!) 160/87   Pulse 88   Temp 96.6  F (35.9  C) (Oral)   Resp 20   Ht 1.549 m (5' 1\")   Wt 67.3 kg (148 lb 5.9 oz)   SpO2 96%   BMI 28.03 kg/m       Neuros: A&Ox4. Able to make needs known.   Activity: Ax1 with gaitbelt and walker.   Cardiac:  WNL. Denies cardiac chest pain   Respiratory:WNL. Sating well on RA.   Diet: regular   GI/Gu:  Voiding without difficulty.No BM this shift. Denies N/V  Skin/Incisions: Scattered bruising   LDA: PIV infusing TKO and ABX  Pain: back pain  PRN: Oxy X1  Changes: No acute changes this shift   Plan: Continue POC     JESSICA FRANZ RN on 12/2/2020 at 5:46 AM       "

## 2020-12-03 ENCOUNTER — APPOINTMENT (OUTPATIENT)
Dept: OCCUPATIONAL THERAPY | Facility: CLINIC | Age: 75
DRG: 542 | End: 2020-12-03
Payer: COMMERCIAL

## 2020-12-03 ENCOUNTER — APPOINTMENT (OUTPATIENT)
Dept: GENERAL RADIOLOGY | Facility: CLINIC | Age: 75
DRG: 542 | End: 2020-12-03
Attending: STUDENT IN AN ORGANIZED HEALTH CARE EDUCATION/TRAINING PROGRAM
Payer: COMMERCIAL

## 2020-12-03 ENCOUNTER — APPOINTMENT (OUTPATIENT)
Dept: PHYSICAL THERAPY | Facility: CLINIC | Age: 75
DRG: 542 | End: 2020-12-03
Payer: COMMERCIAL

## 2020-12-03 LAB
ANION GAP SERPL CALCULATED.3IONS-SCNC: 5 MMOL/L (ref 3–14)
BUN SERPL-MCNC: 17 MG/DL (ref 7–30)
CALCIUM SERPL-MCNC: 8.5 MG/DL (ref 8.5–10.1)
CHLORIDE SERPL-SCNC: 110 MMOL/L (ref 94–109)
CO2 SERPL-SCNC: 24 MMOL/L (ref 20–32)
CREAT SERPL-MCNC: 1.22 MG/DL (ref 0.66–1.25)
EBV DNA # SPEC NAA+PROBE: NORMAL {COPIES}/ML
EBV DNA SPEC NAA+PROBE-LOG#: NORMAL {LOG_COPIES}/ML
ERYTHROCYTE [DISTWIDTH] IN BLOOD BY AUTOMATED COUNT: 15.3 % (ref 10–15)
GFR SERPL CREATININE-BSD FRML MDRD: 58 ML/MIN/{1.73_M2}
GLUCOSE SERPL-MCNC: 84 MG/DL (ref 70–99)
HCT VFR BLD AUTO: 28.1 % (ref 40–53)
HGB BLD-MCNC: 9.1 G/DL (ref 13.3–17.7)
MCH RBC QN AUTO: 34.1 PG (ref 26.5–33)
MCHC RBC AUTO-ENTMCNC: 32.4 G/DL (ref 31.5–36.5)
MCV RBC AUTO: 105 FL (ref 78–100)
PHOSPHATE SERPL-MCNC: 3.4 MG/DL (ref 2.5–4.5)
PLATELET # BLD AUTO: 207 10E9/L (ref 150–450)
POTASSIUM SERPL-SCNC: 4.2 MMOL/L (ref 3.4–5.3)
RBC # BLD AUTO: 2.67 10E12/L (ref 4.4–5.9)
SODIUM SERPL-SCNC: 139 MMOL/L (ref 133–144)
WBC # BLD AUTO: 7.1 10E9/L (ref 4–11)

## 2020-12-03 PROCEDURE — 250N000012 HC RX MED GY IP 250 OP 636 PS 637

## 2020-12-03 PROCEDURE — 250N000011 HC RX IP 250 OP 636: Performed by: STUDENT IN AN ORGANIZED HEALTH CARE EDUCATION/TRAINING PROGRAM

## 2020-12-03 PROCEDURE — 250N000013 HC RX MED GY IP 250 OP 250 PS 637: Performed by: PHYSICIAN ASSISTANT

## 2020-12-03 PROCEDURE — 99233 SBSQ HOSP IP/OBS HIGH 50: CPT | Mod: GC | Performed by: INTERNAL MEDICINE

## 2020-12-03 PROCEDURE — 71045 X-RAY EXAM CHEST 1 VIEW: CPT

## 2020-12-03 PROCEDURE — 85027 COMPLETE CBC AUTOMATED: CPT | Performed by: NURSE PRACTITIONER

## 2020-12-03 PROCEDURE — 250N000011 HC RX IP 250 OP 636: Performed by: NURSE PRACTITIONER

## 2020-12-03 PROCEDURE — 120N000002 HC R&B MED SURG/OB UMMC

## 2020-12-03 PROCEDURE — 71045 X-RAY EXAM CHEST 1 VIEW: CPT | Mod: 26 | Performed by: RADIOLOGY

## 2020-12-03 PROCEDURE — 97110 THERAPEUTIC EXERCISES: CPT | Mod: GO

## 2020-12-03 PROCEDURE — 99232 SBSQ HOSP IP/OBS MODERATE 35: CPT | Mod: GC | Performed by: INTERNAL MEDICINE

## 2020-12-03 PROCEDURE — 36415 COLL VENOUS BLD VENIPUNCTURE: CPT | Performed by: NURSE PRACTITIONER

## 2020-12-03 PROCEDURE — 84100 ASSAY OF PHOSPHORUS: CPT | Performed by: SURGERY

## 2020-12-03 PROCEDURE — 87533 HHV-6 DNA QUANT: CPT | Performed by: NURSE PRACTITIONER

## 2020-12-03 PROCEDURE — 36415 COLL VENOUS BLD VENIPUNCTURE: CPT | Performed by: SURGERY

## 2020-12-03 PROCEDURE — 99232 SBSQ HOSP IP/OBS MODERATE 35: CPT | Performed by: NURSE PRACTITIONER

## 2020-12-03 PROCEDURE — 80048 BASIC METABOLIC PNL TOTAL CA: CPT | Performed by: SURGERY

## 2020-12-03 PROCEDURE — 250N000013 HC RX MED GY IP 250 OP 250 PS 637: Performed by: NURSE PRACTITIONER

## 2020-12-03 PROCEDURE — 97116 GAIT TRAINING THERAPY: CPT | Mod: GP

## 2020-12-03 PROCEDURE — 97530 THERAPEUTIC ACTIVITIES: CPT | Mod: GP

## 2020-12-03 PROCEDURE — 258N000003 HC RX IP 258 OP 636

## 2020-12-03 PROCEDURE — 87799 DETECT AGENT NOS DNA QUANT: CPT | Performed by: NURSE PRACTITIONER

## 2020-12-03 PROCEDURE — 97535 SELF CARE MNGMENT TRAINING: CPT | Mod: GO

## 2020-12-03 PROCEDURE — 250N000013 HC RX MED GY IP 250 OP 250 PS 637: Performed by: SURGERY

## 2020-12-03 RX ORDER — SODIUM CHLORIDE 9 MG/ML
INJECTION, SOLUTION INTRAVENOUS CONTINUOUS
Status: ACTIVE | OUTPATIENT
Start: 2020-12-03 | End: 2020-12-04

## 2020-12-03 RX ORDER — TRIAMCINOLONE ACETONIDE 1 MG/G
OINTMENT TOPICAL 2 TIMES DAILY
Status: DISCONTINUED | OUTPATIENT
Start: 2020-12-03 | End: 2020-12-04 | Stop reason: HOSPADM

## 2020-12-03 RX ADMIN — OXYCODONE HYDROCHLORIDE 10 MG: 5 TABLET ORAL at 17:44

## 2020-12-03 RX ADMIN — PIPERACILLIN AND TAZOBACTAM 3.38 G: 3; .375 INJECTION, POWDER, LYOPHILIZED, FOR SOLUTION INTRAVENOUS at 10:39

## 2020-12-03 RX ADMIN — CALCIUM 500 MG: 500 TABLET ORAL at 12:43

## 2020-12-03 RX ADMIN — OXYCODONE HYDROCHLORIDE 10 MG: 5 TABLET ORAL at 08:11

## 2020-12-03 RX ADMIN — OXYCODONE HYDROCHLORIDE 10 MG: 5 TABLET ORAL at 12:42

## 2020-12-03 RX ADMIN — CALCIUM 500 MG: 500 TABLET ORAL at 17:44

## 2020-12-03 RX ADMIN — AMOXICILLIN AND CLAVULANATE POTASSIUM 1 TABLET: 875; 125 TABLET, FILM COATED ORAL at 20:02

## 2020-12-03 RX ADMIN — METHOCARBAMOL 500 MG: 500 TABLET, FILM COATED ORAL at 22:07

## 2020-12-03 RX ADMIN — CALCIUM 500 MG: 500 TABLET ORAL at 08:14

## 2020-12-03 RX ADMIN — METHOCARBAMOL 500 MG: 500 TABLET, FILM COATED ORAL at 08:14

## 2020-12-03 RX ADMIN — GABAPENTIN 300 MG: 300 CAPSULE ORAL at 08:13

## 2020-12-03 RX ADMIN — METHOCARBAMOL 500 MG: 500 TABLET, FILM COATED ORAL at 14:07

## 2020-12-03 RX ADMIN — HYDROCHLOROTHIAZIDE 12.5 MG: 12.5 CAPSULE, GELATIN COATED ORAL at 08:14

## 2020-12-03 RX ADMIN — METHOCARBAMOL 500 MG: 500 TABLET, FILM COATED ORAL at 17:44

## 2020-12-03 RX ADMIN — SODIUM CHLORIDE: 9 INJECTION, SOLUTION INTRAVENOUS at 15:35

## 2020-12-03 RX ADMIN — GABAPENTIN 300 MG: 300 CAPSULE ORAL at 20:03

## 2020-12-03 RX ADMIN — POTASSIUM & SODIUM PHOSPHATES POWDER PACK 280-160-250 MG 1 PACKET: 280-160-250 PACK at 16:06

## 2020-12-03 RX ADMIN — ACYCLOVIR 400 MG: 400 TABLET ORAL at 08:13

## 2020-12-03 RX ADMIN — LIDOCAINE 1 PATCH: 560 PATCH PERCUTANEOUS; TOPICAL; TRANSDERMAL at 12:44

## 2020-12-03 RX ADMIN — ACETAMINOPHEN 975 MG: 325 TABLET, FILM COATED ORAL at 16:06

## 2020-12-03 RX ADMIN — OXYCODONE HYDROCHLORIDE 10 MG: 5 TABLET ORAL at 22:07

## 2020-12-03 RX ADMIN — ACYCLOVIR 400 MG: 400 TABLET ORAL at 20:03

## 2020-12-03 RX ADMIN — OMEPRAZOLE 10 MG: 10 CAPSULE, DELAYED RELEASE ORAL at 08:13

## 2020-12-03 RX ADMIN — ACETAMINOPHEN 975 MG: 325 TABLET, FILM COATED ORAL at 00:08

## 2020-12-03 RX ADMIN — POLYETHYLENE GLYCOL 3350 17 G: 17 POWDER, FOR SOLUTION ORAL at 08:14

## 2020-12-03 RX ADMIN — ENOXAPARIN SODIUM 30 MG: 30 INJECTION SUBCUTANEOUS at 22:07

## 2020-12-03 RX ADMIN — POTASSIUM & SODIUM PHOSPHATES POWDER PACK 280-160-250 MG 1 PACKET: 280-160-250 PACK at 12:42

## 2020-12-03 RX ADMIN — OXYCODONE HYDROCHLORIDE 10 MG: 5 TABLET ORAL at 04:07

## 2020-12-03 RX ADMIN — POTASSIUM & SODIUM PHOSPHATES POWDER PACK 280-160-250 MG 1 PACKET: 280-160-250 PACK at 08:13

## 2020-12-03 RX ADMIN — DEXAMETHASONE 40 MG: 4 TABLET ORAL at 10:48

## 2020-12-03 RX ADMIN — GABAPENTIN 300 MG: 300 CAPSULE ORAL at 14:07

## 2020-12-03 RX ADMIN — LISINOPRIL 20 MG: 20 TABLET ORAL at 08:13

## 2020-12-03 RX ADMIN — ACETAMINOPHEN 975 MG: 325 TABLET, FILM COATED ORAL at 08:13

## 2020-12-03 RX ADMIN — Medication 25 MCG: at 08:14

## 2020-12-03 RX ADMIN — PIPERACILLIN AND TAZOBACTAM 3.38 G: 3; .375 INJECTION, POWDER, LYOPHILIZED, FOR SOLUTION INTRAVENOUS at 04:07

## 2020-12-03 RX ADMIN — SENNOSIDES AND DOCUSATE SODIUM 2 TABLET: 8.6; 5 TABLET ORAL at 08:22

## 2020-12-03 RX ADMIN — ENOXAPARIN SODIUM 30 MG: 30 INJECTION SUBCUTANEOUS at 10:39

## 2020-12-03 RX ADMIN — OXYCODONE HYDROCHLORIDE 10 MG: 5 TABLET ORAL at 00:03

## 2020-12-03 ASSESSMENT — ACTIVITIES OF DAILY LIVING (ADL)
ADLS_ACUITY_SCORE: 17

## 2020-12-03 NOTE — PROGRESS NOTES
Care Management Follow Up    Length of Stay (days): 7    Expected Discharge Date: 12/04/20     Concerns to be Addressed: discharge planning  Pt unsure when he will be ready for d/c or what his needs will look like at discharge. He said that if he needs TCU at discharge he is ageeable and would prefer to return to [facility], however, is unsure if he will improve enough to return home upon discharge.  Patient plan of care discussed at interdisciplinary rounds: Yes    Anticipated Discharge Disposition: Transitional Care     Anticipated Discharge Services: None  Anticipated Discharge DME: None    Patient/family educated on Medicare website which has current facility and service quality ratings: (Not at this time; [facility] and [facility] with the facility.)  Education Provided on the Discharge Plan:  Yes  Patient/Family in Agreement with the Plan: (TBD)    Referrals Placed by CM/SW: See previous SW documentation  Private pay costs discussed: See previous SW documentation    Additional Information:  Pt is a 75 year old male being followed by SW for TCU placement after falling and sustaining rib fractures.     Pt has requested placement at [facility] and a referral has been made. Per Trauma Afanwi, pt is anticipated to be ready for discharge Friday, his OnQ was stopped today, pt will be on oral abx, and will not be getting any chemo while at TCU.     SW contacted [facility] (p. [phone], f. [phone], has access to Work[employer])- left vm for admissions with update and inquiring about status of referral, waiting to hear back. SHAYNE later spoke with Rosey and she reported seeing that pt walked 400ft with PT today and she questioned if pt will progress to being able to go home instead of needing TCU. Rosey reported she won't know bed availability until tomorrow if pt does indeed need TCU.     Ashley Quintero, MITESH, LICSW  7B Social  Worker  [phone] (pager) [phone]  12/3/2020

## 2020-12-03 NOTE — PROGRESS NOTES
REGIONAL ANESTHESIA PAIN SERVICE FOLLOW UP NOTE  Rogelio Marshall is a 75 year old male with multiple myeloma recently started on treatment, compression fractures, HTN and HLD admitted 11/26 s/p fall with mildly displaced fractures of lateral right fourth and fifth ribs, and posterior fourth right rib; Catheter Day #6 placement of right T4-5 erector spinae (ES) catheter on 1127 for analgesia.    Subjective and Interval History: Overnight no acute events.  Patient reports good pain control, nurse preparing AM meds with oxycodone 10 mg.  Patient denies weakness, paresthesias, circumoral numbness, metallic taste or tinnitus.  Ambulating with standby assistance.  Tolerating regular diet, denies nausea/vomiting.  Plan is for discharge tomorrow    Antithrombotic/Thrombolytic Therapy: Last dose Enoxaparin (Lovenox) SQ administered yesterday at 2230     Medications related to Pain Management (From now, onward)    Start     Dose/Rate Route Frequency Ordered Stop    12/02/20 1033  oxyCODONE (ROXICODONE) tablet 5-10 mg      5-10 mg Oral EVERY 4 HOURS PRN 12/02/20 1033      11/30/20 1542  diphenhydrAMINE (BENADRYL) capsule 25 mg      25 mg Oral EVERY 4 HOURS PRN 11/30/20 1544      11/30/20 1541  diphenhydrAMINE (BENADRYL) injection 25 mg      25 mg Intravenous EVERY 6 HOURS PRN 11/30/20 1544      11/28/20 1100  senna-docusate (SENOKOT-S/PERICOLACE) 8.6-50 MG per tablet 1-2 tablet      1-2 tablet Oral 2 TIMES DAILY 11/28/20 1032      11/28/20 1032  magnesium hydroxide (MILK OF MAGNESIA) suspension 30 mL      30 mL Oral DAILY PRN 11/28/20 1032      11/28/20 0830  gabapentin (NEURONTIN) capsule 300 mg      300 mg Oral 3 TIMES DAILY 11/28/20 0817      11/27/20 1400  lidocaine patch in PLACE       Transdermal EVERY 8 HOURS 11/27/20 1334      11/27/20 1330  Lidocaine (LIDOCARE) 4 % Patch 1-3 patch      1-3 patch  over 12 Hours Transdermal EVERY 24 HOURS 11/27/20 1304      11/27/20 1330  methocarbamol (ROBAXIN) tablet 500 mg      500  "mg Oral 4 TIMES DAILY 11/27/20 1306      11/27/20 1330  polyethylene glycol (MIRALAX) Packet 17 g      17 g Oral DAILY 11/27/20 1306      11/27/20 1304  LORazepam (ATIVAN) tablet 0.5 mg      0.5 mg Oral EVERY 4 HOURS PRN 11/27/20 1306      11/27/20 0430  acetaminophen (TYLENOL) tablet 975 mg      975 mg Oral EVERY 8 HOURS 11/27/20 0302      11/26/20 2015  HYDROmorphone (PF) (DILAUDID) injection 0.3 mg      0.3 mg Intravenous ONCE 11/26/20 2010              Objective  Lab Results     Recent Labs   Lab Test 12/02/20  0702   WBC 6.5   RBC 2.52*   HGB 8.4*   HCT 26.5*   *   MCH 33.3*   MCHC 31.7   RDW 15.3*   *     Lab Results   Component Value Date    INR 1.14 10/09/2020       BP (!) 141/73 (BP Location: Left arm)   Pulse 73   Temp 97.2  F (36.2  C) (Oral)   Resp 18   Ht 1.549 m (5' 1\")   Wt 67.3 kg (148 lb 5.9 oz)   SpO2 97%   BMI 28.03 kg/m       Exam:  Constitutional: alert and no distress  Neuro/MSK:  Strength BLE 5/5  and overall symmetric  Skin: erector spinae (ES) catheter site c/d/i, no tenderness, erythema, heme, edema.      Assessment  Patient currently achieving adequate pain control with erector spinae (ES) catheter/infusion and multimodal analgesia.  Patient is meeting activity goals. No weakness or paresthesias. No evidence of adverse side effects associated with local anesthetic. Trauma Service requesting nerve block cathter removal     Plan  0815 Right erector spinae (ES) catheter removed without complication, dark tip intact.  Insertion site c/d/i. Small bandage applied  - okay to administer Enoxaparin (Lovenox) SQ immediately after catheter removal.    - Primary service and Bedside RN aware of plans.  - RAPS will sign off    Thank you for allowing us the opportunity to participate in the care of Rogelio Marshall  - discussed plan with attending anesthesiologist    Valentine Floyd, JIMBO [facility]   Regional Anesthesia Pain Service      RAPS Contact Info (for in-house use only):  Job " code ID: [id number]   [facility] 0599  Peds 0602  [place] phone: dial * * * [phone], enter jobcode ID, then enter call-back number.    Text: Use [employer] on the Intranet <Paging/Directory> tab and enter Jobcode ID.   If no call back at any time, contact the hospital  and ask for RAPS attending or backup

## 2020-12-03 NOTE — PLAN OF CARE
"Vital signs:  Temp: 97.5  F (36.4  C) Temp src: Oral BP: (!) 141/74 Pulse: 77   Resp: 18 SpO2: 95 % O2 Device: None (Room air) Oxygen Delivery: 2 LPM Height: 154.9 cm (5' 1\") Weight: 67.3 kg (148 lb 5.9 oz)    3445-3745: AFebrile. VSS.    Activity: Repositions self in bed.   Neuros: A & O x4. Neuro intact.   Cardiac: WDL.   Respiratory: LS diminished in bases. O2 sats high 90s on RA. Denies SOB.   GI/: BS+, passing flatus, no BM this shift. Voiding adequate in urinal.   Diet: Regular diet.   Skin: Blanchable redness. No change on rash on chest.   Lines: PIV TKO. Zosyn infused per orders.   Incisions/Drains: None.   Labs: None.   Pain/nausea: PRN oxycodone 10mg x2 effective for pain control. Ropivicaine On-Q at 14 mL/hr. Denies nausea.   New changes this shift: None.   Plan: Continue POC.     "

## 2020-12-03 NOTE — PLAN OF CARE
"/76   Pulse 75   Temp 98  F (36.7  C) (Oral)   Resp 18   Ht 1.549 m (5' 1\")   Wt 67.3 kg (148 lb 5.9 oz)   SpO2 95%   BMI 28.03 kg/m      Neuro: A&Ox4, cooperative  Cardiac: WDL, denies chest pain  Respiratory: on RA, pt using IS adequately  GI/: voiding adequately via urinal, LBM 12/2  Skin: rash on chest/back, scattered bruising   Pain: PRN oxy given, ON-Q running @ 14, lido patch on R chest wall   LDA: L PIV SL, R PIV  running intermittent abx with TKO, ON-Q  Activity: A1 with walker, pt worked with PT  Diet/Appetite: reg diet, tolerating  Plan: TCU versus home discharge, discharge in 1-2 days likely, continue POC,     "

## 2020-12-03 NOTE — PLAN OF CARE
Activity: up with assist of 1, walker/ gait belt  Neuros: alert and oriented x 4  Cardiac: denies chest pain  Respiratory: room air, IS / aerobika  GI/: LBM 12/3, voiding  Diet: regular  Skin: rash chest/back cream ordered (refused this am)  Lines/Drains: PIV saline locked    ON Q discontinued this am.

## 2020-12-03 NOTE — PROGRESS NOTES
REGIONAL ANESTHESIA PAIN SERVICE (RAPS) EVALUATION:  - Time: 18:10.   - Evaluation: Patient reports pain intensity with current therapy 4/10 at rest and 6/10 with activity  General: healthy, alert and no distress  Catheter system integrity: intact  Skin: dressing clean, dry and intact    - Assessment/Plan    PAIN: partially controlled     INTERVENTION: Bolus administered    MEDICATION: PF bupivacaine 0.25%, total bolus 10 mL    PROCEDURE: Clinician bolus via right erector spinae nerve block catheter; administered without complication with negative aspirate before and between each mL.    No symptoms of local anesthetic systemic toxicity (LAST). Remained with and assessed patient for 5 min post-injection. BP, P and MAP stable    POST-PROCEDURE: Bedside RN aware of need to continue BP, P and MAP monitoring Q 5 min for an additional 20 min. Contact RAPS (jobcode ID [id number]) if any of the following: patient experiencing any untoward effects, SBP< 90, P < 50 or > 120, MAP < 60     Patient can be evaluated to receive local anesthetic bolus Q 12 hr PRN pain not controlled with continuous infusion.  Bedside nurse must page RAPS to request bolus.    Ledy Alegria MD      RAPS Contact Info (24 hour job code pager is the last 4 digits) For in-house use only:  [employer] phone: Dial * * * [phone], wait for prompt, then enter [phone].  Text: Use [employer] on the Intranet <Paging/Directory> tab and enter Jobcode ID [id number].  If no call back at any time, contact the hospital  and ask for RAPS attending or backup .

## 2020-12-03 NOTE — PROGRESS NOTES
[facility]   Trauma Service Progress Note    Date of Service (when I saw the patient): 12/03/2020  Assessment & Plan   Trauma Mechanism: Fall   Date of Injury: 11/26/20  Known Injuries:  1. Right 4 & 5 rib fx   Brief HPI    74 y/o male with a history of multiple myeloma on chemotherapy who was admitted to the trauma service after a ground level fall in at home with rib multiple apparent pathologic bilateral rib fractures and T10, T12 and L2 compression deformities.     Neuro/Pain/Psych:  # Fall   - Head CT 11/26: No intracranial pathology.      # Acute on chronic pain   - continued PTA gabapentin 100mg tid, Robaxin   - Scheduled: Tylenol 975mg, lidoderm,   - Prn: Oxycodone 5-10  - RAPS:  right T4-5 erector spinae (ES) catheter inserted for pain control 2/2 rib fractures; 11/27- 12/03     # Anxiety   - continued PTA: ativan 0.5 q4hrs prn,   - Maintain circadian rhythm.  Lights on during the day.  Off at night, minimize cares at night.  OOB during the day.     Pulmonary:  # Rib fractures   # possible HAP  - CXR 11/29: Continued left lower lung subsegmental atelectasis. No acute interval changes. Possible trace left pleural effusion.  - CXR 11/30: Stable rib fractures. Patchy opacities possibly representing atelectasis or pulmonary contusion.  - CT PE ordered and completed per Heme on 11/30 without PE  - Supplemental oxygen to keep saturation above 92 %.  - Incentive spirometer and flutter valve while awake, encourage mobility, splinted cough and acapella     Cardiovascular:    # Hyperlipidemia   - Monitor hemodynamic status.  - Holding PTA: ,   -  Lisinopril 20 daily for HTN     GI/Nutrition:    # GERD  - Continued PTA: omeprazole, Miralax  - Hold bowel regimen for loose stools  - Added Boost breeze supplements     Renal/ Fluids/Electrolytes:  # HALLIE on CKD  # Hypophosphetemia  - Creatinine: 1.59 on admission, improved with IVF hydration  - Replete lytes per  protocol     Endocrine:  - No management indication.    Monitor     Infectious disease:    - COVID neg 10/9. Test 11/26: neg   # Presumed HAP  # Leukocytosis   # Fever 100.5  # Rigors   - Received Chemotherapy 11/29  - 11/29, triggered by sepsis  protocol, with a leukocytosis, lactic 2.0, was started on Vancomycin and Cefepime, transitioned to Zosyn given drug rash.  - UA unremarkable.  Multiple blood cultures with no growth  UA unremarkable  Antibiotics:   - Prophylaxis (see hem): Acyclovir 400 bid, Levofloxacin (on hold with initiation of Cefepime)   - 11/30 to 12/01: Cefepime 2gm IV q8hrs stopped due to drug rash  11/30 to 12/01: Vancomycin  12/01-12/02: transitioned to Merrem due to drug rash  12/02-current: Zosyn  Plan to transition to Augmentin today  See ID and dermatology notes     Hematology:    # Multiple Myeloma with extensive osteolytic lesions  - Hem/Onc following: recommendations in italics below:  - Will not have any chemo needs at TCU; we will arrange for f/u with primary oncologist when a TCU discharge date and location are established  last dose of dexamethasone today- done     # Anemia of chronic illness   - Hgb: stable without transfusion  - Threshold for transfusion if hgb <7.0 or signs/symptoms of hypoperfusion.        # DVT Prophylaxis: Lovenox 30mg bid      Musculoskeletal:  # s/p ORIF left clavicle   # Chronic T10 compression fx  # Chronic T12 superior endplate compression fracture   # Chronic L2 and L3 compression fracture   # Right Rib 4-5 lateral mildly displaced fx  # Right Rib 4 mildly displaced posterior fx  # Weakness and deconditioning   - NSGY: No surgical intervention. Osteopenia tx, no brace needed for asymptomatic.   - Plan f/u MRI C-spine outpatient  - Physical and occupational therapy consults.     Skin:  Drug rash to upper torso and arms, presumed drug rash from Cefepime. Denies itching, not raised or peeling. There was initial concern for SJS, Dermatology was consulted.    Noted some improvement today, afebrile, less erythema.  Trail of Triamcinolone ointment per Derm recs  - dilgent cares to prevent skin breakdown and wound formation     Lines/ tubes/ drains:  - PIV     General Cares:                 PPI/H2 blocker:  Omeprazole                 DVT prophylaxis: Lovenox                 Bowel Regimen/Date of last stool:12/02                Pulmonary toilet: IS, Flutter valve                 Lines / drains:PIV     Code status:  Full Code                   Discharge goals:     Adequate pain management: yes     VSS x24 hours: yes    Hemoglobin stable x 48 hours: yes    Ambulating safely and/or therapy evals complete: yes    Drains/lines removed or plan in place to manage: yes    Teaching done: yes    Other:   Expected D/C date: Friday TCU pending bed and acceptance    Interval History   Feels well today. Denies fevers, chill, nausea, or shortness of breath  ROS x 8 negative with exception of those things listed in interval hx    Physical Exam   Temp: 97.2  F (36.2  C) Temp src: Oral BP: 138/72 Pulse: 93   Resp: 18 SpO2: 97 % O2 Device: None (Room air)    Vitals:    11/26/20 2014 12/01/20 0416   Weight: 65.8 kg (145 lb) 67.3 kg (148 lb 5.9 oz)     Vital Signs with Ranges  Temp:  [96.1  F (35.6  C)-98  F (36.7  C)] 97.2  F (36.2  C)  Pulse:  [72-93] 93  Resp:  [18] 18  BP: (116-144)/(63-79) 138/72  SpO2:  [95 %-98 %] 97 %  I/O last 3 completed shifts:  In: 440 [P.O.:340; I.V.:100]  Out: 2555 [Urine:2555]    Promise Coma Scale - Total 15/15  Constitutional: Awake, alert, cooperative, no apparent distress.  ENT: Normocephalic, atraumatic  Respiratory: No increased work of breathing, good air exchange, clear to auscultation bilaterally, no crackles or wheezing.  Cardiovascular:  regular rate and rhythm, normal S1 and S2  GI: Normal bowel sounds, abdomen soft, non-distended, non-tender, no guarding  Skin:  upper torso rash with improved erythema  Musculoskeletal: There is no redness, warmth,  or swelling of the joints.  Pedal pulse palpated.  Neurologic: Awake, alert, oriented.   Strength and sensory is intact. No focal deficits.  Neuropsychiatric: Calm, normal eye contact, alert, affect appropriate to situation, oriented, thought process normal.    JIMBO Sesay CNP  To contact the trauma service use job code pager [phone],   Numeric texts or alpha text through [facility]

## 2020-12-03 NOTE — PROGRESS NOTES
Hematology Consult Note   Date of Service: 12/03/2020    Patient: Rogelio Marshall  MRN: [id number]  Admission Date: 11/26/2020  Hospital Day # 7   Primary Outpatient Hematologist: Dr. Banerjee    Reason for Consult: multiple myeloma    Assessment & Plan:   74 y/o M w/ hx of HTN, HLD and multiple myeloma, s/p C1D8 Valcade on 11/26/2020 presents to the [facility] ED after a fall, resulting in right upper thoracic pain, from new right third through fifth rib fractures. He has multiple stable compression fracture including T1, T5, T10 and T12, L2 and L3.    # Multiple myeloma  Regarding MM treatment, Revlimid is known to have central nervous system side effects that include: fatigue (11% to 34%), dizziness (20%), and headache (9% to 20%). His chemo regimen could have contributed to his weakness/fall. Recommend holding the Revlimid and reassessing his treatment plan as an outpatient. He was given d11 Velcade on 11/29/2020.  - primary team planning to discharge (possibly Friday) to TCU  - chemo plan:   - s/p Velcade 2.1 mg sub-cu, days 1, 4, 8, 11   - Revlimid 25 mg daily for 14 days on, 7 days off. Hold this for now   - Dexamethasone 40 mg days 1, 8, 15 (last on 12/3)  - Will not have any chemo needs at TCU; has appt with primary oncologist for 12/9 which he should keep     # Fever with leukocytosis; resolved  # Maculopapular rash (probable drug eruption)  Fever with temp as high as ~ 103F, and leukocytosis. Procal elevated > 6 and briefly required supplemental O2 while sleeping. WBC trended down and defervesced on cefepime and vanc -- developed a maculopapular rash and was switched from cefepime to meropenem by the primary team and the rash began improving. Vancomycin was d/c'ed after MRSA swab returned negative. No obvious infectious source (clean UA, CT chest w/o PNA). Now growth on blood cultures. CT chest showed no PE (high clotting risk due to malignancy), but has pleural effusions and atelectasis.  - defer antibiotic  "management to ID and primary team (currently on Zosyn for possible aspiration PNA)   - posaconazole was stopped -- he does not require fungal ppx/treatment  - recommend continuing acyclovir ppx    # Fall  Based on the history patient is likely has orthostatic hypotension/dehydration from poor po intake. He is tolerating ambulation, but still gets dizzy/\"queezy.\" He had CT chest upon admission which did not show acute change in known compression fractures (see above).  - Pain team had been doing nerve block into erector spinae with bupivocaine -- dced on 12/3    # N/V due to chemo: recurrent. Patient is instructed to take Zofran prior to his chemo day and use it PRN; avoid Compazine considering risk for falling. He is instructed to bring his medication to clinic next time to discuss further with primary outpatient oncologist.    Recommendations:   - last dose of dexamethasone today  - okay with de-escalating antibiotics from our perspective but defer to ID  - pain control per primary and pain teams    Patient is seen and discussed with Dr. Cordova, who agrees with the plan.     Moise Valdez MD  PGY-2, Internal Medicine  [facility]    ---------------------------    Interval History:   No acute events overnight. No further feelings of nausea or dizziness this morning. Says he ambulated with a walker x 2 yesterday. No chest pain or abdominal pain. Had the erector spinae catheter removed by the pain team this morning. His back pain is \"doing okay so far.\" No itching; says the rash is improving. Says he may decide to go directly home instead of TCU because of various bills that he needs to take care of.    Initial HPI:    \"76 y/o M w/ hx of HTN, HLD and multiple myeloma, s/p C1D8 Valcade on 11/26/2020 who presents to the [facility] ED after a fall, resulting in right upper thoracic pain. Patient lives alone. Poor appetite and had not been eating/drinking well. He has some nausea and took 1 Zofran then vomited it out. " "He had a fainting feeling but did not lose consciousness.  He went to the bathroom because he did not feel well with a nauseous feeling. He noticed that he did not have the strength to get up from his position leaning over the toilet and he fell backward onto his back. He denies any fever, chills, no bowel/urine problem.    He was noticed to be hypotensive with BP as low as 62/41, improving with NS bolus. He is on IVF and feels better.     He is admitted to trauma service with CT findings of lucid bone lesion throughout the whole spine.   C spine- No overt acute fracture or subluxation.  Thoracic spine: T1, T5, T10 and T12 compression fracture, and right third through fifth rib fractures.   Lumbar spine: moderate chronic L2 and marked chronic L3 compressions.\"    Hematologic History:  This patient is a 75 year old man with lower back pain over a year. He has known compression fractures and bone scan reveals multiple lytic lesions throughout his appendicular and axial skeleton.    - His Beta 2 microglobulin was 3.6 on 10/10/2020.    - Kappa chains were 73.6 on 10/5/2020 with K/L ratio of 89.9. M spike of 16.2 in urine on 10/10.    - Bone marrow biopsy on 10/23/2020 showed trilineage hematopoiesis and 50-60% kappa restricted plasma cells.    - Flow cytometry showed 20 % plasma cells which express CD19, CD38, CD45 and monotypic cytoplasmic kappa immunoglobulin light chains but lack CD20 and CD56. Cytogenetics pending.   - FISH shows IGH-CCND1 fusion (81%; 30% had loss of IGH component from one fusion signal).      He was started on VRD:   - Velcade 2.1mg days 1, 4, 8, 11   - Revlimid 25 mg daily for 14 days on, 7 days off -- currently holding   - Dexamethasone 40mg Days 1, 8, 15.   He received Day 8 and Day 11 Cycle 1 Velcade on 11/26 and 11/29, respectively  Today (12/3) is Day 15    Review of Systems: Pertinent positive and negative systems described in interval history; the remainder of the 14 systems are " negative    Past Medical History:  Past Medical History:   Diagnosis Date     Hyperlipidemia      Hypertension        Past Surgical History:  Past Surgical History:   Procedure Laterality Date     HERNIA REPAIR, INGUINAL RT/LT      needed post-op intestinal repair     ORIF left clavical         Social History:  Social History     Socioeconomic History     Marital status: Single     Spouse name: None     Number of children: None     Years of education: None     Highest education level: None   Occupational History     Occupation: retired   Social Needs     Financial resource strain: None     Food insecurity     Worry: None     Inability: None     Transportation needs     Medical: None     Non-medical: None   Tobacco Use     Smoking status: Former Smoker     Packs/day: 0.10     Years: 30.00     Pack years: 3.00     Types: Cigarettes     Smokeless tobacco: Never Used   Substance and Sexual Activity     Alcohol use: Yes     Alcohol/week: 17.5 standard drinks     Types: 21 drink(s) per week     Drug use: None     Sexual activity: None   Lifestyle     Physical activity     Days per week: None     Minutes per session: None     Stress: None   Relationships     Social connections     Talks on phone: None     Gets together: None     Attends [employer] service: None     Active member of club or organization: None     Attends meetings of clubs or organizations: None     Relationship status: None     Intimate partner violence     Fear of current or ex partner: None     Emotionally abused: None     Physically abused: None     Forced sexual activity: None   Other Topics Concern      Service Not Asked     Blood Transfusions Not Asked     Caffeine Concern Not Asked     Occupational Exposure Not Asked     Hobby Hazards Not Asked     Sleep Concern Not Asked     Stress Concern Not Asked     Weight Concern Not Asked     Special Diet Not Asked     Back Care Not Asked     Exercise Yes     Comment: bikes daily     Bike Helmet Not  Asked     Seat Belt Not Asked     Self-Exams Not Asked   Social History Narrative     None        Family History  Family History   Problem Relation Age of Onset     C.A.D. Father          MI age 69     Hypertension Sister         obese     Family History Negative Brother        Outpatient Medications:  No current facility-administered medications on file prior to encounter.        acyclovir (ZOVIRAX) 400 MG tablet, Take 1 tablet (400 mg) by mouth 2 times daily Viral Prophylaxis.       aspirin (ASA) 325 MG tablet, Take 1 tablet (325 mg) by mouth daily       calcium carbonate 500 mg, elemental, (OSCAL) 500 MG tablet, Take 1 tablet (500 mg) by mouth 3 times daily (with meals) (Patient taking differently: Take 500 mg by mouth 2 times daily )       cholecalciferol (VITAMIN D3) 25 mcg (1000 units) capsule, Take 1 capsule by mouth daily       gabapentin (NEURONTIN) 100 MG capsule, Take 1 capsule (100 mg) by mouth 3 times daily       hydrochlorothiazide (MICROZIDE) 12.5 MG capsule, Take 12.5 mg by mouth daily       ibuprofen (ADVIL/MOTRIN) 600 MG tablet, Take 1 tablet (600 mg) by mouth every 6 hours       LENalidomide (REVLIMID) 25 MG CAPS capsule, Take 1 capsule (25 mg) by mouth daily for 14 days Days 1 through 14.       levofloxacin (LEVAQUIN) 250 MG tablet, Take 1 tablet (250 mg) by mouth daily       lisinopril (ZESTRIL) 20 MG tablet, Take 1 tablet (20 mg) by mouth daily       methocarbamol (ROBAXIN) 500 MG tablet, Take 1 tablet (500 mg) by mouth 4 times daily       omeprazole (PRILOSEC) 10 MG DR capsule, Take 10 mg by mouth daily       ondansetron (ZOFRAN-ODT) 4 MG ODT tab, Take 1 tablet (4 mg) by mouth every 6 hours as needed for nausea or vomiting       oxyCODONE (ROXICODONE) 5 MG tablet, Take 1 tablet (5 mg) by mouth every 6 hours as needed for severe pain       polyethylene glycol (MIRALAX) 17 g packet, Take 17 g by mouth daily       senna-docusate (SENOKOT-S/PERICOLACE) 8.6-50 MG tablet, Take 1 tablet by mouth  "daily as needed        acetaminophen (TYLENOL) 325 MG tablet, Take 3 tablets (975 mg) by mouth 3 times daily       dexamethasone (DECADRON) 4 MG tablet, Take 10 tablets (40 mg) by mouth every 7 days for 3 doses Days 1, 8, and 15.       LORazepam (ATIVAN) 0.5 MG tablet, Take 1 tablet (0.5 mg) by mouth every 4 hours as needed (Anxiety, Nausea/Vomiting or Sleep) (Patient not taking: Reported on 11/26/2020)       prochlorperazine (COMPAZINE) 10 MG tablet, Take 1 tablet (10 mg) by mouth every 6 hours as needed (Nausea/Vomiting) (Patient not taking: Reported on 11/26/2020)      Physical Exam:    BP (!) 141/74 (BP Location: Right arm)   Pulse 77   Temp 97.5  F (36.4  C) (Oral)   Resp 18   Ht 1.549 m (5' 1\")   Wt 67.3 kg (148 lb 5.9 oz)   SpO2 95%   BMI 28.03 kg/m    Gen: Well appearing, in NAD  CV: Normal rate, regular rhythm. No m/r/g  Pulm: CTABL  Abd: Soft, nt/nd  Ext: No lower extremity edema  Skin: maculopapular rash over the upper chest and back bilaterally, improving; non-tender  Neuro: Alert and answering questions appropriately.     Labs & Studies: I personally reviewed the following studies:  ROUTINE LABS (Last four results):  [facility]  Recent Labs   Lab 12/02/20  0702 12/01/20  2206 12/01/20  1943 12/01/20  0554 12/01/20  0448 11/30/20  0657 11/29/20  0730 11/29/20  0730 11/26/20 2006 11/26/20 2006     --   --   --  138 135  --  139   < > 136   POTASSIUM 4.2  --   --   --  4.4 3.9  --  4.0   < > 3.5   CHLORIDE 109  --   --   --  114* 110*  --  113*   < > 107   CO2 23  --   --   --  21 22  --  22   < > 22   ANIONGAP 6  --   --   --  3 4  --  4   < > 7   GLC 81  --   --   --  101* 92  --  89   < > 106*   BUN 12  --   --   --  12 15  --  11   < > 44*   CR 1.06  --   --   --  0.94 1.05  --  0.95   < > 1.59*   GFRESTIMATED 69  --   --   --  80 70  --  79   < > 42*   GFRESTBLACK 80  --   --   --  >90 81  --  >90   < > 49*   MIRI 8.1*  --   --   --  7.7* 7.6*  --  7.8*   < > 8.9   MAG 2.1  --   --   --  1.9 " 2.1  --  2.0   < >  --    PHOS 2.7 2.3* 2.4* 1.7* 2.0* 2.7   < > 3.0   < >  --    PROTTOTAL  --   --   --   --   --   --   --  7.1  --  9.0*   ALBUMIN  --   --   --   --   --   --   --  2.0*  --  2.7*   BILITOTAL  --   --   --   --   --   --   --  0.2  --  0.3   ALKPHOS  --   --   --   --   --   --   --  70  --  83   AST  --   --   --   --   --   --   --  18  --  4   ALT  --   --   --   --   --   --   --  22  --  22    < > = values in this interval not displayed.     CBC  Recent Labs   Lab 12/02/20  0702 12/01/20  1019 11/30/20  1846 11/30/20  0657   WBC 6.5 11.7* 18.6* 17.5*   RBC 2.52* 2.46* 2.66* 2.26*   HGB 8.4* 8.3* 8.9* 7.7*   HCT 26.5* 26.6* 27.6* 24.0*   * 108* 104* 106*   MCH 33.3* 33.7* 33.5* 34.1*   MCHC 31.7 31.2* 32.2 32.1   RDW 15.3* 15.6* 15.3* 15.3*   * 144* 141* 132*     INRNo lab results found in last 7 days.

## 2020-12-04 ENCOUNTER — APPOINTMENT (OUTPATIENT)
Dept: OCCUPATIONAL THERAPY | Facility: CLINIC | Age: 75
DRG: 542 | End: 2020-12-04
Payer: COMMERCIAL

## 2020-12-04 ENCOUNTER — APPOINTMENT (OUTPATIENT)
Dept: PHYSICAL THERAPY | Facility: CLINIC | Age: 75
DRG: 542 | End: 2020-12-04
Payer: COMMERCIAL

## 2020-12-04 VITALS
SYSTOLIC BLOOD PRESSURE: 142 MMHG | HEART RATE: 86 BPM | HEIGHT: 61 IN | TEMPERATURE: 98.6 F | DIASTOLIC BLOOD PRESSURE: 77 MMHG | WEIGHT: 148.37 LBS | OXYGEN SATURATION: 96 % | BODY MASS INDEX: 28.01 KG/M2 | RESPIRATION RATE: 16 BRPM

## 2020-12-04 LAB
ANION GAP SERPL CALCULATED.3IONS-SCNC: 6 MMOL/L (ref 3–14)
BUN SERPL-MCNC: 15 MG/DL (ref 7–30)
CALCIUM SERPL-MCNC: 8.4 MG/DL (ref 8.5–10.1)
CHLORIDE SERPL-SCNC: 110 MMOL/L (ref 94–109)
CMV DNA SPEC NAA+PROBE-ACNC: NORMAL [IU]/ML
CMV DNA SPEC NAA+PROBE-LOG#: NORMAL {LOG_IU}/ML
CO2 SERPL-SCNC: 22 MMOL/L (ref 20–32)
CREAT SERPL-MCNC: 0.91 MG/DL (ref 0.66–1.25)
GFR SERPL CREATININE-BSD FRML MDRD: 83 ML/MIN/{1.73_M2}
GLUCOSE BLDC GLUCOMTR-MCNC: 124 MG/DL (ref 70–99)
GLUCOSE SERPL-MCNC: 101 MG/DL (ref 70–99)
HHV6 DNA # SPEC NAA+PROBE: NORMAL COPIES/ML
HHV6 DNA SPEC NAA+PROBE-LOG#: NORMAL LOG COPIES/ML
PHOSPHATE SERPL-MCNC: 2.1 MG/DL (ref 2.5–4.5)
PHOSPHATE SERPL-MCNC: 2.4 MG/DL (ref 2.5–4.5)
POTASSIUM SERPL-SCNC: 3.8 MMOL/L (ref 3.4–5.3)
SODIUM SERPL-SCNC: 139 MMOL/L (ref 133–144)
SPECIMEN SOURCE: NORMAL
SPECIMEN SOURCE: NORMAL

## 2020-12-04 PROCEDURE — 250N000013 HC RX MED GY IP 250 OP 250 PS 637: Performed by: PHYSICIAN ASSISTANT

## 2020-12-04 PROCEDURE — 36415 COLL VENOUS BLD VENIPUNCTURE: CPT | Performed by: SURGERY

## 2020-12-04 PROCEDURE — 97530 THERAPEUTIC ACTIVITIES: CPT | Mod: GO | Performed by: OCCUPATIONAL THERAPIST

## 2020-12-04 PROCEDURE — 999N001017 HC STATISTIC GLUCOSE BY METER IP

## 2020-12-04 PROCEDURE — 84100 ASSAY OF PHOSPHORUS: CPT | Performed by: SURGERY

## 2020-12-04 PROCEDURE — 80048 BASIC METABOLIC PNL TOTAL CA: CPT | Performed by: SURGERY

## 2020-12-04 PROCEDURE — 99233 SBSQ HOSP IP/OBS HIGH 50: CPT | Mod: GC | Performed by: INTERNAL MEDICINE

## 2020-12-04 PROCEDURE — 250N000011 HC RX IP 250 OP 636: Performed by: STUDENT IN AN ORGANIZED HEALTH CARE EDUCATION/TRAINING PROGRAM

## 2020-12-04 PROCEDURE — 250N000009 HC RX 250: Performed by: SURGERY

## 2020-12-04 PROCEDURE — 99239 HOSP IP/OBS DSCHRG MGMT >30: CPT | Performed by: NURSE PRACTITIONER

## 2020-12-04 PROCEDURE — 258N000003 HC RX IP 258 OP 636: Performed by: SURGERY

## 2020-12-04 PROCEDURE — 97116 GAIT TRAINING THERAPY: CPT | Mod: GP

## 2020-12-04 PROCEDURE — 97750 PHYSICAL PERFORMANCE TEST: CPT | Mod: GP

## 2020-12-04 PROCEDURE — 250N000013 HC RX MED GY IP 250 OP 250 PS 637: Performed by: SURGERY

## 2020-12-04 PROCEDURE — 250N000013 HC RX MED GY IP 250 OP 250 PS 637: Performed by: NURSE PRACTITIONER

## 2020-12-04 PROCEDURE — 97530 THERAPEUTIC ACTIVITIES: CPT | Mod: GP

## 2020-12-04 PROCEDURE — 84132 ASSAY OF SERUM POTASSIUM: CPT | Performed by: SURGERY

## 2020-12-04 PROCEDURE — 258N000003 HC RX IP 258 OP 636

## 2020-12-04 PROCEDURE — 97535 SELF CARE MNGMENT TRAINING: CPT | Mod: GO | Performed by: OCCUPATIONAL THERAPIST

## 2020-12-04 RX ORDER — GABAPENTIN 300 MG/1
300 CAPSULE ORAL 3 TIMES DAILY
Qty: 84 CAPSULE | Refills: 0 | Status: SHIPPED | OUTPATIENT
Start: 2020-12-04 | End: 2021-01-08

## 2020-12-04 RX ORDER — OXYCODONE HYDROCHLORIDE 5 MG/1
5 TABLET ORAL EVERY 6 HOURS PRN
Qty: 30 TABLET | Refills: 0 | Status: SHIPPED | OUTPATIENT
Start: 2020-12-04 | End: 2020-12-31

## 2020-12-04 RX ORDER — TRIAMCINOLONE ACETONIDE 1 MG/G
OINTMENT TOPICAL 2 TIMES DAILY
Qty: 15 G | Refills: 0 | Status: SHIPPED | OUTPATIENT
Start: 2020-12-04

## 2020-12-04 RX ORDER — LIDOCAINE 4 G/G
1-3 PATCH TOPICAL EVERY 24 HOURS
Qty: 30 PATCH | Refills: 1 | Status: SHIPPED | OUTPATIENT
Start: 2020-12-05 | End: 2021-05-24

## 2020-12-04 RX ADMIN — HYDROCHLOROTHIAZIDE 12.5 MG: 12.5 CAPSULE, GELATIN COATED ORAL at 07:55

## 2020-12-04 RX ADMIN — POTASSIUM & SODIUM PHOSPHATES POWDER PACK 280-160-250 MG 1 PACKET: 280-160-250 PACK at 13:08

## 2020-12-04 RX ADMIN — ACETAMINOPHEN 975 MG: 325 TABLET, FILM COATED ORAL at 07:54

## 2020-12-04 RX ADMIN — OXYCODONE HYDROCHLORIDE 10 MG: 5 TABLET ORAL at 04:25

## 2020-12-04 RX ADMIN — CALCIUM 500 MG: 500 TABLET ORAL at 12:26

## 2020-12-04 RX ADMIN — ACETAMINOPHEN 975 MG: 325 TABLET, FILM COATED ORAL at 00:17

## 2020-12-04 RX ADMIN — LISINOPRIL 20 MG: 20 TABLET ORAL at 07:56

## 2020-12-04 RX ADMIN — OXYCODONE HYDROCHLORIDE 5 MG: 5 TABLET ORAL at 08:22

## 2020-12-04 RX ADMIN — SODIUM CHLORIDE: 9 INJECTION, SOLUTION INTRAVENOUS at 00:23

## 2020-12-04 RX ADMIN — GABAPENTIN 300 MG: 300 CAPSULE ORAL at 13:08

## 2020-12-04 RX ADMIN — ACYCLOVIR 400 MG: 400 TABLET ORAL at 07:56

## 2020-12-04 RX ADMIN — Medication 25 MCG: at 07:56

## 2020-12-04 RX ADMIN — CALCIUM 500 MG: 500 TABLET ORAL at 07:56

## 2020-12-04 RX ADMIN — AMOXICILLIN AND CLAVULANATE POTASSIUM 1 TABLET: 875; 125 TABLET, FILM COATED ORAL at 07:56

## 2020-12-04 RX ADMIN — ENOXAPARIN SODIUM 30 MG: 30 INJECTION SUBCUTANEOUS at 07:59

## 2020-12-04 RX ADMIN — OXYCODONE HYDROCHLORIDE 5 MG: 5 TABLET ORAL at 13:08

## 2020-12-04 RX ADMIN — LIDOCAINE 1 PATCH: 560 PATCH PERCUTANEOUS; TOPICAL; TRANSDERMAL at 12:26

## 2020-12-04 RX ADMIN — OMEPRAZOLE 10 MG: 10 CAPSULE, DELAYED RELEASE ORAL at 07:55

## 2020-12-04 RX ADMIN — TRIAMCINOLONE ACETONIDE: 1 OINTMENT TOPICAL at 07:58

## 2020-12-04 RX ADMIN — METHOCARBAMOL 500 MG: 500 TABLET, FILM COATED ORAL at 07:55

## 2020-12-04 RX ADMIN — GABAPENTIN 300 MG: 300 CAPSULE ORAL at 07:55

## 2020-12-04 RX ADMIN — METHOCARBAMOL 500 MG: 500 TABLET, FILM COATED ORAL at 13:08

## 2020-12-04 ASSESSMENT — ACTIVITIES OF DAILY LIVING (ADL)
ADLS_ACUITY_SCORE: 17

## 2020-12-04 NOTE — PROGRESS NOTES
12/04/20 0800   Signing Clinician's Name / Credentials   Signing clinician's name / credentials WILY Peterson   Dynamic Gait Index (Ck and Watts Isabela, 1995)   Gait Level Surface 2   Change in Gait Speed 3   Gait and Horizontal Head Turns 3   Gait with Vertical Head Turns 3   Gait and Pivot Turns 3   Step Over Obstacle 2   Step Around Obstacles 3   Steps 1   Total Dynamic Gait Index Score  (A score of 19 or less has been correlated to an increased risk of falls in community dwelling older adults, patients with vestibular disorders, and patients with MS.)   Total Score (out of 24) 20     Dynamic Gait Index (DGI):The DGI is a measure of balance during gait that is reliable and valid for the elderly and individuals with Parkinson's disease, MS, vestibular disorders, or s/p stroke. Gait assistive device used: None    Patient score: 20/24  Scores ?19/24 indicate an increased risk for falls according to Priit et al 2000  Minimal Detectable Change = 2.9 in community dwelling elderly according to Jesus et al 2011    Assessment (rationale for performing, application to patient s function & care plan): Pt provided DGI assessment to access pt safety for home discharge. Pt demos minor balance deficits, demos need for continued skilled PT. However pt has no LOB with sudden pivots, head turns, or change in speed. Pt does report fatigue without FWW, but was able to finish assessment without issue. Pt is a low falls risk.

## 2020-12-04 NOTE — PROGRESS NOTES
"Rogelio Marshall is a 75 year old male who is being evaluated via a billable telephone visit.      The patient has been notified of following:     \"This telephone visit will be conducted via a call between you and your physician/provider. We have found that certain health care needs can be provided without the need for a physical exam.  This service lets us provide the care you need with a short phone conversation.  If a prescription is necessary we can send it directly to your pharmacy.  If lab work is needed we can place an order for that and you can then stop by our lab to have the test done at a later time.    Telephone visits are billed at different rates depending on your insurance coverage. During this emergency period, for some insurers they may be billed the same as an in-person visit.  Please reach out to your insurance provider with any questions.    If during the course of the call the physician/provider feels a telephone visit is not appropriate, you will not be charged for this service.\"    Patient has given verbal consent for Telephone visit?  Yes    What phone number would you like to be contacted at? [phone]    How would you like to obtain your AVS? Eddie Donahue [facility]    Phone call duration: 29 minutes    Angela Sweeney MD      Oncologic Hx:   -compression fractures and bone scan reveals multiple lytic lesions throughout his appendicular and axial skeleton. His Beta 2 microglobulin was 3.6 on 10/10/2020. Kappa chains were 73.6 on 10/5/2020 with K/L ratio of 89.9. M spike of 16.2 in urine on 10/10. Bone marrow biopsy on 10/23/2020 showed trilineage hematopoiesis and 50-60% kappa restricted plasma cells. Flow cytometry showed 20 % plasma cells which express CD19, CD38, CD45 and monotypic cytoplasmic kappa immunoglobulin light chains but lack CD20 and CD56.  FISH shows IGH-CCND1 fusion (81%; 30% had loss of IGH component from one fusion signal).      - Started 21 day cycle VRD " 11/2020  - 11/27 he had a fall with rib fractures.       Interval Hx: Rogelio had a ground level fall at home due to orthostatic hypotension on 11/27 followed by a hospital admission for right sided rib fractures. He then developed post rib fracture pneumonia. During his time inpatient he did not take lenalidomide but did get velcade. He remembers feeling light headed when he stood up in the bathroom and feels it was related to his lenalidomide. No numbness or tingling in the hands or feet. Since leaving the hospital he has no further incidence of feeling lightheaded.    A comprehensive review of systems was completed and negative except as described above.     Pertinent PMH reviewed:   HTN    Physical Exam:   Neuro: conversant, normal speech pattern  Psych: appropriate mood and affect    The rest of a comprehensive physical examination is deferred due to Public Health Emergency telephone visit restrictions      Pertinent Data Summarized:  ANC 3.2, Hgb 8.7, Plt 658    Assessment and Plan  76 yo man with standard risk multiple myeloma. He is on 21 day cycle VRD due to concerns about his ability to tolerate the medicine. Unfortunately he has had difficulty with hypotension and a fall resulting in rib fractures. I will change him to madyson- velcade-dex and see if this is more tolerable for him. We discussed that daratumumab will be a subcutaneous injection and that risks included lowered blood counts and risk of infection.    Continue levofloxacin and acyclovir. Stop ASA as not taking lenalidomide any more.     He also is at high risk for more compression fractures. Vitamin D is replete and he is on a daily supplement as well as calcium. We discussed that side effects of zometa include bone pains, hypocalcemia, and osteonecrosis of the jaw.  We discussed that I really want to get him started on this but he has not yet seen dentistry. A referral was placed today.    Thrombophilia: He has had slightly low platelets in the  past. Today they are 658. Its not clear what could be causing this but I will recheck them in a week to see if this is ongoing.

## 2020-12-04 NOTE — PROGRESS NOTES
GENERAL ID SERVICE CONSULTATION     Patient:  Rogelio Marshall   Date of birth 1945, Medical record number [id number]  Date of Visit:  12/03/2020  Date of Admission: 11/26/2020  Consult Requester:Hill Donnelly MD          Assessment and Recommendations:   ASSESSMENT:  1. Post rib fracture pneumonia    DISCUSSION:   Rogelio Marshall is a  76 y/o male with PMHx of HLD, HTN, multiple myeloma on chemotherapy,  thoracic and lumbar vertebral compression since 9/11/20, admitted on 11/26 for pain management after sustaining multiple rib fractures from a fall. Admission complicated by sepsis (fever, leukocytosis, tachycardia) on day 3 of admission with elevated procal of 6.6. BCx negative to date.  In the setting of rib fractures and atelectasis, he likely developed pneumonia and is currently improving on Zosyn.    RECOMMENDATION:  1. Plan for total of 7 days of antibiotics 11/30-12/6, IV Zosyn while inpatient. May transition to Augmentin 875mg BID PO to complete 7 day course if discharging before completion.  2. No need for ID specific follow up    Thank you for this consult. ID will sign off at this time. Please contact [place] ID service if issues arise.    Patient discussed with ID staff, Dr. Perico Gutierrez MD  Internal Medicine and Pediatrics  Adult Infectious Disease Fellow    ID Staff Assessment and Plan:   Patient was seen and examined by me with the ID Fellow. The note above reflects our joint assessment and recommendations. I have reviewed the medical record, labs, imaging, vitals signs and have discussed the patient with the ID fellow in detail.   Faye River MD  ID staff physician  Pager [phone]    ________________________________________________________________    Consult Question:  Admission Diagnosis: Closed fracture of multiple ribs of right side, initial encounter [S22.41XA]  Syncope, unspecified syncope type [R55]  Closed fracture of thoracic vertebra, unspecified fracture morphology, unspecified  thoracic vertebral level, initial encounter (H) [S22.009A]         History of Present Illness:   Improving daily. Nursing notes reviewed, no acute events over night. Spinal block removed this AM and pain manageable. Tolerating IS. No fever, chills or worsening cough.         Review of Systems:   4 point review of systems negative unless noted in HPI above.          Past Medical History:     Past Medical History:   Diagnosis Date     Hyperlipidemia      Hypertension             Past Surgical History:     Past Surgical History:   Procedure Laterality Date     HERNIA REPAIR, INGUINAL RT/LT      needed post-op intestinal repair     ORIF left clavical              Family History:   Reviewed and non-contributory.   Family History   Problem Relation Age of Onset     C.A.D. Father          MI age 69     Hypertension Sister         obese     Family History Negative Brother             Social History:     Social History     Tobacco Use     Smoking status: Former Smoker     Packs/day: 0.10     Years: 30.00     Pack years: 3.00     Types: Cigarettes     Smokeless tobacco: Never Used   Substance Use Topics     Alcohol use: Yes     Alcohol/week: 17.5 standard drinks     Types: 21 drink(s) per week     History   Sexual Activity     Sexual activity: Not on file            Current Medications:       acetaminophen  975 mg Oral Q8H     acyclovir  400 mg Oral BID     amoxicillin-clavulanate  1 tablet Oral Q12H [facility]     calcium carbonate 500 mg (elemental)  500 mg Oral TID w/meals     enoxaparin ANTICOAGULANT  30 mg Subcutaneous Q12H     gabapentin  300 mg Oral TID     hydrochlorothiazide  12.5 mg Oral Daily     HYDROmorphone  0.3 mg Intravenous Once     [Held by provider] levofloxacin  250 mg Oral Daily     Lidocaine  1-3 patch Transdermal Q24H     lidocaine   Transdermal Q8H     lisinopril  20 mg Oral Daily     methocarbamol  500 mg Oral 4x Daily     omeprazole  10 mg Oral Daily     polyethylene glycol  17 g Oral Daily      potassium & sodium phosphates  1 packet Oral 4x Daily     senna-docusate  1-2 tablet Oral BID     triamcinolone   Topical BID     Vitamin D3  25 mcg Oral Daily            Allergies:     Allergies   Allergen Reactions     Other Drug Allergy (See Comments)      tobasco sauce ---caused red spots             Physical Exam:   Vitals were reviewed  Patient Vitals for the past 24 hrs:   BP Temp Temp src Pulse Resp SpO2   12/03/20 1703 (!) 149/77 97.7  F (36.5  C) Oral 78 18 97 %   12/03/20 1231 114/70 96.6  F (35.9  C) Oral 74 18 97 %   12/03/20 1017 138/72 -- -- 93 -- 97 %   12/03/20 0953 (!) 144/69 -- -- 72 -- 97 %   12/03/20 0746 (!) 141/73 97.2  F (36.2  C) Oral 73 18 97 %   12/03/20 0333 (!) 141/74 97.5  F (36.4  C) Oral 77 18 95 %   12/02/20 2216 135/76 98  F (36.7  C) Oral 75 18 95 %   12/02/20 2024 -- -- -- -- -- 96 %   12/02/20 2003 (!) 141/68 96.1  F (35.6  C) Oral 87 18 96 %       Physical Examination:  GENERAL: well appearing, sitting in chair, no acute distress.   HEENT:  Head is normocephalic, atraumatic. Conjunctiva clear. JAI, mmm     NECK:  Supple. No cervical lymphadenopathy  LUNGS:  Breathing is unlabored on room air. Lungs clear to to auscultation bilaterally, right anterior crackles, no rhonchi.  CARDIOVASCULAR:  Regular rate and rhythm with no murmurs, gallops or rubs.  ABDOMEN:  Normal bowel sounds, soft, nontender.   SKIN:  No acute rashes. Catheter on back, with dressing. Clean, dry, intact.  NEUROLOGIC:  Grossly nonfocal. Active x4 extremities         Laboratory Data:     Inflammatory Markers    Recent Labs   Lab Test 10/09/20  1405 10/05/20  1500 12/13/12  1242   SED 15 17  --    CRP 28.0* 23.3* 4.1       Hematology Studies    Recent Labs   Lab Test 12/03/20  0848 12/02/20  0702 12/01/20  1019 11/30/20  1846 11/30/20  0657 11/29/20  2246 11/29/20  0730 11/26/20  2006 11/26/20  0917 11/19/20  1536   WBC 7.1 6.5 11.7* 18.6* 17.5* 18.6* 8.8 5.6 10.5 8.1   ANEU  --   --  8.4*  --  15.5*  --  5.6  4.8 7.7 5.1   AEOS  --   --  0.8*  --  0.6  --  1.0* 0.2 0.1 0.2   HGB 9.1* 8.4* 8.3* 8.9* 7.7* 7.9* 8.7* 9.3* 9.5* 9.0*   * 105* 108* 104* 106* 106* 107* 106* 104* 103*    149* 144* 141* 132* 147* 145* 239 266 360       Metabolic Studies     Recent Labs   Lab Test 12/03/20  0645 12/02/20  0702 12/01/20  0448 11/30/20  0657 11/29/20  0730    137 138 135 139   POTASSIUM 4.2 4.2 4.4 3.9 4.0   CHLORIDE 110* 109 114* 110* 113*   CO2 24 23 21 22 22   BUN 17 12 12 15 11   CR 1.22 1.06 0.94 1.05 0.95   GFRESTIMATED 58* 69 80 70 79       Hepatic Studies    Recent Labs   Lab Test 11/29/20  0730 11/26/20  2006 11/19/20  1536 11/14/20  1137 11/04/20  1436 10/11/20  0708   BILITOTAL 0.2 0.3 0.4 0.4 <0.4 0.4   ALKPHOS 70 83 78 84 114 70   ALBUMIN 2.0* 2.7* 3.0* 3.2* 3.2* 2.2*   AST 18 4 15 17 22 19   ALT 22 22 14 16 <21 12       Microbiology:  Culture Micro   Date Value Ref Range Status   12/02/2020 No growth after 1 day  Preliminary   12/02/2020 No growth after 1 day  Preliminary   11/30/2020 No growth after 3 days  Preliminary   11/30/2020 No growth after 3 days  Preliminary   11/30/2020 No growth after 3 days  Preliminary   11/30/2020 No growth after 3 days  Preliminary   11/30/2020 No growth after 3 days  Preliminary   11/30/2020 No growth after 3 days  Preliminary   11/29/2020 No growth after 4 days  Preliminary   11/29/2020 No growth after 4 days  Preliminary       Urine Studies    Recent Labs   Lab Test 11/29/20  2140 10/09/20  1744   LEUKEST Negative Negative   WBCU 1 3       Vancomycin Levels  No lab results found.    Invalid input(s): VANCO    Hepatitis B Testing No lab results found.  Hepatitis C Testing   No results found for: HCVAB, HQTG, HCGENO, HCPCR, HQTRNA, HEPRNA  Respiratory Virus Testing    No results found for: RS, FLUAG     Imaging    CT THORACIC SPINE W/O CONTRAST, CT LUMBAR SPINE W/O CONTRAST 9/18/2020  1. Extensively osteolytic appearance of the spine, pelvis, and ribs  with largest  lesion in the left sacral ala, highly suspicious for  multiple myeloma.  2. Multiple pathologic thoracic and lumbar vertebral compression  deformities including a mild L2 superior endplate compression  deformity that is new since 9/11/2020.  3. Multilevel thoracic and lumbar spondylosis, greatest at L5-S1 where  there is severe bilateral neural foraminal stenosis.     XR BONE SURVEY COMPLETE  IMPRESSION: Extensive lytic lesions throughout the axial and  appendicular skeleton compatible with multiple myeloma. Multiple  thoracic and lumbar compression deformities better seen on recent CT.    CXR 11/26  Impression:   1. Newly visualized mildly displaced fractures of the lateral right  fourth and fifth ribs with suspected additional mildly displaced  fracture of the posterior right fourth rib.  2. Grossly stable compression deformities of the T10 and T12 vertebral  bodies.  3. Abnormal osteolytic appearance of the bones is better demonstrated  on recent thoracic spine CT.  4. Minimal left basilar opacities likely represent atelectasis.    CXR 11/26  Impression:   1.  No pneumothorax. Bibasilar streaky opacities may represent  atelectasis.  2.  Right upper rib fractures better evaluated on recent CT scan.    CXR 11/29  Impression:   1.  No pneumothorax. Right rib fractures.   2.  Unchanged left lower lobe atelectasis and possible trace left  pleural effusion.    CXR 11/29  IMPRESSION: Continued left lower lung subsegmental atelectasis. No  acute interval changes. Possible trace left pleural effusion.    CXR 11/30  IMPRESSION: Stable rib fractures. Patchy opacities possibly  representing atelectasis or pulmonary contusion.    12/01 CXR   IMPRESSION:   1. Stable right-sided rib fractures ._  2. Bilateral small pleural effusions with associated left sided  atelectasis  3. Hyperinflation lungs with upper lobe predominant interstitial  changes.  4. Lower thoracic compression fractures, better seen on previous CT of  the  chest.

## 2020-12-04 NOTE — PROGRESS NOTES
Vitals:    12/03/20 2223 12/04/20 0747 12/04/20 0847 12/04/20 1210   BP: (!) 147/76 (!) 147/77 (!) 151/81 (!) 142/77   BP Location: Right arm Right arm Right arm Right arm   Pulse: 77 84 85 86   Resp: 18 17  16   Temp: 97.2  F (36.2  C) 97.3  F (36.3  C)  98.6  F (37  C)   TempSrc: Oral Oral  Oral   SpO2: 97% 97% 95% 96%   Weight:       Height:         Pt discharged home after hospital stay with rib fractures/spinal fractures. Pt up and ambulated to [facility] and did well/cleared by PT for discharge home. Discharge meds to discharge pharmacy. IV sites discontinued. Phos was low this morning and pt took oral replacement per orders, MD aware. Belongings sent home with patient. Discharge instructions went over with patient. Signed AVS copy in patient chart. Pt to follow up as outpatient, and with PCP. Pt discharged on lovenox, pt educated on administration with teach back, pt appears understanding and with administration locations, risks/adverse reactions.

## 2020-12-04 NOTE — PROGRESS NOTES
Hematology Consult Note   Date of Service: 12/04/2020    Patient: Rogelio Marshall  MRN: [id number]  Admission Date: 11/26/2020  Hospital Day # 8   Primary Outpatient Hematologist: Dr. Sweeney    Reason for Consult: multiple myeloma    Assessment & Plan:   76 y/o M w/ hx of HTN, HLD and multiple myeloma, s/p C1D8 Valcade on 11/26/2020 presents to the [facility] ED after a fall, resulting in right upper thoracic pain, from new right third through fifth rib fractures. He has multiple stable compression fracture including T1, T5, T10 and T12, L2 and L3.    # Multiple myeloma  Regarding MM treatment, Revlimid is known to have central nervous system side effects that include: fatigue (11% to 34%), dizziness (20%), and headache (9% to 20%). His chemo regimen could have contributed to his weakness/fall. Recommend holding the Revlimid and reassessing his treatment plan as an outpatient.  - primary team planning to discharge today to home versus TCU  - chemo plan:   - s/p Velcade 2.1 mg sub-cu, days 1, 4, 8, 11 (complete)   - Revlimid 25 mg daily for 14 days on, 7 days off. Hold this for now   - Dexamethasone 40 mg days 1, 8, 15 (compeleted on 12/3)  - Will not have any chemo needs in the near future if he discharges to TCU; has appt with primary oncologist for 12/9 to discuss the plan further    # Fever with leukocytosis; resolved  # Maculopapular rash (probable drug eruption)  Fever with temp as high as ~ 103F, and leukocytosis. Procal elevated > 6 and briefly required supplemental O2 while sleeping. WBC trended down and defervesced on cefepime and vanc -- developed a maculopapular rash and was switched from cefepime to meropenem by the primary team and the rash began improving. Vancomycin was d/c'ed after MRSA swab returned negative. No obvious infectious source (clean UA, CT chest w/o PNA). No growth on blood cultures. CT chest showed no PE (high clotting risk due to malignancy), but has pleural effusions and  "atelectasis.  - defer antibiotic management to ID and primary team (plan to transition to oral Augmentin at discharge to treat for aspiration pneumonia)   - posaconazole was stopped -- he does not require fungal ppx/treatment  - recommend continuing acyclovir ppx    # Fall  Based on the history patient is likely has orthostatic hypotension/dehydration from poor po intake. He is tolerating ambulation, but still gets dizzy/\"queezy.\" He had CT chest upon admission which did not show acute change in known compression fractures (see above).  - Pain team had been doing nerve block into erector spinae with bupivocaine -- d/jeni on 12/3    # Chemotherapy-induced nausea/vomiting  Patient is instructed to take Zofran prior to his chemo day and use it PRN; avoid Compazine considering risk for falling. He is instructed to bring his medication to clinic next time to discuss further with primary outpatient oncologist.    # HALLIE, resolved  Admitted after fall and had HALLIE which has improved with IV fluids.    Recommendations:   - okay to discharge from hem/onc perspective    Patient is seen and discussed with Dr. Cordova, who agrees with the plan.     Moise Valdez MD  PGY-2, Internal Medicine  [facility]    ---------------------------    Interval History:   No acute events overnight. Eating adequately. No nausea or vomiting. No feelings of lightheadedness. Not having excessive back pain. Ambulating okay with PT, and is hoping to discharge home instead of TCU today. Comprehensive review of systems otherwise negative.    Initial HPI:    \"76 y/o M w/ hx of HTN, HLD and multiple myeloma, s/p C1D8 Valcade on 11/26/2020 who presents to the [facility] ED after a fall, resulting in right upper thoracic pain. Patient lives alone. Poor appetite and had not been eating/drinking well. He has some nausea and took 1 Zofran then vomited it out. He had a fainting feeling but did not lose consciousness.  He went to the bathroom because he did not " "feel well with a nauseous feeling. He noticed that he did not have the strength to get up from his position leaning over the toilet and he fell backward onto his back. He denies any fever, chills, no bowel/urine problem.    He was noticed to be hypotensive with BP as low as 62/41, improving with NS bolus. He is on IVF and feels better.     He is admitted to trauma service with CT findings of lucid bone lesion throughout the whole spine.   C spine- No overt acute fracture or subluxation.  Thoracic spine: T1, T5, T10 and T12 compression fracture, and right third through fifth rib fractures.   Lumbar spine: moderate chronic L2 and marked chronic L3 compressions.\"    Hematologic History:  This patient is a 75 year old man with lower back pain over a year. He has known compression fractures and bone scan reveals multiple lytic lesions throughout his appendicular and axial skeleton.    - His Beta 2 microglobulin was 3.6 on 10/10/2020.    - Kappa chains were 73.6 on 10/5/2020 with K/L ratio of 89.9. M spike of 16.2 in urine on 10/10.    - Bone marrow biopsy on 10/23/2020 showed trilineage hematopoiesis and 50-60% kappa restricted plasma cells.    - Flow cytometry showed 20 % plasma cells which express CD19, CD38, CD45 and monotypic cytoplasmic kappa immunoglobulin light chains but lack CD20 and CD56. Cytogenetics pending.   - FISH shows IGH-CCND1 fusion (81%; 30% had loss of IGH component from one fusion signal).      He was started on VRD:   - Velcade 2.1mg days 1, 4, 8, 11   - Revlimid 25 mg daily for 14 days on, 7 days off -- currently holding   - Dexamethasone 40mg Days 1, 8, 15.   He received Day 8 and Day 11 Cycle 1 Velcade on 11/26 and 11/29, respectively    Review of Systems: Pertinent positive and negative systems described in interval history; the remainder of the 14 systems are negative    Past Medical History:  Past Medical History:   Diagnosis Date     Hyperlipidemia      Hypertension        Past Surgical " History:  Past Surgical History:   Procedure Laterality Date     HERNIA REPAIR, INGUINAL RT/LT      needed post-op intestinal repair     ORIF left clavical         Social History:  Social History     Socioeconomic History     Marital status: Single     Spouse name: None     Number of children: None     Years of education: None     Highest education level: None   Occupational History     Occupation: retired   Social Needs     Financial resource strain: None     Food insecurity     Worry: None     Inability: None     Transportation needs     Medical: None     Non-medical: None   Tobacco Use     Smoking status: Former Smoker     Packs/day: 0.10     Years: 30.00     Pack years: 3.00     Types: Cigarettes     Smokeless tobacco: Never Used   Substance and Sexual Activity     Alcohol use: Yes     Alcohol/week: 17.5 standard drinks     Types: 21 drink(s) per week     Drug use: None     Sexual activity: None   Lifestyle     Physical activity     Days per week: None     Minutes per session: None     Stress: None   Relationships     Social connections     Talks on phone: None     Gets together: None     Attends Restoration service: None     Active member of club or organization: None     Attends meetings of clubs or organizations: None     Relationship status: None     Intimate partner violence     Fear of current or ex partner: None     Emotionally abused: None     Physically abused: None     Forced sexual activity: None   Other Topics Concern      Service Not Asked     Blood Transfusions Not Asked     Caffeine Concern Not Asked     Occupational Exposure Not Asked     Hobby Hazards Not Asked     Sleep Concern Not Asked     Stress Concern Not Asked     Weight Concern Not Asked     Special Diet Not Asked     Back Care Not Asked     Exercise Yes     Comment: bikes daily     Bike Helmet Not Asked     Seat Belt Not Asked     Self-Exams Not Asked   Social History Narrative     None        Family History  Family History    Problem Relation Age of Onset     C.A.D. Father          MI age 69     Hypertension Sister         obese     Family History Negative Brother        Outpatient Medications:  No current facility-administered medications on file prior to encounter.        acyclovir (ZOVIRAX) 400 MG tablet, Take 1 tablet (400 mg) by mouth 2 times daily Viral Prophylaxis.       aspirin (ASA) 325 MG tablet, Take 1 tablet (325 mg) by mouth daily       calcium carbonate 500 mg, elemental, (OSCAL) 500 MG tablet, Take 1 tablet (500 mg) by mouth 3 times daily (with meals) (Patient taking differently: Take 500 mg by mouth 2 times daily )       cholecalciferol (VITAMIN D3) 25 mcg (1000 units) capsule, Take 1 capsule by mouth daily       gabapentin (NEURONTIN) 100 MG capsule, Take 1 capsule (100 mg) by mouth 3 times daily       hydrochlorothiazide (MICROZIDE) 12.5 MG capsule, Take 12.5 mg by mouth daily       ibuprofen (ADVIL/MOTRIN) 600 MG tablet, Take 1 tablet (600 mg) by mouth every 6 hours       LENalidomide (REVLIMID) 25 MG CAPS capsule, Take 1 capsule (25 mg) by mouth daily for 14 days Days 1 through 14.       levofloxacin (LEVAQUIN) 250 MG tablet, Take 1 tablet (250 mg) by mouth daily       lisinopril (ZESTRIL) 20 MG tablet, Take 1 tablet (20 mg) by mouth daily       methocarbamol (ROBAXIN) 500 MG tablet, Take 1 tablet (500 mg) by mouth 4 times daily       omeprazole (PRILOSEC) 10 MG DR capsule, Take 10 mg by mouth daily       ondansetron (ZOFRAN-ODT) 4 MG ODT tab, Take 1 tablet (4 mg) by mouth every 6 hours as needed for nausea or vomiting       oxyCODONE (ROXICODONE) 5 MG tablet, Take 1 tablet (5 mg) by mouth every 6 hours as needed for severe pain       polyethylene glycol (MIRALAX) 17 g packet, Take 17 g by mouth daily       senna-docusate (SENOKOT-S/PERICOLACE) 8.6-50 MG tablet, Take 1 tablet by mouth daily as needed        acetaminophen (TYLENOL) 325 MG tablet, Take 3 tablets (975 mg) by mouth 3 times daily       dexamethasone  "(DECADRON) 4 MG tablet, Take 10 tablets (40 mg) by mouth every 7 days for 3 doses Days 1, 8, and 15.       LORazepam (ATIVAN) 0.5 MG tablet, Take 1 tablet (0.5 mg) by mouth every 4 hours as needed (Anxiety, Nausea/Vomiting or Sleep) (Patient not taking: Reported on 11/26/2020)       prochlorperazine (COMPAZINE) 10 MG tablet, Take 1 tablet (10 mg) by mouth every 6 hours as needed (Nausea/Vomiting) (Patient not taking: Reported on 11/26/2020)      Physical Exam:    BP (!) 151/81 (BP Location: Right arm)   Pulse 85   Temp 97.3  F (36.3  C) (Oral)   Resp 17   Ht 1.549 m (5' 1\")   Wt 67.3 kg (148 lb 5.9 oz)   SpO2 95%   BMI 28.03 kg/m    Gen: Well appearing, in NAD  CV: Normal rate, regular rhythm. No m/r/g  Pulm: CTABL  Abd: Soft, nt/nd  Ext: No lower extremity edema  Neuro: Alert and answering questions appropriately.     Labs & Studies: I personally reviewed the following studies:  ROUTINE LABS (Last four results):  CMP  Recent Labs   Lab 12/04/20  1003 12/04/20  0656 12/03/20  0645 12/02/20  0702 12/01/20  0448 12/01/20  0448 11/30/20  0657 11/29/20  0730 11/29/20  0730   NA  --  139 139 137  --  138 135  --  139   POTASSIUM  --  3.8 4.2 4.2  --  4.4 3.9  --  4.0   CHLORIDE  --  110* 110* 109  --  114* 110*  --  113*   CO2  --  22 24 23  --  21 22  --  22   ANIONGAP  --  6 5 6  --  3 4  --  4   GLC  --  101* 84 81  --  101* 92  --  89   BUN  --  15 17 12  --  12 15  --  11   CR  --  0.91 1.22 1.06  --  0.94 1.05  --  0.95   GFRESTIMATED  --  83 58* 69  --  80 70  --  79   GFRESTBLACK  --  >90 67 80  --  >90 81  --  >90   MIRI  --  8.4* 8.5 8.1*  --  7.7* 7.6*  --  7.8*   MAG  --   --   --  2.1  --  1.9 2.1  --  2.0   PHOS 2.1* 2.4* 3.4 2.7   < > 2.0* 2.7   < > 3.0   PROTTOTAL  --   --   --   --   --   --   --   --  7.1   ALBUMIN  --   --   --   --   --   --   --   --  2.0*   BILITOTAL  --   --   --   --   --   --   --   --  0.2   ALKPHOS  --   --   --   --   --   --   --   --  70   AST  --   --   --   --   --   " --   --   --  18   ALT  --   --   --   --   --   --   --   --  22    < > = values in this interval not displayed.     CBC  Recent Labs   Lab 12/03/20  0848 12/02/20  0702 12/01/20  1019 11/30/20  1846   WBC 7.1 6.5 11.7* 18.6*   RBC 2.67* 2.52* 2.46* 2.66*   HGB 9.1* 8.4* 8.3* 8.9*   HCT 28.1* 26.5* 26.6* 27.6*   * 105* 108* 104*   MCH 34.1* 33.3* 33.7* 33.5*   MCHC 32.4 31.7 31.2* 32.2   RDW 15.3* 15.3* 15.6* 15.3*    149* 144* 141*     INRNo lab results found in last 7 days.

## 2020-12-04 NOTE — PROGRESS NOTES
Care Management Follow Up    Length of Stay (days): 8    Expected Discharge Date: 12/04/20     Concerns to be Addressed: discharge planning    Patient plan of care discussed at interdisciplinary rounds: Yes    Anticipated Discharge Disposition:  Home  Anticipated Discharge Services: [facility] - resumed  Anticipated Discharge DME: None      Additional Information:  Per team patient ready to discharge home today. PT/OT recs are now for home with resumption of [facility] (RN/PT/OT). Spoke with patient by phone who is agreeable to home care. Gave him cab company number [phone] to call for ride. Pt has vargas on him and a credit card at home. Updated bedside RN to let patient know when discharge is happening so pt can call for ride.       Charo Mcdermott, RN, [place]  Float Care Coordinator  Covering 7B  [facility]   Pager: [phone]

## 2020-12-04 NOTE — PLAN OF CARE
Occupational Therapy Discharge Summary    Reason for therapy discharge:    Discharged to home with home therapy.    Progress towards therapy goal(s). See goals on Care Plan in [facility] electronic health record for goal details.  Goals met    Therapy recommendation(s):    Continued therapy is recommended.  Rationale/Recommendations:  Pt would benefit from home health care therapies to increase overall safety and ADL I. Recommending pt completes full body bathing with use of shower chair for fall prevention.

## 2020-12-04 NOTE — PLAN OF CARE
"BP (!) 147/76 (BP Location: Right arm)   Pulse 77   Temp 97.2  F (36.2  C) (Oral)   Resp 18   Ht 1.549 m (5' 1\")   Wt 67.3 kg (148 lb 5.9 oz)   SpO2 97%   BMI 28.03 kg/m      Neuro: A&Ox4, cooperative  Cardiac: WDL, denies chest pain  Respiratory: on RA, using IS/aerobika appropriately   GI/: voiding adequately via urinal, pt had BM this shift  Skin: rash on chest/back improving  Pain: PRN oxy given for pain with adequate control   LDA: PIV running NS @ 100  Activity: A1 with walker, ambulated to bathroom  Diet/Appetite: reg diet, tolerating   Plan: TCU versus home, discharge possible tomorrow, continue POC    "

## 2020-12-04 NOTE — PLAN OF CARE
"BP (!) 147/76 (BP Location: Right arm)   Pulse 77   Temp 97.2  F (36.2  C) (Oral)   Resp 18   Ht 1.549 m (5' 1\")   Wt 67.3 kg (148 lb 5.9 oz)   SpO2 97%   BMI 28.03 kg/m       Neuro: AOx4, forgetful at times  Cardiac: Denies chest pain, WDL  Respiratory: on RA, dypnea with ecertion  GI/: Voiding adequately via urinal, LBM 5/5/20  Diet/Appetite: Regular diet  Skin: Rash on chest/back area  LDA: PIV infusing NS @ 100   Activity: Ax1 with walker  Pain: PRN Oxycodone administered and schedule Tylenol  Plan: Continue POC, discharge pending      "

## 2020-12-05 ENCOUNTER — NURSE TRIAGE (OUTPATIENT)
Dept: NURSING | Facility: CLINIC | Age: 75
End: 2020-12-05

## 2020-12-05 ENCOUNTER — PATIENT OUTREACH (OUTPATIENT)
Dept: CARE COORDINATION | Facility: CLINIC | Age: 75
End: 2020-12-05

## 2020-12-05 LAB
BACTERIA SPEC CULT: NO GROWTH
BACTERIA SPEC CULT: NO GROWTH
SPECIMEN SOURCE: NORMAL
SPECIMEN SOURCE: NORMAL

## 2020-12-06 LAB
BACTERIA SPEC CULT: NO GROWTH
Lab: NORMAL
SPECIMEN SOURCE: NORMAL

## 2020-12-06 NOTE — TELEPHONE ENCOUNTER
Rogelio calls and says that his feet are swollen. Pt. Says that his left foot is more swollen than his right foot. Pt. Also has left thigh pain. Pain began yesterday. Pt. Says that he will go to an  clinic tomorrow. COVID 19 Nurse Triage Plan/Patient Instructions    Please be aware that novel coronavirus (COVID-19) may be circulating in the community. If you develop symptoms such as fever, cough, or SOB or if you have concerns about the presence of another infection including coronavirus (COVID-19), please contact your health care provider or visit [email or web address].     Disposition/Instructions    In-Person Visit with provider recommended. Reference Visit Selection Guide.    Thank you for taking steps to prevent the spread of this virus.  o Limit your contact with others.  o Wear a simple mask to cover your cough.  o Wash your hands well and often.    Resources    [facility]: About COVID-19: [email or web address]    CDC: What to Do If You're Sick: www.cdc.gov/coronavirus/2019-ncov/about/steps-when-sick.html    CDC: Ending Home Isolation: www.cdc.gov/coronavirus/2019-ncov/hcp/disposition-in-home-patients.html     CDC: Caring for Someone: www.cdc.gov/coronavirus/2019-ncov/if-you-are-sick/care-for-someone.html     [facility]: Interim Guidance for Hospital Discharge to Home: www.health.[facility].[place].us/diseases/coronavirus/hcp/hospdischarge.pdf    [facility] clinical trials (COVID-19 research studies): clinicalaffairs.[facility]-clinical-trials     Below are the COVID-19 hotlines at the [place] Department of Health ([facility]). Interpreters are available.   o For health questions: Call 945-080-0257 or 1-684.859.3922 (7 a.m. to 7 p.m.)  o For questions about schools and childcare: Call 742-752-4770 or 1-292.547.5369 (7 a.m. to 7 p.m.)                     Additional Information    Negative: Severe difficulty breathing (e.g., struggling for each breath, speaks in single words)    Negative: Looks like a broken  bone or dislocated joint (e.g., crooked or deformed)    Negative: Sounds like a life-threatening emergency to the triager    Chest pain    Negative: Followed a foot injury    Negative: Diabetes    Negative: Ankle pain is main symptom    Negative: Thigh or calf pain is main symptom    Negative: Entire foot is cool or blue in comparison to other foot    Negative: Purple or black skin on foot or toe    Negative: [1] Red area or streak AND [2] fever    Negative: [1] Swollen foot AND [2] fever    Negative: Patient sounds very sick or weak to the triager    Negative: [1] SEVERE pain (e.g., excruciating, unable to do any normal activities) AND [2] not improved after 2 hours of pain medicine    Negative: [1] Looks infected (spreading redness, pus) AND [2] large red area (> 2 in. or 5 cm)    Negative: Looks like a boil, infected sore, or deep ulcer    Negative: [1] Redness of the skin AND [2] no fever    [1] Swollen foot AND [2] no fever  (Exceptions: localized bump from bunions, calluses, insect bite, sting)    Protocols used: LEG SWELLING AND EDEMA-A-AH, FOOT PAIN-A-AH

## 2020-12-07 NOTE — PROGRESS NOTES
The patient was contacted by another RN for post-hospital follow up. Will close encounter at this time.    Lucina Murry CMA, [facility]  Post Hospital Discharge Team

## 2020-12-08 ENCOUNTER — TELEPHONE (OUTPATIENT)
Dept: INTERNAL MEDICINE | Facility: CLINIC | Age: 75
End: 2020-12-08

## 2020-12-08 DIAGNOSIS — M48.50XS PATHOLOGIC COMPRESSION FRACTURE OF SPINE, SEQUELA: ICD-10-CM

## 2020-12-08 RX ORDER — METHOCARBAMOL 500 MG/1
500 TABLET, FILM COATED ORAL 4 TIMES DAILY
Qty: 120 TABLET | Refills: 3 | Status: SHIPPED | OUTPATIENT
Start: 2020-12-08 | End: 2021-05-14

## 2020-12-08 RX ORDER — CALCIUM CARBONATE 500(1250)
500 TABLET ORAL 2 TIMES DAILY
Qty: 200 TABLET | Refills: 3 | Status: SHIPPED | OUTPATIENT
Start: 2020-12-08 | End: 2022-06-06

## 2020-12-08 RX ORDER — ACETAMINOPHEN 325 MG/1
975 TABLET ORAL 3 TIMES DAILY
Qty: 500 TABLET | Refills: 4 | Status: SHIPPED | OUTPATIENT
Start: 2020-12-08

## 2020-12-08 NOTE — TELEPHONE ENCOUNTER
[facility] now requests orders and shares plan of care/discharge summaries for some patients through mymxlog.  Please REPLY TO THIS MESSAGE OR ROUTE BACK TO THE AUTHOR in order to give authorization for orders when needed.  This is considered a verbal order, you will still receive a faxed copy of orders for signature.  Thank you for your assistance in improving collaboration for our patients.    ORDERS REQUESTED    Skilled nursing 1x/week x 1, 2x/week x 2, 1x/week x 1, 3 PRN  PT and OT to eval and treat    PATIENT NEEDS NEW RX SENT TO [address] [place] [address] PHARMACY FOR THE FOLLOWING MEDICATIONS. Discharged from previous hospitalization with no refills:    1. Methocarbamol 500 mg - 1 tablet 4x/day  1. Tylenol 325 mg - 3 tablets 3x/day  3. Vitamin D3 25 mcg - 1 tablet daily  4. Oyster Shell Calcium 500 mg - 1 tablet 3x/day    FYI regarding medication concenrs:    1. Patent stopped use of prescribed Lovenox. Reports swelling in BLE (now improved) and bleeding after picking a scab on nose. Nurse instructed on importance of use as directed and patient and to avoid injury or picking. Instructed hospitalization more likely cause of swelling. Patient still hesitant to take. Educated on risk of DVT/PE and exercises and activity to prevent.  2. Aspirin and Ibuprofen ordered - duplicate NSAID, patient only using ibuprofen PRN and rarely  3. Zofran and Compazine ordered - duplicate antiemetics    Cristine Bermudez, RN  [phone]    Agree  Wolf Stevens MD

## 2020-12-08 NOTE — TELEPHONE ENCOUNTER
[facility] Prior Authorization Team Request    Medication: methocarbamol 500mg tabs  Dosin tablet 4 times daily  Qty: 120  Day Supply: 30   NDC (required for Medicaid members): [address]-20     Insurance   BIN: [id number]  PCN: DE  Grp: [id number]  ID: [id number]    CoverMyMeds Key (if applicable):     Additional documentation:       Filling Pharmacy: [facility]  Phone Number: [phone]  Contact:    Pharmacy NPI (required for Medicaid members): [id number]

## 2020-12-08 NOTE — TELEPHONE ENCOUNTER
Central Prior Authorization Team   Phone: [phone]      PA Initiation    Medication: methocarbamol 500mg  Insurance Company: Medicare Blue - Phone [phone] Fax [phone]  Pharmacy Filling the Rx: [facility] [place], [place] - [address] [place] [address] [phone]  Filling Pharmacy Phone: [phone]  Filling Pharmacy Fax:    Start Date: 12/8/2020

## 2020-12-09 ENCOUNTER — VIRTUAL VISIT (OUTPATIENT)
Dept: ONCOLOGY | Facility: CLINIC | Age: 75
End: 2020-12-09
Attending: STUDENT IN AN ORGANIZED HEALTH CARE EDUCATION/TRAINING PROGRAM
Payer: COMMERCIAL

## 2020-12-09 VITALS
HEART RATE: 81 BPM | SYSTOLIC BLOOD PRESSURE: 148 MMHG | WEIGHT: 143.9 LBS | DIASTOLIC BLOOD PRESSURE: 79 MMHG | BODY MASS INDEX: 27.19 KG/M2 | RESPIRATION RATE: 18 BRPM | OXYGEN SATURATION: 98 % | TEMPERATURE: 98 F

## 2020-12-09 DIAGNOSIS — Z79.899 ENCOUNTER FOR LONG-TERM (CURRENT) USE OF MEDICATIONS: ICD-10-CM

## 2020-12-09 DIAGNOSIS — C90.00 MULTIPLE MYELOMA, REMISSION STATUS UNSPECIFIED (H): Primary | ICD-10-CM

## 2020-12-09 DIAGNOSIS — C90.00 MULTIPLE MYELOMA, REMISSION STATUS UNSPECIFIED (H): ICD-10-CM

## 2020-12-09 DIAGNOSIS — M48.50XS PATHOLOGIC COMPRESSION FRACTURE OF SPINE, SEQUELA: ICD-10-CM

## 2020-12-09 LAB
ALBUMIN SERPL-MCNC: 2.9 G/DL (ref 3.4–5)
ALP SERPL-CCNC: 154 U/L (ref 40–150)
ALT SERPL W P-5'-P-CCNC: 22 U/L (ref 0–70)
ANION GAP SERPL CALCULATED.3IONS-SCNC: 4 MMOL/L (ref 3–14)
AST SERPL W P-5'-P-CCNC: 15 U/L (ref 0–45)
BASOPHILS # BLD AUTO: 0.1 10E9/L (ref 0–0.2)
BASOPHILS NFR BLD AUTO: 1.2 %
BILIRUB SERPL-MCNC: 0.3 MG/DL (ref 0.2–1.3)
BUN SERPL-MCNC: 21 MG/DL (ref 7–30)
CALCIUM SERPL-MCNC: 8.8 MG/DL (ref 8.5–10.1)
CHLORIDE SERPL-SCNC: 106 MMOL/L (ref 94–109)
CO2 SERPL-SCNC: 27 MMOL/L (ref 20–32)
CREAT SERPL-MCNC: 1 MG/DL (ref 0.66–1.25)
DIFFERENTIAL METHOD BLD: ABNORMAL
EOSINOPHIL # BLD AUTO: 0.5 10E9/L (ref 0–0.7)
EOSINOPHIL NFR BLD AUTO: 9.2 %
ERYTHROCYTE [DISTWIDTH] IN BLOOD BY AUTOMATED COUNT: 15.2 % (ref 10–15)
GFR SERPL CREATININE-BSD FRML MDRD: 74 ML/MIN/{1.73_M2}
GLUCOSE SERPL-MCNC: 92 MG/DL (ref 70–99)
HCT VFR BLD AUTO: 26.6 % (ref 40–53)
HGB BLD-MCNC: 8.7 G/DL (ref 13.3–17.7)
IMM GRANULOCYTES # BLD: 0 10E9/L (ref 0–0.4)
IMM GRANULOCYTES NFR BLD: 0.7 %
LYMPHOCYTES # BLD AUTO: 1.5 10E9/L (ref 0.8–5.3)
LYMPHOCYTES NFR BLD AUTO: 25 %
MCH RBC QN AUTO: 33.9 PG (ref 26.5–33)
MCHC RBC AUTO-ENTMCNC: 32.7 G/DL (ref 31.5–36.5)
MCV RBC AUTO: 104 FL (ref 78–100)
MONOCYTES # BLD AUTO: 0.6 10E9/L (ref 0–1.3)
MONOCYTES NFR BLD AUTO: 9.9 %
NEUTROPHILS # BLD AUTO: 3.2 10E9/L (ref 1.6–8.3)
NEUTROPHILS NFR BLD AUTO: 54 %
NRBC # BLD AUTO: 0 10*3/UL
NRBC BLD AUTO-RTO: 0 /100
PLATELET # BLD AUTO: 658 10E9/L (ref 150–450)
POTASSIUM SERPL-SCNC: 4 MMOL/L (ref 3.4–5.3)
PROT SERPL-MCNC: 7.6 G/DL (ref 6.8–8.8)
RBC # BLD AUTO: 2.57 10E12/L (ref 4.4–5.9)
SODIUM SERPL-SCNC: 137 MMOL/L (ref 133–144)
WBC # BLD AUTO: 5.9 10E9/L (ref 4–11)

## 2020-12-09 PROCEDURE — 99443 PR PHYSICIAN TELEPHONE EVALUATION 21-30 MIN: CPT | Mod: 95 | Performed by: STUDENT IN AN ORGANIZED HEALTH CARE EDUCATION/TRAINING PROGRAM

## 2020-12-09 PROCEDURE — 86901 BLOOD TYPING SEROLOGIC RH(D): CPT | Performed by: STUDENT IN AN ORGANIZED HEALTH CARE EDUCATION/TRAINING PROGRAM

## 2020-12-09 PROCEDURE — 80053 COMPREHEN METABOLIC PANEL: CPT | Performed by: STUDENT IN AN ORGANIZED HEALTH CARE EDUCATION/TRAINING PROGRAM

## 2020-12-09 PROCEDURE — 36415 COLL VENOUS BLD VENIPUNCTURE: CPT

## 2020-12-09 PROCEDURE — 86850 RBC ANTIBODY SCREEN: CPT | Performed by: STUDENT IN AN ORGANIZED HEALTH CARE EDUCATION/TRAINING PROGRAM

## 2020-12-09 PROCEDURE — 999N001193 HC VIDEO/TELEPHONE VISIT; NO CHARGE

## 2020-12-09 PROCEDURE — 86900 BLOOD TYPING SEROLOGIC ABO: CPT | Performed by: STUDENT IN AN ORGANIZED HEALTH CARE EDUCATION/TRAINING PROGRAM

## 2020-12-09 PROCEDURE — 85025 COMPLETE CBC W/AUTO DIFF WBC: CPT | Performed by: STUDENT IN AN ORGANIZED HEALTH CARE EDUCATION/TRAINING PROGRAM

## 2020-12-09 RX ORDER — HEPARIN SODIUM,PORCINE 10 UNIT/ML
5 VIAL (ML) INTRAVENOUS
Status: CANCELLED | OUTPATIENT
Start: 2020-12-17

## 2020-12-09 RX ORDER — HEPARIN SODIUM (PORCINE) LOCK FLUSH IV SOLN 100 UNIT/ML 100 UNIT/ML
5 SOLUTION INTRAVENOUS
Status: CANCELLED | OUTPATIENT
Start: 2020-12-17

## 2020-12-09 RX ORDER — EPINEPHRINE 1 MG/ML
0.3 INJECTION, SOLUTION INTRAMUSCULAR; SUBCUTANEOUS EVERY 5 MIN PRN
Status: CANCELLED | OUTPATIENT
Start: 2020-12-24

## 2020-12-09 RX ORDER — DIPHENHYDRAMINE HYDROCHLORIDE 50 MG/ML
50 INJECTION INTRAMUSCULAR; INTRAVENOUS
Status: CANCELLED
Start: 2020-12-17

## 2020-12-09 RX ORDER — ALBUTEROL SULFATE 0.83 MG/ML
2.5 SOLUTION RESPIRATORY (INHALATION)
Status: CANCELLED | OUTPATIENT
Start: 2020-12-17

## 2020-12-09 RX ORDER — SODIUM CHLORIDE 9 MG/ML
1000 INJECTION, SOLUTION INTRAVENOUS CONTINUOUS PRN
Status: CANCELLED
Start: 2020-12-17

## 2020-12-09 RX ORDER — EPINEPHRINE 1 MG/ML
0.3 INJECTION, SOLUTION INTRAMUSCULAR; SUBCUTANEOUS EVERY 5 MIN PRN
Status: CANCELLED | OUTPATIENT
Start: 2020-12-20

## 2020-12-09 RX ORDER — EPINEPHRINE 1 MG/ML
0.3 INJECTION, SOLUTION INTRAMUSCULAR; SUBCUTANEOUS EVERY 5 MIN PRN
Status: CANCELLED | OUTPATIENT
Start: 2020-12-17

## 2020-12-09 RX ORDER — DIPHENHYDRAMINE HYDROCHLORIDE 50 MG/ML
50 INJECTION INTRAMUSCULAR; INTRAVENOUS
Status: CANCELLED
Start: 2020-12-24

## 2020-12-09 RX ORDER — HEPARIN SODIUM (PORCINE) LOCK FLUSH IV SOLN 100 UNIT/ML 100 UNIT/ML
5 SOLUTION INTRAVENOUS
Status: CANCELLED | OUTPATIENT
Start: 2020-12-20

## 2020-12-09 RX ORDER — HEPARIN SODIUM (PORCINE) LOCK FLUSH IV SOLN 100 UNIT/ML 100 UNIT/ML
5 SOLUTION INTRAVENOUS
Status: CANCELLED | OUTPATIENT
Start: 2020-12-10

## 2020-12-09 RX ORDER — LORAZEPAM 2 MG/ML
0.5 INJECTION INTRAMUSCULAR EVERY 4 HOURS PRN
Status: CANCELLED
Start: 2020-12-24

## 2020-12-09 RX ORDER — EPINEPHRINE 1 MG/ML
0.3 INJECTION, SOLUTION INTRAMUSCULAR; SUBCUTANEOUS EVERY 5 MIN PRN
Status: CANCELLED | OUTPATIENT
Start: 2020-12-13

## 2020-12-09 RX ORDER — MEPERIDINE HYDROCHLORIDE 25 MG/ML
25 INJECTION INTRAMUSCULAR; INTRAVENOUS; SUBCUTANEOUS EVERY 30 MIN PRN
Status: CANCELLED | OUTPATIENT
Start: 2020-12-17

## 2020-12-09 RX ORDER — METHYLPREDNISOLONE SODIUM SUCCINATE 125 MG/2ML
125 INJECTION, POWDER, LYOPHILIZED, FOR SOLUTION INTRAMUSCULAR; INTRAVENOUS
Status: CANCELLED
Start: 2020-12-13

## 2020-12-09 RX ORDER — ALBUTEROL SULFATE 90 UG/1
1-2 AEROSOL, METERED RESPIRATORY (INHALATION)
Status: CANCELLED
Start: 2020-12-10

## 2020-12-09 RX ORDER — DIPHENHYDRAMINE HCL 25 MG
50 CAPSULE ORAL ONCE
Status: CANCELLED | OUTPATIENT
Start: 2020-12-24

## 2020-12-09 RX ORDER — DEXAMETHASONE 4 MG/1
20 TABLET ORAL ONCE
Status: CANCELLED | OUTPATIENT
Start: 2020-12-24

## 2020-12-09 RX ORDER — ACETAMINOPHEN 325 MG/1
650 TABLET ORAL ONCE
Status: CANCELLED | OUTPATIENT
Start: 2020-12-17

## 2020-12-09 RX ORDER — NALOXONE HYDROCHLORIDE 0.4 MG/ML
.1-.4 INJECTION, SOLUTION INTRAMUSCULAR; INTRAVENOUS; SUBCUTANEOUS
Status: CANCELLED | OUTPATIENT
Start: 2020-12-17

## 2020-12-09 RX ORDER — NALOXONE HYDROCHLORIDE 0.4 MG/ML
.1-.4 INJECTION, SOLUTION INTRAMUSCULAR; INTRAVENOUS; SUBCUTANEOUS
Status: CANCELLED | OUTPATIENT
Start: 2020-12-24

## 2020-12-09 RX ORDER — DEXAMETHASONE 4 MG/1
20 TABLET ORAL ONCE
Status: CANCELLED | OUTPATIENT
Start: 2020-12-17

## 2020-12-09 RX ORDER — METHYLPREDNISOLONE SODIUM SUCCINATE 125 MG/2ML
125 INJECTION, POWDER, LYOPHILIZED, FOR SOLUTION INTRAMUSCULAR; INTRAVENOUS
Status: CANCELLED
Start: 2020-12-24

## 2020-12-09 RX ORDER — DIPHENHYDRAMINE HYDROCHLORIDE 50 MG/ML
50 INJECTION INTRAMUSCULAR; INTRAVENOUS
Status: CANCELLED
Start: 2020-12-20

## 2020-12-09 RX ORDER — HEPARIN SODIUM (PORCINE) LOCK FLUSH IV SOLN 100 UNIT/ML 100 UNIT/ML
5 SOLUTION INTRAVENOUS
Status: CANCELLED | OUTPATIENT
Start: 2020-12-13

## 2020-12-09 RX ORDER — HEPARIN SODIUM,PORCINE 10 UNIT/ML
5 VIAL (ML) INTRAVENOUS
Status: CANCELLED | OUTPATIENT
Start: 2020-12-13

## 2020-12-09 RX ORDER — MEPERIDINE HYDROCHLORIDE 25 MG/ML
25 INJECTION INTRAMUSCULAR; INTRAVENOUS; SUBCUTANEOUS EVERY 30 MIN PRN
Status: CANCELLED | OUTPATIENT
Start: 2020-12-20

## 2020-12-09 RX ORDER — METHYLPREDNISOLONE SODIUM SUCCINATE 125 MG/2ML
125 INJECTION, POWDER, LYOPHILIZED, FOR SOLUTION INTRAMUSCULAR; INTRAVENOUS
Status: CANCELLED
Start: 2020-12-20

## 2020-12-09 RX ORDER — METHYLPREDNISOLONE SODIUM SUCCINATE 125 MG/2ML
125 INJECTION, POWDER, LYOPHILIZED, FOR SOLUTION INTRAMUSCULAR; INTRAVENOUS
Status: CANCELLED
Start: 2020-12-10

## 2020-12-09 RX ORDER — ALBUTEROL SULFATE 90 UG/1
1-2 AEROSOL, METERED RESPIRATORY (INHALATION)
Status: CANCELLED
Start: 2020-12-24

## 2020-12-09 RX ORDER — HEPARIN SODIUM,PORCINE 10 UNIT/ML
5 VIAL (ML) INTRAVENOUS
Status: CANCELLED | OUTPATIENT
Start: 2020-12-24

## 2020-12-09 RX ORDER — HEPARIN SODIUM,PORCINE 10 UNIT/ML
5 VIAL (ML) INTRAVENOUS
Status: CANCELLED | OUTPATIENT
Start: 2020-12-20

## 2020-12-09 RX ORDER — SODIUM CHLORIDE 9 MG/ML
1000 INJECTION, SOLUTION INTRAVENOUS CONTINUOUS PRN
Status: CANCELLED
Start: 2020-12-13

## 2020-12-09 RX ORDER — DIPHENHYDRAMINE HCL 25 MG
50 CAPSULE ORAL ONCE
Status: CANCELLED | OUTPATIENT
Start: 2020-12-17

## 2020-12-09 RX ORDER — SODIUM CHLORIDE 9 MG/ML
1000 INJECTION, SOLUTION INTRAVENOUS CONTINUOUS PRN
Status: CANCELLED
Start: 2020-12-10

## 2020-12-09 RX ORDER — HEPARIN SODIUM,PORCINE 10 UNIT/ML
5 VIAL (ML) INTRAVENOUS
Status: CANCELLED | OUTPATIENT
Start: 2020-12-10

## 2020-12-09 RX ORDER — MEPERIDINE HYDROCHLORIDE 25 MG/ML
25 INJECTION INTRAMUSCULAR; INTRAVENOUS; SUBCUTANEOUS EVERY 30 MIN PRN
Status: CANCELLED | OUTPATIENT
Start: 2020-12-13

## 2020-12-09 RX ORDER — SODIUM CHLORIDE 9 MG/ML
1000 INJECTION, SOLUTION INTRAVENOUS CONTINUOUS PRN
Status: CANCELLED
Start: 2020-12-24

## 2020-12-09 RX ORDER — DIPHENHYDRAMINE HYDROCHLORIDE 50 MG/ML
50 INJECTION INTRAMUSCULAR; INTRAVENOUS
Status: CANCELLED
Start: 2020-12-10

## 2020-12-09 RX ORDER — EPINEPHRINE 1 MG/ML
0.3 INJECTION, SOLUTION INTRAMUSCULAR; SUBCUTANEOUS EVERY 5 MIN PRN
Status: CANCELLED | OUTPATIENT
Start: 2020-12-10

## 2020-12-09 RX ORDER — MEPERIDINE HYDROCHLORIDE 25 MG/ML
25 INJECTION INTRAMUSCULAR; INTRAVENOUS; SUBCUTANEOUS EVERY 30 MIN PRN
Status: CANCELLED | OUTPATIENT
Start: 2020-12-10

## 2020-12-09 RX ORDER — LORAZEPAM 2 MG/ML
0.5 INJECTION INTRAMUSCULAR EVERY 4 HOURS PRN
Status: CANCELLED
Start: 2020-12-13

## 2020-12-09 RX ORDER — ALBUTEROL SULFATE 90 UG/1
1-2 AEROSOL, METERED RESPIRATORY (INHALATION)
Status: CANCELLED
Start: 2020-12-20

## 2020-12-09 RX ORDER — MONTELUKAST SODIUM 10 MG/1
10 TABLET ORAL ONCE
Status: CANCELLED | OUTPATIENT
Start: 2020-12-24

## 2020-12-09 RX ORDER — DEXAMETHASONE 4 MG/1
20 TABLET ORAL ONCE
Status: CANCELLED | OUTPATIENT
Start: 2020-12-10

## 2020-12-09 RX ORDER — ALBUTEROL SULFATE 0.83 MG/ML
2.5 SOLUTION RESPIRATORY (INHALATION)
Status: CANCELLED | OUTPATIENT
Start: 2020-12-20

## 2020-12-09 RX ORDER — NALOXONE HYDROCHLORIDE 0.4 MG/ML
.1-.4 INJECTION, SOLUTION INTRAMUSCULAR; INTRAVENOUS; SUBCUTANEOUS
Status: CANCELLED | OUTPATIENT
Start: 2020-12-13

## 2020-12-09 RX ORDER — METHYLPREDNISOLONE SODIUM SUCCINATE 125 MG/2ML
125 INJECTION, POWDER, LYOPHILIZED, FOR SOLUTION INTRAMUSCULAR; INTRAVENOUS
Status: CANCELLED
Start: 2020-12-17

## 2020-12-09 RX ORDER — ACETAMINOPHEN 325 MG/1
650 TABLET ORAL ONCE
Status: CANCELLED | OUTPATIENT
Start: 2020-12-10

## 2020-12-09 RX ORDER — ALBUTEROL SULFATE 90 UG/1
1-2 AEROSOL, METERED RESPIRATORY (INHALATION)
Status: CANCELLED
Start: 2020-12-17

## 2020-12-09 RX ORDER — LORAZEPAM 2 MG/ML
0.5 INJECTION INTRAMUSCULAR EVERY 4 HOURS PRN
Status: CANCELLED
Start: 2020-12-10

## 2020-12-09 RX ORDER — ALBUTEROL SULFATE 90 UG/1
1-2 AEROSOL, METERED RESPIRATORY (INHALATION)
Status: CANCELLED
Start: 2020-12-13

## 2020-12-09 RX ORDER — ALBUTEROL SULFATE 0.83 MG/ML
2.5 SOLUTION RESPIRATORY (INHALATION)
Status: CANCELLED | OUTPATIENT
Start: 2020-12-24

## 2020-12-09 RX ORDER — MONTELUKAST SODIUM 10 MG/1
10 TABLET ORAL ONCE
Status: CANCELLED | OUTPATIENT
Start: 2020-12-10

## 2020-12-09 RX ORDER — ALBUTEROL SULFATE 0.83 MG/ML
2.5 SOLUTION RESPIRATORY (INHALATION)
Status: CANCELLED | OUTPATIENT
Start: 2020-12-10

## 2020-12-09 RX ORDER — MONTELUKAST SODIUM 10 MG/1
10 TABLET ORAL ONCE
Status: CANCELLED | OUTPATIENT
Start: 2020-12-17

## 2020-12-09 RX ORDER — DIPHENHYDRAMINE HCL 25 MG
50 CAPSULE ORAL ONCE
Status: CANCELLED | OUTPATIENT
Start: 2020-12-10

## 2020-12-09 RX ORDER — ACETAMINOPHEN 325 MG/1
650 TABLET ORAL ONCE
Status: CANCELLED | OUTPATIENT
Start: 2020-12-24

## 2020-12-09 RX ORDER — HEPARIN SODIUM (PORCINE) LOCK FLUSH IV SOLN 100 UNIT/ML 100 UNIT/ML
5 SOLUTION INTRAVENOUS
Status: CANCELLED | OUTPATIENT
Start: 2020-12-24

## 2020-12-09 RX ORDER — SODIUM CHLORIDE 9 MG/ML
1000 INJECTION, SOLUTION INTRAVENOUS CONTINUOUS PRN
Status: CANCELLED
Start: 2020-12-20

## 2020-12-09 RX ORDER — DIPHENHYDRAMINE HYDROCHLORIDE 50 MG/ML
50 INJECTION INTRAMUSCULAR; INTRAVENOUS
Status: CANCELLED
Start: 2020-12-13

## 2020-12-09 RX ORDER — ALBUTEROL SULFATE 0.83 MG/ML
2.5 SOLUTION RESPIRATORY (INHALATION)
Status: CANCELLED | OUTPATIENT
Start: 2020-12-13

## 2020-12-09 RX ORDER — LORAZEPAM 2 MG/ML
0.5 INJECTION INTRAMUSCULAR EVERY 4 HOURS PRN
Status: CANCELLED
Start: 2020-12-17

## 2020-12-09 RX ORDER — MEPERIDINE HYDROCHLORIDE 25 MG/ML
25 INJECTION INTRAMUSCULAR; INTRAVENOUS; SUBCUTANEOUS EVERY 30 MIN PRN
Status: CANCELLED | OUTPATIENT
Start: 2020-12-24

## 2020-12-09 RX ORDER — NALOXONE HYDROCHLORIDE 0.4 MG/ML
.1-.4 INJECTION, SOLUTION INTRAMUSCULAR; INTRAVENOUS; SUBCUTANEOUS
Status: CANCELLED | OUTPATIENT
Start: 2020-12-10

## 2020-12-09 RX ORDER — NALOXONE HYDROCHLORIDE 0.4 MG/ML
.1-.4 INJECTION, SOLUTION INTRAMUSCULAR; INTRAVENOUS; SUBCUTANEOUS
Status: CANCELLED | OUTPATIENT
Start: 2020-12-20

## 2020-12-09 RX ORDER — LORAZEPAM 2 MG/ML
0.5 INJECTION INTRAMUSCULAR EVERY 4 HOURS PRN
Status: CANCELLED
Start: 2020-12-20

## 2020-12-09 ASSESSMENT — PAIN SCALES - GENERAL: PAINLEVEL: MILD PAIN (3)

## 2020-12-09 NOTE — TELEPHONE ENCOUNTER
Prior Authorization Approval    Authorization Effective Date: 11/8/2020  Authorization Expiration Date: 12/9/2021  Medication: methocarbamol 500mg- APPROVED  Approved Dose/Quantity:   Reference #:     Insurance Company: Medicare Blue - Phone [phone] Fax [phone]  Expected CoPay:       CoPay Card Available:      Foundation Assistance Needed:    Which Pharmacy is filling the prescription (Not needed for infusion/clinic administered): [facility] [place], [place] - [address] [place] [address] [phone]  Pharmacy Notified: Yes  Patient Notified: No

## 2020-12-09 NOTE — NURSING NOTE
Chief Complaint   Patient presents with     Lab Only     venipuncture, vitals checked     Nancy Hernadez RN on 12/9/2020 at 11:13 AM

## 2020-12-09 NOTE — LETTER
"    12/9/2020         RE: Rogelio Marshall  [address]  [place] [address]        Dear Colleague,    Thank you for referring your patient, Rogelio Marshall, to the [facility]. Please see a copy of my visit note below.    Rogelio Marshall is a 75 year old male who is being evaluated via a billable telephone visit.      The patient has been notified of following:     \"This telephone visit will be conducted via a call between you and your physician/provider. We have found that certain health care needs can be provided without the need for a physical exam.  This service lets us provide the care you need with a short phone conversation.  If a prescription is necessary we can send it directly to your pharmacy.  If lab work is needed we can place an order for that and you can then stop by our lab to have the test done at a later time.    Telephone visits are billed at different rates depending on your insurance coverage. During this emergency period, for some insurers they may be billed the same as an in-person visit.  Please reach out to your insurance provider with any questions.    If during the course of the call the physician/provider feels a telephone visit is not appropriate, you will not be charged for this service.\"    Patient has given verbal consent for Telephone visit?  Yes    What phone number would you like to be contacted at? [phone]    How would you like to obtain your AVS? [facility] Donah[facility]    Phone call duration: 29 minutes    Angela Sweeney MD      Oncologic Hx:   -compression fractures and bone scan reveals multiple lytic lesions throughout his appendicular and axial skeleton. His Beta 2 microglobulin was 3.6 on 10/10/2020. Kappa chains were 73.6 on 10/5/2020 with K/L ratio of 89.9. M spike of 16.2 in urine on 10/10. Bone marrow biopsy on 10/23/2020 showed trilineage hematopoiesis and 50-60% kappa restricted plasma cells. Flow cytometry " showed 20 % plasma cells which express CD19, CD38, CD45 and monotypic cytoplasmic kappa immunoglobulin light chains but lack CD20 and CD56.  FISH shows IGH-CCND1 fusion (81%; 30% had loss of IGH component from one fusion signal).      - Started 21 day cycle VRD 11/2020  - 11/27 he had a fall with rib fractures.       Interval Hx: Rogelio had a ground level fall at home due to orthostatic hypotension on 11/27 followed by a hospital admission for right sided rib fractures. He then developed post rib fracture pneumonia. During his time inpatient he did not take lenalidomide but did get velcade. He remembers feeling light headed when he stood up in the bathroom and feels it was related to his lenalidomide. No numbness or tingling in the hands or feet. Since leaving the hospital he has no further incidence of feeling lightheaded.    A comprehensive review of systems was completed and negative except as described above.     Pertinent PMH reviewed:   HTN    Physical Exam:   Neuro: conversant, normal speech pattern  Psych: appropriate mood and affect    The rest of a comprehensive physical examination is deferred due to Public Health Emergency telephone visit restrictions      Pertinent Data Summarized:  ANC 3.2, Hgb 8.7, Plt 658    Assessment and Plan  76 yo man with standard risk multiple myeloma. He is on 21 day cycle VRD due to concerns about his ability to tolerate the medicine. Unfortunately he has had difficulty with hypotension and a fall resulting in rib fractures. I will change him to madyson- velcade-dex and see if this is more tolerable for him. We discussed that daratumumab will be a subcutaneous injection and that risks included lowered blood counts and risk of infection.    Continue levofloxacin and acyclovir. Stop ASA as not taking lenalidomide any more.     He also is at high risk for more compression fractures. Vitamin D is replete and he is on a daily supplement as well as calcium. We discussed that side  effects of zometa include bone pains, hypocalcemia, and osteonecrosis of the jaw.  We discussed that I really want to get him started on this but he has not yet seen dentistry. A referral was placed today.    Thrombophilia: He has had slightly low platelets in the past. Today they are 658. Its not clear what could be causing this but I will recheck them in a week to see if this is ongoing.           Again, thank you for allowing me to participate in the care of your patient.        Sincerely,        Angela Sweeney MD

## 2020-12-10 ENCOUNTER — INFUSION THERAPY VISIT (OUTPATIENT)
Dept: ONCOLOGY | Facility: CLINIC | Age: 75
End: 2020-12-10
Attending: STUDENT IN AN ORGANIZED HEALTH CARE EDUCATION/TRAINING PROGRAM
Payer: COMMERCIAL

## 2020-12-10 VITALS
DIASTOLIC BLOOD PRESSURE: 81 MMHG | TEMPERATURE: 98.8 F | RESPIRATION RATE: 18 BRPM | HEART RATE: 96 BPM | SYSTOLIC BLOOD PRESSURE: 138 MMHG | OXYGEN SATURATION: 96 %

## 2020-12-10 DIAGNOSIS — C90.00 MULTIPLE MYELOMA, REMISSION STATUS UNSPECIFIED (H): Primary | ICD-10-CM

## 2020-12-10 LAB
ABO + RH BLD: NORMAL
ABO + RH BLD: NORMAL
BLD GP AB SCN SERPL QL: NORMAL
BLOOD BANK CMNT PATIENT-IMP: NORMAL
SPECIMEN EXP DATE BLD: NORMAL

## 2020-12-10 PROCEDURE — 258N000003 HC RX IP 258 OP 636: Performed by: STUDENT IN AN ORGANIZED HEALTH CARE EDUCATION/TRAINING PROGRAM

## 2020-12-10 PROCEDURE — C9062 DARATUMUMAB HYALURONIDASE: HCPCS | Performed by: STUDENT IN AN ORGANIZED HEALTH CARE EDUCATION/TRAINING PROGRAM

## 2020-12-10 PROCEDURE — 999N000127 HC STATISTIC PERIPHERAL IV START W US GUIDANCE

## 2020-12-10 PROCEDURE — 96366 THER/PROPH/DIAG IV INF ADDON: CPT

## 2020-12-10 PROCEDURE — 250N000011 HC RX IP 250 OP 636: Mod: JW | Performed by: STUDENT IN AN ORGANIZED HEALTH CARE EDUCATION/TRAINING PROGRAM

## 2020-12-10 PROCEDURE — 96367 TX/PROPH/DG ADDL SEQ IV INF: CPT

## 2020-12-10 PROCEDURE — 96372 THER/PROPH/DIAG INJ SC/IM: CPT | Performed by: STUDENT IN AN ORGANIZED HEALTH CARE EDUCATION/TRAINING PROGRAM

## 2020-12-10 PROCEDURE — 96401 CHEMO ANTI-NEOPL SQ/IM: CPT

## 2020-12-10 PROCEDURE — 250N000013 HC RX MED GY IP 250 OP 250 PS 637: Performed by: STUDENT IN AN ORGANIZED HEALTH CARE EDUCATION/TRAINING PROGRAM

## 2020-12-10 RX ORDER — ACETAMINOPHEN 325 MG/1
650 TABLET ORAL ONCE
Status: COMPLETED | OUTPATIENT
Start: 2020-12-10 | End: 2020-12-10

## 2020-12-10 RX ORDER — LORAZEPAM 0.5 MG/1
0.5 TABLET ORAL EVERY 4 HOURS PRN
Qty: 30 TABLET | Refills: 5 | Status: CANCELLED | OUTPATIENT
Start: 2020-12-10

## 2020-12-10 RX ORDER — ACYCLOVIR 400 MG/1
400 TABLET ORAL 2 TIMES DAILY
Qty: 60 TABLET | Refills: 11 | Status: CANCELLED | OUTPATIENT
Start: 2020-12-10 | End: 2021-01-09

## 2020-12-10 RX ORDER — MONTELUKAST SODIUM 10 MG/1
10 TABLET ORAL ONCE
Status: COMPLETED | OUTPATIENT
Start: 2020-12-10 | End: 2020-12-10

## 2020-12-10 RX ORDER — DEXAMETHASONE 4 MG/1
TABLET ORAL
Qty: 25 TABLET | Refills: 0 | Status: SHIPPED | OUTPATIENT
Start: 2020-12-10 | End: 2021-02-17

## 2020-12-10 RX ORDER — DEXAMETHASONE 4 MG/1
20 TABLET ORAL ONCE
Status: DISCONTINUED | OUTPATIENT
Start: 2020-12-10 | End: 2020-12-10

## 2020-12-10 RX ORDER — PROCHLORPERAZINE MALEATE 10 MG
10 TABLET ORAL EVERY 6 HOURS PRN
Qty: 30 TABLET | Refills: 5 | Status: CANCELLED | OUTPATIENT
Start: 2020-12-10

## 2020-12-10 RX ORDER — LORAZEPAM 0.5 MG/1
0.5 TABLET ORAL EVERY 4 HOURS PRN
Qty: 30 TABLET | Refills: 5 | Status: SHIPPED | OUTPATIENT
Start: 2020-12-10

## 2020-12-10 RX ORDER — DIPHENHYDRAMINE HCL 25 MG
50 CAPSULE ORAL ONCE
Status: COMPLETED | OUTPATIENT
Start: 2020-12-10 | End: 2020-12-10

## 2020-12-10 RX ORDER — ACYCLOVIR 400 MG/1
400 TABLET ORAL 2 TIMES DAILY
Qty: 60 TABLET | Refills: 11 | Status: SHIPPED | OUTPATIENT
Start: 2020-12-10 | End: 2021-09-27

## 2020-12-10 RX ORDER — PROCHLORPERAZINE MALEATE 10 MG
10 TABLET ORAL EVERY 6 HOURS PRN
Qty: 30 TABLET | Refills: 5 | Status: SHIPPED | OUTPATIENT
Start: 2020-12-10

## 2020-12-10 RX ADMIN — DARATUMUMAB AND HYALURONIDASE-FIHJ (HUMAN RECOMBINANT) 1800 MG: 1800; 30000 INJECTION SUBCUTANEOUS at 13:45

## 2020-12-10 RX ADMIN — BORTEZOMIB 2.2 MG: 3.5 INJECTION, POWDER, LYOPHILIZED, FOR SOLUTION INTRAVENOUS; SUBCUTANEOUS at 13:38

## 2020-12-10 RX ADMIN — DEXAMETHASONE SODIUM PHOSPHATE 20 MG: 10 INJECTION, SOLUTION INTRAMUSCULAR; INTRAVENOUS at 13:11

## 2020-12-10 RX ADMIN — ACETAMINOPHEN 650 MG: 325 TABLET ORAL at 12:36

## 2020-12-10 RX ADMIN — MONTELUKAST SODIUM 10 MG: 10 TABLET, COATED ORAL at 12:36

## 2020-12-10 RX ADMIN — DIPHENHYDRAMINE HYDROCHLORIDE 50 MG: 25 CAPSULE ORAL at 12:36

## 2020-12-10 RX ADMIN — SODIUM CHLORIDE 250 ML: 9 INJECTION, SOLUTION INTRAVENOUS at 13:13

## 2020-12-10 NOTE — PROGRESS NOTES
Infusion Nursing Note:  Rogelio Marshall presents today for Cycle 1 Day 1 Velcade and Darzalex Faspro.    Patient seen by provider today: Yes: Dr. Sweeney - 12/9.   present during visit today: Not Applicable.    Note: Pt arrives to infusion feeling well today. Visit with Dr. Sweeney yesterday. No changes or new complaints overnight.     Pt starting new regimen today and is receiving Darzalex Faspro for the first time.  Verbally reviewed chemotherapy teaching, side effects, take-home medications, and follow-up schedule with patient. Calendar and Darzalex print off given to pt.  Patient instructed to call triage with any questions or if he experiences a temperature >100.4, shaking chills, uncontrolled nausea/vomiting/diarrhea, dizziness, shortness of breath, bleeding not relieved with pressure, or with any other concerns.     Intravenous Access:  Peripheral IV placed by vascular access.    Treatment Conditions:  Lab Results   Component Value Date    HGB 8.7 12/09/2020     Lab Results   Component Value Date    WBC 5.9 12/09/2020      Lab Results   Component Value Date    ANEU 3.2 12/09/2020     Lab Results   Component Value Date     12/09/2020      Lab Results   Component Value Date     12/09/2020                   Lab Results   Component Value Date    POTASSIUM 4.0 12/09/2020           Lab Results   Component Value Date    MAG 2.1 12/02/2020            Lab Results   Component Value Date    CR 1.00 12/09/2020                   Lab Results   Component Value Date    MIRI 8.8 12/09/2020                Lab Results   Component Value Date    BILITOTAL 0.3 12/09/2020           Lab Results   Component Value Date    ALBUMIN 2.9 12/09/2020                    Lab Results   Component Value Date    ALT 22 12/09/2020           Lab Results   Component Value Date    AST 15 12/09/2020       Results reviewed, labs MET treatment parameters, ok to proceed with treatment.      Post Infusion Assessment:  Patient  tolerated Velcade injection without incident to the right lower abdomen.  Patient tolerated Darzalex Faspro injection to the Left Lower Abdomen.  Patient observed for 3.5 hours post Darzalex Faspro per protocol.  Blood return noted pre and post infusion.  Site patent and intact, free from redness, edema or discomfort.  No evidence of extravasations.  Access discontinued per protocol.       Discharge Plan:   Prescription refills given for Decadron, Ativan, Compazine, Acyclovir.  Discharge instructions reviewed with: Patient.  Patient and/or family verbalized understanding of discharge instructions and all questions answered.  Copy of AVS reviewed with patient and/or family.  Patient will return 12/14 for next appointment.  Patient discharged in stable condition accompanied by: self.  Departure Mode: Ambulatory.  Face to Face time: 5.    Юлия Calix RN

## 2020-12-10 NOTE — PATIENT INSTRUCTIONS
[facility] Triage and after hours / weekends / holidays:  [phone]    Please call the triage or after hours line if you experience a temperature greater than or equal to 100.4, shaking chills, have uncontrolled nausea, vomiting and/or diarrhea, dizziness, shortness of breath, chest pain, bleeding, unexplained bruising, or if you have any other new/concerning symptoms, questions or concerns.      If you are having any concerning symptoms or wish to speak to a provider before your next infusion visit, please call your care coordinator or triage to notify them so we can adequately serve you.     If you need a refill on a narcotic prescription or other medication, please call before your infusion appointment.

## 2020-12-14 ENCOUNTER — APPOINTMENT (OUTPATIENT)
Dept: LAB | Facility: CLINIC | Age: 75
End: 2020-12-14
Attending: STUDENT IN AN ORGANIZED HEALTH CARE EDUCATION/TRAINING PROGRAM
Payer: COMMERCIAL

## 2020-12-14 ENCOUNTER — INFUSION THERAPY VISIT (OUTPATIENT)
Dept: ONCOLOGY | Facility: CLINIC | Age: 75
End: 2020-12-14
Attending: STUDENT IN AN ORGANIZED HEALTH CARE EDUCATION/TRAINING PROGRAM
Payer: COMMERCIAL

## 2020-12-14 ENCOUNTER — MEDICAL CORRESPONDENCE (OUTPATIENT)
Dept: HEALTH INFORMATION MANAGEMENT | Facility: CLINIC | Age: 75
End: 2020-12-14

## 2020-12-14 VITALS
BODY MASS INDEX: 27.04 KG/M2 | HEART RATE: 98 BPM | TEMPERATURE: 97.6 F | RESPIRATION RATE: 14 BRPM | SYSTOLIC BLOOD PRESSURE: 144 MMHG | WEIGHT: 143.1 LBS | OXYGEN SATURATION: 99 % | DIASTOLIC BLOOD PRESSURE: 86 MMHG

## 2020-12-14 DIAGNOSIS — C90.00 MULTIPLE MYELOMA, REMISSION STATUS UNSPECIFIED (H): Primary | ICD-10-CM

## 2020-12-14 LAB
ALBUMIN SERPL-MCNC: 3.3 G/DL (ref 3.4–5)
ALP SERPL-CCNC: 161 U/L (ref 40–150)
ALT SERPL W P-5'-P-CCNC: 21 U/L (ref 0–70)
ANION GAP SERPL CALCULATED.3IONS-SCNC: 7 MMOL/L (ref 3–14)
AST SERPL W P-5'-P-CCNC: 10 U/L (ref 0–45)
BASOPHILS # BLD AUTO: 0 10E9/L (ref 0–0.2)
BASOPHILS NFR BLD AUTO: 0.2 %
BILIRUB SERPL-MCNC: 0.3 MG/DL (ref 0.2–1.3)
BUN SERPL-MCNC: 26 MG/DL (ref 7–30)
CALCIUM SERPL-MCNC: 8.7 MG/DL (ref 8.5–10.1)
CHLORIDE SERPL-SCNC: 107 MMOL/L (ref 94–109)
CO2 SERPL-SCNC: 23 MMOL/L (ref 20–32)
CREAT SERPL-MCNC: 0.91 MG/DL (ref 0.66–1.25)
DIFFERENTIAL METHOD BLD: ABNORMAL
EOSINOPHIL # BLD AUTO: 0 10E9/L (ref 0–0.7)
EOSINOPHIL NFR BLD AUTO: 0 %
ERYTHROCYTE [DISTWIDTH] IN BLOOD BY AUTOMATED COUNT: 14.2 % (ref 10–15)
GFR SERPL CREATININE-BSD FRML MDRD: 83 ML/MIN/{1.73_M2}
GLUCOSE SERPL-MCNC: 141 MG/DL (ref 70–99)
HCT VFR BLD AUTO: 28.8 % (ref 40–53)
HGB BLD-MCNC: 9.5 G/DL (ref 13.3–17.7)
IMM GRANULOCYTES # BLD: 0 10E9/L (ref 0–0.4)
IMM GRANULOCYTES NFR BLD: 0.5 %
LYMPHOCYTES # BLD AUTO: 0.8 10E9/L (ref 0.8–5.3)
LYMPHOCYTES NFR BLD AUTO: 12.5 %
MCH RBC QN AUTO: 33.6 PG (ref 26.5–33)
MCHC RBC AUTO-ENTMCNC: 33 G/DL (ref 31.5–36.5)
MCV RBC AUTO: 102 FL (ref 78–100)
MONOCYTES # BLD AUTO: 0.5 10E9/L (ref 0–1.3)
MONOCYTES NFR BLD AUTO: 7.1 %
NEUTROPHILS # BLD AUTO: 5.3 10E9/L (ref 1.6–8.3)
NEUTROPHILS NFR BLD AUTO: 79.7 %
NRBC # BLD AUTO: 0 10*3/UL
NRBC BLD AUTO-RTO: 0 /100
PLATELET # BLD AUTO: 569 10E9/L (ref 150–450)
POTASSIUM SERPL-SCNC: 3.6 MMOL/L (ref 3.4–5.3)
PROT SERPL-MCNC: 8.2 G/DL (ref 6.8–8.8)
RBC # BLD AUTO: 2.83 10E12/L (ref 4.4–5.9)
SODIUM SERPL-SCNC: 137 MMOL/L (ref 133–144)
WBC # BLD AUTO: 6.7 10E9/L (ref 4–11)

## 2020-12-14 PROCEDURE — 99207 PR NO BILLABLE SERVICE THIS VISIT: CPT

## 2020-12-14 PROCEDURE — 36415 COLL VENOUS BLD VENIPUNCTURE: CPT

## 2020-12-14 PROCEDURE — 85025 COMPLETE CBC W/AUTO DIFF WBC: CPT | Performed by: STUDENT IN AN ORGANIZED HEALTH CARE EDUCATION/TRAINING PROGRAM

## 2020-12-14 PROCEDURE — 96401 CHEMO ANTI-NEOPL SQ/IM: CPT

## 2020-12-14 PROCEDURE — 80053 COMPREHEN METABOLIC PANEL: CPT | Performed by: STUDENT IN AN ORGANIZED HEALTH CARE EDUCATION/TRAINING PROGRAM

## 2020-12-14 PROCEDURE — 250N000011 HC RX IP 250 OP 636: Mod: JW | Performed by: STUDENT IN AN ORGANIZED HEALTH CARE EDUCATION/TRAINING PROGRAM

## 2020-12-14 RX ADMIN — BORTEZOMIB 2.2 MG: 3.5 INJECTION, POWDER, LYOPHILIZED, FOR SOLUTION INTRAVENOUS; SUBCUTANEOUS at 11:08

## 2020-12-14 ASSESSMENT — PAIN SCALES - GENERAL: PAINLEVEL: MILD PAIN (2)

## 2020-12-14 NOTE — PROGRESS NOTES
Infusion Nursing Note:  Rogelio Marshall presents today for cycle 1 day 4 Velcade.    Patient seen by provider today: No   present during visit today: Not Applicable.    Note: Pt arrives feeling well, no new complaints/concerns.  Pt reports taking his dexamethasone as directed, denies needing any refills.    Treatment Conditions:  Lab Results   Component Value Date    HGB 9.5 12/14/2020     Lab Results   Component Value Date    WBC 6.7 12/14/2020      Lab Results   Component Value Date    ANEU 5.3 12/14/2020     Lab Results   Component Value Date     12/14/2020      Lab Results   Component Value Date     12/14/2020                   Lab Results   Component Value Date    POTASSIUM 3.6 12/14/2020           Lab Results   Component Value Date    MAG 2.1 12/02/2020            Lab Results   Component Value Date    CR 0.91 12/14/2020                   Lab Results   Component Value Date    MIRI 8.7 12/14/2020                Lab Results   Component Value Date    BILITOTAL 0.3 12/14/2020           Lab Results   Component Value Date    ALBUMIN 3.3 12/14/2020                    Lab Results   Component Value Date    ALT 21 12/14/2020           Lab Results   Component Value Date    AST 10 12/14/2020       Results reviewed, labs MET treatment parameters, ok to proceed with treatment.      Post Infusion Assessment:  Patient tolerated infusion without incident.  Blood return noted pre and post infusion.  Site patent and intact, free from redness, edema or discomfort.  No evidence of extravasations.  Access discontinued per protocol.       Discharge Plan:   Patient declined prescription refills.  Discharge instructions reviewed with: Patient.  Patient and/or family verbalized understanding of discharge instructions and all questions answered.  Copy of AVS reviewed with patient and/or family.  Patient will return 12/18/20 for next appointment.  Patient discharged in stable condition accompanied by:  self.  Departure Mode: Ambulatory.    Nguyen Bloom RN

## 2020-12-14 NOTE — NURSING NOTE
Chief Complaint   Patient presents with     Blood Draw     Labs drawn from  by RN in lab. Vitals taken. Checked into next appointment.      Labs drawn by venipuncture by RN in lab.  Vital signs taken.  Pt checked in to next appointment.    Onelia Polanco RN

## 2020-12-14 NOTE — PATIENT INSTRUCTIONS
Contact Numbers:   [facility] [place]: [phone]    Call triage to speak with triage if you are experiencing chills and/or temperature greater than or equal to 100.5, uncontrolled nausea/vomiting, diarrhea, constipation, dizziness, shortness of breath, chest pain, bleeding, unexplained bruising, or any new/concerning symptoms, questions/concerns.     If you are having any concerning symptoms or wish to speak to a provider before your next infusion visit, please call your care coordinator or triage to notify them so we can adequately serve you.     If you need a refill on a medication or narcotic prescription, please call triage or your care coordinator before your infusion appointment.                 December 2020 Sunday Monday Tuesday Wednesday Thursday Friday Saturday             1    IP TREATMENT   6:00 AM   (30 min.)   Catarina Reece, OT   [facility]    IP TREATMENT   6:00 AM   (30 min.)   Shawna Ashraf, PT   [facility]    XR CHEST PORT 1 VIEW   8:45 AM   (20 min.)   [id number]   [facility] 2    IP TREATMENT   6:00 AM   (30 min.)   Alyssia Broderick, PT   [facility]    IP TREATMENT   6:00 AM   (30 min.)   Gatito Beth OT   [facility]    XR CHEST PORT 1 VIEW   8:45 AM   (20 min.)   [id number]   [facility] 3    IP TREATMENT   6:00 AM   (30 min.)   Katja Tanner PT   [facility]    IP TREATMENT   6:00 AM   (30 min.)   Ramila Beverly OT   [facility]    XR CHEST PORT 1 VIEW   8:45 AM   (20 min.)   [email or web address]   [facility] 4    IP TREATMENT   6:00 AM   (30 min.)   Gatito Beth OT   [facility]    IP TREATMENT   6:00 AM   (30 min.)   Katja Tanner, PT   [facility] 5       6     7     8     9    LAB PERIPHERAL  10:45 AM   (15 min.)     [facility] DRAW   [facility]    TELEPHONE VISIT RETURN   3:00 PM   (30 min.)   Angela Sweeney MD   [facility] 10    [facility] ONC INFUSION 60   1:00 PM   (60 min.)   [facility] INFUSION   [facility] 11     12       13     14    LAB PERIPHERAL   9:15 AM   (15 min.)    [facility] DRAW   [facility]    [facility] ONC INFUSION 60   9:30 AM   (60 min.)   [facility] ON[facility] INFUSION   [facility] 15     16     17     18    LAB PERIPHERAL   8:30 AM   (15 min.)    [facility] DRAW   [facility]    [facility] ONC INFUSION 60   9:00 AM   (60 min.)   [facility] ONCOL[facility] INFUSION   [facility] 19       20     21    [facility] ONC INFUSION 60   9:30 AM   (60 min.)   [facility] ONCOL[facility] INFUSION   [facility] 22     23     24    [facility] ONC INFUSION 60   7:00 AM   (60 min.)   [facility] INFUSION   [facility] 25     26       27     28     29     30     31    LAB PERIPHERAL  10:00 AM   (15 min.)    [facility] DRAW   [facility]    [facility] ONC INFUSION 60  10:30 AM   (60 min.)   [facility] INFUSION   [facility]                       January 2021 Sunday Monday Tuesday Wednesday Thursday Friday Saturday                            1     2       3     4     5     6    [facility] ONC INFUSION 60   9:00 AM   (60 min.)   [facility] ON[facility] INFUSION   [facility] 7    [facility] ONC INFUSION 60   2:00 PM   (60 min.)   [facility] ON[facility]Y INFUSION   [facility] 8     9       10     11    [facility] ONC INFUSION 60  10:00 AM   (60 min.)   [facility] INFUSION   [facility] 12     13    TELEPHONE VISIT RETURN   2:30 PM   (30 min.)   Angela Sweeney MD   [facility] 14    [facility] ONC INFUSION  60  10:30 AM   (60 min.)   [facility] ONCOL[facility]Y INFUSION   [facility] 15     16       17     18     19    [facility] NEW  10:05 AM   (40 min.)   Haritha Sargent MD   [facility] [place] 20     21    LAB PERIPHERAL   8:30 AM   (15 min.)   [facility] LAB DRAW   [facility]    [facility] ONC INFUSION 60   9:00 AM   (60 min.)   [facility] ONCOLOGY INFUSION   [facility] 22     23       24     25    [facility] ONC INFUSION 60  10:00 AM   (60 min.)   [facility] ONCOL[facility]Y INFUSION   [facility] 26     27     28    [facility] ONC INFUSION 60  10:30 AM   (60 min.)    ONCOLOGY INFUSION   [facility] 29     30       31                                                   Lab Results:  Recent Results (from the past 12 hour(s))   Comprehensive metabolic panel    Collection Time: 12/14/20  9:52 AM   Result Value Ref Range    Sodium 137 133 - 144 mmol/L    Potassium 3.6 3.4 - 5.3 mmol/L    Chloride 107 94 - 109 mmol/L    Carbon Dioxide 23 20 - 32 mmol/L    Anion Gap 7 3 - 14 mmol/L    Glucose 141 (H) 70 - 99 mg/dL    Urea Nitrogen 26 7 - 30 mg/dL    Creatinine 0.91 0.66 - 1.25 mg/dL    GFR Estimate 83 >60 mL/min/[1.73_m2]    GFR Estimate If Black >90 >60 mL/min/[1.73_m2]    Calcium 8.7 8.5 - 10.1 mg/dL    Bilirubin Total 0.3 0.2 - 1.3 mg/dL    Albumin 3.3 (L) 3.4 - 5.0 g/dL    Protein Total 8.2 6.8 - 8.8 g/dL    Alkaline Phosphatase 161 (H) 40 - 150 U/L    ALT 21 0 - 70 U/L    AST 10 0 - 45 U/L   CBC with platelets differential    Collection Time: 12/14/20  9:52 AM   Result Value Ref Range    WBC 6.7 4.0 - 11.0 10e9/L    RBC Count 2.83 (L) 4.4 - 5.9 10e12/L    Hemoglobin 9.5 (L) 13.3 - 17.7 g/dL    Hematocrit 28.8 (L) 40.0 - 53.0 %     (H) 78 - 100 fl    MCH 33.6 (H) 26.5 - 33.0 pg    MCHC 33.0 31.5 - 36.5 g/dL    RDW 14.2 10.0 - 15.0 %    Platelet Count 569 (H) 150 - 450 10e9/L    Diff  Method Automated Method     % Neutrophils 79.7 %    % Lymphocytes 12.5 %    % Monocytes 7.1 %    % Eosinophils 0.0 %    % Basophils 0.2 %    % Immature Granulocytes 0.5 %    Nucleated RBCs 0 0 /100    Absolute Neutrophil 5.3 1.6 - 8.3 10e9/L    Absolute Lymphocytes 0.8 0.8 - 5.3 10e9/L    Absolute Monocytes 0.5 0.0 - 1.3 10e9/L    Absolute Eosinophils 0.0 0.0 - 0.7 10e9/L    Absolute Basophils 0.0 0.0 - 0.2 10e9/L    Abs Immature Granulocytes 0.0 0 - 0.4 10e9/L    Absolute Nucleated RBC 0.0

## 2020-12-16 ENCOUNTER — MEDICAL CORRESPONDENCE (OUTPATIENT)
Dept: HEALTH INFORMATION MANAGEMENT | Facility: CLINIC | Age: 75
End: 2020-12-16

## 2020-12-18 ENCOUNTER — INFUSION THERAPY VISIT (OUTPATIENT)
Dept: ONCOLOGY | Facility: CLINIC | Age: 75
End: 2020-12-18
Attending: STUDENT IN AN ORGANIZED HEALTH CARE EDUCATION/TRAINING PROGRAM
Payer: COMMERCIAL

## 2020-12-18 ENCOUNTER — APPOINTMENT (OUTPATIENT)
Dept: LAB | Facility: CLINIC | Age: 75
End: 2020-12-18
Attending: STUDENT IN AN ORGANIZED HEALTH CARE EDUCATION/TRAINING PROGRAM
Payer: COMMERCIAL

## 2020-12-18 VITALS
OXYGEN SATURATION: 99 % | TEMPERATURE: 97.6 F | WEIGHT: 142.5 LBS | RESPIRATION RATE: 16 BRPM | HEART RATE: 74 BPM | DIASTOLIC BLOOD PRESSURE: 77 MMHG | SYSTOLIC BLOOD PRESSURE: 143 MMHG | BODY MASS INDEX: 26.93 KG/M2

## 2020-12-18 DIAGNOSIS — C90.00 MULTIPLE MYELOMA, REMISSION STATUS UNSPECIFIED (H): Primary | ICD-10-CM

## 2020-12-18 LAB
ALBUMIN SERPL-MCNC: 3.2 G/DL (ref 3.4–5)
ALP SERPL-CCNC: 145 U/L (ref 40–150)
ALT SERPL W P-5'-P-CCNC: 20 U/L (ref 0–70)
ANION GAP SERPL CALCULATED.3IONS-SCNC: 6 MMOL/L (ref 3–14)
AST SERPL W P-5'-P-CCNC: 14 U/L (ref 0–45)
BASOPHILS # BLD AUTO: 0 10E9/L (ref 0–0.2)
BASOPHILS NFR BLD AUTO: 0.2 %
BILIRUB SERPL-MCNC: 0.3 MG/DL (ref 0.2–1.3)
BUN SERPL-MCNC: 34 MG/DL (ref 7–30)
CALCIUM SERPL-MCNC: 8.8 MG/DL (ref 8.5–10.1)
CHLORIDE SERPL-SCNC: 105 MMOL/L (ref 94–109)
CO2 SERPL-SCNC: 25 MMOL/L (ref 20–32)
CREAT SERPL-MCNC: 1.1 MG/DL (ref 0.66–1.25)
DIFFERENTIAL METHOD BLD: ABNORMAL
EOSINOPHIL # BLD AUTO: 0.1 10E9/L (ref 0–0.7)
EOSINOPHIL NFR BLD AUTO: 2.3 %
ERYTHROCYTE [DISTWIDTH] IN BLOOD BY AUTOMATED COUNT: 14.6 % (ref 10–15)
GFR SERPL CREATININE-BSD FRML MDRD: 66 ML/MIN/{1.73_M2}
GLUCOSE SERPL-MCNC: 96 MG/DL (ref 70–99)
HCT VFR BLD AUTO: 29.4 % (ref 40–53)
HGB BLD-MCNC: 9.5 G/DL (ref 13.3–17.7)
IMM GRANULOCYTES # BLD: 0 10E9/L (ref 0–0.4)
IMM GRANULOCYTES NFR BLD: 0.5 %
LYMPHOCYTES # BLD AUTO: 1.3 10E9/L (ref 0.8–5.3)
LYMPHOCYTES NFR BLD AUTO: 23.9 %
MCH RBC QN AUTO: 33.8 PG (ref 26.5–33)
MCHC RBC AUTO-ENTMCNC: 32.3 G/DL (ref 31.5–36.5)
MCV RBC AUTO: 105 FL (ref 78–100)
MONOCYTES # BLD AUTO: 0.5 10E9/L (ref 0–1.3)
MONOCYTES NFR BLD AUTO: 8.5 %
NEUTROPHILS # BLD AUTO: 3.6 10E9/L (ref 1.6–8.3)
NEUTROPHILS NFR BLD AUTO: 64.6 %
NRBC # BLD AUTO: 0 10*3/UL
NRBC BLD AUTO-RTO: 0 /100
PLATELET # BLD AUTO: 337 10E9/L (ref 150–450)
POTASSIUM SERPL-SCNC: 4.3 MMOL/L (ref 3.4–5.3)
PROT SERPL-MCNC: 7.5 G/DL (ref 6.8–8.8)
RBC # BLD AUTO: 2.81 10E12/L (ref 4.4–5.9)
SODIUM SERPL-SCNC: 137 MMOL/L (ref 133–144)
WBC # BLD AUTO: 5.6 10E9/L (ref 4–11)

## 2020-12-18 PROCEDURE — 250N000011 HC RX IP 250 OP 636: Performed by: STUDENT IN AN ORGANIZED HEALTH CARE EDUCATION/TRAINING PROGRAM

## 2020-12-18 PROCEDURE — 250N000012 HC RX MED GY IP 250 OP 636 PS 637: Performed by: STUDENT IN AN ORGANIZED HEALTH CARE EDUCATION/TRAINING PROGRAM

## 2020-12-18 PROCEDURE — 86334 IMMUNOFIX E-PHORESIS SERUM: CPT | Mod: 26 | Performed by: STUDENT IN AN ORGANIZED HEALTH CARE EDUCATION/TRAINING PROGRAM

## 2020-12-18 PROCEDURE — 84165 PROTEIN E-PHORESIS SERUM: CPT | Mod: TC | Performed by: STUDENT IN AN ORGANIZED HEALTH CARE EDUCATION/TRAINING PROGRAM

## 2020-12-18 PROCEDURE — 82784 ASSAY IGA/IGD/IGG/IGM EACH: CPT | Performed by: STUDENT IN AN ORGANIZED HEALTH CARE EDUCATION/TRAINING PROGRAM

## 2020-12-18 PROCEDURE — 83883 ASSAY NEPHELOMETRY NOT SPEC: CPT | Performed by: STUDENT IN AN ORGANIZED HEALTH CARE EDUCATION/TRAINING PROGRAM

## 2020-12-18 PROCEDURE — 80053 COMPREHEN METABOLIC PANEL: CPT | Performed by: STUDENT IN AN ORGANIZED HEALTH CARE EDUCATION/TRAINING PROGRAM

## 2020-12-18 PROCEDURE — 96401 CHEMO ANTI-NEOPL SQ/IM: CPT

## 2020-12-18 PROCEDURE — 36592 COLLECT BLOOD FROM PICC: CPT

## 2020-12-18 PROCEDURE — 84165 PROTEIN E-PHORESIS SERUM: CPT | Mod: 26 | Performed by: STUDENT IN AN ORGANIZED HEALTH CARE EDUCATION/TRAINING PROGRAM

## 2020-12-18 PROCEDURE — 85025 COMPLETE CBC W/AUTO DIFF WBC: CPT | Performed by: STUDENT IN AN ORGANIZED HEALTH CARE EDUCATION/TRAINING PROGRAM

## 2020-12-18 PROCEDURE — 999N001036 HC STATISTIC TOTAL PROTEIN: Performed by: STUDENT IN AN ORGANIZED HEALTH CARE EDUCATION/TRAINING PROGRAM

## 2020-12-18 PROCEDURE — C9062 DARATUMUMAB HYALURONIDASE: HCPCS | Performed by: STUDENT IN AN ORGANIZED HEALTH CARE EDUCATION/TRAINING PROGRAM

## 2020-12-18 PROCEDURE — 86334 IMMUNOFIX E-PHORESIS SERUM: CPT | Mod: TC | Performed by: STUDENT IN AN ORGANIZED HEALTH CARE EDUCATION/TRAINING PROGRAM

## 2020-12-18 PROCEDURE — 96372 THER/PROPH/DIAG INJ SC/IM: CPT | Performed by: STUDENT IN AN ORGANIZED HEALTH CARE EDUCATION/TRAINING PROGRAM

## 2020-12-18 PROCEDURE — 250N000013 HC RX MED GY IP 250 OP 250 PS 637: Performed by: STUDENT IN AN ORGANIZED HEALTH CARE EDUCATION/TRAINING PROGRAM

## 2020-12-18 RX ORDER — DEXAMETHASONE 4 MG/1
20 TABLET ORAL ONCE
Status: COMPLETED | OUTPATIENT
Start: 2020-12-18 | End: 2020-12-18

## 2020-12-18 RX ORDER — DIPHENHYDRAMINE HCL 25 MG
50 CAPSULE ORAL ONCE
Status: COMPLETED | OUTPATIENT
Start: 2020-12-18 | End: 2020-12-18

## 2020-12-18 RX ORDER — MONTELUKAST SODIUM 10 MG/1
10 TABLET ORAL ONCE
Status: COMPLETED | OUTPATIENT
Start: 2020-12-18 | End: 2020-12-18

## 2020-12-18 RX ORDER — ACETAMINOPHEN 325 MG/1
650 TABLET ORAL ONCE
Status: COMPLETED | OUTPATIENT
Start: 2020-12-18 | End: 2020-12-18

## 2020-12-18 RX ADMIN — ACETAMINOPHEN 650 MG: 325 TABLET ORAL at 09:31

## 2020-12-18 RX ADMIN — DARATUMUMAB AND HYALURONIDASE-FIHJ (HUMAN RECOMBINANT) 1800 MG: 1800; 30000 INJECTION SUBCUTANEOUS at 10:30

## 2020-12-18 RX ADMIN — MONTELUKAST SODIUM 10 MG: 10 TABLET, COATED ORAL at 09:32

## 2020-12-18 RX ADMIN — BORTEZOMIB 2.2 MG: 3.5 INJECTION, POWDER, LYOPHILIZED, FOR SOLUTION INTRAVENOUS; SUBCUTANEOUS at 10:26

## 2020-12-18 RX ADMIN — DIPHENHYDRAMINE HYDROCHLORIDE 50 MG: 25 CAPSULE ORAL at 09:31

## 2020-12-18 RX ADMIN — DEXAMETHASONE 20 MG: 4 TABLET ORAL at 09:38

## 2020-12-18 ASSESSMENT — PAIN SCALES - GENERAL: PAINLEVEL: MILD PAIN (2)

## 2020-12-18 NOTE — PROGRESS NOTES
Chief Complaint   Patient presents with     Blood Draw     IV placed, vitals taken, checked into next appointment.     Labs drawn via IV placed by Jacquie Doherty MA in right.    Jacquie Doherty MA

## 2020-12-18 NOTE — PROGRESS NOTES
Infusion Nursing Note:   Rogelio Marshall presents today for Cycle 1 Day 8 Velcade to Left lower quadrant of abdomen + Darzalex Faspro to Right lower quadrant of abdomen.  Patient seen by provider today: No   present during visit today: Not Applicable.    Note: VSS, denies any new shortness of breath, fevers, chest pain, numbness or tingling. Pt reports no new changes since last seeing Dr. Sweeney. No changes with voiding/bowels. Rogelio mentioned a decrease in appetite, writer encouraged small frequent high-protein meals or Ensure shakes. Tolerating current regimen well.     Pt states he has 2/10 pain on R sided broken ribs from recent fall about 1 month ago and chronic lower back pain from spinal problems-- declined need for intervention, takes oxycodone and tylenol at home with relief. He reported some increased fatigue, relieved by rest. Confirmed patient is taking decadron- pharmacist reviewed decadron schedule with patient.    Intravenous Access:  Peripheral IV (not needed)     Treatment Conditions:  Lab Results   Component Value Date    HGB 9.5 12/18/2020     Lab Results   Component Value Date    WBC 5.6 12/18/2020      Lab Results   Component Value Date    ANEU 3.6 12/18/2020     Lab Results   Component Value Date     12/18/2020          Post Infusion Assessment:  Patient tolerated injections without incident.   Access discontinued per protocol  Some bruising noted to R + L upper quadrants of abdomen (from previous injections) patient denied any abdominal pain or tenderness.    Discharge Plan:   Patient declined prescription refills.  AVS to patient via SCYNEXIST.  Patient will return 12/21/20 for Velcade.   Patient discharged in stable condition accompanied by: self.  Departure Mode: Walker.  Face to Face time: 0.    Radha Rangel RN

## 2020-12-21 ENCOUNTER — INFUSION THERAPY VISIT (OUTPATIENT)
Dept: ONCOLOGY | Facility: CLINIC | Age: 75
End: 2020-12-21
Attending: STUDENT IN AN ORGANIZED HEALTH CARE EDUCATION/TRAINING PROGRAM
Payer: COMMERCIAL

## 2020-12-21 VITALS
SYSTOLIC BLOOD PRESSURE: 155 MMHG | RESPIRATION RATE: 14 BRPM | DIASTOLIC BLOOD PRESSURE: 91 MMHG | TEMPERATURE: 97.9 F | HEART RATE: 106 BPM | OXYGEN SATURATION: 100 %

## 2020-12-21 DIAGNOSIS — C90.00 MULTIPLE MYELOMA, REMISSION STATUS UNSPECIFIED (H): Primary | ICD-10-CM

## 2020-12-21 LAB
ALBUMIN SERPL ELPH-MCNC: 3.8 G/DL (ref 3.7–5.1)
ALPHA1 GLOB SERPL ELPH-MCNC: 0.3 G/DL (ref 0.2–0.4)
ALPHA2 GLOB SERPL ELPH-MCNC: 0.8 G/DL (ref 0.5–0.9)
B-GLOBULIN SERPL ELPH-MCNC: 0.7 G/DL (ref 0.6–1)
GAMMA GLOB SERPL ELPH-MCNC: 1.4 G/DL (ref 0.7–1.6)
IGA SERPL-MCNC: 6 MG/DL (ref 84–499)
IGG SERPL-MCNC: 1729 MG/DL (ref 610–1616)
IGM SERPL-MCNC: 13 MG/DL (ref 35–242)
KAPPA LC UR-MCNC: 6.32 MG/DL (ref 0.33–1.94)
KAPPA LC/LAMBDA SER: 25.28 {RATIO} (ref 0.26–1.65)
LAMBDA LC SERPL-MCNC: 0.25 MG/DL (ref 0.57–2.63)
M PROTEIN SERPL ELPH-MCNC: 1 G/DL
PROT PATTERN SERPL ELPH-IMP: ABNORMAL
PROT PATTERN SERPL IFE-IMP: ABNORMAL

## 2020-12-21 PROCEDURE — 250N000011 HC RX IP 250 OP 636: Mod: JW | Performed by: STUDENT IN AN ORGANIZED HEALTH CARE EDUCATION/TRAINING PROGRAM

## 2020-12-21 PROCEDURE — 96401 CHEMO ANTI-NEOPL SQ/IM: CPT

## 2020-12-21 RX ADMIN — BORTEZOMIB 2.2 MG: 3.5 INJECTION, POWDER, LYOPHILIZED, FOR SOLUTION INTRAVENOUS; SUBCUTANEOUS at 10:03

## 2020-12-21 ASSESSMENT — PAIN SCALES - GENERAL: PAINLEVEL: MILD PAIN (2)

## 2020-12-21 NOTE — PROGRESS NOTES
Infusion Nursing Note:  Rogelio Marshall presents today for C1D11 Velcade.    Patient seen by provider today: No   present during visit today: Not Applicable.    Note: Patient denies any of the following: fevers, chills, difficulty with energy, vision or hearing changes, chest pain, dyspnea, abdominal pain, nausea, vomiting, diarrhea, constipation, urinary concerns, headaches, numbness, tingling, issues with sleep or mood. He also denies lumps, bumps, rashes or skin lesions, bleeding or bruising issues.  Right rib pain continues, 2/10 today.  Takes oxycodone with relief.    Intravenous Access:  No Intravenous access at this visit.    Treatment Conditions:  Not Applicable.      Post Infusion Assessment:  Patient tolerated injection into LUQ without incident.       Discharge Plan:   Patient declined prescription refills.  Discharge instructions reviewed with: Patient.  Patient and/or family verbalized understanding of discharge instructions and all questions answered.  Copy of AVS reviewed with patient and/or family.  Patient will return 12/24 for next appointment.  Patient discharged in stable condition accompanied by: self.  Departure Mode: Ambulatory.    Kerri Ann RN

## 2020-12-22 ENCOUNTER — MEDICAL CORRESPONDENCE (OUTPATIENT)
Dept: HEALTH INFORMATION MANAGEMENT | Facility: CLINIC | Age: 75
End: 2020-12-22

## 2020-12-24 ENCOUNTER — APPOINTMENT (OUTPATIENT)
Dept: LAB | Facility: CLINIC | Age: 75
End: 2020-12-24
Attending: STUDENT IN AN ORGANIZED HEALTH CARE EDUCATION/TRAINING PROGRAM
Payer: COMMERCIAL

## 2020-12-24 ENCOUNTER — TELEPHONE (OUTPATIENT)
Dept: PHARMACY | Facility: CLINIC | Age: 75
End: 2020-12-24

## 2020-12-24 ENCOUNTER — INFUSION THERAPY VISIT (OUTPATIENT)
Dept: ONCOLOGY | Facility: CLINIC | Age: 75
End: 2020-12-24
Attending: STUDENT IN AN ORGANIZED HEALTH CARE EDUCATION/TRAINING PROGRAM
Payer: COMMERCIAL

## 2020-12-24 VITALS
DIASTOLIC BLOOD PRESSURE: 85 MMHG | OXYGEN SATURATION: 99 % | TEMPERATURE: 97.9 F | HEART RATE: 93 BPM | RESPIRATION RATE: 16 BRPM | SYSTOLIC BLOOD PRESSURE: 156 MMHG | WEIGHT: 148.1 LBS | BODY MASS INDEX: 27.98 KG/M2

## 2020-12-24 DIAGNOSIS — C90.00 MULTIPLE MYELOMA, REMISSION STATUS UNSPECIFIED (H): Primary | ICD-10-CM

## 2020-12-24 LAB
ALBUMIN SERPL-MCNC: 3.4 G/DL (ref 3.4–5)
ALP SERPL-CCNC: 123 U/L (ref 40–150)
ALT SERPL W P-5'-P-CCNC: 19 U/L (ref 0–70)
ANION GAP SERPL CALCULATED.3IONS-SCNC: 7 MMOL/L (ref 3–14)
AST SERPL W P-5'-P-CCNC: 15 U/L (ref 0–45)
BASOPHILS # BLD AUTO: 0 10E9/L (ref 0–0.2)
BASOPHILS NFR BLD AUTO: 0 %
BILIRUB SERPL-MCNC: 0.3 MG/DL (ref 0.2–1.3)
BUN SERPL-MCNC: 33 MG/DL (ref 7–30)
CALCIUM SERPL-MCNC: 8.8 MG/DL (ref 8.5–10.1)
CHLORIDE SERPL-SCNC: 108 MMOL/L (ref 94–109)
CO2 SERPL-SCNC: 25 MMOL/L (ref 20–32)
CREAT SERPL-MCNC: 1.19 MG/DL (ref 0.66–1.25)
DIFFERENTIAL METHOD BLD: ABNORMAL
EOSINOPHIL # BLD AUTO: 0.2 10E9/L (ref 0–0.7)
EOSINOPHIL NFR BLD AUTO: 3.5 %
ERYTHROCYTE [DISTWIDTH] IN BLOOD BY AUTOMATED COUNT: 14.8 % (ref 10–15)
GFR SERPL CREATININE-BSD FRML MDRD: 60 ML/MIN/{1.73_M2}
GLUCOSE SERPL-MCNC: 90 MG/DL (ref 70–99)
HCT VFR BLD AUTO: 30.9 % (ref 40–53)
HGB BLD-MCNC: 10.1 G/DL (ref 13.3–17.7)
IMM GRANULOCYTES # BLD: 0 10E9/L (ref 0–0.4)
IMM GRANULOCYTES NFR BLD: 0.6 %
LYMPHOCYTES # BLD AUTO: 1.4 10E9/L (ref 0.8–5.3)
LYMPHOCYTES NFR BLD AUTO: 29.7 %
MCH RBC QN AUTO: 33.7 PG (ref 26.5–33)
MCHC RBC AUTO-ENTMCNC: 32.7 G/DL (ref 31.5–36.5)
MCV RBC AUTO: 103 FL (ref 78–100)
MONOCYTES # BLD AUTO: 0.7 10E9/L (ref 0–1.3)
MONOCYTES NFR BLD AUTO: 13.7 %
NEUTROPHILS # BLD AUTO: 2.5 10E9/L (ref 1.6–8.3)
NEUTROPHILS NFR BLD AUTO: 52.5 %
NRBC # BLD AUTO: 0 10*3/UL
NRBC BLD AUTO-RTO: 0 /100
PLATELET # BLD AUTO: 155 10E9/L (ref 150–450)
POTASSIUM SERPL-SCNC: 3.7 MMOL/L (ref 3.4–5.3)
PROT SERPL-MCNC: 7.3 G/DL (ref 6.8–8.8)
RBC # BLD AUTO: 3 10E12/L (ref 4.4–5.9)
SODIUM SERPL-SCNC: 139 MMOL/L (ref 133–144)
WBC # BLD AUTO: 4.8 10E9/L (ref 4–11)

## 2020-12-24 PROCEDURE — C9062 DARATUMUMAB HYALURONIDASE: HCPCS | Performed by: STUDENT IN AN ORGANIZED HEALTH CARE EDUCATION/TRAINING PROGRAM

## 2020-12-24 PROCEDURE — 96372 THER/PROPH/DIAG INJ SC/IM: CPT | Performed by: STUDENT IN AN ORGANIZED HEALTH CARE EDUCATION/TRAINING PROGRAM

## 2020-12-24 PROCEDURE — 96401 CHEMO ANTI-NEOPL SQ/IM: CPT | Performed by: STUDENT IN AN ORGANIZED HEALTH CARE EDUCATION/TRAINING PROGRAM

## 2020-12-24 PROCEDURE — 36415 COLL VENOUS BLD VENIPUNCTURE: CPT

## 2020-12-24 PROCEDURE — 85025 COMPLETE CBC W/AUTO DIFF WBC: CPT | Performed by: STUDENT IN AN ORGANIZED HEALTH CARE EDUCATION/TRAINING PROGRAM

## 2020-12-24 PROCEDURE — 80053 COMPREHEN METABOLIC PANEL: CPT | Performed by: STUDENT IN AN ORGANIZED HEALTH CARE EDUCATION/TRAINING PROGRAM

## 2020-12-24 PROCEDURE — 250N000013 HC RX MED GY IP 250 OP 250 PS 637: Performed by: STUDENT IN AN ORGANIZED HEALTH CARE EDUCATION/TRAINING PROGRAM

## 2020-12-24 PROCEDURE — 250N000011 HC RX IP 250 OP 636: Performed by: STUDENT IN AN ORGANIZED HEALTH CARE EDUCATION/TRAINING PROGRAM

## 2020-12-24 PROCEDURE — 250N000012 HC RX MED GY IP 250 OP 636 PS 637: Performed by: STUDENT IN AN ORGANIZED HEALTH CARE EDUCATION/TRAINING PROGRAM

## 2020-12-24 RX ORDER — MONTELUKAST SODIUM 10 MG/1
10 TABLET ORAL ONCE
Status: COMPLETED | OUTPATIENT
Start: 2020-12-24 | End: 2020-12-24

## 2020-12-24 RX ORDER — ACETAMINOPHEN 325 MG/1
650 TABLET ORAL ONCE
Status: COMPLETED | OUTPATIENT
Start: 2020-12-24 | End: 2020-12-24

## 2020-12-24 RX ORDER — DIPHENHYDRAMINE HCL 25 MG
50 CAPSULE ORAL ONCE
Status: COMPLETED | OUTPATIENT
Start: 2020-12-24 | End: 2020-12-24

## 2020-12-24 RX ORDER — DEXAMETHASONE 4 MG/1
20 TABLET ORAL ONCE
Status: COMPLETED | OUTPATIENT
Start: 2020-12-24 | End: 2020-12-24

## 2020-12-24 RX ADMIN — ACETAMINOPHEN 650 MG: 325 TABLET ORAL at 07:24

## 2020-12-24 RX ADMIN — DEXAMETHASONE 20 MG: 4 TABLET ORAL at 07:24

## 2020-12-24 RX ADMIN — MONTELUKAST SODIUM 10 MG: 10 TABLET, COATED ORAL at 07:24

## 2020-12-24 RX ADMIN — DARATUMUMAB AND HYALURONIDASE-FIHJ (HUMAN RECOMBINANT) 1800 MG: 1800; 30000 INJECTION SUBCUTANEOUS at 08:34

## 2020-12-24 RX ADMIN — DIPHENHYDRAMINE HYDROCHLORIDE 50 MG: 25 CAPSULE ORAL at 07:24

## 2020-12-24 ASSESSMENT — PAIN SCALES - GENERAL: PAINLEVEL: MILD PAIN (2)

## 2020-12-24 NOTE — NURSING NOTE
Chief Complaint   Patient presents with     Blood Draw     labs drawn by venipuncture by rn in lab.  vital signs taken.     Labs drawn by venipuncture by RN in lab.  Vital signs taken.  Pt checked in to next appointment.    Nahomi Sweet RN

## 2020-12-24 NOTE — PATIENT INSTRUCTIONS
[facility] Main Line: [phone]    Call triage nurse with chills and/or temperature greater than or equal to 100.4, uncontrolled nausea/vomiting, diarrhea, constipation, dizziness, shortness of breath, chest pain, bleeding, unexplained bruising, or any new/concerning symptoms, questions/concerns.   If you are having any concerning symptoms or wish to speak to a provider before your next infusion visit, please call your care coordinator or triage to notify them so we can adequately serve you.   Nurse Triage line:  [phone]    If after hours, weekends, or holidays, call main hospital  and ask for Oncology doctor on call @ [phone]      December 2020 Sunday Monday Tuesday Wednesday Thursday Friday Saturday             1    IP TREATMENT   6:00 AM   (30 min.)   Catarina Reece OT   [facility]    IP TREATMENT   6:00 AM   (30 min.)   Shawna Ashraf, PT   [facility]    XR CHEST PORT 1 VIEW   8:45 AM   (20 min.)   [id number]   [facility] 2    IP TREATMENT   6:00 AM   (30 min.)   Alyssia Broderick PT   [facility]    IP TREATMENT   6:00 AM   (30 min.)   Gatito Beth OT   [facility]    XR CHEST PORT 1 VIEW   8:45 AM   (20 min.)   [id number]   [facility] 3    IP TREATMENT   6:00 AM   (30 min.)   Katja Tanner PT   [facility]    IP TREATMENT   6:00 AM   (30 min.)   Ramila Beverly OT   [facility]    XR CHEST PORT 1 VIEW   8:45 AM   (20 min.)   [id number]   [facility] 4    IP TREATMENT   6:00 AM   (30 min.)   Gatito Beth OT   [facility]    IP TREATMENT   6:00 AM   (30 min.)   Katja Tanner, PT   [facility] 5       6     7     8     9    LAB PERIPHERAL  10:45 AM   (15 min.)   [facility] LAB DRAW   [facility]  [facility]    TELEPHONE VISIT RETURN   3:00 PM   (30 min.)   Angela Sweeney MD   [facility] 10    UMP ONC INFUSION 60   1:00 PM   (60 min.)   [facility] ONCOLOGY INFUSION   [facility] 11     12       13     14    LAB PERIPHERAL   9:15 AM   (15 min.)   [facility]   [facility]    UMP ONC INFUSION 60   9:30 AM   (60 min.)   [facility] ONCOL[facility]Y INFUSION   [facility] 15     16     17     18    LAB PERIPHERAL   8:30 AM   (15 min.)    [facility] DRAW   [facility]    UMP ONC INFUSION 60   9:00 AM   (60 min.)   [facility] ONCOLOGY INFUSION   [facility] 19       20     21    UMP ONC INFUSION 60   9:30 AM   (60 min.)   [facility] ONCOL[facility]Y INFUSION   [facility] 22     23     24    LAB PERIPHERAL   6:45 AM   (15 min.)    [facility] DRAW   [facility]    UMP ONC INFUSION 60   7:00 AM   (60 min.)   [facility] ONCOL[facility]Y INFUSION   [facility] 25     26       27     28     29     30     31    LAB PERIPHERAL  10:00 AM   (15 min.)    [facility] DRAW   [facility]    UMP ONC INFUSION 60  10:30 AM   (60 min.)   [facility] ONCOL[facility]Y INFUSION   [facility]                       January 2021 Sunday Monday Tuesday Wednesday Thursday Friday Saturday                            1     2       3     4     5     6    UMP ONC INFUSION 60   9:00 AM   (60 min.)   [facility] ONCOL[facility]Y INFUSION   [facility] 7    UMP ONC INFUSION 60   2:00 PM   (60 min.)   [facility] ONCOLOGY INFUSION   [facility] 8     9       10     11    UMP ONC INFUSION 60  10:00 AM   (60 min.)   [facility] ONCOL[facility]Y INFUSION   [facility] 12     13    TELEPHONE VISIT RETURN   2:30 PM   (30 min.)   Angela Sweeney  MD Yamel   [facility] 14    UMP ONC INFUSION 60  10:30 AM   (60 min.)   [facility] ONCOLOGY INFUSION   [facility] 15     16       17     18     19    UMP NEW  10:05 AM   (40 min.)   Haritha Sargent MD   [facility] [place] 20     21    LAB PERIPHERAL   8:30 AM   (15 min.)   [facility] LAB DRAW   [facility]    UMP ONC INFUSION 60   9:00 AM   (60 min.)   UC ONCOLOGY INFUSION   [facility] 22     23       24     25    UMP ONC INFUSION 60  10:00 AM   (60 min.)   [facility] ONCOLOGY INFUSION   [facility] 26     27     28    UMP ONC INFUSION 60  10:30 AM   (60 min.)   [facility] ONCOLOGY INFUSION   [facility] 29     30       31                                                   Lab Results:  Recent Results (from the past 12 hour(s))   Comprehensive metabolic panel    Collection Time: 12/24/20  7:00 AM   Result Value Ref Range    Sodium 139 133 - 144 mmol/L    Potassium 3.7 3.4 - 5.3 mmol/L    Chloride 108 94 - 109 mmol/L    Carbon Dioxide 25 20 - 32 mmol/L    Anion Gap 7 3 - 14 mmol/L    Glucose 90 70 - 99 mg/dL    Urea Nitrogen 33 (H) 7 - 30 mg/dL    Creatinine 1.19 0.66 - 1.25 mg/dL    GFR Estimate 60 (L) >60 mL/min/[1.73_m2]    GFR Estimate If Black 69 >60 mL/min/[1.73_m2]    Calcium 8.8 8.5 - 10.1 mg/dL    Bilirubin Total 0.3 0.2 - 1.3 mg/dL    Albumin 3.4 3.4 - 5.0 g/dL    Protein Total 7.3 6.8 - 8.8 g/dL    Alkaline Phosphatase 123 40 - 150 U/L    ALT 19 0 - 70 U/L    AST 15 0 - 45 U/L   CBC with platelets differential    Collection Time: 12/24/20  7:00 AM   Result Value Ref Range    WBC 4.8 4.0 - 11.0 10e9/L    RBC Count 3.00 (L) 4.4 - 5.9 10e12/L    Hemoglobin 10.1 (L) 13.3 - 17.7 g/dL    Hematocrit 30.9 (L) 40.0 - 53.0 %     (H) 78 - 100 fl    MCH 33.7 (H) 26.5 - 33.0 pg    MCHC 32.7 31.5 - 36.5 g/dL    RDW  14.8 10.0 - 15.0 %    Platelet Count 155 150 - 450 10e9/L    Diff Method Automated Method     % Neutrophils 52.5 %    % Lymphocytes 29.7 %    % Monocytes 13.7 %    % Eosinophils 3.5 %    % Basophils 0.0 %    % Immature Granulocytes 0.6 %    Nucleated RBCs 0 0 /100    Absolute Neutrophil 2.5 1.6 - 8.3 10e9/L    Absolute Lymphocytes 1.4 0.8 - 5.3 10e9/L    Absolute Monocytes 0.7 0.0 - 1.3 10e9/L    Absolute Eosinophils 0.2 0.0 - 0.7 10e9/L    Absolute Basophils 0.0 0.0 - 0.2 10e9/L    Abs Immature Granulocytes 0.0 0 - 0.4 10e9/L    Absolute Nucleated RBC 0.0

## 2020-12-24 NOTE — ORAL ONC MGMT
[facility] Oral Chemotherapy Monitoring Program    Thank you for the opportunity to be a part in the care of this patient's oral chemotherapy. The oncology pharmacy will no longer be following this patient for oral chemotherapy. If there are any questions or the plan changes, feel free to contact us.    ORAL CHEMOTHERAPY 11/10/2020 11/16/2020 11/24/2020 12/24/2020   Assessment Type New Teach Lab Monitoring Initial Follow up Discontinuation   Stop Date - - - 11/27/2020   Reason for Discontinuation - - - Unacceptable toxicity   Diagnosis Code Multiple Myeloma Multiple Myeloma Multiple Myeloma Multiple Myeloma   Providers Dr. Patel Sweeney   Clinic Name/Location [facility]   Drug Name Revlimid (Lenalidomide) Revlimid (Lenalidomide) Revlimid (Lenalidomide) Revlimid (Lenalidomide)   Dose 25 mg 25 mg 25 mg 25 mg   Current Schedule Daily Daily Daily Daily   Cycle Details 2 weeks on, 1 week off 2 weeks on, 1 week off 2 weeks on, 1 week off 2 weeks on, 1 week off   Start Date of Last Cycle - - 11/17/2020 -   Planned next cycle start date 11/16/2020 - - -   Doses missed in last 2 weeks - - 0 -   Adherence Assessment - - Adherent -   Adverse Effects - - Constipation -   Constipation - - Resolved due to intervention -   Pharmacist Intervention(constipation) - - Yes -   Intervention(s) - - Patient education;OTC recommendation -   Any new drug interactions? No - No -   Is the dose as ordered appropriate for the patient? Yes - Yes -       Sherly Quezada  Pharmacy Intern  [facility]  [phone]

## 2020-12-24 NOTE — PROGRESS NOTES
Infusion Nursing Note:  Rogelio Marshall presents today for C1D15 Darzalex Faspro.    Patient seen by provider today: No   present during visit today: Not Applicable.    Note: Patient reported to clinic today feeling well with no new complaints. Patient reported that after receiving his last Velcade he was home and felt a generalized pain all over his body, no fever or chill, no other symptoms. Lasted 30 minutes and then was gone. Reviewed with patient that if this happens again to call Triage line and talk with a nurse.  Patient declined any interventions for pain during infusion visit.    Intravenous Access:  Labs drawn without difficulty.  By Lab.    Treatment Conditions:  Lab Results   Component Value Date    HGB 10.1 12/24/2020     Lab Results   Component Value Date    WBC 4.8 12/24/2020      Lab Results   Component Value Date    ANEU 2.5 12/24/2020     Lab Results   Component Value Date     12/24/2020      Lab Results   Component Value Date     12/24/2020                   Lab Results   Component Value Date    POTASSIUM 3.7 12/24/2020           Lab Results   Component Value Date    MAG 2.1 12/02/2020            Lab Results   Component Value Date    CR 1.19 12/24/2020                   Lab Results   Component Value Date    MIRI 8.8 12/24/2020                Lab Results   Component Value Date    BILITOTAL 0.3 12/24/2020           Lab Results   Component Value Date    ALBUMIN 3.4 12/24/2020                    Lab Results   Component Value Date    ALT 19 12/24/2020           Lab Results   Component Value Date    AST 15 12/24/2020       Results reviewed, labs MET treatment parameters, ok to proceed with treatment.      Post Infusion Assessment:  Patient tolerated injection without incident. One injection of subcutaneous Darzalex Faspro given in left side lower quadrant of abdomen.    Discharge Plan:   Patient declined prescription refills.  Discharge instructions reviewed with:  Patient.  Patient and/or family verbalized understanding of discharge instructions and all questions answered.  Copy of AVS reviewed with patient and/or family.  Patient will return 12/31/2020 for next appointment.  Patient discharged in stable condition accompanied by: self.  Departure Mode: Ambulatory with walker.  Face to Face time: 10 minutes.    Marquise Brenner RN

## 2020-12-28 DIAGNOSIS — C90.00 MULTIPLE MYELOMA, REMISSION STATUS UNSPECIFIED (H): Primary | ICD-10-CM

## 2020-12-28 LAB
BACTERIA SPEC CULT: NORMAL
Lab: NORMAL
SPECIMEN SOURCE: NORMAL

## 2020-12-28 RX ORDER — ACETAMINOPHEN 325 MG/1
650 TABLET ORAL ONCE
Status: CANCELLED | OUTPATIENT
Start: 2021-01-11

## 2020-12-28 RX ORDER — SODIUM CHLORIDE 9 MG/ML
1000 INJECTION, SOLUTION INTRAVENOUS CONTINUOUS PRN
Status: CANCELLED
Start: 2021-01-18

## 2020-12-28 RX ORDER — DIPHENHYDRAMINE HYDROCHLORIDE 50 MG/ML
50 INJECTION INTRAMUSCULAR; INTRAVENOUS
Status: CANCELLED
Start: 2021-01-18

## 2020-12-28 RX ORDER — HEPARIN SODIUM,PORCINE 10 UNIT/ML
5 VIAL (ML) INTRAVENOUS
Status: CANCELLED | OUTPATIENT
Start: 2021-01-18

## 2020-12-28 RX ORDER — HEPARIN SODIUM (PORCINE) LOCK FLUSH IV SOLN 100 UNIT/ML 100 UNIT/ML
5 SOLUTION INTRAVENOUS
Status: CANCELLED | OUTPATIENT
Start: 2020-12-31

## 2020-12-28 RX ORDER — DIPHENHYDRAMINE HYDROCHLORIDE 50 MG/ML
50 INJECTION INTRAMUSCULAR; INTRAVENOUS
Status: CANCELLED
Start: 2020-12-31

## 2020-12-28 RX ORDER — HEPARIN SODIUM (PORCINE) LOCK FLUSH IV SOLN 100 UNIT/ML 100 UNIT/ML
5 SOLUTION INTRAVENOUS
Status: CANCELLED | OUTPATIENT
Start: 2021-01-18

## 2020-12-28 RX ORDER — EPINEPHRINE 1 MG/ML
0.3 INJECTION, SOLUTION INTRAMUSCULAR; SUBCUTANEOUS EVERY 5 MIN PRN
Status: CANCELLED | OUTPATIENT
Start: 2020-12-31

## 2020-12-28 RX ORDER — LORAZEPAM 2 MG/ML
0.5 INJECTION INTRAMUSCULAR EVERY 4 HOURS PRN
Status: CANCELLED
Start: 2020-12-31

## 2020-12-28 RX ORDER — METHYLPREDNISOLONE SODIUM SUCCINATE 125 MG/2ML
125 INJECTION, POWDER, LYOPHILIZED, FOR SOLUTION INTRAMUSCULAR; INTRAVENOUS
Status: CANCELLED
Start: 2021-01-11

## 2020-12-28 RX ORDER — ALBUTEROL SULFATE 90 UG/1
1-2 AEROSOL, METERED RESPIRATORY (INHALATION)
Status: CANCELLED
Start: 2021-01-03

## 2020-12-28 RX ORDER — NALOXONE HYDROCHLORIDE 0.4 MG/ML
.1-.4 INJECTION, SOLUTION INTRAMUSCULAR; INTRAVENOUS; SUBCUTANEOUS
Status: CANCELLED | OUTPATIENT
Start: 2020-12-31

## 2020-12-28 RX ORDER — MEPERIDINE HYDROCHLORIDE 25 MG/ML
25 INJECTION INTRAMUSCULAR; INTRAVENOUS; SUBCUTANEOUS EVERY 30 MIN PRN
Status: CANCELLED | OUTPATIENT
Start: 2021-01-11

## 2020-12-28 RX ORDER — DIPHENHYDRAMINE HCL 25 MG
50 CAPSULE ORAL ONCE
Status: CANCELLED | OUTPATIENT
Start: 2020-12-31

## 2020-12-28 RX ORDER — ACETAMINOPHEN 325 MG/1
650 TABLET ORAL ONCE
Status: CANCELLED | OUTPATIENT
Start: 2020-12-31

## 2020-12-28 RX ORDER — EPINEPHRINE 1 MG/ML
0.3 INJECTION, SOLUTION INTRAMUSCULAR; SUBCUTANEOUS EVERY 5 MIN PRN
Status: CANCELLED | OUTPATIENT
Start: 2021-01-14

## 2020-12-28 RX ORDER — DEXAMETHASONE 4 MG/1
20 TABLET ORAL ONCE
Status: CANCELLED | OUTPATIENT
Start: 2021-01-11

## 2020-12-28 RX ORDER — SODIUM CHLORIDE 9 MG/ML
1000 INJECTION, SOLUTION INTRAVENOUS CONTINUOUS PRN
Status: CANCELLED
Start: 2020-12-31

## 2020-12-28 RX ORDER — DEXAMETHASONE 4 MG/1
20 TABLET ORAL ONCE
Status: CANCELLED | OUTPATIENT
Start: 2020-12-31

## 2020-12-28 RX ORDER — DEXAMETHASONE 4 MG/1
20 TABLET ORAL ONCE
Status: CANCELLED | OUTPATIENT
Start: 2021-01-18

## 2020-12-28 RX ORDER — HEPARIN SODIUM,PORCINE 10 UNIT/ML
5 VIAL (ML) INTRAVENOUS
Status: CANCELLED | OUTPATIENT
Start: 2020-12-31

## 2020-12-28 RX ORDER — ACETAMINOPHEN 325 MG/1
650 TABLET ORAL ONCE
Status: CANCELLED | OUTPATIENT
Start: 2021-01-18

## 2020-12-28 RX ORDER — NALOXONE HYDROCHLORIDE 0.4 MG/ML
.1-.4 INJECTION, SOLUTION INTRAMUSCULAR; INTRAVENOUS; SUBCUTANEOUS
Status: CANCELLED | OUTPATIENT
Start: 2021-01-18

## 2020-12-28 RX ORDER — DIPHENHYDRAMINE HCL 25 MG
50 CAPSULE ORAL ONCE
Status: CANCELLED | OUTPATIENT
Start: 2021-01-11

## 2020-12-28 RX ORDER — ALBUTEROL SULFATE 90 UG/1
1-2 AEROSOL, METERED RESPIRATORY (INHALATION)
Status: CANCELLED
Start: 2021-01-11

## 2020-12-28 RX ORDER — ALBUTEROL SULFATE 90 UG/1
1-2 AEROSOL, METERED RESPIRATORY (INHALATION)
Status: CANCELLED
Start: 2020-12-31

## 2020-12-28 RX ORDER — SODIUM CHLORIDE 9 MG/ML
1000 INJECTION, SOLUTION INTRAVENOUS CONTINUOUS PRN
Status: CANCELLED
Start: 2021-01-03

## 2020-12-28 RX ORDER — METHYLPREDNISOLONE SODIUM SUCCINATE 125 MG/2ML
125 INJECTION, POWDER, LYOPHILIZED, FOR SOLUTION INTRAMUSCULAR; INTRAVENOUS
Status: CANCELLED
Start: 2021-01-14

## 2020-12-28 RX ORDER — ALBUTEROL SULFATE 0.83 MG/ML
2.5 SOLUTION RESPIRATORY (INHALATION)
Status: CANCELLED | OUTPATIENT
Start: 2020-12-31

## 2020-12-28 RX ORDER — EPINEPHRINE 1 MG/ML
0.3 INJECTION, SOLUTION INTRAMUSCULAR; SUBCUTANEOUS EVERY 5 MIN PRN
Status: CANCELLED | OUTPATIENT
Start: 2021-01-03

## 2020-12-28 RX ORDER — LORAZEPAM 2 MG/ML
0.5 INJECTION INTRAMUSCULAR EVERY 4 HOURS PRN
Status: CANCELLED
Start: 2021-01-18

## 2020-12-28 RX ORDER — SODIUM CHLORIDE 9 MG/ML
1000 INJECTION, SOLUTION INTRAVENOUS CONTINUOUS PRN
Status: CANCELLED
Start: 2021-01-14

## 2020-12-28 RX ORDER — MEPERIDINE HYDROCHLORIDE 25 MG/ML
25 INJECTION INTRAMUSCULAR; INTRAVENOUS; SUBCUTANEOUS EVERY 30 MIN PRN
Status: CANCELLED | OUTPATIENT
Start: 2021-01-14

## 2020-12-28 RX ORDER — SODIUM CHLORIDE 9 MG/ML
1000 INJECTION, SOLUTION INTRAVENOUS CONTINUOUS PRN
Status: CANCELLED
Start: 2021-01-11

## 2020-12-28 RX ORDER — ALBUTEROL SULFATE 0.83 MG/ML
2.5 SOLUTION RESPIRATORY (INHALATION)
Status: CANCELLED | OUTPATIENT
Start: 2021-01-11

## 2020-12-28 RX ORDER — ALBUTEROL SULFATE 90 UG/1
1-2 AEROSOL, METERED RESPIRATORY (INHALATION)
Status: CANCELLED
Start: 2021-01-18

## 2020-12-28 RX ORDER — DIPHENHYDRAMINE HYDROCHLORIDE 50 MG/ML
50 INJECTION INTRAMUSCULAR; INTRAVENOUS
Status: CANCELLED
Start: 2021-01-14

## 2020-12-28 RX ORDER — LORAZEPAM 2 MG/ML
0.5 INJECTION INTRAMUSCULAR EVERY 4 HOURS PRN
Status: CANCELLED
Start: 2021-01-14

## 2020-12-28 RX ORDER — DIPHENHYDRAMINE HCL 25 MG
50 CAPSULE ORAL ONCE
Status: CANCELLED | OUTPATIENT
Start: 2021-01-18

## 2020-12-28 RX ORDER — NALOXONE HYDROCHLORIDE 0.4 MG/ML
.1-.4 INJECTION, SOLUTION INTRAMUSCULAR; INTRAVENOUS; SUBCUTANEOUS
Status: CANCELLED | OUTPATIENT
Start: 2021-01-03

## 2020-12-28 RX ORDER — NALOXONE HYDROCHLORIDE 0.4 MG/ML
.1-.4 INJECTION, SOLUTION INTRAMUSCULAR; INTRAVENOUS; SUBCUTANEOUS
Status: CANCELLED | OUTPATIENT
Start: 2021-01-14

## 2020-12-28 RX ORDER — ALBUTEROL SULFATE 0.83 MG/ML
2.5 SOLUTION RESPIRATORY (INHALATION)
Status: CANCELLED | OUTPATIENT
Start: 2021-01-14

## 2020-12-28 RX ORDER — NALOXONE HYDROCHLORIDE 0.4 MG/ML
.1-.4 INJECTION, SOLUTION INTRAMUSCULAR; INTRAVENOUS; SUBCUTANEOUS
Status: CANCELLED | OUTPATIENT
Start: 2021-01-11

## 2020-12-28 RX ORDER — HEPARIN SODIUM,PORCINE 10 UNIT/ML
5 VIAL (ML) INTRAVENOUS
Status: CANCELLED | OUTPATIENT
Start: 2021-01-11

## 2020-12-28 RX ORDER — ALBUTEROL SULFATE 90 UG/1
1-2 AEROSOL, METERED RESPIRATORY (INHALATION)
Status: CANCELLED
Start: 2021-01-14

## 2020-12-28 RX ORDER — HEPARIN SODIUM,PORCINE 10 UNIT/ML
5 VIAL (ML) INTRAVENOUS
Status: CANCELLED | OUTPATIENT
Start: 2021-01-03

## 2020-12-28 RX ORDER — MEPERIDINE HYDROCHLORIDE 25 MG/ML
25 INJECTION INTRAMUSCULAR; INTRAVENOUS; SUBCUTANEOUS EVERY 30 MIN PRN
Status: CANCELLED | OUTPATIENT
Start: 2021-01-18

## 2020-12-28 RX ORDER — METHYLPREDNISOLONE SODIUM SUCCINATE 125 MG/2ML
125 INJECTION, POWDER, LYOPHILIZED, FOR SOLUTION INTRAMUSCULAR; INTRAVENOUS
Status: CANCELLED
Start: 2020-12-31

## 2020-12-28 RX ORDER — ALBUTEROL SULFATE 0.83 MG/ML
2.5 SOLUTION RESPIRATORY (INHALATION)
Status: CANCELLED | OUTPATIENT
Start: 2021-01-18

## 2020-12-28 RX ORDER — METHYLPREDNISOLONE SODIUM SUCCINATE 125 MG/2ML
125 INJECTION, POWDER, LYOPHILIZED, FOR SOLUTION INTRAMUSCULAR; INTRAVENOUS
Status: CANCELLED
Start: 2021-01-18

## 2020-12-28 RX ORDER — HEPARIN SODIUM (PORCINE) LOCK FLUSH IV SOLN 100 UNIT/ML 100 UNIT/ML
5 SOLUTION INTRAVENOUS
Status: CANCELLED | OUTPATIENT
Start: 2021-01-03

## 2020-12-28 RX ORDER — HEPARIN SODIUM,PORCINE 10 UNIT/ML
5 VIAL (ML) INTRAVENOUS
Status: CANCELLED | OUTPATIENT
Start: 2021-01-14

## 2020-12-28 RX ORDER — LORAZEPAM 2 MG/ML
0.5 INJECTION INTRAMUSCULAR EVERY 4 HOURS PRN
Status: CANCELLED
Start: 2021-01-03

## 2020-12-28 RX ORDER — HEPARIN SODIUM (PORCINE) LOCK FLUSH IV SOLN 100 UNIT/ML 100 UNIT/ML
5 SOLUTION INTRAVENOUS
Status: CANCELLED | OUTPATIENT
Start: 2021-01-14

## 2020-12-28 RX ORDER — MEPERIDINE HYDROCHLORIDE 25 MG/ML
25 INJECTION INTRAMUSCULAR; INTRAVENOUS; SUBCUTANEOUS EVERY 30 MIN PRN
Status: CANCELLED | OUTPATIENT
Start: 2020-12-31

## 2020-12-28 RX ORDER — LORAZEPAM 2 MG/ML
0.5 INJECTION INTRAMUSCULAR EVERY 4 HOURS PRN
Status: CANCELLED
Start: 2021-01-11

## 2020-12-28 RX ORDER — ALBUTEROL SULFATE 0.83 MG/ML
2.5 SOLUTION RESPIRATORY (INHALATION)
Status: CANCELLED | OUTPATIENT
Start: 2021-01-03

## 2020-12-28 RX ORDER — HEPARIN SODIUM (PORCINE) LOCK FLUSH IV SOLN 100 UNIT/ML 100 UNIT/ML
5 SOLUTION INTRAVENOUS
Status: CANCELLED | OUTPATIENT
Start: 2021-01-11

## 2020-12-28 RX ORDER — MEPERIDINE HYDROCHLORIDE 25 MG/ML
25 INJECTION INTRAMUSCULAR; INTRAVENOUS; SUBCUTANEOUS EVERY 30 MIN PRN
Status: CANCELLED | OUTPATIENT
Start: 2021-01-03

## 2020-12-28 RX ORDER — DIPHENHYDRAMINE HYDROCHLORIDE 50 MG/ML
50 INJECTION INTRAMUSCULAR; INTRAVENOUS
Status: CANCELLED
Start: 2021-01-11

## 2020-12-28 RX ORDER — EPINEPHRINE 1 MG/ML
0.3 INJECTION, SOLUTION INTRAMUSCULAR; SUBCUTANEOUS EVERY 5 MIN PRN
Status: CANCELLED | OUTPATIENT
Start: 2021-01-11

## 2020-12-28 RX ORDER — METHYLPREDNISOLONE SODIUM SUCCINATE 125 MG/2ML
125 INJECTION, POWDER, LYOPHILIZED, FOR SOLUTION INTRAMUSCULAR; INTRAVENOUS
Status: CANCELLED
Start: 2021-01-03

## 2020-12-28 RX ORDER — DIPHENHYDRAMINE HYDROCHLORIDE 50 MG/ML
50 INJECTION INTRAMUSCULAR; INTRAVENOUS
Status: CANCELLED
Start: 2021-01-03

## 2020-12-28 RX ORDER — EPINEPHRINE 1 MG/ML
0.3 INJECTION, SOLUTION INTRAMUSCULAR; SUBCUTANEOUS EVERY 5 MIN PRN
Status: CANCELLED | OUTPATIENT
Start: 2021-01-18

## 2020-12-31 ENCOUNTER — APPOINTMENT (OUTPATIENT)
Dept: LAB | Facility: CLINIC | Age: 75
End: 2020-12-31
Attending: STUDENT IN AN ORGANIZED HEALTH CARE EDUCATION/TRAINING PROGRAM
Payer: COMMERCIAL

## 2020-12-31 ENCOUNTER — INFUSION THERAPY VISIT (OUTPATIENT)
Dept: ONCOLOGY | Facility: CLINIC | Age: 75
End: 2020-12-31
Attending: STUDENT IN AN ORGANIZED HEALTH CARE EDUCATION/TRAINING PROGRAM
Payer: COMMERCIAL

## 2020-12-31 ENCOUNTER — TELEPHONE (OUTPATIENT)
Dept: INTERNAL MEDICINE | Facility: CLINIC | Age: 75
End: 2020-12-31

## 2020-12-31 VITALS
SYSTOLIC BLOOD PRESSURE: 113 MMHG | HEART RATE: 99 BPM | BODY MASS INDEX: 26.21 KG/M2 | OXYGEN SATURATION: 98 % | TEMPERATURE: 98.1 F | RESPIRATION RATE: 16 BRPM | WEIGHT: 138.7 LBS | DIASTOLIC BLOOD PRESSURE: 63 MMHG

## 2020-12-31 DIAGNOSIS — C90.00 MULTIPLE MYELOMA, REMISSION STATUS UNSPECIFIED (H): Primary | ICD-10-CM

## 2020-12-31 DIAGNOSIS — C90.00 MULTIPLE MYELOMA, REMISSION STATUS UNSPECIFIED (H): ICD-10-CM

## 2020-12-31 LAB
ALBUMIN SERPL-MCNC: 3.4 G/DL (ref 3.4–5)
ALP SERPL-CCNC: 149 U/L (ref 40–150)
ALT SERPL W P-5'-P-CCNC: 20 U/L (ref 0–70)
ANION GAP SERPL CALCULATED.3IONS-SCNC: 9 MMOL/L (ref 3–14)
AST SERPL W P-5'-P-CCNC: 11 U/L (ref 0–45)
BASOPHILS # BLD AUTO: 0 10E9/L (ref 0–0.2)
BASOPHILS NFR BLD AUTO: 0.4 %
BILIRUB SERPL-MCNC: 0.5 MG/DL (ref 0.2–1.3)
BUN SERPL-MCNC: 41 MG/DL (ref 7–30)
CALCIUM SERPL-MCNC: 8.9 MG/DL (ref 8.5–10.1)
CHLORIDE SERPL-SCNC: 106 MMOL/L (ref 94–109)
CO2 SERPL-SCNC: 23 MMOL/L (ref 20–32)
CREAT SERPL-MCNC: 1.35 MG/DL (ref 0.66–1.25)
DIFFERENTIAL METHOD BLD: ABNORMAL
EOSINOPHIL # BLD AUTO: 0.3 10E9/L (ref 0–0.7)
EOSINOPHIL NFR BLD AUTO: 5.5 %
ERYTHROCYTE [DISTWIDTH] IN BLOOD BY AUTOMATED COUNT: 15.1 % (ref 10–15)
GFR SERPL CREATININE-BSD FRML MDRD: 51 ML/MIN/{1.73_M2}
GLUCOSE SERPL-MCNC: 104 MG/DL (ref 70–99)
HCT VFR BLD AUTO: 31 % (ref 40–53)
HGB BLD-MCNC: 9.7 G/DL (ref 13.3–17.7)
IMM GRANULOCYTES # BLD: 0 10E9/L (ref 0–0.4)
IMM GRANULOCYTES NFR BLD: 0.4 %
LYMPHOCYTES # BLD AUTO: 1.2 10E9/L (ref 0.8–5.3)
LYMPHOCYTES NFR BLD AUTO: 21.3 %
MCH RBC QN AUTO: 32.9 PG (ref 26.5–33)
MCHC RBC AUTO-ENTMCNC: 31.3 G/DL (ref 31.5–36.5)
MCV RBC AUTO: 105 FL (ref 78–100)
MONOCYTES # BLD AUTO: 0.7 10E9/L (ref 0–1.3)
MONOCYTES NFR BLD AUTO: 12.3 %
NEUTROPHILS # BLD AUTO: 3.4 10E9/L (ref 1.6–8.3)
NEUTROPHILS NFR BLD AUTO: 60.1 %
NRBC # BLD AUTO: 0 10*3/UL
NRBC BLD AUTO-RTO: 0 /100
PLATELET # BLD AUTO: 331 10E9/L (ref 150–450)
POTASSIUM SERPL-SCNC: 4.3 MMOL/L (ref 3.4–5.3)
PROT SERPL-MCNC: 7.2 G/DL (ref 6.8–8.8)
RBC # BLD AUTO: 2.95 10E12/L (ref 4.4–5.9)
SODIUM SERPL-SCNC: 137 MMOL/L (ref 133–144)
WBC # BLD AUTO: 5.6 10E9/L (ref 4–11)

## 2020-12-31 PROCEDURE — 85025 COMPLETE CBC W/AUTO DIFF WBC: CPT | Performed by: STUDENT IN AN ORGANIZED HEALTH CARE EDUCATION/TRAINING PROGRAM

## 2020-12-31 PROCEDURE — 250N000013 HC RX MED GY IP 250 OP 250 PS 637: Performed by: STUDENT IN AN ORGANIZED HEALTH CARE EDUCATION/TRAINING PROGRAM

## 2020-12-31 PROCEDURE — 80053 COMPREHEN METABOLIC PANEL: CPT | Performed by: STUDENT IN AN ORGANIZED HEALTH CARE EDUCATION/TRAINING PROGRAM

## 2020-12-31 PROCEDURE — 96401 CHEMO ANTI-NEOPL SQ/IM: CPT | Performed by: STUDENT IN AN ORGANIZED HEALTH CARE EDUCATION/TRAINING PROGRAM

## 2020-12-31 PROCEDURE — 99207 PR NO BILLABLE SERVICE THIS VISIT: CPT

## 2020-12-31 PROCEDURE — C9062 DARATUMUMAB HYALURONIDASE: HCPCS | Performed by: STUDENT IN AN ORGANIZED HEALTH CARE EDUCATION/TRAINING PROGRAM

## 2020-12-31 PROCEDURE — 36415 COLL VENOUS BLD VENIPUNCTURE: CPT

## 2020-12-31 PROCEDURE — 250N000012 HC RX MED GY IP 250 OP 636 PS 637: Performed by: STUDENT IN AN ORGANIZED HEALTH CARE EDUCATION/TRAINING PROGRAM

## 2020-12-31 PROCEDURE — 250N000011 HC RX IP 250 OP 636: Performed by: STUDENT IN AN ORGANIZED HEALTH CARE EDUCATION/TRAINING PROGRAM

## 2020-12-31 PROCEDURE — 96372 THER/PROPH/DIAG INJ SC/IM: CPT | Performed by: STUDENT IN AN ORGANIZED HEALTH CARE EDUCATION/TRAINING PROGRAM

## 2020-12-31 RX ORDER — DIPHENHYDRAMINE HCL 25 MG
50 CAPSULE ORAL ONCE
Status: COMPLETED | OUTPATIENT
Start: 2020-12-31 | End: 2020-12-31

## 2020-12-31 RX ORDER — ACETAMINOPHEN 325 MG/1
650 TABLET ORAL ONCE
Status: COMPLETED | OUTPATIENT
Start: 2020-12-31 | End: 2020-12-31

## 2020-12-31 RX ORDER — DEXAMETHASONE 4 MG/1
20 TABLET ORAL ONCE
Status: COMPLETED | OUTPATIENT
Start: 2020-12-31 | End: 2020-12-31

## 2020-12-31 RX ORDER — OXYCODONE HYDROCHLORIDE 5 MG/1
5 TABLET ORAL EVERY 6 HOURS PRN
Qty: 30 TABLET | Refills: 0 | Status: SHIPPED | OUTPATIENT
Start: 2020-12-31 | End: 2021-01-29

## 2020-12-31 RX ORDER — DEXAMETHASONE 4 MG/1
TABLET ORAL
Qty: 25 TABLET | Refills: 0 | Status: SHIPPED | OUTPATIENT
Start: 2020-12-31 | End: 2021-03-01

## 2020-12-31 RX ADMIN — DEXAMETHASONE 20 MG: 4 TABLET ORAL at 11:12

## 2020-12-31 RX ADMIN — BORTEZOMIB 2.2 MG: 3.5 INJECTION, POWDER, LYOPHILIZED, FOR SOLUTION INTRAVENOUS; SUBCUTANEOUS at 11:58

## 2020-12-31 RX ADMIN — ACETAMINOPHEN 650 MG: 325 TABLET ORAL at 11:12

## 2020-12-31 RX ADMIN — DARATUMUMAB AND HYALURONIDASE-FIHJ (HUMAN RECOMBINANT) 1800 MG: 1800; 30000 INJECTION SUBCUTANEOUS at 11:58

## 2020-12-31 RX ADMIN — DIPHENHYDRAMINE HYDROCHLORIDE 50 MG: 25 CAPSULE ORAL at 11:12

## 2020-12-31 ASSESSMENT — PAIN SCALES - GENERAL: PAINLEVEL: MILD PAIN (2)

## 2020-12-31 NOTE — PATIENT INSTRUCTIONS
Contact Numbers    [facility] Main Line (for Scheduling/Triage/After Hours Nurse Line): [phone]    Please call the [facility] nurse triage or the after hours nurse line if you experience a temperature greater than or equal to 100.5, shaking chills, have uncontrolled nausea, vomiting and/or diarrhea, dizziness, lightheadedness, shortness of breath, chest pain, bleeding, unexplained bruising, or if you have any other new/concerning symptoms, questions or concerns.     If you are having any concerning symptoms or wish to speak to a provider before your next infusion visit, please call your care coordinator or triage to notify them so we can adequately serve you.     If you need any refills on medications (narcotics or other medications), please call before your infusion appointment.       December 2020 Sunday Monday Tuesday Wednesday Thursday Friday Saturday             1    IP TREATMENT   6:00 AM   (30 min.)   Catarina Reece, OT   [facility]    IP TREATMENT   6:00 AM   (30 min.)   Shawna Ashraf, PT   [facility]    XR CHEST PORT 1 VIEW   8:45 AM   (20 min.)   [id number]   [facility] 2    IP TREATMENT   6:00 AM   (30 min.)   Alyssia Broderick, PT   [facility]    IP TREATMENT   6:00 AM   (30 min.)   Gatito Beth OT   [facility]    XR CHEST PORT 1 VIEW   8:45 AM   (20 min.)   [id number]   [facility] 3    IP TREATMENT   6:00 AM   (30 min.)   Katja Tanner, PT   [facility]    IP TREATMENT   6:00 AM   (30 min.)   Ramila Beverly, OT   [facility]    XR CHEST PORT 1 VIEW   8:45 AM   (20 min.)   [id number]   [facility] 4    IP TREATMENT   6:00 AM   (30 min.)   Gatito Beth OT   [facility]    IP TREATMENT   6:00 AM   (30 min.)   Katja Tanner, PT   [facility] 5       6     7     8     9    LAB PERIPHERAL  10:45 AM   (15 min.)   [facility]   [facility]    TELEPHONE VISIT RETURN   3:00 PM   (30 min.)   Angela Sweeney MD   [facility] 10    UMP ONC INFUSION 60   1:00 PM   (60 min.)   [facility] ONCOLOGY INFUSION   [facility] 11     12       13     14    LAB PERIPHERAL   9:15 AM   (15 min.)   [facility]   [facility]    UMP ONC INFUSION 60   9:30 AM   (60 min.)   [facility] ONCOLOGY INFUSION   [facility] 15     16     17     18    LAB PERIPHERAL   8:30 AM   (15 min.)   [facility]   [facility]    UMP ONC INFUSION 60   9:00 AM   (60 min.)   [facility] ONCOL[facility]Y INFUSION   [facility] 19       20     21    UMP ONC INFUSION 60   9:30 AM   (60 min.)   [facility] ONCOL[facility]Y INFUSION   [facility] 22     23     24    LAB PERIPHERAL   6:45 AM   (15 min.)   [facility]   [facility]    UMP ONC INFUSION 60   7:00 AM   (60 min.)   [facility] ONCOLOGY INFUSION   [facility] 25     26       27     28     29     30     31    LAB PERIPHERAL  10:00 AM   (15 min.)   [facility]  10:30 AM   (60 min.)   [facility] ONCOL[facility]Y INFUSION   [facility]                       January 2021 Sunday Monday Tuesday Wednesday Thursday Friday Saturday                            1     2       3     4     5     6    LAB PERIPHERAL   8:15 AM   (15 min.)   [facility]   [facility]    UMP ONC INFUSION 60   9:00 AM   (60 min.)   UC ONCOLOGY INFUSION   [facility] 7    UMP ONC INFUSION 60   2:00 PM   (60 min.)   UC ONCOLOGY INFUSION   [facility] 8     9       10     11    UMP ONC INFUSION 60  10:00 AM   (60 min.)   UC ONCOLOGY INFUSION   [facility] 12     13    TELEPHONE VISIT RETURN   2:30 PM   (30 min.)   Angela Sweeney MD   [facility] 14    UMP ONC INFUSION 60  10:30 AM   (60 min.)   [facility] ONCOLOGY INFUSION   [facility] 15     16       17     18     19    UMP NEW  10:05 AM   (40 min.)   Haritha Sargent MD   [facility] [place] 20     21    LAB PERIPHERAL   8:30 AM   (15 min.)   [facility]   [facility]    UMP ONC INFUSION 60   9:00 AM   (60 min.)   UC ONCOLOGY INFUSION   [facility] 22     23       24     25    UMP ONC INFUSION 60  10:00 AM   (60 min.)   [facility] ONCOLOGY INFUSION   [facility] 26     27     28    UMP ONC INFUSION 60  10:30 AM   (60 min.)    ONCOLOGY INFUSION   [facility] 29     30       31                                                   Lab Results:  Recent Results (from the past 12 hour(s))   CBC with platelets differential    Collection Time: 12/31/20 10:13 AM   Result Value Ref Range    WBC 5.6 4.0 - 11.0 10e9/L    RBC Count 2.95 (L) 4.4 - 5.9 10e12/L    Hemoglobin 9.7 (L) 13.3 - 17.7 g/dL    Hematocrit 31.0 (L) 40.0 - 53.0 %     (H) 78 - 100 fl    MCH 32.9 26.5 - 33.0 pg    MCHC 31.3 (L) 31.5 - 36.5 g/dL    RDW 15.1 (H) 10.0 - 15.0 %    Platelet Count 331 150 - 450 10e9/L    Diff Method Automated Method     % Neutrophils 60.1 %    % Lymphocytes 21.3 %    % Monocytes 12.3 %    % Eosinophils 5.5 %    % Basophils 0.4 %    % Immature Granulocytes 0.4 %    Nucleated RBCs 0 0 /100    Absolute Neutrophil 3.4 1.6 - 8.3 10e9/L    Absolute Lymphocytes 1.2 0.8 - 5.3 10e9/L    Absolute Monocytes 0.7 0.0 - 1.3 10e9/L    Absolute Eosinophils 0.3 0.0 - 0.7 10e9/L     Absolute Basophils 0.0 0.0 - 0.2 10e9/L    Abs Immature Granulocytes 0.0 0 - 0.4 10e9/L    Absolute Nucleated RBC 0.0    Comprehensive metabolic panel    Collection Time: 12/31/20 10:13 AM   Result Value Ref Range    Sodium 137 133 - 144 mmol/L    Potassium 4.3 3.4 - 5.3 mmol/L    Chloride 106 94 - 109 mmol/L    Carbon Dioxide 23 20 - 32 mmol/L    Anion Gap 9 3 - 14 mmol/L    Glucose 104 (H) 70 - 99 mg/dL    Urea Nitrogen 41 (H) 7 - 30 mg/dL    Creatinine 1.35 (H) 0.66 - 1.25 mg/dL    GFR Estimate 51 (L) >60 mL/min/[1.73_m2]    GFR Estimate If Black 60 (L) >60 mL/min/[1.73_m2]    Calcium 8.9 8.5 - 10.1 mg/dL    Bilirubin Total 0.5 0.2 - 1.3 mg/dL    Albumin 3.4 3.4 - 5.0 g/dL    Protein Total 7.2 6.8 - 8.8 g/dL    Alkaline Phosphatase 149 40 - 150 U/L    ALT 20 0 - 70 U/L    AST 11 0 - 45 U/L

## 2020-12-31 NOTE — PROGRESS NOTES
"Infusion Nursing Note:  Rogelio Marshall presents today for Cycle 2 Day 1 Velcade, Subcutaneous Daratumumab.    Patient seen by provider today: No    Note: Pt arrived denying any SOB/cough, chest pain, fever/chills. Main concern is that everything tastes like cardboard, so he isn't eating a lot. He has an appetite but since \"everything tastes like garbage\", he just doesn't eat. He is also not drinking a ton of fluids. He is requesting a refill of his Oxycodone, which he takes twice a day for his rib pain. Denies need for pain intervention while in infusion today.    LINDSAY Sweeney/Tosha Conway, OSVALDO 12/31/2020 @ 1105:   -Will send prescription refill for Oxycodone  -No additional interventions for elevated creatinine (other than push oral hydration)  -Aware of weight loss    The above instructions were reviewed with patient.    Intravenous Access:  No PIV placed-labs drawn in lab.    Treatment Conditions:  Lab Results   Component Value Date    HGB 9.7 12/31/2020     Lab Results   Component Value Date    WBC 5.6 12/31/2020      Lab Results   Component Value Date    ANEU 3.4 12/31/2020     Lab Results   Component Value Date     12/31/2020      Lab Results   Component Value Date     12/31/2020                   Lab Results   Component Value Date    POTASSIUM 4.3 12/31/2020           Lab Results   Component Value Date    MAG 2.1 12/02/2020            Lab Results   Component Value Date    CR 1.35 12/31/2020                   Lab Results   Component Value Date    MIRI 8.9 12/31/2020                Lab Results   Component Value Date    BILITOTAL 0.5 12/31/2020           Lab Results   Component Value Date    ALBUMIN 3.4 12/31/2020                    Lab Results   Component Value Date    ALT 20 12/31/2020           Lab Results   Component Value Date    AST 11 12/31/2020     Results reviewed, labs MET treatment parameters, ok to proceed with treatment.    Post Infusion Assessment:  Patient tolerated two subcutaneous " injections without incident (Darzalex to right abdomen and Velcade to left abdomen).    Discharge Plan:   Prescription refills given for Oxycodone and Prednisone.  Copy of AVS reviewed with patient and/or family.  Patient will return 1/6 for next appointment.  Patient discharged in stable condition accompanied by: self.  Departure Mode: Ambulatory.    Tosha Conway RN

## 2020-12-31 NOTE — TELEPHONE ENCOUNTER
[facility] now requests orders and shares plan of care/discharge summaries for some patients through [employer].  Please REPLY TO THIS MESSAGE OR ROUTE BACK TO THE AUTHOR in order to give authorization for orders when needed.  This is considered a verbal order, you will still receive a faxed copy of orders for signature.  Thank you for your assistance in improving collaboration for our patients.    ORDER  Nursing 1x/week for 9 weeks, 3 PRN  OT/PT to eval and treat    Tosha Newton RN [facility]  [phone]  sho @[facility]    Agree  Wolf Stevens MD

## 2020-12-31 NOTE — NURSING NOTE
Chief Complaint   Patient presents with     Blood Draw     labs drawn with vpt by rn.  vs taken     Labs drawn with vpt by rn.  Pt tolerated well.  VS taken.  Pt checked in for next appt.    Nimo Pinedo RN

## 2021-01-04 ENCOUNTER — INFUSION THERAPY VISIT (OUTPATIENT)
Dept: ONCOLOGY | Facility: CLINIC | Age: 76
End: 2021-01-04
Attending: STUDENT IN AN ORGANIZED HEALTH CARE EDUCATION/TRAINING PROGRAM
Payer: COMMERCIAL

## 2021-01-04 ENCOUNTER — MEDICAL CORRESPONDENCE (OUTPATIENT)
Dept: HEALTH INFORMATION MANAGEMENT | Facility: CLINIC | Age: 76
End: 2021-01-04

## 2021-01-04 VITALS
WEIGHT: 139.4 LBS | SYSTOLIC BLOOD PRESSURE: 162 MMHG | DIASTOLIC BLOOD PRESSURE: 85 MMHG | BODY MASS INDEX: 26.34 KG/M2 | OXYGEN SATURATION: 100 % | HEART RATE: 88 BPM | RESPIRATION RATE: 18 BRPM

## 2021-01-04 DIAGNOSIS — C90.00 MULTIPLE MYELOMA, REMISSION STATUS UNSPECIFIED (H): Primary | ICD-10-CM

## 2021-01-04 LAB
ALBUMIN SERPL-MCNC: 3.8 G/DL (ref 3.4–5)
ALP SERPL-CCNC: 142 U/L (ref 40–150)
ALT SERPL W P-5'-P-CCNC: 22 U/L (ref 0–70)
ANION GAP SERPL CALCULATED.3IONS-SCNC: 8 MMOL/L (ref 3–14)
AST SERPL W P-5'-P-CCNC: 10 U/L (ref 0–45)
BASOPHILS # BLD AUTO: 0 10E9/L (ref 0–0.2)
BASOPHILS NFR BLD AUTO: 0.1 %
BILIRUB SERPL-MCNC: 0.4 MG/DL (ref 0.2–1.3)
BUN SERPL-MCNC: 45 MG/DL (ref 7–30)
CALCIUM SERPL-MCNC: 9.4 MG/DL (ref 8.5–10.1)
CHLORIDE SERPL-SCNC: 104 MMOL/L (ref 94–109)
CO2 SERPL-SCNC: 23 MMOL/L (ref 20–32)
CREAT SERPL-MCNC: 1.27 MG/DL (ref 0.66–1.25)
DIFFERENTIAL METHOD BLD: ABNORMAL
EOSINOPHIL # BLD AUTO: 0 10E9/L (ref 0–0.7)
EOSINOPHIL NFR BLD AUTO: 0 %
ERYTHROCYTE [DISTWIDTH] IN BLOOD BY AUTOMATED COUNT: 14.4 % (ref 10–15)
GFR SERPL CREATININE-BSD FRML MDRD: 55 ML/MIN/{1.73_M2}
GLUCOSE SERPL-MCNC: 142 MG/DL (ref 70–99)
HCT VFR BLD AUTO: 29.5 % (ref 40–53)
HGB BLD-MCNC: 9.8 G/DL (ref 13.3–17.7)
IMM GRANULOCYTES # BLD: 0.1 10E9/L (ref 0–0.4)
IMM GRANULOCYTES NFR BLD: 1.3 %
LYMPHOCYTES # BLD AUTO: 0.8 10E9/L (ref 0.8–5.3)
LYMPHOCYTES NFR BLD AUTO: 7.8 %
MCH RBC QN AUTO: 33.6 PG (ref 26.5–33)
MCHC RBC AUTO-ENTMCNC: 33.2 G/DL (ref 31.5–36.5)
MCV RBC AUTO: 101 FL (ref 78–100)
MONOCYTES # BLD AUTO: 0.6 10E9/L (ref 0–1.3)
MONOCYTES NFR BLD AUTO: 5.6 %
NEUTROPHILS # BLD AUTO: 8.4 10E9/L (ref 1.6–8.3)
NEUTROPHILS NFR BLD AUTO: 85.2 %
NRBC # BLD AUTO: 0 10*3/UL
NRBC BLD AUTO-RTO: 0 /100
PLATELET # BLD AUTO: 343 10E9/L (ref 150–450)
POTASSIUM SERPL-SCNC: 4 MMOL/L (ref 3.4–5.3)
PROT SERPL-MCNC: 7.3 G/DL (ref 6.8–8.8)
RBC # BLD AUTO: 2.92 10E12/L (ref 4.4–5.9)
SODIUM SERPL-SCNC: 135 MMOL/L (ref 133–144)
WBC # BLD AUTO: 9.9 10E9/L (ref 4–11)

## 2021-01-04 PROCEDURE — 250N000011 HC RX IP 250 OP 636: Performed by: STUDENT IN AN ORGANIZED HEALTH CARE EDUCATION/TRAINING PROGRAM

## 2021-01-04 PROCEDURE — 85025 COMPLETE CBC W/AUTO DIFF WBC: CPT | Performed by: STUDENT IN AN ORGANIZED HEALTH CARE EDUCATION/TRAINING PROGRAM

## 2021-01-04 PROCEDURE — 96401 CHEMO ANTI-NEOPL SQ/IM: CPT

## 2021-01-04 PROCEDURE — 36415 COLL VENOUS BLD VENIPUNCTURE: CPT

## 2021-01-04 PROCEDURE — 80053 COMPREHEN METABOLIC PANEL: CPT | Performed by: STUDENT IN AN ORGANIZED HEALTH CARE EDUCATION/TRAINING PROGRAM

## 2021-01-04 RX ADMIN — BORTEZOMIB 2.2 MG: 3.5 INJECTION, POWDER, LYOPHILIZED, FOR SOLUTION INTRAVENOUS; SUBCUTANEOUS at 09:27

## 2021-01-04 ASSESSMENT — PAIN SCALES - GENERAL: PAINLEVEL: NO PAIN (0)

## 2021-01-04 NOTE — PROGRESS NOTES
Infusion Nursing Note:  Rogelio Marshall presents today for Cycle 2 Day 4 Velcade SQ.    Patient seen by provider today: No   present during visit today: Not Applicable.    Note: Patient states he has been feeling well at home, less rib pain to report and is only taking about once/day in the mornings with Tylenol. He is asking for a refill, but looks like a refill was sent to his local [facility] [employer] on 12/31, patient will pick it up there. Confirms he is taking his decadron as prescribed. Encouraged patient to keep pushing fluids at home.     Intravenous Access:  No Intravenous access at this visit.    Treatment Conditions:  Lab Results   Component Value Date    HGB 9.8 01/04/2021     Lab Results   Component Value Date    WBC 9.9 01/04/2021      Lab Results   Component Value Date    ANEU 8.4 01/04/2021     Lab Results   Component Value Date     01/04/2021      Lab Results   Component Value Date     01/04/2021                   Lab Results   Component Value Date    POTASSIUM 4.0 01/04/2021           Lab Results   Component Value Date    MAG 2.1 12/02/2020            Lab Results   Component Value Date    CR 1.27 01/04/2021                   Lab Results   Component Value Date    MIRI 9.4 01/04/2021                Lab Results   Component Value Date    BILITOTAL 0.4 01/04/2021           Lab Results   Component Value Date    ALBUMIN 3.8 01/04/2021                    Lab Results   Component Value Date    ALT 22 01/04/2021           Lab Results   Component Value Date    AST 10 01/04/2021       Results reviewed, labs MET treatment parameters, ok to proceed with treatment.      Post Infusion Assessment:  Patient tolerated injection without incident to RIGHT abdomen.       Discharge Plan:   Patient declined prescription refills.  AVS to patient via [employer].  Patient will return 1/7 for next appointment.   Patient discharged in stable condition accompanied by: self.  Departure Mode: walker.  Face to Face  time: 0.    Enid Gunter RN

## 2021-01-04 NOTE — PROGRESS NOTES
Chief Complaint   Patient presents with     Blood Draw     Labs Drawn. Vitals refused     Labs drawn via venipuncture by Paramedic in lab. Vital signs taken. Pt checked in for next appointment.   Trupti Huff, Paramedic

## 2021-01-04 NOTE — LETTER
1/4/2021         RE: Rogelio Marshall  [address]  [place] [address]        Dear Colleague,    Thank you for referring your patient, Rogelio Marshall, to the [facility]. Please see a copy of my visit note below.    Chief Complaint   Patient presents with     Blood Draw     Labs Drawn. Vitals refused     Labs drawn via venipuncture by Paramedic in lab. Vital signs taken. Pt checked in for next appointment.   Trupti Huff, Paramedic      Again, thank you for allowing me to participate in the care of your patient.        Sincerely,        [facility] Lab Draw

## 2021-01-05 ENCOUNTER — MEDICAL CORRESPONDENCE (OUTPATIENT)
Dept: HEALTH INFORMATION MANAGEMENT | Facility: CLINIC | Age: 76
End: 2021-01-05

## 2021-01-07 ENCOUNTER — INFUSION THERAPY VISIT (OUTPATIENT)
Dept: ONCOLOGY | Facility: CLINIC | Age: 76
End: 2021-01-07
Attending: STUDENT IN AN ORGANIZED HEALTH CARE EDUCATION/TRAINING PROGRAM
Payer: COMMERCIAL

## 2021-01-07 VITALS
TEMPERATURE: 97.5 F | RESPIRATION RATE: 18 BRPM | BODY MASS INDEX: 26.7 KG/M2 | WEIGHT: 141.31 LBS | HEART RATE: 101 BPM | SYSTOLIC BLOOD PRESSURE: 119 MMHG | OXYGEN SATURATION: 97 % | DIASTOLIC BLOOD PRESSURE: 75 MMHG

## 2021-01-07 DIAGNOSIS — C90.00 MULTIPLE MYELOMA, REMISSION STATUS UNSPECIFIED (H): Primary | ICD-10-CM

## 2021-01-07 LAB
BASOPHILS # BLD AUTO: 0 10E9/L (ref 0–0.2)
BASOPHILS NFR BLD AUTO: 0.1 %
DIFFERENTIAL METHOD BLD: ABNORMAL
EOSINOPHIL # BLD AUTO: 0 10E9/L (ref 0–0.7)
EOSINOPHIL NFR BLD AUTO: 0.2 %
ERYTHROCYTE [DISTWIDTH] IN BLOOD BY AUTOMATED COUNT: 14.6 % (ref 10–15)
HCT VFR BLD AUTO: 32.2 % (ref 40–53)
HGB BLD-MCNC: 10.3 G/DL (ref 13.3–17.7)
IMM GRANULOCYTES # BLD: 0.1 10E9/L (ref 0–0.4)
IMM GRANULOCYTES NFR BLD: 0.5 %
LYMPHOCYTES # BLD AUTO: 1.5 10E9/L (ref 0.8–5.3)
LYMPHOCYTES NFR BLD AUTO: 14.8 %
MCH RBC QN AUTO: 33.1 PG (ref 26.5–33)
MCHC RBC AUTO-ENTMCNC: 32 G/DL (ref 31.5–36.5)
MCV RBC AUTO: 104 FL (ref 78–100)
MONOCYTES # BLD AUTO: 0.8 10E9/L (ref 0–1.3)
MONOCYTES NFR BLD AUTO: 7.8 %
NEUTROPHILS # BLD AUTO: 7.9 10E9/L (ref 1.6–8.3)
NEUTROPHILS NFR BLD AUTO: 76.6 %
NRBC # BLD AUTO: 0 10*3/UL
NRBC BLD AUTO-RTO: 0 /100
PLATELET # BLD AUTO: 263 10E9/L (ref 150–450)
RBC # BLD AUTO: 3.11 10E12/L (ref 4.4–5.9)
WBC # BLD AUTO: 10.3 10E9/L (ref 4–11)

## 2021-01-07 PROCEDURE — 85025 COMPLETE CBC W/AUTO DIFF WBC: CPT | Performed by: STUDENT IN AN ORGANIZED HEALTH CARE EDUCATION/TRAINING PROGRAM

## 2021-01-07 PROCEDURE — 258N000003 HC RX IP 258 OP 636: Performed by: STUDENT IN AN ORGANIZED HEALTH CARE EDUCATION/TRAINING PROGRAM

## 2021-01-07 PROCEDURE — 36415 COLL VENOUS BLD VENIPUNCTURE: CPT

## 2021-01-07 PROCEDURE — 96360 HYDRATION IV INFUSION INIT: CPT

## 2021-01-07 RX ADMIN — SODIUM CHLORIDE 1000 ML: 9 INJECTION, SOLUTION INTRAVENOUS at 14:52

## 2021-01-07 ASSESSMENT — PAIN SCALES - GENERAL: PAINLEVEL: MILD PAIN (3)

## 2021-01-07 NOTE — PROGRESS NOTES
Chief Complaint   Patient presents with     Blood Draw     VPT blood draw and vitals      Venipuncture labs drawn from left arm

## 2021-01-07 NOTE — PATIENT INSTRUCTIONS
[facility] Triage and after hours / weekends / holidays:  [phone]    Please call the triage or after hours line if you experience a temperature greater than or equal to 100.4, shaking chills, have uncontrolled nausea, vomiting and/or diarrhea, dizziness, shortness of breath, chest pain, bleeding, unexplained bruising, or if you have any other new/concerning symptoms, questions or concerns.      If you are having any concerning symptoms or wish to speak to a provider before your next infusion visit, please call your care coordinator or triage to notify them so we can adequately serve you.     If you need a refill on a narcotic prescription or other medication, please call before your infusion appointment.         January 2021 Sunday Monday Tuesday Wednesday Thursday Friday Saturday                            1     2       3     4    LAB PERIPHERAL   7:45 AM   (15 min.)    [facility] DRAW   [facility] ONC INFUSION 60   8:30 AM   (60 min.)    ONCOLOGY INFUSION   [facility] 5     6     7    LAB PERIPHERAL   1:30 PM   (15 min.)    [facility]RAW   [facility] ONC INFUSION 60   2:00 PM   (60 min.)    ONCOLOGY INFUSION   [facility] 8    [facility] RETURN   9:45 AM   (50 min.)   Zoey Alvarez APRN CNP   [facility] 9       10     11    LAB PERIPHERAL   9:30 AM   (15 min.)    [facility]RAW   [facility] ONC INFUSION 60  10:00 AM   (60 min.)    ONCOLOGY INFUSION   [facility] 12     13    TELEPHONE VISIT RETURN   2:30 PM   (30 min.)   Angela Sweeney MD   [facility] 14    LAB PERIPHERAL   9:45 AM   (15 min.)   [facility]   [facility] ONC INFUSION 60  10:30 AM   (60 min.)    ONCOLOGY INFUSION   [facility]  [facility] 15     16       17     18     19    [facility] NEW  10:05 AM   (40 min.)   Haritha Sargent MD   [facility] [place] 20     21    LAB PERIPHERAL   8:30 AM   (15 min.)   [facility]   [facility]    [facility] ONC INFUSION 60   9:00 AM   (60 min.)   [facility] ONCOLOGY INFUSION   [facility] 22     23       24     25    LAB PERIPHERAL   9:30 AM   (15 min.)   [facility]   [facility] ONC INFUSION 60  10:00 AM   (60 min.)   [facility] ONCOLOGY INFUSION   [facility] 26     27     28    LAB PERIPHERAL  10:00 AM   (15 min.)   [facility]   [facility] ONC INFUSION 60  10:30 AM   (60 min.)   [facility] ONCOLOGY INFUSION   [facility] 29     30       31 February 2021 Sunday Monday Tuesday Wednesday Thursday Friday Saturday        1    LAB PERIPHERAL   9:15 AM   (15 min.)   [facility]   [facility] ONC INFUSION 60  10:00 AM   (60 min.)    ONCOLOGY INFUSION   [facility] 2     3     4    LAB PERIPHERAL   9:45 AM   (15 min.)    [facility] DRAW   [facility] ONC INFUSION 60  10:30 AM   (60 min.)    ONCOLOGY INFUSION   [facility] 5     6       7     8  Happy Birthday!     9     10     11     12     13       14     15     16     17     18     19     20       21     22     23     24     25     26     27       28                                                  Recent Results (from the past 24 hour(s))   CBC with platelets differential    Collection Time: 01/07/21  2:01 PM   Result Value Ref Range    WBC 10.3 4.0 - 11.0 10e9/L    RBC Count 3.11 (L) 4.4 - 5.9 10e12/L    Hemoglobin 10.3 (L) 13.3 -  17.7 g/dL    Hematocrit 32.2 (L) 40.0 - 53.0 %     (H) 78 - 100 fl    MCH 33.1 (H) 26.5 - 33.0 pg    MCHC 32.0 31.5 - 36.5 g/dL    RDW 14.6 10.0 - 15.0 %    Platelet Count 263 150 - 450 10e9/L    Diff Method Automated Method     % Neutrophils 76.6 %    % Lymphocytes 14.8 %    % Monocytes 7.8 %    % Eosinophils 0.2 %    % Basophils 0.1 %    % Immature Granulocytes 0.5 %    Nucleated RBCs 0 0 /100    Absolute Neutrophil 7.9 1.6 - 8.3 10e9/L    Absolute Lymphocytes 1.5 0.8 - 5.3 10e9/L    Absolute Monocytes 0.8 0.0 - 1.3 10e9/L    Absolute Eosinophils 0.0 0.0 - 0.7 10e9/L    Absolute Basophils 0.0 0.0 - 0.2 10e9/L    Abs Immature Granulocytes 0.1 0 - 0.4 10e9/L    Absolute Nucleated RBC 0.0

## 2021-01-07 NOTE — PROGRESS NOTES
"Infusion Nursing Note:  Rogelio Marshall presents today for Cycle 2 Day 8 Velcade and Darzalex Faspro - HELD.    Patient seen by provider today: No   present during visit today: Not Applicable.    Note: Pt arrives to infusion today not feeling well. He states that after he ate lunch yesterday and all day today he has been super fatigued. He says he falls asleep doing things and when he wakes up he is still tired. He did not eat dinner last night, or breakfast or lunch today. He does not have an appetite and things taste horrible. He also states that his \"stomach doesn't feel like anything should be put in it.\" He states the only liquid that he has drank in 24 hours is a cup of coffee and some water with his medications. He feels bloated and is burping frequently. Denies constipation and states his LBM was yesterday and was not hard and a medium amount. He denies fevers, chills, cough, sob, dizziness, swelling. He states when he tries to urinate it is slow to start and a narrow stream. Denies burning or bleeding with urination. He also reports left foot and left hand numbness with left leg pain starting in the hip. He is unsure when this started. Dr. Sweeney notified for the above new symptoms.     [facility] 1/7/21 9006 Dr. Sweeney / Юлия Calix, RN  - Hold treatment today.  - Follow up with an ANUP asap.  - Give 1L NS bolus today.     Intravenous Access:  Peripheral IV placed.    Treatment Conditions:  Lab Results   Component Value Date    HGB 10.3 01/07/2021     Lab Results   Component Value Date    WBC 10.3 01/07/2021      Lab Results   Component Value Date    ANEU 7.9 01/07/2021     Lab Results   Component Value Date     01/07/2021      Lab Results   Component Value Date     01/04/2021                   Lab Results   Component Value Date    POTASSIUM 4.0 01/04/2021           Lab Results   Component Value Date    MAG 2.1 12/02/2020            Lab Results   Component Value Date    CR 1.27 " 01/04/2021                   Lab Results   Component Value Date    MIRI 9.4 01/04/2021                Lab Results   Component Value Date    BILITOTAL 0.4 01/04/2021           Lab Results   Component Value Date    ALBUMIN 3.8 01/04/2021                    Lab Results   Component Value Date    ALT 22 01/04/2021           Lab Results   Component Value Date    AST 10 01/04/2021     Post Infusion Assessment:  Patient tolerated infusion without incident.  Blood return noted pre and post infusion.  Site patent and intact, free from redness, edema or discomfort.  No evidence of extravasations.  Access discontinued per protocol.     Discharge Plan:   Patient declined prescription refills.  Copy of AVS reviewed with patient and/or family.  Patient will return tomorrow for ANUP appointment.  Patient discharged in stable condition accompanied by: self.  Departure Mode: Ambulatory with walker.  Face to Face time: 5.    Юлия Calix RN

## 2021-01-08 ENCOUNTER — ANCILLARY PROCEDURE (OUTPATIENT)
Dept: GENERAL RADIOLOGY | Facility: CLINIC | Age: 76
End: 2021-01-08
Attending: NURSE PRACTITIONER
Payer: COMMERCIAL

## 2021-01-08 ENCOUNTER — APPOINTMENT (OUTPATIENT)
Dept: LAB | Facility: CLINIC | Age: 76
End: 2021-01-08
Attending: NURSE PRACTITIONER
Payer: COMMERCIAL

## 2021-01-08 ENCOUNTER — ONCOLOGY VISIT (OUTPATIENT)
Dept: ONCOLOGY | Facility: CLINIC | Age: 76
End: 2021-01-08
Attending: NURSE PRACTITIONER
Payer: COMMERCIAL

## 2021-01-08 ENCOUNTER — INFUSION THERAPY VISIT (OUTPATIENT)
Dept: ONCOLOGY | Facility: CLINIC | Age: 76
End: 2021-01-08
Attending: STUDENT IN AN ORGANIZED HEALTH CARE EDUCATION/TRAINING PROGRAM
Payer: COMMERCIAL

## 2021-01-08 VITALS
BODY MASS INDEX: 26.24 KG/M2 | TEMPERATURE: 98 F | OXYGEN SATURATION: 99 % | HEART RATE: 103 BPM | DIASTOLIC BLOOD PRESSURE: 81 MMHG | SYSTOLIC BLOOD PRESSURE: 159 MMHG | RESPIRATION RATE: 18 BRPM | WEIGHT: 138.9 LBS

## 2021-01-08 VITALS
DIASTOLIC BLOOD PRESSURE: 76 MMHG | SYSTOLIC BLOOD PRESSURE: 145 MMHG | HEART RATE: 93 BPM | OXYGEN SATURATION: 99 % | TEMPERATURE: 98.9 F | RESPIRATION RATE: 16 BRPM

## 2021-01-08 DIAGNOSIS — R68.83 CHILLS: ICD-10-CM

## 2021-01-08 DIAGNOSIS — C90.00 MULTIPLE MYELOMA, REMISSION STATUS UNSPECIFIED (H): ICD-10-CM

## 2021-01-08 DIAGNOSIS — S22.41XA CLOSED FRACTURE OF MULTIPLE RIBS OF RIGHT SIDE, INITIAL ENCOUNTER: ICD-10-CM

## 2021-01-08 DIAGNOSIS — R53.83 OTHER FATIGUE: Primary | ICD-10-CM

## 2021-01-08 LAB
ALBUMIN SERPL-MCNC: 3.7 G/DL (ref 3.4–5)
ALBUMIN UR-MCNC: NEGATIVE MG/DL
ALP SERPL-CCNC: 134 U/L (ref 40–150)
ALT SERPL W P-5'-P-CCNC: 20 U/L (ref 0–70)
ANION GAP SERPL CALCULATED.3IONS-SCNC: 6 MMOL/L (ref 3–14)
APPEARANCE UR: CLEAR
AST SERPL W P-5'-P-CCNC: 8 U/L (ref 0–45)
BASOPHILS # BLD AUTO: 0 10E9/L (ref 0–0.2)
BASOPHILS NFR BLD AUTO: 0 %
BILIRUB SERPL-MCNC: 0.5 MG/DL (ref 0.2–1.3)
BILIRUB UR QL STRIP: NEGATIVE
BUN SERPL-MCNC: 32 MG/DL (ref 7–30)
CALCIUM SERPL-MCNC: 9.3 MG/DL (ref 8.5–10.1)
CHLORIDE SERPL-SCNC: 104 MMOL/L (ref 94–109)
CO2 SERPL-SCNC: 26 MMOL/L (ref 20–32)
COLOR UR AUTO: YELLOW
CREAT SERPL-MCNC: 1.18 MG/DL (ref 0.66–1.25)
DIFFERENTIAL METHOD BLD: ABNORMAL
EOSINOPHIL # BLD AUTO: 0.1 10E9/L (ref 0–0.7)
EOSINOPHIL NFR BLD AUTO: 0.9 %
ERYTHROCYTE [DISTWIDTH] IN BLOOD BY AUTOMATED COUNT: 14.6 % (ref 10–15)
GFR SERPL CREATININE-BSD FRML MDRD: 60 ML/MIN/{1.73_M2}
GLUCOSE SERPL-MCNC: 95 MG/DL (ref 70–99)
GLUCOSE UR STRIP-MCNC: NEGATIVE MG/DL
HCT VFR BLD AUTO: 32 % (ref 40–53)
HGB BLD-MCNC: 10.3 G/DL (ref 13.3–17.7)
HGB UR QL STRIP: NEGATIVE
HYALINE CASTS #/AREA URNS LPF: 3 /LPF (ref 0–2)
IMM GRANULOCYTES # BLD: 0 10E9/L (ref 0–0.4)
IMM GRANULOCYTES NFR BLD: 0.5 %
KAPPA LC UR-MCNC: 1.08 MG/DL (ref 0.33–1.94)
KAPPA LC/LAMBDA SER: 5.68 {RATIO} (ref 0.26–1.65)
KETONES UR STRIP-MCNC: 20 MG/DL
LACTATE BLD-SCNC: 1.2 MMOL/L (ref 0.7–2)
LAMBDA LC SERPL-MCNC: 0.19 MG/DL (ref 0.57–2.63)
LEUKOCYTE ESTERASE UR QL STRIP: NEGATIVE
LYMPHOCYTES # BLD AUTO: 1.1 10E9/L (ref 0.8–5.3)
LYMPHOCYTES NFR BLD AUTO: 12.3 %
MCH RBC QN AUTO: 33.1 PG (ref 26.5–33)
MCHC RBC AUTO-ENTMCNC: 32.2 G/DL (ref 31.5–36.5)
MCV RBC AUTO: 103 FL (ref 78–100)
MONOCYTES # BLD AUTO: 0.6 10E9/L (ref 0–1.3)
MONOCYTES NFR BLD AUTO: 7 %
MUCOUS THREADS #/AREA URNS LPF: PRESENT /LPF
NEUTROPHILS # BLD AUTO: 6.9 10E9/L (ref 1.6–8.3)
NEUTROPHILS NFR BLD AUTO: 79.3 %
NITRATE UR QL: NEGATIVE
NRBC # BLD AUTO: 0 10*3/UL
NRBC BLD AUTO-RTO: 0 /100
PH UR STRIP: 5 PH (ref 5–7)
PLATELET # BLD AUTO: 267 10E9/L (ref 150–450)
POTASSIUM SERPL-SCNC: 4.1 MMOL/L (ref 3.4–5.3)
PROCALCITONIN SERPL-MCNC: 0.17 NG/ML
PROT SERPL-MCNC: 7.1 G/DL (ref 6.8–8.8)
RBC # BLD AUTO: 3.11 10E12/L (ref 4.4–5.9)
RBC #/AREA URNS AUTO: 1 /HPF (ref 0–2)
SODIUM SERPL-SCNC: 135 MMOL/L (ref 133–144)
SOURCE: ABNORMAL
SP GR UR STRIP: 1.02 (ref 1–1.03)
UROBILINOGEN UR STRIP-MCNC: 0 MG/DL (ref 0–2)
WBC # BLD AUTO: 8.8 10E9/L (ref 4–11)
WBC #/AREA URNS AUTO: 0 /HPF (ref 0–5)

## 2021-01-08 PROCEDURE — 999N001036 HC STATISTIC TOTAL PROTEIN: Performed by: NURSE PRACTITIONER

## 2021-01-08 PROCEDURE — 258N000003 HC RX IP 258 OP 636: Performed by: NURSE PRACTITIONER

## 2021-01-08 PROCEDURE — 36415 COLL VENOUS BLD VENIPUNCTURE: CPT

## 2021-01-08 PROCEDURE — 99215 OFFICE O/P EST HI 40 MIN: CPT | Performed by: NURSE PRACTITIONER

## 2021-01-08 PROCEDURE — 85025 COMPLETE CBC W/AUTO DIFF WBC: CPT | Performed by: NURSE PRACTITIONER

## 2021-01-08 PROCEDURE — 83605 ASSAY OF LACTIC ACID: CPT | Performed by: NURSE PRACTITIONER

## 2021-01-08 PROCEDURE — 83883 ASSAY NEPHELOMETRY NOT SPEC: CPT | Performed by: NURSE PRACTITIONER

## 2021-01-08 PROCEDURE — 84165 PROTEIN E-PHORESIS SERUM: CPT | Mod: 26 | Performed by: PATHOLOGY

## 2021-01-08 PROCEDURE — G0463 HOSPITAL OUTPT CLINIC VISIT: HCPCS

## 2021-01-08 PROCEDURE — G0463 HOSPITAL OUTPT CLINIC VISIT: HCPCS | Mod: 25

## 2021-01-08 PROCEDURE — 84145 PROCALCITONIN (PCT): CPT | Performed by: NURSE PRACTITIONER

## 2021-01-08 PROCEDURE — 93005 ELECTROCARDIOGRAM TRACING: CPT

## 2021-01-08 PROCEDURE — 93010 ELECTROCARDIOGRAM REPORT: CPT | Performed by: INTERNAL MEDICINE

## 2021-01-08 PROCEDURE — 84165 PROTEIN E-PHORESIS SERUM: CPT | Mod: TC | Performed by: NURSE PRACTITIONER

## 2021-01-08 PROCEDURE — 80053 COMPREHEN METABOLIC PANEL: CPT | Performed by: NURSE PRACTITIONER

## 2021-01-08 PROCEDURE — 71046 X-RAY EXAM CHEST 2 VIEWS: CPT | Mod: GC | Performed by: RADIOLOGY

## 2021-01-08 PROCEDURE — 81001 URINALYSIS AUTO W/SCOPE: CPT | Performed by: NURSE PRACTITIONER

## 2021-01-08 PROCEDURE — 96360 HYDRATION IV INFUSION INIT: CPT

## 2021-01-08 RX ORDER — GABAPENTIN 300 MG/1
300 CAPSULE ORAL 3 TIMES DAILY
Qty: 84 CAPSULE | Refills: 0 | Status: SHIPPED | OUTPATIENT
Start: 2021-01-08 | End: 2021-02-17

## 2021-01-08 RX ADMIN — SODIUM CHLORIDE 1000 ML: 9 INJECTION, SOLUTION INTRAVENOUS at 14:20

## 2021-01-08 ASSESSMENT — PAIN SCALES - GENERAL: PAINLEVEL: MILD PAIN (2)

## 2021-01-08 NOTE — PROGRESS NOTES
"Reason for Visit: add on for fatigue     Oncology HPI:   -compression fractures and bone scan reveals multiple lytic lesions throughout his appendicular and axial skeleton. His Beta 2 microglobulin was 3.6 on 10/10/2020. Kappa chains were 73.6 on 10/5/2020 with K/L ratio of 89.9. M spike of 16.2 in urine on 10/10. Bone marrow biopsy on 10/23/2020 showed trilineage hematopoiesis and 50-60% kappa restricted plasma cells. Flow cytometry showed 20 % plasma cells which express CD19, CD38, CD45 and monotypic cytoplasmic kappa immunoglobulin light chains but lack CD20 and CD56.  FISH shows IGH-CCND1 fusion (81%; 30% had loss of IGH component from one fusion signal).       - Started 21 day cycle VRD 11/2020  - 11/27 he had a fall with rib fractures.   -12/9/2020 was switched to madyson-velcade-dex due to poor tolerability of VRD    Interval history:   -Energy is poor. Reports he is spending most of the day watching tv and napping. Lives alone.   -Patient reports chills and body aches * 1.5 days   -Reports he has not been eating much for * 1.5 days. Reports he has no appetite. Also endorses poor taste. Reports he feels full all the time. 24 hours diet recall: a couple spoonfuls of chicken soup. Had 1 small coffee. Only had enough water to take with pills. Reports abdominal \"tightness\"   -Reports some constipation- small bowel movement today. Good BM 2 days ago. Took a senna this am. Denies painful stools. Denies hematochezia and melena  -Refuses COVID test \"It is a collective insanity\", \"political stunt\", \"I absolutely refuse\"   -Reports neuropathy in L foot and L hand that started   -Denies fevers, dyspnea, cough, chest pain, confusion, rashes, new lumps/bumps, vision changes, headaches, mucositis, sore throat, palpitations, peripheral edema, hematuria, and dizziness,       Past Medical History:   Diagnosis Date     Hyperlipidemia      Hypertension         Current Outpatient Medications   Medication Sig Dispense Refill     " acetaminophen (TYLENOL) 325 MG tablet Take 3 tablets (975 mg) by mouth 3 times daily 500 tablet 4     acyclovir (ZOVIRAX) 400 MG tablet Take 1 tablet (400 mg) by mouth 2 times daily Viral Prophylaxis. 60 tablet 11     aspirin (ASA) 325 MG tablet Take 1 tablet (325 mg) by mouth daily 30 tablet 4     calcium carbonate 500 mg, elemental, (OSCAL) 500 MG tablet Take 1 tablet (500 mg) by mouth 2 times daily 200 tablet 3     cholecalciferol (VITAMIN D3) 25 mcg (1000 units) capsule Take 1 capsule (25 mcg) by mouth daily 100 capsule 3     dexamethasone (DECADRON) 4 MG tablet Take 20 mg (five tabs) by mouth on Days 2, 4, 5, 9, and 16. 25 tablet 0     dexamethasone (DECADRON) 4 MG tablet Take 20 mg (five tabs) by mouth on Days 2, 4, 5, 9, and 16. 25 tablet 0     enoxaparin ANTICOAGULANT (LOVENOX) 30 MG/0.3ML syringe Inject 0.3 mLs (30 mg) Subcutaneous every 12 hours (Patient not taking: Reported on 12/18/2020) 28 Syringe 0     gabapentin (NEURONTIN) 300 MG capsule Take 1 capsule (300 mg) by mouth 3 times daily for 28 days 84 capsule 0     hydrochlorothiazide (MICROZIDE) 12.5 MG capsule Take 12.5 mg by mouth daily       ibuprofen (ADVIL/MOTRIN) 600 MG tablet Take 1 tablet (600 mg) by mouth every 6 hours       Lidocaine (LIDOCARE) 4 % Patch Place 1-3 patches onto the skin every 24 hours To prevent lidocaine toxicity, patient should be patch free for 12 hrs daily. (Patient not taking: Reported on 12/18/2020) 30 patch 1     lisinopril (ZESTRIL) 20 MG tablet Take 1 tablet (20 mg) by mouth daily 30 tablet 3     LORazepam (ATIVAN) 0.5 MG tablet Take 1 tablet (0.5 mg) by mouth every 4 hours as needed (Anxiety, Nausea/Vomiting or Sleep) (Patient not taking: Reported on 12/18/2020) 30 tablet 5     methocarbamol (ROBAXIN) 500 MG tablet Take 1 tablet (500 mg) by mouth 4 times daily 120 tablet 3     omeprazole (PRILOSEC) 10 MG DR capsule Take 10 mg by mouth daily       ondansetron (ZOFRAN-ODT) 4 MG ODT tab Take 1 tablet (4 mg) by mouth  every 6 hours as needed for nausea or vomiting (Patient not taking: Reported on 12/18/2020)       oxyCODONE (ROXICODONE) 5 MG tablet Take 1 tablet (5 mg) by mouth every 6 hours as needed for severe pain 30 tablet 0     polyethylene glycol (MIRALAX) 17 g packet Take 17 g by mouth daily       prochlorperazine (COMPAZINE) 10 MG tablet Take 1 tablet (10 mg) by mouth every 6 hours as needed (Nausea/Vomiting) (Patient not taking: Reported on 12/18/2020) 30 tablet 5     senna-docusate (SENOKOT-S/PERICOLACE) 8.6-50 MG tablet Take 1 tablet by mouth daily as needed        triamcinolone (KENALOG) 0.1 % external ointment Apply topically 2 times daily To back rash (Patient not taking: Reported on 12/18/2020) 15 g 0     Exam:  BP (!) 159/81   Pulse 103   Temp 98  F (36.7  C) (Tympanic)   Resp 18   Wt 63 kg (138 lb 14.4 oz)   SpO2 99%   BMI 26.24 kg/m      General: No acute distress. Alert and cooperative   Integumentary: Warm, dry, intact. No rashes, petechiae, or open wounds.   HEENT:    *Head: head is normo-cephalic, symmetric facial features   *Eyes: PERRLA. Conjunctiva clear and sclera white. Eyelids without crusting or lesions   *Nose: no discharge noted    *Neck: Neck supple. Trachea midline.    *Mouth/Throat: Oral mucosa intact without lesions. Pharynx and Tonsils normal. Uvula midline.   Respiratory: Equal chest rise and fall without retractions or abdominal muscle use. Lungs clear to auscultation  Cardiovascular: S1S2. Regular rate and rhythm without murmurs or gallops.   Peripheral Vascular: No cyanosis or peripheral edema.   Gastrointestinal: Soft, non-tender, not distended. Non-guarding. Normoactive bowel sounds *4 quadrants. No masses or splenomegaly   Musculoskeletal: No joint swelling or deformities. Gait intact.   Neurologic: Alert and Oriented *3. Follow commands.   Psychiatric: Patient is alert, cooperative, and pleasant. Appropriate affect and interaction.     Labs:   Results for BECK, BRUNO STEPHEN (MRN  [id number]) as of 1/8/2021 10:57   Ref. Range 1/8/2021 10:21   Sodium Latest Ref Range: 133 - 144 mmol/L 135   Potassium Latest Ref Range: 3.4 - 5.3 mmol/L 4.1   Chloride Latest Ref Range: 94 - 109 mmol/L 104   Carbon Dioxide Latest Ref Range: 20 - 32 mmol/L 26   Urea Nitrogen Latest Ref Range: 7 - 30 mg/dL 32 (H)   Creatinine Latest Ref Range: 0.66 - 1.25 mg/dL 1.18   GFR Estimate Latest Ref Range: >60 mL/min/1.73_m2 60 (L)   GFR Estimate If Black Latest Ref Range: >60 mL/min/1.73_m2 70   Calcium Latest Ref Range: 8.5 - 10.1 mg/dL 9.3   Anion Gap Latest Ref Range: 3 - 14 mmol/L 6   Albumin Latest Ref Range: 3.4 - 5.0 g/dL 3.7   Protein Total Latest Ref Range: 6.8 - 8.8 g/dL 7.1   Bilirubin Total Latest Ref Range: 0.2 - 1.3 mg/dL 0.5   Alkaline Phosphatase Latest Ref Range: 40 - 150 U/L 134   ALT Latest Ref Range: 0 - 70 U/L 20   AST Latest Ref Range: 0 - 45 U/L 8   Glucose Latest Ref Range: 70 - 99 mg/dL 95   WBC Latest Ref Range: 4.0 - 11.0 10e9/L 8.8   Hemoglobin Latest Ref Range: 13.3 - 17.7 g/dL 10.3 (L)   Hematocrit Latest Ref Range: 40.0 - 53.0 % 32.0 (L)   Platelet Count Latest Ref Range: 150 - 450 10e9/L 267   RBC Count Latest Ref Range: 4.4 - 5.9 10e12/L 3.11 (L)   MCV Latest Ref Range: 78 - 100 fl 103 (H)   MCH Latest Ref Range: 26.5 - 33.0 pg 33.1 (H)   MCHC Latest Ref Range: 31.5 - 36.5 g/dL 32.2   RDW Latest Ref Range: 10.0 - 15.0 % 14.6   Diff Method Unknown Automated Method   % Neutrophils Latest Units: % 79.3   % Lymphocytes Latest Units: % 12.3   % Monocytes Latest Units: % 7.0   % Eosinophils Latest Units: % 0.9   % Basophils Latest Units: % 0.0   % Immature Granulocytes Latest Units: % 0.5   Nucleated RBCs Latest Ref Range: 0 /100 0   Absolute Neutrophil Latest Ref Range: 1.6 - 8.3 10e9/L 6.9   Absolute Lymphocytes Latest Ref Range: 0.8 - 5.3 10e9/L 1.1   Absolute Monocytes Latest Ref Range: 0.0 - 1.3 10e9/L 0.6   Absolute Eosinophils Latest Ref Range: 0.0 - 0.7 10e9/L 0.1   Absolute Basophils  Latest Ref Range: 0.0 - 0.2 10e9/L 0.0   Abs Immature Granulocytes Latest Ref Range: 0 - 0.4 10e9/L 0.0   Absolute Nucleated RBC Unknown 0.0     Results for BRUNO ALFORD (MRN [id number]) as of 1/8/2021 10:57   Ref. Range 1/8/2021 10:20   Color Urine Unknown Yellow   Appearance Urine Unknown Clear   Glucose Urine Latest Ref Range: NEG^Negative mg/dL Negative   Bilirubin Urine Latest Ref Range: NEG^Negative  Negative   Ketones Urine Latest Ref Range: NEG^Negative mg/dL 20 (A)   Specific Gravity Urine Latest Ref Range: 1.003 - 1.035  1.020   pH Urine Latest Ref Range: 5.0 - 7.0 pH 5.0   Protein Albumin Urine Latest Ref Range: NEG^Negative mg/dL Negative   Urobilinogen mg/dL Latest Ref Range: 0.0 - 2.0 mg/dL 0.0   Nitrite Urine Latest Ref Range: NEG^Negative  Negative   Blood Urine Latest Ref Range: NEG^Negative  Negative   Leukocyte Esterase Urine Latest Ref Range: NEG^Negative  Negative   Source Unknown Midstream Urine   WBC Urine Latest Ref Range: 0 - 5 /HPF 0   RBC Urine Latest Ref Range: 0 - 2 /HPF 1   Mucous Urine Latest Ref Range: NEG^Negative /LPF Present (A)   Hyaline Casts Latest Ref Range: 0 - 2 /LPF 3 (H)       Imaging:   EXAM: XR CHEST 2 VW  1/8/2021 2:11 PM       HISTORY: Chills     COMPARISON: Chest x-ray dated 12/3/2020     FINDINGS: Two views of the chest. Trachea is midline. Heart is not  enlarged. Pulmonary vasculature is within normal limits. Improved  aeration compared to prior. No focal consolidative opacity. No pleural  effusion or pneumothorax. Stable minimally displaced right-sided rib  fractures.                                                                      IMPRESSION:  Improved aeration compared to prior. No acute airspace disease.     I have personally reviewed the examination and initial interpretation  and I agree with the findings.     NAOMI RO MD    Assessment/plan:   #Multiple myeloma  -Diagnosed in October 2020 after identifying multiple lytic lesions throughout his  appendicular and axial skeleton  -Initiated VRD on 11/19. Was held on C1D21 due to concerns about his ability to tolerate the medicine. Unfortunately he has had difficulty with hypotension and a fall resulting in rib fractures.  -Was switched to madyson- velcade-dex on 12/9. Tolerating well thus far with some fatigue and poor PO intake      Prophy: Continue levofloxacin and acyclovir. Stop ASA as not taking lenalidomide any more.     #Bone mets   -At a high risk for more compression fractures. Vitamin D is replete and he is on a daily supplement as well as calcium.   -Previously discussed that side effects of zometa include bone pains, hypocalcemia, and osteonecrosis of the jaw. He still needs to be cleared by dentistry before beginning zometa      #Fatigue   -2/2 chemotherapy and poor PO intake. We had a long discussion about how important it is to eat and be active during the day to improve fatigue     #Chills and body aches   -No source of infection identifies on chest xray or UA. Labs without leukocytosis. Refused COVID test (does not believe in COVID. Should be treated as a PUI for 14 days    #Poor PO intake  -Got fluids yesterday but did not eat much last night. Lytes are WNL and Cr not elevated. I instructed him he needed to eat at least 2 snacks while over in infusion getting his fluids and commit to eating over the weekend in order to not be inpatient for failure to thrive. Expressed understanding     #Constipation   -Mild. 2/2 chemotherapy and poor fluid intake. Encouraged daily senna and proper hydration     #Behavior   -Patient was argumentative at times with staff. Does not believes COVID exists. Refused to wear a mask for nursing and lab staff. I instructed him clinic policies and that he needs to be respectful to staff and follow clinic policy or a behavioral contract will be drawn up     #Neuropathy   -Grade 1. Stable. Continue to monitor       Zoey Alvarez, APRN, CNP  1/8/2021      Addendum  1/8/2021 at 1600: Per infusion RN patient received 1 L fluids, HR came down to low 90s, and patient ate fruit cup, pretzels, and bag of cookies in infusion. Pt feels safe to go home. Will set up fluids and labs for Sunday. Explained he must eat over the weekend. Will have close follow up early next week. Aware to go to ED with fever, chest pain, dyspnea, and if his PO intake gets any worse.

## 2021-01-08 NOTE — LETTER
"    1/8/2021         RE: Rogelio Marshall  [address]  [facility] [address]        Dear Colleague,    Thank you for referring your patient, Rogelio Marshall, to the [facility]. Please see a copy of my visit note below.    Reason for Visit: add on for fatigue     Oncology HPI:   -compression fractures and bone scan reveals multiple lytic lesions throughout his appendicular and axial skeleton. His Beta 2 microglobulin was 3.6 on 10/10/2020. Kappa chains were 73.6 on 10/5/2020 with K/L ratio of 89.9. M spike of 16.2 in urine on 10/10. Bone marrow biopsy on 10/23/2020 showed trilineage hematopoiesis and 50-60% kappa restricted plasma cells. Flow cytometry showed 20 % plasma cells which express CD19, CD38, CD45 and monotypic cytoplasmic kappa immunoglobulin light chains but lack CD20 and CD56.  FISH shows IGH-CCND1 fusion (81%; 30% had loss of IGH component from one fusion signal).       - Started 21 day cycle VRD 11/2020  - 11/27 he had a fall with rib fractures.   -12/9/2020 was switched to madyson-velcade-dex due to poor tolerability of VRD    Interval history:   -Energy is poor. Reports he is spending most of the day watching tv and napping. Lives alone.   -Patient reports chills and body aches * 1.5 days   -Reports he has not been eating much for * 1.5 days. Reports he has no appetite. Also endorses poor taste. Reports he feels full all the time. 24 hours diet recall: a couple spoonfuls of chicken soup. Had 1 small coffee. Only had enough water to take with pills. Reports abdominal \"tightness\"   -Reports some constipation- small bowel movement today. Good BM 2 days ago. Took a senna this am. Denies painful stools. Denies hematochezia and melena  -Refuses COVID test \"It is a collective insanity\", \"political stunt\", \"I absolutely refuse\"   -Reports neuropathy in L foot and L hand that started   -Denies fevers, dyspnea, cough, chest pain, confusion, rashes, new lumps/bumps, vision " changes, headaches, mucositis, sore throat, palpitations, peripheral edema, hematuria, and dizziness,       Past Medical History:   Diagnosis Date     Hyperlipidemia      Hypertension         Current Outpatient Medications   Medication Sig Dispense Refill     acetaminophen (TYLENOL) 325 MG tablet Take 3 tablets (975 mg) by mouth 3 times daily 500 tablet 4     acyclovir (ZOVIRAX) 400 MG tablet Take 1 tablet (400 mg) by mouth 2 times daily Viral Prophylaxis. 60 tablet 11     aspirin (ASA) 325 MG tablet Take 1 tablet (325 mg) by mouth daily 30 tablet 4     calcium carbonate 500 mg, elemental, (OSCAL) 500 MG tablet Take 1 tablet (500 mg) by mouth 2 times daily 200 tablet 3     cholecalciferol (VITAMIN D3) 25 mcg (1000 units) capsule Take 1 capsule (25 mcg) by mouth daily 100 capsule 3     dexamethasone (DECADRON) 4 MG tablet Take 20 mg (five tabs) by mouth on Days 2, 4, 5, 9, and 16. 25 tablet 0     dexamethasone (DECADRON) 4 MG tablet Take 20 mg (five tabs) by mouth on Days 2, 4, 5, 9, and 16. 25 tablet 0     enoxaparin ANTICOAGULANT (LOVENOX) 30 MG/0.3ML syringe Inject 0.3 mLs (30 mg) Subcutaneous every 12 hours (Patient not taking: Reported on 12/18/2020) 28 Syringe 0     gabapentin (NEURONTIN) 300 MG capsule Take 1 capsule (300 mg) by mouth 3 times daily for 28 days 84 capsule 0     hydrochlorothiazide (MICROZIDE) 12.5 MG capsule Take 12.5 mg by mouth daily       ibuprofen (ADVIL/MOTRIN) 600 MG tablet Take 1 tablet (600 mg) by mouth every 6 hours       Lidocaine (LIDOCARE) 4 % Patch Place 1-3 patches onto the skin every 24 hours To prevent lidocaine toxicity, patient should be patch free for 12 hrs daily. (Patient not taking: Reported on 12/18/2020) 30 patch 1     lisinopril (ZESTRIL) 20 MG tablet Take 1 tablet (20 mg) by mouth daily 30 tablet 3     LORazepam (ATIVAN) 0.5 MG tablet Take 1 tablet (0.5 mg) by mouth every 4 hours as needed (Anxiety, Nausea/Vomiting or Sleep) (Patient not taking: Reported on 12/18/2020)  30 tablet 5     methocarbamol (ROBAXIN) 500 MG tablet Take 1 tablet (500 mg) by mouth 4 times daily 120 tablet 3     omeprazole (PRILOSEC) 10 MG DR capsule Take 10 mg by mouth daily       ondansetron (ZOFRAN-ODT) 4 MG ODT tab Take 1 tablet (4 mg) by mouth every 6 hours as needed for nausea or vomiting (Patient not taking: Reported on 12/18/2020)       oxyCODONE (ROXICODONE) 5 MG tablet Take 1 tablet (5 mg) by mouth every 6 hours as needed for severe pain 30 tablet 0     polyethylene glycol (MIRALAX) 17 g packet Take 17 g by mouth daily       prochlorperazine (COMPAZINE) 10 MG tablet Take 1 tablet (10 mg) by mouth every 6 hours as needed (Nausea/Vomiting) (Patient not taking: Reported on 12/18/2020) 30 tablet 5     senna-docusate (SENOKOT-S/PERICOLACE) 8.6-50 MG tablet Take 1 tablet by mouth daily as needed        triamcinolone (KENALOG) 0.1 % external ointment Apply topically 2 times daily To back rash (Patient not taking: Reported on 12/18/2020) 15 g 0     Exam:  BP (!) 159/81   Pulse 103   Temp 98  F (36.7  C) (Tympanic)   Resp 18   Wt 63 kg (138 lb 14.4 oz)   SpO2 99%   BMI 26.24 kg/m      General: No acute distress. Alert and cooperative   Integumentary: Warm, dry, intact. No rashes, petechiae, or open wounds.   HEENT:    *Head: head is normo-cephalic, symmetric facial features   *Eyes: PERRLA. Conjunctiva clear and sclera white. Eyelids without crusting or lesions   *Nose: no discharge noted    *Neck: Neck supple. Trachea midline.    *Mouth/Throat: Oral mucosa intact without lesions. Pharynx and Tonsils normal. Uvula midline.   Respiratory: Equal chest rise and fall without retractions or abdominal muscle use. Lungs clear to auscultation  Cardiovascular: S1S2. Regular rate and rhythm without murmurs or gallops.   Peripheral Vascular: No cyanosis or peripheral edema.   Gastrointestinal: Soft, non-tender, not distended. Non-guarding. Normoactive bowel sounds *4 quadrants. No masses or splenomegaly    Musculoskeletal: No joint swelling or deformities. Gait intact.   Neurologic: Alert and Oriented *3. Follow commands.   Psychiatric: Patient is alert, cooperative, and pleasant. Appropriate affect and interaction.     Labs:   Results for BRUNO ALFORD (MRN [id number]) as of 1/8/2021 10:57   Ref. Range 1/8/2021 10:21   Sodium Latest Ref Range: 133 - 144 mmol/L 135   Potassium Latest Ref Range: 3.4 - 5.3 mmol/L 4.1   Chloride Latest Ref Range: 94 - 109 mmol/L 104   Carbon Dioxide Latest Ref Range: 20 - 32 mmol/L 26   Urea Nitrogen Latest Ref Range: 7 - 30 mg/dL 32 (H)   Creatinine Latest Ref Range: 0.66 - 1.25 mg/dL 1.18   GFR Estimate Latest Ref Range: >60 mL/min/1.73_m2 60 (L)   GFR Estimate If Black Latest Ref Range: >60 mL/min/1.73_m2 70   Calcium Latest Ref Range: 8.5 - 10.1 mg/dL 9.3   Anion Gap Latest Ref Range: 3 - 14 mmol/L 6   Albumin Latest Ref Range: 3.4 - 5.0 g/dL 3.7   Protein Total Latest Ref Range: 6.8 - 8.8 g/dL 7.1   Bilirubin Total Latest Ref Range: 0.2 - 1.3 mg/dL 0.5   Alkaline Phosphatase Latest Ref Range: 40 - 150 U/L 134   ALT Latest Ref Range: 0 - 70 U/L 20   AST Latest Ref Range: 0 - 45 U/L 8   Glucose Latest Ref Range: 70 - 99 mg/dL 95   WBC Latest Ref Range: 4.0 - 11.0 10e9/L 8.8   Hemoglobin Latest Ref Range: 13.3 - 17.7 g/dL 10.3 (L)   Hematocrit Latest Ref Range: 40.0 - 53.0 % 32.0 (L)   Platelet Count Latest Ref Range: 150 - 450 10e9/L 267   RBC Count Latest Ref Range: 4.4 - 5.9 10e12/L 3.11 (L)   MCV Latest Ref Range: 78 - 100 fl 103 (H)   MCH Latest Ref Range: 26.5 - 33.0 pg 33.1 (H)   MCHC Latest Ref Range: 31.5 - 36.5 g/dL 32.2   RDW Latest Ref Range: 10.0 - 15.0 % 14.6   Diff Method Unknown Automated Method   % Neutrophils Latest Units: % 79.3   % Lymphocytes Latest Units: % 12.3   % Monocytes Latest Units: % 7.0   % Eosinophils Latest Units: % 0.9   % Basophils Latest Units: % 0.0   % Immature Granulocytes Latest Units: % 0.5   Nucleated RBCs Latest Ref Range: 0 /100 0    Absolute Neutrophil Latest Ref Range: 1.6 - 8.3 10e9/L 6.9   Absolute Lymphocytes Latest Ref Range: 0.8 - 5.3 10e9/L 1.1   Absolute Monocytes Latest Ref Range: 0.0 - 1.3 10e9/L 0.6   Absolute Eosinophils Latest Ref Range: 0.0 - 0.7 10e9/L 0.1   Absolute Basophils Latest Ref Range: 0.0 - 0.2 10e9/L 0.0   Abs Immature Granulocytes Latest Ref Range: 0 - 0.4 10e9/L 0.0   Absolute Nucleated RBC Unknown 0.0     Results for BRUNO ALFORD (MRN [id number]) as of 1/8/2021 10:57   Ref. Range 1/8/2021 10:20   Color Urine Unknown Yellow   Appearance Urine Unknown Clear   Glucose Urine Latest Ref Range: NEG^Negative mg/dL Negative   Bilirubin Urine Latest Ref Range: NEG^Negative  Negative   Ketones Urine Latest Ref Range: NEG^Negative mg/dL 20 (A)   Specific Gravity Urine Latest Ref Range: 1.003 - 1.035  1.020   pH Urine Latest Ref Range: 5.0 - 7.0 pH 5.0   Protein Albumin Urine Latest Ref Range: NEG^Negative mg/dL Negative   Urobilinogen mg/dL Latest Ref Range: 0.0 - 2.0 mg/dL 0.0   Nitrite Urine Latest Ref Range: NEG^Negative  Negative   Blood Urine Latest Ref Range: NEG^Negative  Negative   Leukocyte Esterase Urine Latest Ref Range: NEG^Negative  Negative   Source Unknown Midstream Urine   WBC Urine Latest Ref Range: 0 - 5 /HPF 0   RBC Urine Latest Ref Range: 0 - 2 /HPF 1   Mucous Urine Latest Ref Range: NEG^Negative /LPF Present (A)   Hyaline Casts Latest Ref Range: 0 - 2 /LPF 3 (H)       Imaging:   EXAM: XR CHEST 2 VW  1/8/2021 2:11 PM       HISTORY: Chills     COMPARISON: Chest x-ray dated 12/3/2020     FINDINGS: Two views of the chest. Trachea is midline. Heart is not  enlarged. Pulmonary vasculature is within normal limits. Improved  aeration compared to prior. No focal consolidative opacity. No pleural  effusion or pneumothorax. Stable minimally displaced right-sided rib  fractures.                                                                      IMPRESSION:  Improved aeration compared to prior. No acute  airspace disease.     I have personally reviewed the examination and initial interpretation  and I agree with the findings.     NAOMI RO MD    Assessment/plan:   #Multiple myeloma  -Diagnosed in October 2020 after identifying multiple lytic lesions throughout his appendicular and axial skeleton  -Initiated VRD on 11/19. Was held on C1D21 due to concerns about his ability to tolerate the medicine. Unfortunately he has had difficulty with hypotension and a fall resulting in rib fractures.  -Was switched to madyson- velcade-dex on 12/9. Tolerating well thus far with some fatigue and poor PO intake      Prophy: Continue levofloxacin and acyclovir. Stop ASA as not taking lenalidomide any more.     #Bone mets   -At a high risk for more compression fractures. Vitamin D is replete and he is on a daily supplement as well as calcium.   -Previously discussed that side effects of zometa include bone pains, hypocalcemia, and osteonecrosis of the jaw. He still needs to be cleared by dentistry before beginning zometa      #Fatigue   -2/2 chemotherapy and poor PO intake. We had a long discussion about how important it is to eat and be active during the day to improve fatigue     #Chills and body aches   -No source of infection identifies on chest xray or UA. Labs without leukocytosis. Refused COVID test (does not believe in COVID. Should be treated as a PUI for 14 days    #Poor PO intake  -Got fluids yesterday but did not eat much last night. Lytes are WNL and Cr not elevated. I instructed him he needed to eat at least 2 snacks while over in infusion getting his fluids and commit to eating over the weekend in order to not be inpatient for failure to thrive. Expressed understanding     #Constipation   -Mild. 2/2 chemotherapy and poor fluid intake. Encouraged daily senna and proper hydration     #Behavior   -Patient was argumentative at times with staff. Does not believes COVID exists. Refused to wear a mask for nursing and lab  staff. I instructed him clinic policies and that he needs to be respectful to staff and follow clinic policy or a behavioral contract will be drawn up     #Neuropathy   -Grade 1. Stable. Continue to monitor       JIMBO Jarvis, CNP  1/8/2021      Addendum 1/8/2021 at 1600: Per infusion RN patient received 1 L fluids, HR came down to low 90s, and patient ate fruit cup, pretzels, and bag of cookies in infusion. Pt feels safe to go home. Will set up fluids and labs for Sunday. Explained he must eat over the weekend. Will have close follow up early next week. Aware to go to ED with fever, chest pain, dyspnea, and if his PO intake gets any worse.     Again, thank you for allowing me to participate in the care of your patient.      Sincerely,      JIMBO Jarvis CNP

## 2021-01-08 NOTE — NURSING NOTE
No chief complaint on file.    BP (!) 159/81   Pulse 103   Temp 98  F (36.7  C) (Tympanic)   Resp 18   Wt 63 kg (138 lb 14.4 oz)   SpO2 99%   BMI 26.24 kg/m      Labs drawn via right antecubital venipuncture. Pt refusing to wear a mask, needed constant reminder to put mask on. Supervisor notified & checked in for next appointment.    Simona De Jesus RN on 1/8/2021 at 10:28 AM

## 2021-01-08 NOTE — NURSING NOTE
"Oncology Rooming Note    January 8, 2021 10:45 AM   Rogelio Marshall is a 75 year old male who presents for:    Chief Complaint   Patient presents with     Oncology Clinic Visit     multiple myeloma     Initial Vitals: BP (!) 159/81   Pulse 103   Temp 98  F (36.7  C) (Tympanic)   Resp 18   Wt 63 kg (138 lb 14.4 oz)   SpO2 99%   BMI 26.24 kg/m   Estimated body mass index is 26.24 kg/m  as calculated from the following:    Height as of 11/26/20: 1.549 m (5' 1\").    Weight as of this encounter: 63 kg (138 lb 14.4 oz). Body surface area is 1.65 meters squared.  Mild Pain (2) Comment: right ribs   No LMP for male patient.  Allergies reviewed: Yes  Medications reviewed: Yes    Medications: MEDICATION REFILLS NEEDED TODAY. Provider was notified. Pt needs Oxycodone refilled.    Pharmacy name entered into EPIC:    [facility] - [place], [place] - [id number]  WRITTEN PRESCRIPTION REQUESTED  [id number] - [place], [place] - [address] [facility] - [place], [place] - [address]    Clinical concerns: increased pain, interferes with sleep. Self decreased Oxycodone.       Ami Neff CMA            "

## 2021-01-08 NOTE — PATIENT INSTRUCTIONS
Contact Numbers:   [facility]: [phone]    Call triage to speak with triage if you are experiencing chills and/or temperature greater than or equal to 100.5, uncontrolled nausea/vomiting, diarrhea, constipation, dizziness, shortness of breath, chest pain, bleeding, unexplained bruising, or any new/concerning symptoms, questions/concerns.     If you are having any concerning symptoms or wish to speak to a provider before your next infusion visit, please call your care coordinator or triage to notify them so we can adequately serve you.     If you need a refill on a medication or narcotic prescription, please call triage or your care coordinator before your infusion appointment.                 January 2021 Sunday Monday Tuesday Wednesday Thursday Friday Saturday                            1     2       3     4    LAB PERIPHERAL   7:45 AM   (15 min.)    [facility] DRAW   [facility] ONC INFUSION 60   8:30 AM   (60 min.)    ONCOLOGY INFUSION   [facility] 5     6     7    LAB PERIPHERAL   1:30 PM   (15 min.)   [facility]   [facility] ONC INFUSION 60   2:00 PM   (60 min.)    ONCOLOGY INFUSION   [facility] 8    LAB PERIPHERAL   9:30 AM   (15 min.)    [facility] DRAW   [facility] RETURN   9:45 AM   (50 min.)   Zoey Alvarez APRN CNP   [facility] ONC INFUSION 120  12:30 PM   (120 min.)    ONCOLOGY INFUSION   [facility]    XR CHEST 2 VIEWS  12:30 PM   (15 min.)   [id number]   [facility] [place] 9       10     11    LAB PERIPHERAL   9:30 AM   (15 min.)    [facility] DRAW   [facility] ONC INFUSION 60  10:00 AM   (60 min.)    ONCOLOGY INFUSION   [facility] 12      13    TELEPHONE VISIT RETURN   2:30 PM   (30 min.)   Angela Sweeney MD   [facility] 14    LAB PERIPHERAL   9:45 AM   (15 min.)   [facility]   [facility] ONC INFUSION 60  10:30 AM   (60 min.)   [facility] ONCOLOGY INFUSION   [facility] 15     16       17     18     19    [facility] NEW  10:05 AM   (40 min.)   Haritha Sargent MD   [facility] [place] 20     21    LAB PERIPHERAL   8:30 AM   (15 min.)   [facility]   [facility] 60   9:00 AM   (60 min.)   [facility] ONCOLOGY INFUSION   [facility] 22     23       24     25    LAB PERIPHERAL   9:30 AM   (15 min.)   [facility]   [facility]  10:00 AM   (60 min.)   [facility] ONCOLOGY INFUSION   [facility] 26     27     28    LAB PERIPHERAL  10:00 AM   (15 min.)   [facility]   [facility]    [facility] ONC INFUSION 60  10:30 AM   (60 min.)   [facility] ONCOLOGY INFUSION   [facility] 29     30       31                                               February 2021 Sunday Monday Tuesday Wednesday Thursday Friday Saturday        1    LAB PERIPHERAL   9:15 AM   (15 min.)   [facility]   [facility]  10:00 AM   (60 min.)   [facility] ONCOLOGY INFUSION   [facility] 2     3     4    LAB PERIPHERAL   9:45 AM   (15 min.)   [facility]   [facility]  10:30 AM   (60 min.)   [facility] ONCOLOGY INFUSION   [facility] 5     6       7     8  Happy Birthday!     9     10     11     12     13       14     15     16     17     18     19     20       21     22     23     24     25      26     27       28                                                   Lab Results:  Recent Results (from the past 12 hour(s))   Routine UA with micro reflex to culture    Collection Time: 01/08/21 10:20 AM    Specimen: Midstream Urine   Result Value Ref Range    Color Urine Yellow     Appearance Urine Clear     Glucose Urine Negative NEG^Negative mg/dL    Bilirubin Urine Negative NEG^Negative    Ketones Urine 20 (A) NEG^Negative mg/dL    Specific Gravity Urine 1.020 1.003 - 1.035    Blood Urine Negative NEG^Negative    pH Urine 5.0 5.0 - 7.0 pH    Protein Albumin Urine Negative NEG^Negative mg/dL    Urobilinogen mg/dL 0.0 0.0 - 2.0 mg/dL    Nitrite Urine Negative NEG^Negative    Leukocyte Esterase Urine Negative NEG^Negative    Source Midstream Urine     WBC Urine 0 0 - 5 /HPF    RBC Urine 1 0 - 2 /HPF    Mucous Urine Present (A) NEG^Negative /LPF    Hyaline Casts 3 (H) 0 - 2 /LPF   Comprehensive metabolic panel    Collection Time: 01/08/21 10:21 AM   Result Value Ref Range    Sodium 135 133 - 144 mmol/L    Potassium 4.1 3.4 - 5.3 mmol/L    Chloride 104 94 - 109 mmol/L    Carbon Dioxide 26 20 - 32 mmol/L    Anion Gap 6 3 - 14 mmol/L    Glucose 95 70 - 99 mg/dL    Urea Nitrogen 32 (H) 7 - 30 mg/dL    Creatinine 1.18 0.66 - 1.25 mg/dL    GFR Estimate 60 (L) >60 mL/min/[1.73_m2]    GFR Estimate If Black 70 >60 mL/min/[1.73_m2]    Calcium 9.3 8.5 - 10.1 mg/dL    Bilirubin Total 0.5 0.2 - 1.3 mg/dL    Albumin 3.7 3.4 - 5.0 g/dL    Protein Total 7.1 6.8 - 8.8 g/dL    Alkaline Phosphatase 134 40 - 150 U/L    ALT 20 0 - 70 U/L    AST 8 0 - 45 U/L   *CBC with platelets differential    Collection Time: 01/08/21 10:21 AM   Result Value Ref Range    WBC 8.8 4.0 - 11.0 10e9/L    RBC Count 3.11 (L) 4.4 - 5.9 10e12/L    Hemoglobin 10.3 (L) 13.3 - 17.7 g/dL    Hematocrit 32.0 (L) 40.0 - 53.0 %     (H) 78 - 100 fl    MCH 33.1 (H) 26.5 - 33.0 pg    MCHC 32.2 31.5 - 36.5 g/dL    RDW 14.6 10.0 - 15.0 %    Platelet Count 267 150 -  450 10e9/L    Diff Method Automated Method     % Neutrophils 79.3 %    % Lymphocytes 12.3 %    % Monocytes 7.0 %    % Eosinophils 0.9 %    % Basophils 0.0 %    % Immature Granulocytes 0.5 %    Nucleated RBCs 0 0 /100    Absolute Neutrophil 6.9 1.6 - 8.3 10e9/L    Absolute Lymphocytes 1.1 0.8 - 5.3 10e9/L    Absolute Monocytes 0.6 0.0 - 1.3 10e9/L    Absolute Eosinophils 0.1 0.0 - 0.7 10e9/L    Absolute Basophils 0.0 0.0 - 0.2 10e9/L    Abs Immature Granulocytes 0.0 0 - 0.4 10e9/L    Absolute Nucleated RBC 0.0    Protein electrophoresis    Collection Time: 01/08/21 10:21 AM   Result Value Ref Range    Albumin Fraction PENDING 3.7 - 5.1 g/dL    Alpha 1 Fraction PENDING 0.2 - 0.4 g/dL    Alpha 2 Fraction PENDING 0.5 - 0.9 g/dL    Beta Fraction PENDING 0.6 - 1.0 g/dL    Gamma Fraction PENDING 0.7 - 1.6 g/dL    Monoclonal Peak PENDING 0.0 g/dL    ELP Interpretation: PENDING    EKG 12-lead complete w/read - Clinics    Collection Time: 01/08/21 10:59 AM   Result Value Ref Range    Interpretation ECG Click View Image link to view waveform and result    Lactic acid whole blood    Collection Time: 01/08/21  2:20 PM   Result Value Ref Range    Lactic Acid 1.2 0.7 - 2.0 mmol/L

## 2021-01-08 NOTE — PROGRESS NOTES
Infusion Nursing Note:  Rogelio Marshall presents today for IVF.    Patient seen by provider today: Yes: Zoey Alvarez NP   present during visit today: Not Applicable.    Note: Pt arrives to infusion after having a chest xray. Pt states he has not had an appetite the last few days, he is unsure why. He denies any vomiting. States he has taken a few zofran tablets the last few days because he thought he might be getting nauseated. He is willing to eat some pretzels while getting his IVF today. Pt also states he has been cold since yesterday but unaware of any fevers. He requests a blanket today but not a warm one.    Pt was able to eat the pretzels without problem. Now requesting cookies. He states the stomach problem he has been having is getting better. Heart rate decreased to 93 from 103.     TORB: Zoey Alvarez NP/Nguyen Bloom RN  -ok for pt to be discharged to home after IVF  -pt to return on Sunday for IVF  -treat pt as PUI on Sunday and Monday  Pt agreed with plan. Scheduled for Sunday at 7 am.    Intravenous Access:  Peripheral IV placed.    Treatment Conditions:  Lab Results   Component Value Date    HGB 10.3 01/08/2021     Lab Results   Component Value Date    WBC 8.8 01/08/2021      Lab Results   Component Value Date    ANEU 6.9 01/08/2021     Lab Results   Component Value Date     01/08/2021      Lab Results   Component Value Date     01/08/2021                   Lab Results   Component Value Date    POTASSIUM 4.1 01/08/2021           Lab Results   Component Value Date    MAG 2.1 12/02/2020            Lab Results   Component Value Date    CR 1.18 01/08/2021                   Lab Results   Component Value Date    MIRI 9.3 01/08/2021                Lab Results   Component Value Date    BILITOTAL 0.5 01/08/2021           Lab Results   Component Value Date    ALBUMIN 3.7 01/08/2021                    Lab Results   Component Value Date    ALT 20 01/08/2021           Lab  Results   Component Value Date    AST 8 01/08/2021           Post Infusion Assessment:  Patient tolerated infusion without incident.  Blood return noted pre and post infusion.  Site patent and intact, free from redness, edema or discomfort.  No evidence of extravasations.  Access discontinued per protocol.       Discharge Plan:   Patient declined prescription refills.  Discharge instructions reviewed with: Patient.  Patient and/or family verbalized understanding of discharge instructions and all questions answered.  Copy of AVS reviewed with patient and/or family.  Patient will return 1/10/21 for next appointment.  Patient discharged in stable condition accompanied by: self.  Departure Mode: Ambulatory.    Nguyen Bloom RN

## 2021-01-10 ENCOUNTER — INFUSION THERAPY VISIT (OUTPATIENT)
Dept: ONCOLOGY | Facility: CLINIC | Age: 76
End: 2021-01-10
Attending: STUDENT IN AN ORGANIZED HEALTH CARE EDUCATION/TRAINING PROGRAM
Payer: COMMERCIAL

## 2021-01-10 VITALS
OXYGEN SATURATION: 100 % | HEART RATE: 85 BPM | RESPIRATION RATE: 16 BRPM | SYSTOLIC BLOOD PRESSURE: 142 MMHG | TEMPERATURE: 97.6 F | DIASTOLIC BLOOD PRESSURE: 78 MMHG

## 2021-01-10 DIAGNOSIS — C90.00 MULTIPLE MYELOMA, REMISSION STATUS UNSPECIFIED (H): Primary | ICD-10-CM

## 2021-01-10 LAB
ALBUMIN SERPL-MCNC: 3.3 G/DL (ref 3.4–5)
ALP SERPL-CCNC: 111 U/L (ref 40–150)
ALT SERPL W P-5'-P-CCNC: 20 U/L (ref 0–70)
ANION GAP SERPL CALCULATED.3IONS-SCNC: 6 MMOL/L (ref 3–14)
AST SERPL W P-5'-P-CCNC: 24 U/L (ref 0–45)
BASOPHILS # BLD AUTO: 0 10E9/L (ref 0–0.2)
BASOPHILS NFR BLD AUTO: 0 %
BILIRUB SERPL-MCNC: 0.5 MG/DL (ref 0.2–1.3)
BUN SERPL-MCNC: 28 MG/DL (ref 7–30)
CALCIUM SERPL-MCNC: 8.9 MG/DL (ref 8.5–10.1)
CHLORIDE SERPL-SCNC: 105 MMOL/L (ref 94–109)
CO2 SERPL-SCNC: 25 MMOL/L (ref 20–32)
CREAT SERPL-MCNC: 1.22 MG/DL (ref 0.66–1.25)
DIFFERENTIAL METHOD BLD: ABNORMAL
EOSINOPHIL # BLD AUTO: 0.2 10E9/L (ref 0–0.7)
EOSINOPHIL NFR BLD AUTO: 1.9 %
ERYTHROCYTE [DISTWIDTH] IN BLOOD BY AUTOMATED COUNT: 14.6 % (ref 10–15)
GFR SERPL CREATININE-BSD FRML MDRD: 58 ML/MIN/{1.73_M2}
GLUCOSE SERPL-MCNC: 95 MG/DL (ref 70–99)
HCT VFR BLD AUTO: 27.7 % (ref 40–53)
HGB BLD-MCNC: 9 G/DL (ref 13.3–17.7)
IMM GRANULOCYTES # BLD: 0 10E9/L (ref 0–0.4)
IMM GRANULOCYTES NFR BLD: 0.4 %
LYMPHOCYTES # BLD AUTO: 1.1 10E9/L (ref 0.8–5.3)
LYMPHOCYTES NFR BLD AUTO: 13.5 %
MCH RBC QN AUTO: 32.8 PG (ref 26.5–33)
MCHC RBC AUTO-ENTMCNC: 32.5 G/DL (ref 31.5–36.5)
MCV RBC AUTO: 101 FL (ref 78–100)
MONOCYTES # BLD AUTO: 0.8 10E9/L (ref 0–1.3)
MONOCYTES NFR BLD AUTO: 9.4 %
NEUTROPHILS # BLD AUTO: 5.9 10E9/L (ref 1.6–8.3)
NEUTROPHILS NFR BLD AUTO: 74.8 %
NRBC # BLD AUTO: 0 10*3/UL
NRBC BLD AUTO-RTO: 0 /100
PLATELET # BLD AUTO: 238 10E9/L (ref 150–450)
POTASSIUM SERPL-SCNC: 4.3 MMOL/L (ref 3.4–5.3)
PROT SERPL-MCNC: 6.6 G/DL (ref 6.8–8.8)
RBC # BLD AUTO: 2.74 10E12/L (ref 4.4–5.9)
SODIUM SERPL-SCNC: 135 MMOL/L (ref 133–144)
WBC # BLD AUTO: 7.9 10E9/L (ref 4–11)

## 2021-01-10 PROCEDURE — 96360 HYDRATION IV INFUSION INIT: CPT

## 2021-01-10 PROCEDURE — 258N000003 HC RX IP 258 OP 636: Performed by: NURSE PRACTITIONER

## 2021-01-10 PROCEDURE — 85025 COMPLETE CBC W/AUTO DIFF WBC: CPT | Performed by: STUDENT IN AN ORGANIZED HEALTH CARE EDUCATION/TRAINING PROGRAM

## 2021-01-10 PROCEDURE — 80053 COMPREHEN METABOLIC PANEL: CPT | Performed by: STUDENT IN AN ORGANIZED HEALTH CARE EDUCATION/TRAINING PROGRAM

## 2021-01-10 RX ADMIN — SODIUM CHLORIDE 1000 ML: 9 INJECTION, SOLUTION INTRAVENOUS at 07:29

## 2021-01-10 ASSESSMENT — PAIN SCALES - GENERAL: PAINLEVEL: NO PAIN (1)

## 2021-01-10 NOTE — PATIENT INSTRUCTIONS
[facility] Main Line: [phone]    Call triage nurse with chills and/or temperature greater than or equal to 100.4, uncontrolled nausea/vomiting, diarrhea, constipation, dizziness, shortness of breath, chest pain, bleeding, unexplained bruising, or any new/concerning symptoms, questions/concerns.   If you are having any concerning symptoms or wish to speak to a provider before your next infusion visit, please call your care coordinator or triage to notify them so we can adequately serve you.   Triage Nurse Line: [phone]    If after hours, weekends, or holidays [phone]

## 2021-01-10 NOTE — PROGRESS NOTES
Infusion Nursing Note:  Rogelio Marshall presents today for labs and 1 liter IV Normal Saline.    Patient seen by provider today: No   present during visit today: Not Applicable.    Note: Rogelio said he is feeling much improved.  Has no complaints.  Pain level is down to a 1 in his low back.  He states that his ribs have pretty much healed and don't bother him anymore.  He states his neuropathy has   I am treating him as PUI per notes.  CBC, CMP drawn.    Rogelio is thinking of resuming his supplements.  I have asked that he make a list of these for MDpharmacy review prior to starting them up again now that he's on chemo.  He will do this.    Intravenous Access:  Peripheral IV placed.    Treatment Conditions:  Lab Results   Component Value Date    HGB 9.0 01/10/2021     Lab Results   Component Value Date    WBC 7.9 01/10/2021      Lab Results   Component Value Date    ANEU 5.9 01/10/2021     Lab Results   Component Value Date     01/10/2021      Lab Results   Component Value Date     01/10/2021                   Lab Results   Component Value Date    POTASSIUM 4.3 01/10/2021           Lab Results   Component Value Date    MAG 2.1 12/02/2020            Lab Results   Component Value Date    CR 1.22 01/10/2021                   Lab Results   Component Value Date    MIRI 8.9 01/10/2021                Lab Results   Component Value Date    BILITOTAL 0.5 01/10/2021           Lab Results   Component Value Date    ALBUMIN 3.3 01/10/2021                    Lab Results   Component Value Date    ALT 20 01/10/2021           Lab Results   Component Value Date    AST 24 01/10/2021           Post Infusion Assessment:  Patient tolerated infusion without incident.  Blood return noted pre and post infusion.  Site patent and intact, free from redness, edema or discomfort.  No evidence of extravasations.  Access discontinued per protocol.       Discharge Plan:   AVS to patient via [employer].  Patient will return 1/11/21  labs/chemo for next appointment.   Patient discharged in stable condition accompanied by: self.  Departure Mode: Ambulatory with walker.  Face to Face time: 0.    Diana Flores RN

## 2021-01-11 ENCOUNTER — INFUSION THERAPY VISIT (OUTPATIENT)
Dept: ONCOLOGY | Facility: CLINIC | Age: 76
End: 2021-01-11
Attending: STUDENT IN AN ORGANIZED HEALTH CARE EDUCATION/TRAINING PROGRAM
Payer: COMMERCIAL

## 2021-01-11 VITALS
OXYGEN SATURATION: 98 % | RESPIRATION RATE: 14 BRPM | HEART RATE: 93 BPM | SYSTOLIC BLOOD PRESSURE: 148 MMHG | TEMPERATURE: 97.9 F | DIASTOLIC BLOOD PRESSURE: 70 MMHG

## 2021-01-11 DIAGNOSIS — C90.00 MULTIPLE MYELOMA, REMISSION STATUS UNSPECIFIED (H): Primary | ICD-10-CM

## 2021-01-11 LAB
ALBUMIN SERPL ELPH-MCNC: 4.1 G/DL (ref 3.7–5.1)
ALPHA1 GLOB SERPL ELPH-MCNC: 0.4 G/DL (ref 0.2–0.4)
ALPHA2 GLOB SERPL ELPH-MCNC: 0.8 G/DL (ref 0.5–0.9)
B-GLOBULIN SERPL ELPH-MCNC: 0.7 G/DL (ref 0.6–1)
GAMMA GLOB SERPL ELPH-MCNC: 0.7 G/DL (ref 0.7–1.6)
INTERPRETATION ECG - MUSE: NORMAL
M PROTEIN SERPL ELPH-MCNC: 0.5 G/DL
PROT PATTERN SERPL ELPH-IMP: ABNORMAL

## 2021-01-11 PROCEDURE — 250N000013 HC RX MED GY IP 250 OP 250 PS 637: Performed by: STUDENT IN AN ORGANIZED HEALTH CARE EDUCATION/TRAINING PROGRAM

## 2021-01-11 PROCEDURE — 96401 CHEMO ANTI-NEOPL SQ/IM: CPT

## 2021-01-11 PROCEDURE — 250N000011 HC RX IP 250 OP 636: Performed by: STUDENT IN AN ORGANIZED HEALTH CARE EDUCATION/TRAINING PROGRAM

## 2021-01-11 PROCEDURE — 250N000012 HC RX MED GY IP 250 OP 636 PS 637: Performed by: STUDENT IN AN ORGANIZED HEALTH CARE EDUCATION/TRAINING PROGRAM

## 2021-01-11 RX ORDER — DEXAMETHASONE 4 MG/1
20 TABLET ORAL ONCE
Status: COMPLETED | OUTPATIENT
Start: 2021-01-11 | End: 2021-01-11

## 2021-01-11 RX ORDER — DIPHENHYDRAMINE HCL 25 MG
50 CAPSULE ORAL ONCE
Status: COMPLETED | OUTPATIENT
Start: 2021-01-11 | End: 2021-01-11

## 2021-01-11 RX ORDER — ACETAMINOPHEN 325 MG/1
650 TABLET ORAL ONCE
Status: COMPLETED | OUTPATIENT
Start: 2021-01-11 | End: 2021-01-11

## 2021-01-11 RX ADMIN — DIPHENHYDRAMINE HYDROCHLORIDE 50 MG: 25 CAPSULE ORAL at 10:13

## 2021-01-11 RX ADMIN — BORTEZOMIB 2.2 MG: 3.5 INJECTION, POWDER, LYOPHILIZED, FOR SOLUTION INTRAVENOUS; SUBCUTANEOUS at 10:49

## 2021-01-11 RX ADMIN — ACETAMINOPHEN 650 MG: 325 TABLET ORAL at 10:13

## 2021-01-11 RX ADMIN — DARATUMUMAB AND HYALURONIDASE-FIHJ (HUMAN RECOMBINANT) 1800 MG: 1800; 30000 INJECTION SUBCUTANEOUS at 10:50

## 2021-01-11 RX ADMIN — DEXAMETHASONE 20 MG: 4 TABLET ORAL at 10:13

## 2021-01-11 ASSESSMENT — PAIN SCALES - GENERAL: PAINLEVEL: NO PAIN (1)

## 2021-01-11 NOTE — PATIENT INSTRUCTIONS
-Take your Dexamethasone as ordered tomorrow        Contact Numbers  [facility]: [phone]    After Hours:  [phone]  Triage: [phone]    [facility] call [facility] T[facility] if you experience a temperature greater than or equal to 100.5, shaking chills, have uncontrolled nausea, vomiting and/or diarrhea, dizziness, shortness of breath, chest pain, bleeding, unexplained bruising, or if you have any other new/concerning symptoms, questions or concerns.     If it is after hours, weekends, or holidays, please call the main hospital  at  [phone] and ask to speak to the Oncology doctor on call.     If you are having any concerning symptoms or wish to speak to a provider before your next infusion visit, please call your care coordinator or triage to notify them so we can adequately serve you.     If you need a refill on a narcotic prescription or other medication, please call triage before your infusion appointment.

## 2021-01-11 NOTE — PROGRESS NOTES
"Infusion Nursing Note:  Rogelio Marshall presents today for C2D8 Velcade-Darzalex.    Patient seen by provider today: No   present during visit today: Not Applicable.    Note: Patient denies any of the following: fevers, chills, vision or hearing changes, chest pain, dyspnea, abdominal pain, nausea, vomiting, diarrhea, constipation, urinary concerns, headaches, tingling, issues with sleep or mood. He also denies lumps, bumps, rashes or skin lesions, bleeding or bruising issues.  Baseline fatigue continues.    Per provider note from 1/8:  -Energy is poor.  -Patient reports chills and body aches * 1.5 days   -Reports he has not been eating much for * 1.5 days.   -Refuses COVID test \"It is a collective insanity\", \"political stunt\", \"I absolutely refuse\"     PUI through 1/28/21  Treatment plan not updated from 1/7 when treatment was held.    TORB 1/11/20@1000 Dr Kelvin Ann RN  --Today is C2D8 Velcade-Darzalex    Intravenous Access:  No Intravenous access at this visit.    Treatment Conditions:  Lab Results   Component Value Date    HGB 9.0 01/10/2021     Lab Results   Component Value Date    WBC 7.9 01/10/2021      Lab Results   Component Value Date    ANEU 5.9 01/10/2021     Lab Results   Component Value Date     01/10/2021      Lab Results   Component Value Date     01/10/2021                   Lab Results   Component Value Date    POTASSIUM 4.3 01/10/2021           Lab Results   Component Value Date    MAG 2.1 12/02/2020            Lab Results   Component Value Date    CR 1.22 01/10/2021                   Lab Results   Component Value Date    MIRI 8.9 01/10/2021                Lab Results   Component Value Date    BILITOTAL 0.5 01/10/2021           Lab Results   Component Value Date    ALBUMIN 3.3 01/10/2021                    Lab Results   Component Value Date    ALT 20 01/10/2021           Lab Results   Component Value Date    AST 24 01/10/2021       Results reviewed, labs MET " treatment parameters, ok to proceed with treatment.      Post Infusion Assessment:  Patient tolerated Velcade injection into LUQ without incident.   Patient tolerated Darzalex injection into RUQ without incident.     Discharge Plan:   Patient declined prescription refills, has DEX at home and will take per order 1/12.  Discharge instructions reviewed with: Patient.  Patient and/or family verbalized understanding of discharge instructions and all questions answered.  Copy of AVS reviewed with patient and/or family.  Patient will return 1/14 for next appointment.  Patient discharged in stable condition accompanied by: self.  Departure Mode: Ambulatory.    Kerri Ann RN

## 2021-01-13 ENCOUNTER — VIRTUAL VISIT (OUTPATIENT)
Dept: ONCOLOGY | Facility: CLINIC | Age: 76
End: 2021-01-13
Attending: STUDENT IN AN ORGANIZED HEALTH CARE EDUCATION/TRAINING PROGRAM
Payer: COMMERCIAL

## 2021-01-13 DIAGNOSIS — K29.00 OTHER ACUTE GASTRITIS, PRESENCE OF BLEEDING UNSPECIFIED: ICD-10-CM

## 2021-01-13 DIAGNOSIS — C90.00 MULTIPLE MYELOMA, REMISSION STATUS UNSPECIFIED (H): Primary | ICD-10-CM

## 2021-01-13 PROBLEM — N18.30 CHRONIC KIDNEY DISEASE, STAGE 3 (H): Status: ACTIVE | Noted: 2021-01-13

## 2021-01-13 PROCEDURE — 999N001193 HC VIDEO/TELEPHONE VISIT; NO CHARGE

## 2021-01-13 PROCEDURE — 99214 OFFICE O/P EST MOD 30 MIN: CPT | Mod: 95 | Performed by: STUDENT IN AN ORGANIZED HEALTH CARE EDUCATION/TRAINING PROGRAM

## 2021-01-13 RX ORDER — OMEPRAZOLE 40 MG/1
40 CAPSULE, DELAYED RELEASE ORAL DAILY
Qty: 30 CAPSULE | Refills: 3 | Status: SHIPPED | OUTPATIENT
Start: 2021-01-13 | End: 2021-02-04

## 2021-01-13 NOTE — PROGRESS NOTES
Rogelio is a 75 year old who is being evaluated via a billable telephone visit.      What phone number would you like to be contacted at? [phone]  How would you like to obtain your AVS? [place]   Phone call duration: 20  minutes      TOAN Briceño            Oncologic Hx:   -compression fractures and bone scan reveals multiple lytic lesions throughout his appendicular and axial skeleton. His Beta 2 microglobulin was 3.6 on 10/10/2020. Kappa chains were 73.6 on 10/5/2020 with K/L ratio of 89.9. M spike of 16.2 in urine on 10/10. Bone marrow biopsy on 10/23/2020 showed trilineage hematopoiesis and 50-60% kappa restricted plasma cells. Flow cytometry showed 20 % plasma cells which express CD19, CD38, CD45 and monotypic cytoplasmic kappa immunoglobulin light chains but lack CD20 and CD56.  FISH shows IGH-CCND1 fusion (81%; 30% had loss of IGH component from one fusion signal).      - Started 21 day cycle VRD 11/2020    - 11/27 he had a fall with rib fractures.     - Dec 2020 Started madyson-velcade-dex      Interval Hx: Rogelio has not yet had an opportunity to see dentistry. We had a long discussion today about his supplements and which he should hold while on chemo. He has also noted some stomach upset and 'reflux'. He used to take omeprazole but has not been on this for awhile. Otherwise no side effects. His bone pain is stable and he is doing PT for strengthening. No neuropathy.     A comprehensive review of systems was completed and negative except as described above.     Pertinent PMH reviewed:   HTN    Physical Exam:   Neuro: conversant, normal speech pattern  Psych: appropriate mood and affect    The rest of a comprehensive physical examination is deferred due to Public Health Emergency telephone visit restrictions      Pertinent Data Summarized:  K/L 5.7, M spike 0.5    Assessment and Plan  76 yo man with standard risk multiple myeloma.He is tolerating madyson-velcade-dex and his M spike is improving. Continue  levofloxacin and acyclovir. Today we discussed that the goal of treatment is to induce a complete remission and than to maintain him with one medication. He is agreeable with this plan.     He also is at high risk for more compression fractures. Vitamin D is replete and he is on a daily supplement as well as calcium. We discussed that side effects of zometa include bone pains, hypocalcemia, and osteonecrosis of the jaw.  We discussed that I really want to get him started on this but he has not yet seen dentistry. I encouraged this again.     GERD: this is probably d/t his steroids. I started omeprazole 40 mg every day and he will try this.     Supplements: I recommended he hold zinc or at least decrease to only twice weekly to avoid other mineral deficiencies. OK to take glucosamine and collagen.     Angela Sweeney MD PhD

## 2021-01-13 NOTE — LETTER
1/13/2021         RE: Rogelio Marshall  [address]  [place] [address]        Dear Colleague,    Thank you for referring your patient, Rogelio Marshall, to the [facility]. Please see a copy of my visit note below.    Rogelio is a 75 year old who is being evaluated via a billable telephone visit.      What phone number would you like to be contacted at? [phone]  How would you like to obtain your AVS? [place]   Phone call duration: 20  minutes      TOAN Briceño            Oncologic Hx:   -compression fractures and bone scan reveals multiple lytic lesions throughout his appendicular and axial skeleton. His Beta 2 microglobulin was 3.6 on 10/10/2020. Kappa chains were 73.6 on 10/5/2020 with K/L ratio of 89.9. M spike of 16.2 in urine on 10/10. Bone marrow biopsy on 10/23/2020 showed trilineage hematopoiesis and 50-60% kappa restricted plasma cells. Flow cytometry showed 20 % plasma cells which express CD19, CD38, CD45 and monotypic cytoplasmic kappa immunoglobulin light chains but lack CD20 and CD56.  FISH shows IGH-CCND1 fusion (81%; 30% had loss of IGH component from one fusion signal).      - Started 21 day cycle VRD 11/2020    - 11/27 he had a fall with rib fractures.     - Dec 2020 Started madyson-velcade-dex      Interval Hx: Rogelio has not yet had an opportunity to see dentistry. We had a long discussion today about his supplements and which he should hold while on chemo. He has also noted some stomach upset and 'reflux'. He used to take omeprazole but has not been on this for awhile. Otherwise no side effects. His bone pain is stable and he is doing PT for strengthening. No neuropathy.     A comprehensive review of systems was completed and negative except as described above.     Pertinent PMH reviewed:   HTN    Physical Exam:   Neuro: conversant, normal speech pattern  Psych: appropriate mood and affect    The rest of a comprehensive physical examination is  deferred due to Public Health Emergency telephone visit restrictions      Pertinent Data Summarized:  K/L 5.7, M spike 0.5    Assessment and Plan  74 yo man with standard risk multiple myeloma.He is tolerating madyson-velcade-dex and his M spike is improving. Continue levofloxacin and acyclovir. Today we discussed that the goal of treatment is to induce a complete remission and than to maintain him with one medication. He is agreeable with this plan.     He also is at high risk for more compression fractures. Vitamin D is replete and he is on a daily supplement as well as calcium. We discussed that side effects of zometa include bone pains, hypocalcemia, and osteonecrosis of the jaw.  We discussed that I really want to get him started on this but he has not yet seen dentistry. I encouraged this again.     GERD: this is probably d/t his steroids. I started omeprazole 40 mg every day and he will try this.     Supplements: I recommended he hold zinc or at least decrease to only twice weekly to avoid other mineral deficiencies. OK to take glucosamine and collagen.     Angela Sweeney MD PhD

## 2021-01-14 ENCOUNTER — INFUSION THERAPY VISIT (OUTPATIENT)
Dept: ONCOLOGY | Facility: CLINIC | Age: 76
End: 2021-01-14
Attending: STUDENT IN AN ORGANIZED HEALTH CARE EDUCATION/TRAINING PROGRAM
Payer: COMMERCIAL

## 2021-01-14 VITALS
RESPIRATION RATE: 16 BRPM | TEMPERATURE: 97.6 F | OXYGEN SATURATION: 100 % | DIASTOLIC BLOOD PRESSURE: 76 MMHG | HEART RATE: 92 BPM | SYSTOLIC BLOOD PRESSURE: 138 MMHG

## 2021-01-14 DIAGNOSIS — C90.00 MULTIPLE MYELOMA, REMISSION STATUS UNSPECIFIED (H): Primary | ICD-10-CM

## 2021-01-14 LAB
ALBUMIN SERPL-MCNC: 3.7 G/DL (ref 3.4–5)
ALP SERPL-CCNC: 129 U/L (ref 40–150)
ALT SERPL W P-5'-P-CCNC: 19 U/L (ref 0–70)
ANION GAP SERPL CALCULATED.3IONS-SCNC: 6 MMOL/L (ref 3–14)
AST SERPL W P-5'-P-CCNC: 12 U/L (ref 0–45)
BASOPHILS # BLD AUTO: 0 10E9/L (ref 0–0.2)
BASOPHILS NFR BLD AUTO: 0.1 %
BILIRUB SERPL-MCNC: 0.4 MG/DL (ref 0.2–1.3)
BUN SERPL-MCNC: 34 MG/DL (ref 7–30)
CALCIUM SERPL-MCNC: 9.3 MG/DL (ref 8.5–10.1)
CHLORIDE SERPL-SCNC: 108 MMOL/L (ref 94–109)
CO2 SERPL-SCNC: 23 MMOL/L (ref 20–32)
CREAT SERPL-MCNC: 1.34 MG/DL (ref 0.66–1.25)
DIFFERENTIAL METHOD BLD: ABNORMAL
EOSINOPHIL # BLD AUTO: 0 10E9/L (ref 0–0.7)
EOSINOPHIL NFR BLD AUTO: 0.1 %
ERYTHROCYTE [DISTWIDTH] IN BLOOD BY AUTOMATED COUNT: 15 % (ref 10–15)
GFR SERPL CREATININE-BSD FRML MDRD: 52 ML/MIN/{1.73_M2}
GLUCOSE SERPL-MCNC: 97 MG/DL (ref 70–99)
HCT VFR BLD AUTO: 30.1 % (ref 40–53)
HGB BLD-MCNC: 9.8 G/DL (ref 13.3–17.7)
IMM GRANULOCYTES # BLD: 0.1 10E9/L (ref 0–0.4)
IMM GRANULOCYTES NFR BLD: 1 %
LYMPHOCYTES # BLD AUTO: 1.7 10E9/L (ref 0.8–5.3)
LYMPHOCYTES NFR BLD AUTO: 12.4 %
MCH RBC QN AUTO: 32.9 PG (ref 26.5–33)
MCHC RBC AUTO-ENTMCNC: 32.6 G/DL (ref 31.5–36.5)
MCV RBC AUTO: 101 FL (ref 78–100)
MONOCYTES # BLD AUTO: 1.7 10E9/L (ref 0–1.3)
MONOCYTES NFR BLD AUTO: 12.3 %
NEUTROPHILS # BLD AUTO: 10.4 10E9/L (ref 1.6–8.3)
NEUTROPHILS NFR BLD AUTO: 74.1 %
NRBC # BLD AUTO: 0 10*3/UL
NRBC BLD AUTO-RTO: 0 /100
PLATELET # BLD AUTO: 306 10E9/L (ref 150–450)
POTASSIUM SERPL-SCNC: 3.7 MMOL/L (ref 3.4–5.3)
PROT SERPL-MCNC: 6.9 G/DL (ref 6.8–8.8)
RBC # BLD AUTO: 2.98 10E12/L (ref 4.4–5.9)
SODIUM SERPL-SCNC: 138 MMOL/L (ref 133–144)
WBC # BLD AUTO: 14 10E9/L (ref 4–11)

## 2021-01-14 PROCEDURE — 85025 COMPLETE CBC W/AUTO DIFF WBC: CPT | Performed by: STUDENT IN AN ORGANIZED HEALTH CARE EDUCATION/TRAINING PROGRAM

## 2021-01-14 PROCEDURE — 80053 COMPREHEN METABOLIC PANEL: CPT | Performed by: STUDENT IN AN ORGANIZED HEALTH CARE EDUCATION/TRAINING PROGRAM

## 2021-01-14 PROCEDURE — 96401 CHEMO ANTI-NEOPL SQ/IM: CPT

## 2021-01-14 PROCEDURE — 36415 COLL VENOUS BLD VENIPUNCTURE: CPT

## 2021-01-14 PROCEDURE — 250N000011 HC RX IP 250 OP 636: Mod: JW | Performed by: STUDENT IN AN ORGANIZED HEALTH CARE EDUCATION/TRAINING PROGRAM

## 2021-01-14 RX ADMIN — BORTEZOMIB 2.2 MG: 3.5 INJECTION, POWDER, LYOPHILIZED, FOR SOLUTION INTRAVENOUS; SUBCUTANEOUS at 11:25

## 2021-01-14 ASSESSMENT — PAIN SCALES - GENERAL: PAINLEVEL: MILD PAIN (3)

## 2021-01-14 NOTE — PATIENT INSTRUCTIONS
Contact Numbers    [facility] Main Line (for Scheduling/Triage/After Hours Nurse Line): [phone]    Please call the [facility] nurse triage or the after hours nurse line if you experience a temperature greater than or equal to 100.5, shaking chills, have uncontrolled nausea, vomiting and/or diarrhea, dizziness, lightheadedness, shortness of breath, chest pain, bleeding, unexplained bruising, or if you have any other new/concerning symptoms, questions or concerns.     If you are having any concerning symptoms or wish to speak to a provider before your next infusion visit, please call your care coordinator or triage to notify them so we can adequately serve you.     If you need any refills on medications (narcotics or other medications), please call before your infusion appointment.       January 2021 Sunday Monday Tuesday Wednesday Thursday Friday Saturday                            1     2       3     4    LAB PERIPHERAL   7:45 AM   (15 min.)   [facility] DRAW   [facility] ONC INFUSION 60   8:30 AM   (60 min.)    ONCOLOGY INFUSION   [facility] 5     6     7    LAB PERIPHERAL   1:30 PM   (15 min.)   [facility] DRAW   [facility]    [facility] ONC INFUSION 60   2:00 PM   (60 min.)    ONCOLOGY INFUSION   [facility] 8    LAB PERIPHERAL   9:30 AM   (15 min.)   [facility] DRAW   [facility]    [facility] RETURN   9:45 AM   (50 min.)   Zoey Alvarez, JIMBO CNP   [facility] ONC INFUSION 120  12:30 PM   (120 min.)    ONCOLOGY INFUSION   [facility]    XR CHEST 2 VIEWS  12:30 PM   (15 min.)   [id number]   [facility] Xray [place] 9       10    [facility] ONC INFUSION 120   7:00 AM   (120 min.)    ONCOLOGY INFUSION   [facility] 11    [facility] ONC INFUSION 60  10:00 AM    (60 min.)   [facility] ONCOLOGY INFUSION   [facility] 12     13    TELEPHONE VISIT RETURN   2:30 PM   (30 min.)   Angela Sweeney MD   [facility] 14    [facility] ONC INFUSION 60  10:30 AM   (60 min.)   [facility] ONCOLOGY INFUSION   [facility] 15     16       17     18     19    [facility] NEW  10:05 AM   (40 min.)   Haritha Sargent MD   [facility] [place] 20     21    LAB PERIPHERAL   8:30 AM   (15 min.)   [facility] DRAW   [facility]    [facility] ONC INFUSION 60   9:00 AM   (60 min.)   [facility] ONCOLOGY INFUSION   [facility] 22     23       24     25    LAB PERIPHERAL   9:30 AM   (15 min.)   [facility] DRAW   [facility]    [facility] ONC INFUSION 60  10:00 AM   (60 min.)   [facility] ONCOLOGY INFUSION   [facility] 26     27     28    LAB PERIPHERAL  10:00 AM   (15 min.)   [facility] DRAW   [facility]    [facility] ONC INFUSION 60  10:30 AM   (60 min.)   [facility] ONCOLOGY INFUSION   [facility] 29     30       31                                               February 2021 Sunday Monday Tuesday Wednesday Thursday Friday Saturday        1    LAB PERIPHERAL   9:15 AM   (15 min.)   [facility] DRAW   [facility]    [facility] ONC INFUSION 60  10:00 AM   (60 min.)   [facility] ONCOLOGY INFUSION   [facility] 2     3     4    LAB PERIPHERAL   9:45 AM   (15 min.)   [facility] DRAW   [facility]    [facility] ONC INFUSION 60  10:30 AM   (60 min.)   [facility] ONCOLOGY INFUSION   [facility] 5     6       7     8  Happy Birthday!    [facility] ONC INFUSION 60  11:00 AM   (60 min.)   [facility] ONCOLOGY INFUSION   [facility] 9     10     11     12     13        14     15    LAB PERIPHERAL  10:30 AM   (15 min.)   [facility] LAB DRAW   [facility]    [facility] ONC INFUSION 60  11:00 AM   (60 min.)    ONCOLOGY INFUSION   [facility] 16     17    TELEPHONE VISIT RETURN  10:00 AM   (50 min.)   Pebbles Longo PA-C   [facility] 18    [facility] ONC INFUSION 60  11:00 AM   (60 min.)    ONCOLOGY INFUSION   [facility] 19     20       21     22    [facility] ONC INFUSION 60  11:00 AM   (60 min.)    ONCOLOGY INFUSION   [facility] 23     24     25     26     27       28                                                  Recent Results (from the past 24 hour(s))   CBC with platelets differential    Collection Time: 01/14/21 10:18 AM   Result Value Ref Range    WBC 14.0 (H) 4.0 - 11.0 10e9/L    RBC Count 2.98 (L) 4.4 - 5.9 10e12/L    Hemoglobin 9.8 (L) 13.3 - 17.7 g/dL    Hematocrit 30.1 (L) 40.0 - 53.0 %     (H) 78 - 100 fl    MCH 32.9 26.5 - 33.0 pg    MCHC 32.6 31.5 - 36.5 g/dL    RDW 15.0 10.0 - 15.0 %    Platelet Count 306 150 - 450 10e9/L    Diff Method Automated Method     % Neutrophils 74.1 %    % Lymphocytes 12.4 %    % Monocytes 12.3 %    % Eosinophils 0.1 %    % Basophils 0.1 %    % Immature Granulocytes 1.0 %    Nucleated RBCs 0 0 /100    Absolute Neutrophil 10.4 (H) 1.6 - 8.3 10e9/L    Absolute Lymphocytes 1.7 0.8 - 5.3 10e9/L    Absolute Monocytes 1.7 (H) 0.0 - 1.3 10e9/L    Absolute Eosinophils 0.0 0.0 - 0.7 10e9/L    Absolute Basophils 0.0 0.0 - 0.2 10e9/L    Abs Immature Granulocytes 0.1 0 - 0.4 10e9/L    Absolute Nucleated RBC 0.0    Comprehensive metabolic panel    Collection Time: 01/14/21 10:18 AM   Result Value Ref Range    Sodium 138 133 - 144 mmol/L    Potassium 3.7 3.4 - 5.3 mmol/L    Chloride 108 94 - 109 mmol/L    Carbon Dioxide 23 20 - 32 mmol/L    Anion Gap 6 3 - 14 mmol/L    Glucose 97 70 - 99 mg/dL    Urea Nitrogen 34 (H) 7  - 30 mg/dL    Creatinine 1.34 (H) 0.66 - 1.25 mg/dL    GFR Estimate 52 (L) >60 mL/min/[1.73_m2]    GFR Estimate If Black 60 (L) >60 mL/min/[1.73_m2]    Calcium 9.3 8.5 - 10.1 mg/dL    Bilirubin Total 0.4 0.2 - 1.3 mg/dL    Albumin 3.7 3.4 - 5.0 g/dL    Protein Total 6.9 6.8 - 8.8 g/dL    Alkaline Phosphatase 129 40 - 150 U/L    ALT 19 0 - 70 U/L    AST 12 0 - 45 U/L

## 2021-01-14 NOTE — PROGRESS NOTES
Infusion Nursing Note:  Rogelio Marshall presents today for Cycle 2 Day 11 Velcade.    Patient seen by provider today: No   present during visit today: Not Applicable.    Note: Patient states he talked to Dr. Sweeney on the phone yesterday.  He reports no changes overnight.  Patient does not believe in Covid-19, so refuses to be tested.  He is still in isolation as a PUI because he had symptoms starting 1/7.    Intravenous Access:  Labs drawn via venipuncture without difficulty from left arm.    Treatment Conditions:  Lab Results   Component Value Date    HGB 9.8 01/14/2021     Lab Results   Component Value Date    WBC 14.0 01/14/2021      Lab Results   Component Value Date    ANEU 10.4 01/14/2021     Lab Results   Component Value Date     01/14/2021      Lab Results   Component Value Date     01/14/2021                   Lab Results   Component Value Date    POTASSIUM 3.7 01/14/2021           Lab Results   Component Value Date    MAG 2.1 12/02/2020            Lab Results   Component Value Date    CR 1.34 01/14/2021                   Lab Results   Component Value Date    MIRI 9.3 01/14/2021                Lab Results   Component Value Date    BILITOTAL 0.4 01/14/2021           Lab Results   Component Value Date    ALBUMIN 3.7 01/14/2021                    Lab Results   Component Value Date    ALT 19 01/14/2021           Lab Results   Component Value Date    AST 12 01/14/2021       Dr. Sweeney notified of patient's elevated creatinine today.    1/14/21 1120 TORB:  Dr. Angela Sweeney MD/Robert Amaya RN  - OK to give Velcade today  - No need for IVFs today.  Encourage patient to push PO fluids.    Post Infusion Assessment:  Patient tolerated injection without incident.  Velcade administered subcutaneously into patient's right lower abdomen.     Discharge Plan:   Patient declined prescription refills.  Discharge instructions reviewed with: Patient.  Patient and/or family verbalized understanding of  discharge instructions and all questions answered.  Copy of AVS reviewed with patient and/or family.  Patient will return 1/21/21 for next appointment.  Patient discharged in stable condition accompanied by: self.  Departure Mode: Ambulatory.  Face to Face time: 3 minutes.    PATTI LEWIS RN

## 2021-01-15 ENCOUNTER — TELEPHONE (OUTPATIENT)
Dept: ONCOLOGY | Facility: CLINIC | Age: 76
End: 2021-01-15

## 2021-01-15 ENCOUNTER — NURSE TRIAGE (OUTPATIENT)
Dept: NURSING | Facility: CLINIC | Age: 76
End: 2021-01-15

## 2021-01-15 NOTE — TELEPHONE ENCOUNTER
Oncology RN Care Coordination Note:     Outgoing Call:  This writer attempted to reach Rogelio to discuss his symptoms, no answer at first number tried, voicemail left indicating I was calling for Rogelio to follow up on his symptoms.  Attempted to reach on the home number, this number is disconnected, no longer in service.  Unable to reach this patient to discuss his concerns.     Cristine Redding, RN BSN   [facility]  Nurse Coordinator

## 2021-01-16 NOTE — TELEPHONE ENCOUNTER
"Patient calling to go over medications. States I think the home care nurse messed up my RX for   dexamethasone (DECADRON) 4 MG tablet 25 tablet 0 12/31/2020  --   Sig: Take 20 mg (five tabs) by mouth on Days 2, 4, 5, 9, and 16.     Spent great deal of time going over medications per epic/MD and infusion dates/discharge summary.   Gave home care advice and to follow up with PCP and home care nurse as needed.  Latrice Hsu RN [facility]    COVID 19 Nurse Triage Plan/Patient Instructions    Please be aware that novel coronavirus (COVID-19) may be circulating in the community. If you develop symptoms such as fever, cough, or SOB or if you have concerns about the presence of another infection including coronavirus (COVID-19), please contact your health care provider or visit [email or web address].     Disposition/Instructions    Additional COVID19 information to add for patients.   How can I protect others?  If you have symptoms (fever, cough, body aches or trouble breathing): Stay home and away from others (self-isolate) until:    At least 10 days have passed since your symptoms started, And     You ve had no fever--and no medicine that reduces fever--for 1 full day (24 hours), And      Your other symptoms have resolved (gotten better).     If you don t have symptoms, but a test showed that you have COVID-19 (you tested positive):    Stay home and away from others (self-isolate). Follow the tips under \"How do I self-isolate?\" below for 10 days (20 days if you have a weak immune system).    You don't need to be retested for COVID-19 before going back to school or work. As long as you're fever-free and feeling better, you can go back to school, work and other activities after waiting the 10 or 20 days.     How do I self-isolate?    Stay in your own room, even for meals. Use your own bathroom if you can.     Stay away from others in your home. No hugging, kissing or shaking hands. No visitors.    Don t go to " work, school or anywhere else.     Clean  high touch  surfaces often (doorknobs, counters, handles, etc.). Use a household cleaning spray or wipes. You ll find a full list on the EPA website:  www.epa.gov/pesticide-registration/list-n-disinfectants-use-against-sars-cov-2.    Cover your mouth and nose with a mask, tissue or washcloth to avoid spreading germs.    Wash your hands and face often. Use soap and water.    Caregivers in these groups are at risk for severe illness due to COVID-19:  o People 65 years and older  o People who live in a nursing home or long-term care facility  o People with chronic disease (lung, heart, cancer, diabetes, kidney, liver, immunologic)  o People who have a weakened immune system, including those who:  - Are in cancer treatment  - Take medicine that weakens the immune system, such as corticosteroids  - Had a bone marrow or organ transplant  - Have an immune deficiency  - Have poorly controlled HIV or AIDS  - Are obese (body mass index of 40 or higher)  - Smoke regularly    Caregivers should wear gloves while washing dishes, handling laundry and cleaning bedrooms and bathrooms.    Use caution when washing and drying laundry: Don t shake dirty laundry, and use the warmest water setting that you can.    For more tips, go to www.cdc.gov/coronavirus/2019-ncov/downloads/10Things.pdf.    How can I take care of myself?  1. Get lots of rest. Drink extra fluids (unless a doctor has told you not to).     2. Take Tylenol (acetaminophen) for fever or pain. If you have liver or kidney problems, ask your family doctor if it s okay to take Tylenol.     Adults can take either:     650 mg (two 325 mg pills) every 4 to 6 hours, or     1,000 mg (two 500 mg pills) every 8 hours as needed.     Note: Don t take more than 3,000 mg in one day.   Acetaminophen is found in many medicines (both prescribed and over-the-counter medicines). Read all labels to be sure you don t take too much.     For children,  check the Tylenol bottle for the right dose. The dose is based on the child s age or weight.    3. If you have other health problems (like cancer, heart failure, an organ transplant or severe kidney disease): Call your specialty clinic if you don t feel better in the next 2 days.    4. Know when to call 911: Emergency warning signs include:    Trouble breathing or shortness of breath    Pain or pressure in the chest that doesn t go away    Feeling confused like you haven t felt before, or not being able to wake up    Bluish-colored lips or face    What are the symptoms of COVID-19?     The most common symptoms are cough, fever and trouble breathing.     Less common symptoms include body aches, chills, diarrhea (loose, watery poops), fatigue (feeling very tired), headache, runny nose, sore throat and loss of smell.    COVID-19 can cause severe coughing (bronchitis) and lung infection (pneumonia).    How does it spread?     The virus may spread when a person coughs or sneezes into the air. The virus can travel about 6 feet this way, and it can live on surfaces.      Common  (household disinfectants) will kill the virus.    Who is at risk?  Anyone can catch COVID-19 if they re around someone who has the virus.    How can others protect themselves?     Stay away from people who have COVID-19 (or symptoms of COVID-19).    Wash hands often with soap and water. Or, use hand  with at least 60% alcohol.    Avoid touching the eyes, nose or mouth.     Wear a face mask when you go out in public, when sick or when caring for a sick person.    Where can I get more information?     [facility]: About COVID-19: [email or web address]    CDC: What to Do If You re Sick: www.cdc.gov/coronavirus/2019-ncov/about/steps-when-sick.html    CDC: Ending Home Isolation: www.cdc.gov/coronavirus/2019-ncov/hcp/disposition-in-home-patients.html     CDC: Caring for Someone:  www.cdc.gov/coronavirus/2019-ncov/if-you-are-sick/care-for-someone.html     [facility]: Interim Guidance for Hospital Discharge to Home: www.[facility]/diseases/coronavirus/hcp/hospdischarge.pdf    [facility] clinical trials (COVID-19 research studies): clinicalaffairs.[facility]-clinical-trials     Below are the COVID-19 hotlines at the [place] [facility] ([facility]). Interpreters are available.   o For health questions: Call 904-040-8596 or 1-338.989.5613 (7 a.m. to 7 p.m.)  o For questions about schools and childcare: Call 791-781-5763 or 1-989.367.1867 (7 a.m. to 7 p.m.)          Thank you for taking steps to prevent the spread of this virus.  o Limit your contact with others.  o Wear a simple mask to cover your cough.  o Wash your hands well and often.    Resources    [facility]: About COVID-19: [email or web address]    CDC: What to Do If You're Sick: www.cdc.gov/coronavirus/2019-ncov/about/steps-when-sick.html    CDC: Ending Home Isolation: www.cdc.gov/coronavirus/2019-ncov/hcp/disposition-in-home-patients.html     CDC: Caring for Someone: www.cdc.gov/coronavirus/2019-ncov/if-you-are-sick/care-for-someone.html     [facility]: Interim Guidance for Hospital Discharge to Home: www.[facility]./diseases/coronavirus/hcp/hospdischarge.pdf    [facility] clinical trials (COVID-19 research studies): clinicalaffairs.[email or web address]-clinical-trials     Below are the COVID-19 hotlines at the [place] [facility] ([facility]). Interpreters are available.   o For health questions: Call 887-587-8010 or 1-637.355.2497 (7 a.m. to 7 p.m.)  o For questions about schools and childcare: Call 530-272-7016 or 1-114.473.8238 (7 a.m. to 7 p.m.)                     Reason for Disposition    Caller has NON-URGENT medication question about med that PCP prescribed and triager unable to answer question    Protocols used: MEDICATION QUESTION CALL-A-AH

## 2021-01-19 ENCOUNTER — PATIENT OUTREACH (OUTPATIENT)
Dept: ONCOLOGY | Facility: CLINIC | Age: 76
End: 2021-01-19

## 2021-01-19 ENCOUNTER — OFFICE VISIT (OUTPATIENT)
Dept: ANESTHESIOLOGY | Facility: CLINIC | Age: 76
End: 2021-01-19
Payer: COMMERCIAL

## 2021-01-19 VITALS — SYSTOLIC BLOOD PRESSURE: 119 MMHG | HEART RATE: 97 BPM | DIASTOLIC BLOOD PRESSURE: 53 MMHG | OXYGEN SATURATION: 96 %

## 2021-01-19 DIAGNOSIS — S22.41XA CLOSED FRACTURE OF MULTIPLE RIBS OF RIGHT SIDE, INITIAL ENCOUNTER: Primary | ICD-10-CM

## 2021-01-19 PROCEDURE — 99203 OFFICE O/P NEW LOW 30 MIN: CPT | Performed by: ANESTHESIOLOGY

## 2021-01-19 ASSESSMENT — ENCOUNTER SYMPTOMS
CHILLS: 0
DEPRESSION: 0
BACK PAIN: 1
SHORTNESS OF BREATH: 0
FEVER: 0
FLANK PAIN: 0
FOCAL WEAKNESS: 0
NECK PAIN: 0
BRUISES/BLEEDS EASILY: 0
ABDOMINAL PAIN: 0
SENSORY CHANGE: 0
TINGLING: 0
WEAKNESS: 0

## 2021-01-19 ASSESSMENT — PAIN SCALES - GENERAL: PAINLEVEL: MILD PAIN (2)

## 2021-01-19 NOTE — LETTER
1/19/2021       RE: Rogelio Marshall  [address]  [facility] [address]     Dear Colleague,    Thank you for referring your patient, Rogelio Marshall, to the [facility] FOR COMPREHENSIVE PAIN MANAGEMENT [place] at [facility]. Please see a copy of my visit note below.      Pain Clinic New Patient Consult Note:    Referring Provider: Og   Primary care provider: Wolf Stevens.    Rogelio Marshall is a 75 year old y.o. old male with multiple myeloma who presents to the pain clinic with low back pain. He states that his low back pain first started a few years ago prior to his cancer diagnosis, and it has slowly gotten worse over time. The pain is dull and achy and does not radiate. He denies weakness or sensation changes. He takes oxycodone and ibuprofen for the pain which has helped. He also takes gabapentin but thinks he ran out of it a while ago and has not yet gotten it refilled. He is also on Robaxin which he thinks helps with the pain. He notes that the pain is constant throughout the day and nothing makes it worse. He briefly tried physical therapy years ago for back pain but did not return after a few sessions. He underwent imaging of his cervical, thoracic, and lumbar spine last November showing diffuse lytic lesions throughout his spine.     HPI:  Patient Supplied Answers To the [facility] Pain Questionnaire  [facility] -  Patient Entered Questionnaire/Answers 1/19/2021   What number best describes your pain right now:  0 = No pain  to  10 = Worst pain imaginable 2   How would you describe the pain? dull, aching   Which of the following worsen your pain? standing, walking   Which of the following improve or reduce your pain?  lying down, sitting   What number best describes your average pain for the past week:  0 = No pain  to  10 = Worst pain imaginable 5   What number best describes your LOWEST pain in past 24 hours:  0 = No pain  to  10 =  Worst pain imaginable 1   What number best describes your WORST pain in past 24 hours:  0 = No pain  to  10 = Worst pain imaginable 3   When is your pain worst? AM, PM, Night   What non-medicine treatments have you already had for your pain? physical therapy   Have you tried treating your pain with medication?  No   Are you currently taking medications for your pain? No       Pain treatments:    Herbal medicines: no  Physical therapy: had a few PT visits for back pain prior to cancer diagnosis, about 1.5 years ago  Chiropractor: no  Pain physician: no  Surgery: no  Biofeedback: no  Acupuncture: no    Tests/Imaging reviewed with the patient:    CT cervical spine 11/26/20:  1. Diffuse lytic lesions throughout the cervical spine and occipital bone  compatible with underlying multiple myeloma. No overt acute fracture  or subluxation.  2. Bilateral moderate to severe foraminal narrowing at C5-C6.    CT thoracic spine 11/26/20:  1.  Extensive lucencies throughout the thoracic osseous structures compatible with patient's diagnosis of multiple myeloma. No acute thoracic spine fracture.  2.  Stable mild compressions of T1, T5 and marked stable chronic compressions of T10 and T12.  3.  No high-grade canal narrowing.  4.  Right third through fifth rib fractures. Correlate with chest CT.  5.  Marked bilateral T10-T11 and left T11-T12 foraminal narrowing.    CT lumbar spine 11/26/20:  1.  Extensive lucencies throughout the lumbar and pelvic osseous structures compatible with patient's diagnosis of multiple myeloma. No acute lumbar spine fracture or significant change compared to 10/10/2020.  2.  Stable moderate chronic L2 and marked chronic L3 compressions.  3.  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  4.  Marked L4-L5 and L5-S1 degenerative disc disease.  5.  Moderate to marked lower lumbar degenerative foraminal narrowing.      Significant Medical History:   Past Medical History:   Diagnosis Date      Hyperlipidemia      Hypertension           Past Surgical History:  Past Surgical History:   Procedure Laterality Date     HERNIA REPAIR, INGUINAL RT/LT      needed post-op intestinal repair     ORIF left clavical            Family History:  Family History   Problem Relation Age of Onset     C.A.D. Father          MI age 69     Hypertension Sister         obese     Family History Negative Brother           Social History:  Social History     Socioeconomic History     Marital status: Single     Spouse name: Not on file     Number of children: Not on file     Years of education: Not on file     Highest education level: Not on file   Occupational History     Occupation: retired   Social Needs     Financial resource strain: Not on file     Food insecurity     Worry: Not on file     Inability: Not on file     Transportation needs     Medical: Not on file     Non-medical: Not on file   Tobacco Use     Smoking status: Former Smoker     Packs/day: 0.10     Years: 30.00     Pack years: 3.00     Types: Cigarettes     Smokeless tobacco: Never Used   Substance and Sexual Activity     Alcohol use: Yes     Alcohol/week: 17.5 standard drinks     Types: 21 drink(s) per week     Drug use: Not on file     Sexual activity: Not on file   Lifestyle     Physical activity     Days per week: Not on file     Minutes per session: Not on file     Stress: Not on file   Relationships     Social connections     Talks on phone: Not on file     Gets together: Not on file     Attends Hoahaoism service: Not on file     Active member of club or organization: Not on file     Attends meetings of clubs or organizations: Not on file     Relationship status: Not on file     Intimate partner violence     Fear of current or ex partner: Not on file     Emotionally abused: Not on file     Physically abused: Not on file     Forced sexual activity: Not on file   Other Topics Concern      Service Not Asked     Blood Transfusions Not Asked     Caffeine  Concern Not Asked     Occupational Exposure Not Asked     Hobby Hazards Not Asked     Sleep Concern Not Asked     Stress Concern Not Asked     Weight Concern Not Asked     Special Diet Not Asked     Back Care Not Asked     Exercise Yes     Comment: bikes daily     Bike Helmet Not Asked     Seat Belt Not Asked     Self-Exams Not Asked   Social History Narrative     Not on file     Social History     Social History Narrative     Not on file          Allergies:  Allergies   Allergen Reactions     Other Drug Allergy (See Comments)      tobasco sauce ---caused red spots        Current Medications:   Current Outpatient Medications   Medication Sig Dispense Refill     acetaminophen (TYLENOL) 325 MG tablet Take 3 tablets (975 mg) by mouth 3 times daily 500 tablet 4     calcium carbonate 500 mg, elemental, (OSCAL) 500 MG tablet Take 1 tablet (500 mg) by mouth 2 times daily 200 tablet 3     cholecalciferol (VITAMIN D3) 25 mcg (1000 units) capsule Take 1 capsule (25 mcg) by mouth daily 100 capsule 3     dexamethasone (DECADRON) 4 MG tablet Take 20 mg (five tabs) by mouth on Days 2, 4, 5, 9, and 16. 25 tablet 0     dexamethasone (DECADRON) 4 MG tablet Take 20 mg (five tabs) by mouth on Days 2, 4, 5, 9, and 16. 25 tablet 0     gabapentin (NEURONTIN) 300 MG capsule Take 1 capsule (300 mg) by mouth 3 times daily 84 capsule 0     hydrochlorothiazide (MICROZIDE) 12.5 MG capsule Take 12.5 mg by mouth daily       ibuprofen (ADVIL/MOTRIN) 600 MG tablet Take 1 tablet (600 mg) by mouth every 6 hours       lisinopril (ZESTRIL) 20 MG tablet Take 1 tablet (20 mg) by mouth daily 30 tablet 3     LORazepam (ATIVAN) 0.5 MG tablet Take 1 tablet (0.5 mg) by mouth every 4 hours as needed (Anxiety, Nausea/Vomiting or Sleep) 30 tablet 5     methocarbamol (ROBAXIN) 500 MG tablet Take 1 tablet (500 mg) by mouth 4 times daily 120 tablet 3     omeprazole (PRILOSEC) 40 MG DR capsule Take 1 capsule (40 mg) by mouth daily 30 capsule 3     oxyCODONE  (ROXICODONE) 5 MG tablet Take 1 tablet (5 mg) by mouth every 6 hours as needed for severe pain 30 tablet 0     polyethylene glycol (MIRALAX) 17 g packet Take 17 g by mouth daily       senna-docusate (SENOKOT-S/PERICOLACE) 8.6-50 MG tablet Take 1 tablet by mouth daily as needed        acyclovir (ZOVIRAX) 400 MG tablet Take 1 tablet (400 mg) by mouth 2 times daily Viral Prophylaxis. 60 tablet 11     enoxaparin ANTICOAGULANT (LOVENOX) 30 MG/0.3ML syringe Inject 0.3 mLs (30 mg) Subcutaneous every 12 hours (Patient not taking: Reported on 12/18/2020) 28 Syringe 0     Lidocaine (LIDOCARE) 4 % Patch Place 1-3 patches onto the skin every 24 hours To prevent lidocaine toxicity, patient should be patch free for 12 hrs daily. (Patient not taking: Reported on 12/18/2020) 30 patch 1     ondansetron (ZOFRAN-ODT) 4 MG ODT tab Take 1 tablet (4 mg) by mouth every 6 hours as needed for nausea or vomiting (Patient not taking: Reported on 12/18/2020)       prochlorperazine (COMPAZINE) 10 MG tablet Take 1 tablet (10 mg) by mouth every 6 hours as needed (Nausea/Vomiting) (Patient not taking: Reported on 12/18/2020) 30 tablet 5     triamcinolone (KENALOG) 0.1 % external ointment Apply topically 2 times daily To back rash (Patient not taking: Reported on 12/18/2020) 15 g 0          Current Pain Medications:  Medications related to Pain Management (From now, onward)    None           Past Pain Medications:  Oxycodone  Gabapentin  Tylenol  Ibuprofen  Robaxin    Blood thinner:  Enoxaparin for two weeks after discharge in December, discontinued three weeks ago    Current work status: Retired    Psychosocial History:   History of treatment for behavioral disorder: No    Review of Systems:  Review of Systems   Constitutional: Negative for chills and fever.   Respiratory: Negative for shortness of breath.    Cardiovascular: Negative for chest pain and leg swelling.   Gastrointestinal: Negative for abdominal pain.   Genitourinary: Negative for  flank pain.   Musculoskeletal: Positive for back pain. Negative for joint pain and neck pain.   Skin: Negative for itching and rash.   Neurological: Negative for tingling, sensory change, focal weakness and weakness.   Endo/Heme/Allergies: Does not bruise/bleed easily.   Psychiatric/Behavioral: Negative for depression.         Physical Exam:     Vitals:    01/19/21 1014   BP: 119/53   Pulse: 97   SpO2: 96%       General Appearance: No distress, seated comfortably  Mood: Euthymic  HE ENT: Non constricted pupils  Respiratory: Non labored breathing  CVS: Regular rate, no peripheral edema  GI: Soft, non distended, no TTP  Skin: No rashes over exposed skin  MS: no TTP, no paraspinous tenderness  Neuro: sensation to light touch intact. Strength 5/5 in hip/knee flexion/extension, dorsiflexion, plantar flexion bilaterally. Negative straight leg test bilaterally. Patellar and Achilles reflexes intact bilaterally.   Gait: ambulates with assistance of walker, toe walking intact, able to briefly perform heel walking but difficulty with balance    Laboratory results:  Recent Labs   Lab Test 01/14/21  1018 01/10/21  0720    135   POTASSIUM 3.7 4.3   CHLORIDE 108 105   CO2 23 25   ANIONGAP 6 6   GLC 97 95   BUN 34* 28   CR 1.34* 1.22   MIRI 9.3 8.9       CBC RESULTS:   Recent Labs   Lab Test 01/14/21  1018   WBC 14.0*   RBC 2.98*   HGB 9.8*   HCT 30.1*   *   MCH 32.9   MCHC 32.6   RDW 15.0          Imaging:  No images are attached to the encounter.       ASSESSMENT AND PLAN:     Rogelio Marshall is a 75 year old y.o. old male who presents to the pain clinic with chronic low back pain. Patient's history, physical exam and imaging are suggestive of their pain is related to lytic lesions of his lumbar spine from multiple myeloma.     I have summarized the patient history, discussed their clinical findings and differential diagnosis with the patient. I have discussed anatomy and possible sources of the pain using  models and/or pictures(diagrams). I have discussed multi- disciplinary pain management options with the patient as pertaining to their case as detailed above. All questions and concerns were answered to the best of my ability.     RECOMMENDATIONS:     1. Medications: The patient should continue with his current doses of oxycodone, gabapentin, ibuprofen, and robaxin as prescribed. Further titration of gabapentin is limited by renal disease.     2. Interventions: Recommend against interventional pain techniques at this time given his spinal pathology. May re-address this at a future date if his pain worsens or does not respond to more conservative therapies.     3. Physical therapy: I have refered the patient for outpatient physical therapy for stretching, strengthening and home exercise program. The patient will also discuss spine care and posture. Pool therapy and stretches can be considered if available.    Follow up: As needed     Attestation signed by Haritha Sargent MD at 1/21/2021  3:20 PM:  ATTENDING ATTESTATION  I saw the patient along with the pain medicine anesthesiology resident Dr. Ledy Alegria Please see her note above for full details. I have edited her note and agree with it. I was involved in gathering history, physical examination and development of the plan of care. At his time the patient feels comfortable pursuing conservative therapies for pain control. I would recommend keeping steroid based targeted injections as the absolute last resort given his extensive osteoporosis.     AVS reviewed with the pt.   Charlotte Spencer LPN    Again, thank you for allowing me to participate in the care of your patient.      Sincerely,    Haritha Sargent MD

## 2021-01-19 NOTE — PATIENT INSTRUCTIONS
Treatment planning:    Recommendations will be written in the providers note for your Primary Care provider (OR other providers in your care team) to review and make changes to your therapies based on their discretion.      Recommended Follow up:      As needed with Dr. Sargent.        Please call [phone], option #1 to schedule your clinic appointment if you don't already have an appointment scheduled.    To speak with a nurse, schedule/reschedule/cancel a clinic appointment, or request a medication refill call: [phone], option #1.    You can also reach us by [facility]: [email or web address]

## 2021-01-19 NOTE — PROGRESS NOTES
Pain Clinic New Patient Consult Note:    Referring Provider: Og   Primary care provider: Wolf Stevens.    Rogelio Marshall is a 75 year old y.o. old male with multiple myeloma who presents to the pain clinic with low back pain. He states that his low back pain first started a few years ago prior to his cancer diagnosis, and it has slowly gotten worse over time. The pain is dull and achy and does not radiate. He denies weakness or sensation changes. He takes oxycodone and ibuprofen for the pain which has helped. He also takes gabapentin but thinks he ran out of it a while ago and has not yet gotten it refilled. He is also on Robaxin which he thinks helps with the pain. He notes that the pain is constant throughout the day and nothing makes it worse. He briefly tried physical therapy years ago for back pain but did not return after a few sessions. He underwent imaging of his cervical, thoracic, and lumbar spine last November showing diffuse lytic lesions throughout his spine.     HPI:  Patient Supplied Answers To the [facility] Pain Questionnaire  [facility] -  Patient Entered Questionnaire/Answers 1/19/2021   What number best describes your pain right now:  0 = No pain  to  10 = Worst pain imaginable 2   How would you describe the pain? dull, aching   Which of the following worsen your pain? standing, walking   Which of the following improve or reduce your pain?  lying down, sitting   What number best describes your average pain for the past week:  0 = No pain  to  10 = Worst pain imaginable 5   What number best describes your LOWEST pain in past 24 hours:  0 = No pain  to  10 = Worst pain imaginable 1   What number best describes your WORST pain in past 24 hours:  0 = No pain  to  10 = Worst pain imaginable 3   When is your pain worst? AM, PM, Night   What non-medicine treatments have you already had for your pain? physical therapy   Have you tried treating your pain with medication?  No   Are you currently  taking medications for your pain? No           Pain treatments:    Herbal medicines: no  Physical therapy: had a few PT visits for back pain prior to cancer diagnosis, about 1.5 years ago  Chiropractor: no  Pain physician: no  Surgery: no  Biofeedback: no  Acupuncture: no    Tests/Imaging reviewed with the patient:    CT cervical spine 20:  1. Diffuse lytic lesions throughout the cervical spine and occipital bone  compatible with underlying multiple myeloma. No overt acute fracture  or subluxation.  2. Bilateral moderate to severe foraminal narrowing at C5-C6.    CT thoracic spine 20:  1.  Extensive lucencies throughout the thoracic osseous structures compatible with patient's diagnosis of multiple myeloma. No acute thoracic spine fracture.  2.  Stable mild compressions of T1, T5 and marked stable chronic compressions of T10 and T12.  3.  No high-grade canal narrowing.  4.  Right third through fifth rib fractures. Correlate with chest CT.  5.  Marked bilateral T10-T11 and left T11-T12 foraminal narrowing.    CT lumbar spine 20:  1.  Extensive lucencies throughout the lumbar and pelvic osseous structures compatible with patient's diagnosis of multiple myeloma. No acute lumbar spine fracture or significant change compared to 10/10/2020.  2.  Stable moderate chronic L2 and marked chronic L3 compressions.  3.  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  4.  Marked L4-L5 and L5-S1 degenerative disc disease.  5.  Moderate to marked lower lumbar degenerative foraminal narrowing.      Significant Medical History:   Past Medical History:   Diagnosis Date     Hyperlipidemia      Hypertension           Past Surgical History:  Past Surgical History:   Procedure Laterality Date     HERNIA REPAIR, INGUINAL RT/LT      needed post-op intestinal repair     ORIF left clavical            Family History:  Family History   Problem Relation Age of Onset     C.A.D. Father          MI age 69      Hypertension Sister         obese     Family History Negative Brother           Social History:  Social History     Socioeconomic History     Marital status: Single     Spouse name: Not on file     Number of children: Not on file     Years of education: Not on file     Highest education level: Not on file   Occupational History     Occupation: retired   Social Needs     Financial resource strain: Not on file     Food insecurity     Worry: Not on file     Inability: Not on file     Transportation needs     Medical: Not on file     Non-medical: Not on file   Tobacco Use     Smoking status: Former Smoker     Packs/day: 0.10     Years: 30.00     Pack years: 3.00     Types: Cigarettes     Smokeless tobacco: Never Used   Substance and Sexual Activity     Alcohol use: Yes     Alcohol/week: 17.5 standard drinks     Types: 21 drink(s) per week     Drug use: Not on file     Sexual activity: Not on file   Lifestyle     Physical activity     Days per week: Not on file     Minutes per session: Not on file     Stress: Not on file   Relationships     Social connections     Talks on phone: Not on file     Gets together: Not on file     Attends [employer] service: Not on file     Active member of club or organization: Not on file     Attends meetings of clubs or organizations: Not on file     Relationship status: Not on file     Intimate partner violence     Fear of current or ex partner: Not on file     Emotionally abused: Not on file     Physically abused: Not on file     Forced sexual activity: Not on file   Other Topics Concern      Service Not Asked     Blood Transfusions Not Asked     Caffeine Concern Not Asked     Occupational Exposure Not Asked     Hobby Hazards Not Asked     Sleep Concern Not Asked     Stress Concern Not Asked     Weight Concern Not Asked     Special Diet Not Asked     Back Care Not Asked     Exercise Yes     Comment: bikes daily     Bike Helmet Not Asked     Seat Belt Not Asked     Self-Exams Not  Asked   Social History Narrative     Not on file     Social History     Social History Narrative     Not on file          Allergies:  Allergies   Allergen Reactions     Other Drug Allergy (See Comments)      tobasco sauce ---caused red spots        Current Medications:   Current Outpatient Medications   Medication Sig Dispense Refill     acetaminophen (TYLENOL) 325 MG tablet Take 3 tablets (975 mg) by mouth 3 times daily 500 tablet 4     calcium carbonate 500 mg, elemental, (OSCAL) 500 MG tablet Take 1 tablet (500 mg) by mouth 2 times daily 200 tablet 3     cholecalciferol (VITAMIN D3) 25 mcg (1000 units) capsule Take 1 capsule (25 mcg) by mouth daily 100 capsule 3     dexamethasone (DECADRON) 4 MG tablet Take 20 mg (five tabs) by mouth on Days 2, 4, 5, 9, and 16. 25 tablet 0     dexamethasone (DECADRON) 4 MG tablet Take 20 mg (five tabs) by mouth on Days 2, 4, 5, 9, and 16. 25 tablet 0     gabapentin (NEURONTIN) 300 MG capsule Take 1 capsule (300 mg) by mouth 3 times daily 84 capsule 0     hydrochlorothiazide (MICROZIDE) 12.5 MG capsule Take 12.5 mg by mouth daily       ibuprofen (ADVIL/MOTRIN) 600 MG tablet Take 1 tablet (600 mg) by mouth every 6 hours       lisinopril (ZESTRIL) 20 MG tablet Take 1 tablet (20 mg) by mouth daily 30 tablet 3     LORazepam (ATIVAN) 0.5 MG tablet Take 1 tablet (0.5 mg) by mouth every 4 hours as needed (Anxiety, Nausea/Vomiting or Sleep) 30 tablet 5     methocarbamol (ROBAXIN) 500 MG tablet Take 1 tablet (500 mg) by mouth 4 times daily 120 tablet 3     omeprazole (PRILOSEC) 40 MG DR capsule Take 1 capsule (40 mg) by mouth daily 30 capsule 3     oxyCODONE (ROXICODONE) 5 MG tablet Take 1 tablet (5 mg) by mouth every 6 hours as needed for severe pain 30 tablet 0     polyethylene glycol (MIRALAX) 17 g packet Take 17 g by mouth daily       senna-docusate (SENOKOT-S/PERICOLACE) 8.6-50 MG tablet Take 1 tablet by mouth daily as needed        acyclovir (ZOVIRAX) 400 MG tablet Take 1 tablet (400  mg) by mouth 2 times daily Viral Prophylaxis. 60 tablet 11     enoxaparin ANTICOAGULANT (LOVENOX) 30 MG/0.3ML syringe Inject 0.3 mLs (30 mg) Subcutaneous every 12 hours (Patient not taking: Reported on 12/18/2020) 28 Syringe 0     Lidocaine (LIDOCARE) 4 % Patch Place 1-3 patches onto the skin every 24 hours To prevent lidocaine toxicity, patient should be patch free for 12 hrs daily. (Patient not taking: Reported on 12/18/2020) 30 patch 1     ondansetron (ZOFRAN-ODT) 4 MG ODT tab Take 1 tablet (4 mg) by mouth every 6 hours as needed for nausea or vomiting (Patient not taking: Reported on 12/18/2020)       prochlorperazine (COMPAZINE) 10 MG tablet Take 1 tablet (10 mg) by mouth every 6 hours as needed (Nausea/Vomiting) (Patient not taking: Reported on 12/18/2020) 30 tablet 5     triamcinolone (KENALOG) 0.1 % external ointment Apply topically 2 times daily To back rash (Patient not taking: Reported on 12/18/2020) 15 g 0          Current Pain Medications:  Medications related to Pain Management (From now, onward)    None           Past Pain Medications:  Oxycodone  Gabapentin  Tylenol  Ibuprofen  Robaxin    Blood thinner:  Enoxaparin for two weeks after discharge in December, discontinued three weeks ago    Current work status: Retired    Psychosocial History:   History of treatment for behavioral disorder: No    Review of Systems:  Review of Systems   Constitutional: Negative for chills and fever.   Respiratory: Negative for shortness of breath.    Cardiovascular: Negative for chest pain and leg swelling.   Gastrointestinal: Negative for abdominal pain.   Genitourinary: Negative for flank pain.   Musculoskeletal: Positive for back pain. Negative for joint pain and neck pain.   Skin: Negative for itching and rash.   Neurological: Negative for tingling, sensory change, focal weakness and weakness.   Endo/Heme/Allergies: Does not bruise/bleed easily.   Psychiatric/Behavioral: Negative for depression.         Physical  Exam:     Vitals:    01/19/21 1014   BP: 119/53   Pulse: 97   SpO2: 96%       General Appearance: No distress, seated comfortably  Mood: Euthymic  HE ENT: Non constricted pupils  Respiratory: Non labored breathing  CVS: Regular rate, no peripheral edema  GI: Soft, non distended, no TTP  Skin: No rashes over exposed skin  MS: no TTP, no paraspinous tenderness  Neuro: sensation to light touch intact. Strength 5/5 in hip/knee flexion/extension, dorsiflexion, plantar flexion bilaterally. Negative straight leg test bilaterally. Patellar and Achilles reflexes intact bilaterally.   Gait: ambulates with assistance of walker, toe walking intact, able to briefly perform heel walking but difficulty with balance    Laboratory results:  Recent Labs   Lab Test 01/14/21  1018 01/10/21  0720    135   POTASSIUM 3.7 4.3   CHLORIDE 108 105   CO2 23 25   ANIONGAP 6 6   GLC 97 95   BUN 34* 28   CR 1.34* 1.22   MIRI 9.3 8.9       CBC RESULTS:   Recent Labs   Lab Test 01/14/21  1018   WBC 14.0*   RBC 2.98*   HGB 9.8*   HCT 30.1*   *   MCH 32.9   MCHC 32.6   RDW 15.0            Imaging:  No images are attached to the encounter.       ASSESSMENT AND PLAN:     Rogelio Marshall is a 75 year old y.o. old male who presents to the pain clinic with chronic low back pain. Patient's history, physical exam and imaging are suggestive of their pain is related to lytic lesions of his lumbar spine from multiple myeloma.     I have summarized the patient history, discussed their clinical findings and differential diagnosis with the patient. I have discussed anatomy and possible sources of the pain using models and/or pictures(diagrams). I have discussed multi- disciplinary pain management options with the patient as pertaining to their case as detailed above. All questions and concerns were answered to the best of my ability.     RECOMMENDATIONS:     1. Medications: The patient should continue with his current doses of oxycodone,  gabapentin, ibuprofen, and robaxin as prescribed. Further titration of gabapentin is limited by renal disease.     2. Interventions: Recommend against interventional pain techniques at this time given his spinal pathology. May re-address this at a future date if his pain worsens or does not respond to more conservative therapies.     3. Physical therapy: I have refered the patient for outpatient physical therapy for stretching, strengthening and home exercise program. The patient will also discuss spine care and posture. Pool therapy and stretches can be considered if available.    Follow up: As needed                                                                                               Answers for HPI/ROS submitted by the patient on 1/19/2021   General Symptoms: No  Skin Symptoms: No  HENT Symptoms: No  EYE SYMPTOMS: No  HEART SYMPTOMS: No  LUNG SYMPTOMS: No  INTESTINAL SYMPTOMS: No  URINARY SYMPTOMS: No  REPRODUCTIVE SYMPTOMS: No  SKELETAL SYMPTOMS: No  BLOOD SYMPTOMS: No  NERVOUS SYSTEM SYMPTOMS: No  MENTAL HEALTH SYMPTOMS: No

## 2021-01-19 NOTE — PROGRESS NOTES
"Oncology RN Care Coordination Note:     Incoming Call:  Patient left a voicemail stating he wanted to cancel his treatment on 01.21.2021 because he had a \"bad reaction\" to his last treatment.     Outgoing Call:  This writer returned Rogelio's call to discuss his complaint of a \"bad reaction\" to his chemotherapy last time.  He said he had overall body aches and pain.  He was alternating his pain medications, oxycodone with tylenol and ibuprofen.  Rogelio stated this didn't provide relief and he had to cut the time down to every 4 hours which then provided relief.      Writer had received a message from Fany CHRISTIAN infusion charge RN stating Rogelio's dates are off for his infusions and was wondering if we could adjust them, writer replied letting her know Rogelio had requested to postpone his appointment anyway due to his symptoms.      We will set Rogelio up with an ANUP visit prior to his next infusion to discuss his side effects.  Dr. Sweeney has been notified of this plan as well.    Cristine Redding RN BSN   [facility]  Nurse Coordinator        "

## 2021-01-25 RX ORDER — HYDROCHLOROTHIAZIDE 12.5 MG/1
25 CAPSULE ORAL DAILY
Qty: 60 CAPSULE | Refills: 11 | OUTPATIENT
Start: 2021-01-25

## 2021-01-26 DIAGNOSIS — C90.00 MULTIPLE MYELOMA, REMISSION STATUS UNSPECIFIED (H): Primary | ICD-10-CM

## 2021-01-26 RX ORDER — METHYLPREDNISOLONE SODIUM SUCCINATE 125 MG/2ML
125 INJECTION, POWDER, LYOPHILIZED, FOR SOLUTION INTRAMUSCULAR; INTRAVENOUS
Status: CANCELLED
Start: 2021-02-01

## 2021-01-26 RX ORDER — ALBUTEROL SULFATE 0.83 MG/ML
2.5 SOLUTION RESPIRATORY (INHALATION)
Status: CANCELLED | OUTPATIENT
Start: 2021-02-08

## 2021-01-26 RX ORDER — DIPHENHYDRAMINE HYDROCHLORIDE 50 MG/ML
50 INJECTION INTRAMUSCULAR; INTRAVENOUS
Status: CANCELLED
Start: 2021-02-04

## 2021-01-26 RX ORDER — DIPHENHYDRAMINE HYDROCHLORIDE 50 MG/ML
50 INJECTION INTRAMUSCULAR; INTRAVENOUS
Status: CANCELLED
Start: 2021-02-15

## 2021-01-26 RX ORDER — METHYLPREDNISOLONE SODIUM SUCCINATE 125 MG/2ML
125 INJECTION, POWDER, LYOPHILIZED, FOR SOLUTION INTRAMUSCULAR; INTRAVENOUS
Status: CANCELLED
Start: 2021-02-15

## 2021-01-26 RX ORDER — SODIUM CHLORIDE 9 MG/ML
1000 INJECTION, SOLUTION INTRAVENOUS CONTINUOUS PRN
Status: CANCELLED
Start: 2021-02-08

## 2021-01-26 RX ORDER — MEPERIDINE HYDROCHLORIDE 25 MG/ML
25 INJECTION INTRAMUSCULAR; INTRAVENOUS; SUBCUTANEOUS EVERY 30 MIN PRN
Status: CANCELLED | OUTPATIENT
Start: 2021-02-15

## 2021-01-26 RX ORDER — SODIUM CHLORIDE 9 MG/ML
1000 INJECTION, SOLUTION INTRAVENOUS CONTINUOUS PRN
Status: CANCELLED
Start: 2021-02-11

## 2021-01-26 RX ORDER — HEPARIN SODIUM (PORCINE) LOCK FLUSH IV SOLN 100 UNIT/ML 100 UNIT/ML
5 SOLUTION INTRAVENOUS
Status: CANCELLED | OUTPATIENT
Start: 2021-02-15

## 2021-01-26 RX ORDER — EPINEPHRINE 1 MG/ML
0.3 INJECTION, SOLUTION INTRAMUSCULAR; SUBCUTANEOUS EVERY 5 MIN PRN
Status: CANCELLED | OUTPATIENT
Start: 2021-02-01

## 2021-01-26 RX ORDER — ALBUTEROL SULFATE 90 UG/1
1-2 AEROSOL, METERED RESPIRATORY (INHALATION)
Status: CANCELLED
Start: 2021-02-11

## 2021-01-26 RX ORDER — MEPERIDINE HYDROCHLORIDE 25 MG/ML
25 INJECTION INTRAMUSCULAR; INTRAVENOUS; SUBCUTANEOUS EVERY 30 MIN PRN
Status: CANCELLED | OUTPATIENT
Start: 2021-02-08

## 2021-01-26 RX ORDER — NALOXONE HYDROCHLORIDE 0.4 MG/ML
.1-.4 INJECTION, SOLUTION INTRAMUSCULAR; INTRAVENOUS; SUBCUTANEOUS
Status: CANCELLED | OUTPATIENT
Start: 2021-02-08

## 2021-01-26 RX ORDER — ACETAMINOPHEN 325 MG/1
650 TABLET ORAL ONCE
Status: CANCELLED | OUTPATIENT
Start: 2021-02-08

## 2021-01-26 RX ORDER — HEPARIN SODIUM (PORCINE) LOCK FLUSH IV SOLN 100 UNIT/ML 100 UNIT/ML
5 SOLUTION INTRAVENOUS
Status: CANCELLED | OUTPATIENT
Start: 2021-02-01

## 2021-01-26 RX ORDER — DEXAMETHASONE 4 MG/1
20 TABLET ORAL ONCE
Status: CANCELLED | OUTPATIENT
Start: 2021-02-08

## 2021-01-26 RX ORDER — DEXAMETHASONE 4 MG/1
20 TABLET ORAL ONCE
Status: CANCELLED | OUTPATIENT
Start: 2021-02-01

## 2021-01-26 RX ORDER — LORAZEPAM 2 MG/ML
0.5 INJECTION INTRAMUSCULAR EVERY 4 HOURS PRN
Status: CANCELLED
Start: 2021-02-11

## 2021-01-26 RX ORDER — METHYLPREDNISOLONE SODIUM SUCCINATE 125 MG/2ML
125 INJECTION, POWDER, LYOPHILIZED, FOR SOLUTION INTRAMUSCULAR; INTRAVENOUS
Status: CANCELLED
Start: 2021-02-04

## 2021-01-26 RX ORDER — HEPARIN SODIUM (PORCINE) LOCK FLUSH IV SOLN 100 UNIT/ML 100 UNIT/ML
5 SOLUTION INTRAVENOUS
Status: CANCELLED | OUTPATIENT
Start: 2021-02-04

## 2021-01-26 RX ORDER — ALBUTEROL SULFATE 0.83 MG/ML
2.5 SOLUTION RESPIRATORY (INHALATION)
Status: CANCELLED | OUTPATIENT
Start: 2021-02-15

## 2021-01-26 RX ORDER — NALOXONE HYDROCHLORIDE 0.4 MG/ML
.1-.4 INJECTION, SOLUTION INTRAMUSCULAR; INTRAVENOUS; SUBCUTANEOUS
Status: CANCELLED | OUTPATIENT
Start: 2021-02-01

## 2021-01-26 RX ORDER — ALBUTEROL SULFATE 90 UG/1
1-2 AEROSOL, METERED RESPIRATORY (INHALATION)
Status: CANCELLED
Start: 2021-02-01

## 2021-01-26 RX ORDER — NALOXONE HYDROCHLORIDE 0.4 MG/ML
.1-.4 INJECTION, SOLUTION INTRAMUSCULAR; INTRAVENOUS; SUBCUTANEOUS
Status: CANCELLED | OUTPATIENT
Start: 2021-02-04

## 2021-01-26 RX ORDER — LORAZEPAM 2 MG/ML
0.5 INJECTION INTRAMUSCULAR EVERY 4 HOURS PRN
Status: CANCELLED
Start: 2021-02-04

## 2021-01-26 RX ORDER — LORAZEPAM 2 MG/ML
0.5 INJECTION INTRAMUSCULAR EVERY 4 HOURS PRN
Status: CANCELLED
Start: 2021-02-01

## 2021-01-26 RX ORDER — MEPERIDINE HYDROCHLORIDE 25 MG/ML
25 INJECTION INTRAMUSCULAR; INTRAVENOUS; SUBCUTANEOUS EVERY 30 MIN PRN
Status: CANCELLED | OUTPATIENT
Start: 2021-02-11

## 2021-01-26 RX ORDER — ACETAMINOPHEN 325 MG/1
650 TABLET ORAL ONCE
Status: CANCELLED | OUTPATIENT
Start: 2021-02-15

## 2021-01-26 RX ORDER — DIPHENHYDRAMINE HCL 25 MG
50 CAPSULE ORAL ONCE
Status: CANCELLED | OUTPATIENT
Start: 2021-02-01

## 2021-01-26 RX ORDER — METHYLPREDNISOLONE SODIUM SUCCINATE 125 MG/2ML
125 INJECTION, POWDER, LYOPHILIZED, FOR SOLUTION INTRAMUSCULAR; INTRAVENOUS
Status: CANCELLED
Start: 2021-02-11

## 2021-01-26 RX ORDER — ALBUTEROL SULFATE 90 UG/1
1-2 AEROSOL, METERED RESPIRATORY (INHALATION)
Status: CANCELLED
Start: 2021-02-15

## 2021-01-26 RX ORDER — SODIUM CHLORIDE 9 MG/ML
1000 INJECTION, SOLUTION INTRAVENOUS CONTINUOUS PRN
Status: CANCELLED
Start: 2021-02-04

## 2021-01-26 RX ORDER — NALOXONE HYDROCHLORIDE 0.4 MG/ML
.1-.4 INJECTION, SOLUTION INTRAMUSCULAR; INTRAVENOUS; SUBCUTANEOUS
Status: CANCELLED | OUTPATIENT
Start: 2021-02-11

## 2021-01-26 RX ORDER — DIPHENHYDRAMINE HCL 25 MG
50 CAPSULE ORAL ONCE
Status: CANCELLED | OUTPATIENT
Start: 2021-02-15

## 2021-01-26 RX ORDER — SODIUM CHLORIDE 9 MG/ML
1000 INJECTION, SOLUTION INTRAVENOUS CONTINUOUS PRN
Status: CANCELLED
Start: 2021-02-15

## 2021-01-26 RX ORDER — ALBUTEROL SULFATE 0.83 MG/ML
2.5 SOLUTION RESPIRATORY (INHALATION)
Status: CANCELLED | OUTPATIENT
Start: 2021-02-01

## 2021-01-26 RX ORDER — ALBUTEROL SULFATE 90 UG/1
1-2 AEROSOL, METERED RESPIRATORY (INHALATION)
Status: CANCELLED
Start: 2021-02-04

## 2021-01-26 RX ORDER — ALBUTEROL SULFATE 0.83 MG/ML
2.5 SOLUTION RESPIRATORY (INHALATION)
Status: CANCELLED | OUTPATIENT
Start: 2021-02-04

## 2021-01-26 RX ORDER — HEPARIN SODIUM (PORCINE) LOCK FLUSH IV SOLN 100 UNIT/ML 100 UNIT/ML
5 SOLUTION INTRAVENOUS
Status: CANCELLED | OUTPATIENT
Start: 2021-02-08

## 2021-01-26 RX ORDER — HEPARIN SODIUM,PORCINE 10 UNIT/ML
5 VIAL (ML) INTRAVENOUS
Status: CANCELLED | OUTPATIENT
Start: 2021-02-15

## 2021-01-26 RX ORDER — EPINEPHRINE 1 MG/ML
0.3 INJECTION, SOLUTION INTRAMUSCULAR; SUBCUTANEOUS EVERY 5 MIN PRN
Status: CANCELLED | OUTPATIENT
Start: 2021-02-15

## 2021-01-26 RX ORDER — SODIUM CHLORIDE 9 MG/ML
1000 INJECTION, SOLUTION INTRAVENOUS CONTINUOUS PRN
Status: CANCELLED
Start: 2021-02-01

## 2021-01-26 RX ORDER — DIPHENHYDRAMINE HYDROCHLORIDE 50 MG/ML
50 INJECTION INTRAMUSCULAR; INTRAVENOUS
Status: CANCELLED
Start: 2021-02-01

## 2021-01-26 RX ORDER — NALOXONE HYDROCHLORIDE 0.4 MG/ML
.1-.4 INJECTION, SOLUTION INTRAMUSCULAR; INTRAVENOUS; SUBCUTANEOUS
Status: CANCELLED | OUTPATIENT
Start: 2021-02-15

## 2021-01-26 RX ORDER — EPINEPHRINE 1 MG/ML
0.3 INJECTION, SOLUTION INTRAMUSCULAR; SUBCUTANEOUS EVERY 5 MIN PRN
Status: CANCELLED | OUTPATIENT
Start: 2021-02-11

## 2021-01-26 RX ORDER — MEPERIDINE HYDROCHLORIDE 25 MG/ML
25 INJECTION INTRAMUSCULAR; INTRAVENOUS; SUBCUTANEOUS EVERY 30 MIN PRN
Status: CANCELLED | OUTPATIENT
Start: 2021-02-04

## 2021-01-26 RX ORDER — ACETAMINOPHEN 325 MG/1
650 TABLET ORAL ONCE
Status: CANCELLED | OUTPATIENT
Start: 2021-02-01

## 2021-01-26 RX ORDER — DEXAMETHASONE 4 MG/1
20 TABLET ORAL ONCE
Status: CANCELLED | OUTPATIENT
Start: 2021-02-15

## 2021-01-26 RX ORDER — EPINEPHRINE 1 MG/ML
0.3 INJECTION, SOLUTION INTRAMUSCULAR; SUBCUTANEOUS EVERY 5 MIN PRN
Status: CANCELLED | OUTPATIENT
Start: 2021-02-04

## 2021-01-26 RX ORDER — HEPARIN SODIUM,PORCINE 10 UNIT/ML
5 VIAL (ML) INTRAVENOUS
Status: CANCELLED | OUTPATIENT
Start: 2021-02-11

## 2021-01-26 RX ORDER — MEPERIDINE HYDROCHLORIDE 25 MG/ML
25 INJECTION INTRAMUSCULAR; INTRAVENOUS; SUBCUTANEOUS EVERY 30 MIN PRN
Status: CANCELLED | OUTPATIENT
Start: 2021-02-01

## 2021-01-26 RX ORDER — DIPHENHYDRAMINE HCL 25 MG
50 CAPSULE ORAL ONCE
Status: CANCELLED | OUTPATIENT
Start: 2021-02-08

## 2021-01-26 RX ORDER — DIPHENHYDRAMINE HYDROCHLORIDE 50 MG/ML
50 INJECTION INTRAMUSCULAR; INTRAVENOUS
Status: CANCELLED
Start: 2021-02-11

## 2021-01-26 RX ORDER — EPINEPHRINE 1 MG/ML
0.3 INJECTION, SOLUTION INTRAMUSCULAR; SUBCUTANEOUS EVERY 5 MIN PRN
Status: CANCELLED | OUTPATIENT
Start: 2021-02-08

## 2021-01-26 RX ORDER — LORAZEPAM 2 MG/ML
0.5 INJECTION INTRAMUSCULAR EVERY 4 HOURS PRN
Status: CANCELLED
Start: 2021-02-08

## 2021-01-26 RX ORDER — HEPARIN SODIUM,PORCINE 10 UNIT/ML
5 VIAL (ML) INTRAVENOUS
Status: CANCELLED | OUTPATIENT
Start: 2021-02-08

## 2021-01-26 RX ORDER — DIPHENHYDRAMINE HYDROCHLORIDE 50 MG/ML
50 INJECTION INTRAMUSCULAR; INTRAVENOUS
Status: CANCELLED
Start: 2021-02-08

## 2021-01-26 RX ORDER — ALBUTEROL SULFATE 90 UG/1
1-2 AEROSOL, METERED RESPIRATORY (INHALATION)
Status: CANCELLED
Start: 2021-02-08

## 2021-01-26 RX ORDER — ALBUTEROL SULFATE 0.83 MG/ML
2.5 SOLUTION RESPIRATORY (INHALATION)
Status: CANCELLED | OUTPATIENT
Start: 2021-02-11

## 2021-01-26 RX ORDER — LORAZEPAM 2 MG/ML
0.5 INJECTION INTRAMUSCULAR EVERY 4 HOURS PRN
Status: CANCELLED
Start: 2021-02-15

## 2021-01-26 RX ORDER — HEPARIN SODIUM,PORCINE 10 UNIT/ML
5 VIAL (ML) INTRAVENOUS
Status: CANCELLED | OUTPATIENT
Start: 2021-02-04

## 2021-01-26 RX ORDER — HEPARIN SODIUM,PORCINE 10 UNIT/ML
5 VIAL (ML) INTRAVENOUS
Status: CANCELLED | OUTPATIENT
Start: 2021-02-01

## 2021-01-26 RX ORDER — METHYLPREDNISOLONE SODIUM SUCCINATE 125 MG/2ML
125 INJECTION, POWDER, LYOPHILIZED, FOR SOLUTION INTRAMUSCULAR; INTRAVENOUS
Status: CANCELLED
Start: 2021-02-08

## 2021-01-26 RX ORDER — HEPARIN SODIUM (PORCINE) LOCK FLUSH IV SOLN 100 UNIT/ML 100 UNIT/ML
5 SOLUTION INTRAVENOUS
Status: CANCELLED | OUTPATIENT
Start: 2021-02-11

## 2021-01-27 ENCOUNTER — PATIENT OUTREACH (OUTPATIENT)
Dept: ONCOLOGY | Facility: CLINIC | Age: 76
End: 2021-01-27

## 2021-01-27 NOTE — PROGRESS NOTES
Oncology RN Care Coordination Note:     Incoming Call:  This patient left a voicemail requesting a refill of his oxycodone.     Outgoing Call:  This writer attempted to reach Rogelio to discuss the oxycodone refill, he has an appointment with Pebbles Longo PA-C on Friday, 01.29.2021, wondering if he can wait for his refill until his appointment?  This writer left a detailed voicemail for Rogelio.      Cristine Redding RN BSN   [facility]  Nurse Coordinator

## 2021-01-27 NOTE — PROGRESS NOTES
Reason for Visit: add on for evaluation of episode of body aches in the setting of MM    Oncology HPI:   -compression fractures and bone scan reveals multiple lytic lesions throughout his appendicular and axial skeleton. His Beta 2 microglobulin was 3.6 on 10/10/2020. Kappa chains were 73.6 on 10/5/2020 with K/L ratio of 89.9. M spike of 16.2 in urine on 10/10. Bone marrow biopsy on 10/23/2020 showed trilineage hematopoiesis and 50-60% kappa restricted plasma cells. Flow cytometry showed 20 % plasma cells which express CD19, CD38, CD45 and monotypic cytoplasmic kappa immunoglobulin light chains but lack CD20 and CD56.  FISH shows IGH-CCND1 fusion (81%; 30% had loss of IGH component from one fusion signal).       - Started 21 day cycle VRD 11/2020  - 11/27 he had a fall with rib fractures.   -12/9/2020 was switched to madyson-velcade-dex due to poor tolerability of VRD    Interval history:   Rogelio Marshall was met with for follow up.  -After his Velcade injection on the 14th, he reported that he had 2-1/2 days of this very severe diffuse body pain.  It was not localized 1 region . nothing seemed to help it.  It eventually improved with time and is now resolved.  Denies any new focal neurologic symptoms.  Reports his head did hurt at that time but does not at this time.  No dizziness.  No changes in his vision.  Neuropathy is at baseline, though he admits he ran out of his gabapentin and needs more.  He did not have this with any prior infusions or injections.  At that time he denied any acute infectious symptoms.  Admits that there is some confusion about his Decadron with his last cycle given the week and delays of days.  He is unsure if or when he was getting his Decadron as his nurse that set up for him.  He is fearful to have another dose of his infusions given this significance of his symptoms.  -He also admits to a progressive loss of appetite.  Most few days after his infusion the loss of appetite got worse.   Thankfully his weight has remained stable.  He has been having some mild epigastric discomfort but remains on his proton pump inhibitor.  No true nausea or emesis to his knowledge.  No issues with bowels.  -Denies any recent fevers or chills.  -Overall bone pain is stable, though he notes some progression in his right sided rib pain.  Denies any known trauma to there.  He continues to take oxycodone and ibuprofen for this pain.     ROS: 10 point ROS neg other than the symptoms noted above in the HPI.        Past Medical History:   Diagnosis Date     Hyperlipidemia      Hypertension         Current Outpatient Medications   Medication Sig Dispense Refill     acetaminophen (TYLENOL) 325 MG tablet Take 3 tablets (975 mg) by mouth 3 times daily 500 tablet 4     aspirin (ASA) 325 MG tablet        calcium carbonate 500 mg, elemental, (OSCAL) 500 MG tablet Take 1 tablet (500 mg) by mouth 2 times daily 200 tablet 3     cholecalciferol (VITAMIN D3) 25 mcg (1000 units) capsule Take 1 capsule (25 mcg) by mouth daily 100 capsule 3     dexamethasone (DECADRON) 4 MG tablet Take 20 mg (five tabs) by mouth on Days 2, 4, 5, 9, and 16. 25 tablet 0     dexamethasone (DECADRON) 4 MG tablet Take 20 mg (five tabs) by mouth on Days 2, 4, 5, 9, and 16. 25 tablet 0     gabapentin (NEURONTIN) 300 MG capsule Take 1 capsule (300 mg) by mouth 3 times daily 84 capsule 0     gabapentin (NEURONTIN) 300 MG capsule Take 1 capsule (300 mg) by mouth 3 times daily 84 capsule 0     hydrochlorothiazide (MICROZIDE) 12.5 MG capsule Take 12.5 mg by mouth daily       ibuprofen (ADVIL/MOTRIN) 600 MG tablet Take 1 tablet (600 mg) by mouth every 6 hours       lisinopril (ZESTRIL) 20 MG tablet Take 1 tablet (20 mg) by mouth daily 30 tablet 3     LORazepam (ATIVAN) 0.5 MG tablet Take 1 tablet (0.5 mg) by mouth every 4 hours as needed (Anxiety, Nausea/Vomiting or Sleep) 30 tablet 5     methocarbamol (ROBAXIN) 500 MG tablet Take 1 tablet (500 mg) by mouth 4 times  "daily 120 tablet 3     omeprazole (PRILOSEC) 40 MG DR capsule Take 1 capsule (40 mg) by mouth daily 30 capsule 3     oxyCODONE (ROXICODONE) 5 MG tablet Take 1 tablet (5 mg) by mouth every 6 hours as needed for severe pain 30 tablet 0     polyethylene glycol (MIRALAX) 17 g packet Take 17 g by mouth daily       senna-docusate (SENOKOT-S/PERICOLACE) 8.6-50 MG tablet Take 1 tablet by mouth daily as needed        acyclovir (ZOVIRAX) 400 MG tablet Take 1 tablet (400 mg) by mouth 2 times daily Viral Prophylaxis. 60 tablet 11     enoxaparin ANTICOAGULANT (LOVENOX) 30 MG/0.3ML syringe Inject 0.3 mLs (30 mg) Subcutaneous every 12 hours (Patient not taking: Reported on 12/18/2020) 28 Syringe 0     Lidocaine (LIDOCARE) 4 % Patch Place 1-3 patches onto the skin every 24 hours To prevent lidocaine toxicity, patient should be patch free for 12 hrs daily. (Patient not taking: Reported on 12/18/2020) 30 patch 1     ondansetron (ZOFRAN-ODT) 4 MG ODT tab Take 1 tablet (4 mg) by mouth every 6 hours as needed for nausea or vomiting (Patient not taking: Reported on 12/18/2020)       prochlorperazine (COMPAZINE) 10 MG tablet Take 1 tablet (10 mg) by mouth every 6 hours as needed (Nausea/Vomiting) (Patient not taking: Reported on 12/18/2020) 30 tablet 5     triamcinolone (KENALOG) 0.1 % external ointment Apply topically 2 times daily To back rash (Patient not taking: Reported on 12/18/2020) 15 g 0     Exam:  BP (!) 164/83   Pulse 98   Temp 98.1  F (36.7  C) (Oral)   Ht 1.549 m (5' 1\")   Wt 63.3 kg (139 lb 9.6 oz)   SpO2 99%   BMI 26.38 kg/m      General: No acute distress. Alert and cooperative   Integumentary: Warm, dry, intact. No rashes, petechiae, or open wounds.   HEENT:    *Head: head is normo-cephalic, symmetric facial features   *Eyes: PERRLA. Conjunctiva clear and sclera white. Eyelids without crusting or lesions   *Nose: no discharge noted    *Neck: Neck supple. Trachea midline.    *Mouth/Throat: Oral mucosa intact without " lesions. Pharynx and Tonsils normal. Uvula midline.   Respiratory: Equal chest rise and fall without retractions or abdominal muscle use. Lungs clear to auscultation  Cardiovascular: S1S2. Regular rate and rhythm without murmurs or gallops.   Peripheral Vascular: No cyanosis or peripheral edema.   Gastrointestinal: Soft, non-tender, not distended. Non-guarding. Normoactive bowel sounds *4 quadrants. No masses or splenomegaly   Musculoskeletal: No joint swelling or deformities. Gait intact.   Neurologic: Alert and Oriented *3. Follow commands.   Psychiatric: Patient is alert, cooperative, and pleasant. Appropriate affect and interaction.     Labs:   Results for BRUNO ALFORD (MRN [id number]) as of 1/29/2021 16:36   Ref. Range 1/29/2021 14:13   Sodium Latest Ref Range: 133 - 144 mmol/L 140   Potassium Latest Ref Range: 3.4 - 5.3 mmol/L 4.0   Chloride Latest Ref Range: 94 - 109 mmol/L 108   Carbon Dioxide Latest Ref Range: 20 - 32 mmol/L 24   Urea Nitrogen Latest Ref Range: 7 - 30 mg/dL 17   Creatinine Latest Ref Range: 0.66 - 1.25 mg/dL 0.96   GFR Estimate Latest Ref Range: >60 mL/min/1.73_m2 77   GFR Estimate If Black Latest Ref Range: >60 mL/min/1.73_m2 90   Calcium Latest Ref Range: 8.5 - 10.1 mg/dL 9.6   Anion Gap Latest Ref Range: 3 - 14 mmol/L 8   Albumin Latest Ref Range: 3.4 - 5.0 g/dL 3.7   Protein Total Latest Ref Range: 6.8 - 8.8 g/dL 6.8   Bilirubin Total Latest Ref Range: 0.2 - 1.3 mg/dL 0.4   Alkaline Phosphatase Latest Ref Range: 40 - 150 U/L 149   ALT Latest Ref Range: 0 - 70 U/L 16   AST Latest Ref Range: 0 - 45 U/L 9   Glucose Latest Ref Range: 70 - 99 mg/dL 96   WBC Latest Ref Range: 4.0 - 11.0 10e9/L 6.9   Hemoglobin Latest Ref Range: 13.3 - 17.7 g/dL 8.6 (L)   Hematocrit Latest Ref Range: 40.0 - 53.0 % 27.3 (L)   Platelet Count Latest Ref Range: 150 - 450 10e9/L 477 (H)   RBC Count Latest Ref Range: 4.4 - 5.9 10e12/L 2.58 (L)   MCV Latest Ref Range: 78 - 100 fl 106 (H)   MCH Latest Ref Range:  26.5 - 33.0 pg 33.3 (H)   MCHC Latest Ref Range: 31.5 - 36.5 g/dL 31.5   RDW Latest Ref Range: 10.0 - 15.0 % 14.8   Diff Method Unknown Automated Method   % Neutrophils Latest Units: % 73.4   % Lymphocytes Latest Units: % 15.4   % Monocytes Latest Units: % 8.3   % Eosinophils Latest Units: % 1.6   % Basophils Latest Units: % 1.0   % Immature Granulocytes Latest Units: % 0.3   Nucleated RBCs Latest Ref Range: 0 /100 0   Absolute Neutrophil Latest Ref Range: 1.6 - 8.3 10e9/L 5.1   Absolute Lymphocytes Latest Ref Range: 0.8 - 5.3 10e9/L 1.1   Absolute Monocytes Latest Ref Range: 0.0 - 1.3 10e9/L 0.6   Absolute Eosinophils Latest Ref Range: 0.0 - 0.7 10e9/L 0.1   Absolute Basophils Latest Ref Range: 0.0 - 0.2 10e9/L 0.1   Abs Immature Granulocytes Latest Ref Range: 0 - 0.4 10e9/L 0.0   Absolute Nucleated RBC Unknown 0.0         Imaging:   n/a    Assessment/plan:   #Multiple myeloma  -Diagnosed in October 2020 after identifying multiple lytic lesions throughout his appendicular and axial skeleton  -Initiated VRD on 11/19. Was held on C1D21 due to concerns about his ability to tolerate the medicine. Unfortunately he has had difficulty with hypotension and a fall resulting in rib fractures.  -Was switched to madyson- velcade-dex on 12/9. Ha significant diffuse body pain after day 11 Velcade cycle 2.  Unclear etiology but I do not suspect this was related to the Velcade.  Discussed in great detail with Rogelio his regimen, goals of care, pathophysiology of multiple myeloma.  Reviewed recent lab works demonstrating an improvement in his myeloma with treatment.  Bluntly explained that I am unsure what caused this increase in diffuse body pain after day 11, and could not rule out that it was from the Velcade, though I do not suspect that is what caused it.  He is agreeable to resume treatment at full doses.  If this becomes continues to be an issue we could consider a dose reduction of the Velcade, but I am hoping we will not need  to.  For ease of administration with the infusions and Decadron, will try to get his schedule on Mondays/Thursdays.  I will have close follow-up with him after next week to see how he is feeling  -We will also refer him to palliative care     Prophy: Continue levofloxacin and acyclovir. Stop ASA as not taking lenalidomide any more.     #Bone mets   -At a high risk for more compression fractures. Vitamin D is replete and he is on a daily supplement as well as calcium.   -Previously discussed that side effects of zometa include bone pains, hypocalcemia, and osteonecrosis of the jaw. He still needs to be cleared by dentistry before beginning zometa -again addressed today.     #Fatigue   -2/2 chemotherapy and poor PO intake.  We addressed a lot today and did not delve into this in specific.  Did relate to him that it is not uncommon to see decrease in appetite and energy level on treatment.  -Referring to palliative care as above.    #Single episode of significant body aches after Velcade.  Unclear etiology.  We will see if this recurs with next cycle    #Poor PO intake, poor appetite.  -He correlates this with his treatment.  Did not discuss extensively today.  We will continue to address.  Thankfully weights have remained stable.  -Referred to palliative care as above.    #Neuropathy   -Grade 1. Stable. Continue to monitor - resume gabapentin    #Right rib pain.  Suspect this is related to his recent fall.  Myeloma has been looking good.  He does report though, that the pain is worsening.  We will send him for films of ribs to ensure there is no progressive fracture or lytic lesion  -Advised to stop taking ibuprofen.  Asked to start utilizing Tylenol as needed for pain.  He has previously been using oxycodone for pain and requests a refill today.  We will refill the oxycodone but again reviewed that I want him to try the Tylenol first for pain.    Pebbles Longo PA-C    70 minutes spent on the date of the encounter  doing chart review, review of outside records, review of test results, interpretation of tests and patient visit

## 2021-01-29 ENCOUNTER — ONCOLOGY VISIT (OUTPATIENT)
Dept: ONCOLOGY | Facility: CLINIC | Age: 76
End: 2021-01-29
Attending: PHYSICIAN ASSISTANT
Payer: COMMERCIAL

## 2021-01-29 VITALS
SYSTOLIC BLOOD PRESSURE: 164 MMHG | BODY MASS INDEX: 26.36 KG/M2 | OXYGEN SATURATION: 99 % | HEIGHT: 61 IN | WEIGHT: 139.6 LBS | HEART RATE: 98 BPM | DIASTOLIC BLOOD PRESSURE: 83 MMHG | TEMPERATURE: 98.1 F

## 2021-01-29 DIAGNOSIS — S22.41XA CLOSED FRACTURE OF MULTIPLE RIBS OF RIGHT SIDE, INITIAL ENCOUNTER: ICD-10-CM

## 2021-01-29 DIAGNOSIS — C90.00 MULTIPLE MYELOMA, REMISSION STATUS UNSPECIFIED (H): Primary | ICD-10-CM

## 2021-01-29 DIAGNOSIS — R07.81 RIB PAIN ON RIGHT SIDE: ICD-10-CM

## 2021-01-29 LAB
ALBUMIN SERPL-MCNC: 3.7 G/DL (ref 3.4–5)
ALP SERPL-CCNC: 149 U/L (ref 40–150)
ALT SERPL W P-5'-P-CCNC: 16 U/L (ref 0–70)
ANION GAP SERPL CALCULATED.3IONS-SCNC: 8 MMOL/L (ref 3–14)
AST SERPL W P-5'-P-CCNC: 9 U/L (ref 0–45)
BASOPHILS # BLD AUTO: 0.1 10E9/L (ref 0–0.2)
BASOPHILS NFR BLD AUTO: 1 %
BILIRUB SERPL-MCNC: 0.4 MG/DL (ref 0.2–1.3)
BUN SERPL-MCNC: 17 MG/DL (ref 7–30)
CALCIUM SERPL-MCNC: 9.6 MG/DL (ref 8.5–10.1)
CHLORIDE SERPL-SCNC: 108 MMOL/L (ref 94–109)
CO2 SERPL-SCNC: 24 MMOL/L (ref 20–32)
CREAT SERPL-MCNC: 0.96 MG/DL (ref 0.66–1.25)
DIFFERENTIAL METHOD BLD: ABNORMAL
EOSINOPHIL # BLD AUTO: 0.1 10E9/L (ref 0–0.7)
EOSINOPHIL NFR BLD AUTO: 1.6 %
ERYTHROCYTE [DISTWIDTH] IN BLOOD BY AUTOMATED COUNT: 14.8 % (ref 10–15)
GFR SERPL CREATININE-BSD FRML MDRD: 77 ML/MIN/{1.73_M2}
GLUCOSE SERPL-MCNC: 96 MG/DL (ref 70–99)
HCT VFR BLD AUTO: 27.3 % (ref 40–53)
HGB BLD-MCNC: 8.6 G/DL (ref 13.3–17.7)
IMM GRANULOCYTES # BLD: 0 10E9/L (ref 0–0.4)
IMM GRANULOCYTES NFR BLD: 0.3 %
LYMPHOCYTES # BLD AUTO: 1.1 10E9/L (ref 0.8–5.3)
LYMPHOCYTES NFR BLD AUTO: 15.4 %
MCH RBC QN AUTO: 33.3 PG (ref 26.5–33)
MCHC RBC AUTO-ENTMCNC: 31.5 G/DL (ref 31.5–36.5)
MCV RBC AUTO: 106 FL (ref 78–100)
MONOCYTES # BLD AUTO: 0.6 10E9/L (ref 0–1.3)
MONOCYTES NFR BLD AUTO: 8.3 %
NEUTROPHILS # BLD AUTO: 5.1 10E9/L (ref 1.6–8.3)
NEUTROPHILS NFR BLD AUTO: 73.4 %
NRBC # BLD AUTO: 0 10*3/UL
NRBC BLD AUTO-RTO: 0 /100
PLATELET # BLD AUTO: 477 10E9/L (ref 150–450)
POTASSIUM SERPL-SCNC: 4 MMOL/L (ref 3.4–5.3)
PROT SERPL-MCNC: 6.8 G/DL (ref 6.8–8.8)
RBC # BLD AUTO: 2.58 10E12/L (ref 4.4–5.9)
SODIUM SERPL-SCNC: 140 MMOL/L (ref 133–144)
WBC # BLD AUTO: 6.9 10E9/L (ref 4–11)

## 2021-01-29 PROCEDURE — 36415 COLL VENOUS BLD VENIPUNCTURE: CPT

## 2021-01-29 PROCEDURE — 999N001036 HC STATISTIC TOTAL PROTEIN: Performed by: PHYSICIAN ASSISTANT

## 2021-01-29 PROCEDURE — 85025 COMPLETE CBC W/AUTO DIFF WBC: CPT | Performed by: PHYSICIAN ASSISTANT

## 2021-01-29 PROCEDURE — 83883 ASSAY NEPHELOMETRY NOT SPEC: CPT | Performed by: PHYSICIAN ASSISTANT

## 2021-01-29 PROCEDURE — 99215 OFFICE O/P EST HI 40 MIN: CPT | Performed by: PHYSICIAN ASSISTANT

## 2021-01-29 PROCEDURE — 82784 ASSAY IGA/IGD/IGG/IGM EACH: CPT | Performed by: PHYSICIAN ASSISTANT

## 2021-01-29 PROCEDURE — 84165 PROTEIN E-PHORESIS SERUM: CPT | Mod: 26 | Performed by: PATHOLOGY

## 2021-01-29 PROCEDURE — 80053 COMPREHEN METABOLIC PANEL: CPT | Performed by: PHYSICIAN ASSISTANT

## 2021-01-29 PROCEDURE — G0463 HOSPITAL OUTPT CLINIC VISIT: HCPCS

## 2021-01-29 PROCEDURE — 99417 PROLNG OP E/M EACH 15 MIN: CPT | Performed by: PHYSICIAN ASSISTANT

## 2021-01-29 PROCEDURE — 84165 PROTEIN E-PHORESIS SERUM: CPT | Mod: TC | Performed by: PHYSICIAN ASSISTANT

## 2021-01-29 RX ORDER — OXYCODONE HYDROCHLORIDE 5 MG/1
5 TABLET ORAL EVERY 6 HOURS PRN
Qty: 30 TABLET | Refills: 0 | Status: SHIPPED | OUTPATIENT
Start: 2021-01-29 | End: 2021-02-17

## 2021-01-29 RX ORDER — GABAPENTIN 300 MG/1
300 CAPSULE ORAL 3 TIMES DAILY
Qty: 84 CAPSULE | Refills: 0 | Status: SHIPPED | OUTPATIENT
Start: 2021-01-29 | End: 2021-03-12

## 2021-01-29 RX ORDER — ASPIRIN 325 MG
TABLET ORAL
COMMUNITY
Start: 2021-01-20 | End: 2021-03-17

## 2021-01-29 ASSESSMENT — MIFFLIN-ST. JEOR: SCORE: 1241.6

## 2021-01-29 ASSESSMENT — PAIN SCALES - GENERAL: PAINLEVEL: MILD PAIN (3)

## 2021-01-29 NOTE — NURSING NOTE
"Oncology Rooming Note    January 29, 2021 12:51 PM   Rogelio Marshall is a 75 year old male who presents for:    Chief Complaint   Patient presents with     Oncology Clinic Visit     CHEMO DISCUSSION; Multiple myeloma, remission      Initial Vitals: BP (!) 164/83   Pulse 98   Temp 98.1  F (36.7  C) (Oral)   Ht 1.549 m (5' 1\")   Wt 63.3 kg (139 lb 9.6 oz)   SpO2 99%   BMI 26.38 kg/m   Estimated body mass index is 26.38 kg/m  as calculated from the following:    Height as of this encounter: 1.549 m (5' 1\").    Weight as of this encounter: 63.3 kg (139 lb 9.6 oz). Body surface area is 1.65 meters squared.  Mild Pain (3) Comment: Data Unavailable   No LMP for male patient.  Allergies reviewed: Yes  Medications reviewed: Yes    Medications: MEDICATION REFILLS NEEDED TODAY. Provider was NOT notified. Pt needs a refill on Oxycodone and Hydrochlorothiazide.  Pharmacy name entered into [employer]:    [facility] - [place], [place] - [phone]  WRITTEN PRESCRIPTION REQUESTED  [id number] - [place], [place] - [address] [facility] - [place], [place] - [address]    Clinical concerns: No new concerns today.       Lala Saha MA            "

## 2021-02-01 ENCOUNTER — INFUSION THERAPY VISIT (OUTPATIENT)
Dept: ONCOLOGY | Facility: CLINIC | Age: 76
End: 2021-02-01
Payer: COMMERCIAL

## 2021-02-01 ENCOUNTER — ANCILLARY PROCEDURE (OUTPATIENT)
Dept: GENERAL RADIOLOGY | Facility: CLINIC | Age: 76
End: 2021-02-01
Attending: PHYSICIAN ASSISTANT
Payer: COMMERCIAL

## 2021-02-01 VITALS
BODY MASS INDEX: 26.17 KG/M2 | HEART RATE: 75 BPM | SYSTOLIC BLOOD PRESSURE: 156 MMHG | OXYGEN SATURATION: 99 % | RESPIRATION RATE: 18 BRPM | WEIGHT: 138.5 LBS | DIASTOLIC BLOOD PRESSURE: 74 MMHG | TEMPERATURE: 98 F

## 2021-02-01 DIAGNOSIS — R07.81 RIB PAIN ON RIGHT SIDE: ICD-10-CM

## 2021-02-01 DIAGNOSIS — C90.00 MULTIPLE MYELOMA, REMISSION STATUS UNSPECIFIED (H): Primary | ICD-10-CM

## 2021-02-01 LAB
ALBUMIN SERPL ELPH-MCNC: 4.1 G/DL (ref 3.7–5.1)
ALPHA1 GLOB SERPL ELPH-MCNC: 0.4 G/DL (ref 0.2–0.4)
ALPHA2 GLOB SERPL ELPH-MCNC: 0.8 G/DL (ref 0.5–0.9)
B-GLOBULIN SERPL ELPH-MCNC: 0.7 G/DL (ref 0.6–1)
GAMMA GLOB SERPL ELPH-MCNC: 0.4 G/DL (ref 0.7–1.6)
IGA SERPL-MCNC: 7 MG/DL (ref 84–499)
IGG SERPL-MCNC: 443 MG/DL (ref 610–1616)
IGM SERPL-MCNC: <10 MG/DL (ref 35–242)
KAPPA LC UR-MCNC: 0.93 MG/DL (ref 0.33–1.94)
KAPPA LC/LAMBDA SER: 4.89 {RATIO} (ref 0.26–1.65)
LAMBDA LC SERPL-MCNC: 0.19 MG/DL (ref 0.57–2.63)
M PROTEIN SERPL ELPH-MCNC: 0.2 G/DL
PROT PATTERN SERPL ELPH-IMP: ABNORMAL

## 2021-02-01 PROCEDURE — 250N000012 HC RX MED GY IP 250 OP 636 PS 637: Performed by: STUDENT IN AN ORGANIZED HEALTH CARE EDUCATION/TRAINING PROGRAM

## 2021-02-01 PROCEDURE — 250N000013 HC RX MED GY IP 250 OP 250 PS 637: Performed by: STUDENT IN AN ORGANIZED HEALTH CARE EDUCATION/TRAINING PROGRAM

## 2021-02-01 PROCEDURE — 71101 X-RAY EXAM UNILAT RIBS/CHEST: CPT | Mod: RT | Performed by: RADIOLOGY

## 2021-02-01 PROCEDURE — 96401 CHEMO ANTI-NEOPL SQ/IM: CPT

## 2021-02-01 PROCEDURE — 250N000011 HC RX IP 250 OP 636: Performed by: STUDENT IN AN ORGANIZED HEALTH CARE EDUCATION/TRAINING PROGRAM

## 2021-02-01 RX ORDER — DEXAMETHASONE 4 MG/1
20 TABLET ORAL ONCE
Status: COMPLETED | OUTPATIENT
Start: 2021-02-01 | End: 2021-02-01

## 2021-02-01 RX ORDER — DIPHENHYDRAMINE HCL 25 MG
50 CAPSULE ORAL ONCE
Status: COMPLETED | OUTPATIENT
Start: 2021-02-01 | End: 2021-02-01

## 2021-02-01 RX ORDER — DEXAMETHASONE 4 MG/1
TABLET ORAL
Qty: 25 TABLET | Refills: 0 | Status: SHIPPED | OUTPATIENT
Start: 2021-02-01 | End: 2021-03-01

## 2021-02-01 RX ORDER — ACETAMINOPHEN 325 MG/1
650 TABLET ORAL ONCE
Status: COMPLETED | OUTPATIENT
Start: 2021-02-01 | End: 2021-02-01

## 2021-02-01 RX ADMIN — ACETAMINOPHEN 650 MG: 325 TABLET ORAL at 08:35

## 2021-02-01 RX ADMIN — DARATUMUMAB AND HYALURONIDASE-FIHJ (HUMAN RECOMBINANT) 1800 MG: 1800; 30000 INJECTION SUBCUTANEOUS at 09:50

## 2021-02-01 RX ADMIN — DIPHENHYDRAMINE HYDROCHLORIDE 50 MG: 25 CAPSULE ORAL at 08:35

## 2021-02-01 RX ADMIN — DEXAMETHASONE 20 MG: 4 TABLET ORAL at 08:35

## 2021-02-01 RX ADMIN — BORTEZOMIB 2.2 MG: 3.5 INJECTION, POWDER, LYOPHILIZED, FOR SOLUTION INTRAVENOUS; SUBCUTANEOUS at 09:46

## 2021-02-01 ASSESSMENT — PAIN SCALES - GENERAL: PAINLEVEL: MILD PAIN (2)

## 2021-02-01 NOTE — PROGRESS NOTES
Infusion Nursing Note:  Rogelio Marshall presents today for Cycle 3 Day 1 Velcade, Hetal SQ.    Patient seen by provider today: No   present during visit today: Not Applicable.    Note: Patient reports no changes since seeing Pebbles DAVILA on Friday. States he did not take his morning meds today, BP slightly elevated.   Patient not offered accepted/declined the covid-19 test.    Intravenous Access:  No Intravenous access/labs at this visit. Labs done 1/29.     Treatment Conditions:  Lab Results   Component Value Date    HGB 8.6 01/29/2021     Lab Results   Component Value Date    WBC 6.9 01/29/2021      Lab Results   Component Value Date    ANEU 5.1 01/29/2021     Lab Results   Component Value Date     01/29/2021      Lab Results   Component Value Date     01/29/2021                   Lab Results   Component Value Date    POTASSIUM 4.0 01/29/2021           Lab Results   Component Value Date    MAG 2.1 12/02/2020            Lab Results   Component Value Date    CR 0.96 01/29/2021                   Lab Results   Component Value Date    MIRI 9.6 01/29/2021                Lab Results   Component Value Date    BILITOTAL 0.4 01/29/2021           Lab Results   Component Value Date    ALBUMIN 3.7 01/29/2021                    Lab Results   Component Value Date    ALT 16 01/29/2021           Lab Results   Component Value Date    AST 9 01/29/2021       Results reviewed, labs MET treatment parameters, ok to proceed with treatment.    Post Infusion Assessment:  Patient tolerated injections without incident to abdominal tissue.  Darzalex Faspro given on RIGHT side.  Velcade given on LEFT side.     Discharge Plan:   Prescription refills given for decadron.  Discharge instructions reviewed with: Patient.  AVS to patient via [employer].  Patient will return 2/4 for next appointment.   Patient discharged in stable condition accompanied by: self.  Departure Mode: Ambulatory. To xray following infusion.  Face to  Face time: 0.  IB sent to scheduling/Pebbles DAVILA to double check on need for lab appointment     Enid Gunter RN

## 2021-02-04 ENCOUNTER — ONCOLOGY VISIT (OUTPATIENT)
Dept: ONCOLOGY | Facility: CLINIC | Age: 76
End: 2021-02-04
Attending: STUDENT IN AN ORGANIZED HEALTH CARE EDUCATION/TRAINING PROGRAM
Payer: COMMERCIAL

## 2021-02-04 ENCOUNTER — APPOINTMENT (OUTPATIENT)
Dept: LAB | Facility: CLINIC | Age: 76
End: 2021-02-04
Attending: STUDENT IN AN ORGANIZED HEALTH CARE EDUCATION/TRAINING PROGRAM
Payer: COMMERCIAL

## 2021-02-04 ENCOUNTER — INFUSION THERAPY VISIT (OUTPATIENT)
Dept: ONCOLOGY | Facility: CLINIC | Age: 76
End: 2021-02-04
Payer: COMMERCIAL

## 2021-02-04 VITALS
DIASTOLIC BLOOD PRESSURE: 81 MMHG | WEIGHT: 141.7 LBS | TEMPERATURE: 97.7 F | HEART RATE: 97 BPM | SYSTOLIC BLOOD PRESSURE: 142 MMHG | OXYGEN SATURATION: 98 % | RESPIRATION RATE: 18 BRPM | BODY MASS INDEX: 26.77 KG/M2

## 2021-02-04 DIAGNOSIS — C90.00 MULTIPLE MYELOMA, REMISSION STATUS UNSPECIFIED (H): Primary | ICD-10-CM

## 2021-02-04 DIAGNOSIS — K29.00 OTHER ACUTE GASTRITIS, PRESENCE OF BLEEDING UNSPECIFIED: ICD-10-CM

## 2021-02-04 LAB
ALBUMIN SERPL-MCNC: 3.9 G/DL (ref 3.4–5)
ALP SERPL-CCNC: 141 U/L (ref 40–150)
ALT SERPL W P-5'-P-CCNC: 18 U/L (ref 0–70)
ANION GAP SERPL CALCULATED.3IONS-SCNC: 6 MMOL/L (ref 3–14)
AST SERPL W P-5'-P-CCNC: 9 U/L (ref 0–45)
BASOPHILS # BLD AUTO: 0 10E9/L (ref 0–0.2)
BASOPHILS NFR BLD AUTO: 0.4 %
BILIRUB SERPL-MCNC: 0.4 MG/DL (ref 0.2–1.3)
BUN SERPL-MCNC: 29 MG/DL (ref 7–30)
CALCIUM SERPL-MCNC: 9.2 MG/DL (ref 8.5–10.1)
CHLORIDE SERPL-SCNC: 108 MMOL/L (ref 94–109)
CO2 SERPL-SCNC: 26 MMOL/L (ref 20–32)
CREAT SERPL-MCNC: 1.11 MG/DL (ref 0.66–1.25)
DIFFERENTIAL METHOD BLD: ABNORMAL
EOSINOPHIL # BLD AUTO: 0 10E9/L (ref 0–0.7)
EOSINOPHIL NFR BLD AUTO: 0.2 %
ERYTHROCYTE [DISTWIDTH] IN BLOOD BY AUTOMATED COUNT: 14.8 % (ref 10–15)
GFR SERPL CREATININE-BSD FRML MDRD: 65 ML/MIN/{1.73_M2}
GLUCOSE SERPL-MCNC: 91 MG/DL (ref 70–99)
HCT VFR BLD AUTO: 31.3 % (ref 40–53)
HGB BLD-MCNC: 10.1 G/DL (ref 13.3–17.7)
IMM GRANULOCYTES # BLD: 0.1 10E9/L (ref 0–0.4)
IMM GRANULOCYTES NFR BLD: 0.6 %
LYMPHOCYTES # BLD AUTO: 1.8 10E9/L (ref 0.8–5.3)
LYMPHOCYTES NFR BLD AUTO: 22.9 %
MCH RBC QN AUTO: 33.2 PG (ref 26.5–33)
MCHC RBC AUTO-ENTMCNC: 32.3 G/DL (ref 31.5–36.5)
MCV RBC AUTO: 103 FL (ref 78–100)
MONOCYTES # BLD AUTO: 1 10E9/L (ref 0–1.3)
MONOCYTES NFR BLD AUTO: 12.2 %
NEUTROPHILS # BLD AUTO: 5.1 10E9/L (ref 1.6–8.3)
NEUTROPHILS NFR BLD AUTO: 63.7 %
NRBC # BLD AUTO: 0 10*3/UL
NRBC BLD AUTO-RTO: 0 /100
PLATELET # BLD AUTO: 346 10E9/L (ref 150–450)
POTASSIUM SERPL-SCNC: 3.5 MMOL/L (ref 3.4–5.3)
PROT SERPL-MCNC: 6.6 G/DL (ref 6.8–8.8)
RBC # BLD AUTO: 3.04 10E12/L (ref 4.4–5.9)
SODIUM SERPL-SCNC: 139 MMOL/L (ref 133–144)
WBC # BLD AUTO: 8 10E9/L (ref 4–11)

## 2021-02-04 PROCEDURE — 85025 COMPLETE CBC W/AUTO DIFF WBC: CPT | Performed by: STUDENT IN AN ORGANIZED HEALTH CARE EDUCATION/TRAINING PROGRAM

## 2021-02-04 PROCEDURE — 80053 COMPREHEN METABOLIC PANEL: CPT | Performed by: STUDENT IN AN ORGANIZED HEALTH CARE EDUCATION/TRAINING PROGRAM

## 2021-02-04 PROCEDURE — 250N000011 HC RX IP 250 OP 636: Performed by: STUDENT IN AN ORGANIZED HEALTH CARE EDUCATION/TRAINING PROGRAM

## 2021-02-04 PROCEDURE — 96401 CHEMO ANTI-NEOPL SQ/IM: CPT

## 2021-02-04 PROCEDURE — 36415 COLL VENOUS BLD VENIPUNCTURE: CPT

## 2021-02-04 PROCEDURE — 99214 OFFICE O/P EST MOD 30 MIN: CPT | Performed by: PHYSICIAN ASSISTANT

## 2021-02-04 PROCEDURE — G0463 HOSPITAL OUTPT CLINIC VISIT: HCPCS

## 2021-02-04 RX ORDER — OMEPRAZOLE 40 MG/1
40 CAPSULE, DELAYED RELEASE ORAL DAILY
Qty: 30 CAPSULE | Refills: 3 | Status: SHIPPED | OUTPATIENT
Start: 2021-02-04 | End: 2021-02-04

## 2021-02-04 RX ADMIN — BORTEZOMIB 2.2 MG: 3.5 INJECTION, POWDER, LYOPHILIZED, FOR SOLUTION INTRAVENOUS; SUBCUTANEOUS at 10:55

## 2021-02-04 ASSESSMENT — PAIN SCALES - GENERAL: PAINLEVEL: NO PAIN (0)

## 2021-02-04 NOTE — NURSING NOTE
"Oncology Rooming Note    February 4, 2021 12:35 PM   Rogelio Marshall is a 75 year old male who presents for:    Chief Complaint   Patient presents with     Blood Draw     VPT blood draw and vitals     Oncology Clinic Visit     MULTIPLE MYELOMA      Initial Vitals: BP (!) 142/81   Pulse 97   Temp 97.7  F (36.5  C) (Oral)   Resp 18   Wt 64.3 kg (141 lb 11.2 oz)   SpO2 98%   BMI 26.77 kg/m   Estimated body mass index is 26.77 kg/m  as calculated from the following:    Height as of 1/29/21: 1.549 m (5' 1\").    Weight as of this encounter: 64.3 kg (141 lb 11.2 oz). Body surface area is 1.66 meters squared.  No Pain (0) Comment: Data Unavailable   No LMP for male patient.  Allergies reviewed: Yes  Medications reviewed: Yes    Medications: Medication refills not needed today.  Pharmacy name entered into EPIC:    [facility] [place], [place] - [phone]  WRITTEN PRESCRIPTION REQUESTED  [facility] [id number] [facility] - [place], [place] - [address] [facility] - [place], [place] - [address]    Clinical concerns: NONE       Meliza Reynoso CMA              "

## 2021-02-04 NOTE — PROGRESS NOTES
Infusion Nursing Note:  Rogelio Marshall presents today for Day 4 Cycle 3 Velcade.    Patient seen by provider today: Yes: LOLA De Oliveira   present during visit today: Not Applicable.    Note: Rogelio arrives to infusion today doing well overall. He states he wore himself out yesterday walking in a store, but took oxycodone for body soreness overnight with improvement in symptoms. He otherwise offers no concerns. He did not take his morning medications as he was running late, but confirms he will take dexamethasone today.     Treatment Conditions:  Lab Results   Component Value Date    HGB 10.1 02/04/2021     Lab Results   Component Value Date    WBC 8.0 02/04/2021      Lab Results   Component Value Date    ANEU 5.1 02/04/2021     Lab Results   Component Value Date     02/04/2021      Results reviewed, labs MET treatment parameters, ok to proceed with treatment.    Post Infusion Assessment:  Patient tolerated injection without incident.  Velcade given subcutaneously in LUQ abdomen.     Discharge Plan:   Patient declined prescription refills.  AVS to patient via [employer].  Patient will return 02/08 for next appointment.   Patient discharged in stable condition accompanied by: self.  Departure Mode: Ambulatory with walker.    Christine Cobian RN

## 2021-02-04 NOTE — PROGRESS NOTES
Reason for Visit:  evaluation and management of MM    Oncology HPI:   -compression fractures and bone scan reveals multiple lytic lesions throughout his appendicular and axial skeleton. His Beta 2 microglobulin was 3.6 on 10/10/2020. Kappa chains were 73.6 on 10/5/2020 with K/L ratio of 89.9. M spike of 16.2 in urine on 10/10. Bone marrow biopsy on 10/23/2020 showed trilineage hematopoiesis and 50-60% kappa restricted plasma cells. Flow cytometry showed 20 % plasma cells which express CD19, CD38, CD45 and monotypic cytoplasmic kappa immunoglobulin light chains but lack CD20 and CD56.  FISH shows IGH-CCND1 fusion (81%; 30% had loss of IGH component from one fusion signal).       - Started 21 day cycle VRD 11/2020  - 11/27 he had a fall with rib fractures.   -12/9/2020 was switched to madyson-velcade-dex due to poor tolerability of VRD    Interval history:   Rogelio Marshall was met with for follow up.  - This week went much better. Appetite is slightly improved. Weight up a few pounds.  Overall energy level is okay  -He did not have any recurrence of that diffuse body pain he had with his last Velcade injection.  - Continues to note an upset stomach and burping. Certain foods upset stomach- cottage cheese helps.   - No progression of neuropathy  - Bone/rib pain is stable. Is working very hard to get off the ibuprofen. Taking tylenol with an occasional ibuprofen.  - Noting some constipation- took senna this morning.   -Denies any fevers, chills, headaches, dizziness, cough, shortness of breath, chest pain, rashes, swelling.     ROS: 10 point ROS neg other than the symptoms noted above in the HPI.        Past Medical History:   Diagnosis Date     Hyperlipidemia      Hypertension         Current Outpatient Medications   Medication Sig Dispense Refill     acetaminophen (TYLENOL) 325 MG tablet Take 3 tablets (975 mg) by mouth 3 times daily 500 tablet 4     acyclovir (ZOVIRAX) 400 MG tablet Take 1 tablet (400 mg) by mouth 2  times daily Viral Prophylaxis. 60 tablet 11     aspirin (ASA) 325 MG tablet        calcium carbonate 500 mg, elemental, (OSCAL) 500 MG tablet Take 1 tablet (500 mg) by mouth 2 times daily 200 tablet 3     cholecalciferol (VITAMIN D3) 25 mcg (1000 units) capsule Take 1 capsule (25 mcg) by mouth daily 100 capsule 3     dexamethasone (DECADRON) 4 MG tablet Take 20 mg (five tabs) by mouth on Days 2, 4, 5, 9, and 16. 25 tablet 0     dexamethasone (DECADRON) 4 MG tablet Take 20 mg (five tabs) by mouth on Days 2, 4, 5, 9, and 16. 25 tablet 0     dexamethasone (DECADRON) 4 MG tablet Take 20 mg (five tabs) by mouth on Days 2, 4, 5, 9, and 16. 25 tablet 0     enoxaparin ANTICOAGULANT (LOVENOX) 30 MG/0.3ML syringe Inject 0.3 mLs (30 mg) Subcutaneous every 12 hours (Patient not taking: Reported on 12/18/2020) 28 Syringe 0     gabapentin (NEURONTIN) 300 MG capsule Take 1 capsule (300 mg) by mouth 3 times daily 84 capsule 0     gabapentin (NEURONTIN) 300 MG capsule Take 1 capsule (300 mg) by mouth 3 times daily 84 capsule 0     hydrochlorothiazide (MICROZIDE) 12.5 MG capsule Take 12.5 mg by mouth daily       ibuprofen (ADVIL/MOTRIN) 600 MG tablet Take 1 tablet (600 mg) by mouth every 6 hours       Lidocaine (LIDOCARE) 4 % Patch Place 1-3 patches onto the skin every 24 hours To prevent lidocaine toxicity, patient should be patch free for 12 hrs daily. (Patient not taking: Reported on 12/18/2020) 30 patch 1     lisinopril (ZESTRIL) 20 MG tablet Take 1 tablet (20 mg) by mouth daily 30 tablet 3     LORazepam (ATIVAN) 0.5 MG tablet Take 1 tablet (0.5 mg) by mouth every 4 hours as needed (Anxiety, Nausea/Vomiting or Sleep) 30 tablet 5     methocarbamol (ROBAXIN) 500 MG tablet Take 1 tablet (500 mg) by mouth 4 times daily 120 tablet 3     omeprazole (PRILOSEC) 40 MG DR capsule Take 1 capsule (40 mg) by mouth daily 30 capsule 3     ondansetron (ZOFRAN-ODT) 4 MG ODT tab Take 1 tablet (4 mg) by mouth every 6 hours as needed for nausea or  vomiting (Patient not taking: Reported on 12/18/2020)       oxyCODONE (ROXICODONE) 5 MG tablet Take 1 tablet (5 mg) by mouth every 6 hours as needed for severe pain 30 tablet 0     polyethylene glycol (MIRALAX) 17 g packet Take 17 g by mouth daily       prochlorperazine (COMPAZINE) 10 MG tablet Take 1 tablet (10 mg) by mouth every 6 hours as needed (Nausea/Vomiting) (Patient not taking: Reported on 12/18/2020) 30 tablet 5     senna-docusate (SENOKOT-S/PERICOLACE) 8.6-50 MG tablet Take 1 tablet by mouth daily as needed        triamcinolone (KENALOG) 0.1 % external ointment Apply topically 2 times daily To back rash (Patient not taking: Reported on 12/18/2020) 15 g 0     Exam:  BP (!) 142/81   Pulse 97   Temp 97.7  F (36.5  C) (Oral)   Resp 18   Wt 64.3 kg (141 lb 11.2 oz)   SpO2 98%   BMI 26.77 kg/m       General: No acute distress. Alert and cooperative   Integumentary: Warm, dry, intact. No rashes, petechiae, or open wounds.   HEENT:    *Head: head is normo-cephalic, symmetric facial features   *Eyes: PERRLA. Conjunctiva clear and sclera white. Eyelids without crusting or lesions   *Nose: no discharge noted    *Neck: Neck supple. Trachea midline.    *Mouth/Throat: Oral mucosa intact without lesions. Pharynx and Tonsils normal. Uvula midline.   Respiratory: Equal chest rise and fall without retractions or abdominal muscle use. Lungs clear to auscultation  Cardiovascular: S1S2. Regular rate and rhythm without murmurs or gallops.   Peripheral Vascular: No cyanosis or peripheral edema.   Gastrointestinal: Soft, non-tender, not distended. Non-guarding. Normoactive bowel sounds *4 quadrants. No masses or splenomegaly   Musculoskeletal: No joint swelling or deformities. Gait intact.   Neurologic: Alert and Oriented *3. Follow commands.   Psychiatric: Patient is alert, cooperative, and pleasant. Appropriate affect and interaction.     Labs:   Results for BECK, BRUNO MITCHELL (MRN [id number]) as of 2/4/2021 13:20   Ref.  Range 2/4/2021 09:56   Sodium Latest Ref Range: 133 - 144 mmol/L 139   Potassium Latest Ref Range: 3.4 - 5.3 mmol/L 3.5   Chloride Latest Ref Range: 94 - 109 mmol/L 108   Carbon Dioxide Latest Ref Range: 20 - 32 mmol/L 26   Urea Nitrogen Latest Ref Range: 7 - 30 mg/dL 29   Creatinine Latest Ref Range: 0.66 - 1.25 mg/dL 1.11   GFR Estimate Latest Ref Range: >60 mL/min/1.73_m2 65   GFR Estimate If Black Latest Ref Range: >60 mL/min/1.73_m2 75   Calcium Latest Ref Range: 8.5 - 10.1 mg/dL 9.2   Anion Gap Latest Ref Range: 3 - 14 mmol/L 6   Albumin Latest Ref Range: 3.4 - 5.0 g/dL 3.9   Protein Total Latest Ref Range: 6.8 - 8.8 g/dL 6.6 (L)   Bilirubin Total Latest Ref Range: 0.2 - 1.3 mg/dL 0.4   Alkaline Phosphatase Latest Ref Range: 40 - 150 U/L 141   ALT Latest Ref Range: 0 - 70 U/L 18   AST Latest Ref Range: 0 - 45 U/L 9   Glucose Latest Ref Range: 70 - 99 mg/dL 91   WBC Latest Ref Range: 4.0 - 11.0 10e9/L 8.0   Hemoglobin Latest Ref Range: 13.3 - 17.7 g/dL 10.1 (L)   Hematocrit Latest Ref Range: 40.0 - 53.0 % 31.3 (L)   Platelet Count Latest Ref Range: 150 - 450 10e9/L 346   RBC Count Latest Ref Range: 4.4 - 5.9 10e12/L 3.04 (L)   MCV Latest Ref Range: 78 - 100 fl 103 (H)   MCH Latest Ref Range: 26.5 - 33.0 pg 33.2 (H)   MCHC Latest Ref Range: 31.5 - 36.5 g/dL 32.3   RDW Latest Ref Range: 10.0 - 15.0 % 14.8   Diff Method Unknown Automated Method   % Neutrophils Latest Units: % 63.7   % Lymphocytes Latest Units: % 22.9   % Monocytes Latest Units: % 12.2   % Eosinophils Latest Units: % 0.2   % Basophils Latest Units: % 0.4   % Immature Granulocytes Latest Units: % 0.6   Nucleated RBCs Latest Ref Range: 0 /100 0   Absolute Neutrophil Latest Ref Range: 1.6 - 8.3 10e9/L 5.1   Absolute Lymphocytes Latest Ref Range: 0.8 - 5.3 10e9/L 1.8   Absolute Monocytes Latest Ref Range: 0.0 - 1.3 10e9/L 1.0   Absolute Eosinophils Latest Ref Range: 0.0 - 0.7 10e9/L 0.0   Absolute Basophils Latest Ref Range: 0.0 - 0.2 10e9/L 0.0   Abs  Immature Granulocytes Latest Ref Range: 0 - 0.4 10e9/L 0.1   Absolute Nucleated RBC Unknown 0.0       Imaging:   Study Result    Exam: XR RIBS & CHEST RT 3VW, 2/1/2021 10:34 AM     History: Right sided rib pain, history of myeloma, rule out lytic  lesion vs acute fracture; Rib pain on right side     Comparison: Radiographs 1/8/2021, 12/3/2020, 12/2/2020, 12/1/2020; CT  11/30/2020     Findings:     Redemonstration of the multiple mildly displaced right-sided rib  fractures and left-sided rib fractures, grossly similar to 1/8/2021.  Specifically, no new displaced rib fracture corresponding to the  marker at right lower rib.     Bones appear osteopenic with multiple lucent foci throughout  particularly in ribs, scapula, partially visualized right humerus,  innominate bones. Bones are diffusely osteopenic.     Multilevel degenerative changes of the thoracolumbar spine including  compression deformities at mid lumbar vertebrae in particular, not  fully assessed on current study but fractures were present on prior CT  11/26/2020.     Lungs are clear. Cardiomediastinal silhouette is within normal limits.                                                                      Impression:  1.  Redemonstration of multiple minimally displaced bilateral rib  fractures, grossly similar to recent chest radiographs 1/8/2021.  Though relative sensitivity is limited by radiograph techniques  especially with osteopenia.  2. Multiple lytic lesions, in keeping with history of myeloma.     I have personally reviewed the examination and initial interpretation  and I agree with the findings.     ALEXANDER ALEGRIA         Assessment/plan:   #Multiple myeloma  -Diagnosed in October 2020 after identifying multiple lytic lesions throughout his appendicular and axial skeleton  -Initiated VRD on 11/19. Was held on C1D21 due to concerns about his ability to tolerate the medicine. Unfortunately he has had difficulty with hypotension and a fall  resulting in rib fractures.  -Was switched to madyson- velcade-dex on 12/9. Had significant diffuse body pain after day 11 Velcade cycle 2. He took a brief break from treatment but then agreed to resume at full dose this past week.  Thus far tolerating well.  Lab work reveals a great response in mental markers to treatment.  We will continue as planned.    -Has an appointment with palliative care set up.     Prophy: Continue levofloxacin and acyclovir. Stop ASA as not taking lenalidomide any more.     #Bone mets   -At a high risk for more compression fractures. Vitamin D is replete and he is on a daily supplement as well as calcium.   -Previously discussed that side effects of zometa include bone pains, hypocalcemia, and osteonecrosis of the jaw. He still needs to be cleared by dentistry before beginning zometa -again addressed today 2/4/21.     #Fatigue   -2/2 chemotherapy and poor PO intake.  Overall improved this past week.  We will continue to monitor  -Referring to palliative care as above.    #Single episode of significant body aches after Velcade.  Unclear etiology.  No recurrence at this time.    #Poor PO intake, poor appetite.  -Overall improved this past week and he is up a few pounds.  It sounds as if he is having some symptoms of acid reflux which might also be contributing.  Encouraged continued caloric intake.  We will continue to monitor.  -Referred to palliative care as above.    #Neuropathy   -Grade 1. Stable. Continue to monitor - resume gabapentin    #Right rib pain.  Suspect this is related to his recent fall.  No acute findings on recent chest x-ray.  -Advised to stop taking ibuprofen, which he has nearly done.  Asked to start utilizing Tylenol as needed for pain.  He has previously been using oxycodone -aware to limit use as able and utilize Tylenol first for pain.    # GERD  -We will resume omeprazole 20 mg daily.  Can titrate up as needed.    LOLA De Oliveira-C    30 minutes spent on the date of  the encounter doing chart review, review of outside records, review of test results, interpretation of tests and patient visit

## 2021-02-04 NOTE — PATIENT INSTRUCTIONS
Contact Numbers  [facility]: [phone] (for symptom and scheduling needs)    Please call the [facility] Triage line if you experience a temperature greater than or equal to 100.4, shaking chills, have uncontrolled nausea, vomiting and/or diarrhea, dizziness, shortness of breath, chest pain, bleeding, unexplained bruising, or if you have any other new/concerning symptoms, questions or concerns.     If you are having any concerning symptoms or wish to speak to a provider before your next infusion visit, please call your care coordinator or triage to notify them so we can adequately serve you.     If you need a refill on a narcotic prescription or other medication, please call triage before your infusion appointment.          February 2021 Sunday Monday Tuesday Wednesday Thursday Friday Saturday 1    [facility] ONC INFUSION 60   8:00 AM   (60 min.)    ONCOLOGY INFUSION   [facility]    XR RIBS & CHEST RIGHT G/E 3 VW   9:15 AM   (25 min.)   [id number]   [facility] Xray [place] 2     3     4    LAB CENTRAL   8:45 AM   (15 min.)   [facility] LAB DRAW   [facility]    [facility] ONC INFUSION 60   9:30 AM   (60 min.)    ONCOLOGY INFUSION   [facility]    UMP RETURN  12:25 PM   (50 min.)   Pebbles Longo PA-C   [facility] 5     6       7     8  Happy Birthday!    [facility] ONC INFUSION 60  11:00 AM   (60 min.)    ONCOLOGY INFUSION   [facility] 9     10     11    [facility] ONC INFUSION 60   3:30 PM   (60 min.)    ONCOLOGY INFUSION   [facility] 12     13       14     15    LAB PERIPHERAL  10:30 AM   (15 min.)   [facility] DRAW   [facility]    [facility] ONC INFUSION 60  11:00 AM   (60 min.)    ONCOLOGY INFUSION   [facility] 16     17    TELEPHONE VISIT RETURN  10:00 AM   (50 min.)    Pebbles Longo PA-C   [facility]    VIDEO VISIT NEW   2:05 PM   (80 min.)   Cat Tate MD   [facility] 18    UMP ONC INFUSION 60  11:00 AM   (60 min.)   [facility] ONCOLOGY INFUSION   [facility] 19     20       21     22    [facility] ONC INFUSION 60  11:00 AM   (60 min.)   [facility] ONCOLOGY INFUSION   [facility] 23     24     25     26     27       28                                               March 2021 Sunday Monday Tuesday Wednesday Thursday Friday Saturday        1     2     3     4     5     6       7     8    LAB PERIPHERAL   9:45 AM   (15 min.)   [facility] LAB DRAW   [facility]    [facility] ONC INFUSION 60  10:30 AM   (60 min.)   [facility] ONCOLOGY INFUSION   [facility] 9     10     11    [facility] ONC INFUSION 60  10:00 AM   (60 min.)   [facility] ONCOLOGY INFUSION   [facility] 12     13       14     15    [facility] ONC INFUSION 60  10:00 AM   (60 min.)   [facility] ONCOLOGY INFUSION   [facility] 16     17    TELEPHONE VISIT RETURN   2:00 PM   (30 min.)   Angela Sweeney MD   [facility] 18    UMP ONC INFUSION 60  10:00 AM   (60 min.)   [facility] ONCOLOGY INFUSION   [facility] 19     20       21     22     23     24     25     26     27       28     29     30     31                                    Lab Results:  Recent Results (from the past 12 hour(s))   CBC with platelets differential    Collection Time: 02/04/21  9:56 AM   Result Value Ref Range    WBC 8.0 4.0 - 11.0 10e9/L    RBC Count 3.04 (L) 4.4 - 5.9 10e12/L    Hemoglobin 10.1 (L) 13.3 - 17.7 g/dL    Hematocrit 31.3 (L) 40.0 - 53.0 %     (H) 78 - 100 fl    MCH 33.2 (H) 26.5 - 33.0 pg    MCHC 32.3 31.5 - 36.5 g/dL    RDW 14.8 10.0 - 15.0 %    Platelet Count 346 150 - 450 10e9/L    Diff Method Automated  Method     % Neutrophils 63.7 %    % Lymphocytes 22.9 %    % Monocytes 12.2 %    % Eosinophils 0.2 %    % Basophils 0.4 %    % Immature Granulocytes 0.6 %    Nucleated RBCs 0 0 /100    Absolute Neutrophil 5.1 1.6 - 8.3 10e9/L    Absolute Lymphocytes 1.8 0.8 - 5.3 10e9/L    Absolute Monocytes 1.0 0.0 - 1.3 10e9/L    Absolute Eosinophils 0.0 0.0 - 0.7 10e9/L    Absolute Basophils 0.0 0.0 - 0.2 10e9/L    Abs Immature Granulocytes 0.1 0 - 0.4 10e9/L    Absolute Nucleated RBC 0.0

## 2021-02-05 ENCOUNTER — MEDICAL CORRESPONDENCE (OUTPATIENT)
Dept: HEALTH INFORMATION MANAGEMENT | Facility: CLINIC | Age: 76
End: 2021-02-05

## 2021-02-08 ENCOUNTER — INFUSION THERAPY VISIT (OUTPATIENT)
Dept: ONCOLOGY | Facility: CLINIC | Age: 76
End: 2021-02-08
Attending: STUDENT IN AN ORGANIZED HEALTH CARE EDUCATION/TRAINING PROGRAM
Payer: COMMERCIAL

## 2021-02-08 ENCOUNTER — APPOINTMENT (OUTPATIENT)
Dept: LAB | Facility: CLINIC | Age: 76
End: 2021-02-08
Attending: INTERNAL MEDICINE
Payer: COMMERCIAL

## 2021-02-08 VITALS
RESPIRATION RATE: 18 BRPM | BODY MASS INDEX: 26.11 KG/M2 | DIASTOLIC BLOOD PRESSURE: 86 MMHG | HEART RATE: 99 BPM | TEMPERATURE: 98.6 F | SYSTOLIC BLOOD PRESSURE: 140 MMHG | WEIGHT: 138.2 LBS | OXYGEN SATURATION: 98 %

## 2021-02-08 DIAGNOSIS — C90.00 MULTIPLE MYELOMA, REMISSION STATUS UNSPECIFIED (H): Primary | ICD-10-CM

## 2021-02-08 LAB
ALBUMIN SERPL-MCNC: 3.6 G/DL (ref 3.4–5)
ALP SERPL-CCNC: 123 U/L (ref 40–150)
ALT SERPL W P-5'-P-CCNC: 17 U/L (ref 0–70)
ANION GAP SERPL CALCULATED.3IONS-SCNC: 6 MMOL/L (ref 3–14)
AST SERPL W P-5'-P-CCNC: 13 U/L (ref 0–45)
BASOPHILS # BLD AUTO: 0 10E9/L (ref 0–0.2)
BASOPHILS NFR BLD AUTO: 0.2 %
BILIRUB SERPL-MCNC: 0.3 MG/DL (ref 0.2–1.3)
BUN SERPL-MCNC: 18 MG/DL (ref 7–30)
CALCIUM SERPL-MCNC: 8.8 MG/DL (ref 8.5–10.1)
CHLORIDE SERPL-SCNC: 108 MMOL/L (ref 94–109)
CO2 SERPL-SCNC: 26 MMOL/L (ref 20–32)
CREAT SERPL-MCNC: 0.98 MG/DL (ref 0.66–1.25)
DIFFERENTIAL METHOD BLD: ABNORMAL
EOSINOPHIL # BLD AUTO: 0.1 10E9/L (ref 0–0.7)
EOSINOPHIL NFR BLD AUTO: 1.7 %
ERYTHROCYTE [DISTWIDTH] IN BLOOD BY AUTOMATED COUNT: 14.4 % (ref 10–15)
GFR SERPL CREATININE-BSD FRML MDRD: 76 ML/MIN/{1.73_M2}
GLUCOSE SERPL-MCNC: 122 MG/DL (ref 70–99)
HCT VFR BLD AUTO: 28.8 % (ref 40–53)
HGB BLD-MCNC: 9.2 G/DL (ref 13.3–17.7)
IMM GRANULOCYTES # BLD: 0 10E9/L (ref 0–0.4)
IMM GRANULOCYTES NFR BLD: 0.5 %
LYMPHOCYTES # BLD AUTO: 1.3 10E9/L (ref 0.8–5.3)
LYMPHOCYTES NFR BLD AUTO: 22.5 %
MCH RBC QN AUTO: 32.9 PG (ref 26.5–33)
MCHC RBC AUTO-ENTMCNC: 31.9 G/DL (ref 31.5–36.5)
MCV RBC AUTO: 103 FL (ref 78–100)
MONOCYTES # BLD AUTO: 0.7 10E9/L (ref 0–1.3)
MONOCYTES NFR BLD AUTO: 12.5 %
NEUTROPHILS # BLD AUTO: 3.6 10E9/L (ref 1.6–8.3)
NEUTROPHILS NFR BLD AUTO: 62.6 %
NRBC # BLD AUTO: 0 10*3/UL
NRBC BLD AUTO-RTO: 0 /100
PLATELET # BLD AUTO: 208 10E9/L (ref 150–450)
POTASSIUM SERPL-SCNC: 4.3 MMOL/L (ref 3.4–5.3)
PROT SERPL-MCNC: 6.4 G/DL (ref 6.8–8.8)
RBC # BLD AUTO: 2.8 10E12/L (ref 4.4–5.9)
SODIUM SERPL-SCNC: 140 MMOL/L (ref 133–144)
WBC # BLD AUTO: 5.7 10E9/L (ref 4–11)

## 2021-02-08 PROCEDURE — 83883 ASSAY NEPHELOMETRY NOT SPEC: CPT | Performed by: STUDENT IN AN ORGANIZED HEALTH CARE EDUCATION/TRAINING PROGRAM

## 2021-02-08 PROCEDURE — 85025 COMPLETE CBC W/AUTO DIFF WBC: CPT | Performed by: STUDENT IN AN ORGANIZED HEALTH CARE EDUCATION/TRAINING PROGRAM

## 2021-02-08 PROCEDURE — 80053 COMPREHEN METABOLIC PANEL: CPT | Performed by: STUDENT IN AN ORGANIZED HEALTH CARE EDUCATION/TRAINING PROGRAM

## 2021-02-08 PROCEDURE — 84165 PROTEIN E-PHORESIS SERUM: CPT | Mod: 26 | Performed by: PATHOLOGY

## 2021-02-08 PROCEDURE — 84165 PROTEIN E-PHORESIS SERUM: CPT | Mod: TC | Performed by: STUDENT IN AN ORGANIZED HEALTH CARE EDUCATION/TRAINING PROGRAM

## 2021-02-08 PROCEDURE — 96401 CHEMO ANTI-NEOPL SQ/IM: CPT

## 2021-02-08 PROCEDURE — 250N000012 HC RX MED GY IP 250 OP 636 PS 637: Performed by: STUDENT IN AN ORGANIZED HEALTH CARE EDUCATION/TRAINING PROGRAM

## 2021-02-08 PROCEDURE — 250N000011 HC RX IP 250 OP 636: Performed by: STUDENT IN AN ORGANIZED HEALTH CARE EDUCATION/TRAINING PROGRAM

## 2021-02-08 PROCEDURE — 250N000013 HC RX MED GY IP 250 OP 250 PS 637: Performed by: STUDENT IN AN ORGANIZED HEALTH CARE EDUCATION/TRAINING PROGRAM

## 2021-02-08 PROCEDURE — 999N001036 HC STATISTIC TOTAL PROTEIN: Performed by: STUDENT IN AN ORGANIZED HEALTH CARE EDUCATION/TRAINING PROGRAM

## 2021-02-08 PROCEDURE — 36415 COLL VENOUS BLD VENIPUNCTURE: CPT

## 2021-02-08 RX ORDER — DEXAMETHASONE 4 MG/1
20 TABLET ORAL ONCE
Status: COMPLETED | OUTPATIENT
Start: 2021-02-08 | End: 2021-02-08

## 2021-02-08 RX ORDER — ACETAMINOPHEN 325 MG/1
650 TABLET ORAL ONCE
Status: COMPLETED | OUTPATIENT
Start: 2021-02-08 | End: 2021-02-08

## 2021-02-08 RX ORDER — DIPHENHYDRAMINE HCL 25 MG
50 CAPSULE ORAL ONCE
Status: COMPLETED | OUTPATIENT
Start: 2021-02-08 | End: 2021-02-08

## 2021-02-08 RX ADMIN — BORTEZOMIB 2.2 MG: 3.5 INJECTION, POWDER, LYOPHILIZED, FOR SOLUTION INTRAVENOUS; SUBCUTANEOUS at 12:45

## 2021-02-08 RX ADMIN — ACETAMINOPHEN 650 MG: 325 TABLET ORAL at 11:41

## 2021-02-08 RX ADMIN — DARATUMUMAB AND HYALURONIDASE-FIHJ (HUMAN RECOMBINANT) 1800 MG: 1800; 30000 INJECTION SUBCUTANEOUS at 12:45

## 2021-02-08 RX ADMIN — DIPHENHYDRAMINE HYDROCHLORIDE 50 MG: 25 CAPSULE ORAL at 11:41

## 2021-02-08 RX ADMIN — DEXAMETHASONE 20 MG: 4 TABLET ORAL at 11:42

## 2021-02-08 ASSESSMENT — PAIN SCALES - GENERAL: PAINLEVEL: NO PAIN (0)

## 2021-02-08 NOTE — PATIENT INSTRUCTIONS
Contact Numbers    [facility] (for Scheduling/Triage/After Hours Nurse Line): [phone]    Please call the [facility] nurse triage or the after hours nurse line if you experience a temperature greater than or equal to 100.5, shaking chills, have uncontrolled nausea, vomiting and/or diarrhea, dizziness, lightheadedness, shortness of breath, chest pain, bleeding, unexplained bruising, or if you have any other new/concerning symptoms, questions or concerns.     If you are having any concerning symptoms or wish to speak to a provider before your next infusion visit, please call your care coordinator or triage to notify them so we can adequately serve you.     If you need any refills on medications (narcotics or other medications), please call before your infusion appointment.       February 2021 Sunday Monday Tuesday Wednesday Thursday Friday Saturday        1    [facility] ONC INFUSION 60   8:00 AM   (60 min.)    ONCOLOGY INFUSION   [facility]    XR RIBS & CHEST RIGHT G/E 3 VW   9:15 AM   (25 min.)   [id number]   [facility] Xray [place] 2     3     4    LAB CENTRAL   8:45 AM   (15 min.)    [facility] DRAW   [facility] ONC INFUSION 60   9:30 AM   (60 min.)    ONCOLOGY INFUSION   [facility]    UMP RETURN  12:25 PM   (50 min.)   Pebbles Longo PA-C   [facility] 5     6       7     8  Happy Birthday!    LAB CENTRAL  10:15 AM   (15 min.)    [facility] LAB DRAW   [facility] ONC INFUSION 60  11:00 AM   (60 min.)   [facility] ONCOLOGY INFUSION   [facility] 9     10     11    [facility] ONC INFUSION 60   3:30 PM   (60 min.)   [facility] ONCOLOGY INFUSION   [facility] 12     13       14     15    LAB PERIPHERAL  10:30 AM   (15 min.)   [facility] LAB DRAW   [facility] ONC  INFUSION 60  11:00 AM   (60 min.)   [facility] ONCOLOGY INFUSION   [facility] 16     17    TELEPHONE VISIT RETURN  10:00 AM   (50 min.)   Pebbles Longo PA-C   [facility]    VIDEO VISIT NEW   2:05 PM   (80 min.)   Cat Tate MD   [facility] 18    [facility] ONC INFUSION 60  11:00 AM   (60 min.)   [facility] ONCOLOGY INFUSION   [facility] 19     20       21     22    [facility] ONC INFUSION 60  11:00 AM   (60 min.)   [facility] ONCOLOGY INFUSION   [facility] 23     24     25     26     27       28                                               March 2021 Sunday Monday Tuesday Wednesday Thursday Friday Saturday        1     2     3     4     5     6       7     8    LAB PERIPHERAL   9:45 AM   (15 min.)   [facility] LAB DRAW   [facility]    [facility] ONC INFUSION 60  10:30 AM   (60 min.)   [facility] ONCOLOGY INFUSION   [facility] 9     10     11    [facility] ONC INFUSION 60  10:00 AM   (60 min.)   [facility] ONCOLOGY INFUSION   [facility] 12     13       14     15    UMP ONC INFUSION 60  10:00 AM   (60 min.)   [facility] ONCOLOGY INFUSION   [facility] 16     17    TELEPHONE VISIT RETURN   2:00 PM   (30 min.)   Angela Sweeney MD   [facility] 18    UMP ONC INFUSION 60  10:00 AM   (60 min.)   [facility] ONCOLOGY INFUSION   [facility] 19     20       21     22     23     24     25     26     27       28     29     30     31                                   Recent Results (from the past 24 hour(s))   CBC with platelets differential    Collection Time: 02/08/21 10:42 AM   Result Value Ref Range    WBC 5.7 4.0 - 11.0 10e9/L    RBC Count 2.80 (L) 4.4 - 5.9 10e12/L    Hemoglobin 9.2 (L) 13.3 - 17.7 g/dL    Hematocrit 28.8 (L) 40.0 - 53.0 %     (H) 78 - 100 fl    MCH  32.9 26.5 - 33.0 pg    MCHC 31.9 31.5 - 36.5 g/dL    RDW 14.4 10.0 - 15.0 %    Platelet Count 208 150 - 450 10e9/L    Diff Method Automated Method     % Neutrophils 62.6 %    % Lymphocytes 22.5 %    % Monocytes 12.5 %    % Eosinophils 1.7 %    % Basophils 0.2 %    % Immature Granulocytes 0.5 %    Nucleated RBCs 0 0 /100    Absolute Neutrophil 3.6 1.6 - 8.3 10e9/L    Absolute Lymphocytes 1.3 0.8 - 5.3 10e9/L    Absolute Monocytes 0.7 0.0 - 1.3 10e9/L    Absolute Eosinophils 0.1 0.0 - 0.7 10e9/L    Absolute Basophils 0.0 0.0 - 0.2 10e9/L    Abs Immature Granulocytes 0.0 0 - 0.4 10e9/L    Absolute Nucleated RBC 0.0    Comprehensive metabolic panel    Collection Time: 02/08/21 10:42 AM   Result Value Ref Range    Sodium 140 133 - 144 mmol/L    Potassium 4.3 3.4 - 5.3 mmol/L    Chloride 108 94 - 109 mmol/L    Carbon Dioxide 26 20 - 32 mmol/L    Anion Gap 6 3 - 14 mmol/L    Glucose 122 (H) 70 - 99 mg/dL    Urea Nitrogen 18 7 - 30 mg/dL    Creatinine 0.98 0.66 - 1.25 mg/dL    GFR Estimate 76 >60 mL/min/[1.73_m2]    GFR Estimate If Black 88 >60 mL/min/[1.73_m2]    Calcium 8.8 8.5 - 10.1 mg/dL    Bilirubin Total 0.3 0.2 - 1.3 mg/dL    Albumin 3.6 3.4 - 5.0 g/dL    Protein Total 6.4 (L) 6.8 - 8.8 g/dL    Alkaline Phosphatase 123 40 - 150 U/L    ALT 17 0 - 70 U/L    AST 13 0 - 45 U/L

## 2021-02-08 NOTE — PROGRESS NOTES
"Infusion Nursing Note:  Rogelio Marshall presents today for Cycle 3 Day 8 Velcade and Darzalex Faspro.    Patient seen by provider today: No   present during visit today: Not Applicable.    Note: Patient denies any pain currently.  He states he is taking his oxycodone and tylenol with relief.  He states he continues to get very fatigued with activity, but if he sits for a few minutes he recovers quickly.  Patient reports he has overall be \"doing pretty good\" and offers no new concerns at this time.    Patient declined the covid-19 test.  He does not believe that Covid19 exists.    Patient is taking his dexamethasone as prescribed and will take another dose (day 9) tomorrow morning.    Intravenous Access:  No Intravenous access at this visit.    Treatment Conditions:  Lab Results   Component Value Date    HGB 9.2 02/08/2021     Lab Results   Component Value Date    WBC 5.7 02/08/2021      Lab Results   Component Value Date    ANEU 3.6 02/08/2021     Lab Results   Component Value Date     02/08/2021      Lab Results   Component Value Date     02/08/2021                   Lab Results   Component Value Date    POTASSIUM 4.3 02/08/2021           Lab Results   Component Value Date    MAG 2.1 12/02/2020            Lab Results   Component Value Date    CR 0.98 02/08/2021                   Lab Results   Component Value Date    MIRI 8.8 02/08/2021                Lab Results   Component Value Date    BILITOTAL 0.3 02/08/2021           Lab Results   Component Value Date    ALBUMIN 3.6 02/08/2021                    Lab Results   Component Value Date    ALT 17 02/08/2021           Lab Results   Component Value Date    AST 13 02/08/2021       Results reviewed, labs MET treatment parameters, ok to proceed with treatment.      Post Infusion Assessment:  Patient tolerated injection without incident.  Velcade administered subcutaneously into the LUQ of patient's abdomen.  Darzalex Faspro administered " subcutaneously into the RUQ of patient's abdomen.     Discharge Plan:   Patient declined prescription refills.  Discharge instructions reviewed with: Patient.  Patient and/or family verbalized understanding of discharge instructions and all questions answered.  Copy of AVS reviewed with patient and/or family.  Patient will return 2/11/21 for next appointment.  Patient discharged in stable condition accompanied by: self.  Departure Mode: Ambulatory and w/walker.  Face to Face time: 0.    PATTI LEWIS RN

## 2021-02-08 NOTE — NURSING NOTE
Chief Complaint   Patient presents with     Blood Draw     Vitals, blood drawn via VPT by LPN. Pt checked into appt.      ANTHONY Mcgrath LPN

## 2021-02-09 LAB
ALBUMIN SERPL ELPH-MCNC: 4.1 G/DL (ref 3.7–5.1)
ALPHA1 GLOB SERPL ELPH-MCNC: 0.3 G/DL (ref 0.2–0.4)
ALPHA2 GLOB SERPL ELPH-MCNC: 0.7 G/DL (ref 0.5–0.9)
B-GLOBULIN SERPL ELPH-MCNC: 0.6 G/DL (ref 0.6–1)
GAMMA GLOB SERPL ELPH-MCNC: 0.4 G/DL (ref 0.7–1.6)
KAPPA LC UR-MCNC: 0.77 MG/DL (ref 0.33–1.94)
KAPPA LC/LAMBDA SER: 4.28 {RATIO} (ref 0.26–1.65)
LAMBDA LC SERPL-MCNC: 0.18 MG/DL (ref 0.57–2.63)
M PROTEIN SERPL ELPH-MCNC: 0.2 G/DL
PROT PATTERN SERPL ELPH-IMP: ABNORMAL

## 2021-02-11 ENCOUNTER — INFUSION THERAPY VISIT (OUTPATIENT)
Dept: ONCOLOGY | Facility: CLINIC | Age: 76
End: 2021-02-11
Attending: STUDENT IN AN ORGANIZED HEALTH CARE EDUCATION/TRAINING PROGRAM
Payer: COMMERCIAL

## 2021-02-11 VITALS
HEART RATE: 105 BPM | DIASTOLIC BLOOD PRESSURE: 78 MMHG | RESPIRATION RATE: 18 BRPM | TEMPERATURE: 97.6 F | SYSTOLIC BLOOD PRESSURE: 155 MMHG | OXYGEN SATURATION: 100 %

## 2021-02-11 DIAGNOSIS — C90.00 MULTIPLE MYELOMA, REMISSION STATUS UNSPECIFIED (H): Primary | ICD-10-CM

## 2021-02-11 PROCEDURE — 250N000011 HC RX IP 250 OP 636: Mod: JW | Performed by: STUDENT IN AN ORGANIZED HEALTH CARE EDUCATION/TRAINING PROGRAM

## 2021-02-11 PROCEDURE — 96401 CHEMO ANTI-NEOPL SQ/IM: CPT

## 2021-02-11 RX ADMIN — BORTEZOMIB 2.2 MG: 3.5 INJECTION, POWDER, LYOPHILIZED, FOR SOLUTION INTRAVENOUS; SUBCUTANEOUS at 16:15

## 2021-02-11 ASSESSMENT — PAIN SCALES - GENERAL: PAINLEVEL: MILD PAIN (2)

## 2021-02-11 NOTE — PATIENT INSTRUCTIONS
Contact numbers:    Triage/[phone]    Call with chills and/or temperature greater than or equal to 100.5 and questions or concerns.    If after hours, weekends, or holidays, call main hospital  at  [phone] and ask for Oncology doctor on call.                 1    [facility] ONC INFUSION 60   8:00 AM   (60 min.)   [facility] ONCOLOGY INFUSION   [facility]    XR RIBS & CHEST RIGHT G/E 3 VW   9:15 AM   (25 min.)   [id number]   [facility] Xray [place] 2     3     4    LAB CENTRAL   8:45 AM   (15 min.)    [facility] DRAW   [facility]    [facility] ONC INFUSION 60   9:30 AM   (60 min.)    ONCOLOGY INFUSION   [facility]    UMP RETURN  12:25 PM   (50 min.)   Pebbles Longo PA-C   [facility] 5     6       7     8  Happy Birthday!    LAB CENTRAL  10:15 AM   (15 min.)    [facility] LAB DRAW   [facility]    [facility] ONC INFUSION 60  11:00 AM   (60 min.)   [facility] ONCOLOGY INFUSION   [facility] 9     10     11    [facility] ONC INFUSION 60   3:30 PM   (60 min.)    ONCOLOGY INFUSION   [facility] 12     13       14     15    LAB PERIPHERAL  10:30 AM   (15 min.)   [facility]  11:00 AM   (60 min.)    ONCOLOGY INFUSION   [facility] 16     17    TELEPHONE VISIT RETURN  10:00 AM   (50 min.)   Pebbles Longo PA-C   [facility]    VIDEO VISIT NEW   2:05 PM   (80 min.)   Cat Tate MD   [facility] 18    [facility] ONC INFUSION 60  11:00 AM   (60 min.)   UC ONCOLOGY INFUSION   [facility] 19     20       21     22    [facility] ONC INFUSION 60  11:00 AM   (60 min.)    [facility] ONCOLOGY INFUSION   [facility] 23     24     25     26     27       28                                               March 2021 Sunday Monday Tuesday Wednesday Thursday Friday Saturday        1     2     3     4     5     6       7     8    LAB PERIPHERAL   9:45 AM   (15 min.)   [facility] LAB DRAW   [facility]    [facility] ONC INFUSION 60  10:30 AM   (60 min.)    ONCOLOGY INFUSION   [facility] 9     10     11    [facility] ONC INFUSION 60  10:00 AM   (60 min.)    ONCOLOGY INFUSION   [facility] 12     13       14     15    [facility] ONC INFUSION 60  10:00 AM   (60 min.)    ONCOLOGY INFUSION   [facility] 16     17    TELEPHONE VISIT RETURN   2:00 PM   (30 min.)   Angela Sweeney MD   [facility] 18    [facility] ONC INFUSION 60  10:00 AM   (60 min.)    ONCOLOGY INFUSION   [facility] 19     20       21     22     23     24     25     26     27       28     29     30     31                                    Lab Results:  No results found for this or any previous visit (from the past 12 hour(s)).

## 2021-02-11 NOTE — PROGRESS NOTES
Infusion Nursing Note:  Rogelio Marshall presents for C3D11 Velcade    Note: pt feeling well today, no new issues or concerns to report. He does have questions regarding his dexamethasone schedule though. He thought he was only supposed to take the dex the day after he gets daratumumab, but it's ordered on Days 4 and 5 which are just velcade days. He DID NOT take it on day 9 as ordered and is wondering if he should take it today or tomorrow or just skip it. Writer reviewed dex schedule with Krystal in pharmacy and it's unclear why the dex is ordered that way-- IB sent to [facility] to follow up with pt regarding dex and whether he should take it or skip it for Day 9.     Pain: pt has ongoing low back pain, taking oral pain meds with relief, no intervention needed in infusion     Treatment Conditions:  Not Applicable.    Intravenous Access:  No Intravenous access/labs at this visit.    Post Infusion Assessment:  Patient tolerated injection without incident to RIGHT abdomen    Discharge Plan:   Patient declined prescription refills.  Discharge instructions reviewed with: Patient.  Patient and/or family verbalized understanding of discharge instructions and all questions answered.  Copy of AVS reviewed with patient and/or family.  Patient will return 2/15 for next appointment.  Patient discharged in stable condition accompanied by: self.  Face time: 5 minutes    Hamida Jules RN, RN

## 2021-02-12 ENCOUNTER — PATIENT OUTREACH (OUTPATIENT)
Dept: ONCOLOGY | Facility: CLINIC | Age: 76
End: 2021-02-12

## 2021-02-12 NOTE — TELEPHONE ENCOUNTER
RN Care Coordination Note  Incoming Message:   From: Hamida Jules RN   Sent: 2/11/2021   4:30 PM CST   To: Pebbles Longo PA-C, Val Lyle, RN, *   Subject: dex?                                           Hi Rogelio Rogel was here for Day 11 velcade today and has questions about his dex- it's ordered to be taken on days 2, 4, 5, 9, and 16. I was asking pharmacy and they said normally the dex is just given weekly the day after madyson, do you know why he gets in on day 4 and 5 as well? If he's getting it day 4 and 5, should he get it days 11 and 12 as well?   He was supposed to take it day 9 but did not, so now he's wondering if he should take it today or tomorrow? Since it's so late in the day, pharmacy said probably not to take it today, but do you want him to take it tomorrow or just skip that dose?   Provider Response:  Pebbles Longo PA-C Allard, Kathy, OSVALDO; Hamida Jules, RN  We will plan on doing 20 mg PO the day after each madyson moving forward per discussion with Dr. Sweeney.  Outgoing Call:   Spoke with patient and relayed above information. He is asking that the treatment plan be updated to reflect this to avoid confusion in the future.    Val Lyle RN, BSN  Care Coordinator   [facility]

## 2021-02-15 ENCOUNTER — TELEPHONE (OUTPATIENT)
Dept: ONCOLOGY | Facility: CLINIC | Age: 76
End: 2021-02-15

## 2021-02-15 ENCOUNTER — APPOINTMENT (OUTPATIENT)
Dept: LAB | Facility: CLINIC | Age: 76
End: 2021-02-15
Attending: STUDENT IN AN ORGANIZED HEALTH CARE EDUCATION/TRAINING PROGRAM
Payer: COMMERCIAL

## 2021-02-15 ENCOUNTER — INFUSION THERAPY VISIT (OUTPATIENT)
Dept: ONCOLOGY | Facility: CLINIC | Age: 76
End: 2021-02-15
Attending: STUDENT IN AN ORGANIZED HEALTH CARE EDUCATION/TRAINING PROGRAM
Payer: COMMERCIAL

## 2021-02-15 VITALS
SYSTOLIC BLOOD PRESSURE: 134 MMHG | RESPIRATION RATE: 16 BRPM | DIASTOLIC BLOOD PRESSURE: 82 MMHG | OXYGEN SATURATION: 98 % | TEMPERATURE: 97.6 F | HEART RATE: 89 BPM | WEIGHT: 142.4 LBS | BODY MASS INDEX: 26.91 KG/M2

## 2021-02-15 DIAGNOSIS — C90.00 MULTIPLE MYELOMA, REMISSION STATUS UNSPECIFIED (H): Primary | ICD-10-CM

## 2021-02-15 LAB
ALBUMIN SERPL-MCNC: 3.4 G/DL (ref 3.4–5)
ALP SERPL-CCNC: 99 U/L (ref 40–150)
ALT SERPL W P-5'-P-CCNC: 22 U/L (ref 0–70)
ANION GAP SERPL CALCULATED.3IONS-SCNC: 7 MMOL/L (ref 3–14)
AST SERPL W P-5'-P-CCNC: 12 U/L (ref 0–45)
BASOPHILS # BLD AUTO: 0 10E9/L (ref 0–0.2)
BASOPHILS NFR BLD AUTO: 0.3 %
BILIRUB SERPL-MCNC: 0.3 MG/DL (ref 0.2–1.3)
BUN SERPL-MCNC: 35 MG/DL (ref 7–30)
CALCIUM SERPL-MCNC: 8.8 MG/DL (ref 8.5–10.1)
CHLORIDE SERPL-SCNC: 107 MMOL/L (ref 94–109)
CO2 SERPL-SCNC: 23 MMOL/L (ref 20–32)
CREAT SERPL-MCNC: 1.18 MG/DL (ref 0.66–1.25)
DIFFERENTIAL METHOD BLD: ABNORMAL
EOSINOPHIL # BLD AUTO: 0.1 10E9/L (ref 0–0.7)
EOSINOPHIL NFR BLD AUTO: 0.8 %
ERYTHROCYTE [DISTWIDTH] IN BLOOD BY AUTOMATED COUNT: 14.9 % (ref 10–15)
GFR SERPL CREATININE-BSD FRML MDRD: 60 ML/MIN/{1.73_M2}
GLUCOSE SERPL-MCNC: 104 MG/DL (ref 70–99)
HCT VFR BLD AUTO: 28.4 % (ref 40–53)
HGB BLD-MCNC: 9.3 G/DL (ref 13.3–17.7)
IMM GRANULOCYTES # BLD: 0.2 10E9/L (ref 0–0.4)
IMM GRANULOCYTES NFR BLD: 2.2 %
LYMPHOCYTES # BLD AUTO: 1.6 10E9/L (ref 0.8–5.3)
LYMPHOCYTES NFR BLD AUTO: 18 %
MCH RBC QN AUTO: 32.6 PG (ref 26.5–33)
MCHC RBC AUTO-ENTMCNC: 32.7 G/DL (ref 31.5–36.5)
MCV RBC AUTO: 100 FL (ref 78–100)
MONOCYTES # BLD AUTO: 1.1 10E9/L (ref 0–1.3)
MONOCYTES NFR BLD AUTO: 12.4 %
NEUTROPHILS # BLD AUTO: 6 10E9/L (ref 1.6–8.3)
NEUTROPHILS NFR BLD AUTO: 66.3 %
NRBC # BLD AUTO: 0.1 10*3/UL
NRBC BLD AUTO-RTO: 1 /100
PLATELET # BLD AUTO: 101 10E9/L (ref 150–450)
POTASSIUM SERPL-SCNC: 4.4 MMOL/L (ref 3.4–5.3)
PROT SERPL-MCNC: 6.3 G/DL (ref 6.8–8.8)
RBC # BLD AUTO: 2.85 10E12/L (ref 4.4–5.9)
SODIUM SERPL-SCNC: 138 MMOL/L (ref 133–144)
WBC # BLD AUTO: 9 10E9/L (ref 4–11)

## 2021-02-15 PROCEDURE — 250N000013 HC RX MED GY IP 250 OP 250 PS 637: Performed by: STUDENT IN AN ORGANIZED HEALTH CARE EDUCATION/TRAINING PROGRAM

## 2021-02-15 PROCEDURE — 85025 COMPLETE CBC W/AUTO DIFF WBC: CPT | Performed by: STUDENT IN AN ORGANIZED HEALTH CARE EDUCATION/TRAINING PROGRAM

## 2021-02-15 PROCEDURE — 36415 COLL VENOUS BLD VENIPUNCTURE: CPT

## 2021-02-15 PROCEDURE — 80053 COMPREHEN METABOLIC PANEL: CPT | Performed by: STUDENT IN AN ORGANIZED HEALTH CARE EDUCATION/TRAINING PROGRAM

## 2021-02-15 PROCEDURE — 96401 CHEMO ANTI-NEOPL SQ/IM: CPT

## 2021-02-15 PROCEDURE — 250N000012 HC RX MED GY IP 250 OP 636 PS 637: Performed by: STUDENT IN AN ORGANIZED HEALTH CARE EDUCATION/TRAINING PROGRAM

## 2021-02-15 PROCEDURE — 250N000011 HC RX IP 250 OP 636: Performed by: STUDENT IN AN ORGANIZED HEALTH CARE EDUCATION/TRAINING PROGRAM

## 2021-02-15 RX ORDER — DEXAMETHASONE 4 MG/1
20 TABLET ORAL ONCE
Status: COMPLETED | OUTPATIENT
Start: 2021-02-15 | End: 2021-02-15

## 2021-02-15 RX ORDER — DIPHENHYDRAMINE HCL 25 MG
50 CAPSULE ORAL ONCE
Status: COMPLETED | OUTPATIENT
Start: 2021-02-15 | End: 2021-02-15

## 2021-02-15 RX ORDER — ACETAMINOPHEN 325 MG/1
650 TABLET ORAL ONCE
Status: COMPLETED | OUTPATIENT
Start: 2021-02-15 | End: 2021-02-15

## 2021-02-15 RX ADMIN — ACETAMINOPHEN 650 MG: 325 TABLET ORAL at 12:14

## 2021-02-15 RX ADMIN — DEXAMETHASONE 20 MG: 4 TABLET ORAL at 12:14

## 2021-02-15 RX ADMIN — DIPHENHYDRAMINE HYDROCHLORIDE 50 MG: 25 CAPSULE ORAL at 12:15

## 2021-02-15 RX ADMIN — DARATUMUMAB AND HYALURONIDASE-FIHJ (HUMAN RECOMBINANT) 1800 MG: 1800; 30000 INJECTION SUBCUTANEOUS at 13:17

## 2021-02-15 ASSESSMENT — PAIN SCALES - GENERAL: PAINLEVEL: MILD PAIN (2)

## 2021-02-15 NOTE — PATIENT INSTRUCTIONS
[facility] Triage and after hours / weekends / holidays:  [phone]    Please call the triage or after hours line if you experience a temperature greater than or equal to 100.5, shaking chills, have uncontrolled nausea, vomiting and/or diarrhea, dizziness, shortness of breath, chest pain, bleeding, unexplained bruising, or if you have any other new/concerning symptoms, questions or concerns.      If you are having any concerning symptoms or wish to speak to a provider before your next infusion visit, please call your care coordinator or triage to notify them so we can adequately serve you.     If you need a refill on a narcotic prescription or other medication, please call before your infusion appointment.               February 2021 Sunday Monday Tuesday Wednesday Thursday Friday Saturday 1    [facility] ONC INFUSION 60   8:00 AM   (60 min.)   [facility] ONCOLOGY INFUSION   [facility]    XR RIBS & CHEST RIGHT G/E 3 VW   9:15 AM   (25 min.)   [id number]   [facility] [place] 2     3     4    LAB CENTRAL   8:45 AM   (15 min.)    [facility] DRAW   [facility]    [facility] ONC INFUSION 60   9:30 AM   (60 min.)    ONCOLOGY INFUSION   [facility]    [facility] RETURN  12:25 PM   (50 min.)   Pebbles Longo PA-C   [facility] 5     6       7     8  Happy Birthday!    LAB CENTRAL  10:15 AM   (15 min.)    [facility] DRAW   [facility]    [facility] ONC INFUSION 60  11:00 AM   (60 min.)   [facility] ONCOLOGY INFUSION   [facility] 9     10     11    [facility] ONC INFUSION 60   3:30 PM   (60 min.)   [facility] ONCOLOGY INFUSION   [facility] 12     13       14     15    LAB PERIPHERAL  10:30 AM   (15 min.)    [facility] DRAW   [facility]    [facility] ONC INFUSION 60  11:00 AM   (60 min.)   [facility] ONCOLOGY INFUSION   [facility] 16     17    TELEPHONE VISIT RETURN  10:00 AM   (50 min.)   Pebbles Longo PA-C   [facility]    VIDEO VISIT NEW   2:05 PM   (80 min.)   Cat Tate MD   [facility] 18     19     20       21     22    [facility] ONC INFUSION 60  11:00 AM   (60 min.)   UC ONCOLOGY INFUSION   [facility] 23     24     25    [facility] ONC INFUSION 60  10:00 AM   (60 min.)   UC ONCOLOGY INFUSION   [facility] 26     27       28                                               March 2021 Sunday Monday Tuesday Wednesday Thursday Friday Saturday        1    [facility] ONC INFUSION 60  10:00 AM   (60 min.)   UC ONCOLOGY INFUSION   [facility] 2     3     4    [facility] ONC INFUSION 60   9:30 AM   (60 min.)   UC ONCOLOGY INFUSION   [facility] 5     6       7     8    LAB PERIPHERAL   9:45 AM   (15 min.)   [facility] LAB DRAW   [facility]    [facility] ONC INFUSION 60  10:30 AM   (60 min.)   UC ONCOLOGY INFUSION   [facility] 9     10     11    [facility] ONC INFUSION 60  10:00 AM   (60 min.)    ONCOLOGY INFUSION   [facility] 12     13       14     15    [facility] ONC INFUSION 60  10:00 AM   (60 min.)   [facility] ONCOLOGY INFUSION   [facility] 16     17    TELEPHONE VISIT RETURN   2:00 PM   (30 min.)   Angela Sweeney MD   [facility] 18    [facility] ONC INFUSION 60  10:00 AM   (60 min.)   [facility] ONCOLOGY INFUSION   [facility] 19     20       21     22     23     24     25     26     27       28     29     30     31                                    Lab Results:  Recent Results (from the past 12 hour(s))   CBC with platelets differential    Collection Time: 02/15/21 11:22 AM   Result Value Ref Range    WBC 9.0 4.0  - 11.0 10e9/L    RBC Count 2.85 (L) 4.4 - 5.9 10e12/L    Hemoglobin 9.3 (L) 13.3 - 17.7 g/dL    Hematocrit 28.4 (L) 40.0 - 53.0 %     78 - 100 fl    MCH 32.6 26.5 - 33.0 pg    MCHC 32.7 31.5 - 36.5 g/dL    RDW 14.9 10.0 - 15.0 %    Platelet Count 101 (L) 150 - 450 10e9/L    Diff Method Automated Method     % Neutrophils 66.3 %    % Lymphocytes 18.0 %    % Monocytes 12.4 %    % Eosinophils 0.8 %    % Basophils 0.3 %    % Immature Granulocytes 2.2 %    Nucleated RBCs 1 (H) 0 /100    Absolute Neutrophil 6.0 1.6 - 8.3 10e9/L    Absolute Lymphocytes 1.6 0.8 - 5.3 10e9/L    Absolute Monocytes 1.1 0.0 - 1.3 10e9/L    Absolute Eosinophils 0.1 0.0 - 0.7 10e9/L    Absolute Basophils 0.0 0.0 - 0.2 10e9/L    Abs Immature Granulocytes 0.2 0 - 0.4 10e9/L    Absolute Nucleated RBC 0.1    Comprehensive metabolic panel    Collection Time: 02/15/21 11:22 AM   Result Value Ref Range    Sodium 138 133 - 144 mmol/L    Potassium 4.4 3.4 - 5.3 mmol/L    Chloride 107 94 - 109 mmol/L    Carbon Dioxide 23 20 - 32 mmol/L    Anion Gap 7 3 - 14 mmol/L    Glucose 104 (H) 70 - 99 mg/dL    Urea Nitrogen 35 (H) 7 - 30 mg/dL    Creatinine 1.18 0.66 - 1.25 mg/dL    GFR Estimate 60 (L) >60 mL/min/[1.73_m2]    GFR Estimate If Black 70 >60 mL/min/[1.73_m2]    Calcium 8.8 8.5 - 10.1 mg/dL    Bilirubin Total 0.3 0.2 - 1.3 mg/dL    Albumin 3.4 3.4 - 5.0 g/dL    Protein Total 6.3 (L) 6.8 - 8.8 g/dL    Alkaline Phosphatase 99 40 - 150 U/L    ALT 22 0 - 70 U/L    AST 12 0 - 45 U/L

## 2021-02-15 NOTE — PROGRESS NOTES
"Infusion Nursing Note:  Rogelio Marshall presents today for Day 15 Cycle 3 subcutaneous Daratumumab.    Patient seen by provider today: No   present during visit today: Not Applicable.    Note: Patient presents to infusion feeling ok. Patient states since last injection last week, swelling of bilateral feet/ankles noted. Patient denies shortness of breath, chest pain, pain, redness, or increased warmth to extremities but does state feet feeling tight in shoes that are normally comfortable. Upon assessment, +1 pitting edema noted primarily in bilateral ankles with trace edema to top of foot, calf/lower legs have trace pitting edema, and no redness or increased heat noted. Patient states he hasn't taken PO Hydrochlorathiazide per med list for about a month do to needing a refill. Patient does not know who ordered/is following him with bp meds at home. Patient has had a 4lb weight gain since 2/8/2021 which patient states \"does not make sense since I dont eat very much at home\". Patient states he tries to eat 3 times a day but amount consumed varies do to decreased appetite and taste changes since chemo start. Otherwise, patient denies s/s of infection such as fever, shortness of breath, cough, chest pain, sore throat, or changes in taste/smell. Previous right abdomen injection site red, non painful, no drainage, and feeling slightly more warm then the rest of stomach. See photo. Patient verbalizes understanding to notify Dr. Sweeney if area becomes more red beyond black pen border, firm, painful, or if drainage and/or fever develop at home. Patient voices understanding to take 20mg of PO Dex x1 the day after each Daratumumab injection. Plt drop to 101,000 today    Pebbles DAVILA notified of patients new bilateral LE edema and labs today.  TORB. 2/15/2021. 1243. Pebbles DAVILA. Jori Devlin RN. Ok to proceed with Hetal today. Hold on refilling Hydrochlorothiazide do to increase in creatinine.     Patient " did meet criteria for an asymptomatic covid-19 PCR test in infusion today. Patient declined the covid-19 test. Patient constantly needing reinforcement to wear mask while in infusion.     Intravenous Access:  No Intravenous access at this visit.    Treatment Conditions:  Lab Results   Component Value Date    HGB 9.3 02/15/2021     Lab Results   Component Value Date    WBC 9.0 02/15/2021      Lab Results   Component Value Date    ANEU 6.0 02/15/2021     Lab Results   Component Value Date     02/15/2021      Lab Results   Component Value Date     02/15/2021                   Lab Results   Component Value Date    POTASSIUM 4.4 02/15/2021           Lab Results   Component Value Date    MAG 2.1 12/02/2020            Lab Results   Component Value Date    CR 1.18 02/15/2021                   Lab Results   Component Value Date    MIRI 8.8 02/15/2021                Lab Results   Component Value Date    BILITOTAL 0.3 02/15/2021           Lab Results   Component Value Date    ALBUMIN 3.4 02/15/2021                    Lab Results   Component Value Date    ALT 22 02/15/2021           Lab Results   Component Value Date    AST 12 02/15/2021       Results reviewed, labs MET treatment parameters, ok to proceed with treatment.      Post Infusion Assessment:  Patient tolerated 1 subcutaneous Hetal injection in LLQ of abdomen without incident.       Discharge Plan:   Patient declined prescription refills.  Discharge instructions reviewed with: Patient.  Patient and/or family verbalized understanding of discharge instructions and all questions answered.  Copy of AVS reviewed with patient and/or family.  Patient will return 2/22 for next appointment.  Patient discharged in stable condition accompanied by: self.  Departure Mode: Ambulatory.  Face to Face time: 0 minutes.    Jori Devlin RN

## 2021-02-15 NOTE — NURSING NOTE
Chief Complaint   Patient presents with     Blood Draw     Labs drawn via  by RN. VS taken     Labs drawn with vpt by rn.  Pt tolerated well.  VS taken.  Pt checked in for next appt.    Cr Salgado RN

## 2021-02-16 NOTE — PROGRESS NOTES
"Rogelio is a 76 year old who is being evaluated via a billable telephone visit.      What phone number would you like to be contacted at? [phone]  How would you like to obtain your AVS? MyChart     Refill on Oxycodone     Vitals - Patient Reported  Weight (Patient Reported): 63.5 kg (140 lb)  Height (Patient Reported): 154.9 cm (5' 1\")  BMI (Based on Pt Reported Ht/Wt): 26.45  Pain Score: Mild Pain (2)    Nguyen Souza CMA February 17, 2021  9:34 AM     Phone call duration: 29 minutes      Reason for Visit:  evaluation and management of MM    Oncology HPI:   -compression fractures and bone scan reveals multiple lytic lesions throughout his appendicular and axial skeleton. His Beta 2 microglobulin was 3.6 on 10/10/2020. Kappa chains were 73.6 on 10/5/2020 with K/L ratio of 89.9. M spike of 16.2 in urine on 10/10. Bone marrow biopsy on 10/23/2020 showed trilineage hematopoiesis and 50-60% kappa restricted plasma cells. Flow cytometry showed 20 % plasma cells which express CD19, CD38, CD45 and monotypic cytoplasmic kappa immunoglobulin light chains but lack CD20 and CD56.  FISH shows IGH-CCND1 fusion (81%; 30% had loss of IGH component from one fusion signal).       - Started 21 day cycle VRD 11/2020  - 11/27 he had a fall with rib fractures.   -12/9/2020 was switched to madyson-velcade-dex due to poor tolerability of VRD    Interval history:   Rogelio Marshall was met with for follow up over telephone.  - Continues to have fluctuant appetites. Typically it decreases after his treatments, but then increases as he gets further away. Continues to note poor taste. Trying his best to get adequate nutrition. Weight is a little up   - Overall he feels he is getting used to the infusions. Feels stronger.   - No further body aches. No progression of neuropathy- remains in fingertips. Not interfering with function  - Still working on getting in with dentist.   - overall back and rib pain is improving. Only needing 1-2 " oxycodone a day.   - Noting progressive LE edema in both legs- noting in feet but also up into calves. No longer taking hydrochlorothiazide. No calf pain, chest pain, shortness of breath, cough.     Denies headaches, dizziness, fever, chills, mouth irritation, sore throat, nausea, stomach pain, change in bowel habits, bleeding, rash, urinary complaints.        ROS: 10 point ROS neg other than the symptoms noted above in the HPI.        Past Medical History:   Diagnosis Date     Hyperlipidemia      Hypertension         Current Outpatient Medications   Medication Sig Dispense Refill     acetaminophen (TYLENOL) 325 MG tablet Take 3 tablets (975 mg) by mouth 3 times daily 500 tablet 4     aspirin (ASA) 325 MG tablet        calcium carbonate 500 mg, elemental, (OSCAL) 500 MG tablet Take 1 tablet (500 mg) by mouth 2 times daily 200 tablet 3     cholecalciferol (VITAMIN D3) 25 mcg (1000 units) capsule Take 1 capsule (25 mcg) by mouth daily 100 capsule 3     gabapentin (NEURONTIN) 300 MG capsule Take 1 capsule (300 mg) by mouth 3 times daily 84 capsule 0     gabapentin (NEURONTIN) 300 MG capsule Take 1 capsule (300 mg) by mouth 3 times daily 84 capsule 0     hydrochlorothiazide (MICROZIDE) 12.5 MG capsule Take 1 capsule (12.5 mg) by mouth daily 30 capsule 0     lisinopril (ZESTRIL) 20 MG tablet Take 1 tablet (20 mg) by mouth daily 30 tablet 3     methocarbamol (ROBAXIN) 500 MG tablet Take 1 tablet (500 mg) by mouth 4 times daily 120 tablet 3     omeprazole (PRILOSEC) 20 MG DR capsule Take 1 capsule (20 mg) by mouth daily 30 capsule 3     oxyCODONE (ROXICODONE) 5 MG tablet Take 1 tablet (5 mg) by mouth every 12 hours as needed for severe pain 30 tablet 0     polyethylene glycol (MIRALAX) 17 g packet Take 17 g by mouth daily       prochlorperazine (COMPAZINE) 10 MG tablet Take 1 tablet (10 mg) by mouth every 6 hours as needed (Nausea/Vomiting) 30 tablet 5     senna-docusate (SENOKOT-S/PERICOLACE) 8.6-50 MG tablet Take 1  tablet by mouth daily as needed        acyclovir (ZOVIRAX) 400 MG tablet Take 1 tablet (400 mg) by mouth 2 times daily Viral Prophylaxis. 60 tablet 11     dexamethasone (DECADRON) 4 MG tablet Take 20 mg (five tabs) by mouth on Days 2, 4, 5, 9, and 16. (Patient not taking: Reported on 2/17/2021) 25 tablet 0     dexamethasone (DECADRON) 4 MG tablet Take 20 mg (five tabs) by mouth on Days 2, 4, 5, 9, and 16. (Patient not taking: Reported on 2/17/2021) 25 tablet 0     enoxaparin ANTICOAGULANT (LOVENOX) 30 MG/0.3ML syringe Inject 0.3 mLs (30 mg) Subcutaneous every 12 hours (Patient not taking: Reported on 12/18/2020) 28 Syringe 0     Lidocaine (LIDOCARE) 4 % Patch Place 1-3 patches onto the skin every 24 hours To prevent lidocaine toxicity, patient should be patch free for 12 hrs daily. (Patient not taking: Reported on 12/18/2020) 30 patch 1     LORazepam (ATIVAN) 0.5 MG tablet Take 1 tablet (0.5 mg) by mouth every 4 hours as needed (Anxiety, Nausea/Vomiting or Sleep) (Patient not taking: Reported on 2/15/2021) 30 tablet 5     ondansetron (ZOFRAN-ODT) 4 MG ODT tab Take 1 tablet (4 mg) by mouth every 6 hours as needed for nausea or vomiting (Patient not taking: Reported on 12/18/2020)       triamcinolone (KENALOG) 0.1 % external ointment Apply topically 2 times daily To back rash (Patient not taking: Reported on 12/18/2020) 15 g 0     Exam:  There were no vitals taken for this visit. =  Telephone visit was done today  Voice sounded strong, able to follow thought processes, did not sound to be in acute distress      Labs:   Results for BRUNO ALFORD (MRN [id number]) as of 2/17/2021 11:35   Ref. Range 2/15/2021 11:22   Sodium Latest Ref Range: 133 - 144 mmol/L 138   Potassium Latest Ref Range: 3.4 - 5.3 mmol/L 4.4   Chloride Latest Ref Range: 94 - 109 mmol/L 107   Carbon Dioxide Latest Ref Range: 20 - 32 mmol/L 23   Urea Nitrogen Latest Ref Range: 7 - 30 mg/dL 35 (H)   Creatinine Latest Ref Range: 0.66 - 1.25 mg/dL 1.18    GFR Estimate Latest Ref Range: >60 mL/min/1.73_m2 60 (L)   GFR Estimate If Black Latest Ref Range: >60 mL/min/1.73_m2 70   Calcium Latest Ref Range: 8.5 - 10.1 mg/dL 8.8   Anion Gap Latest Ref Range: 3 - 14 mmol/L 7   Albumin Latest Ref Range: 3.4 - 5.0 g/dL 3.4   Protein Total Latest Ref Range: 6.8 - 8.8 g/dL 6.3 (L)   Bilirubin Total Latest Ref Range: 0.2 - 1.3 mg/dL 0.3   Alkaline Phosphatase Latest Ref Range: 40 - 150 U/L 99   ALT Latest Ref Range: 0 - 70 U/L 22   AST Latest Ref Range: 0 - 45 U/L 12   Glucose Latest Ref Range: 70 - 99 mg/dL 104 (H)   WBC Latest Ref Range: 4.0 - 11.0 10e9/L 9.0   Hemoglobin Latest Ref Range: 13.3 - 17.7 g/dL 9.3 (L)   Hematocrit Latest Ref Range: 40.0 - 53.0 % 28.4 (L)   Platelet Count Latest Ref Range: 150 - 450 10e9/L 101 (L)   RBC Count Latest Ref Range: 4.4 - 5.9 10e12/L 2.85 (L)   MCV Latest Ref Range: 78 - 100 fl 100   MCH Latest Ref Range: 26.5 - 33.0 pg 32.6   MCHC Latest Ref Range: 31.5 - 36.5 g/dL 32.7   RDW Latest Ref Range: 10.0 - 15.0 % 14.9   Diff Method Unknown Automated Method   % Neutrophils Latest Units: % 66.3   % Lymphocytes Latest Units: % 18.0   % Monocytes Latest Units: % 12.4   % Eosinophils Latest Units: % 0.8   % Basophils Latest Units: % 0.3   % Immature Granulocytes Latest Units: % 2.2   Nucleated RBCs Latest Ref Range: 0 /100 1 (H)   Absolute Neutrophil Latest Ref Range: 1.6 - 8.3 10e9/L 6.0   Absolute Lymphocytes Latest Ref Range: 0.8 - 5.3 10e9/L 1.6   Absolute Monocytes Latest Ref Range: 0.0 - 1.3 10e9/L 1.1   Absolute Eosinophils Latest Ref Range: 0.0 - 0.7 10e9/L 0.1   Absolute Basophils Latest Ref Range: 0.0 - 0.2 10e9/L 0.0   Abs Immature Granulocytes Latest Ref Range: 0 - 0.4 10e9/L 0.2   Absolute Nucleated RBC Unknown 0.1         Imaging:   None to review    Assessment/plan:   #Multiple myeloma  -Diagnosed in October 2020 after identifying multiple lytic lesions throughout his appendicular and axial skeleton  -Initiated VRD on 11/19. Was  held on C1D21 due to concerns about his ability to tolerate the medicine. Unfortunately he has had difficulty with hypotension and a fall resulting in rib fractures.  -Was switched to madyson- velcade-dex on 12/9. Had significant diffuse body pain after day 11 Velcade cycle 2. He took a brief break from treatment but then agreed to resume at full dose this past week.  Thus far tolerating well.  Lab work reveals a great response in mental markers to treatment.  We will continue as planned.    -Has an appointment with palliative care set up for today.     Prophy: Continue levofloxacin and acyclovir.     #Bone mets   -At a high risk for more compression fractures. Vitamin D is replete and he is on a daily supplement as well as calcium.   -Previously discussed that side effects of zometa include bone pains, hypocalcemia, and osteonecrosis of the jaw. He still needs to be cleared by dentistry before beginning zometa -again addressed today 2/17/21.     #Fatigue   -2/2 chemotherapy and poor PO intake.  Overall improving.  We will continue to monitor  -Referring to palliative care as above.    #Poor PO intake, poor appetite.  -Overall improved this past week- fluctuant overall.    Encouraged continued caloric intake.  We will continue to monitor.  - Advised trial of Zinc supplement or Miracle Berry.   -Referred to palliative care as above.    #Neuropathy   -Grade 1. Stable. Continue to monitor - continue gabapentin    #Right rib pain.  Suspect this is related to his recent fall.  No acute findings on recent chest x-ray.  -Utilizing tylenol and limited oxycodone with adequate control    # GERD  -On omeprazole 20 mg daily.  Can titrate up as needed.    # Bilateral LE swelling. Will send for doppler of legs. Cautiously resume hydrochlorothiazide with close monitoring of renal function.     # Fluctuant renal function. Now avoiding ibuprofen. Will continue to monitor closely.     Pebbles Longo PA-C    20 minutes spent on the date of  the encounter doing chart review, interpretation of tests and documentation, in addition to 29 minutes spent on the phone with the patient.

## 2021-02-17 ENCOUNTER — VIRTUAL VISIT (OUTPATIENT)
Dept: ONCOLOGY | Facility: CLINIC | Age: 76
End: 2021-02-17
Attending: PHYSICIAN ASSISTANT
Payer: COMMERCIAL

## 2021-02-17 ENCOUNTER — VIRTUAL VISIT (OUTPATIENT)
Dept: PALLIATIVE CARE | Facility: CLINIC | Age: 76
End: 2021-02-17
Attending: PHYSICIAN ASSISTANT
Payer: COMMERCIAL

## 2021-02-17 ENCOUNTER — TELEPHONE (OUTPATIENT)
Dept: INTERNAL MEDICINE | Facility: CLINIC | Age: 76
End: 2021-02-17

## 2021-02-17 DIAGNOSIS — M79.89 LEG SWELLING: Primary | ICD-10-CM

## 2021-02-17 DIAGNOSIS — G89.3 CANCER RELATED PAIN: Primary | ICD-10-CM

## 2021-02-17 DIAGNOSIS — C90.00 MULTIPLE MYELOMA, REMISSION STATUS UNSPECIFIED (H): ICD-10-CM

## 2021-02-17 DIAGNOSIS — M79.89 LEG SWELLING: ICD-10-CM

## 2021-02-17 DIAGNOSIS — R53.83 OTHER FATIGUE: ICD-10-CM

## 2021-02-17 DIAGNOSIS — K59.03 CONSTIPATION DUE TO OPIOID THERAPY: ICD-10-CM

## 2021-02-17 DIAGNOSIS — R63.0 LOSS OF APPETITE: ICD-10-CM

## 2021-02-17 DIAGNOSIS — T40.2X5A CONSTIPATION DUE TO OPIOID THERAPY: ICD-10-CM

## 2021-02-17 PROCEDURE — 99215 OFFICE O/P EST HI 40 MIN: CPT | Mod: 95 | Performed by: INTERNAL MEDICINE

## 2021-02-17 PROCEDURE — 999N001193 HC VIDEO/TELEPHONE VISIT; NO CHARGE

## 2021-02-17 PROCEDURE — 99215 OFFICE O/P EST HI 40 MIN: CPT | Mod: 95 | Performed by: PHYSICIAN ASSISTANT

## 2021-02-17 RX ORDER — METHYLPREDNISOLONE SODIUM SUCCINATE 125 MG/2ML
125 INJECTION, POWDER, LYOPHILIZED, FOR SOLUTION INTRAMUSCULAR; INTRAVENOUS
Status: CANCELLED
Start: 2021-02-25

## 2021-02-17 RX ORDER — DEXAMETHASONE 4 MG/1
20 TABLET ORAL ONCE
Status: CANCELLED | OUTPATIENT
Start: 2021-02-22

## 2021-02-17 RX ORDER — HYDROCHLOROTHIAZIDE 12.5 MG/1
12.5 CAPSULE ORAL DAILY
Qty: 30 CAPSULE | Refills: 0 | Status: SHIPPED | OUTPATIENT
Start: 2021-02-17 | End: 2021-02-17

## 2021-02-17 RX ORDER — NALOXONE HYDROCHLORIDE 0.4 MG/ML
.1-.4 INJECTION, SOLUTION INTRAMUSCULAR; INTRAVENOUS; SUBCUTANEOUS
Status: CANCELLED | OUTPATIENT
Start: 2021-02-25

## 2021-02-17 RX ORDER — MEPERIDINE HYDROCHLORIDE 25 MG/ML
25 INJECTION INTRAMUSCULAR; INTRAVENOUS; SUBCUTANEOUS EVERY 30 MIN PRN
Status: CANCELLED | OUTPATIENT
Start: 2021-02-22

## 2021-02-17 RX ORDER — HEPARIN SODIUM,PORCINE 10 UNIT/ML
5 VIAL (ML) INTRAVENOUS
Status: CANCELLED | OUTPATIENT
Start: 2021-03-04

## 2021-02-17 RX ORDER — ALBUTEROL SULFATE 0.83 MG/ML
2.5 SOLUTION RESPIRATORY (INHALATION)
Status: CANCELLED | OUTPATIENT
Start: 2021-02-25

## 2021-02-17 RX ORDER — LORAZEPAM 2 MG/ML
0.5 INJECTION INTRAMUSCULAR EVERY 4 HOURS PRN
Status: CANCELLED
Start: 2021-03-04

## 2021-02-17 RX ORDER — ALBUTEROL SULFATE 0.83 MG/ML
2.5 SOLUTION RESPIRATORY (INHALATION)
Status: CANCELLED | OUTPATIENT
Start: 2021-03-01

## 2021-02-17 RX ORDER — HEPARIN SODIUM (PORCINE) LOCK FLUSH IV SOLN 100 UNIT/ML 100 UNIT/ML
5 SOLUTION INTRAVENOUS
Status: CANCELLED | OUTPATIENT
Start: 2021-03-04

## 2021-02-17 RX ORDER — ALBUTEROL SULFATE 90 UG/1
1-2 AEROSOL, METERED RESPIRATORY (INHALATION)
Status: CANCELLED
Start: 2021-02-22

## 2021-02-17 RX ORDER — SODIUM CHLORIDE 9 MG/ML
1000 INJECTION, SOLUTION INTRAVENOUS CONTINUOUS PRN
Status: CANCELLED
Start: 2021-02-22

## 2021-02-17 RX ORDER — NALOXONE HYDROCHLORIDE 0.4 MG/ML
.1-.4 INJECTION, SOLUTION INTRAMUSCULAR; INTRAVENOUS; SUBCUTANEOUS
Status: CANCELLED | OUTPATIENT
Start: 2021-02-22

## 2021-02-17 RX ORDER — MEPERIDINE HYDROCHLORIDE 25 MG/ML
25 INJECTION INTRAMUSCULAR; INTRAVENOUS; SUBCUTANEOUS EVERY 30 MIN PRN
Status: CANCELLED | OUTPATIENT
Start: 2021-02-25

## 2021-02-17 RX ORDER — EPINEPHRINE 1 MG/ML
0.3 INJECTION, SOLUTION INTRAMUSCULAR; SUBCUTANEOUS EVERY 5 MIN PRN
Status: CANCELLED | OUTPATIENT
Start: 2021-03-01

## 2021-02-17 RX ORDER — METHYLPREDNISOLONE SODIUM SUCCINATE 125 MG/2ML
125 INJECTION, POWDER, LYOPHILIZED, FOR SOLUTION INTRAMUSCULAR; INTRAVENOUS
Status: CANCELLED
Start: 2021-02-22

## 2021-02-17 RX ORDER — METHYLPREDNISOLONE SODIUM SUCCINATE 125 MG/2ML
125 INJECTION, POWDER, LYOPHILIZED, FOR SOLUTION INTRAMUSCULAR; INTRAVENOUS
Status: CANCELLED
Start: 2021-03-01

## 2021-02-17 RX ORDER — EPINEPHRINE 1 MG/ML
0.3 INJECTION, SOLUTION INTRAMUSCULAR; SUBCUTANEOUS EVERY 5 MIN PRN
Status: CANCELLED | OUTPATIENT
Start: 2021-03-04

## 2021-02-17 RX ORDER — ALBUTEROL SULFATE 90 UG/1
1-2 AEROSOL, METERED RESPIRATORY (INHALATION)
Status: CANCELLED
Start: 2021-03-04

## 2021-02-17 RX ORDER — HEPARIN SODIUM (PORCINE) LOCK FLUSH IV SOLN 100 UNIT/ML 100 UNIT/ML
5 SOLUTION INTRAVENOUS
Status: CANCELLED | OUTPATIENT
Start: 2021-02-25

## 2021-02-17 RX ORDER — DIPHENHYDRAMINE HCL 25 MG
50 CAPSULE ORAL ONCE
Status: CANCELLED | OUTPATIENT
Start: 2021-02-22

## 2021-02-17 RX ORDER — MEPERIDINE HYDROCHLORIDE 25 MG/ML
25 INJECTION INTRAMUSCULAR; INTRAVENOUS; SUBCUTANEOUS EVERY 30 MIN PRN
Status: CANCELLED | OUTPATIENT
Start: 2021-03-04

## 2021-02-17 RX ORDER — MEPERIDINE HYDROCHLORIDE 25 MG/ML
25 INJECTION INTRAMUSCULAR; INTRAVENOUS; SUBCUTANEOUS EVERY 30 MIN PRN
Status: CANCELLED | OUTPATIENT
Start: 2021-03-01

## 2021-02-17 RX ORDER — DIPHENHYDRAMINE HYDROCHLORIDE 50 MG/ML
50 INJECTION INTRAMUSCULAR; INTRAVENOUS
Status: CANCELLED
Start: 2021-03-01

## 2021-02-17 RX ORDER — HEPARIN SODIUM (PORCINE) LOCK FLUSH IV SOLN 100 UNIT/ML 100 UNIT/ML
5 SOLUTION INTRAVENOUS
Status: CANCELLED | OUTPATIENT
Start: 2021-02-22

## 2021-02-17 RX ORDER — DIPHENHYDRAMINE HYDROCHLORIDE 50 MG/ML
50 INJECTION INTRAMUSCULAR; INTRAVENOUS
Status: CANCELLED
Start: 2021-02-25

## 2021-02-17 RX ORDER — EPINEPHRINE 1 MG/ML
0.3 INJECTION, SOLUTION INTRAMUSCULAR; SUBCUTANEOUS EVERY 5 MIN PRN
Status: CANCELLED | OUTPATIENT
Start: 2021-02-25

## 2021-02-17 RX ORDER — SODIUM CHLORIDE 9 MG/ML
1000 INJECTION, SOLUTION INTRAVENOUS CONTINUOUS PRN
Status: CANCELLED
Start: 2021-02-25

## 2021-02-17 RX ORDER — LORAZEPAM 2 MG/ML
0.5 INJECTION INTRAMUSCULAR EVERY 4 HOURS PRN
Status: CANCELLED
Start: 2021-02-22

## 2021-02-17 RX ORDER — DIPHENHYDRAMINE HYDROCHLORIDE 50 MG/ML
50 INJECTION INTRAMUSCULAR; INTRAVENOUS
Status: CANCELLED
Start: 2021-03-04

## 2021-02-17 RX ORDER — LORAZEPAM 2 MG/ML
0.5 INJECTION INTRAMUSCULAR EVERY 4 HOURS PRN
Status: CANCELLED
Start: 2021-03-01

## 2021-02-17 RX ORDER — HEPARIN SODIUM,PORCINE 10 UNIT/ML
5 VIAL (ML) INTRAVENOUS
Status: CANCELLED | OUTPATIENT
Start: 2021-02-25

## 2021-02-17 RX ORDER — HEPARIN SODIUM,PORCINE 10 UNIT/ML
5 VIAL (ML) INTRAVENOUS
Status: CANCELLED | OUTPATIENT
Start: 2021-02-22

## 2021-02-17 RX ORDER — SODIUM CHLORIDE 9 MG/ML
1000 INJECTION, SOLUTION INTRAVENOUS CONTINUOUS PRN
Status: CANCELLED
Start: 2021-03-04

## 2021-02-17 RX ORDER — ALBUTEROL SULFATE 0.83 MG/ML
2.5 SOLUTION RESPIRATORY (INHALATION)
Status: CANCELLED | OUTPATIENT
Start: 2021-02-22

## 2021-02-17 RX ORDER — LORAZEPAM 2 MG/ML
0.5 INJECTION INTRAMUSCULAR EVERY 4 HOURS PRN
Status: CANCELLED
Start: 2021-02-25

## 2021-02-17 RX ORDER — HEPARIN SODIUM,PORCINE 10 UNIT/ML
5 VIAL (ML) INTRAVENOUS
Status: CANCELLED | OUTPATIENT
Start: 2021-03-01

## 2021-02-17 RX ORDER — HEPARIN SODIUM (PORCINE) LOCK FLUSH IV SOLN 100 UNIT/ML 100 UNIT/ML
5 SOLUTION INTRAVENOUS
Status: CANCELLED | OUTPATIENT
Start: 2021-03-01

## 2021-02-17 RX ORDER — ALBUTEROL SULFATE 0.83 MG/ML
2.5 SOLUTION RESPIRATORY (INHALATION)
Status: CANCELLED | OUTPATIENT
Start: 2021-03-04

## 2021-02-17 RX ORDER — NALOXONE HYDROCHLORIDE 0.4 MG/ML
.1-.4 INJECTION, SOLUTION INTRAMUSCULAR; INTRAVENOUS; SUBCUTANEOUS
Status: CANCELLED | OUTPATIENT
Start: 2021-03-01

## 2021-02-17 RX ORDER — NALOXONE HYDROCHLORIDE 0.4 MG/ML
.1-.4 INJECTION, SOLUTION INTRAMUSCULAR; INTRAVENOUS; SUBCUTANEOUS
Status: CANCELLED | OUTPATIENT
Start: 2021-03-04

## 2021-02-17 RX ORDER — HYDROCHLOROTHIAZIDE 12.5 MG/1
12.5 CAPSULE ORAL DAILY
Qty: 90 CAPSULE | Refills: 3 | Status: SHIPPED | OUTPATIENT
Start: 2021-02-17

## 2021-02-17 RX ORDER — OXYCODONE HYDROCHLORIDE 5 MG/1
5 TABLET ORAL EVERY 12 HOURS PRN
Qty: 30 TABLET | Refills: 0 | Status: SHIPPED | OUTPATIENT
Start: 2021-02-17 | End: 2021-03-12

## 2021-02-17 RX ORDER — ALBUTEROL SULFATE 90 UG/1
1-2 AEROSOL, METERED RESPIRATORY (INHALATION)
Status: CANCELLED
Start: 2021-02-25

## 2021-02-17 RX ORDER — METHYLPREDNISOLONE SODIUM SUCCINATE 125 MG/2ML
125 INJECTION, POWDER, LYOPHILIZED, FOR SOLUTION INTRAMUSCULAR; INTRAVENOUS
Status: CANCELLED
Start: 2021-03-04

## 2021-02-17 RX ORDER — DIPHENHYDRAMINE HYDROCHLORIDE 50 MG/ML
50 INJECTION INTRAMUSCULAR; INTRAVENOUS
Status: CANCELLED
Start: 2021-02-22

## 2021-02-17 RX ORDER — ACETAMINOPHEN 325 MG/1
650 TABLET ORAL ONCE
Status: CANCELLED | OUTPATIENT
Start: 2021-02-22

## 2021-02-17 RX ORDER — SODIUM CHLORIDE 9 MG/ML
1000 INJECTION, SOLUTION INTRAVENOUS CONTINUOUS PRN
Status: CANCELLED
Start: 2021-03-01

## 2021-02-17 RX ORDER — ALBUTEROL SULFATE 90 UG/1
1-2 AEROSOL, METERED RESPIRATORY (INHALATION)
Status: CANCELLED
Start: 2021-03-01

## 2021-02-17 RX ORDER — EPINEPHRINE 1 MG/ML
0.3 INJECTION, SOLUTION INTRAMUSCULAR; SUBCUTANEOUS EVERY 5 MIN PRN
Status: CANCELLED | OUTPATIENT
Start: 2021-02-22

## 2021-02-17 NOTE — TELEPHONE ENCOUNTER
[facility] Call Center    Phone Message    May a detailed message be left on voicemail: yes     Reason for Call: Medication Question or concern regarding medication   Prescription Clarification  Name of Medication: hydrochlorothiazide (HYDRODIURIL) 12.5 MG  Prescribing Provider: Dr Stevens   Pharmacy:    [facility] [place], [place] - [address] [place] [address] [phone]   What on the order needs clarification? Pt has been trying to get this for a month. They have been trying to get this filed for over a month.  The homecare nurse is calling to say the patient needs this due to his legs are swelling and the increased swelling in the R foot. Please call back to discuss with Praveena if questions.          Action Taken: Message routed to:  [facility] ([facility]    Travel Screening: Not Applicable

## 2021-02-17 NOTE — LETTER
"    2/17/2021         RE: Rogelio Marshall  [address]  [facility] [address]        Dear Colleague,    Thank you for referring your patient, Rogelio Marshall, to the [facility]. Please see a copy of my visit note below.    Rogelio is a 76 year old who is being evaluated via a billable telephone visit.      What phone number would you like to be contacted at? [phone]  How would you like to obtain your AVS? MyChart     Refill on Oxycodone     Vitals - Patient Reported  Weight (Patient Reported): 63.5 kg (140 lb)  Height (Patient Reported): 154.9 cm (5' 1\")  BMI (Based on Pt Reported Ht/Wt): 26.45  Pain Score: Mild Pain (2)    Nguyen Souza CMA February 17, 2021  9:34 AM     Phone call duration: 29 minutes      Reason for Visit:  evaluation and management of MM    Oncology HPI:   -compression fractures and bone scan reveals multiple lytic lesions throughout his appendicular and axial skeleton. His Beta 2 microglobulin was 3.6 on 10/10/2020. Kappa chains were 73.6 on 10/5/2020 with K/L ratio of 89.9. M spike of 16.2 in urine on 10/10. Bone marrow biopsy on 10/23/2020 showed trilineage hematopoiesis and 50-60% kappa restricted plasma cells. Flow cytometry showed 20 % plasma cells which express CD19, CD38, CD45 and monotypic cytoplasmic kappa immunoglobulin light chains but lack CD20 and CD56.  FISH shows IGH-CCND1 fusion (81%; 30% had loss of IGH component from one fusion signal).       - Started 21 day cycle VRD 11/2020  - 11/27 he had a fall with rib fractures.   -12/9/2020 was switched to madyson-velcade-dex due to poor tolerability of VRD    Interval history:   Rogelio Marshall was met with for follow up over telephone.  - Continues to have fluctuant appetites. Typically it decreases after his treatments, but then increases as he gets further away. Continues to note poor taste. Trying his best to get adequate nutrition. Weight is a little up   - Overall he feels he is " getting used to the infusions. Feels stronger.   - No further body aches. No progression of neuropathy- remains in fingertips. Not interfering with function  - Still working on getting in with dentist.   - overall back and rib pain is improving. Only needing 1-2 oxycodone a day.   - Noting progressive LE edema in both legs- noting in feet but also up into calves. No longer taking hydrochlorothiazide. No calf pain, chest pain, shortness of breath, cough.     Denies headaches, dizziness, fever, chills, mouth irritation, sore throat, nausea, stomach pain, change in bowel habits, bleeding, rash, urinary complaints.        ROS: 10 point ROS neg other than the symptoms noted above in the HPI.        Past Medical History:   Diagnosis Date     Hyperlipidemia      Hypertension         Current Outpatient Medications   Medication Sig Dispense Refill     acetaminophen (TYLENOL) 325 MG tablet Take 3 tablets (975 mg) by mouth 3 times daily 500 tablet 4     aspirin (ASA) 325 MG tablet        calcium carbonate 500 mg, elemental, (OSCAL) 500 MG tablet Take 1 tablet (500 mg) by mouth 2 times daily 200 tablet 3     cholecalciferol (VITAMIN D3) 25 mcg (1000 units) capsule Take 1 capsule (25 mcg) by mouth daily 100 capsule 3     gabapentin (NEURONTIN) 300 MG capsule Take 1 capsule (300 mg) by mouth 3 times daily 84 capsule 0     gabapentin (NEURONTIN) 300 MG capsule Take 1 capsule (300 mg) by mouth 3 times daily 84 capsule 0     hydrochlorothiazide (MICROZIDE) 12.5 MG capsule Take 1 capsule (12.5 mg) by mouth daily 30 capsule 0     lisinopril (ZESTRIL) 20 MG tablet Take 1 tablet (20 mg) by mouth daily 30 tablet 3     methocarbamol (ROBAXIN) 500 MG tablet Take 1 tablet (500 mg) by mouth 4 times daily 120 tablet 3     omeprazole (PRILOSEC) 20 MG DR capsule Take 1 capsule (20 mg) by mouth daily 30 capsule 3     oxyCODONE (ROXICODONE) 5 MG tablet Take 1 tablet (5 mg) by mouth every 12 hours as needed for severe pain 30 tablet 0      polyethylene glycol (MIRALAX) 17 g packet Take 17 g by mouth daily       prochlorperazine (COMPAZINE) 10 MG tablet Take 1 tablet (10 mg) by mouth every 6 hours as needed (Nausea/Vomiting) 30 tablet 5     senna-docusate (SENOKOT-S/PERICOLACE) 8.6-50 MG tablet Take 1 tablet by mouth daily as needed        acyclovir (ZOVIRAX) 400 MG tablet Take 1 tablet (400 mg) by mouth 2 times daily Viral Prophylaxis. 60 tablet 11     dexamethasone (DECADRON) 4 MG tablet Take 20 mg (five tabs) by mouth on Days 2, 4, 5, 9, and 16. (Patient not taking: Reported on 2/17/2021) 25 tablet 0     dexamethasone (DECADRON) 4 MG tablet Take 20 mg (five tabs) by mouth on Days 2, 4, 5, 9, and 16. (Patient not taking: Reported on 2/17/2021) 25 tablet 0     enoxaparin ANTICOAGULANT (LOVENOX) 30 MG/0.3ML syringe Inject 0.3 mLs (30 mg) Subcutaneous every 12 hours (Patient not taking: Reported on 12/18/2020) 28 Syringe 0     Lidocaine (LIDOCARE) 4 % Patch Place 1-3 patches onto the skin every 24 hours To prevent lidocaine toxicity, patient should be patch free for 12 hrs daily. (Patient not taking: Reported on 12/18/2020) 30 patch 1     LORazepam (ATIVAN) 0.5 MG tablet Take 1 tablet (0.5 mg) by mouth every 4 hours as needed (Anxiety, Nausea/Vomiting or Sleep) (Patient not taking: Reported on 2/15/2021) 30 tablet 5     ondansetron (ZOFRAN-ODT) 4 MG ODT tab Take 1 tablet (4 mg) by mouth every 6 hours as needed for nausea or vomiting (Patient not taking: Reported on 12/18/2020)       triamcinolone (KENALOG) 0.1 % external ointment Apply topically 2 times daily To back rash (Patient not taking: Reported on 12/18/2020) 15 g 0     Exam:  There were no vitals taken for this visit. =  Telephone visit was done today  Voice sounded strong, able to follow thought processes, did not sound to be in acute distress      Labs:   Results for BRUNO ALFORD (MRN [id number]) as of 2/17/2021 11:35   Ref. Range 2/15/2021 11:22   Sodium Latest Ref Range: 133 - 144  mmol/L 138   Potassium Latest Ref Range: 3.4 - 5.3 mmol/L 4.4   Chloride Latest Ref Range: 94 - 109 mmol/L 107   Carbon Dioxide Latest Ref Range: 20 - 32 mmol/L 23   Urea Nitrogen Latest Ref Range: 7 - 30 mg/dL 35 (H)   Creatinine Latest Ref Range: 0.66 - 1.25 mg/dL 1.18   GFR Estimate Latest Ref Range: >60 mL/min/1.73_m2 60 (L)   GFR Estimate If Black Latest Ref Range: >60 mL/min/1.73_m2 70   Calcium Latest Ref Range: 8.5 - 10.1 mg/dL 8.8   Anion Gap Latest Ref Range: 3 - 14 mmol/L 7   Albumin Latest Ref Range: 3.4 - 5.0 g/dL 3.4   Protein Total Latest Ref Range: 6.8 - 8.8 g/dL 6.3 (L)   Bilirubin Total Latest Ref Range: 0.2 - 1.3 mg/dL 0.3   Alkaline Phosphatase Latest Ref Range: 40 - 150 U/L 99   ALT Latest Ref Range: 0 - 70 U/L 22   AST Latest Ref Range: 0 - 45 U/L 12   Glucose Latest Ref Range: 70 - 99 mg/dL 104 (H)   WBC Latest Ref Range: 4.0 - 11.0 10e9/L 9.0   Hemoglobin Latest Ref Range: 13.3 - 17.7 g/dL 9.3 (L)   Hematocrit Latest Ref Range: 40.0 - 53.0 % 28.4 (L)   Platelet Count Latest Ref Range: 150 - 450 10e9/L 101 (L)   RBC Count Latest Ref Range: 4.4 - 5.9 10e12/L 2.85 (L)   MCV Latest Ref Range: 78 - 100 fl 100   MCH Latest Ref Range: 26.5 - 33.0 pg 32.6   MCHC Latest Ref Range: 31.5 - 36.5 g/dL 32.7   RDW Latest Ref Range: 10.0 - 15.0 % 14.9   Diff Method Unknown Automated Method   % Neutrophils Latest Units: % 66.3   % Lymphocytes Latest Units: % 18.0   % Monocytes Latest Units: % 12.4   % Eosinophils Latest Units: % 0.8   % Basophils Latest Units: % 0.3   % Immature Granulocytes Latest Units: % 2.2   Nucleated RBCs Latest Ref Range: 0 /100 1 (H)   Absolute Neutrophil Latest Ref Range: 1.6 - 8.3 10e9/L 6.0   Absolute Lymphocytes Latest Ref Range: 0.8 - 5.3 10e9/L 1.6   Absolute Monocytes Latest Ref Range: 0.0 - 1.3 10e9/L 1.1   Absolute Eosinophils Latest Ref Range: 0.0 - 0.7 10e9/L 0.1   Absolute Basophils Latest Ref Range: 0.0 - 0.2 10e9/L 0.0   Abs Immature Granulocytes Latest Ref Range: 0 -  0.4 10e9/L 0.2   Absolute Nucleated RBC Unknown 0.1         Imaging:   None to review    Assessment/plan:   #Multiple myeloma  -Diagnosed in October 2020 after identifying multiple lytic lesions throughout his appendicular and axial skeleton  -Initiated VRD on 11/19. Was held on C1D21 due to concerns about his ability to tolerate the medicine. Unfortunately he has had difficulty with hypotension and a fall resulting in rib fractures.  -Was switched to madyson- velcade-dex on 12/9. Had significant diffuse body pain after day 11 Velcade cycle 2. He took a brief break from treatment but then agreed to resume at full dose this past week.  Thus far tolerating well.  Lab work reveals a great response in mental markers to treatment.  We will continue as planned.    -Has an appointment with palliative care set up for today.     Prophy: Continue levofloxacin and acyclovir.     #Bone mets   -At a high risk for more compression fractures. Vitamin D is replete and he is on a daily supplement as well as calcium.   -Previously discussed that side effects of zometa include bone pains, hypocalcemia, and osteonecrosis of the jaw. He still needs to be cleared by dentistry before beginning zometa -again addressed today 2/17/21.     #Fatigue   -2/2 chemotherapy and poor PO intake.  Overall improving.  We will continue to monitor  -Referring to palliative care as above.    #Poor PO intake, poor appetite.  -Overall improved this past week- fluctuant overall.    Encouraged continued caloric intake.  We will continue to monitor.  - Advised trial of Zinc supplement or Miracle Berry.   -Referred to palliative care as above.    #Neuropathy   -Grade 1. Stable. Continue to monitor - continue gabapentin    #Right rib pain.  Suspect this is related to his recent fall.  No acute findings on recent chest x-ray.  -Utilizing tylenol and limited oxycodone with adequate control    # GERD  -On omeprazole 20 mg daily.  Can titrate up as needed.    #  Bilateral LE swelling. Will send for doppler of legs. Cautiously resume hydrochlorothiazide with close monitoring of renal function.     # Fluctuant renal function. Now avoiding ibuprofen. Will continue to monitor closely.     Pebbles Longo PA-C    20 minutes spent on the date of the encounter doing chart review, interpretation of tests and documentation, in addition to 29 minutes spent on the phone with the patient.         Again, thank you for allowing me to participate in the care of your patient.        Sincerely,        Pebbles Longo PA-C

## 2021-02-17 NOTE — TELEPHONE ENCOUNTER
hydrochlorothiazide (MICROZIDE) 12.5 MG capsule     HISTORICAL ONLY ON MEDICATION LIST    Last Office Visit : 11-9-2020  Future Office visit:  NONE    Routing refill request to provider for review/approval because:  Medication is reported/historical.  Per home nurse patient has been with out medication for quite some time and is having leg swelling.  ALSO - has appointment in oncology today with leg swelling as one of the issues.      Kathleen M Doege RN    RX sent to pt's pharmacy per Dr Stevens.  Hamida Chakraborty RN 7:53 AM on 2/18/2021.

## 2021-02-17 NOTE — PROGRESS NOTES
"Rogelio is a 76 year old who is being evaluated via a billable telephone visit.      What phone number would you like to be contacted at? [phone]  How would you like to obtain your AVS? Eddie     Vitals - Patient Reported  Weight (Patient Reported): 63.5 kg (140 lb)  Height (Patient Reported): 154.9 cm (5' 1\")  BMI (Based on Pt Reported Ht/Wt): 26.45  Temperature (Patient Reported): 98.7  F (37.1  C)  Pain Score: Mild Pain (2)(LOW BACK)    Zully JOYA    Phone call duration: 49 minutes    Palliative Care Outpatient Clinic Progress Note    Patient Name:  Rogelio Marshall  Primary Provider:  Wolf Stevens    Chief Complaint/Patient ID: Follow-up   75-year-old man with multiple myeloma   -Started on treatment with VRD 11/2020, switched to daratumumab/velcade/dex 12/2020 due to poor tolerability    Last Palliative Care Appointment: Seen inpatient 11/29/2020 after admission for fall with rib fracture, referred again by Pebbles Longo PA-C for pain, appetite, goals of care    Interim History:  Rogelio Marshall 76 year old male returns to be seen by palliative care today.  Telephone visit done due to coronavirus pandemic.    His primary concern is fatigue.  Says since starting chemo has had increasing fatigue, but lately has been a little bit better.  Has been feels he has been sleeping well overnight, no depression symptoms.     Says appetite is up and down, less interest in food and food always tastes bad.  Some times hasn't wanted to eat anything but a small cup of cottage cheese.  Most of the time no early satiety.  Has been able to push himself to eat.  Infrequent nausea.     Pain - not too much, mostly in lower back now as ribs healing.  Takes oxycodone about once/day at night, reduced from twice/day.  Taking tylenol 1000 mg three times/day, and will take with oxycodone which helps be more effective and last longer.  Has numbness in fingers, has noticed decreased dexterity in his fingers with his " "hobbies.  No burning/tingling/pins-and-needles pain.  Also taking gabapentin 300 mg 3 times daily and Robaxin 500 mg 4 times daily, denies neuropathic pain or muscle spasm. Oncology advised to stop taking ibuprofen due to decrease in kidney function.      Swollen ankles and thighs - since last infusion.  Oncology [facility] ordered doppler today.  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     Lives alone, describes himself as a \"[place]\"  Worked as a printer and deliveryman.  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    Advance Directive Status: Has not completed.  At last conversation he said he would want his brother Dutch howard to make medical decisions as he be next of kin.  They have not been in contact in years.      Allergies   Allergen Reactions     Other Drug Allergy (See Comments)      tobasco sauce ---caused red spots      Current Outpatient Medications   Medication Sig Dispense Refill     acetaminophen (TYLENOL) 325 MG tablet Take 3 tablets (975 mg) by mouth 3 times daily 500 tablet 4     acyclovir (ZOVIRAX) 400 MG tablet Take 1 tablet (400 mg) by mouth 2 times daily Viral Prophylaxis. 60 tablet 11     aspirin (ASA) 325 MG tablet        calcium carbonate 500 mg, elemental, (OSCAL) 500 MG tablet Take 1 tablet (500 mg) by mouth 2 times daily 200 tablet 3     cholecalciferol (VITAMIN D3) 25 mcg (1000 units) capsule Take 1 capsule (25 mcg) by mouth daily 100 capsule 3     dexamethasone (DECADRON) 4 MG tablet Take 20 mg (five tabs) by mouth on Days 2, 4, 5, 9, and 16. (Patient not taking: Reported on 2/17/2021) 25 tablet 0     dexamethasone (DECADRON) 4 MG tablet Take 20 mg (five tabs) by mouth on Days 2, 4, 5, 9, and 16. (Patient not taking: Reported on 2/17/2021) 25 tablet 0     dexamethasone (DECADRON) 4 MG tablet " Take 20 mg (five tabs) by mouth on Days 2, 4, 5, 9, and 16. 25 tablet 0     enoxaparin ANTICOAGULANT (LOVENOX) 30 MG/0.3ML syringe Inject 0.3 mLs (30 mg) Subcutaneous every 12 hours (Patient not taking: Reported on 12/18/2020) 28 Syringe 0     gabapentin (NEURONTIN) 300 MG capsule Take 1 capsule (300 mg) by mouth 3 times daily 84 capsule 0     gabapentin (NEURONTIN) 300 MG capsule Take 1 capsule (300 mg) by mouth 3 times daily 84 capsule 0     hydrochlorothiazide (MICROZIDE) 12.5 MG capsule Take 12.5 mg by mouth daily       ibuprofen (ADVIL/MOTRIN) 600 MG tablet Take 1 tablet (600 mg) by mouth every 6 hours       Lidocaine (LIDOCARE) 4 % Patch Place 1-3 patches onto the skin every 24 hours To prevent lidocaine toxicity, patient should be patch free for 12 hrs daily. (Patient not taking: Reported on 12/18/2020) 30 patch 1     lisinopril (ZESTRIL) 20 MG tablet Take 1 tablet (20 mg) by mouth daily 30 tablet 3     LORazepam (ATIVAN) 0.5 MG tablet Take 1 tablet (0.5 mg) by mouth every 4 hours as needed (Anxiety, Nausea/Vomiting or Sleep) (Patient not taking: Reported on 2/15/2021) 30 tablet 5     methocarbamol (ROBAXIN) 500 MG tablet Take 1 tablet (500 mg) by mouth 4 times daily 120 tablet 3     omeprazole (PRILOSEC) 20 MG DR capsule Take 1 capsule (20 mg) by mouth daily 30 capsule 3     ondansetron (ZOFRAN-ODT) 4 MG ODT tab Take 1 tablet (4 mg) by mouth every 6 hours as needed for nausea or vomiting (Patient not taking: Reported on 12/18/2020)       oxyCODONE (ROXICODONE) 5 MG tablet Take 1 tablet (5 mg) by mouth every 6 hours as needed for severe pain 30 tablet 0     polyethylene glycol (MIRALAX) 17 g packet Take 17 g by mouth daily       prochlorperazine (COMPAZINE) 10 MG tablet Take 1 tablet (10 mg) by mouth every 6 hours as needed (Nausea/Vomiting) 30 tablet 5     senna-docusate (SENOKOT-S/PERICOLACE) 8.6-50 MG tablet Take 1 tablet by mouth daily as needed        triamcinolone (KENALOG) 0.1 % external ointment Apply  topically 2 times daily To back rash (Patient not taking: Reported on 12/18/2020) 15 g 0       Palliative Symptom Review (0=no symptom/no concern, 1=mild, 2=moderate, 3=severe):      Pain: 1      Fatigue: 1-2      Nausea: 0      Constipation: 0 - taking miralax in the morning and senna as needed      Diarrhea: 0      Depressive Symptoms: 0      Anxiety: 0      Drowsiness: 0      Poor Appetite: 1-2      Shortness of Breath: 0      Insomnia: 0      Other: 0      Overall (0 good/no concerns, 3 very poor):  1    No physical exam due to telephone visit.  Voice strong, mood calm.  Good  short and long-term memory.    Wt Readings from Last 10 Encounters:   02/15/21 64.6 kg (142 lb 6.4 oz)   02/08/21 62.7 kg (138 lb 3.2 oz)   02/04/21 64.3 kg (141 lb 11.2 oz)   02/01/21 62.8 kg (138 lb 8 oz)   01/29/21 63.3 kg (139 lb 9.6 oz)   01/08/21 63 kg (138 lb 14.4 oz)   01/07/21 64.1 kg (141 lb 5 oz)   01/04/21 63.2 kg (139 lb 6.4 oz)   12/31/20 62.9 kg (138 lb 11.2 oz)   12/24/20 67.2 kg (148 lb 1.6 oz)       Key Data Reviewed:  LABS:   Recent Labs   Lab Test 02/15/21  1122 02/08/21  1042    140   POTASSIUM 4.4 4.3   CHLORIDE 107 108   CO2 23 26   ANIONGAP 7 6   * 122*   BUN 35* 18   CR 1.18 0.98   MIRI 8.8 8.8     GFR 60  Albumin 3.4  ALP, ALT, AST normal  Hb 9.3,     IgG and light chains trending down    IMAGING:   XR ribs and chest 2/12/2021  Findings:     Redemonstration of the multiple mildly displaced right-sided rib  fractures and left-sided rib fractures, grossly similar to 1/8/2021.  Specifically, no new displaced rib fracture corresponding to the  marker at right lower rib.     Bones appear osteopenic with multiple lucent foci throughout  particularly in ribs, scapula, partially visualized right humerus,  innominate bones. Bones are diffusely osteopenic.     Multilevel degenerative changes of the thoracolumbar spine including  compression deformities at mid lumbar vertebrae in particular, not  fully  assessed on current study but fractures were present on prior CT  11/26/2020.     Lungs are clear. Cardiomediastinal silhouette is within normal limits.                                                           Impression:  1.  Redemonstration of multiple minimally displaced bilateral rib  fractures, grossly similar to recent chest radiographs 1/8/2021.  Though relative sensitivity is limited by radiograph techniques  especially with osteopenia.  2. Multiple lytic lesions, in keeping with history of myeloma.    CT T and L-spine 11/26/20  IMPRESSION:     Thoracic spine:  1.  Extensive lucencies throughout the thoracic osseous structures compatible with patient's diagnosis of multiple myeloma. No acute thoracic spine fracture.  2.  Stable mild compressions of T1, T5 and marked stable chronic compressions of T10 and T12.  3.  No high-grade canal narrowing.  4.  Right third through fifth rib fractures. Correlate with chest CT.  5.  Marked bilateral T10-T11 and left T11-T12 foraminal narrowing.     Lumbar spine:  1.  Extensive lucencies throughout the lumbar and pelvic osseous structures compatible with patient's diagnosis of multiple myeloma. No acute lumbar spine fracture or significant change compared to 10/10/2020.  2.  Stable moderate chronic L2 and marked chronic L3 compressions.  3.  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  4.  Marked L4-L5 and L5-S1 degenerative disc disease.  5.  Moderate to marked lower lumbar degenerative foraminal narrowing.    Impression & Recommendations & Counseling:  Rogelio Marshall is a 76-year-old man with multiple myeloma on daratumumab/velcade/dex with response on labs and improved pain, seen for palliative care follow-up.  Most concerned about fatigue, also with poor appetite.     Fatigue - chemo and cancer-related  - Counseled on exercise, maintaining activity, getting good nutrition  - On sedating medications which may be contributing - will reduce  gabapentin and methocarbamol as below    Pain - in spine, ribs due to fractures and myeloma.  Improving.   -Continue oxycodone as needed, currently only taking at night  -Continue Tylenol 1000 mg 3 times daily  - Gabapentin -started for neuropathy, now mainly only having numbness which the drug is not effective for.  If he is not having neuropathic pain type symptoms, is reasonable to taper off.  Recommended decreasing the dose weekly, so taking 300 mg twice a day for the next week, then 300 mg once a day for a week then off no change in his pain  - Methocarbamol - no longer having muscle spasm but may be causing fatigue.  Recommended tapering as tolerated if no change in pain    Low appetite - no nausea.  Weight stable  -Counseled on nonpharmacologic approaches -eating small frequent meals, [id number] foods    OIC - continue miralax daily, senna PRN    Multiple Myeloma - currently on treatment.  Knows cancer is incurable, but does not have a sense of what to expect with treatments, if he would be on continuously.   -  Advanced Care Planning - not in touch with family, no close friends.  Does not know who he'd trust to make medical decisions, says would be alright with having an appointed guardian if he could not make decisions.  Introduced health care directive, will consider discussing.     Care coordination - would like to change PCPs, does not know how to go about.  I recommended starting with his insurance plan, who is covered.  He'd like assistance.  Will contact onc SW to assist.     63 minutes spent including reviewing record, review of above studies, above visit with patient, and documentation.     (This note was transcribed using voice recognition software. While I review and edit the transcription, I may miss errors, and the software sometimes does unexpected capitalizations and formatting that I miss. Please let me know of any serious mistranscriptions and I will addend this note.)

## 2021-02-17 NOTE — LETTER
"2/17/2021       RE: Rogelio Marshall  [address]  [facility] [address]     Dear Colleague,    [facility] you [facility] referring your patient, [facility]io Marshall[facility]. Please see a copy of my visit note below.    Rogelio is a 76 year old who is being evaluated via a billable telephone visit.      What phone number would you like to be contacted at? [phone]  How would you like to obtain your AVS? MyChart     Vitals - Patient Reported  Weight (Patient Reported): 63.5 kg (140 lb)  Height (Patient Reported): 154.9 cm (5' 1\")  BMI (Based on Pt Reported Ht/Wt): 26.45  Temperature (Patient Reported): 98.7  F (37.1  C)  Pain Score: Mild Pain (2)(LOW BACK)    Zully JOYA    Phone call duration: 49 minutes    Palliative Care Outpatient Clinic Progress Note    Patient Name:  Rogelio Marshall  Primary Provider:  Wolf Stevens    Chief Complaint/Patient ID: Follow-up   75-year-old man with multiple myeloma   -Started on treatment with VRD 11/2020, switched to daratumumab/velcade/dex 12/2020 due to poor tolerability    Last Palliative Care Appointment: Seen inpatient 11/29/2020 after admission for fall with rib fracture, referred again by Pebbles Longo PA-C for pain, appetite, goals of care    Interim History:  Rogelio Marshall 76 year old male returns to be seen by palliative care today.  Telephone visit done due to coronavirus pandemic.    His primary concern is fatigue.  Says since starting chemo has had increasing fatigue, but lately has been a little bit better.  Has been feels he has been sleeping well overnight, no depression symptoms.     Says appetite is up and down, less interest in food and food always tastes bad.  Some times hasn't wanted to eat anything but a small cup of cottage cheese.  Most of the time no early satiety.  Has been able to push himself to eat.  Infrequent nausea.     Pain - not too " "much, mostly in lower back now as ribs healing.  Takes oxycodone about once/day at night, reduced from twice/day.  Taking tylenol 1000 mg three times/day, and will take with oxycodone which helps be more effective and last longer.  Has numbness in fingers, has noticed decreased dexterity in his fingers with his hobbies.  No burning/tingling/pins-and-needles pain.  Also taking gabapentin 300 mg 3 times daily and Robaxin 500 mg 4 times daily, denies neuropathic pain or muscle spasm. Oncology advised to stop taking ibuprofen due to decrease in kidney function.      Swollen ankles and thighs - since last infusion.  Oncology ANUP ordered doppler today.  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     Lives alone, describes himself as a \"[place]\"  Worked as a printer and deliveryman.  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    Advance Directive Status: Has not completed.  At last conversation he said he would want his brother Dutch howard to make medical decisions as he be next of kin.  They have not been in contact in years.      Allergies   Allergen Reactions     Other Drug Allergy (See Comments)      ernesto castillo ---caused red spots      Current Outpatient Medications   Medication Sig Dispense Refill     acetaminophen (TYLENOL) 325 MG tablet Take 3 tablets (975 mg) by mouth 3 times daily 500 tablet 4     acyclovir (ZOVIRAX) 400 MG tablet Take 1 tablet (400 mg) by mouth 2 times daily Viral Prophylaxis. 60 tablet 11     aspirin (ASA) 325 MG tablet        calcium carbonate 500 mg, elemental, (OSCAL) 500 MG tablet Take 1 tablet (500 mg) by mouth 2 times daily 200 tablet 3     cholecalciferol (VITAMIN D3) 25 mcg (1000 units) capsule Take 1 capsule (25 mcg) by mouth daily 100 capsule 3     dexamethasone (DECADRON) 4 MG tablet " Take 20 mg (five tabs) by mouth on Days 2, 4, 5, 9, and 16. (Patient not taking: Reported on 2/17/2021) 25 tablet 0     dexamethasone (DECADRON) 4 MG tablet Take 20 mg (five tabs) by mouth on Days 2, 4, 5, 9, and 16. (Patient not taking: Reported on 2/17/2021) 25 tablet 0     dexamethasone (DECADRON) 4 MG tablet Take 20 mg (five tabs) by mouth on Days 2, 4, 5, 9, and 16. 25 tablet 0     enoxaparin ANTICOAGULANT (LOVENOX) 30 MG/0.3ML syringe Inject 0.3 mLs (30 mg) Subcutaneous every 12 hours (Patient not taking: Reported on 12/18/2020) 28 Syringe 0     gabapentin (NEURONTIN) 300 MG capsule Take 1 capsule (300 mg) by mouth 3 times daily 84 capsule 0     gabapentin (NEURONTIN) 300 MG capsule Take 1 capsule (300 mg) by mouth 3 times daily 84 capsule 0     hydrochlorothiazide (MICROZIDE) 12.5 MG capsule Take 12.5 mg by mouth daily       ibuprofen (ADVIL/MOTRIN) 600 MG tablet Take 1 tablet (600 mg) by mouth every 6 hours       Lidocaine (LIDOCARE) 4 % Patch Place 1-3 patches onto the skin every 24 hours To prevent lidocaine toxicity, patient should be patch free for 12 hrs daily. (Patient not taking: Reported on 12/18/2020) 30 patch 1     lisinopril (ZESTRIL) 20 MG tablet Take 1 tablet (20 mg) by mouth daily 30 tablet 3     LORazepam (ATIVAN) 0.5 MG tablet Take 1 tablet (0.5 mg) by mouth every 4 hours as needed (Anxiety, Nausea/Vomiting or Sleep) (Patient not taking: Reported on 2/15/2021) 30 tablet 5     methocarbamol (ROBAXIN) 500 MG tablet Take 1 tablet (500 mg) by mouth 4 times daily 120 tablet 3     omeprazole (PRILOSEC) 20 MG DR capsule Take 1 capsule (20 mg) by mouth daily 30 capsule 3     ondansetron (ZOFRAN-ODT) 4 MG ODT tab Take 1 tablet (4 mg) by mouth every 6 hours as needed for nausea or vomiting (Patient not taking: Reported on 12/18/2020)       oxyCODONE (ROXICODONE) 5 MG tablet Take 1 tablet (5 mg) by mouth every 6 hours as needed for severe pain 30 tablet 0     polyethylene glycol (MIRALAX) 17 g packet  Take 17 g by mouth daily       prochlorperazine (COMPAZINE) 10 MG tablet Take 1 tablet (10 mg) by mouth every 6 hours as needed (Nausea/Vomiting) 30 tablet 5     senna-docusate (SENOKOT-S/PERICOLACE) 8.6-50 MG tablet Take 1 tablet by mouth daily as needed        triamcinolone (KENALOG) 0.1 % external ointment Apply topically 2 times daily To back rash (Patient not taking: Reported on 12/18/2020) 15 g 0       Palliative Symptom Review (0=no symptom/no concern, 1=mild, 2=moderate, 3=severe):      Pain: 1      Fatigue: 1-2      Nausea: 0      Constipation: 0 - taking miralax in the morning and senna as needed      Diarrhea: 0      Depressive Symptoms: 0      Anxiety: 0      Drowsiness: 0      Poor Appetite: 1-2      Shortness of Breath: 0      Insomnia: 0      Other: 0      Overall (0 good/no concerns, 3 very poor):  1    No physical exam due to telephone visit.  Voice strong, mood calm.  Good  short and long-term memory.    Wt Readings from Last 10 Encounters:   02/15/21 64.6 kg (142 lb 6.4 oz)   02/08/21 62.7 kg (138 lb 3.2 oz)   02/04/21 64.3 kg (141 lb 11.2 oz)   02/01/21 62.8 kg (138 lb 8 oz)   01/29/21 63.3 kg (139 lb 9.6 oz)   01/08/21 63 kg (138 lb 14.4 oz)   01/07/21 64.1 kg (141 lb 5 oz)   01/04/21 63.2 kg (139 lb 6.4 oz)   12/31/20 62.9 kg (138 lb 11.2 oz)   12/24/20 67.2 kg (148 lb 1.6 oz)       Key Data Reviewed:  LABS:   Recent Labs   Lab Test 02/15/21  1122 02/08/21  1042    140   POTASSIUM 4.4 4.3   CHLORIDE 107 108   CO2 23 26   ANIONGAP 7 6   * 122*   BUN 35* 18   CR 1.18 0.98   MIRI 8.8 8.8     GFR 60  Albumin 3.4  ALP, ALT, AST normal  Hb 9.3,     IgG and light chains trending down    IMAGING:   XR ribs and chest 2/12/2021  Findings:     Redemonstration of the multiple mildly displaced right-sided rib  fractures and left-sided rib fractures, grossly similar to 1/8/2021.  Specifically, no new displaced rib fracture corresponding to the  marker at right lower rib.     Bones appear  osteopenic with multiple lucent foci throughout  particularly in ribs, scapula, partially visualized right humerus,  innominate bones. Bones are diffusely osteopenic.     Multilevel degenerative changes of the thoracolumbar spine including  compression deformities at mid lumbar vertebrae in particular, not  fully assessed on current study but fractures were present on prior CT  11/26/2020.     Lungs are clear. Cardiomediastinal silhouette is within normal limits.                                                           Impression:  1.  Redemonstration of multiple minimally displaced bilateral rib  fractures, grossly similar to recent chest radiographs 1/8/2021.  Though relative sensitivity is limited by radiograph techniques  especially with osteopenia.  2. Multiple lytic lesions, in keeping with history of myeloma.    CT T and L-spine 11/26/20  IMPRESSION:     Thoracic spine:  1.  Extensive lucencies throughout the thoracic osseous structures compatible with patient's diagnosis of multiple myeloma. No acute thoracic spine fracture.  2.  Stable mild compressions of T1, T5 and marked stable chronic compressions of T10 and T12.  3.  No high-grade canal narrowing.  4.  Right third through fifth rib fractures. Correlate with chest CT.  5.  Marked bilateral T10-T11 and left T11-T12 foraminal narrowing.     Lumbar spine:  1.  Extensive lucencies throughout the lumbar and pelvic osseous structures compatible with patient's diagnosis of multiple myeloma. No acute lumbar spine fracture or significant change compared to 10/10/2020.  2.  Stable moderate chronic L2 and marked chronic L3 compressions.  3.  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  4.  Marked L4-L5 and L5-S1 degenerative disc disease.  5.  Moderate to marked lower lumbar degenerative foraminal narrowing.    Impression & Recommendations & Counseling:  Rogelio Marshall is a 76-year-old man with multiple myeloma on daratumumab/velcade/dex with  response on labs and improved pain, seen for palliative care follow-up.  Most concerned about fatigue, also with poor appetite.     Fatigue - chemo and cancer-related  - Counseled on exercise, maintaining activity, getting good nutrition  - On sedating medications which may be contributing - will reduce gabapentin and methocarbamol as below    Pain - in spine, ribs due to fractures and myeloma.  Improving.   -Continue oxycodone as needed, currently only taking at night  -Continue Tylenol 1000 mg 3 times daily  - Gabapentin -started for neuropathy, now mainly only having numbness which the drug is not effective for.  If he is not having neuropathic pain type symptoms, is reasonable to taper off.  Recommended decreasing the dose weekly, so taking 300 mg twice a day for the next week, then 300 mg once a day for a week then off no change in his pain  - Methocarbamol - no longer having muscle spasm but may be causing fatigue.  Recommended tapering as tolerated if no change in pain    Low appetite - no nausea.  Weight stable  -Counseled on nonpharmacologic approaches -eating small frequent meals, [id number] foods    OIC - continue miralax daily, senna PRN    Multiple Myeloma - currently on treatment.  Knows cancer is incurable, but does not have a sense of what to expect with treatments, if he would be on continuously.   -  Advanced Care Planning - not in touch with family, no close friends.  Does not know who he'd trust to make medical decisions, says would be alright with having an appointed guardian if he could not make decisions.  Introduced health care directive, will consider discussing.     Care coordination - would like to change PCPs, does not know how to go about.  I recommended starting with his insurance plan, who is covered.  He'd like assistance.  Will contact onc SW to assist.     63 minutes spent including reviewing record, review of above studies, above visit with patient, and documentation.     (This  note was transcribed using voice recognition software. While I review and edit the transcription, I may miss errors, and the software sometimes does unexpected capitalizations and formatting that I miss. Please let me know of any serious mistranscriptions and I will addend this note.)    Again, thank you for allowing me to participate in the care of your patient.      Sincerely,    Cat Tate MD

## 2021-02-17 NOTE — PATIENT INSTRUCTIONS
Thank you for seeing the Palliative Care Clinic today.      1. Will try decreasing gabapentin - continue to decrease if you notice no change in your pain or feelings in your hands.    - Can decrease to 300 mg twice/day now, continue this dose x 1 week   - Then decrease to once/day x1 week, then off.     2. Can decrease methocarbamol - this is for muscle spasms in your back.  It can also be sedating, which may contribute to fatigue.    - Can reduce to 3x/day and monitor for a few days.  If no change in your pain, can continue to reduce one dose at a time.     Return to clinic in 1 month for a follow-up.      You can reach the Palliative Care Team during business hours at the following numbers:   -For the [facility], call [phone]  -To reach the palliative RN for questions or refills, call [phone]       To reach the Palliative Care Provider on-call After-hours or on holidays and weekends, call: [phone].  Please note that we are not able to provide pain medication refills on evenings or weekends.

## 2021-02-18 RX ORDER — HYDROCHLOROTHIAZIDE 12.5 MG/1
TABLET ORAL
Qty: 60 TABLET | Refills: 0 | OUTPATIENT
Start: 2021-02-18

## 2021-02-18 NOTE — TELEPHONE ENCOUNTER
HYDROCHLOROTHIAZIDE 12.5MG TABS  hydrochlorothiazide (MICROZIDE) 12.5 MG capsule 90 capsule 3 2/17/2021  No   Sig - Route: Take 1 capsule (12.5 mg) by mouth daily - Oral   Sent to pharmacy as: hydroCHLOROthiazide 12.5 MG Oral Capsule (MICROZIDE)   Class: E-Prescribe   Order: [id number]   E-Prescribing Status: Receipt confirmed by pharmacy (2/17/2021 10:05 PM CST)   Printout Tracking    External Result Report   Pharmacy    [facility] [place], [place] - [address] [place] [address] [phone]     Juanita Nguyen RN  Central Triage Red Flags/Med Refills

## 2021-02-19 ENCOUNTER — PATIENT OUTREACH (OUTPATIENT)
Dept: CARE COORDINATION | Facility: CLINIC | Age: 76
End: 2021-02-19

## 2021-02-19 NOTE — PROGRESS NOTES
Social Work Follow-Up  [facility] [place]    Data/Intervention:  Patient Name:  Rogelio Marshall  /Age:  1945 (76 year old)    Reason for Follow-Up:  Supports Needed    Collaborated With:    -pt    Intervention/Education/Resources Provided:  SW received referral asking to reach out to pt as they are wanting assistance in finding a new primary care provider. SW called pt to help assist, but had to leave a voicemail. SW left contact information and will wait for pt to return call.    Assessment/Plan:  SW will remain available as needed.  Previously provided patient/family with writer's contact information and availability.       MITESH Espana,VERNON  Hematology/Oncology Social Worker  Phone:[phone] Pager: [phone]

## 2021-02-22 ENCOUNTER — ANCILLARY PROCEDURE (OUTPATIENT)
Dept: ULTRASOUND IMAGING | Facility: CLINIC | Age: 76
End: 2021-02-22
Attending: PHYSICIAN ASSISTANT
Payer: COMMERCIAL

## 2021-02-22 ENCOUNTER — INFUSION THERAPY VISIT (OUTPATIENT)
Dept: ONCOLOGY | Facility: CLINIC | Age: 76
End: 2021-02-22
Attending: STUDENT IN AN ORGANIZED HEALTH CARE EDUCATION/TRAINING PROGRAM
Payer: COMMERCIAL

## 2021-02-22 ENCOUNTER — APPOINTMENT (OUTPATIENT)
Dept: LAB | Facility: CLINIC | Age: 76
End: 2021-02-22
Attending: STUDENT IN AN ORGANIZED HEALTH CARE EDUCATION/TRAINING PROGRAM
Payer: COMMERCIAL

## 2021-02-22 VITALS
BODY MASS INDEX: 26.45 KG/M2 | SYSTOLIC BLOOD PRESSURE: 132 MMHG | WEIGHT: 140 LBS | HEART RATE: 82 BPM | TEMPERATURE: 97.5 F | OXYGEN SATURATION: 93 % | DIASTOLIC BLOOD PRESSURE: 67 MMHG

## 2021-02-22 DIAGNOSIS — C90.00 MULTIPLE MYELOMA, REMISSION STATUS UNSPECIFIED (H): Primary | ICD-10-CM

## 2021-02-22 DIAGNOSIS — M79.89 LEG SWELLING: ICD-10-CM

## 2021-02-22 LAB
ALBUMIN SERPL-MCNC: 3.5 G/DL (ref 3.4–5)
ALP SERPL-CCNC: 92 U/L (ref 40–150)
ALT SERPL W P-5'-P-CCNC: 20 U/L (ref 0–70)
ANION GAP SERPL CALCULATED.3IONS-SCNC: 6 MMOL/L (ref 3–14)
AST SERPL W P-5'-P-CCNC: 11 U/L (ref 0–45)
BASOPHILS # BLD AUTO: 0 10E9/L (ref 0–0.2)
BASOPHILS NFR BLD AUTO: 0.1 %
BILIRUB SERPL-MCNC: 0.4 MG/DL (ref 0.2–1.3)
BUN SERPL-MCNC: 32 MG/DL (ref 7–30)
CALCIUM SERPL-MCNC: 9.3 MG/DL (ref 8.5–10.1)
CHLORIDE SERPL-SCNC: 107 MMOL/L (ref 94–109)
CO2 SERPL-SCNC: 26 MMOL/L (ref 20–32)
CREAT SERPL-MCNC: 1.08 MG/DL (ref 0.66–1.25)
DIFFERENTIAL METHOD BLD: ABNORMAL
EOSINOPHIL # BLD AUTO: 0.2 10E9/L (ref 0–0.7)
EOSINOPHIL NFR BLD AUTO: 2.5 %
ERYTHROCYTE [DISTWIDTH] IN BLOOD BY AUTOMATED COUNT: 15 % (ref 10–15)
GFR SERPL CREATININE-BSD FRML MDRD: 67 ML/MIN/{1.73_M2}
GLUCOSE SERPL-MCNC: 93 MG/DL (ref 70–99)
HCT VFR BLD AUTO: 27.5 % (ref 40–53)
HGB BLD-MCNC: 8.8 G/DL (ref 13.3–17.7)
IMM GRANULOCYTES # BLD: 0.1 10E9/L (ref 0–0.4)
IMM GRANULOCYTES NFR BLD: 0.8 %
LYMPHOCYTES # BLD AUTO: 1.4 10E9/L (ref 0.8–5.3)
LYMPHOCYTES NFR BLD AUTO: 16 %
MCH RBC QN AUTO: 33 PG (ref 26.5–33)
MCHC RBC AUTO-ENTMCNC: 32 G/DL (ref 31.5–36.5)
MCV RBC AUTO: 103 FL (ref 78–100)
MONOCYTES # BLD AUTO: 0.8 10E9/L (ref 0–1.3)
MONOCYTES NFR BLD AUTO: 8.9 %
NEUTROPHILS # BLD AUTO: 6 10E9/L (ref 1.6–8.3)
NEUTROPHILS NFR BLD AUTO: 71.7 %
NRBC # BLD AUTO: 0 10*3/UL
NRBC BLD AUTO-RTO: 0 /100
PLATELET # BLD AUTO: 382 10E9/L (ref 150–450)
POTASSIUM SERPL-SCNC: 4.5 MMOL/L (ref 3.4–5.3)
PROT SERPL-MCNC: 6.4 G/DL (ref 6.8–8.8)
RBC # BLD AUTO: 2.67 10E12/L (ref 4.4–5.9)
SODIUM SERPL-SCNC: 138 MMOL/L (ref 133–144)
WBC # BLD AUTO: 8.4 10E9/L (ref 4–11)

## 2021-02-22 PROCEDURE — 80053 COMPREHEN METABOLIC PANEL: CPT | Performed by: PHYSICIAN ASSISTANT

## 2021-02-22 PROCEDURE — G0463 HOSPITAL OUTPT CLINIC VISIT: HCPCS | Mod: 25

## 2021-02-22 PROCEDURE — 93970 EXTREMITY STUDY: CPT | Mod: GC | Performed by: RADIOLOGY

## 2021-02-22 PROCEDURE — 250N000012 HC RX MED GY IP 250 OP 636 PS 637: Performed by: PHYSICIAN ASSISTANT

## 2021-02-22 PROCEDURE — 85025 COMPLETE CBC W/AUTO DIFF WBC: CPT | Performed by: PHYSICIAN ASSISTANT

## 2021-02-22 PROCEDURE — 36415 COLL VENOUS BLD VENIPUNCTURE: CPT

## 2021-02-22 PROCEDURE — 96401 CHEMO ANTI-NEOPL SQ/IM: CPT

## 2021-02-22 PROCEDURE — 250N000013 HC RX MED GY IP 250 OP 250 PS 637: Performed by: PHYSICIAN ASSISTANT

## 2021-02-22 PROCEDURE — 250N000011 HC RX IP 250 OP 636: Performed by: PHYSICIAN ASSISTANT

## 2021-02-22 RX ORDER — DEXAMETHASONE 4 MG/1
TABLET ORAL
Qty: 35 TABLET | Refills: 0 | Status: SHIPPED | OUTPATIENT
Start: 2021-02-22 | End: 2021-10-26

## 2021-02-22 RX ORDER — DEXAMETHASONE 4 MG/1
20 TABLET ORAL ONCE
Status: COMPLETED | OUTPATIENT
Start: 2021-02-22 | End: 2021-02-22

## 2021-02-22 RX ORDER — DIPHENHYDRAMINE HCL 25 MG
50 CAPSULE ORAL ONCE
Status: COMPLETED | OUTPATIENT
Start: 2021-02-22 | End: 2021-02-22

## 2021-02-22 RX ORDER — ACETAMINOPHEN 325 MG/1
650 TABLET ORAL ONCE
Status: COMPLETED | OUTPATIENT
Start: 2021-02-22 | End: 2021-02-22

## 2021-02-22 RX ADMIN — BORTEZOMIB 2.2 MG: 3.5 INJECTION, POWDER, LYOPHILIZED, FOR SOLUTION INTRAVENOUS; SUBCUTANEOUS at 12:23

## 2021-02-22 RX ADMIN — ACETAMINOPHEN 650 MG: 325 TABLET ORAL at 11:30

## 2021-02-22 RX ADMIN — DIPHENHYDRAMINE HYDROCHLORIDE 50 MG: 25 CAPSULE ORAL at 11:30

## 2021-02-22 RX ADMIN — DARATUMUMAB AND HYALURONIDASE-FIHJ (HUMAN RECOMBINANT) 1800 MG: 1800; 30000 INJECTION SUBCUTANEOUS at 12:27

## 2021-02-22 RX ADMIN — DEXAMETHASONE 20 MG: 4 TABLET ORAL at 11:30

## 2021-02-22 ASSESSMENT — PAIN SCALES - GENERAL: PAINLEVEL: NO PAIN (1)

## 2021-02-22 NOTE — PROGRESS NOTES
Infusion Nursing Note:  Rogelio Marshall presents today for Cycle 4 Day 1 Velcade and Darzalex Faspro Injections.    Patient had a virtual visit with Pebbles Longo on 2/17/21.    Note: Velcade injected to R upper arm without incident             Darzalex Faspro injected to RUQ abdomen without incident        Rogelio had a visit with Pebbles Longo on 2/17/21.  He feels well today and has no new concerns.  No signs or symptoms of infection.  Peripheral neuropathy continues in finger tips and has not worsened since his last visit. Understands he needs to see the dentist prior to starting his zometa but has not made an appointment yet.    Bilateral LE edema continues but has not worsened.  He did not go to his US appointment that Pebbles had made for him last Thursday.  This US was rescheduled for 3:30 today and pt said he would go to first floor imaging at that time  A message was sent to Pebbles via [employer] so she was aware.    Treatment Conditions:  Lab Results   Component Value Date    HGB 8.8 02/22/2021     Lab Results   Component Value Date    WBC 8.4 02/22/2021      Lab Results   Component Value Date    ANEU 6.0 02/22/2021     Lab Results   Component Value Date     02/22/2021      Lab Results   Component Value Date     02/22/2021                   Lab Results   Component Value Date    POTASSIUM 4.5 02/22/2021           Lab Results   Component Value Date    MAG 2.1 12/02/2020            Lab Results   Component Value Date    CR 1.08 02/22/2021                   Lab Results   Component Value Date    MIRI 9.3 02/22/2021                Lab Results   Component Value Date    BILITOTAL 0.4 02/22/2021           Lab Results   Component Value Date    ALBUMIN 3.5 02/22/2021                    Lab Results   Component Value Date    ALT 20 02/22/2021           Lab Results   Component Value Date    AST 11 02/22/2021       Results reviewed, labs MET treatment parameters, ok to proceed with treatment.      Discharge Plan:    Prescription refills given for several medications that his home RN had requested to be refilled  He also had a refill on his decadron and is aware to take that tomorrow as directed.  Copy of AVS reviewed with patient and/or family.  Patient will return 2/25/21 for cycle 4 day 4  Face to Face time: 0.    Saba King RN

## 2021-02-22 NOTE — PROGRESS NOTES
Chief Complaint   Patient presents with     Blood Draw      blood draw, vitals taken and checked into next appointment     Blood drawn from right arm.    -Cat FRASER CMA

## 2021-02-22 NOTE — PATIENT INSTRUCTIONS
[facility] Triage and after hours / weekends / holidays:  [phone]    Please call the triage or after hours line if you experience a temperature greater than or equal to 100.5, shaking chills, have uncontrolled nausea, vomiting and/or diarrhea, dizziness, shortness of breath, chest pain, bleeding, unexplained bruising, or if you have any other new/concerning symptoms, questions or concerns.      If you are having any concerning symptoms or wish to speak to a provider before your next infusion visit, please call your care coordinator or triage to notify them so we can adequately serve you.     If you need a refill on a narcotic prescription or other medication, please call before your infusion appointment.

## 2021-02-25 ENCOUNTER — INFUSION THERAPY VISIT (OUTPATIENT)
Dept: ONCOLOGY | Facility: CLINIC | Age: 76
End: 2021-02-25
Attending: STUDENT IN AN ORGANIZED HEALTH CARE EDUCATION/TRAINING PROGRAM
Payer: COMMERCIAL

## 2021-02-25 VITALS
OXYGEN SATURATION: 100 % | TEMPERATURE: 97.4 F | SYSTOLIC BLOOD PRESSURE: 129 MMHG | DIASTOLIC BLOOD PRESSURE: 85 MMHG | HEART RATE: 94 BPM | RESPIRATION RATE: 18 BRPM

## 2021-02-25 DIAGNOSIS — C90.00 MULTIPLE MYELOMA, REMISSION STATUS UNSPECIFIED (H): Primary | ICD-10-CM

## 2021-02-25 PROCEDURE — 250N000011 HC RX IP 250 OP 636: Performed by: PHYSICIAN ASSISTANT

## 2021-02-25 PROCEDURE — 96401 CHEMO ANTI-NEOPL SQ/IM: CPT

## 2021-02-25 RX ADMIN — BORTEZOMIB 2.2 MG: 3.5 INJECTION, POWDER, LYOPHILIZED, FOR SOLUTION INTRAVENOUS; SUBCUTANEOUS at 10:54

## 2021-02-25 ASSESSMENT — PAIN SCALES - GENERAL: PAINLEVEL: NO PAIN (0)

## 2021-02-25 NOTE — PROGRESS NOTES
Infusion Nursing Note:  Rogelio Marshall presents today for Cycle 4 Day 4 Velcade.    Patient seen by provider today: No    Note: Pt reports no new concerns since he was here earlier this week. His BLE swelling has actually improved, which he is happy about. No labs due to be drawn today.    Post Infusion Assessment:  Patient tolerated injection to left arm without incident.     Discharge Plan:   Patient declined prescription refills.  AVS to patient via [employer].  Patient will return 3/1 for next appointment.   Patient discharged in stable condition accompanied by: self.  Departure Mode: Ambulatory.    Tosha Conway RN

## 2021-02-25 NOTE — PATIENT INSTRUCTIONS
Contact Numbers    [facility] [place]for Scheduling/Triage/After Hours Nurse Line): [phone]    Please call the [facility] nurse triage or the after hours nurse line if you experience a temperature greater than or equal to 100.5, shaking chills, have uncontrolled nausea, vomiting and/or diarrhea, dizziness, lightheadedness, shortness of breath, chest pain, bleeding, unexplained bruising, or if you have any other new/concerning symptoms, questions or concerns.     If you are having any concerning symptoms or wish to speak to a provider before your next infusion visit, please call your care coordinator or triage to notify them so we can adequately serve you.     If you need any refills on medications (narcotics or other medications), please call before your infusion appointment.       February 2021 Sunday Monday Tuesday Wednesday Thursday Friday Saturday        1    [facility] ONC INFUSION 60   8:00 AM   (60 min.)    ONCOLOGY INFUSION   [facility]    XR RIBS & CHEST RIGHT G/E 3 VW   9:15 AM   (25 min.)   UCSCXR1   [facility] [place] 2     3     4    LAB CENTRAL   8:45 AM   (15 min.)    [facility] DRAW   [facility] ONC INFUSION 60   9:30 AM   (60 min.)    ONCOLOGY INFUSION   [facility]    UMP RETURN  12:25 PM   (50 min.)   Pebbles Longo PA-C   [facility] 5     6       7     8  Happy Birthday!    LAB CENTRAL  10:15 AM   (15 min.)    [facility] DRAW   [facility] ONC INFUSION 60  11:00 AM   (60 min.)   UC ONCOLOGY INFUSION   [facility] 9     10     11    [facility] ONC INFUSION 60   3:30 PM   (60 min.)   UC ONCOLOGY INFUSION   [facility] 12     13       14     15    LAB PERIPHERAL  10:30 AM   (15 min.)   [facility]RAW   [facility] ONC  INFUSION 60  11:00 AM   (60 min.)   UC ONCOLOGY INFUSION   [facility] 16     17    TELEPHONE VISIT RETURN  10:00 AM   (50 min.)   Pebbles Longo PA-C   [facility]    TELEPHONE VISIT RETURN   2:20 PM   (80 min.)   Cat Tate MD   [facility] 18    US LWR EXT VENOUS DUPLEX BILAT  12:45 PM   (60 min.)   UCSCUSV2   [facility] 19     20       21     22    LAB PERIPHERAL  10:30 AM   (15 min.)    [facility] DRAW   [facility]    [facility] ONC INFUSION 60  11:00 AM   (60 min.)   UC ONCOLOGY INFUSION   [facility]    US LWR EXT VENOUS DUPLEX BILAT   3:30 PM   (60 min.)   UCSCUSV2   [facility] 23     24     25    [facility] ONC INFUSION 60  10:00 AM   (60 min.)   UC ONCOLOGY INFUSION   [facility] 26     27       28 March 2021 Sunday Monday Tuesday Wednesday Thursday Friday Saturday        1    [facility] ONC INFUSION 60  10:00 AM   (60 min.)   UC ONCOLOGY INFUSION   [facility] 2     3     4    [facility] ONC INFUSION 60   9:30 AM   (60 min.)   UC ONCOLOGY INFUSION   [facility] 5     6       7     8    LAB PERIPHERAL   9:45 AM   (15 min.)   [facility] D[facility]   [facility]    [facility] ONC INFUSION 60  10:30 AM   (60 min.)   UC ONCOLOGY INFUSION   [facility] 9     10     11    [facility] ONC INFUSION 60  10:00 AM   (60 min.)   UC ONCOLOGY INFUSION   [facility] 12     13       14     15    [facility] ONC INFUSION 60  10:00 AM   (60 min.)   UC ONCOLOGY INFUSION   [facility] 16     17    TELEPHONE VISIT RETURN   2:00 PM   (30 min.)   Angela Sweeney MD   [facility]  18    [facility] ONC INFUSION 60  10:00 AM   (60 min.)    ONCOLOGY INFUSION   [facility] 19     20       21     22     23     24     25     26     27       28     29     30     31                                    Lab Results:  No results found for this or any previous visit (from the past 12 hour(s)).

## 2021-03-01 ENCOUNTER — INFUSION THERAPY VISIT (OUTPATIENT)
Dept: ONCOLOGY | Facility: CLINIC | Age: 76
End: 2021-03-01
Attending: STUDENT IN AN ORGANIZED HEALTH CARE EDUCATION/TRAINING PROGRAM
Payer: COMMERCIAL

## 2021-03-01 VITALS
DIASTOLIC BLOOD PRESSURE: 72 MMHG | OXYGEN SATURATION: 96 % | WEIGHT: 137.9 LBS | HEART RATE: 82 BPM | TEMPERATURE: 97.8 F | SYSTOLIC BLOOD PRESSURE: 121 MMHG | BODY MASS INDEX: 26.06 KG/M2 | RESPIRATION RATE: 16 BRPM

## 2021-03-01 DIAGNOSIS — C90.00 MULTIPLE MYELOMA, REMISSION STATUS UNSPECIFIED (H): Primary | ICD-10-CM

## 2021-03-01 LAB
ALBUMIN SERPL-MCNC: 3.6 G/DL (ref 3.4–5)
ALP SERPL-CCNC: 82 U/L (ref 40–150)
ALT SERPL W P-5'-P-CCNC: 17 U/L (ref 0–70)
ANION GAP SERPL CALCULATED.3IONS-SCNC: 7 MMOL/L (ref 3–14)
AST SERPL W P-5'-P-CCNC: 16 U/L (ref 0–45)
BASOPHILS # BLD AUTO: 0 10E9/L (ref 0–0.2)
BASOPHILS NFR BLD AUTO: 0.2 %
BILIRUB SERPL-MCNC: 0.4 MG/DL (ref 0.2–1.3)
BUN SERPL-MCNC: 30 MG/DL (ref 7–30)
CALCIUM SERPL-MCNC: 9 MG/DL (ref 8.5–10.1)
CHLORIDE SERPL-SCNC: 108 MMOL/L (ref 94–109)
CO2 SERPL-SCNC: 24 MMOL/L (ref 20–32)
CREAT SERPL-MCNC: 1.06 MG/DL (ref 0.66–1.25)
DIFFERENTIAL METHOD BLD: ABNORMAL
EOSINOPHIL # BLD AUTO: 0.1 10E9/L (ref 0–0.7)
EOSINOPHIL NFR BLD AUTO: 1.4 %
ERYTHROCYTE [DISTWIDTH] IN BLOOD BY AUTOMATED COUNT: 14.8 % (ref 10–15)
GFR SERPL CREATININE-BSD FRML MDRD: 69 ML/MIN/{1.73_M2}
GLUCOSE SERPL-MCNC: 107 MG/DL (ref 70–99)
HCT VFR BLD AUTO: 27.6 % (ref 40–53)
HGB BLD-MCNC: 9.1 G/DL (ref 13.3–17.7)
IMM GRANULOCYTES # BLD: 0 10E9/L (ref 0–0.4)
IMM GRANULOCYTES NFR BLD: 0.5 %
LYMPHOCYTES # BLD AUTO: 1.3 10E9/L (ref 0.8–5.3)
LYMPHOCYTES NFR BLD AUTO: 21.1 %
MCH RBC QN AUTO: 33.2 PG (ref 26.5–33)
MCHC RBC AUTO-ENTMCNC: 33 G/DL (ref 31.5–36.5)
MCV RBC AUTO: 101 FL (ref 78–100)
MONOCYTES # BLD AUTO: 0.7 10E9/L (ref 0–1.3)
MONOCYTES NFR BLD AUTO: 10.9 %
NEUTROPHILS # BLD AUTO: 4.2 10E9/L (ref 1.6–8.3)
NEUTROPHILS NFR BLD AUTO: 65.9 %
NRBC # BLD AUTO: 0 10*3/UL
NRBC BLD AUTO-RTO: 1 /100
PLATELET # BLD AUTO: 227 10E9/L (ref 150–450)
POTASSIUM SERPL-SCNC: 4.5 MMOL/L (ref 3.4–5.3)
PROT SERPL-MCNC: 6.4 G/DL (ref 6.8–8.8)
RBC # BLD AUTO: 2.74 10E12/L (ref 4.4–5.9)
SODIUM SERPL-SCNC: 139 MMOL/L (ref 133–144)
WBC # BLD AUTO: 6.4 10E9/L (ref 4–11)

## 2021-03-01 PROCEDURE — 250N000011 HC RX IP 250 OP 636: Performed by: PHYSICIAN ASSISTANT

## 2021-03-01 PROCEDURE — 80053 COMPREHEN METABOLIC PANEL: CPT | Performed by: PHYSICIAN ASSISTANT

## 2021-03-01 PROCEDURE — 96401 CHEMO ANTI-NEOPL SQ/IM: CPT

## 2021-03-01 PROCEDURE — 85025 COMPLETE CBC W/AUTO DIFF WBC: CPT | Performed by: PHYSICIAN ASSISTANT

## 2021-03-01 PROCEDURE — 36415 COLL VENOUS BLD VENIPUNCTURE: CPT

## 2021-03-01 RX ADMIN — BORTEZOMIB 2.2 MG: 3.5 INJECTION, POWDER, LYOPHILIZED, FOR SOLUTION INTRAVENOUS; SUBCUTANEOUS at 10:23

## 2021-03-01 ASSESSMENT — PAIN SCALES - GENERAL: PAINLEVEL: MILD PAIN (2)

## 2021-03-01 NOTE — NURSING NOTE
Chief Complaint   Patient presents with     Blood Draw     labs drawn by venipuncture by rn in lab.  vital signs taken.     Labs drawn by venipuncture by RN in lab.  Vital signs taken.  Pt reminded several times to put his mask on but kept taking it off.  Pt checked in to next appointment.    Nahomi Sweet RN

## 2021-03-01 NOTE — PATIENT INSTRUCTIONS
Patient Education     Cancer: Managing Fatigue     Family members can help with meals and chores around the house.   Extreme tiredness, called fatigue, is common when you have cancer. It can be caused by worry, lack of sleep, or low appetite. Fatigue can also be a sign of anemia. This is when your blood doesn't have enough red blood cells. You might need medical treatment for anemia. Red blood cells carry oxygen to all the cells in your body, and low levels can make you tired. The tips below can help you feel better.  Saving your energy    Keep track of the times of day when you are most tired and plan around them. For instance, if you are more tired in the afternoon, try to get tasks done in the morning.    Decide which tasks are most important. Do those first.    Pass tasks along to others when you need to. Ask for help.    Accept help when it s offered. Tell people what they can do to help. For instance, you may need someone to fix a meal, fold clothes, or put gas in your car.    Plan rest times. You may want to take a nap each day. Just sitting quietly for a few minutes can also make you feel more rested.  Taking care of yourself    Relax before you go to bed. Take a bath or read for a while.    Keep regular hours. Go to bed at the same time each night and get up at the same time each morning.    Eat well. Choose foods from all of the food groups each day.    Exercise. Take a brisk walk to help increase your energy.    Don't have caffeine or alcohol. Drink plenty of water or fruit juices instead.  Treating anemia  If you begin to feel more tired than normal, tell your healthcare provider. Fatigue could be a sign of anemia. This problem is common in people with cancer, especially during chemotherapy and radiation treatments. If your red blood cell count is too low, you may need a blood transfusion. In some cases, you may need medicine to increase the number of red blood cells your body makes.  Call your  healthcare provider if you have:    Shortness of breath or chest pain    A dizzy feeling when you get up from lying or sitting    Paler skin than normal    Extreme tiredness that is not helped by sleep  Heidi last reviewed this educational content on 3/1/2018    5195-7298 [employer]. [address] [place] [address]. All rights reserved. This information is not intended as a substitute for professional medical care. Always follow your healthcare professional's instructions.             Contact Numbers  [facility]: [phone]    After Hours:  [phone]  Triage: [phone]    Please call the [facility] Triage line if you experience a temperature greater than or equal to 100.5, shaking chills, have uncontrolled nausea, vomiting and/or diarrhea, dizziness, shortness of breath, chest pain, bleeding, unexplained bruising, or if you have any other new/concerning symptoms, questions or concerns.     If it is after hours, weekends, or holidays, please call the main hospital  at  [phone] and ask to speak to the Oncology doctor on call.     If you are having any concerning symptoms or wish to speak to a provider before your next infusion visit, please call your care coordinator or triage to notify them so we can adequately serve you.     If you need a refill on a narcotic prescription or other medication, please call triage before your infusion appointment.

## 2021-03-01 NOTE — PROGRESS NOTES
Infusion Nursing Note:  Rogelio Marshall presents today for C4D8 Velcade SQ.    Patient seen by provider today: No   present during visit today: Not Applicable.    Note:   General: patient appears well in no acute distress, alert and oriented, speech clear , Negative for chills/fever  Fatigue has increased over the past week.  Does not have as much energy.  Pt received a teaching handout r/t cancer fatigue and he said he will try the suggestions Grade 2  Eyes/Ears/Nose/Throat: no erythema or discharge noted.  Negative for sore throat. Negative for pain and visual disturbance Grade 0  Skin: no visualized rash or lesions on visualized skin. Grade 0  Resp: Appears to be breathing comfortably without accessory muscle usage, speaking in full sentences, no cough or audible wheeze . Negative for cough, chest tightness and shortness of breath. Grade 0  Neuro/Musk/Skel: facial movement symmetric, no noted tremors, Normal ROM, moving extremities freely. Negative for dizziness, weakness and headaches. Reports numbness/tingling in bilateral hands/feet, baseline.  Takes Gabapentin Grade 1  Psych: able to articulate logical thoughts,  Affect is normal. Negative for confusion Grade 0  Emotional:WNL Grade 0  Cardiovascular:WNL. Negative for chest pain and palpitations. BLE swelling had decreased prior to treatment last week but did increase throughout this week.  Pt does not always elevate legs when sitting and realizes he should. Less activity this week due to fatigue.  Pt encouraged to walk around the house a couple times every hour until he feels better to walk outside again.Grade 1   GI/Nutrition:WNL. Negative for abdominal distention, abdominal pain, constipation, diarrhea, nausea and vomiting. Decreased appetite Grade 0   Genitourinary:WNL. Negative for difficulty urinating, dysuria, frequency and urgency. Grade 0  Endocrine:WNL Grade 0  Mobility/Fall Risk:uses walker Grade 1    Pain level:2 lower back, baseline  per pt report    Pt received DEX instructions to show his home health nurse.    Intravenous Access:  No Intravenous access/labs at this visit.    Treatment Conditions:  Lab Results   Component Value Date    HGB 9.1 03/01/2021     Lab Results   Component Value Date    WBC 6.4 03/01/2021      Lab Results   Component Value Date    ANEU 4.2 03/01/2021     Lab Results   Component Value Date     03/01/2021      Lab Results   Component Value Date     03/01/2021                   Lab Results   Component Value Date    POTASSIUM 4.5 03/01/2021           Lab Results   Component Value Date    MAG 2.1 12/02/2020            Lab Results   Component Value Date    CR 1.06 03/01/2021                   Lab Results   Component Value Date    MIRI 9.0 03/01/2021                Lab Results   Component Value Date    BILITOTAL 0.4 03/01/2021           Lab Results   Component Value Date    ALBUMIN 3.6 03/01/2021                    Lab Results   Component Value Date    ALT 17 03/01/2021           Lab Results   Component Value Date    AST 16 03/01/2021       Results reviewed, labs MET treatment parameters, ok to proceed with treatment.      Post Infusion Assessment:  Patient tolerated injection without incident.       Discharge Plan:   Patient declined prescription refills.  Discharge instructions reviewed with: Patient.  Patient and/or family verbalized understanding of discharge instructions and all questions answered.  Copy of AVS reviewed with patient and/or family.  Patient will return 3/4 for next appointment.  Patient discharged in stable condition accompanied by: self.  Departure Mode: Ambulatory.    Kerri Ann RN

## 2021-03-02 ENCOUNTER — TELEPHONE (OUTPATIENT)
Dept: INTERNAL MEDICINE | Facility: CLINIC | Age: 76
End: 2021-03-02

## 2021-03-02 NOTE — TELEPHONE ENCOUNTER
[facility] now requests orders and shares plan of care/discharge summaries for some patients through [employer].  Please REPLY TO THIS MESSAGE OR ROUTE BACK TO THE AUTHOR in order to give authorization for orders when needed.  This is considered a verbal order, you will still receive a faxed copy of orders for signature.  Thank you for your assistance in improving collaboration for our patients.    ORDER  Nursing 1x/week for 8 weeks, 3 PRN    Tosha Newton RN [facility]  [phone]  sho [email or web address]

## 2021-03-03 NOTE — TELEPHONE ENCOUNTER
Patient is no longer using Triamcinolone cream as his back rash has resolved, He is not longer using a lidocaine patch for pain. He does not do the enoxiparin injections. Please update in Epic. Thanks    Please lock this encounter as acknowledgement of receipt. Thanks

## 2021-03-04 ENCOUNTER — INFUSION THERAPY VISIT (OUTPATIENT)
Dept: ONCOLOGY | Facility: CLINIC | Age: 76
End: 2021-03-04
Attending: STUDENT IN AN ORGANIZED HEALTH CARE EDUCATION/TRAINING PROGRAM
Payer: COMMERCIAL

## 2021-03-04 VITALS
HEART RATE: 86 BPM | TEMPERATURE: 97.5 F | OXYGEN SATURATION: 100 % | SYSTOLIC BLOOD PRESSURE: 119 MMHG | RESPIRATION RATE: 16 BRPM | DIASTOLIC BLOOD PRESSURE: 72 MMHG

## 2021-03-04 DIAGNOSIS — C90.00 MULTIPLE MYELOMA, REMISSION STATUS UNSPECIFIED (H): Primary | ICD-10-CM

## 2021-03-04 PROCEDURE — 96401 CHEMO ANTI-NEOPL SQ/IM: CPT

## 2021-03-04 PROCEDURE — 250N000011 HC RX IP 250 OP 636: Mod: JW | Performed by: PHYSICIAN ASSISTANT

## 2021-03-04 RX ADMIN — BORTEZOMIB 2.2 MG: 3.5 INJECTION, POWDER, LYOPHILIZED, FOR SOLUTION INTRAVENOUS; SUBCUTANEOUS at 10:01

## 2021-03-04 ASSESSMENT — PAIN SCALES - GENERAL: PAINLEVEL: MILD PAIN (2)

## 2021-03-04 NOTE — PROGRESS NOTES
"Infusion Nursing Note:  Rogelio Marshall presents today for Cycle 4 Day 11 Velcade.    Patient seen by provider today: No   present during visit today: Not Applicable.    Note: Patient states he continues to struggle with \"fatigue.\"  He states he gets \"tired out pretty easily,\" but if he sits for a little while he recovers and is able to \"get back to doing what I was doing.\"  Patient states he continue to struggle with decreased appetite, but has been eating \"more sweets because I crave them,\" and continue to get meals provided by [employer].  Patient also has 2/10 low back pain, for which he is taking oxycodone with some relief.  Patient requires no further intervention at this time.  Patient offers no other concerns at this time.    Intravenous Access:  No Intravenous access/labs at this visit.    Treatment Conditions:  Results reviewed from 3/1/21, labs MET treatment parameters, ok to proceed with treatment.    Post Infusion Assessment:  Patient tolerated injection without incident.  Velcade administered subcutaneously into the back of patient's left arm.  Patient stated his abdomen \"needed a break.\"     Discharge Plan:   Patient declined prescription refills.  Discharge instructions reviewed with: Patient.  Patient and/or family verbalized understanding of discharge instructions and all questions answered.  AVS to patient via [employer].  Patient will return 3/8/21 for next appointment.   Patient discharged in stable condition accompanied by: self.  Departure Mode: Ambulatory and w/walker.  Face to Face time: 0.    PATTI LEWIS RN                      "

## 2021-03-04 NOTE — PATIENT INSTRUCTIONS
Contact Numbers    [facility] Main Line (for Scheduling/Triage/After Hours Nurse Line): [phone]    Please call the [facility] nurse triage or the after hours nurse line if you experience a temperature greater than or equal to 100.5, shaking chills, have uncontrolled nausea, vomiting and/or diarrhea, dizziness, lightheadedness, shortness of breath, chest pain, bleeding, unexplained bruising, or if you have any other new/concerning symptoms, questions or concerns.     If you are having any concerning symptoms or wish to speak to a provider before your next infusion visit, please call your care coordinator or triage to notify them so we can adequately serve you.     If you need any refills on medications (narcotics or other medications), please call before your infusion appointment.       March 2021 Sunday Monday Tuesday Wednesday Thursday Friday Saturday        1    LAB PERIPHERAL   9:15 AM   (15 min.)   [facility] D[facility]   [facility]    [facility] ONC INFUSION 60  10:00 AM   (60 min.)    ONCOLOGY INFUSION   [facility] 2     3     4    [facility] ONC INFUSION 30   9:30 AM   (60 min.)    ONCOLOGY INFUSION   [facility] 5     6       7     8    LAB PERIPHERAL   9:45 AM   (15 min.)   [facility] D[facility]   [facility]    [facility] ONC INFUSION 60  10:30 AM   (60 min.)    ONCOLOGY INFUSION   [facility] 9     10     11    [facility] ONC INFUSION 60  10:00 AM   (60 min.)    ONCOLOGY INFUSION   [facility] 12     13       14     15    [facility] ONC INFUSION 60  10:00 AM   (60 min.)    ONCOLOGY INFUSION   [facility] 16     17    TELEPHONE VISIT RETURN   2:00 PM   (30 min.)   Angela Sweeney MD   [facility] 18    UMP ONC INFUSION 60  10:00 AM   (60 min.)    ONCOLOGY INFUSION   [facility]  19     20       21     22     23     24     25     26     27       28     29     30     31                                April 2021 Sunday Monday Tuesday Wednesday Thursday Friday Saturday                       1     2     3       4     5     6     7     8     9     10       11     12     13     14     15     16     17       18     19     20     21     22     23     24       25     26     27     28     29     30                       No results found for this or any previous visit (from the past 24 hour(s)).

## 2021-03-05 ENCOUNTER — TELEPHONE (OUTPATIENT)
Dept: ONCOLOGY | Facility: CLINIC | Age: 76
End: 2021-03-05

## 2021-03-05 NOTE — TELEPHONE ENCOUNTER
"Pt transferred to triage to discuss symptoms; he stated today he has been more jittery than usual, knocking things over, such as his coffee cup and keys. State after his infusions he is more unsteady, this lasts a few days then goes away. He denied this worsening with each infusion but today started and he expects it to last a few days. Also he had one episode of bowel incontinence. Now that he thinks about it, he took senokot night before last and that may have contributed. He usually takes miralax once daily, forgot to take it this morning. Couldn't make it to the bathroom on time this morning, stool was loose \"but not watery\". With these 2 issues today he just wants his day to go back to normal, wondering if he should take his miralax now. Advised to hold miralax and go back to regular schedule tomorrow, he verbalized understanding. Asked if he should worry about bowel incontinence, advised to monitor, if it becomes a regular occurrence without use of senokot then call back to report, increased hand tremors or other symptoms to call triage, he verbalized understanding.  "

## 2021-03-06 ENCOUNTER — NURSE TRIAGE (OUTPATIENT)
Dept: NURSING | Facility: CLINIC | Age: 76
End: 2021-03-06

## 2021-03-06 NOTE — TELEPHONE ENCOUNTER
"Wasn't able to ask all COVID travel screening questions as patient stated \"you shouldn't be asking me about this\".    Patient calling back and had another episode of diarrhea, today it is more watery than before.  Smaller quantify and more watery, is incontinent also with it.  Confirms pain in abdomen, feels like a general stomach pain.  He feels it may be more due to his loss of appetite and it feels almost like hunger pains.  Last night he ate leftovers of a TV dinner from [employer] to try and get some food down.  Had been in the fridge for a couple of days.      He feels like the recovery time is slower this time after his infusion this time, as infusion was on Thursday.  Patient reports that he is not drinking much liquids at all.  Last urine output was this afternoon and \"not really abnormal\", a small amount.   Also reports that he is more fatigued than usual, \"can't sit still for more than a few minutes without nodding off\".      Denies fever or vomiting.    Advised patient to hold off on his Miralax for today, per note from clinic yesterday and to start pushing small sips of water every 5-10 minutes.      Dr. Raza paged at 3:30pm.  Provider called back and   Could take Immodium if he wants to, if he is feeling very weak, can call an ambulance to come in.  Stressed the importance of increasing fluid intake.    Called patient back and he doesn't have any imodium in the house and no way to get any.  He will focus on fluid intake and monitoring symptoms. Did stress to patient that he can call the triage nurse line back with further questions or new symptoms and per on-call provider, if he is beginning to feel very weak and very different than his usual post infusion days, to call 911 to come to the ER for fluids and evaluation, as he is living alone.  He had no further questions.    Will route to Oncology team for follow-up on Monday.    Charlotte Arroyo RN on 3/6/2021 at 3:55 PM              Reason for " Disposition    [1] Neutropenia known or suspected (e.g., recent cancer chemotherapy) AND [2] new onset of diarrhea    Additional Information    Negative: Shock suspected (e.g., cold/pale/clammy skin, too weak to stand, low BP, rapid pulse)    Negative: Difficult to awaken or acting confused (e.g., disoriented, slurred speech)    Negative: Sounds like a life-threatening emergency to the triager    Negative: Vomiting also present and worse than the diarrhea    Negative: Fecal impaction suspected (with leakage of watery stool around impaction)    Negative: [1] SEVERE abdominal pain (e.g., excruciating) AND [2] present > 1 hour    Negative: [1] SEVERE abdominal pain AND [2] age > 60    Negative: [1] Blood in the stool AND [2] moderate or large amount of blood    Negative: Black or tarry bowel movements  (Exception: chronic-unchanged  black-grey bowel movements AND is taking iron pills or peptobismol)    Protocols used: CANCER - DIARRHEA-A-

## 2021-03-08 NOTE — TELEPHONE ENCOUNTER
"Per Dr. Sweeney: jitteriness may be from dexamethasone, pt to follow-up with pcp for bowel incontinence.    Called pt to follow-up; he stated he had a tough weekend; still suffering from lack of appetite, had diarrhea twice, no more incontinence. State symptoms \"pretty severe\" compared to other cycles \"fatigue, lack of appetite, pretty painful weekend\". Asked about jitteriness, he stated it's better now. State he only takes oral dexa when given daratumumab and not velcade.    Today has eaten 1/2 a toasted english muffin with butter, some sips of water so far. Dozing off and on while watching Youtube, feeling better today than the weekend. No further questions including from after hours call to triage about resuming Miralax.  "

## 2021-03-12 DIAGNOSIS — S22.41XA CLOSED FRACTURE OF MULTIPLE RIBS OF RIGHT SIDE, INITIAL ENCOUNTER: ICD-10-CM

## 2021-03-12 DIAGNOSIS — C90.00 MULTIPLE MYELOMA, REMISSION STATUS UNSPECIFIED (H): ICD-10-CM

## 2021-03-12 RX ORDER — GABAPENTIN 300 MG/1
300 CAPSULE ORAL 2 TIMES DAILY
Qty: 60 CAPSULE | Refills: 3 | Status: SHIPPED | OUTPATIENT
Start: 2021-03-12 | End: 2021-03-23

## 2021-03-12 RX ORDER — OXYCODONE HYDROCHLORIDE 5 MG/1
5 TABLET ORAL EVERY 12 HOURS PRN
Qty: 30 TABLET | Refills: 0 | Status: SHIPPED | OUTPATIENT
Start: 2021-03-12 | End: 2021-07-22

## 2021-03-12 NOTE — TELEPHONE ENCOUNTER
Narcotic Refill     Medication(s) Requested: Oxycodone 5 mg    Last refilled date and by who: 2/17/2021 30 tablets Pebbles Longo PMP reviewed and noted: Not a delegate of provider    What pain is the medication treating: Cancer pain    How is the patient actually taking the medication (not just want the directions say): Per Rogelio, he takes 1-2 per day    Is their pain being adequately controlled on the current regimen: Yes    Experiencing any side effects from medication (sedation, constipation, etc): Has chronic, intermittent constipation for which he has a bowel regimen in place

## 2021-03-12 NOTE — PROGRESS NOTES
"Rogelio is a 76 year old who is being evaluated via a billable telephone visit.      What phone number would you like to be contacted at? [phone]  How would you like to obtain your AVS? Eddie     Vitals - Patient Reported  Weight (Patient Reported): 63.5 kg (140 lb)  Height (Patient Reported): 154.9 cm (5' 1\")  BMI (Based on Pt Reported Ht/Wt): 26.45  Pain Score: Mild Pain (2)  Pain Loc: Low Back    Nguyen Souza [facility] March 15, 2021  8:09 AM     Phone call duration: 38 minutes    Reason for Visit:  evaluation and management of MM    Oncology HPI:   -compression fractures and bone scan reveals multiple lytic lesions throughout his appendicular and axial skeleton. His Beta 2 microglobulin was 3.6 on 10/10/2020. Kappa chains were 73.6 on 10/5/2020 with K/L ratio of 89.9. M spike of 16.2 in urine on 10/10. Bone marrow biopsy on 10/23/2020 showed trilineage hematopoiesis and 50-60% kappa restricted plasma cells. Flow cytometry showed 20 % plasma cells which express CD19, CD38, CD45 and monotypic cytoplasmic kappa immunoglobulin light chains but lack CD20 and CD56.  FISH shows IGH-CCND1 fusion (81%; 30% had loss of IGH component from one fusion signal).       - Started 21 day cycle VRD 11/2020  - 11/27 he had a fall with rib fractures.   -12/9/2020 was switched to madyson-velcade-dex due to poor tolerability of VRD    Interval history:   Rogelio Marshall was met with for follow up over telephone.  - Overall he feels he is slowly recovering from prior infusion.   - Loss of appetite persists. Still maintaining weight and able to eat. Tried protein shakes for breakfast. Very minimal stomach pain.  No pain in mouth or throat. Had a single episode emesis and 3 episodes of diarrhea. Has been taking senna for constipation. No persistent nausea.   -He notes numbness in the very tips of his fingers and the tips of his toes.  States this is overall stable since initiation of treatment and has not progressed despite multiple " doses of Velcade.  Neither are interfering with functionality.  It is not painful.  -He continues to utilize 1-2 oxycodone a day for his back and rib pain.  -He is still not establish care with a dentist.  -Leg swelling is much improved.  -He is noting progressive fatigue as he continues on these treatments.  He is still able to perform ADLs throughout the day.  Sleeping okay at nighttime.     ROS: 10 point ROS neg other than the symptoms noted above in the HPI.        Past Medical History:   Diagnosis Date     Hyperlipidemia      Hypertension         Current Outpatient Medications   Medication Sig Dispense Refill     acetaminophen (TYLENOL) 325 MG tablet Take 3 tablets (975 mg) by mouth 3 times daily 500 tablet 4     aspirin (ASA) 325 MG tablet        calcium carbonate 500 mg, elemental, (OSCAL) 500 MG tablet Take 1 tablet (500 mg) by mouth 2 times daily 200 tablet 3     cholecalciferol (VITAMIN D3) 25 mcg (1000 units) capsule Take 1 capsule (25 mcg) by mouth daily 100 capsule 3     dexamethasone (DECADRON) 4 MG tablet Take 20 mg (five tabs) by mouth on Day 2 35 tablet 0     gabapentin (NEURONTIN) 300 MG capsule Take 1 capsule (300 mg) by mouth 2 times daily 60 capsule 3     hydrochlorothiazide (MICROZIDE) 12.5 MG capsule Take 1 capsule (12.5 mg) by mouth daily 90 capsule 3     lisinopril (ZESTRIL) 20 MG tablet Take 1 tablet (20 mg) by mouth daily 30 tablet 3     methocarbamol (ROBAXIN) 500 MG tablet Take 1 tablet (500 mg) by mouth 4 times daily (Patient taking differently: Take 500 mg by mouth 3 times daily ) 120 tablet 3     mirtazapine (REMERON) 15 MG tablet Take 1 tablet (15 mg) by mouth At Bedtime 30 tablet 0     omeprazole (PRILOSEC) 20 MG DR capsule Take 1 capsule (20 mg) by mouth daily 30 capsule 3     oxyCODONE (ROXICODONE) 5 MG tablet Take 1 tablet (5 mg) by mouth every 12 hours as needed for severe pain 30 tablet 0     polyethylene glycol (MIRALAX) 17 g packet Take 17 g by mouth daily        prochlorperazine (COMPAZINE) 10 MG tablet Take 1 tablet (10 mg) by mouth every 6 hours as needed (Nausea/Vomiting) 30 tablet 5     senna-docusate (SENOKOT-S/PERICOLACE) 8.6-50 MG tablet Take 1 tablet by mouth daily as needed        acyclovir (ZOVIRAX) 400 MG tablet Take 1 tablet (400 mg) by mouth 2 times daily Viral Prophylaxis. 60 tablet 11     Lidocaine (LIDOCARE) 4 % Patch Place 1-3 patches onto the skin every 24 hours To prevent lidocaine toxicity, patient should be patch free for 12 hrs daily. (Patient not taking: Reported on 12/18/2020) 30 patch 1     LORazepam (ATIVAN) 0.5 MG tablet Take 1 tablet (0.5 mg) by mouth every 4 hours as needed (Anxiety, Nausea/Vomiting or Sleep) (Patient not taking: Reported on 2/15/2021) 30 tablet 5     ondansetron (ZOFRAN-ODT) 4 MG ODT tab Take 1 tablet (4 mg) by mouth every 6 hours as needed for nausea or vomiting (Patient not taking: Reported on 12/18/2020)       triamcinolone (KENALOG) 0.1 % external ointment Apply topically 2 times daily To back rash (Patient not taking: Reported on 12/18/2020) 15 g 0     Exam:  There were no vitals taken for this visit.   Telephone visit was done today  Voice sounded strong, able to follow thought processes, did not sound to be in acute distress      Labs:   To be drawn later today.     Imaging:   None to review    Assessment/plan:   #Multiple myeloma  -Diagnosed in October 2020 after identifying multiple lytic lesions throughout his appendicular and axial skeleton  -Initiated VRD on 11/19. Was held on C1D21 due to concerns about his ability to tolerate the medicine. Unfortunately he has had difficulty with hypotension and a fall resulting in rib fractures.  -Was switched to madyson- velcade-dex on 12/9. Had significant diffuse body pain after day 11 Velcade cycle 2. He took a brief break from treatment but then agreed to resume at full dose this past week.  Thus far tolerating well but is noting some progressive fatigue and neuropathy.  He  also had very limited GI issues with last cycle but this did not persist.    - Lab work reveals a great response in markers to treatment- repeat today.    - We will continue with next cycle as planned- will need to monitor neuropathy closely; low threshold to reduce dose of velcade if worsening.   -Established with palliative care     Prophy: Continue levofloxacin and acyclovir.     #Bone mets   -At a high risk for more compression fractures. Vitamin D is replete and he is on a daily supplement as well as calcium.   -Previously discussed that side effects of zometa include bone pains, hypocalcemia, and osteonecrosis of the jaw. He still needs to be cleared by dentistry before beginning zometa -again addressed today 3/15/21.     #Fatigue   -2/2 treatment and poor PO intake.  We will continue to monitor. Encouraged light physical activity.     #Poor PO intake, poor appetite.   fluctuant overall.    Encouraged continued caloric intake.  Will trial Remeron at bedtime.     #Neuropathy   -Grade 1. Stable. Continue to monitor - continue gabapentin. Aware to let us know if worsening as we again reviewed that velcade can worsen neuropathy.     #Right rib pain.  Suspect this is related to his recent fall.  No acute findings on recent chest x-ray.  -Utilizing tylenol and limited oxycodone with adequate control    # GERD  -On omeprazole 20 mg daily.  Can titrate up as needed.    # Fluctuant renal function. Now avoiding ibuprofen. Will continue to monitor closely.     ADDENDUM:  Cr up today. Will given 500 ml IVF bolus, stop hydrochlorothiazide and cut lisinopril from 20 mg to 10 mg. Repeat BMP later this week.     Pebbles Longo PA-C    20 minutes spent on the date of the encounter doing chart review, interpretation of tests and documentation, in addition to 38 minutes spent on the phone with the patient.

## 2021-03-15 ENCOUNTER — VIRTUAL VISIT (OUTPATIENT)
Dept: ONCOLOGY | Facility: CLINIC | Age: 76
End: 2021-03-15
Attending: STUDENT IN AN ORGANIZED HEALTH CARE EDUCATION/TRAINING PROGRAM
Payer: COMMERCIAL

## 2021-03-15 ENCOUNTER — APPOINTMENT (OUTPATIENT)
Dept: LAB | Facility: CLINIC | Age: 76
End: 2021-03-15
Attending: STUDENT IN AN ORGANIZED HEALTH CARE EDUCATION/TRAINING PROGRAM
Payer: COMMERCIAL

## 2021-03-15 VITALS
TEMPERATURE: 97.6 F | HEART RATE: 94 BPM | SYSTOLIC BLOOD PRESSURE: 110 MMHG | RESPIRATION RATE: 16 BRPM | DIASTOLIC BLOOD PRESSURE: 60 MMHG | OXYGEN SATURATION: 99 % | WEIGHT: 133 LBS | BODY MASS INDEX: 25.13 KG/M2

## 2021-03-15 DIAGNOSIS — C90.00 MULTIPLE MYELOMA, REMISSION STATUS UNSPECIFIED (H): Primary | ICD-10-CM

## 2021-03-15 DIAGNOSIS — R63.0 ANOREXIA: ICD-10-CM

## 2021-03-15 LAB
ALBUMIN SERPL-MCNC: 3.9 G/DL (ref 3.4–5)
ALP SERPL-CCNC: 96 U/L (ref 40–150)
ALT SERPL W P-5'-P-CCNC: 17 U/L (ref 0–70)
ANION GAP SERPL CALCULATED.3IONS-SCNC: 7 MMOL/L (ref 3–14)
AST SERPL W P-5'-P-CCNC: 10 U/L (ref 0–45)
BASOPHILS # BLD AUTO: 0 10E9/L (ref 0–0.2)
BASOPHILS NFR BLD AUTO: 0.4 %
BILIRUB SERPL-MCNC: 0.5 MG/DL (ref 0.2–1.3)
BUN SERPL-MCNC: 36 MG/DL (ref 7–30)
CALCIUM SERPL-MCNC: 9.7 MG/DL (ref 8.5–10.1)
CHLORIDE SERPL-SCNC: 101 MMOL/L (ref 94–109)
CO2 SERPL-SCNC: 25 MMOL/L (ref 20–32)
CREAT SERPL-MCNC: 1.39 MG/DL (ref 0.66–1.25)
DIFFERENTIAL METHOD BLD: ABNORMAL
EOSINOPHIL # BLD AUTO: 0.1 10E9/L (ref 0–0.7)
EOSINOPHIL NFR BLD AUTO: 1.5 %
ERYTHROCYTE [DISTWIDTH] IN BLOOD BY AUTOMATED COUNT: 14.7 % (ref 10–15)
GFR SERPL CREATININE-BSD FRML MDRD: 50 ML/MIN/{1.73_M2}
GLUCOSE SERPL-MCNC: 103 MG/DL (ref 70–99)
HCT VFR BLD AUTO: 28.5 % (ref 40–53)
HGB BLD-MCNC: 9.4 G/DL (ref 13.3–17.7)
IMM GRANULOCYTES # BLD: 0 10E9/L (ref 0–0.4)
IMM GRANULOCYTES NFR BLD: 0.4 %
LYMPHOCYTES # BLD AUTO: 1 10E9/L (ref 0.8–5.3)
LYMPHOCYTES NFR BLD AUTO: 14.1 %
MCH RBC QN AUTO: 33.3 PG (ref 26.5–33)
MCHC RBC AUTO-ENTMCNC: 33 G/DL (ref 31.5–36.5)
MCV RBC AUTO: 101 FL (ref 78–100)
MONOCYTES # BLD AUTO: 0.7 10E9/L (ref 0–1.3)
MONOCYTES NFR BLD AUTO: 9.7 %
NEUTROPHILS # BLD AUTO: 5 10E9/L (ref 1.6–8.3)
NEUTROPHILS NFR BLD AUTO: 73.9 %
NRBC # BLD AUTO: 0 10*3/UL
NRBC BLD AUTO-RTO: 0 /100
PLATELET # BLD AUTO: 441 10E9/L (ref 150–450)
POTASSIUM SERPL-SCNC: 4.4 MMOL/L (ref 3.4–5.3)
PROT SERPL-MCNC: 7.1 G/DL (ref 6.8–8.8)
RBC # BLD AUTO: 2.82 10E12/L (ref 4.4–5.9)
SODIUM SERPL-SCNC: 133 MMOL/L (ref 133–144)
WBC # BLD AUTO: 6.8 10E9/L (ref 4–11)

## 2021-03-15 PROCEDURE — 82784 ASSAY IGA/IGD/IGG/IGM EACH: CPT | Performed by: PHYSICIAN ASSISTANT

## 2021-03-15 PROCEDURE — 250N000013 HC RX MED GY IP 250 OP 250 PS 637: Performed by: PHYSICIAN ASSISTANT

## 2021-03-15 PROCEDURE — 250N000011 HC RX IP 250 OP 636: Performed by: PHYSICIAN ASSISTANT

## 2021-03-15 PROCEDURE — 84165 PROTEIN E-PHORESIS SERUM: CPT | Mod: TC | Performed by: PHYSICIAN ASSISTANT

## 2021-03-15 PROCEDURE — 250N000012 HC RX MED GY IP 250 OP 636 PS 637: Performed by: PHYSICIAN ASSISTANT

## 2021-03-15 PROCEDURE — 84165 PROTEIN E-PHORESIS SERUM: CPT | Mod: 26 | Performed by: PATHOLOGY

## 2021-03-15 PROCEDURE — 80053 COMPREHEN METABOLIC PANEL: CPT | Performed by: PHYSICIAN ASSISTANT

## 2021-03-15 PROCEDURE — 96401 CHEMO ANTI-NEOPL SQ/IM: CPT

## 2021-03-15 PROCEDURE — 83883 ASSAY NEPHELOMETRY NOT SPEC: CPT | Performed by: PHYSICIAN ASSISTANT

## 2021-03-15 PROCEDURE — 258N000003 HC RX IP 258 OP 636: Performed by: PHYSICIAN ASSISTANT

## 2021-03-15 PROCEDURE — G0463 HOSPITAL OUTPT CLINIC VISIT: HCPCS | Mod: 25

## 2021-03-15 PROCEDURE — 85025 COMPLETE CBC W/AUTO DIFF WBC: CPT | Performed by: PHYSICIAN ASSISTANT

## 2021-03-15 PROCEDURE — 36415 COLL VENOUS BLD VENIPUNCTURE: CPT

## 2021-03-15 PROCEDURE — 96360 HYDRATION IV INFUSION INIT: CPT

## 2021-03-15 PROCEDURE — 999N001036 HC STATISTIC TOTAL PROTEIN: Performed by: PHYSICIAN ASSISTANT

## 2021-03-15 PROCEDURE — 99215 OFFICE O/P EST HI 40 MIN: CPT | Mod: 95 | Performed by: PHYSICIAN ASSISTANT

## 2021-03-15 RX ORDER — MIRTAZAPINE 15 MG/1
15 TABLET, FILM COATED ORAL AT BEDTIME
Qty: 30 TABLET | Refills: 0 | Status: SHIPPED | OUTPATIENT
Start: 2021-03-15 | End: 2021-04-21

## 2021-03-15 RX ORDER — ACETAMINOPHEN 325 MG/1
650 TABLET ORAL ONCE
Status: CANCELLED | OUTPATIENT
Start: 2021-03-15

## 2021-03-15 RX ORDER — MEPERIDINE HYDROCHLORIDE 25 MG/ML
25 INJECTION INTRAMUSCULAR; INTRAVENOUS; SUBCUTANEOUS EVERY 30 MIN PRN
Status: CANCELLED | OUTPATIENT
Start: 2021-03-15

## 2021-03-15 RX ORDER — DEXAMETHASONE 4 MG/1
20 TABLET ORAL ONCE
Status: CANCELLED | OUTPATIENT
Start: 2021-03-15

## 2021-03-15 RX ORDER — METHYLPREDNISOLONE SODIUM SUCCINATE 125 MG/2ML
125 INJECTION, POWDER, LYOPHILIZED, FOR SOLUTION INTRAMUSCULAR; INTRAVENOUS
Status: CANCELLED
Start: 2021-03-15

## 2021-03-15 RX ORDER — LORAZEPAM 2 MG/ML
0.5 INJECTION INTRAMUSCULAR EVERY 4 HOURS PRN
Status: CANCELLED
Start: 2021-03-15

## 2021-03-15 RX ORDER — HEPARIN SODIUM,PORCINE 10 UNIT/ML
5 VIAL (ML) INTRAVENOUS
Status: CANCELLED | OUTPATIENT
Start: 2021-03-15

## 2021-03-15 RX ORDER — EPINEPHRINE 1 MG/ML
0.3 INJECTION, SOLUTION INTRAMUSCULAR; SUBCUTANEOUS EVERY 5 MIN PRN
Status: CANCELLED | OUTPATIENT
Start: 2021-03-15

## 2021-03-15 RX ORDER — ALBUTEROL SULFATE 90 UG/1
1-2 AEROSOL, METERED RESPIRATORY (INHALATION)
Status: CANCELLED
Start: 2021-03-15

## 2021-03-15 RX ORDER — DIPHENHYDRAMINE HCL 25 MG
50 CAPSULE ORAL ONCE
Status: CANCELLED | OUTPATIENT
Start: 2021-03-15

## 2021-03-15 RX ORDER — DIPHENHYDRAMINE HYDROCHLORIDE 50 MG/ML
50 INJECTION INTRAMUSCULAR; INTRAVENOUS
Status: CANCELLED
Start: 2021-03-15

## 2021-03-15 RX ORDER — SODIUM CHLORIDE 9 MG/ML
1000 INJECTION, SOLUTION INTRAVENOUS CONTINUOUS PRN
Status: CANCELLED
Start: 2021-03-15

## 2021-03-15 RX ORDER — ALBUTEROL SULFATE 0.83 MG/ML
2.5 SOLUTION RESPIRATORY (INHALATION)
Status: CANCELLED | OUTPATIENT
Start: 2021-03-15

## 2021-03-15 RX ORDER — DIPHENHYDRAMINE HCL 25 MG
50 CAPSULE ORAL ONCE
Status: COMPLETED | OUTPATIENT
Start: 2021-03-15 | End: 2021-03-15

## 2021-03-15 RX ORDER — HEPARIN SODIUM (PORCINE) LOCK FLUSH IV SOLN 100 UNIT/ML 100 UNIT/ML
5 SOLUTION INTRAVENOUS
Status: CANCELLED | OUTPATIENT
Start: 2021-03-15

## 2021-03-15 RX ORDER — NALOXONE HYDROCHLORIDE 0.4 MG/ML
.1-.4 INJECTION, SOLUTION INTRAMUSCULAR; INTRAVENOUS; SUBCUTANEOUS
Status: CANCELLED | OUTPATIENT
Start: 2021-03-15

## 2021-03-15 RX ORDER — ACETAMINOPHEN 325 MG/1
650 TABLET ORAL ONCE
Status: COMPLETED | OUTPATIENT
Start: 2021-03-15 | End: 2021-03-15

## 2021-03-15 RX ORDER — DEXAMETHASONE 4 MG/1
20 TABLET ORAL ONCE
Status: COMPLETED | OUTPATIENT
Start: 2021-03-15 | End: 2021-03-15

## 2021-03-15 RX ADMIN — DIPHENHYDRAMINE HYDROCHLORIDE 50 MG: 25 CAPSULE ORAL at 14:14

## 2021-03-15 RX ADMIN — DARATUMUMAB AND HYALURONIDASE-FIHJ (HUMAN RECOMBINANT) 1800 MG: 1800; 30000 INJECTION SUBCUTANEOUS at 15:20

## 2021-03-15 RX ADMIN — ACETAMINOPHEN 650 MG: 325 TABLET ORAL at 14:14

## 2021-03-15 RX ADMIN — BORTEZOMIB 2.2 MG: 3.5 INJECTION, POWDER, LYOPHILIZED, FOR SOLUTION INTRAVENOUS; SUBCUTANEOUS at 15:21

## 2021-03-15 RX ADMIN — DEXAMETHASONE 20 MG: 4 TABLET ORAL at 14:14

## 2021-03-15 RX ADMIN — SODIUM CHLORIDE 500 ML: 9 INJECTION, SOLUTION INTRAVENOUS at 14:33

## 2021-03-15 ASSESSMENT — PAIN SCALES - GENERAL: PAINLEVEL: MILD PAIN (2)

## 2021-03-15 NOTE — PATIENT INSTRUCTIONS
-Stop taking/HOLD your hydrochlorothiazide  -Decrease you lisinopril dose by half (decrease to 10mg by mouth daily)  -try to drink lots of fluids at home  -call if you start noticing any new swelling        [facility] Triage and after hours / weekends / holidays:  [phone]    Please call the triage or after hours line if you experience a temperature greater than or equal to 100.5, shaking chills, have uncontrolled nausea, vomiting and/or diarrhea, dizziness, shortness of breath, chest pain, bleeding, unexplained bruising, or if you have any other new/concerning symptoms, questions or concerns.      If you are having any concerning symptoms or wish to speak to a provider before your next infusion visit, please call your care coordinator or triage to notify them so we can adequately serve you.     If you need a refill on a narcotic prescription or other medication, please call before your infusion appointment.           March 2021 Sunday Monday Tuesday Wednesday Thursday Friday Saturday 1    LAB PERIPHERAL   9:15 AM   (15 min.)   [facility]RAW   [facility] ONC INFUSION 60  10:00 AM   (60 min.)    ONCOLOGY INFUSION   [facility] 2     3     4    [facility] ONC INFUSION 30   9:30 AM   (60 min.)    ONCOLOGY INFUSION   [facility] 5     6       7     8     9     10     11     12     13       14     15    TELEPHONE VISIT RETURN   8:50 AM   (50 min.)   Pebbles Longo PA-C   [facility]    LAB PERIPHERAL  12:45 PM   (15 min.)   [facility]RAW   [facility] ONC INFUSION 120   1:30 PM   (120 min.)    ONCOLOGY INFUSION   [facility] 16     17    TELEPHONE VISIT RETURN   2:00 PM   (30 min.)   Angela Sweeney MD   [facility] 18    LAB PERIPHERAL   9:45 AM   (15 min.)   [facility] DRAW     [facility]    [facility] ONC INFUSION 60  10:00 AM   (60 min.)   [facility] ONCOLOGY INFUSION   [facility] 19     20       21     22    LAB PERIPHERAL   9:15 AM   (15 min.)   [facility]RAW   [facility]    [facility] ONC INFUSION 60  10:00 AM   (60 min.)   [facility] ONCOLOGY INFUSION   [facility] 23     24     25    [facility] ONC INFUSION 60   3:00 PM   (60 min.)   [facility] ONCOLOGY INFUSION   [facility] 26     27       28     29     30     31 April 2021 Sunday Monday Tuesday Wednesday Thursday Friday Saturday                       1     2     3       4     5     6     7     8     9     10       11     12     13     14     15     16     17       18     19     20     21     22     23     24       25     26     27     28     29     30                         Recent Results (from the past 24 hour(s))   CBC with platelets differential    Collection Time: 03/15/21  1:21 PM   Result Value Ref Range    WBC 6.8 4.0 - 11.0 10e9/L    RBC Count 2.82 (L) 4.4 - 5.9 10e12/L    Hemoglobin 9.4 (L) 13.3 - 17.7 g/dL    Hematocrit 28.5 (L) 40.0 - 53.0 %     (H) 78 - 100 fl    MCH 33.3 (H) 26.5 - 33.0 pg    MCHC 33.0 31.5 - 36.5 g/dL    RDW 14.7 10.0 - 15.0 %    Platelet Count 441 150 - 450 10e9/L    Diff Method Automated Method     % Neutrophils 73.9 %    % Lymphocytes 14.1 %    % Monocytes 9.7 %    % Eosinophils 1.5 %    % Basophils 0.4 %    % Immature Granulocytes 0.4 %    Nucleated RBCs 0 0 /100    Absolute Neutrophil 5.0 1.6 - 8.3 10e9/L    Absolute Lymphocytes 1.0 0.8 - 5.3 10e9/L    Absolute Monocytes 0.7 0.0 - 1.3 10e9/L    Absolute Eosinophils 0.1 0.0 - 0.7 10e9/L    Absolute Basophils 0.0 0.0 - 0.2 10e9/L    Abs Immature Granulocytes 0.0 0 - 0.4 10e9/L    Absolute Nucleated RBC 0.0    Comprehensive metabolic panel    Collection Time: 03/15/21  1:21 PM   Result Value Ref Range     Sodium 133 133 - 144 mmol/L    Potassium 4.4 3.4 - 5.3 mmol/L    Chloride 101 94 - 109 mmol/L    Carbon Dioxide 25 20 - 32 mmol/L    Anion Gap 7 3 - 14 mmol/L    Glucose 103 (H) 70 - 99 mg/dL    Urea Nitrogen 36 (H) 7 - 30 mg/dL    Creatinine 1.39 (H) 0.66 - 1.25 mg/dL    GFR Estimate 50 (L) >60 mL/min/[1.73_m2]    GFR Estimate If Black 57 (L) >60 mL/min/[1.73_m2]    Calcium 9.7 8.5 - 10.1 mg/dL    Bilirubin Total 0.5 0.2 - 1.3 mg/dL    Albumin 3.9 3.4 - 5.0 g/dL    Protein Total 7.1 6.8 - 8.8 g/dL    Alkaline Phosphatase 96 40 - 150 U/L    ALT 17 0 - 70 U/L    AST 10 0 - 45 U/L

## 2021-03-15 NOTE — PROGRESS NOTES
3/15/2021 1410 TORB Pebbles Longo PA-C/Tosha Levy RN  -ok to give chemo today with creat 1.39  -give 500cc NS bolus IV today and encourage patient to push oral fluids at home  -instruct patient to hold his hydrochlorothiazide and call if he starts noticing edema  -instruct patient to decrease his lisinopril dose by half

## 2021-03-15 NOTE — PROGRESS NOTES
Infusion Nursing Note:  Rogelio Marshall presents today for cycle 5 day 1 daratumumab/velcade injections.    Patient seen by provider today: Yes: virtual visit with Pebbles Longo PA-C.   present during visit today: Not Applicable.    Note: Pt presents feeling well. Endorses some fatigue, decreased appetite and altered taste. Denies pain or nausea/vomiting. Denies fevers, cough, or SOB. Daratumumab given to LLQ abdomen, velcade in R arm. Pt reports having enough decadron at home for tomorrow's dose.    Intravenous Access:  Peripheral IV placed.    Treatment Conditions:  Lab Results   Component Value Date    HGB 9.4 03/15/2021     Lab Results   Component Value Date    WBC 6.8 03/15/2021      Lab Results   Component Value Date    ANEU 5.0 03/15/2021     Lab Results   Component Value Date     03/15/2021      Lab Results   Component Value Date     03/15/2021                   Lab Results   Component Value Date    POTASSIUM 4.4 03/15/2021           Lab Results   Component Value Date    MAG 2.1 12/02/2020            Lab Results   Component Value Date    CR 1.39 03/15/2021                   Lab Results   Component Value Date    MIRI 9.7 03/15/2021                Lab Results   Component Value Date    BILITOTAL 0.5 03/15/2021           Lab Results   Component Value Date    ALBUMIN 3.9 03/15/2021                    Lab Results   Component Value Date    ALT 17 03/15/2021           Lab Results   Component Value Date    AST 10 03/15/2021       Results reviewed, labs MET treatment parameters, ok to proceed with treatment.      Post Infusion Assessment:  Patient tolerated injection without incident.  Site patent and intact, free from redness, edema or discomfort.  No evidence of extravasations.  Access discontinued per protocol.       Discharge Plan:   Prescription refills given for remeron.  Discharge instructions reviewed with: Patient.  Patient and/or family verbalized understanding of discharge  instructions and all questions answered.  Copy of AVS reviewed with patient and/or family.  Patient will return 03/18 for next appointment.  Patient discharged in stable condition accompanied by: self.  Departure Mode: Ambulatory.    Joie Baker RN

## 2021-03-15 NOTE — LETTER
"    3/15/2021         RE: Rogelio Marshall  [address]  [facility] [address]        Dear Colleague,    Thank you for referring your patient, Rogelio Marshall, to the [facility]. Please see a copy of my visit note below.    Rogelio is a 76 year old who is being evaluated via a billable telephone visit.      What phone number would you like to be contacted at? [phone]  How would you like to obtain your AVS? MyChart     Vitals - Patient Reported  Weight (Patient Reported): 63.5 kg (140 lb)  Height (Patient Reported): 154.9 cm (5' 1\")  BMI (Based on Pt Reported Ht/Wt): 26.45  Pain Score: Mild Pain (2)  Pain Loc: Low Back    Nguyen Souza CMA March 15, 2021  8:09 AM     Phone call duration: 38 minutes    Reason for Visit:  evaluation and management of MM    Oncology HPI:   -compression fractures and bone scan reveals multiple lytic lesions throughout his appendicular and axial skeleton. His Beta 2 microglobulin was 3.6 on 10/10/2020. Kappa chains were 73.6 on 10/5/2020 with K/L ratio of 89.9. M spike of 16.2 in urine on 10/10. Bone marrow biopsy on 10/23/2020 showed trilineage hematopoiesis and 50-60% kappa restricted plasma cells. Flow cytometry showed 20 % plasma cells which express CD19, CD38, CD45 and monotypic cytoplasmic kappa immunoglobulin light chains but lack CD20 and CD56.  FISH shows IGH-CCND1 fusion (81%; 30% had loss of IGH component from one fusion signal).       - Started 21 day cycle VRD 11/2020  - 11/27 he had a fall with rib fractures.   -12/9/2020 was switched to madyson-velcade-dex due to poor tolerability of VRD    Interval history:   Rogelio Marshall was met with for follow up over telephone.  - Overall he feels he is slowly recovering from prior infusion.   - Loss of appetite persists. Still maintaining weight and able to eat. Tried protein shakes for breakfast. Very minimal stomach pain.  No pain in mouth or throat. Had a single episode emesis and 3 " episodes of diarrhea. Has been taking senna for constipation. No persistent nausea.   -He notes numbness in the very tips of his fingers and the tips of his toes.  States this is overall stable since initiation of treatment and has not progressed despite multiple doses of Velcade.  Neither are interfering with functionality.  It is not painful.  -He continues to utilize 1-2 oxycodone a day for his back and rib pain.  -He is still not establish care with a dentist.  -Leg swelling is much improved.  -He is noting progressive fatigue as he continues on these treatments.  He is still able to perform ADLs throughout the day.  Sleeping okay at nighttime.     ROS: 10 point ROS neg other than the symptoms noted above in the HPI.        Past Medical History:   Diagnosis Date     Hyperlipidemia      Hypertension         Current Outpatient Medications   Medication Sig Dispense Refill     acetaminophen (TYLENOL) 325 MG tablet Take 3 tablets (975 mg) by mouth 3 times daily 500 tablet 4     aspirin (ASA) 325 MG tablet        calcium carbonate 500 mg, elemental, (OSCAL) 500 MG tablet Take 1 tablet (500 mg) by mouth 2 times daily 200 tablet 3     cholecalciferol (VITAMIN D3) 25 mcg (1000 units) capsule Take 1 capsule (25 mcg) by mouth daily 100 capsule 3     dexamethasone (DECADRON) 4 MG tablet Take 20 mg (five tabs) by mouth on Day 2 35 tablet 0     gabapentin (NEURONTIN) 300 MG capsule Take 1 capsule (300 mg) by mouth 2 times daily 60 capsule 3     hydrochlorothiazide (MICROZIDE) 12.5 MG capsule Take 1 capsule (12.5 mg) by mouth daily 90 capsule 3     lisinopril (ZESTRIL) 20 MG tablet Take 1 tablet (20 mg) by mouth daily 30 tablet 3     methocarbamol (ROBAXIN) 500 MG tablet Take 1 tablet (500 mg) by mouth 4 times daily (Patient taking differently: Take 500 mg by mouth 3 times daily ) 120 tablet 3     mirtazapine (REMERON) 15 MG tablet Take 1 tablet (15 mg) by mouth At Bedtime 30 tablet 0     omeprazole (PRILOSEC) 20 MG DR capsule  Take 1 capsule (20 mg) by mouth daily 30 capsule 3     oxyCODONE (ROXICODONE) 5 MG tablet Take 1 tablet (5 mg) by mouth every 12 hours as needed for severe pain 30 tablet 0     polyethylene glycol (MIRALAX) 17 g packet Take 17 g by mouth daily       prochlorperazine (COMPAZINE) 10 MG tablet Take 1 tablet (10 mg) by mouth every 6 hours as needed (Nausea/Vomiting) 30 tablet 5     senna-docusate (SENOKOT-S/PERICOLACE) 8.6-50 MG tablet Take 1 tablet by mouth daily as needed        acyclovir (ZOVIRAX) 400 MG tablet Take 1 tablet (400 mg) by mouth 2 times daily Viral Prophylaxis. 60 tablet 11     Lidocaine (LIDOCARE) 4 % Patch Place 1-3 patches onto the skin every 24 hours To prevent lidocaine toxicity, patient should be patch free for 12 hrs daily. (Patient not taking: Reported on 12/18/2020) 30 patch 1     LORazepam (ATIVAN) 0.5 MG tablet Take 1 tablet (0.5 mg) by mouth every 4 hours as needed (Anxiety, Nausea/Vomiting or Sleep) (Patient not taking: Reported on 2/15/2021) 30 tablet 5     ondansetron (ZOFRAN-ODT) 4 MG ODT tab Take 1 tablet (4 mg) by mouth every 6 hours as needed for nausea or vomiting (Patient not taking: Reported on 12/18/2020)       triamcinolone (KENALOG) 0.1 % external ointment Apply topically 2 times daily To back rash (Patient not taking: Reported on 12/18/2020) 15 g 0     Exam:  There were no vitals taken for this visit.   Telephone visit was done today  Voice sounded strong, able to follow thought processes, did not sound to be in acute distress      Labs:   To be drawn later today.     Imaging:   None to review    Assessment/plan:   #Multiple myeloma  -Diagnosed in October 2020 after identifying multiple lytic lesions throughout his appendicular and axial skeleton  -Initiated VRD on 11/19. Was held on C1D21 due to concerns about his ability to tolerate the medicine. Unfortunately he has had difficulty with hypotension and a fall resulting in rib fractures.  -Was switched to madyson- velcade-dex on  12/9. Had significant diffuse body pain after day 11 Velcade cycle 2. He took a brief break from treatment but then agreed to resume at full dose this past week.  Thus far tolerating well but is noting some progressive fatigue and neuropathy.  He also had very limited GI issues with last cycle but this did not persist.    - Lab work reveals a great response in markers to treatment- repeat today.    - We will continue with next cycle as planned- will need to monitor neuropathy closely; low threshold to reduce dose of velcade if worsening.   -Established with palliative care     Prophy: Continue levofloxacin and acyclovir.     #Bone mets   -At a high risk for more compression fractures. Vitamin D is replete and he is on a daily supplement as well as calcium.   -Previously discussed that side effects of zometa include bone pains, hypocalcemia, and osteonecrosis of the jaw. He still needs to be cleared by dentistry before beginning zometa -again addressed today 3/15/21.     #Fatigue   -2/2 treatment and poor PO intake.  We will continue to monitor. Encouraged light physical activity.     #Poor PO intake, poor appetite.   fluctuant overall.    Encouraged continued caloric intake.  Will trial Remeron at bedtime.     #Neuropathy   -Grade 1. Stable. Continue to monitor - continue gabapentin. Aware to let us know if worsening as we again reviewed that velcade can worsen neuropathy.     #Right rib pain.  Suspect this is related to his recent fall.  No acute findings on recent chest x-ray.  -Utilizing tylenol and limited oxycodone with adequate control    # GERD  -On omeprazole 20 mg daily.  Can titrate up as needed.    # Fluctuant renal function. Now avoiding ibuprofen. Will continue to monitor closely.     ADDENDUM:  Cr up today. Will given 500 ml IVF bolus, stop hydrochlorothiazide and cut lisinopril from 20 mg to 10 mg. Repeat BMP later this week.     Pebbles Longo PA-C    20 minutes spent on the date of the encounter doing  chart review, interpretation of tests and documentation, in addition to 38 minutes spent on the phone with the patient.       Again, thank you for allowing me to participate in the care of your patient.        Sincerely,        Pebbles Longo PA-C

## 2021-03-16 DIAGNOSIS — Z53.9 DIAGNOSIS NOT YET DEFINED: Primary | ICD-10-CM

## 2021-03-16 LAB
ALBUMIN SERPL ELPH-MCNC: 4.4 G/DL (ref 3.7–5.1)
ALPHA1 GLOB SERPL ELPH-MCNC: 0.4 G/DL (ref 0.2–0.4)
ALPHA2 GLOB SERPL ELPH-MCNC: 0.8 G/DL (ref 0.5–0.9)
B-GLOBULIN SERPL ELPH-MCNC: 0.7 G/DL (ref 0.6–1)
GAMMA GLOB SERPL ELPH-MCNC: 0.3 G/DL (ref 0.7–1.6)
IGA SERPL-MCNC: 5 MG/DL (ref 84–499)
IGG SERPL-MCNC: 369 MG/DL (ref 610–1616)
IGM SERPL-MCNC: <10 MG/DL (ref 35–242)
KAPPA LC UR-MCNC: 0.68 MG/DL (ref 0.33–1.94)
KAPPA LC/LAMBDA SER: 3.24 {RATIO} (ref 0.26–1.65)
LAMBDA LC SERPL-MCNC: 0.21 MG/DL (ref 0.57–2.63)
M PROTEIN SERPL ELPH-MCNC: 0.1 G/DL
PROT PATTERN SERPL ELPH-IMP: ABNORMAL

## 2021-03-16 PROCEDURE — G0179 MD RECERTIFICATION HHA PT: HCPCS | Performed by: INTERNAL MEDICINE

## 2021-03-17 ENCOUNTER — VIRTUAL VISIT (OUTPATIENT)
Dept: ONCOLOGY | Facility: CLINIC | Age: 76
End: 2021-03-17
Attending: STUDENT IN AN ORGANIZED HEALTH CARE EDUCATION/TRAINING PROGRAM
Payer: COMMERCIAL

## 2021-03-17 DIAGNOSIS — T88.7XXA MEDICATION SIDE EFFECTS: ICD-10-CM

## 2021-03-17 DIAGNOSIS — N18.2 CKD (CHRONIC KIDNEY DISEASE) STAGE 2, GFR 60-89 ML/MIN: ICD-10-CM

## 2021-03-17 DIAGNOSIS — C90.00 MULTIPLE MYELOMA, REMISSION STATUS UNSPECIFIED (H): Primary | ICD-10-CM

## 2021-03-17 PROCEDURE — 99214 OFFICE O/P EST MOD 30 MIN: CPT | Mod: 95 | Performed by: STUDENT IN AN ORGANIZED HEALTH CARE EDUCATION/TRAINING PROGRAM

## 2021-03-17 PROCEDURE — 999N001193 HC VIDEO/TELEPHONE VISIT; NO CHARGE

## 2021-03-17 RX ORDER — HEPARIN SODIUM (PORCINE) LOCK FLUSH IV SOLN 100 UNIT/ML 100 UNIT/ML
5 SOLUTION INTRAVENOUS
Status: CANCELLED | OUTPATIENT
Start: 2021-03-18

## 2021-03-17 RX ORDER — NALOXONE HYDROCHLORIDE 0.4 MG/ML
.1-.4 INJECTION, SOLUTION INTRAMUSCULAR; INTRAVENOUS; SUBCUTANEOUS
Status: CANCELLED | OUTPATIENT
Start: 2021-03-18

## 2021-03-17 RX ORDER — HEPARIN SODIUM,PORCINE 10 UNIT/ML
5 VIAL (ML) INTRAVENOUS
Status: CANCELLED | OUTPATIENT
Start: 2021-03-22

## 2021-03-17 RX ORDER — DIPHENHYDRAMINE HYDROCHLORIDE 50 MG/ML
50 INJECTION INTRAMUSCULAR; INTRAVENOUS
Status: CANCELLED
Start: 2021-03-18

## 2021-03-17 RX ORDER — NALOXONE HYDROCHLORIDE 0.4 MG/ML
.1-.4 INJECTION, SOLUTION INTRAMUSCULAR; INTRAVENOUS; SUBCUTANEOUS
Status: CANCELLED | OUTPATIENT
Start: 2021-03-25

## 2021-03-17 RX ORDER — HEPARIN SODIUM,PORCINE 10 UNIT/ML
5 VIAL (ML) INTRAVENOUS
Status: CANCELLED | OUTPATIENT
Start: 2021-03-18

## 2021-03-17 RX ORDER — ALBUTEROL SULFATE 0.83 MG/ML
2.5 SOLUTION RESPIRATORY (INHALATION)
Status: CANCELLED | OUTPATIENT
Start: 2021-03-18

## 2021-03-17 RX ORDER — SODIUM CHLORIDE 9 MG/ML
1000 INJECTION, SOLUTION INTRAVENOUS CONTINUOUS PRN
Status: CANCELLED
Start: 2021-03-22

## 2021-03-17 RX ORDER — METHYLPREDNISOLONE SODIUM SUCCINATE 125 MG/2ML
125 INJECTION, POWDER, LYOPHILIZED, FOR SOLUTION INTRAMUSCULAR; INTRAVENOUS
Status: CANCELLED
Start: 2021-03-25

## 2021-03-17 RX ORDER — LORAZEPAM 2 MG/ML
0.5 INJECTION INTRAMUSCULAR EVERY 4 HOURS PRN
Status: CANCELLED
Start: 2021-03-18

## 2021-03-17 RX ORDER — HEPARIN SODIUM (PORCINE) LOCK FLUSH IV SOLN 100 UNIT/ML 100 UNIT/ML
5 SOLUTION INTRAVENOUS
Status: CANCELLED | OUTPATIENT
Start: 2021-03-22

## 2021-03-17 RX ORDER — METHYLPREDNISOLONE SODIUM SUCCINATE 125 MG/2ML
125 INJECTION, POWDER, LYOPHILIZED, FOR SOLUTION INTRAMUSCULAR; INTRAVENOUS
Status: CANCELLED
Start: 2021-03-18

## 2021-03-17 RX ORDER — EPINEPHRINE 1 MG/ML
0.3 INJECTION, SOLUTION INTRAMUSCULAR; SUBCUTANEOUS EVERY 5 MIN PRN
Status: CANCELLED | OUTPATIENT
Start: 2021-03-25

## 2021-03-17 RX ORDER — ALBUTEROL SULFATE 90 UG/1
1-2 AEROSOL, METERED RESPIRATORY (INHALATION)
Status: CANCELLED
Start: 2021-03-18

## 2021-03-17 RX ORDER — MEPERIDINE HYDROCHLORIDE 25 MG/ML
25 INJECTION INTRAMUSCULAR; INTRAVENOUS; SUBCUTANEOUS EVERY 30 MIN PRN
Status: CANCELLED | OUTPATIENT
Start: 2021-03-25

## 2021-03-17 RX ORDER — MEPERIDINE HYDROCHLORIDE 25 MG/ML
25 INJECTION INTRAMUSCULAR; INTRAVENOUS; SUBCUTANEOUS EVERY 30 MIN PRN
Status: CANCELLED | OUTPATIENT
Start: 2021-03-18

## 2021-03-17 RX ORDER — HEPARIN SODIUM (PORCINE) LOCK FLUSH IV SOLN 100 UNIT/ML 100 UNIT/ML
5 SOLUTION INTRAVENOUS
Status: CANCELLED | OUTPATIENT
Start: 2021-03-25

## 2021-03-17 RX ORDER — EPINEPHRINE 1 MG/ML
0.3 INJECTION, SOLUTION INTRAMUSCULAR; SUBCUTANEOUS EVERY 5 MIN PRN
Status: CANCELLED | OUTPATIENT
Start: 2021-03-22

## 2021-03-17 RX ORDER — SODIUM CHLORIDE 9 MG/ML
1000 INJECTION, SOLUTION INTRAVENOUS CONTINUOUS PRN
Status: CANCELLED
Start: 2021-03-25

## 2021-03-17 RX ORDER — METHYLPREDNISOLONE SODIUM SUCCINATE 125 MG/2ML
125 INJECTION, POWDER, LYOPHILIZED, FOR SOLUTION INTRAMUSCULAR; INTRAVENOUS
Status: CANCELLED
Start: 2021-03-22

## 2021-03-17 RX ORDER — ALBUTEROL SULFATE 90 UG/1
1-2 AEROSOL, METERED RESPIRATORY (INHALATION)
Status: CANCELLED
Start: 2021-03-25

## 2021-03-17 RX ORDER — MEPERIDINE HYDROCHLORIDE 25 MG/ML
25 INJECTION INTRAMUSCULAR; INTRAVENOUS; SUBCUTANEOUS EVERY 30 MIN PRN
Status: CANCELLED | OUTPATIENT
Start: 2021-03-22

## 2021-03-17 RX ORDER — SODIUM CHLORIDE 9 MG/ML
1000 INJECTION, SOLUTION INTRAVENOUS CONTINUOUS PRN
Status: CANCELLED
Start: 2021-03-18

## 2021-03-17 RX ORDER — ALBUTEROL SULFATE 90 UG/1
1-2 AEROSOL, METERED RESPIRATORY (INHALATION)
Status: CANCELLED
Start: 2021-03-22

## 2021-03-17 RX ORDER — HEPARIN SODIUM,PORCINE 10 UNIT/ML
5 VIAL (ML) INTRAVENOUS
Status: CANCELLED | OUTPATIENT
Start: 2021-03-25

## 2021-03-17 RX ORDER — LORAZEPAM 2 MG/ML
0.5 INJECTION INTRAMUSCULAR EVERY 4 HOURS PRN
Status: CANCELLED
Start: 2021-03-22

## 2021-03-17 RX ORDER — EPINEPHRINE 1 MG/ML
0.3 INJECTION, SOLUTION INTRAMUSCULAR; SUBCUTANEOUS EVERY 5 MIN PRN
Status: CANCELLED | OUTPATIENT
Start: 2021-03-18

## 2021-03-17 RX ORDER — DIPHENHYDRAMINE HYDROCHLORIDE 50 MG/ML
50 INJECTION INTRAMUSCULAR; INTRAVENOUS
Status: CANCELLED
Start: 2021-03-22

## 2021-03-17 RX ORDER — ALBUTEROL SULFATE 0.83 MG/ML
2.5 SOLUTION RESPIRATORY (INHALATION)
Status: CANCELLED | OUTPATIENT
Start: 2021-03-25

## 2021-03-17 RX ORDER — DIPHENHYDRAMINE HYDROCHLORIDE 50 MG/ML
50 INJECTION INTRAMUSCULAR; INTRAVENOUS
Status: CANCELLED
Start: 2021-03-25

## 2021-03-17 RX ORDER — LORAZEPAM 2 MG/ML
0.5 INJECTION INTRAMUSCULAR EVERY 4 HOURS PRN
Status: CANCELLED
Start: 2021-03-25

## 2021-03-17 RX ORDER — NALOXONE HYDROCHLORIDE 0.4 MG/ML
.1-.4 INJECTION, SOLUTION INTRAMUSCULAR; INTRAVENOUS; SUBCUTANEOUS
Status: CANCELLED | OUTPATIENT
Start: 2021-03-22

## 2021-03-17 RX ORDER — ALBUTEROL SULFATE 0.83 MG/ML
2.5 SOLUTION RESPIRATORY (INHALATION)
Status: CANCELLED | OUTPATIENT
Start: 2021-03-22

## 2021-03-17 NOTE — LETTER
3/17/2021         RE: Rogelio Marshall  [address]  [facility] [address]        Dear Colleague,    Thank you for referring your patient, Rogelio Marshall, to the [facility]. Please see a copy of my visit note below.    Rogelio is a 76 year old who is being evaluated via a billable telephone visit.    What phone number would you like to be contacted at? [phone]  How would you like to obtain your AVS? SMS GupShupt     Phone call duration: 25 minutes    TOAN Briceño      Oncologic Hx:   -compression fractures and bone scan reveals multiple lytic lesions throughout his appendicular and axial skeleton. His Beta 2 microglobulin was 3.6 on 10/10/2020. Kappa chains were 73.6 on 10/5/2020 with K/L ratio of 89.9. M spike of 16.2 in urine on 10/10. Bone marrow biopsy on 10/23/2020 showed trilineage hematopoiesis and 50-60% kappa restricted plasma cells. Flow cytometry showed 20 % plasma cells which express CD19, CD38, CD45 and monotypic cytoplasmic kappa immunoglobulin light chains but lack CD20 and CD56.  FISH shows IGH-CCND1 fusion (81%; 30% had loss of IGH component from one fusion signal).      - Started 21 day cycle VRD 11/2020    - 11/27 he had a fall with rib fractures.     - Dec 2020 Started madyson-velcade-dex. Now on cycle 5      Interval Hx: Rogelio was seen today because he reports numerous side effects from chemotherapy. He states lists these as loss of appetite, loss of coordination, nervous twitching, fatigue, occasional diarrhea but also 'constipation all the time', one episode of vomiting, and numbness in his fingers and toes. He states these sx improve when he has a break from velcade. Today he is feeling well. No new bone pains, lumps or bumps, or night sweats.    A comprehensive review of systems was completed and negative except as described above.     Pertinent PMH reviewed:   HTN    Physical Exam:   Neuro: conversant, normal speech pattern  Psych: appropriate mood  and affect    The rest of a comprehensive physical examination is deferred due to Public Health Emergency telephone visit restrictions      Pertinent Data Summarized:  3/15/2021 M spike 0.1, K/L 3.2, kappa 0.68, IgG 369    Assessment and Plan  76 yo man with IgG Kappa standard risk multiple myeloma. Despite his side effects, he has had a VGPR with therapy. After discussing this he is willing to finish cycle 5 to try to get into a CR. Then we will start monthly darzalex faspro for maintenance as he is getting neuropathy with velcade.     He also is at high risk for more compression fractures. Vitamin D is replete and he is on a daily supplement as well as calcium. We did not discuss zometa again today because I don't want to start something else while he is having so many side effects. Will touchbase next visit and see if he has see if he dentist yet.    GERD: seems to be well controlled with omeprazole.     Side effects of chemotherapy: follow up April 7 to ensure these are resolving after completion of cycle 5.     HALLIE vs CKD: Baseline creatinine is 1.1 suggesting underlying CKD but has now jumped up to 1.4. I will recheck this and get a UA and UPEP to help determine the etiology. I do not suspect this is due to his myeloma because his M spike in blood continues to fall.       Angela Sweeney MD PhD

## 2021-03-17 NOTE — PROGRESS NOTES
Rogelio is a 76 year old who is being evaluated via a billable telephone visit.    What phone number would you like to be contacted at? [phone]  How would you like to obtain your AVS? Datometry     Phone call duration: 25 minutes    TOAN Briceño      Oncologic Hx:   -compression fractures and bone scan reveals multiple lytic lesions throughout his appendicular and axial skeleton. His Beta 2 microglobulin was 3.6 on 10/10/2020. Kappa chains were 73.6 on 10/5/2020 with K/L ratio of 89.9. M spike of 16.2 in urine on 10/10. Bone marrow biopsy on 10/23/2020 showed trilineage hematopoiesis and 50-60% kappa restricted plasma cells. Flow cytometry showed 20 % plasma cells which express CD19, CD38, CD45 and monotypic cytoplasmic kappa immunoglobulin light chains but lack CD20 and CD56.  FISH shows IGH-CCND1 fusion (81%; 30% had loss of IGH component from one fusion signal).      - Started 21 day cycle VRD 11/2020    - 11/27 he had a fall with rib fractures.     - Dec 2020 Started madyson-velcade-dex. Now on cycle 5      Interval Hx: Rogelio was seen today because he reports numerous side effects from chemotherapy. He states lists these as loss of appetite, loss of coordination, nervous twitching, fatigue, occasional diarrhea but also 'constipation all the time', one episode of vomiting, and numbness in his fingers and toes. He states these sx improve when he has a break from velcade. Today he is feeling well. No new bone pains, lumps or bumps, or night sweats.    A comprehensive review of systems was completed and negative except as described above.     Pertinent PMH reviewed:   HTN    Physical Exam:   Neuro: conversant, normal speech pattern  Psych: appropriate mood and affect    The rest of a comprehensive physical examination is deferred due to Public Health Emergency telephone visit restrictions      Pertinent Data Summarized:  3/15/2021 M spike 0.1, K/L 3.2, kappa 0.68, IgG 369    Assessment and Plan  76 yo man with IgG  Kappa standard risk multiple myeloma. Despite his side effects, he has had a VGPR with therapy. After discussing this he is willing to finish cycle 5 to try to get into a CR. Then we will start monthly darzalex faspro for maintenance as he is getting neuropathy with velcade.     He also is at high risk for more compression fractures. Vitamin D is replete and he is on a daily supplement as well as calcium. We did not discuss zometa again today because I don't want to start something else while he is having so many side effects. Will touchbase next visit and see if he has see if he dentist yet.    GERD: seems to be well controlled with omeprazole.     Side effects of chemotherapy: follow up April 7 to ensure these are resolving after completion of cycle 5.     HALLIE vs CKD: Baseline creatinine is 1.1 suggesting underlying CKD but has now jumped up to 1.4. I will recheck this and get a UA and UPEP to help determine the etiology. I do not suspect this is due to his myeloma because his M spike in blood continues to fall.       Angela Sweeney MD PhD

## 2021-03-18 ENCOUNTER — INFUSION THERAPY VISIT (OUTPATIENT)
Dept: ONCOLOGY | Facility: CLINIC | Age: 76
End: 2021-03-18
Attending: STUDENT IN AN ORGANIZED HEALTH CARE EDUCATION/TRAINING PROGRAM
Payer: COMMERCIAL

## 2021-03-18 ENCOUNTER — APPOINTMENT (OUTPATIENT)
Dept: LAB | Facility: CLINIC | Age: 76
End: 2021-03-18
Attending: STUDENT IN AN ORGANIZED HEALTH CARE EDUCATION/TRAINING PROGRAM
Payer: COMMERCIAL

## 2021-03-18 VITALS
TEMPERATURE: 98 F | DIASTOLIC BLOOD PRESSURE: 58 MMHG | WEIGHT: 129.4 LBS | RESPIRATION RATE: 16 BRPM | SYSTOLIC BLOOD PRESSURE: 103 MMHG | BODY MASS INDEX: 24.45 KG/M2 | HEART RATE: 89 BPM | OXYGEN SATURATION: 98 %

## 2021-03-18 DIAGNOSIS — C90.00 MULTIPLE MYELOMA, REMISSION STATUS UNSPECIFIED (H): Primary | ICD-10-CM

## 2021-03-18 LAB
ALBUMIN UR-MCNC: NEGATIVE MG/DL
ANION GAP SERPL CALCULATED.3IONS-SCNC: 6 MMOL/L (ref 3–14)
APPEARANCE UR: CLEAR
BASOPHILS # BLD AUTO: 0 10E9/L (ref 0–0.2)
BASOPHILS NFR BLD AUTO: 0.4 %
BILIRUB UR QL STRIP: NEGATIVE
BUN SERPL-MCNC: 44 MG/DL (ref 7–30)
CALCIUM SERPL-MCNC: 9.1 MG/DL (ref 8.5–10.1)
CHLORIDE SERPL-SCNC: 106 MMOL/L (ref 94–109)
CO2 SERPL-SCNC: 25 MMOL/L (ref 20–32)
COLOR UR AUTO: YELLOW
CREAT SERPL-MCNC: 1.95 MG/DL (ref 0.66–1.25)
DIFFERENTIAL METHOD BLD: ABNORMAL
EOSINOPHIL # BLD AUTO: 0.1 10E9/L (ref 0–0.7)
EOSINOPHIL NFR BLD AUTO: 0.5 %
ERYTHROCYTE [DISTWIDTH] IN BLOOD BY AUTOMATED COUNT: 15.2 % (ref 10–15)
GFR SERPL CREATININE-BSD FRML MDRD: 33 ML/MIN/{1.73_M2}
GLUCOSE SERPL-MCNC: 113 MG/DL (ref 70–99)
GLUCOSE UR STRIP-MCNC: NEGATIVE MG/DL
HCT VFR BLD AUTO: 26.1 % (ref 40–53)
HGB BLD-MCNC: 8.6 G/DL (ref 13.3–17.7)
HGB UR QL STRIP: NEGATIVE
IMM GRANULOCYTES # BLD: 0.1 10E9/L (ref 0–0.4)
IMM GRANULOCYTES NFR BLD: 1.4 %
KAPPA LC UR-MCNC: 0.81 MG/DL (ref 0.33–1.94)
KAPPA LC/LAMBDA SER: 3.52 {RATIO} (ref 0.26–1.65)
KETONES UR STRIP-MCNC: NEGATIVE MG/DL
LAMBDA LC SERPL-MCNC: 0.23 MG/DL (ref 0.57–2.63)
LEUKOCYTE ESTERASE UR QL STRIP: NEGATIVE
LYMPHOCYTES # BLD AUTO: 2.6 10E9/L (ref 0.8–5.3)
LYMPHOCYTES NFR BLD AUTO: 26.4 %
MCH RBC QN AUTO: 33.3 PG (ref 26.5–33)
MCHC RBC AUTO-ENTMCNC: 33 G/DL (ref 31.5–36.5)
MCV RBC AUTO: 101 FL (ref 78–100)
MONOCYTES # BLD AUTO: 1.2 10E9/L (ref 0–1.3)
MONOCYTES NFR BLD AUTO: 12.4 %
NEUTROPHILS # BLD AUTO: 5.8 10E9/L (ref 1.6–8.3)
NEUTROPHILS NFR BLD AUTO: 58.9 %
NITRATE UR QL: NEGATIVE
NRBC # BLD AUTO: 0 10*3/UL
NRBC BLD AUTO-RTO: 0 /100
PH UR STRIP: 5 PH (ref 5–7)
PLATELET # BLD AUTO: 405 10E9/L (ref 150–450)
POTASSIUM SERPL-SCNC: 4.1 MMOL/L (ref 3.4–5.3)
RBC # BLD AUTO: 2.58 10E12/L (ref 4.4–5.9)
SODIUM SERPL-SCNC: 136 MMOL/L (ref 133–144)
SOURCE: NORMAL
SP GR UR STRIP: 1.02 (ref 1–1.03)
UROBILINOGEN UR STRIP-MCNC: 0 MG/DL (ref 0–2)
WBC # BLD AUTO: 9.8 10E9/L (ref 4–11)

## 2021-03-18 PROCEDURE — 80048 BASIC METABOLIC PNL TOTAL CA: CPT | Performed by: PHYSICIAN ASSISTANT

## 2021-03-18 PROCEDURE — 84166 PROTEIN E-PHORESIS/URINE/CSF: CPT | Mod: TC | Performed by: STUDENT IN AN ORGANIZED HEALTH CARE EDUCATION/TRAINING PROGRAM

## 2021-03-18 PROCEDURE — 250N000011 HC RX IP 250 OP 636: Performed by: STUDENT IN AN ORGANIZED HEALTH CARE EDUCATION/TRAINING PROGRAM

## 2021-03-18 PROCEDURE — 84166 PROTEIN E-PHORESIS/URINE/CSF: CPT | Mod: 26 | Performed by: PATHOLOGY

## 2021-03-18 PROCEDURE — 85025 COMPLETE CBC W/AUTO DIFF WBC: CPT | Performed by: PHYSICIAN ASSISTANT

## 2021-03-18 PROCEDURE — 96360 HYDRATION IV INFUSION INIT: CPT

## 2021-03-18 PROCEDURE — 258N000003 HC RX IP 258 OP 636: Performed by: STUDENT IN AN ORGANIZED HEALTH CARE EDUCATION/TRAINING PROGRAM

## 2021-03-18 PROCEDURE — 83883 ASSAY NEPHELOMETRY NOT SPEC: CPT | Performed by: PHYSICIAN ASSISTANT

## 2021-03-18 PROCEDURE — 96401 CHEMO ANTI-NEOPL SQ/IM: CPT

## 2021-03-18 PROCEDURE — G0463 HOSPITAL OUTPT CLINIC VISIT: HCPCS | Mod: 25

## 2021-03-18 PROCEDURE — 81003 URINALYSIS AUTO W/O SCOPE: CPT | Performed by: STUDENT IN AN ORGANIZED HEALTH CARE EDUCATION/TRAINING PROGRAM

## 2021-03-18 RX ADMIN — SODIUM CHLORIDE 1000 ML: 9 INJECTION, SOLUTION INTRAVENOUS at 10:23

## 2021-03-18 RX ADMIN — BORTEZOMIB 2.2 MG: 3.5 INJECTION, POWDER, LYOPHILIZED, FOR SOLUTION INTRAVENOUS; SUBCUTANEOUS at 11:25

## 2021-03-18 ASSESSMENT — PAIN SCALES - GENERAL: PAINLEVEL: NO PAIN (0)

## 2021-03-18 NOTE — NURSING NOTE
Chief Complaint   Patient presents with     Blood Draw     Labs drawn via IV placed by RN in lab. VS taken.      Tre Bailey RN

## 2021-03-18 NOTE — PATIENT INSTRUCTIONS
Contact Numbers  [facility]: [phone]    After Hours:  [phone]  Triage: [phone]    Please call the [facility] Triage line if you experience a temperature greater than or equal to 100.5, shaking chills, have uncontrolled nausea, vomiting and/or diarrhea, dizziness, shortness of breath, chest pain, bleeding, unexplained bruising, or if you have any other new/concerning symptoms, questions or concerns.     If it is after hours, weekends, or holidays, please call the main hospital  at  [phone] and ask to speak to the Oncology doctor on call.     If you are having any concerning symptoms or wish to speak to a provider before your next infusion visit, please call your care coordinator or triage to notify them so we can adequately serve you.     If you need a refill on a narcotic prescription or other medication, please call triage before your infusion appointment.         March 2021 Sunday Monday Tuesday Wednesday Thursday Friday Saturday        1    LAB PERIPHERAL   9:15 AM   (15 min.)   [facility] DRAW   [facility] ONC INFUSION 60  10:00 AM   (60 min.)    ONCOLOGY INFUSION   [facility] 2     3     4    [facility] ONC INFUSION 30   9:30 AM   (60 min.)    ONCOLOGY INFUSION   [facility] 5     6       7     8     9     10     11     12     13       14     15    TELEPHONE VISIT RETURN   8:50 AM   (50 min.)   Pebbles Longo PA-C   [facility]    LAB PERIPHERAL  12:45 PM   (15 min.)   [facility] DRAW   [facility] ONC INFUSION 120   1:30 PM   (120 min.)    ONCOLOGY INFUSION   [facility] 16     17    TELEPHONE VISIT RETURN   2:00 PM   (30 min.)   Angela Sweeney MD   [facility] 18    LAB PERIPHERAL   9:45 AM   (15 min.)   [facility] DRAW   [facility]    [facility] ONC INFUSION 60  10:00 AM   (60 min.)   [facility] ONCOLOGY INFUSION   [facility] 19     20       21     22    LAB PERIPHERAL   9:15 AM   (15 min.)    [facility]RAW   [facility]    [facility] ONC INFUSION 60  10:00 AM   (60 min.)   [facility] ONCOLOGY INFUSION   [facility] 23     24     25    [facility] ONC INFUSION 60   3:00 PM   (60 min.)   [facility] ONCOLOGY INFUSION   [facility] 26     27       28     29     30     31 April 2021 Sunday Monday Tuesday Wednesday Thursday Friday Saturday                       1     2     3       4     5    LAB PERIPHERAL   6:45 AM   (15 min.)    [facility] 360   7:00 AM   (360 min.)   [facility] ONCOLOGY INFUSION   [facility] 6     7    RETURN  11:15 AM   (30 min.)   Angela Sweeney MD   [facility] 8     9     10       11     12     13     14     15     16     17       18     19     20     21     22     23     24       25     26     27     28     29     30                          Lab Results:  Recent Results (from the past 12 hour(s))   Basic metabolic panel    Collection Time: 03/18/21 10:21 AM   Result Value Ref Range    Sodium 136 133 - 144 mmol/L    Potassium 4.1 3.4 - 5.3 mmol/L    Chloride 106 94 - 109 mmol/L    Carbon Dioxide 25 20 - 32 mmol/L    Anion Gap 6 3 - 14 mmol/L    Glucose 113 (H) 70 - 99 mg/dL    Urea Nitrogen 44 (H) 7 - 30 mg/dL    Creatinine 1.95 (H) 0.66 - 1.25 mg/dL    GFR Estimate 33 (L) >60 mL/min/[1.73_m2]    GFR Estimate If Black 38 (L) >60 mL/min/[1.73_m2]    Calcium 9.1 8.5 - 10.1 mg/dL   *CBC with platelets differential    Collection Time: 03/18/21 10:21 AM   Result Value Ref Range    WBC 9.8 4.0 - 11.0 10e9/L    RBC Count 2.58 (L) 4.4 - 5.9 10e12/L    Hemoglobin 8.6 (L) 13.3 - 17.7  g/dL    Hematocrit 26.1 (L) 40.0 - 53.0 %     (H) 78 - 100 fl    MCH 33.3 (H) 26.5 - 33.0 pg    MCHC 33.0 31.5 - 36.5 g/dL    RDW 15.2 (H) 10.0 - 15.0 %    Platelet Count 405 150 - 450 10e9/L    Diff Method Automated Method     % Neutrophils 58.9 %    % Lymphocytes 26.4 %    % Monocytes 12.4 %    % Eosinophils 0.5 %    % Basophils 0.4 %    % Immature Granulocytes 1.4 %    Nucleated RBCs 0 0 /100    Absolute Neutrophil 5.8 1.6 - 8.3 10e9/L    Absolute Lymphocytes 2.6 0.8 - 5.3 10e9/L    Absolute Monocytes 1.2 0.0 - 1.3 10e9/L    Absolute Eosinophils 0.1 0.0 - 0.7 10e9/L    Absolute Basophils 0.0 0.0 - 0.2 10e9/L    Abs Immature Granulocytes 0.1 0 - 0.4 10e9/L    Absolute Nucleated RBC 0.0    UA reflex to Microscopic    Collection Time: 03/18/21 10:21 AM   Result Value Ref Range    Color Urine Yellow     Appearance Urine Clear     Glucose Urine Negative NEG^Negative mg/dL    Bilirubin Urine Negative NEG^Negative    Ketones Urine Negative NEG^Negative mg/dL    Specific Gravity Urine 1.016 1.003 - 1.035    Blood Urine Negative NEG^Negative    pH Urine 5.0 5.0 - 7.0 pH    Protein Albumin Urine Negative NEG^Negative mg/dL    Urobilinogen mg/dL 0.0 0.0 - 2.0 mg/dL    Nitrite Urine Negative NEG^Negative    Leukocyte Esterase Urine Negative NEG^Negative    Source Midstream Urine

## 2021-03-18 NOTE — PROGRESS NOTES
Infusion Nursing Note:  Rogelio Marshall presents today for C5D2 Velcade subcutaneous.    Patient seen by provider today: No, Dr Sweeney 3/17/21   present during visit today: Not Applicable.    Note: Patient arrived at infusion with low blood pressure. States that fatigue is more today. States that despite of loss of appitite his fluids intake does not change. Unable to estimated hoe much he takes. Denies dizziness, light headedness nor chest and abdominal discomfort. No new concerns made. Otherwise well.    Patient did meet criteria for an asymptomatic covid-19 PCR test in infusion today. Patient declined the covid-19 test.    Instruction given to patient as per provider. Verbalized understanding.       Intravenous Access:  Peripheral IV placed.    Treatment Conditions:  Lab Results   Component Value Date    HGB 8.6 03/18/2021     Lab Results   Component Value Date    WBC 9.8 03/18/2021      Lab Results   Component Value Date    ANEU 5.8 03/18/2021     Lab Results   Component Value Date     03/18/2021      Lab Results   Component Value Date     03/18/2021                   Lab Results   Component Value Date    POTASSIUM 4.1 03/18/2021           Lab Results   Component Value Date    MAG 2.1 12/02/2020            Lab Results   Component Value Date    CR 1.95 03/18/2021                   Lab Results   Component Value Date    MIRI 9.1 03/18/2021                Lab Results   Component Value Date    BILITOTAL 0.5 03/15/2021           Lab Results   Component Value Date    ALBUMIN 3.9 03/15/2021                    Lab Results   Component Value Date    ALT 17 03/15/2021           Lab Results   Component Value Date    AST 10 03/15/2021       Results reviewed, labs MET treatment parameters, ok to proceed with treatment.    TORB: 3/18/21/1014/ Dr. Sweeney/Irina Griffin RN/ BP noted give IV N/S 100 ml. May give Velcade if parameters met. May discharge patient if SBP 95.       TORB: 3/18/21/1120/   Patel/Irina Griffin RN/ Creatinine 1.95, no intervention needed. Keep schedule as planned. Do CMP on Monday.        Post Infusion Assessment:  Patient tolerated infusion without incident.  Patient tolerated injection without incident.  Blood return noted pre and post infusion.  Site patent and intact, free from redness, edema or discomfort.  No evidence of extravasations.  Access discontinued per protocol.       Discharge Plan:   Patient declined prescription refills.  Discharge instructions reviewed with: Patient.  Patient and/or family verbalized understanding of discharge instructions and all questions answered.  Copy of AVS reviewed with patient and/or family.  Patient will return 3/22 for next appointment.  Patient discharged in stable condition accompanied by: self.  Departure Mode: Ambulatory.  Face to Face time: 5.    GEORGE MORIN, OSVALDO

## 2021-03-19 LAB — PROT PATTERN UR ELPH-IMP: NORMAL

## 2021-03-22 ENCOUNTER — APPOINTMENT (OUTPATIENT)
Dept: LAB | Facility: CLINIC | Age: 76
End: 2021-03-22
Attending: STUDENT IN AN ORGANIZED HEALTH CARE EDUCATION/TRAINING PROGRAM
Payer: COMMERCIAL

## 2021-03-22 ENCOUNTER — INFUSION THERAPY VISIT (OUTPATIENT)
Dept: ONCOLOGY | Facility: CLINIC | Age: 76
End: 2021-03-22
Attending: STUDENT IN AN ORGANIZED HEALTH CARE EDUCATION/TRAINING PROGRAM
Payer: COMMERCIAL

## 2021-03-22 VITALS
WEIGHT: 130.3 LBS | OXYGEN SATURATION: 98 % | BODY MASS INDEX: 24.62 KG/M2 | DIASTOLIC BLOOD PRESSURE: 61 MMHG | RESPIRATION RATE: 16 BRPM | HEART RATE: 96 BPM | SYSTOLIC BLOOD PRESSURE: 107 MMHG | TEMPERATURE: 97.4 F

## 2021-03-22 DIAGNOSIS — Z79.899 ENCOUNTER FOR LONG-TERM (CURRENT) USE OF MEDICATIONS: ICD-10-CM

## 2021-03-22 DIAGNOSIS — C90.00 MULTIPLE MYELOMA, REMISSION STATUS UNSPECIFIED (H): Primary | ICD-10-CM

## 2021-03-22 LAB
ALBUMIN SERPL-MCNC: 3.6 G/DL (ref 3.4–5)
ALP SERPL-CCNC: 77 U/L (ref 40–150)
ALT SERPL W P-5'-P-CCNC: 14 U/L (ref 0–70)
ANION GAP SERPL CALCULATED.3IONS-SCNC: 7 MMOL/L (ref 3–14)
AST SERPL W P-5'-P-CCNC: 10 U/L (ref 0–45)
BASOPHILS # BLD AUTO: 0 10E9/L (ref 0–0.2)
BASOPHILS NFR BLD AUTO: 0.1 %
BILIRUB SERPL-MCNC: 0.4 MG/DL (ref 0.2–1.3)
BUN SERPL-MCNC: 38 MG/DL (ref 7–30)
CALCIUM SERPL-MCNC: 9.1 MG/DL (ref 8.5–10.1)
CHLORIDE SERPL-SCNC: 109 MMOL/L (ref 94–109)
CO2 SERPL-SCNC: 23 MMOL/L (ref 20–32)
CREAT SERPL-MCNC: 1.35 MG/DL (ref 0.66–1.25)
DIFFERENTIAL METHOD BLD: ABNORMAL
EOSINOPHIL # BLD AUTO: 0.1 10E9/L (ref 0–0.7)
EOSINOPHIL NFR BLD AUTO: 1.1 %
ERYTHROCYTE [DISTWIDTH] IN BLOOD BY AUTOMATED COUNT: 15 % (ref 10–15)
GFR SERPL CREATININE-BSD FRML MDRD: 51 ML/MIN/{1.73_M2}
GLUCOSE SERPL-MCNC: 130 MG/DL (ref 70–99)
HCT VFR BLD AUTO: 27.5 % (ref 40–53)
HGB BLD-MCNC: 8.8 G/DL (ref 13.3–17.7)
IMM GRANULOCYTES # BLD: 0.1 10E9/L (ref 0–0.4)
IMM GRANULOCYTES NFR BLD: 1.1 %
LYMPHOCYTES # BLD AUTO: 1.3 10E9/L (ref 0.8–5.3)
LYMPHOCYTES NFR BLD AUTO: 16.5 %
MCH RBC QN AUTO: 33 PG (ref 26.5–33)
MCHC RBC AUTO-ENTMCNC: 32 G/DL (ref 31.5–36.5)
MCV RBC AUTO: 103 FL (ref 78–100)
MONOCYTES # BLD AUTO: 0.6 10E9/L (ref 0–1.3)
MONOCYTES NFR BLD AUTO: 7.8 %
NEUTROPHILS # BLD AUTO: 5.8 10E9/L (ref 1.6–8.3)
NEUTROPHILS NFR BLD AUTO: 73.4 %
NRBC # BLD AUTO: 0.1 10*3/UL
NRBC BLD AUTO-RTO: 1 /100
PLATELET # BLD AUTO: 263 10E9/L (ref 150–450)
POTASSIUM SERPL-SCNC: 4 MMOL/L (ref 3.4–5.3)
PROT SERPL-MCNC: 6.4 G/DL (ref 6.8–8.8)
RBC # BLD AUTO: 2.67 10E12/L (ref 4.4–5.9)
SODIUM SERPL-SCNC: 138 MMOL/L (ref 133–144)
WBC # BLD AUTO: 7.9 10E9/L (ref 4–11)

## 2021-03-22 PROCEDURE — 36415 COLL VENOUS BLD VENIPUNCTURE: CPT

## 2021-03-22 PROCEDURE — 250N000011 HC RX IP 250 OP 636: Mod: JW | Performed by: STUDENT IN AN ORGANIZED HEALTH CARE EDUCATION/TRAINING PROGRAM

## 2021-03-22 PROCEDURE — 84165 PROTEIN E-PHORESIS SERUM: CPT | Mod: TC | Performed by: STUDENT IN AN ORGANIZED HEALTH CARE EDUCATION/TRAINING PROGRAM

## 2021-03-22 PROCEDURE — 999N001036 HC STATISTIC TOTAL PROTEIN: Performed by: STUDENT IN AN ORGANIZED HEALTH CARE EDUCATION/TRAINING PROGRAM

## 2021-03-22 PROCEDURE — 84165 PROTEIN E-PHORESIS SERUM: CPT | Mod: 26 | Performed by: PATHOLOGY

## 2021-03-22 PROCEDURE — 80053 COMPREHEN METABOLIC PANEL: CPT | Performed by: STUDENT IN AN ORGANIZED HEALTH CARE EDUCATION/TRAINING PROGRAM

## 2021-03-22 PROCEDURE — 85025 COMPLETE CBC W/AUTO DIFF WBC: CPT | Performed by: STUDENT IN AN ORGANIZED HEALTH CARE EDUCATION/TRAINING PROGRAM

## 2021-03-22 PROCEDURE — 96401 CHEMO ANTI-NEOPL SQ/IM: CPT

## 2021-03-22 RX ADMIN — BORTEZOMIB 2.2 MG: 3.5 INJECTION, POWDER, LYOPHILIZED, FOR SOLUTION INTRAVENOUS; SUBCUTANEOUS at 10:56

## 2021-03-22 ASSESSMENT — PAIN SCALES - GENERAL: PAINLEVEL: MILD PAIN (2)

## 2021-03-22 NOTE — PROGRESS NOTES
Infusion Nursing Note:  Rogelio Marshall presents today for C5D8 Velcade    Patient seen by provider today: No   present during visit today: Not Applicable.    Note: pt assessed upon arrival to infusion clinic. Pt states he was very fatigued over the weekend but over the last day or so getting more energy.  Denies fever, chills, SOB, or cough.  Eating and drinking minimally. Pt states that he does feel well for treatment today.   Reviewed plan of care with patient.    Intravenous Access:  No Intravenous access    Treatment Conditions:  Lab Results   Component Value Date    HGB 8.8 03/22/2021     Lab Results   Component Value Date    WBC 7.9 03/22/2021      Lab Results   Component Value Date    ANEU 5.8 03/22/2021     Lab Results   Component Value Date     03/22/2021      Lab Results   Component Value Date     03/22/2021                   Lab Results   Component Value Date    POTASSIUM 4.0 03/22/2021           Lab Results   Component Value Date    MAG 2.1 12/02/2020            Lab Results   Component Value Date    CR 1.35 03/22/2021                   Lab Results   Component Value Date    MIRI 9.1 03/22/2021                Lab Results   Component Value Date    BILITOTAL 0.4 03/22/2021           Lab Results   Component Value Date    ALBUMIN 3.6 03/22/2021                    Lab Results   Component Value Date    ALT 14 03/22/2021           Lab Results   Component Value Date    AST 10 03/22/2021       Results reviewed, labs MET treatment parameters, ok to proceed with treatment.      Post Infusion Assessment:  Patient tolerated one injection without incident.  subcutaneous Velcade given in Left upper arm.  Site patent and intact, free from redness, edema or discomfort.       Discharge Plan:   Patient declined prescription refills.  Discharge instructions reviewed with: Patient.  Patient and/or family verbalized understanding of discharge instructions and all questions answered.  AVS to given to  patient.  Patient will return 3/25/21 for next appointment.   Patient discharged in stable condition accompanied by: self.  Departure Mode: Ambulatory.    Krystina Gastelum RN

## 2021-03-22 NOTE — PATIENT INSTRUCTIONS
[facility] Triage and after hours / weekends / holidays:  [phone]    Please call the triage or after hours line if you experience a temperature greater than or equal to 100.5, shaking chills, have uncontrolled nausea, vomiting and/or diarrhea, dizziness, shortness of breath, chest pain, bleeding, unexplained bruising, or if you have any other new/concerning symptoms, questions or concerns.      If you are having any concerning symptoms or wish to speak to a provider before your next infusion visit, please call your care coordinator or triage to notify them so we can adequately serve you.     If you need a refill on a narcotic prescription or other medication, please call before your infusion appointment.                 March 2021 Sunday Monday Tuesday Wednesday Thursday Friday Saturday 1    LAB PERIPHERAL   9:15 AM   (15 min.)   [facility]   [facility] ONC INFUSION 60  10:00 AM   (60 min.)    ONCOLOGY INFUSION   [facility] 2     3     4    [facility] ONC INFUSION 30   9:30 AM   (60 min.)    ONCOLOGY INFUSION   [facility] 5     6       7     8     9     10     11     12     13       14     15    TELEPHONE VISIT RETURN   8:50 AM   (50 min.)   Pebbles Longo PA-C   [facility]    LAB PERIPHERAL  12:45 PM   (15 min.)   [facility]   [facility] ONC INFUSION 120   1:30 PM   (120 min.)    ONCOLOGY INFUSION   [facility] 16     17    TELEPHONE VISIT RETURN   2:00 PM   (30 min.)   Angela Sweeney MD   [facility] 18    LAB PERIPHERAL   9:45 AM   (15 min.)   [facility]   [facility] ONC INFUSION 60  10:00 AM   (60 min.)    ONCOLOGY INFUSION   [facility] 19     20       21     22    LAB PERIPHERAL   9:15  AM   (15 min.)   [facility]   [facility]    [facility] 60  10:00 AM   (60 min.)   [facility]   [facility] 23    TELEPHONE VISIT RETURN  11:20 AM   (40 min.)   Cat Tate MD   [facility] 24     25    [facility] ON[facility] INFUSION 60   3:00 PM   (60 min.)   [facility]   [facility] 26     27       28     29     30 31 April 2021 Sunday Monday Tuesday Wednesday Thursday Friday Saturday                       1     2     3       4     5    LAB PERIPHERAL   6:45 AM   (15 min.)    [facility]   [facility] 360   7:00 AM   (360 min.)   [facility]   [facility] 6     7    RETURN  11:15 AM   (30 min.)   Angela Sweeney MD   [facility] 8     9     10       11     12     13     14     15     16     17       18     19     20     21     22     23     24       25     26     27     28     29     30                         Recent Results (from the past 24 hour(s))   CBC with platelets differential    Collection Time: 03/22/21  9:49 AM   Result Value Ref Range    WBC 7.9 4.0 - 11.0 10e9/L    RBC Count 2.67 (L) 4.4 - 5.9 10e12/L    Hemoglobin 8.8 (L) 13.3 - 17.7 g/dL    Hematocrit 27.5 (L) 40.0 - 53.0 %     (H) 78 - 100 fl    MCH 33.0 26.5 - 33.0 pg    MCHC 32.0 31.5 - 36.5 g/dL    RDW 15.0 10.0 - 15.0 %    Platelet Count 263 150 - 450 10e9/L    Diff Method Automated Method     % Neutrophils 73.4 %    % Lymphocytes 16.5 %    % Monocytes 7.8 %    % Eosinophils 1.1 %    % Basophils 0.1 %    % Immature Granulocytes 1.1 %    Nucleated RBCs 1 (H) 0 /100    Absolute Neutrophil 5.8 1.6 - 8.3 10e9/L    Absolute Lymphocytes 1.3 0.8 - 5.3 10e9/L    Absolute Monocytes 0.6 0.0 - 1.3 10e9/L    Absolute Eosinophils 0.1 0.0 - 0.7 10e9/L     Absolute Basophils 0.0 0.0 - 0.2 10e9/L    Abs Immature Granulocytes 0.1 0 - 0.4 10e9/L    Absolute Nucleated RBC 0.1    Comprehensive metabolic panel    Collection Time: 03/22/21  9:49 AM   Result Value Ref Range    Sodium 138 133 - 144 mmol/L    Potassium 4.0 3.4 - 5.3 mmol/L    Chloride 109 94 - 109 mmol/L    Carbon Dioxide 23 20 - 32 mmol/L    Anion Gap 7 3 - 14 mmol/L    Glucose 130 (H) 70 - 99 mg/dL    Urea Nitrogen 38 (H) 7 - 30 mg/dL    Creatinine 1.35 (H) 0.66 - 1.25 mg/dL    GFR Estimate 51 (L) >60 mL/min/[1.73_m2]    GFR Estimate If Black 59 (L) >60 mL/min/[1.73_m2]    Calcium 9.1 8.5 - 10.1 mg/dL    Bilirubin Total 0.4 0.2 - 1.3 mg/dL    Albumin 3.6 3.4 - 5.0 g/dL    Protein Total 6.4 (L) 6.8 - 8.8 g/dL    Alkaline Phosphatase 77 40 - 150 U/L    ALT 14 0 - 70 U/L    AST 10 0 - 45 U/L

## 2021-03-22 NOTE — NURSING NOTE
Chief Complaint   Patient presents with     Blood Draw     Labs drawn from  by RN in lab. Vitals taken. Checked into next appointment.      Labs drawn by venipuncture by RN in lab.  Vital signs taken. Elie purple JIC tube if patient needs a type and screen.  Pt checked in to next appointment.    Onelia Polanco RN

## 2021-03-23 ENCOUNTER — VIRTUAL VISIT (OUTPATIENT)
Dept: PALLIATIVE MEDICINE | Facility: CLINIC | Age: 76
End: 2021-03-23
Payer: COMMERCIAL

## 2021-03-23 DIAGNOSIS — K59.03 CONSTIPATION DUE TO OPIOID THERAPY: ICD-10-CM

## 2021-03-23 DIAGNOSIS — R63.0 POOR APPETITE: ICD-10-CM

## 2021-03-23 DIAGNOSIS — T40.2X5A CONSTIPATION DUE TO OPIOID THERAPY: ICD-10-CM

## 2021-03-23 DIAGNOSIS — G89.29 CHRONIC MIDLINE LOW BACK PAIN WITHOUT SCIATICA: ICD-10-CM

## 2021-03-23 DIAGNOSIS — R25.1 TREMOR OF BOTH HANDS: Primary | ICD-10-CM

## 2021-03-23 DIAGNOSIS — M54.50 CHRONIC MIDLINE LOW BACK PAIN WITHOUT SCIATICA: ICD-10-CM

## 2021-03-23 DIAGNOSIS — M48.50XS PATHOLOGIC COMPRESSION FRACTURE OF SPINE, SEQUELA: ICD-10-CM

## 2021-03-23 DIAGNOSIS — C90.00 MULTIPLE MYELOMA, REMISSION STATUS UNSPECIFIED (H): ICD-10-CM

## 2021-03-23 LAB
ALBUMIN SERPL ELPH-MCNC: 3.9 G/DL (ref 3.7–5.1)
ALPHA1 GLOB SERPL ELPH-MCNC: 0.4 G/DL (ref 0.2–0.4)
ALPHA2 GLOB SERPL ELPH-MCNC: 0.8 G/DL (ref 0.5–0.9)
B-GLOBULIN SERPL ELPH-MCNC: 0.6 G/DL (ref 0.6–1)
GAMMA GLOB SERPL ELPH-MCNC: 0.3 G/DL (ref 0.7–1.6)
M PROTEIN SERPL ELPH-MCNC: 0.1 G/DL
PROT PATTERN SERPL ELPH-IMP: ABNORMAL

## 2021-03-23 PROCEDURE — 99214 OFFICE O/P EST MOD 30 MIN: CPT | Mod: 95 | Performed by: INTERNAL MEDICINE

## 2021-03-23 RX ORDER — GABAPENTIN 300 MG/1
300 CAPSULE ORAL AT BEDTIME
Qty: 30 CAPSULE | Refills: 3 | COMMUNITY
Start: 2021-03-23 | End: 2021-05-24

## 2021-03-23 NOTE — PROGRESS NOTES
"Rogelio is a 76 year old who is being evaluated via a billable telephone visit.      What phone number would you like to be contacted at? [phone]  How would you like to obtain your AVS? MyChart      Review Of Systems  Skin: NEGATIVE  Eyes:Ears/Nose/Throat: NEGATIVE  Respiratory: NEGATIVE  Cardiovascular:NEGATIVE  Gastrointestinal: NEGATIVE  Genitourinary:NEGATIVE   Musculoskeletal: NEGATIVE  Neurologic: NEGATIVE  Psychiatric: NEGATIVE  Hematologic/Lymphatic/Immunologic: NEGATIVE  Endocrine:  NEGATIVE    Telephone number of patient: [phone]    Blood pressure home reading unavailable    Vitals - Patient Reported  Weight (Patient Reported): 59 kg (130 lb)  Height (Patient Reported): 154.9 cm (5' 1\")  BMI (Based on Pt Reported Ht/Wt): 24.56  MSexton N      Palliative Care Outpatient Clinic Progress Note    Patient Name:  Rogelio Marshall  Primary Provider:  Wolf Stevens    Chief Complaint/Patient ID: Follow-up   75-year-old man with multiple myeloma   -Started on treatment with VRD 11/2020, switched to daratumumab/velcade/dex 12/2020 due to poor tolerability  Pain - pathologic rib fractures    Last Palliative Care Appointment: 2/17/21 - discussed decreasing/weaning off gabapentin and methocarbamol due to fatigue, low appetite and improved pain    Interim History:  Rogelio Marshall 76 year old male returns for palliative care appointment today.  Telephone visit done due to coronavirus pandemic.    Started mirtazapine 15 mg QHS last week, has not noticed change in appetite.  Noted to have fall in kidney function - oncology held diuretic and decrease ACE, improved GFR this week but still under baseline.     Pain - Oxycodone 5 mg once-twice/day, mostly for pain in lower back in the center and throughout the torso.  No shooting pains, no pain into legs.  Takes tylenol 1000 mg three times/day.   Will get a muscle-spasm twitch in both hands that he's been taking the methocarbamol (not sure if it's " "helping), has had for a few months, associates it starting when he started the cancer treatments.  Has numbness in his fingers, no burning/tingling/pins and needles.     Says appetite has been low - last night only had one piece of a frozen pizza, when he could usually eat a full pizza.  Food tastes terrible, but protein shakes do taste good - he's able to drink those.  Is losing weight, has lost about 10 lbs.   Having constipation - takes miralax in the morning, if 1-2 days of feeling constipated - will take senna and this is effective.      Sleeping alright.  Mood has been generally good.     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     Lives alone, describes himself as a \"[place]\"  Worked as a printer and deliveryman.  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    Advance Directive Status: Has not completed.  At last conversation he said he would want his brother Dutch howard to make medical decisions as he be next of kin.  They have not been in contact in years.      Allergies   Allergen Reactions     Other Drug Allergy (See Comments)      tobasco sauce ---caused red spots      Current Outpatient Medications   Medication Sig Dispense Refill     acetaminophen (TYLENOL) 325 MG tablet Take 3 tablets (975 mg) by mouth 3 times daily 500 tablet 4     acyclovir (ZOVIRAX) 400 MG tablet Take 1 tablet (400 mg) by mouth 2 times daily Viral Prophylaxis. 60 tablet 11     calcium carbonate 500 mg, elemental, (OSCAL) 500 MG tablet Take 1 tablet (500 mg) by mouth 2 times daily 200 tablet 3     cholecalciferol (VITAMIN D3) 25 mcg (1000 units) capsule Take 1 capsule (25 mcg) by mouth daily 100 capsule 3     dexamethasone (DECADRON) 4 MG tablet Take 20 mg (five tabs) by mouth on Day 2 35 tablet 0     gabapentin (NEURONTIN) 300 MG capsule Take 1 capsule (300 " mg) by mouth 2 times daily 60 capsule 3     hydrochlorothiazide (MICROZIDE) 12.5 MG capsule Take 1 capsule (12.5 mg) by mouth daily (Patient not taking: Reported on 3/15/2021) 90 capsule 3     Lidocaine (LIDOCARE) 4 % Patch Place 1-3 patches onto the skin every 24 hours To prevent lidocaine toxicity, patient should be patch free for 12 hrs daily. (Patient not taking: Reported on 12/18/2020) 30 patch 1     lisinopril (ZESTRIL) 20 MG tablet Take 1 tablet (20 mg) by mouth daily (Patient taking differently: Take 10 mg by mouth daily ) 30 tablet 3     LORazepam (ATIVAN) 0.5 MG tablet Take 1 tablet (0.5 mg) by mouth every 4 hours as needed (Anxiety, Nausea/Vomiting or Sleep) (Patient not taking: Reported on 2/15/2021) 30 tablet 5     methocarbamol (ROBAXIN) 500 MG tablet Take 1 tablet (500 mg) by mouth 4 times daily (Patient taking differently: Take 500 mg by mouth 3 times daily ) 120 tablet 3     mirtazapine (REMERON) 15 MG tablet Take 1 tablet (15 mg) by mouth At Bedtime 30 tablet 0     omeprazole (PRILOSEC) 20 MG DR capsule Take 1 capsule (20 mg) by mouth daily 30 capsule 3     ondansetron (ZOFRAN-ODT) 4 MG ODT tab Take 1 tablet (4 mg) by mouth every 6 hours as needed for nausea or vomiting (Patient not taking: Reported on 12/18/2020)       oxyCODONE (ROXICODONE) 5 MG tablet Take 1 tablet (5 mg) by mouth every 12 hours as needed for severe pain 30 tablet 0     polyethylene glycol (MIRALAX) 17 g packet Take 17 g by mouth daily       prochlorperazine (COMPAZINE) 10 MG tablet Take 1 tablet (10 mg) by mouth every 6 hours as needed (Nausea/Vomiting) 30 tablet 5     senna-docusate (SENOKOT-S/PERICOLACE) 8.6-50 MG tablet Take 1 tablet by mouth daily as needed        triamcinolone (KENALOG) 0.1 % external ointment Apply topically 2 times daily To back rash (Patient not taking: Reported on 12/18/2020) 15 g 0       Palliative Symptom Review (0=no symptom/no concern, 1=mild, 2=moderate, 3=severe):      Pain: 1      Fatigue: 2       Nausea: 0      Constipation: 1 - taking miralax in the morning and senna as needed      Diarrhea: 0      Depressive Symptoms: 0      Anxiety: 0      Drowsiness: 0      Poor Appetite: 2      Shortness of Breath: 0      Insomnia: 0      Other: 0      Overall (0 good/no concerns, 3 very poor):  1    No physical exam due to telephone visit.  Voice strong, mood calm.  Good short and long-term memory.     Wt Readings from Last 10 Encounters:   03/22/21 59.1 kg (130 lb 4.8 oz)   03/18/21 58.7 kg (129 lb 6.4 oz)   03/15/21 60.3 kg (133 lb)   03/01/21 62.6 kg (137 lb 14.4 oz)   02/22/21 63.5 kg (140 lb)   02/15/21 64.6 kg (142 lb 6.4 oz)   02/08/21 62.7 kg (138 lb 3.2 oz)   02/04/21 64.3 kg (141 lb 11.2 oz)   02/01/21 62.8 kg (138 lb 8 oz)   01/29/21 63.3 kg (139 lb 9.6 oz)       Key Data Reviewed:  LABS:   Recent Labs   Lab Test 03/22/21  0949 03/18/21  1021    136   POTASSIUM 4.0 4.1   CHLORIDE 109 106   CO2 23 25   ANIONGAP 7 6   * 113*   BUN 38* 44*   CR 1.35* 1.95*   MIRI 9.1 9.1     GFR 51 (recently 33)  Albumin 3.6  ALP, ALT, AST normal  Hb 8.8,     IgG and light chains trending down    IMAGING:   No new imaging    [place]  reviewed and fills consistent with history.     Impression & Recommendations & Counseling:  Rogelio Marshall is a 76-year-old man with multiple myeloma on daratumumab/velcade/dex with response on labs and improved pain, seen for palliative care follow-up.  Having poor appetite, fatigue, and tremors in hands.  Also following for pain.     Pain - in spine, due to myeloma, pathologic fractures, and preexisting degenerative disease.   -Continue oxycodone as needed, currently 1-2x/day  -Continue Tylenol 1000 mg 3 times daily  - Gabapentin -started for neuropathy, now mainly only having numbness which the drug is not effective for.  If he is not having neuropathic pain type symptoms, is reasonable to taper off.  Didn't notice a change with decreasing TID -> BID, will decrease to  nighttime only.  This may also be a cause for his tremor, so more reason to stop if not benefitting  -  Continues on methocarbamol for tremor in hands, but unclear if this is helping.  Will wean gabapentin first, but decreasing this may also help with fatigue.     Low appetite - no nausea.  Weight decreasing.  Has been on mirtazapine x 1 week  -Have discussed nonpharmacologic approaches, is doing protein shake supplements  - Encouraged to trial the mirtazapine for 1 month, can stop after that if no improvement, consider alternative    OIC - continue miralax daily, senna PRN.  Encouraged to take senna on days he takes the oxycodone twice.     Multiple Myeloma - currently on treatment.  Knows cancer is incurable, but does not have a sense of what to expect with treatments, if he would be on continuously.   -  Advanced Care Planning - not in touch with family, no close friends.  Does not know who he'd trust to make medical decisions, says would be alright with having an appointed guardian if he could not make decisions.  Have introduced health care directive.    Phone call duration: 28 minutes    Follow-up in 2-3 months    43 minutes spent including reviewing record, review of above studies, above visit with patient, and documentation.     Cat Tate MD  Palliative Medicine  Pager [phone]     (This note was transcribed using voice recognition software. While I review and edit the transcription, I may miss errors, and the software sometimes does unexpected capitalizations and formatting that I miss. Please let me know of any serious mistranscriptions and I will addend this note.)

## 2021-03-23 NOTE — PATIENT INSTRUCTIONS
Thank you for seeing the Palliative Care Clinic today.      1. Decrease gabapentin to 300 mg at night only.  If you are not having burning, tingling, or shooting pains this is less helpful and may be causing your tremor.  This can be weaned off if not changing pain.      2. Decreasing the methocarbamol would be the next thing I'd consider to help your fatigue.     3. Can take senna on the nights you take oxycodone 2x.     Return to clinic in 2-3 months for a follow-up.      You can reach the Palliative Care Team during business hours at the following numbers:   -For the [facility], call [phone]  -To reach the palliative RN for questions or refills, call [phone]       To reach the Palliative Care Provider on-call After-hours or on holidays and weekends, call: [phone].  Please note that we are not able to provide pain medication refills on evenings or weekends.

## 2021-03-25 ENCOUNTER — INFUSION THERAPY VISIT (OUTPATIENT)
Dept: ONCOLOGY | Facility: CLINIC | Age: 76
End: 2021-03-25
Attending: STUDENT IN AN ORGANIZED HEALTH CARE EDUCATION/TRAINING PROGRAM
Payer: COMMERCIAL

## 2021-03-25 VITALS
TEMPERATURE: 98.1 F | HEART RATE: 95 BPM | RESPIRATION RATE: 16 BRPM | DIASTOLIC BLOOD PRESSURE: 65 MMHG | SYSTOLIC BLOOD PRESSURE: 100 MMHG | OXYGEN SATURATION: 99 %

## 2021-03-25 DIAGNOSIS — C90.00 MULTIPLE MYELOMA, REMISSION STATUS UNSPECIFIED (H): Primary | ICD-10-CM

## 2021-03-25 PROCEDURE — 96401 CHEMO ANTI-NEOPL SQ/IM: CPT

## 2021-03-25 PROCEDURE — 250N000011 HC RX IP 250 OP 636: Mod: JW | Performed by: STUDENT IN AN ORGANIZED HEALTH CARE EDUCATION/TRAINING PROGRAM

## 2021-03-25 RX ADMIN — BORTEZOMIB 2.2 MG: 3.5 INJECTION, POWDER, LYOPHILIZED, FOR SOLUTION INTRAVENOUS; SUBCUTANEOUS at 15:58

## 2021-03-25 ASSESSMENT — PAIN SCALES - GENERAL: PAINLEVEL: MILD PAIN (2)

## 2021-03-25 NOTE — PROGRESS NOTES
Infusion Nursing Note:  Rogelio Marshall presents today for C5 D11 Velcade.    Patient seen by provider today: No   present during visit today: Not Applicable.    Note: Patient arrives feeling OK. No significant changes since visit on Monday. Doesn't have an appetite, but manages to drink a protein shake in the morning. Pt unable to report how much fluid intake he's consuming. Encouraged pt to push fluids more. Denies N/V, and fluctuates between constipation and diarrhea, but states his bowels are controlled at this time.    Treatment Conditions:  Labs reviewed from 3/22.      Post Infusion Assessment:  Patient tolerated Velcade injection without incident into RIGHT arm.  Site patent and intact, free from redness, edema or discomfort.       Discharge Plan:   Patient declined prescription refills.  Discharge instructions reviewed with: Patient.  Patient and/or family verbalized understanding of discharge instructions and all questions answered.  Copy of AVS reviewed with patient and/or family.  Patient will return 4/5 for next appointment.  Patient discharged in stable condition accompanied by: self.  Departure Mode: Ambulatory with walker.    Sara Paez RN

## 2021-03-25 NOTE — PATIENT INSTRUCTIONS
Contact numbers:    Triage/[phone]    Call with chills and/or temperature greater than or equal to 100.5 and questions or concerns.    If after hours, weekends, or holidays, call main hospital  at  [phone] and ask for Oncology doctor on call.                 1    LAB PERIPHERAL   9:15 AM   (15 min.)    [facility]   [facility] ONC INFUSION 60  10:00 AM   (60 min.)   [facility]   [facility] 2     3     4    [facility] INFUSION 30   9:30 AM   (60 min.)    ONCOLOGY INFUSION   [facility] 5     6       7     8     9     10     11     12     13       14     15    TELEPHONE VISIT RETURN   8:50 AM   (50 min.)   Pebbles Longo PA-C   [facility]    LAB PERIPHERAL  12:45 PM   (15 min.)   [facility]   [facility] ONC INFUSION 120   1:30 PM   (120 min.)   [facility]FUSION   [facility] 16     17    TELEPHONE VISIT RETURN   2:00 PM   (30 min.)   Angela Sweeney MD   [facility] 18    LAB PERIPHERAL   9:45 AM   (15 min.)    [facility]   [facility] ONC INFUSION 60  10:00 AM   (60 min.)    ONCOLOGY INFUSION   [facility] 19     20       21     22    LAB PERIPHERAL   9:15 AM   (15 min.)    [facility]   [facility] ONC INFUSION 60  10:00 AM   (60 min.)    ONCOLOGY INFUSION   [facility] 23    TELEPHONE VISIT RETURN  11:20 AM   (40 min.)   Cat Tate MD   [facility] 24     25    P ONC INFUSION 60   3:00 PM   (60 min.)    ONCOLOGY INFUSION   [facility] 26     27       28     29      30     31 April 2021 Sunday Monday Tuesday Wednesday Thursday Friday Saturday                       1     2     3       4     5    LAB PERIPHERAL   6:45 AM   (15 min.)   [facility] LAB DRAW   [facility]    [facility] ONC INFUSION 360   7:00 AM   (360 min.)    ONCOLOGY INFUSION   [facility] 6     7    RETURN  11:15 AM   (30 min.)   Angela Sweeney MD   [facility] 8     9     10       11     12     13     14     15     16     17       18     19     20     21     22     23     24       25     26     27     28     29     30                          Lab Results:  No results found for this or any previous visit (from the past 12 hour(s)).

## 2021-03-27 ENCOUNTER — HEALTH MAINTENANCE LETTER (OUTPATIENT)
Age: 76
End: 2021-03-27

## 2021-04-01 ENCOUNTER — TELEPHONE (OUTPATIENT)
Dept: ONCOLOGY | Facility: CLINIC | Age: 76
End: 2021-04-01

## 2021-04-01 DIAGNOSIS — I10 ESSENTIAL HYPERTENSION: ICD-10-CM

## 2021-04-01 NOTE — TELEPHONE ENCOUNTER
[facility] Telephone Triage Note    Assessment:   Rogelio (pt) reporting the following symptoms: diarrhea starting 2 days ago with 6 episodes in the last 24 hours.  States seems to occur after trying to eat chocolate milk/chocolate shakes.   Liquid, light brown  Some abdominal cramping    Continues to have an appetite problem so is drinking mio milk and protein shakes.    Rogelio continues to drink water, juices-apple or grape juice.    Oxycodone taking once/twice a day as needed and was taking stool softeners for SE of oxycodone.   But since cutting back on oxycodone only as needed was still taking Miralax daily every morning until diarrhea stops for 2 days.     Denies fevers/chills, cough, sore throat, SOB, n/v, bladder issues, dannie blood and black tarry stool.     Spent 25minutes on the phone educating on diarrhea management.    This writer educated to hold Miralax, only take if no BM in 36 to 48 hrs. Hold on consuming dairy products. Avoid fried, fatty, greasy, spicy foods, avoid high fiber foods, caffeine, alcohol. Keep skin intact may use skin barrier.     Recommendations: Routed to provider Dr. Sweeney  Provided home care advice per  diarrhea protocol.    Follow-Up:  Patient verbalize understanding of instructions and wrote down instructions and repeated back to nurse. Patient will call triage back if does not improve or worsens.    Instructed patient to seek care immediately for worsening symptoms, including: fever, chest pain, shortness of breath, blood stools, lethargy, weakness, severe constant cramping.

## 2021-04-02 DIAGNOSIS — C90.00 MULTIPLE MYELOMA, REMISSION STATUS UNSPECIFIED (H): Primary | ICD-10-CM

## 2021-04-02 RX ORDER — ALBUTEROL SULFATE 90 UG/1
1-2 AEROSOL, METERED RESPIRATORY (INHALATION)
Status: CANCELLED
Start: 2021-05-03

## 2021-04-02 RX ORDER — DIPHENHYDRAMINE HYDROCHLORIDE 50 MG/ML
50 INJECTION INTRAMUSCULAR; INTRAVENOUS
Status: CANCELLED
Start: 2021-05-03

## 2021-04-02 RX ORDER — NALOXONE HYDROCHLORIDE 0.4 MG/ML
.1-.4 INJECTION, SOLUTION INTRAMUSCULAR; INTRAVENOUS; SUBCUTANEOUS
Status: CANCELLED | OUTPATIENT
Start: 2021-04-05

## 2021-04-02 RX ORDER — DEXAMETHASONE 0.5 MG/1
20 TABLET ORAL ONCE
Status: CANCELLED | OUTPATIENT
Start: 2021-05-03

## 2021-04-02 RX ORDER — ACETAMINOPHEN 325 MG/1
650 TABLET ORAL ONCE
Status: CANCELLED | OUTPATIENT
Start: 2021-05-03

## 2021-04-02 RX ORDER — ALBUTEROL SULFATE 90 UG/1
1-2 AEROSOL, METERED RESPIRATORY (INHALATION)
Status: CANCELLED
Start: 2021-04-05

## 2021-04-02 RX ORDER — SODIUM CHLORIDE 9 MG/ML
1000 INJECTION, SOLUTION INTRAVENOUS CONTINUOUS PRN
Status: CANCELLED
Start: 2021-04-05

## 2021-04-02 RX ORDER — HEPARIN SODIUM (PORCINE) LOCK FLUSH IV SOLN 100 UNIT/ML 100 UNIT/ML
5 SOLUTION INTRAVENOUS
Status: CANCELLED | OUTPATIENT
Start: 2021-04-05

## 2021-04-02 RX ORDER — DIPHENHYDRAMINE HYDROCHLORIDE 50 MG/ML
50 INJECTION INTRAMUSCULAR; INTRAVENOUS
Status: CANCELLED
Start: 2021-04-05

## 2021-04-02 RX ORDER — EPINEPHRINE 1 MG/ML
0.3 INJECTION, SOLUTION, CONCENTRATE INTRAVENOUS EVERY 5 MIN PRN
Status: CANCELLED | OUTPATIENT
Start: 2021-04-05

## 2021-04-02 RX ORDER — HEPARIN SODIUM (PORCINE) LOCK FLUSH IV SOLN 100 UNIT/ML 100 UNIT/ML
5 SOLUTION INTRAVENOUS
Status: CANCELLED | OUTPATIENT
Start: 2021-05-03

## 2021-04-02 RX ORDER — DIPHENHYDRAMINE HCL 25 MG
50 CAPSULE ORAL ONCE
Status: CANCELLED | OUTPATIENT
Start: 2021-04-05

## 2021-04-02 RX ORDER — NALOXONE HYDROCHLORIDE 0.4 MG/ML
.1-.4 INJECTION, SOLUTION INTRAMUSCULAR; INTRAVENOUS; SUBCUTANEOUS
Status: CANCELLED | OUTPATIENT
Start: 2021-05-03

## 2021-04-02 RX ORDER — MEPERIDINE HYDROCHLORIDE 25 MG/ML
25 INJECTION INTRAMUSCULAR; INTRAVENOUS; SUBCUTANEOUS EVERY 30 MIN PRN
Status: CANCELLED | OUTPATIENT
Start: 2021-05-03

## 2021-04-02 RX ORDER — HEPARIN SODIUM,PORCINE 10 UNIT/ML
5 VIAL (ML) INTRAVENOUS
Status: CANCELLED | OUTPATIENT
Start: 2021-05-03

## 2021-04-02 RX ORDER — HEPARIN SODIUM,PORCINE 10 UNIT/ML
5 VIAL (ML) INTRAVENOUS
Status: CANCELLED | OUTPATIENT
Start: 2021-04-05

## 2021-04-02 RX ORDER — DEXAMETHASONE 0.5 MG/1
20 TABLET ORAL ONCE
Status: CANCELLED | OUTPATIENT
Start: 2021-04-05

## 2021-04-02 RX ORDER — SODIUM CHLORIDE 9 MG/ML
1000 INJECTION, SOLUTION INTRAVENOUS CONTINUOUS PRN
Status: CANCELLED
Start: 2021-05-03

## 2021-04-02 RX ORDER — EPINEPHRINE 1 MG/ML
0.3 INJECTION, SOLUTION, CONCENTRATE INTRAVENOUS EVERY 5 MIN PRN
Status: CANCELLED | OUTPATIENT
Start: 2021-05-03

## 2021-04-02 RX ORDER — ALBUTEROL SULFATE 0.83 MG/ML
2.5 SOLUTION RESPIRATORY (INHALATION)
Status: CANCELLED | OUTPATIENT
Start: 2021-05-03

## 2021-04-02 RX ORDER — DIPHENHYDRAMINE HCL 25 MG
50 CAPSULE ORAL ONCE
Status: CANCELLED | OUTPATIENT
Start: 2021-05-03

## 2021-04-02 RX ORDER — ACETAMINOPHEN 325 MG/1
650 TABLET ORAL ONCE
Status: CANCELLED | OUTPATIENT
Start: 2021-04-05

## 2021-04-02 RX ORDER — LORAZEPAM 2 MG/ML
0.5 INJECTION INTRAMUSCULAR EVERY 4 HOURS PRN
Status: CANCELLED
Start: 2021-04-05

## 2021-04-02 RX ORDER — LORAZEPAM 2 MG/ML
0.5 INJECTION INTRAMUSCULAR EVERY 4 HOURS PRN
Status: CANCELLED
Start: 2021-05-03

## 2021-04-02 RX ORDER — MEPERIDINE HYDROCHLORIDE 25 MG/ML
25 INJECTION INTRAMUSCULAR; INTRAVENOUS; SUBCUTANEOUS EVERY 30 MIN PRN
Status: CANCELLED | OUTPATIENT
Start: 2021-04-05

## 2021-04-02 RX ORDER — ALBUTEROL SULFATE 0.83 MG/ML
2.5 SOLUTION RESPIRATORY (INHALATION)
Status: CANCELLED | OUTPATIENT
Start: 2021-04-05

## 2021-04-03 NOTE — PROGRESS NOTES
Oncologic Hx:   -compression fractures and bone scan reveals multiple lytic lesions throughout his appendicular and axial skeleton. His Beta 2 microglobulin was 3.6 on 10/10/2020. Kappa chains were 73.6 on 10/5/2020 with K/L ratio of 89.9. M spike of 16.2 in urine on 10/10. Bone marrow biopsy on 10/23/2020 showed trilineage hematopoiesis and 50-60% kappa restricted plasma cells. Flow cytometry showed 20 % plasma cells which express CD19, CD38, CD45 and monotypic cytoplasmic kappa immunoglobulin light chains but lack CD20 and CD56.  FISH shows IGH-CCND1 fusion (81%; 30% had loss of IGH component from one fusion signal).      - Started 21 day cycle VRD 11/2020    - 11/27 he had a fall with rib fractures.     - Dec 2020 Started madyson-velcade-dex. Completed 5 cycles and achieved VGPR    - April 2021 started darzalex maintenance      Interval Hx: Rogelio has low BP again today and although his med list states he is on hydrochlorothiazide and lisinopril, his home nurse's note states he is only taking half of the lisinopril dose. He has also recently had an increase in his creatinine. He describes feeling tired and weak. Also notes that he has a 'bad taste in my mouth all the time'. He attributes the taste changes to the velcade he was on for myeloma treatment. The numbness and tingling in his hands and feet is stable.    A comprehensive review of systems was completed and negative except as described above.     Pertinent PMH reviewed:   HTN    Physical Exam:   BP 92/56   Pulse 90   Temp 98.5  F (36.9  C) (Oral)   Resp 18   Wt 57.2 kg (126 lb 1.6 oz)   SpO2 99%   BMI 23.83 kg/m      Constitutional: NAD  Eyes: no scleral icterus  ENT: no oral lesions  Lymph: no cervical, supraclavicular, axillary LAD  Pulm: CTAB  CV: RRR, no m/r/g  GI: bowel sounds present, soft and nontender to palpation  MSK: No edema in the extremities  Neuro: alert, conversant, normal gait  Psych: appropriate mood and affect      Pertinent Data  Summarized:  3/22/2021 M spike 0.1    Assessment and Plan  74 yo man with IgG Kappa standard risk multiple myeloma. Despite his side effects, he has had a VGPR with therapy. Now on q 4 week darzalex faspro for maintenance as he was getting neuropathy with velcade.     Pathologic Fractures: He also is at high risk for more compression fractures. Vitamin D is replete and he is on a daily supplement as well as calcium. I have asked him repeatedly to see the dentist to clear him for zometa treatment but he is very resistant to this. I have placed a referral again,     HALLIE vs CKD: Baseline creatinine is 1.1 suggesting underlying CKD but recently worsened to 1.9, then back down to 1.4. I am concerned this might be related to hypotension so I have stopped his lisinopril. (He states he was not taking hydrochlorothiazide). I will message Dr. Stevens, his PCP, about this change.       Angela Sweeney MD PhD

## 2021-04-05 ENCOUNTER — INFUSION THERAPY VISIT (OUTPATIENT)
Dept: ONCOLOGY | Facility: CLINIC | Age: 76
End: 2021-04-05
Attending: STUDENT IN AN ORGANIZED HEALTH CARE EDUCATION/TRAINING PROGRAM
Payer: COMMERCIAL

## 2021-04-05 VITALS
SYSTOLIC BLOOD PRESSURE: 106 MMHG | DIASTOLIC BLOOD PRESSURE: 66 MMHG | BODY MASS INDEX: 23.88 KG/M2 | OXYGEN SATURATION: 99 % | TEMPERATURE: 97.7 F | WEIGHT: 126.4 LBS | RESPIRATION RATE: 18 BRPM | HEART RATE: 100 BPM

## 2021-04-05 DIAGNOSIS — C90.00 MULTIPLE MYELOMA, REMISSION STATUS UNSPECIFIED (H): Primary | ICD-10-CM

## 2021-04-05 LAB
ALBUMIN SERPL-MCNC: 3.9 G/DL (ref 3.4–5)
ALP SERPL-CCNC: 91 U/L (ref 40–150)
ALT SERPL W P-5'-P-CCNC: 16 U/L (ref 0–70)
ANION GAP SERPL CALCULATED.3IONS-SCNC: 8 MMOL/L (ref 3–14)
AST SERPL W P-5'-P-CCNC: 8 U/L (ref 0–45)
BASOPHILS # BLD AUTO: 0.1 10E9/L (ref 0–0.2)
BASOPHILS NFR BLD AUTO: 1.3 %
BILIRUB SERPL-MCNC: 0.6 MG/DL (ref 0.2–1.3)
BUN SERPL-MCNC: 48 MG/DL (ref 7–30)
CALCIUM SERPL-MCNC: 9.9 MG/DL (ref 8.5–10.1)
CHLORIDE SERPL-SCNC: 108 MMOL/L (ref 94–109)
CO2 SERPL-SCNC: 21 MMOL/L (ref 20–32)
CREAT SERPL-MCNC: 1.97 MG/DL (ref 0.66–1.25)
DIFFERENTIAL METHOD BLD: ABNORMAL
EOSINOPHIL # BLD AUTO: 0.4 10E9/L (ref 0–0.7)
EOSINOPHIL NFR BLD AUTO: 5.5 %
ERYTHROCYTE [DISTWIDTH] IN BLOOD BY AUTOMATED COUNT: 15.9 % (ref 10–15)
GFR SERPL CREATININE-BSD FRML MDRD: 32 ML/MIN/{1.73_M2}
GLUCOSE SERPL-MCNC: 91 MG/DL (ref 70–99)
HCT VFR BLD AUTO: 28.4 % (ref 40–53)
HGB BLD-MCNC: 9 G/DL (ref 13.3–17.7)
IMM GRANULOCYTES # BLD: 0.1 10E9/L (ref 0–0.4)
IMM GRANULOCYTES NFR BLD: 0.7 %
LYMPHOCYTES # BLD AUTO: 1.2 10E9/L (ref 0.8–5.3)
LYMPHOCYTES NFR BLD AUTO: 16.4 %
MCH RBC QN AUTO: 32.4 PG (ref 26.5–33)
MCHC RBC AUTO-ENTMCNC: 31.7 G/DL (ref 31.5–36.5)
MCV RBC AUTO: 102 FL (ref 78–100)
MONOCYTES # BLD AUTO: 0.9 10E9/L (ref 0–1.3)
MONOCYTES NFR BLD AUTO: 12.3 %
NEUTROPHILS # BLD AUTO: 4.8 10E9/L (ref 1.6–8.3)
NEUTROPHILS NFR BLD AUTO: 63.8 %
NRBC # BLD AUTO: 0 10*3/UL
NRBC BLD AUTO-RTO: 0 /100
PLATELET # BLD AUTO: 466 10E9/L (ref 150–450)
POTASSIUM SERPL-SCNC: 4.6 MMOL/L (ref 3.4–5.3)
PROT SERPL-MCNC: 6.7 G/DL (ref 6.8–8.8)
RBC # BLD AUTO: 2.78 10E12/L (ref 4.4–5.9)
SODIUM SERPL-SCNC: 136 MMOL/L (ref 133–144)
WBC # BLD AUTO: 7.5 10E9/L (ref 4–11)

## 2021-04-05 PROCEDURE — 80053 COMPREHEN METABOLIC PANEL: CPT | Performed by: STUDENT IN AN ORGANIZED HEALTH CARE EDUCATION/TRAINING PROGRAM

## 2021-04-05 PROCEDURE — 250N000013 HC RX MED GY IP 250 OP 250 PS 637: Performed by: STUDENT IN AN ORGANIZED HEALTH CARE EDUCATION/TRAINING PROGRAM

## 2021-04-05 PROCEDURE — 250N000012 HC RX MED GY IP 250 OP 636 PS 637: Performed by: STUDENT IN AN ORGANIZED HEALTH CARE EDUCATION/TRAINING PROGRAM

## 2021-04-05 PROCEDURE — 96401 CHEMO ANTI-NEOPL SQ/IM: CPT

## 2021-04-05 PROCEDURE — 96402 CHEMO HORMON ANTINEOPL SQ/IM: CPT

## 2021-04-05 PROCEDURE — 85025 COMPLETE CBC W/AUTO DIFF WBC: CPT | Performed by: STUDENT IN AN ORGANIZED HEALTH CARE EDUCATION/TRAINING PROGRAM

## 2021-04-05 PROCEDURE — 36415 COLL VENOUS BLD VENIPUNCTURE: CPT

## 2021-04-05 PROCEDURE — 250N000011 HC RX IP 250 OP 636: Performed by: STUDENT IN AN ORGANIZED HEALTH CARE EDUCATION/TRAINING PROGRAM

## 2021-04-05 RX ORDER — DIPHENHYDRAMINE HCL 25 MG
50 CAPSULE ORAL ONCE
Status: COMPLETED | OUTPATIENT
Start: 2021-04-05 | End: 2021-04-05

## 2021-04-05 RX ORDER — DEXAMETHASONE 4 MG/1
20 TABLET ORAL ONCE
Status: COMPLETED | OUTPATIENT
Start: 2021-04-05 | End: 2021-04-05

## 2021-04-05 RX ORDER — ACETAMINOPHEN 325 MG/1
650 TABLET ORAL ONCE
Status: COMPLETED | OUTPATIENT
Start: 2021-04-05 | End: 2021-04-05

## 2021-04-05 RX ADMIN — DEXAMETHASONE 20 MG: 4 TABLET ORAL at 07:36

## 2021-04-05 RX ADMIN — DARATUMUMAB AND HYALURONIDASE-FIHJ (HUMAN RECOMBINANT) 1800 MG: 1800; 30000 INJECTION SUBCUTANEOUS at 08:27

## 2021-04-05 RX ADMIN — ACETAMINOPHEN 650 MG: 325 TABLET ORAL at 07:36

## 2021-04-05 RX ADMIN — DIPHENHYDRAMINE HYDROCHLORIDE 50 MG: 25 CAPSULE ORAL at 07:36

## 2021-04-05 NOTE — PATIENT INSTRUCTIONS
[facility] Triage and after hours / weekends / holidays:  [phone]    Please call the triage or after hours line if you experience a temperature greater than or equal to 100.5, shaking chills, have uncontrolled nausea, vomiting and/or diarrhea, dizziness, shortness of breath, chest pain, bleeding, unexplained bruising, or if you have any other new/concerning symptoms, questions or concerns.      If you are having any concerning symptoms or wish to speak to a provider before your next infusion visit, please call your care coordinator or triage to notify them so we can adequately serve you.     If you need a refill on a narcotic prescription or other medication, please call before your infusion appointment.                 April 2021 Sunday Monday Tuesday Wednesday Thursday Friday Saturday                       1     2     3       4     5    LAB PERIPHERAL   6:45 AM   (15 min.)   [facility] LAB DRAW   [facility] ONC INFUSION 360   7:00 AM   (360 min.)    ONCOLOGY INFUSION   [facility] 6     7    RETURN  11:15 AM   (30 min.)   Angela Sweeney MD   [facility] 8     9     10       11     12     13     14     15     16     17       18     19     20     21     22     23     24       25     26     27     28     29     30                      May 2021      [place] Monday Tuesday Wednesday Thursday Friday Saturday                                 1       2     3     4     5     6     7     8       9     10     11     12     13     14     15       16     17     18     19     20     21     22       23     24    TELEPHONE VISIT RETURN  11:50 AM   (40 min.)   Cat Tate MD   [facility] 25     26     27     28     29       30     31                                             Recent Results (from the past 24 hour(s))   CBC with platelets differential    Collection Time: 04/05/21  6:59 AM    Result Value Ref Range    WBC 7.5 4.0 - 11.0 10e9/L    RBC Count 2.78 (L) 4.4 - 5.9 10e12/L    Hemoglobin 9.0 (L) 13.3 - 17.7 g/dL    Hematocrit 28.4 (L) 40.0 - 53.0 %     (H) 78 - 100 fl    MCH 32.4 26.5 - 33.0 pg    MCHC 31.7 31.5 - 36.5 g/dL    RDW 15.9 (H) 10.0 - 15.0 %    Platelet Count 466 (H) 150 - 450 10e9/L    Diff Method Automated Method     % Neutrophils 63.8 %    % Lymphocytes 16.4 %    % Monocytes 12.3 %    % Eosinophils 5.5 %    % Basophils 1.3 %    % Immature Granulocytes 0.7 %    Nucleated RBCs 0 0 /100    Absolute Neutrophil 4.8 1.6 - 8.3 10e9/L    Absolute Lymphocytes 1.2 0.8 - 5.3 10e9/L    Absolute Monocytes 0.9 0.0 - 1.3 10e9/L    Absolute Eosinophils 0.4 0.0 - 0.7 10e9/L    Absolute Basophils 0.1 0.0 - 0.2 10e9/L    Abs Immature Granulocytes 0.1 0 - 0.4 10e9/L    Absolute Nucleated RBC 0.0    Comprehensive metabolic panel    Collection Time: 04/05/21  6:59 AM   Result Value Ref Range    Sodium 136 133 - 144 mmol/L    Potassium 4.6 3.4 - 5.3 mmol/L    Chloride 108 94 - 109 mmol/L    Carbon Dioxide 21 20 - 32 mmol/L    Anion Gap 8 3 - 14 mmol/L    Glucose 91 70 - 99 mg/dL    Urea Nitrogen 48 (H) 7 - 30 mg/dL    Creatinine 1.97 (H) 0.66 - 1.25 mg/dL    GFR Estimate 32 (L) >60 mL/min/[1.73_m2]    GFR Estimate If Black 38 (L) >60 mL/min/[1.73_m2]    Calcium 9.9 8.5 - 10.1 mg/dL    Bilirubin Total 0.6 0.2 - 1.3 mg/dL    Albumin 3.9 3.4 - 5.0 g/dL    Protein Total 6.7 (L) 6.8 - 8.8 g/dL    Alkaline Phosphatase 91 40 - 150 U/L    ALT 16 0 - 70 U/L    AST 8 0 - 45 U/L

## 2021-04-05 NOTE — PROGRESS NOTES
Infusion Nursing Note:  Rogelio Marshall presents today for Day 1 of Maintenance Darzalex Faspro.    Patient seen by provider today: No   present during visit today: Not Applicable.    Note: Pt arrives to infusion today feeling pretty well after having a week and a half off of chemotherapy. He states his fatigue is improved but his decreased appetite remains. Encouraged him to push non-caffienated fluids at home. Denies any s/s of infection. Offers no new concerns or complaints.     Intravenous Access:  No Intravenous access.    Treatment Conditions:  Lab Results   Component Value Date    HGB 9.0 04/05/2021     Lab Results   Component Value Date    WBC 7.5 04/05/2021      Lab Results   Component Value Date    ANEU 4.8 04/05/2021     Lab Results   Component Value Date     04/05/2021      Lab Results   Component Value Date     04/05/2021                   Lab Results   Component Value Date    POTASSIUM 4.6 04/05/2021           Lab Results   Component Value Date    MAG 2.1 12/02/2020            Lab Results   Component Value Date    CR 1.97 04/05/2021                   Lab Results   Component Value Date    MIRI 9.9 04/05/2021                Lab Results   Component Value Date    BILITOTAL 0.6 04/05/2021           Lab Results   Component Value Date    ALBUMIN 3.9 04/05/2021                    Lab Results   Component Value Date    ALT 16 04/05/2021           Lab Results   Component Value Date    AST 8 04/05/2021     Results reviewed, labs MET treatment parameters, ok to proceed with treatment.    Post Infusion Assessment:  Patient tolerated injection without incident to the Right abdomen.     Discharge Plan:   Patient declined prescription refills.  Copy of AVS reviewed with patient and/or family.  Patient will have f/u with Dr. Sweeney on 4/7.  Patient discharged in stable condition accompanied by: self.  Departure Mode: Ambulatory.  Face to Face time: 0.    Юлия Calix  RN

## 2021-04-05 NOTE — NURSING NOTE
Chief Complaint   Patient presents with     Blood Draw     labs drawn via  by RN in lab     /66   Pulse 100   Resp 18   Wt 57.3 kg (126 lb 6.4 oz)   SpO2 99%   BMI 23.88 kg/m      Labs drawn via right antecubital venipuncture. Pt refusing to wear a mask, had to ask patient multiple times to put mask on.     Simona De Jesus RN on 4/5/2021 at 7:09 AM

## 2021-04-05 NOTE — CONFIDENTIAL NOTE
lisinopril (ZESTRIL) 20 MG tablet   Last Written Prescription Date:  11/9/20  Last Fill Quantity: 30,   # refills: 3  Last Office Visit : 11/9/20  Future Office visit:  None    Routing refill request to provider for review/approval because:  fco GAMBOA  Ordered by other provider.

## 2021-04-07 ENCOUNTER — ONCOLOGY VISIT (OUTPATIENT)
Dept: ONCOLOGY | Facility: CLINIC | Age: 76
End: 2021-04-07
Attending: STUDENT IN AN ORGANIZED HEALTH CARE EDUCATION/TRAINING PROGRAM
Payer: COMMERCIAL

## 2021-04-07 VITALS
BODY MASS INDEX: 23.83 KG/M2 | RESPIRATION RATE: 18 BRPM | WEIGHT: 126.1 LBS | OXYGEN SATURATION: 99 % | HEART RATE: 90 BPM | DIASTOLIC BLOOD PRESSURE: 56 MMHG | TEMPERATURE: 98.5 F | SYSTOLIC BLOOD PRESSURE: 92 MMHG

## 2021-04-07 DIAGNOSIS — C90.00 MULTIPLE MYELOMA, REMISSION STATUS UNSPECIFIED (H): Primary | ICD-10-CM

## 2021-04-07 DIAGNOSIS — N18.31 STAGE 3A CHRONIC KIDNEY DISEASE (H): ICD-10-CM

## 2021-04-07 PROCEDURE — G0463 HOSPITAL OUTPT CLINIC VISIT: HCPCS

## 2021-04-07 PROCEDURE — 99214 OFFICE O/P EST MOD 30 MIN: CPT | Performed by: STUDENT IN AN ORGANIZED HEALTH CARE EDUCATION/TRAINING PROGRAM

## 2021-04-07 RX ORDER — SODIUM CHLORIDE 9 MG/ML
1000 INJECTION, SOLUTION INTRAVENOUS CONTINUOUS PRN
Status: CANCELLED
Start: 2021-07-26

## 2021-04-07 RX ORDER — LORAZEPAM 2 MG/ML
0.5 INJECTION INTRAMUSCULAR EVERY 4 HOURS PRN
Status: CANCELLED
Start: 2021-08-23

## 2021-04-07 RX ORDER — LORAZEPAM 2 MG/ML
0.5 INJECTION INTRAMUSCULAR EVERY 4 HOURS PRN
Status: CANCELLED
Start: 2021-07-26

## 2021-04-07 RX ORDER — NALOXONE HYDROCHLORIDE 0.4 MG/ML
.1-.4 INJECTION, SOLUTION INTRAMUSCULAR; INTRAVENOUS; SUBCUTANEOUS
Status: CANCELLED | OUTPATIENT
Start: 2021-06-28

## 2021-04-07 RX ORDER — ACETAMINOPHEN 325 MG/1
650 TABLET ORAL ONCE
Status: CANCELLED | OUTPATIENT
Start: 2021-08-23

## 2021-04-07 RX ORDER — CHOLECALCIFEROL (VITAMIN D3) 25 MCG
2 TABLET ORAL DAILY
COMMUNITY
Start: 2021-03-02

## 2021-04-07 RX ORDER — SODIUM CHLORIDE 9 MG/ML
1000 INJECTION, SOLUTION INTRAVENOUS CONTINUOUS PRN
Status: CANCELLED
Start: 2021-06-28

## 2021-04-07 RX ORDER — MEPERIDINE HYDROCHLORIDE 25 MG/ML
25 INJECTION INTRAMUSCULAR; INTRAVENOUS; SUBCUTANEOUS EVERY 30 MIN PRN
Status: CANCELLED | OUTPATIENT
Start: 2021-06-28

## 2021-04-07 RX ORDER — DIPHENHYDRAMINE HCL 25 MG
50 CAPSULE ORAL ONCE
Status: CANCELLED | OUTPATIENT
Start: 2021-05-31

## 2021-04-07 RX ORDER — ASPIRIN 325 MG
TABLET ORAL
COMMUNITY
Start: 2021-03-17 | End: 2021-04-16

## 2021-04-07 RX ORDER — DEXAMETHASONE 4 MG/1
20 TABLET ORAL ONCE
Status: CANCELLED | OUTPATIENT
Start: 2021-07-26

## 2021-04-07 RX ORDER — DEXAMETHASONE 4 MG/1
20 TABLET ORAL ONCE
Status: CANCELLED | OUTPATIENT
Start: 2021-08-23

## 2021-04-07 RX ORDER — DIPHENHYDRAMINE HYDROCHLORIDE 50 MG/ML
50 INJECTION INTRAMUSCULAR; INTRAVENOUS
Status: CANCELLED
Start: 2021-08-23

## 2021-04-07 RX ORDER — LORAZEPAM 2 MG/ML
0.5 INJECTION INTRAMUSCULAR EVERY 4 HOURS PRN
Status: CANCELLED
Start: 2021-05-31

## 2021-04-07 RX ORDER — METHYLPREDNISOLONE SODIUM SUCCINATE 125 MG/2ML
125 INJECTION, POWDER, LYOPHILIZED, FOR SOLUTION INTRAMUSCULAR; INTRAVENOUS
Status: CANCELLED
Start: 2021-08-23

## 2021-04-07 RX ORDER — METHYLPREDNISOLONE SODIUM SUCCINATE 125 MG/2ML
125 INJECTION, POWDER, LYOPHILIZED, FOR SOLUTION INTRAMUSCULAR; INTRAVENOUS
Status: CANCELLED
Start: 2021-07-26

## 2021-04-07 RX ORDER — ALBUTEROL SULFATE 0.83 MG/ML
2.5 SOLUTION RESPIRATORY (INHALATION)
Status: CANCELLED | OUTPATIENT
Start: 2021-05-31

## 2021-04-07 RX ORDER — SODIUM CHLORIDE 9 MG/ML
1000 INJECTION, SOLUTION INTRAVENOUS CONTINUOUS PRN
Status: CANCELLED
Start: 2021-08-23

## 2021-04-07 RX ORDER — DEXAMETHASONE 4 MG/1
20 TABLET ORAL ONCE
Status: CANCELLED | OUTPATIENT
Start: 2021-06-28

## 2021-04-07 RX ORDER — MEPERIDINE HYDROCHLORIDE 25 MG/ML
25 INJECTION INTRAMUSCULAR; INTRAVENOUS; SUBCUTANEOUS EVERY 30 MIN PRN
Status: CANCELLED | OUTPATIENT
Start: 2021-08-23

## 2021-04-07 RX ORDER — NALOXONE HYDROCHLORIDE 0.4 MG/ML
.1-.4 INJECTION, SOLUTION INTRAMUSCULAR; INTRAVENOUS; SUBCUTANEOUS
Status: CANCELLED | OUTPATIENT
Start: 2021-07-26

## 2021-04-07 RX ORDER — ACETAMINOPHEN 325 MG/1
650 TABLET ORAL ONCE
Status: CANCELLED | OUTPATIENT
Start: 2021-06-28

## 2021-04-07 RX ORDER — HEPARIN SODIUM,PORCINE 10 UNIT/ML
5 VIAL (ML) INTRAVENOUS
Status: CANCELLED | OUTPATIENT
Start: 2021-05-31

## 2021-04-07 RX ORDER — ALBUTEROL SULFATE 90 UG/1
1-2 AEROSOL, METERED RESPIRATORY (INHALATION)
Status: CANCELLED
Start: 2021-08-23

## 2021-04-07 RX ORDER — HEPARIN SODIUM,PORCINE 10 UNIT/ML
5 VIAL (ML) INTRAVENOUS
Status: CANCELLED | OUTPATIENT
Start: 2021-08-23

## 2021-04-07 RX ORDER — EPINEPHRINE 1 MG/ML
0.3 INJECTION, SOLUTION INTRAMUSCULAR; SUBCUTANEOUS EVERY 5 MIN PRN
Status: CANCELLED | OUTPATIENT
Start: 2021-05-31

## 2021-04-07 RX ORDER — NALOXONE HYDROCHLORIDE 0.4 MG/ML
.1-.4 INJECTION, SOLUTION INTRAMUSCULAR; INTRAVENOUS; SUBCUTANEOUS
Status: CANCELLED | OUTPATIENT
Start: 2021-05-31

## 2021-04-07 RX ORDER — MEPERIDINE HYDROCHLORIDE 25 MG/ML
25 INJECTION INTRAMUSCULAR; INTRAVENOUS; SUBCUTANEOUS EVERY 30 MIN PRN
Status: CANCELLED | OUTPATIENT
Start: 2021-07-26

## 2021-04-07 RX ORDER — HEPARIN SODIUM,PORCINE 10 UNIT/ML
5 VIAL (ML) INTRAVENOUS
Status: CANCELLED | OUTPATIENT
Start: 2021-07-26

## 2021-04-07 RX ORDER — HEPARIN SODIUM (PORCINE) LOCK FLUSH IV SOLN 100 UNIT/ML 100 UNIT/ML
5 SOLUTION INTRAVENOUS
Status: CANCELLED | OUTPATIENT
Start: 2021-05-31

## 2021-04-07 RX ORDER — ALBUTEROL SULFATE 0.83 MG/ML
2.5 SOLUTION RESPIRATORY (INHALATION)
Status: CANCELLED | OUTPATIENT
Start: 2021-08-23

## 2021-04-07 RX ORDER — NALOXONE HYDROCHLORIDE 0.4 MG/ML
.1-.4 INJECTION, SOLUTION INTRAMUSCULAR; INTRAVENOUS; SUBCUTANEOUS
Status: CANCELLED | OUTPATIENT
Start: 2021-08-23

## 2021-04-07 RX ORDER — MEPERIDINE HYDROCHLORIDE 25 MG/ML
25 INJECTION INTRAMUSCULAR; INTRAVENOUS; SUBCUTANEOUS EVERY 30 MIN PRN
Status: CANCELLED | OUTPATIENT
Start: 2021-05-31

## 2021-04-07 RX ORDER — DIPHENHYDRAMINE HYDROCHLORIDE 50 MG/ML
50 INJECTION INTRAMUSCULAR; INTRAVENOUS
Status: CANCELLED
Start: 2021-05-31

## 2021-04-07 RX ORDER — ALBUTEROL SULFATE 90 UG/1
1-2 AEROSOL, METERED RESPIRATORY (INHALATION)
Status: CANCELLED
Start: 2021-06-28

## 2021-04-07 RX ORDER — LISINOPRIL 10 MG/1
10 TABLET ORAL DAILY
Qty: 30 TABLET | Refills: 2 | Status: SHIPPED | OUTPATIENT
Start: 2021-04-07 | End: 2021-08-18

## 2021-04-07 RX ORDER — ACETAMINOPHEN 325 MG/1
650 TABLET ORAL ONCE
Status: CANCELLED | OUTPATIENT
Start: 2021-07-26

## 2021-04-07 RX ORDER — ACETAMINOPHEN 325 MG/1
650 TABLET ORAL ONCE
Status: CANCELLED | OUTPATIENT
Start: 2021-05-31

## 2021-04-07 RX ORDER — DIPHENHYDRAMINE HCL 25 MG
50 CAPSULE ORAL ONCE
Status: CANCELLED | OUTPATIENT
Start: 2021-07-26

## 2021-04-07 RX ORDER — LORAZEPAM 2 MG/ML
0.5 INJECTION INTRAMUSCULAR EVERY 4 HOURS PRN
Status: CANCELLED
Start: 2021-06-28

## 2021-04-07 RX ORDER — ALBUTEROL SULFATE 90 UG/1
1-2 AEROSOL, METERED RESPIRATORY (INHALATION)
Status: CANCELLED
Start: 2021-07-26

## 2021-04-07 RX ORDER — DIPHENHYDRAMINE HYDROCHLORIDE 50 MG/ML
50 INJECTION INTRAMUSCULAR; INTRAVENOUS
Status: CANCELLED
Start: 2021-06-28

## 2021-04-07 RX ORDER — METHYLPREDNISOLONE SODIUM SUCCINATE 125 MG/2ML
125 INJECTION, POWDER, LYOPHILIZED, FOR SOLUTION INTRAMUSCULAR; INTRAVENOUS
Status: CANCELLED
Start: 2021-05-31

## 2021-04-07 RX ORDER — HEPARIN SODIUM (PORCINE) LOCK FLUSH IV SOLN 100 UNIT/ML 100 UNIT/ML
5 SOLUTION INTRAVENOUS
Status: CANCELLED | OUTPATIENT
Start: 2021-06-28

## 2021-04-07 RX ORDER — HEPARIN SODIUM (PORCINE) LOCK FLUSH IV SOLN 100 UNIT/ML 100 UNIT/ML
5 SOLUTION INTRAVENOUS
Status: CANCELLED | OUTPATIENT
Start: 2021-08-23

## 2021-04-07 RX ORDER — DIPHENHYDRAMINE HCL 25 MG
50 CAPSULE ORAL ONCE
Status: CANCELLED | OUTPATIENT
Start: 2021-08-23

## 2021-04-07 RX ORDER — EPINEPHRINE 1 MG/ML
0.3 INJECTION, SOLUTION INTRAMUSCULAR; SUBCUTANEOUS EVERY 5 MIN PRN
Status: CANCELLED | OUTPATIENT
Start: 2021-07-26

## 2021-04-07 RX ORDER — ALBUTEROL SULFATE 0.83 MG/ML
2.5 SOLUTION RESPIRATORY (INHALATION)
Status: CANCELLED | OUTPATIENT
Start: 2021-07-26

## 2021-04-07 RX ORDER — DIPHENHYDRAMINE HYDROCHLORIDE 50 MG/ML
50 INJECTION INTRAMUSCULAR; INTRAVENOUS
Status: CANCELLED
Start: 2021-07-26

## 2021-04-07 RX ORDER — DIPHENHYDRAMINE HCL 25 MG
50 CAPSULE ORAL ONCE
Status: CANCELLED | OUTPATIENT
Start: 2021-06-28

## 2021-04-07 RX ORDER — DEXAMETHASONE 4 MG/1
20 TABLET ORAL ONCE
Status: CANCELLED | OUTPATIENT
Start: 2021-05-31

## 2021-04-07 RX ORDER — EPINEPHRINE 1 MG/ML
0.3 INJECTION, SOLUTION INTRAMUSCULAR; SUBCUTANEOUS EVERY 5 MIN PRN
Status: CANCELLED | OUTPATIENT
Start: 2021-06-28

## 2021-04-07 RX ORDER — HEPARIN SODIUM (PORCINE) LOCK FLUSH IV SOLN 100 UNIT/ML 100 UNIT/ML
5 SOLUTION INTRAVENOUS
Status: CANCELLED | OUTPATIENT
Start: 2021-07-26

## 2021-04-07 RX ORDER — ALBUTEROL SULFATE 90 UG/1
1-2 AEROSOL, METERED RESPIRATORY (INHALATION)
Status: CANCELLED
Start: 2021-05-31

## 2021-04-07 RX ORDER — METHYLPREDNISOLONE SODIUM SUCCINATE 125 MG/2ML
125 INJECTION, POWDER, LYOPHILIZED, FOR SOLUTION INTRAMUSCULAR; INTRAVENOUS
Status: CANCELLED
Start: 2021-06-28

## 2021-04-07 RX ORDER — EPINEPHRINE 1 MG/ML
0.3 INJECTION, SOLUTION INTRAMUSCULAR; SUBCUTANEOUS EVERY 5 MIN PRN
Status: CANCELLED | OUTPATIENT
Start: 2021-08-23

## 2021-04-07 RX ORDER — HEPARIN SODIUM,PORCINE 10 UNIT/ML
5 VIAL (ML) INTRAVENOUS
Status: CANCELLED | OUTPATIENT
Start: 2021-06-28

## 2021-04-07 RX ORDER — SODIUM CHLORIDE 9 MG/ML
1000 INJECTION, SOLUTION INTRAVENOUS CONTINUOUS PRN
Status: CANCELLED
Start: 2021-05-31

## 2021-04-07 RX ORDER — ALBUTEROL SULFATE 0.83 MG/ML
2.5 SOLUTION RESPIRATORY (INHALATION)
Status: CANCELLED | OUTPATIENT
Start: 2021-06-28

## 2021-04-07 ASSESSMENT — PAIN SCALES - GENERAL: PAINLEVEL: NO PAIN (0)

## 2021-04-07 NOTE — LETTER
4/7/2021         RE: Rogelio Marshall  [address]  [facility] [address]        Dear Colleague,    Thank you for referring your patient, Rogelio Marshall, to the [facility]. Please see a copy of my visit note below.        Oncologic Hx:   -compression fractures and bone scan reveals multiple lytic lesions throughout his appendicular and axial skeleton. His Beta 2 microglobulin was 3.6 on 10/10/2020. Kappa chains were 73.6 on 10/5/2020 with K/L ratio of 89.9. M spike of 16.2 in urine on 10/10. Bone marrow biopsy on 10/23/2020 showed trilineage hematopoiesis and 50-60% kappa restricted plasma cells. Flow cytometry showed 20 % plasma cells which express CD19, CD38, CD45 and monotypic cytoplasmic kappa immunoglobulin light chains but lack CD20 and CD56.  FISH shows IGH-CCND1 fusion (81%; 30% had loss of IGH component from one fusion signal).      - Started 21 day cycle VRD 11/2020    - 11/27 he had a fall with rib fractures.     - Dec 2020 Started madyson-velcade-dex. Completed 5 cycles and achieved VGPR    - April 2021 started darzalex maintenance      Interval Hx: Rogelio has low BP again today and although his med list states he is on hydrochlorothiazide and lisinopril, his home nurse's note states he is only taking half of the lisinopril dose. He has also recently had an increase in his creatinine. He describes feeling tired and weak. Also notes that he has a 'bad taste in my mouth all the time'. He attributes the taste changes to the velcade he was on for myeloma treatment. The numbness and tingling in his hands and feet is stable.    A comprehensive review of systems was completed and negative except as described above.     Pertinent PMH reviewed:   HTN    Physical Exam:   BP 92/56   Pulse 90   Temp 98.5  F (36.9  C) (Oral)   Resp 18   Wt 57.2 kg (126 lb 1.6 oz)   SpO2 99%   BMI 23.83 kg/m      Constitutional: NAD  Eyes: no scleral icterus  ENT: no oral lesions  Lymph: no  cervical, supraclavicular, axillary LAD  Pulm: CTAB  CV: RRR, no m/r/g  GI: bowel sounds present, soft and nontender to palpation  MSK: No edema in the extremities  Neuro: alert, conversant, normal gait  Psych: appropriate mood and affect      Pertinent Data Summarized:  3/22/2021 M spike 0.1    Assessment and Plan  74 yo man with IgG Kappa standard risk multiple myeloma. Despite his side effects, he has had a VGPR with therapy. Now on q 4 week darzalex faspro for maintenance as he was getting neuropathy with velcade.     Pathologic Fractures: He also is at high risk for more compression fractures. Vitamin D is replete and he is on a daily supplement as well as calcium. I have asked him repeatedly to see the dentist to clear him for zometa treatment but he is very resistant to this. I have placed a referral again,     HALLIE vs CKD: Baseline creatinine is 1.1 suggesting underlying CKD but recently worsened to 1.9, then back down to 1.4. I am concerned this might be related to hypotension so I have stopped his lisinopril. (He states he was not taking hydrochlorothiazide). I will message Dr. Stevens, his PCP, about this change.       Angela Sweeney MD PhD      Again, thank you for allowing me to participate in the care of your patient.        Sincerely,        Angela Sweeney MD

## 2021-04-07 NOTE — NURSING NOTE
"Oncology Rooming Note    April 7, 2021 11:34 AM   Rogelio Marshall is a 76 year old male who presents for:    Chief Complaint   Patient presents with     Oncology Clinic Visit     Multiple myeloma, remission status unspecified (H) (Primary Dx); Stage 3a chronic kidney disease      Initial Vitals: BP 92/56   Pulse 90   Temp 98.5  F (36.9  C) (Oral)   Resp 18   Wt 57.2 kg (126 lb 1.6 oz)   SpO2 99%   BMI 23.83 kg/m   Estimated body mass index is 23.83 kg/m  as calculated from the following:    Height as of 1/29/21: 1.549 m (5' 1\").    Weight as of this encounter: 57.2 kg (126 lb 1.6 oz). Body surface area is 1.57 meters squared.  No Pain (0) Comment: Data Unavailable   No LMP for male patient.  Allergies reviewed: Yes  Medications reviewed: Yes    Medications: Medication refills not needed today.  Pharmacy name entered into EPIC:    [facility] [place], [place] - [phone]  WRITTEN PRESCRIPTION REQUESTED  [id number] - [place], [place] - [address] [facility] - [place], [place] - [address]    Clinical concerns: Patient states he has been tired, BP is reading 92/56.       Lala Saha MA            "

## 2021-04-16 DIAGNOSIS — R63.0 ANOREXIA: ICD-10-CM

## 2021-04-16 DIAGNOSIS — C90.00 MULTIPLE MYELOMA, REMISSION STATUS UNSPECIFIED (H): Primary | ICD-10-CM

## 2021-04-17 NOTE — CONFIDENTIAL NOTE
11/9/2020  [facility] Internal [facility] Filiberto Vargas MD  Internal Medicine        aspirin (ASA) 325 MG tablet  Last Written Prescription Date:  unknown  Last Fill Quantity: unknown,   # refills: unknown      Routing refill request to provider for review/approval because:  historical, dx? Instructions?

## 2021-04-19 RX ORDER — MIRTAZAPINE 15 MG/1
15 TABLET, FILM COATED ORAL AT BEDTIME
Qty: 30 TABLET | Refills: 0 | OUTPATIENT
Start: 2021-04-19

## 2021-04-19 NOTE — TELEPHONE ENCOUNTER
aspirin (ASA) 325 MG tablet      Historical entry  Last Office Visit : 11/9/20  Future Office visit:  None scheduled    Routing refill request to provider for review/approval because:  Medication is reported/historical

## 2021-04-21 RX ORDER — MIRTAZAPINE 15 MG/1
15 TABLET, FILM COATED ORAL AT BEDTIME
Qty: 30 TABLET | Refills: 0 | Status: SHIPPED | OUTPATIENT
Start: 2021-04-21 | End: 2021-05-14

## 2021-04-21 RX ORDER — ASPIRIN 325 MG
325 TABLET ORAL DAILY
Qty: 90 TABLET | Refills: 4 | Status: SHIPPED | OUTPATIENT
Start: 2021-04-21 | End: 2021-08-18

## 2021-04-26 ENCOUNTER — PATIENT OUTREACH (OUTPATIENT)
Dept: ONCOLOGY | Facility: CLINIC | Age: 76
End: 2021-04-26

## 2021-04-27 NOTE — PROGRESS NOTES
Oncology RN Care Coordination Note:     Incoming Call:  This patient left a voicemail asking about the diagnosis of CKD on his AVS.     Per Dr. Sweeney she has already discussed this diagnosis with Rogelio, since his creatinine levels never came back down to be within normal limits that is how he was diagnosed with CKD.     Outgoing Call:  This writer attempted to reach Rogelio to discuss this however, he didn't answer.  Writer left a detailed voicemail as well as sent a [employer] message with this information.    Cristine Redding RN BSN   [facility]  Nurse Coordinator

## 2021-05-03 ENCOUNTER — INFUSION THERAPY VISIT (OUTPATIENT)
Dept: ONCOLOGY | Facility: CLINIC | Age: 76
End: 2021-05-03
Attending: STUDENT IN AN ORGANIZED HEALTH CARE EDUCATION/TRAINING PROGRAM
Payer: COMMERCIAL

## 2021-05-03 ENCOUNTER — APPOINTMENT (OUTPATIENT)
Dept: LAB | Facility: CLINIC | Age: 76
End: 2021-05-03
Attending: STUDENT IN AN ORGANIZED HEALTH CARE EDUCATION/TRAINING PROGRAM
Payer: COMMERCIAL

## 2021-05-03 ENCOUNTER — TELEPHONE (OUTPATIENT)
Dept: INTERNAL MEDICINE | Facility: CLINIC | Age: 76
End: 2021-05-03

## 2021-05-03 VITALS
TEMPERATURE: 98 F | BODY MASS INDEX: 23.28 KG/M2 | DIASTOLIC BLOOD PRESSURE: 71 MMHG | RESPIRATION RATE: 16 BRPM | WEIGHT: 123.2 LBS | HEART RATE: 94 BPM | SYSTOLIC BLOOD PRESSURE: 126 MMHG | OXYGEN SATURATION: 97 %

## 2021-05-03 DIAGNOSIS — C90.00 MULTIPLE MYELOMA, REMISSION STATUS UNSPECIFIED (H): Primary | ICD-10-CM

## 2021-05-03 LAB
ALBUMIN SERPL-MCNC: 3.8 G/DL (ref 3.4–5)
ALP SERPL-CCNC: 89 U/L (ref 40–150)
ALT SERPL W P-5'-P-CCNC: 22 U/L (ref 0–70)
ANION GAP SERPL CALCULATED.3IONS-SCNC: 7 MMOL/L (ref 3–14)
AST SERPL W P-5'-P-CCNC: 14 U/L (ref 0–45)
BASOPHILS # BLD AUTO: 0.1 10E9/L (ref 0–0.2)
BASOPHILS NFR BLD AUTO: 0.8 %
BILIRUB SERPL-MCNC: 0.6 MG/DL (ref 0.2–1.3)
BUN SERPL-MCNC: 16 MG/DL (ref 7–30)
CALCIUM SERPL-MCNC: 9 MG/DL (ref 8.5–10.1)
CHLORIDE SERPL-SCNC: 107 MMOL/L (ref 94–109)
CO2 SERPL-SCNC: 25 MMOL/L (ref 20–32)
CREAT SERPL-MCNC: 0.97 MG/DL (ref 0.66–1.25)
DIFFERENTIAL METHOD BLD: ABNORMAL
EOSINOPHIL # BLD AUTO: 0.3 10E9/L (ref 0–0.7)
EOSINOPHIL NFR BLD AUTO: 4.8 %
ERYTHROCYTE [DISTWIDTH] IN BLOOD BY AUTOMATED COUNT: 15.2 % (ref 10–15)
GFR SERPL CREATININE-BSD FRML MDRD: 77 ML/MIN/{1.73_M2}
GLUCOSE SERPL-MCNC: 116 MG/DL (ref 70–99)
HCT VFR BLD AUTO: 32.4 % (ref 40–53)
HGB BLD-MCNC: 10.6 G/DL (ref 13.3–17.7)
IMM GRANULOCYTES # BLD: 0 10E9/L (ref 0–0.4)
IMM GRANULOCYTES NFR BLD: 0.3 %
LYMPHOCYTES # BLD AUTO: 1 10E9/L (ref 0.8–5.3)
LYMPHOCYTES NFR BLD AUTO: 16.6 %
MCH RBC QN AUTO: 34.4 PG (ref 26.5–33)
MCHC RBC AUTO-ENTMCNC: 32.7 G/DL (ref 31.5–36.5)
MCV RBC AUTO: 105 FL (ref 78–100)
MONOCYTES # BLD AUTO: 0.5 10E9/L (ref 0–1.3)
MONOCYTES NFR BLD AUTO: 7.8 %
NEUTROPHILS # BLD AUTO: 4.1 10E9/L (ref 1.6–8.3)
NEUTROPHILS NFR BLD AUTO: 69.7 %
NRBC # BLD AUTO: 0 10*3/UL
NRBC BLD AUTO-RTO: 0 /100
PLATELET # BLD AUTO: 370 10E9/L (ref 150–450)
POTASSIUM SERPL-SCNC: 3.6 MMOL/L (ref 3.4–5.3)
PROT SERPL-MCNC: 6.6 G/DL (ref 6.8–8.8)
RBC # BLD AUTO: 3.08 10E12/L (ref 4.4–5.9)
SODIUM SERPL-SCNC: 140 MMOL/L (ref 133–144)
WBC # BLD AUTO: 5.9 10E9/L (ref 4–11)

## 2021-05-03 PROCEDURE — 36415 COLL VENOUS BLD VENIPUNCTURE: CPT

## 2021-05-03 PROCEDURE — 83883 ASSAY NEPHELOMETRY NOT SPEC: CPT | Performed by: STUDENT IN AN ORGANIZED HEALTH CARE EDUCATION/TRAINING PROGRAM

## 2021-05-03 PROCEDURE — 250N000012 HC RX MED GY IP 250 OP 636 PS 637: Performed by: STUDENT IN AN ORGANIZED HEALTH CARE EDUCATION/TRAINING PROGRAM

## 2021-05-03 PROCEDURE — 85025 COMPLETE CBC W/AUTO DIFF WBC: CPT | Performed by: STUDENT IN AN ORGANIZED HEALTH CARE EDUCATION/TRAINING PROGRAM

## 2021-05-03 PROCEDURE — 84165 PROTEIN E-PHORESIS SERUM: CPT | Mod: TC | Performed by: STUDENT IN AN ORGANIZED HEALTH CARE EDUCATION/TRAINING PROGRAM

## 2021-05-03 PROCEDURE — 80053 COMPREHEN METABOLIC PANEL: CPT | Performed by: STUDENT IN AN ORGANIZED HEALTH CARE EDUCATION/TRAINING PROGRAM

## 2021-05-03 PROCEDURE — 96401 CHEMO ANTI-NEOPL SQ/IM: CPT

## 2021-05-03 PROCEDURE — 999N001036 HC STATISTIC TOTAL PROTEIN: Performed by: STUDENT IN AN ORGANIZED HEALTH CARE EDUCATION/TRAINING PROGRAM

## 2021-05-03 PROCEDURE — 84165 PROTEIN E-PHORESIS SERUM: CPT | Mod: 26 | Performed by: PATHOLOGY

## 2021-05-03 PROCEDURE — 250N000013 HC RX MED GY IP 250 OP 250 PS 637: Performed by: STUDENT IN AN ORGANIZED HEALTH CARE EDUCATION/TRAINING PROGRAM

## 2021-05-03 PROCEDURE — 250N000011 HC RX IP 250 OP 636: Performed by: STUDENT IN AN ORGANIZED HEALTH CARE EDUCATION/TRAINING PROGRAM

## 2021-05-03 RX ORDER — DIPHENHYDRAMINE HCL 25 MG
50 CAPSULE ORAL ONCE
Status: COMPLETED | OUTPATIENT
Start: 2021-05-03 | End: 2021-05-03

## 2021-05-03 RX ORDER — ACETAMINOPHEN 325 MG/1
650 TABLET ORAL ONCE
Status: COMPLETED | OUTPATIENT
Start: 2021-05-03 | End: 2021-05-03

## 2021-05-03 RX ORDER — DEXAMETHASONE 4 MG/1
20 TABLET ORAL ONCE
Status: COMPLETED | OUTPATIENT
Start: 2021-05-03 | End: 2021-05-03

## 2021-05-03 RX ADMIN — DIPHENHYDRAMINE HYDROCHLORIDE 50 MG: 25 CAPSULE ORAL at 09:30

## 2021-05-03 RX ADMIN — DEXAMETHASONE 20 MG: 4 TABLET ORAL at 09:30

## 2021-05-03 RX ADMIN — ACETAMINOPHEN 650 MG: 325 TABLET ORAL at 09:30

## 2021-05-03 RX ADMIN — DARATUMUMAB AND HYALURONIDASE-FIHJ (HUMAN RECOMBINANT) 1800 MG: 1800; 30000 INJECTION SUBCUTANEOUS at 10:16

## 2021-05-03 ASSESSMENT — PAIN SCALES - GENERAL: PAINLEVEL: MILD PAIN (2)

## 2021-05-03 NOTE — PROGRESS NOTES
"Infusion Nursing Note:  Rogelio Marshall presents today for Maintenance Darzalex Faspro.    Patient seen by provider today: No   present during visit today: Not Applicable.    Note: Patient continues to struggle with neuropathy and nerve pain.  He rates this at a 2/10 today.  He is wondering when this will get better.  He declines any further intervention for pain at this time.  Reassured patient that it takes a long time for nerves to recover and/or heal and he just finished his velcade about 1 month ago, so it may take several more months to improve.  Patient states his energy levels and strength has improved.  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  He states his appetite has not completed returned or his \"taste for food,\" but that it has been improving.  Patient offers no other new concerns at this time.    Intravenous Access:  No Intravenous access at this visit.    Treatment Conditions:  Lab Results   Component Value Date    HGB 10.6 05/03/2021     Lab Results   Component Value Date    WBC 5.9 05/03/2021      Lab Results   Component Value Date    ANEU 4.1 05/03/2021     Lab Results   Component Value Date     05/03/2021      Lab Results   Component Value Date     05/03/2021                   Lab Results   Component Value Date    POTASSIUM 3.6 05/03/2021           Lab Results   Component Value Date    MAG 2.1 12/02/2020            Lab Results   Component Value Date    CR 0.97 05/03/2021                   Lab Results   Component Value Date    MIRI 9.0 05/03/2021                Lab Results   Component Value Date    BILITOTAL 0.6 05/03/2021           Lab Results   Component Value Date    ALBUMIN 3.8 05/03/2021                    Lab Results   Component Value Date    ALT 22 05/03/2021           Lab Results   Component Value Date    AST 14 05/03/2021       Results reviewed, labs MET treatment parameters, ok to proceed with treatment.      Post Infusion " Assessment:  Patient tolerated injection without incident.   Darzalex Faspro given subcutaneously into the LEFT side of patient's abdomen    Discharge Plan:   Patient declined prescription refills.  Discharge instructions reviewed with: Patient.  Patient and/or family verbalized understanding of discharge instructions and all questions answered.  AVS to patient via [employer]  Patient will return 6/1/21 for next appointment.   Patient discharged in stable condition accompanied by: self.  Departure Mode: Ambulatory and w/walker.  Face to Face time: 0.    PATTI LEWIS RN

## 2021-05-04 LAB
KAPPA LC UR-MCNC: 0.66 MG/DL (ref 0.33–1.94)
KAPPA LC/LAMBDA SER: 3.14 {RATIO} (ref 0.26–1.65)
LAMBDA LC SERPL-MCNC: 0.21 MG/DL (ref 0.57–2.63)

## 2021-05-04 NOTE — TELEPHONE ENCOUNTER
[facility] now requests orders and shares plan of care/discharge summaries for some patients through [facility].  Please REPLY TO THIS MESSAGE OR ROUTE BACK TO THE AUTHOR in order to give authorization for orders when needed.  This is considered a verbal order, you will still receive a faxed copy of orders for signature.  Thank you for your assistance in improving collaboration for our patients.    ORDER  Nursing 1x/week for 9 weeks, 3 PRN for medication management    Agree  Wolf Stevens MD

## 2021-05-05 LAB
ALBUMIN SERPL ELPH-MCNC: 4.3 G/DL (ref 3.7–5.1)
ALPHA1 GLOB SERPL ELPH-MCNC: 0.3 G/DL (ref 0.2–0.4)
ALPHA2 GLOB SERPL ELPH-MCNC: 0.7 G/DL (ref 0.5–0.9)
B-GLOBULIN SERPL ELPH-MCNC: 0.6 G/DL (ref 0.6–1)
GAMMA GLOB SERPL ELPH-MCNC: 0.3 G/DL (ref 0.7–1.6)
M PROTEIN SERPL ELPH-MCNC: 0.1 G/DL
PROT PATTERN SERPL ELPH-IMP: ABNORMAL

## 2021-05-12 DIAGNOSIS — M48.50XS PATHOLOGIC COMPRESSION FRACTURE OF SPINE, SEQUELA: ICD-10-CM

## 2021-05-12 DIAGNOSIS — R63.0 ANOREXIA: ICD-10-CM

## 2021-05-14 RX ORDER — MIRTAZAPINE 15 MG/1
15 TABLET, FILM COATED ORAL AT BEDTIME
Qty: 90 TABLET | Refills: 1 | Status: SHIPPED | OUTPATIENT
Start: 2021-05-14 | End: 2021-08-18

## 2021-05-14 NOTE — TELEPHONE ENCOUNTER
METHOCARBAMOL 500MG TABS  Last Written Prescription Date:  12/8/2020  Last Fill Quantity: 120,   # refills: 3  Last Office Visit : 11/9/2020  Future Office visit:  None  Routing refill request to provider for review/approval because:  Clarification of orders.  Is it 3 x a day?  Or 4 x a day?  Drug not on the [facility], [facility] or [facility] refill protocol or controlled substance    MIRTAZAPINE 15MG TABS  Last Written Prescription Date:  4/21/2021  Last Fill Quantity: 30,   # refills: 0  Last Office Visit : 11/9/2020  Future Office visit:  None  Routing refill request to provider for review/approval because:  Only a 30 day supply sent to pharm  Would Provider like a 90 day supply with refills sent to the pharm?  Please advise     Juanita Nguyen RN  Central Triage Red Flags/Med Refills

## 2021-05-14 NOTE — TELEPHONE ENCOUNTER
Methocarbamol last prescribed for 500 mg four times daily. Patient reported he is using 500 mg three times daily. I will update PCP.    Ekaterina Collins) OSVALDO Bloom

## 2021-05-15 RX ORDER — METHOCARBAMOL 500 MG/1
500 TABLET, FILM COATED ORAL 3 TIMES DAILY
Qty: 90 TABLET | Refills: 5 | Status: SHIPPED | OUTPATIENT
Start: 2021-05-15 | End: 2021-05-24

## 2021-05-18 DIAGNOSIS — Z53.9 DIAGNOSIS NOT YET DEFINED: Primary | ICD-10-CM

## 2021-05-18 PROCEDURE — G0179 MD RECERTIFICATION HHA PT: HCPCS | Performed by: INTERNAL MEDICINE

## 2021-05-24 ENCOUNTER — VIRTUAL VISIT (OUTPATIENT)
Dept: PALLIATIVE CARE | Facility: CLINIC | Age: 76
End: 2021-05-24
Attending: INTERNAL MEDICINE
Payer: COMMERCIAL

## 2021-05-24 DIAGNOSIS — G62.0 DRUG-INDUCED PERIPHERAL NEUROPATHY (H): ICD-10-CM

## 2021-05-24 DIAGNOSIS — M54.50 CHRONIC MIDLINE LOW BACK PAIN WITHOUT SCIATICA: ICD-10-CM

## 2021-05-24 DIAGNOSIS — G89.29 CHRONIC MIDLINE LOW BACK PAIN WITHOUT SCIATICA: ICD-10-CM

## 2021-05-24 DIAGNOSIS — C90.00 MULTIPLE MYELOMA, REMISSION STATUS UNSPECIFIED (H): ICD-10-CM

## 2021-05-24 DIAGNOSIS — M48.50XS PATHOLOGIC COMPRESSION FRACTURE OF SPINE, SEQUELA: Primary | ICD-10-CM

## 2021-05-24 PROCEDURE — 99443 PR PHYSICIAN TELEPHONE EVALUATION 21-30 MIN: CPT | Performed by: INTERNAL MEDICINE

## 2021-05-24 PROCEDURE — 999N001193 HC VIDEO/TELEPHONE VISIT; NO CHARGE

## 2021-05-24 RX ORDER — METHOCARBAMOL 500 MG/1
500 TABLET, FILM COATED ORAL 2 TIMES DAILY
Qty: 60 TABLET | Refills: 5 | Status: SHIPPED | OUTPATIENT
Start: 2021-05-24 | End: 2021-07-22

## 2021-05-24 RX ORDER — GABAPENTIN 300 MG/1
300 CAPSULE ORAL 2 TIMES DAILY
Qty: 60 CAPSULE | Refills: 3 | Status: SHIPPED | OUTPATIENT
Start: 2021-05-24 | End: 2021-11-29

## 2021-05-24 NOTE — LETTER
5/24/2021       RE: Rogelio Marshall  [address]  [facility] [address]     Dear Colleague,    Thank you for referring your patient, Rogelio Marshall[facility]. Please see a copy of my visit note below.    Rogelio is a 76 year old who is being evaluated via a billable telephone visit.      What phone number would you like to be contacted at? [phone]  How would you like to obtain your AVS? MyChart     Vitals - Patient Reported  Weight (Patient Reported): 59 kg (130 lb)  Pain Score: Mild Pain (3)  Pain Loc: Low Back(NEUROPATHY IN FEET)    Zully JOYA    Phone call duration: 37 minutes    Rogelio is a 76 year old who is being evaluated via a billable telephone visit.      What phone number would you like to be contacted at? [phone]  How would you like to obtain your AVS? MyChart      Review Of Systems  Skin: NEGATIVE  Eyes:Ears/Nose/Throat: NEGATIVE  Respiratory: NEGATIVE  Cardiovascular:NEGATIVE  Gastrointestinal: NEGATIVE  Genitourinary:NEGATIVE   Musculoskeletal: NEGATIVE  Neurologic: NEGATIVE  Psychiatric: NEGATIVE  Hematologic/Lymphatic/Immunologic: NEGATIVE  Endocrine:  NEGATIVE    Telephone number of patient: [phone]    Blood pressure home reading unavailable    Vitals - Patient Reported  Weight (Patient Reported): 59 kg (130 lb)  Pain Score: Mild Pain (3)  Pain Loc: Low Back(NEUROPATHY IN FEET)  MSexton N      Palliative Care Outpatient Clinic Progress Note    Patient Name:  Rogelio Marshall  Primary Provider:  Wolf Stevens    Chief Complaint/Patient ID: Follow-up   75-year-old man with multiple myeloma   -Started on treatment with VRD 11/2020, switched to daratumumab/velcade/dex 12/2020 due to poor tolerability. Stopped velcade for neuropathy.   Pain - pathologic rib fractures    Last Palliative Care Appointment: 3/23/21 - discussed weaning off gabapentin and  "methocarbamol    Interim History:  Rogelio Marshall 76 year old male returns for palliative care appointment today.  Telephone visit done due to coronavirus pandemic.    Having burning pain in feet, tiny bit in fingers, mainly in the evening.  Thinks maybe changed after we decreased the gabapentin.  Pretty aggravating particularly at night.  Sleeping alright.  Now taking melatonin at night - had nightmares with 1 mg, now taking 0.25 mg and is helpful.  Still feeling fatigued, thinks is related to the daratumumab    Pain in back has improved - describes as generalized, mild, gets worse when he's been walking around a lot and gets better when resting, worse when active again.   Taking tylenol 325 mg TID, not needing oxycodone any more.  Not having muscle spasm.      Appetite has been better, thinks weight is stable.  No nausea or constipation.     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     Lives alone, describes himself as a \"[place]\"  Worked as a printer and deliveryman.  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    Advance Directive Status: Has not completed.  At previous conversation he said he would want his brother Dutch howard to make medical decisions as he be next of kin.  They have not been in contact in years.      Allergies   Allergen Reactions     Other Drug Allergy (See Comments)      ernesto castillo ---caused red spots      Current Outpatient Medications   Medication Sig Dispense Refill     acetaminophen (TYLENOL) 325 MG tablet Take 3 tablets (975 mg) by mouth 3 times daily 500 tablet 4     aspirin (ASA) 325 MG tablet Take 1 tablet (325 mg) by mouth daily 90 tablet 4     calcium carbonate 500 mg, elemental, (OSCAL) 500 MG tablet Take 1 tablet (500 mg) by mouth 2 times daily 200 tablet 3     cholecalciferol (VITAMIN D3) 25 mcg (1000 " units) capsule Take 1 capsule (25 mcg) by mouth daily 100 capsule 3     dexamethasone (DECADRON) 4 MG tablet Take 20 mg (five tabs) by mouth on Day 2 35 tablet 0     gabapentin (NEURONTIN) 300 MG capsule Take 1 capsule (300 mg) by mouth At Bedtime 30 capsule 3     LORazepam (ATIVAN) 0.5 MG tablet Take 1 tablet (0.5 mg) by mouth every 4 hours as needed (Anxiety, Nausea/Vomiting or Sleep) 30 tablet 5     methocarbamol (ROBAXIN) 500 MG tablet Take 1 tablet (500 mg) by mouth 3 times daily 90 tablet 5     mirtazapine (REMERON) 15 MG tablet Take 1 tablet (15 mg) by mouth At Bedtime 90 tablet 1     omeprazole (PRILOSEC) 20 MG DR capsule Take 1 capsule (20 mg) by mouth daily 30 capsule 3     oxyCODONE (ROXICODONE) 5 MG tablet Take 1 tablet (5 mg) by mouth every 12 hours as needed for severe pain 30 tablet 0     polyethylene glycol (MIRALAX) 17 g packet Take 17 g by mouth daily       senna-docusate (SENOKOT-S/PERICOLACE) 8.6-50 MG tablet Take 1 tablet by mouth daily as needed        VITAMIN D3 25 MCG (1000 UT) tablet        acyclovir (ZOVIRAX) 400 MG tablet Take 1 tablet (400 mg) by mouth 2 times daily Viral Prophylaxis. 60 tablet 11     hydrochlorothiazide (MICROZIDE) 12.5 MG capsule Take 1 capsule (12.5 mg) by mouth daily (Patient not taking: Reported on 3/15/2021) 90 capsule 3     Lidocaine (LIDOCARE) 4 % Patch Place 1-3 patches onto the skin every 24 hours To prevent lidocaine toxicity, patient should be patch free for 12 hrs daily. (Patient not taking: Reported on 12/18/2020) 30 patch 1     lisinopril (ZESTRIL) 10 MG tablet Take 1 tablet (10 mg) by mouth daily (Patient not taking: Reported on 5/24/2021) 30 tablet 2     ondansetron (ZOFRAN-ODT) 4 MG ODT tab Take 1 tablet (4 mg) by mouth every 6 hours as needed for nausea or vomiting (Patient not taking: Reported on 12/18/2020)       prochlorperazine (COMPAZINE) 10 MG tablet Take 1 tablet (10 mg) by mouth every 6 hours as needed (Nausea/Vomiting) (Patient not taking:  Reported on 3/25/2021) 30 tablet 5     triamcinolone (KENALOG) 0.1 % external ointment Apply topically 2 times daily To back rash (Patient not taking: Reported on 12/18/2020) 15 g 0       Palliative Symptom Review (0=no symptom/no concern, 1=mild, 2=moderate, 3=severe):      Pain: 1      Fatigue: 2      Nausea: 0      Constipation: 0      Diarrhea: 0      Depressive Symptoms: 0      Anxiety: 0      Drowsiness: 0      Poor Appetite: 1      Shortness of Breath: 0      Insomnia: 0      Other: 0      Overall (0 good/no concerns, 3 very poor):  0    No physical exam due to telephone visit.  Voice strong, mood calm.  Good short and long-term memory.     Wt Readings from Last 10 Encounters:   05/03/21 55.9 kg (123 lb 3.2 oz)   04/07/21 57.2 kg (126 lb 1.6 oz)   04/05/21 57.3 kg (126 lb 6.4 oz)   03/22/21 59.1 kg (130 lb 4.8 oz)   03/18/21 58.7 kg (129 lb 6.4 oz)   03/15/21 60.3 kg (133 lb)   03/01/21 62.6 kg (137 lb 14.4 oz)   02/22/21 63.5 kg (140 lb)   02/15/21 64.6 kg (142 lb 6.4 oz)   02/08/21 62.7 kg (138 lb 3.2 oz)       Key Data Reviewed:  LABS:   Recent Labs   Lab Test 05/03/21  0901 04/05/21  0659    136   POTASSIUM 3.6 4.6   CHLORIDE 107 108   CO2 25 21   ANIONGAP 7 8   * 91   BUN 16 48*   CR 0.97 1.97*   MIRI 9.0 9.9     GFR 77 (recently 32)  Albumin 3.8  ALP, ALT, AST normal  Hb 10.6,     IgG and Light chains trending down, M peak 0.1    IMAGING:   No new imaging    [place]  reviewed and fills consistent with history.     Impression & Recommendations & Counseling:  Rogelio Marshall is a 76-year-old man with multiple myeloma on daratumumab maintenance with response on labs and improved pain, seen for palliative care follow-up.  Continues to have fatigue, neuropathic pain, have been downtitrating multiple medications due to polypharmacy likely contributing to fatigue.     Back Pain - in spine, due to myeloma, pathologic fractures, and preexisting degenerative disease.  Extensive discussion today  answering his questions about the mechanism of pain and impact of healing on bone with osteoporosis, MM, and degenerative disease.   -Continue tylenol 325 mg TID - can move to PRN  - Unclear benefit from methocarbamol and may be contributing to daytime fatigue - recommended to stop - he'd prefer to taper, can decrease to 500 mg BID and continue down if no change  - Discussed bisphosphonate and role to prevent further fractures, build bone, and need for dental health prior to starting.  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  Weight stable per report.  Continue mirtazapine.    - Improved dentition may benefit    Multiple Myeloma - currently on treatment.  Knows cancer is incurable, but does not have a sense of what to expect with treatments, if he would be on continuously.   -  Advanced Care Planning - not in touch with family, no close friends.  Does not know who he'd trust to make medical decisions, says would be alright with having an appointed guardian if he could not make decisions.  Have introduced health care directive.    Phone call duration: 37 minutes    Follow-up in 2-3 months    51 minutes spent including reviewing record, review of above studies, above visit with patient, and documentation.     Cat Tate MD  Palliative Medicine  Pager [phone]     (This note was transcribed using voice recognition software. While I review and edit the transcription, I may miss errors, and the software sometimes does unexpected capitalizations and formatting that I miss. Please let me know of any serious mistranscriptions and I will addend this note.)

## 2021-05-24 NOTE — PROGRESS NOTES
Rogelio is a 76 year old who is being evaluated via a billable telephone visit.      What phone number would you like to be contacted at? [phone]  How would you like to obtain your AVS? MyChart     Vitals - Patient Reported  Weight (Patient Reported): 59 kg (130 lb)  Pain Score: Mild Pain (3)  Pain Loc: Low Back(NEUROPATHY IN FEET)    Zully JOYA    Phone call duration: 37 minutes    Rogelio is a 76 year old who is being evaluated via a billable telephone visit.      What phone number would you like to be contacted at? [phone]  How would you like to obtain your AVS? MyChart      Review Of Systems  Skin: NEGATIVE  Eyes:Ears/Nose/Throat: NEGATIVE  Respiratory: NEGATIVE  Cardiovascular:NEGATIVE  Gastrointestinal: NEGATIVE  Genitourinary:NEGATIVE   Musculoskeletal: NEGATIVE  Neurologic: NEGATIVE  Psychiatric: NEGATIVE  Hematologic/Lymphatic/Immunologic: NEGATIVE  Endocrine:  NEGATIVE    Telephone number of patient: [phone]    Blood pressure home reading unavailable    Vitals - Patient Reported  Weight (Patient Reported): 59 kg (130 lb)  Pain Score: Mild Pain (3)  Pain Loc: Low Back(NEUROPATHY IN FEET)  [facility]      Palliative Care Outpatient Clinic Progress Note    Patient Name:  Rogelio Marshall  Primary Provider:  Wolf Stevens    Chief Complaint/Patient ID: Follow-up   75-year-old man with multiple myeloma   -Started on treatment with VRD 11/2020, switched to daratumumab/velcade/dex 12/2020 due to poor tolerability. Stopped velcade for neuropathy.   Pain - pathologic rib fractures    Last Palliative Care Appointment: 3/23/21 - discussed weaning off gabapentin and methocarbamol    Interim History:  Rogelio Marshall 76 year old male returns for palliative care appointment today.  Telephone visit done due to coronavirus pandemic.    Having burning pain in feet, tiny bit in fingers, mainly in the evening.  Thinks maybe changed after we decreased the gabapentin.  Pretty aggravating  "particularly at night.  Sleeping alright.  Now taking melatonin at night - had nightmares with 1 mg, now taking 0.25 mg and is helpful.  Still feeling fatigued, thinks is related to the daratumumab    Pain in back has improved - describes as generalized, mild, gets worse when he's been walking around a lot and gets better when resting, worse when active again.   Taking tylenol 325 mg TID, not needing oxycodone any more.  Not having muscle spasm.      Appetite has been better, thinks weight is stable.  No nausea or constipation.     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    Advance Directive Status: Has not completed.  At previous conversation he said he would want his brother Dutch howard to make medical decisions as he be next of kin.  They have not been in contact in years.      Allergies   Allergen Reactions     Other Drug Allergy (See Comments)      tobshikha sauce ---caused red spots      Current Outpatient Medications   Medication Sig Dispense Refill     acetaminophen (TYLENOL) 325 MG tablet Take 3 tablets (975 mg) by mouth 3 times daily 500 tablet 4     aspirin (ASA) 325 MG tablet Take 1 tablet (325 mg) by mouth daily 90 tablet 4     calcium carbonate 500 mg, elemental, (OSCAL) 500 MG tablet Take 1 tablet (500 mg) by mouth 2 times daily 200 tablet 3     cholecalciferol (VITAMIN D3) 25 mcg (1000 units) capsule Take 1 capsule (25 mcg) by mouth daily 100 capsule 3     dexamethasone (DECADRON) 4 MG tablet Take 20 mg (five tabs) by mouth on Day 2 35 tablet 0     gabapentin (NEURONTIN) 300 MG capsule Take 1 capsule (300 mg) by mouth At Bedtime 30 capsule 3     LORazepam (ATIVAN) 0.5 MG tablet Take 1 tablet (0.5 mg) " by mouth every 4 hours as needed (Anxiety, Nausea/Vomiting or Sleep) 30 tablet 5     methocarbamol (ROBAXIN) 500 MG tablet Take 1 tablet (500 mg) by mouth 3 times daily 90 tablet 5     mirtazapine (REMERON) 15 MG tablet Take 1 tablet (15 mg) by mouth At Bedtime 90 tablet 1     omeprazole (PRILOSEC) 20 MG DR capsule Take 1 capsule (20 mg) by mouth daily 30 capsule 3     oxyCODONE (ROXICODONE) 5 MG tablet Take 1 tablet (5 mg) by mouth every 12 hours as needed for severe pain 30 tablet 0     polyethylene glycol (MIRALAX) 17 g packet Take 17 g by mouth daily       senna-docusate (SENOKOT-S/PERICOLACE) 8.6-50 MG tablet Take 1 tablet by mouth daily as needed        VITAMIN D3 25 MCG (1000 UT) tablet        acyclovir (ZOVIRAX) 400 MG tablet Take 1 tablet (400 mg) by mouth 2 times daily Viral Prophylaxis. 60 tablet 11     hydrochlorothiazide (MICROZIDE) 12.5 MG capsule Take 1 capsule (12.5 mg) by mouth daily (Patient not taking: Reported on 3/15/2021) 90 capsule 3     Lidocaine (LIDOCARE) 4 % Patch Place 1-3 patches onto the skin every 24 hours To prevent lidocaine toxicity, patient should be patch free for 12 hrs daily. (Patient not taking: Reported on 12/18/2020) 30 patch 1     lisinopril (ZESTRIL) 10 MG tablet Take 1 tablet (10 mg) by mouth daily (Patient not taking: Reported on 5/24/2021) 30 tablet 2     ondansetron (ZOFRAN-ODT) 4 MG ODT tab Take 1 tablet (4 mg) by mouth every 6 hours as needed for nausea or vomiting (Patient not taking: Reported on 12/18/2020)       prochlorperazine (COMPAZINE) 10 MG tablet Take 1 tablet (10 mg) by mouth every 6 hours as needed (Nausea/Vomiting) (Patient not taking: Reported on 3/25/2021) 30 tablet 5     triamcinolone (KENALOG) 0.1 % external ointment Apply topically 2 times daily To back rash (Patient not taking: Reported on 12/18/2020) 15 g 0       Palliative Symptom Review (0=no symptom/no concern, 1=mild, 2=moderate, 3=severe):      Pain: 1      Fatigue: 2      Nausea: 0       Constipation: 0      Diarrhea: 0      Depressive Symptoms: 0      Anxiety: 0      Drowsiness: 0      Poor Appetite: 1      Shortness of Breath: 0      Insomnia: 0      Other: 0      Overall (0 good/no concerns, 3 very poor):  0    No physical exam due to telephone visit.  Voice strong, mood calm.  Good short and long-term memory.     Wt Readings from Last 10 Encounters:   05/03/21 55.9 kg (123 lb 3.2 oz)   04/07/21 57.2 kg (126 lb 1.6 oz)   04/05/21 57.3 kg (126 lb 6.4 oz)   03/22/21 59.1 kg (130 lb 4.8 oz)   03/18/21 58.7 kg (129 lb 6.4 oz)   03/15/21 60.3 kg (133 lb)   03/01/21 62.6 kg (137 lb 14.4 oz)   02/22/21 63.5 kg (140 lb)   02/15/21 64.6 kg (142 lb 6.4 oz)   02/08/21 62.7 kg (138 lb 3.2 oz)       Key Data Reviewed:  LABS:   Recent Labs   Lab Test 05/03/21  0901 04/05/21  0659    136   POTASSIUM 3.6 4.6   CHLORIDE 107 108   CO2 25 21   ANIONGAP 7 8   * 91   BUN 16 48*   CR 0.97 1.97*   MIRI 9.0 9.9     GFR 77 (recently 32)  Albumin 3.8  ALP, ALT, AST normal  Hb 10.6,     IgG and Light chains trending down, M peak 0.1    IMAGING:   No new imaging    [place]  reviewed and fills consistent with history.     Impression & Recommendations & Counseling:  Rogelio Marshall is a 76-year-old man with multiple myeloma on daratumumab maintenance with response on labs and improved pain, seen for palliative care follow-up.  Continues to have fatigue, neuropathic pain, have been downtitrating multiple medications due to polypharmacy likely contributing to fatigue.     Back Pain - in spine, due to myeloma, pathologic fractures, and preexisting degenerative disease.  Extensive discussion today answering his questions about the mechanism of pain and impact of healing on bone with osteoporosis, MM, and degenerative disease.   -Continue tylenol 325 mg TID - can move to PRN  - Unclear benefit from methocarbamol and may be contributing to daytime fatigue - recommended to stop - he'd prefer to taper, can decrease  to 500 mg BID and continue down if no change  - Discussed bisphosphonate and role to prevent further fractures, build bone, and need for dental health prior to starting.  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  Weight stable per report.  Continue mirtazapine.    - Improved dentition may benefit    Multiple Myeloma - currently on treatment.  Knows cancer is incurable, but does not have a sense of what to expect with treatments, if he would be on continuously.   -  Advanced Care Planning - not in touch with family, no close friends.  Does not know who he'd trust to make medical decisions, says would be alright with having an appointed guardian if he could not make decisions.  Have introduced health care directive.    Phone call duration: 37 minutes    Follow-up in 2-3 months    51 minutes spent including reviewing record, review of above studies, above visit with patient, and documentation.     Cat Tate MD  Palliative Medicine  Pager [phone]     (This note was transcribed using voice recognition software. While I review and edit the transcription, I may miss errors, and the software sometimes does unexpected capitalizations and formatting that I miss. Please let me know of any serious mistranscriptions and I will addend this note.)

## 2021-05-26 DIAGNOSIS — K29.00 OTHER ACUTE GASTRITIS, PRESENCE OF BLEEDING UNSPECIFIED: ICD-10-CM

## 2021-06-01 ENCOUNTER — APPOINTMENT (OUTPATIENT)
Dept: LAB | Facility: CLINIC | Age: 76
End: 2021-06-01
Attending: STUDENT IN AN ORGANIZED HEALTH CARE EDUCATION/TRAINING PROGRAM
Payer: COMMERCIAL

## 2021-06-01 ENCOUNTER — INFUSION THERAPY VISIT (OUTPATIENT)
Dept: ONCOLOGY | Facility: CLINIC | Age: 76
End: 2021-06-01
Attending: STUDENT IN AN ORGANIZED HEALTH CARE EDUCATION/TRAINING PROGRAM
Payer: COMMERCIAL

## 2021-06-01 VITALS
BODY MASS INDEX: 23.69 KG/M2 | WEIGHT: 125.4 LBS | TEMPERATURE: 97.6 F | OXYGEN SATURATION: 98 % | SYSTOLIC BLOOD PRESSURE: 146 MMHG | HEART RATE: 98 BPM | RESPIRATION RATE: 16 BRPM | DIASTOLIC BLOOD PRESSURE: 84 MMHG

## 2021-06-01 DIAGNOSIS — C90.00 MULTIPLE MYELOMA, REMISSION STATUS UNSPECIFIED (H): Primary | ICD-10-CM

## 2021-06-01 LAB
ALBUMIN SERPL-MCNC: 3.6 G/DL (ref 3.4–5)
ALP SERPL-CCNC: 99 U/L (ref 40–150)
ALT SERPL W P-5'-P-CCNC: 27 U/L (ref 0–70)
ANION GAP SERPL CALCULATED.3IONS-SCNC: 6 MMOL/L (ref 3–14)
AST SERPL W P-5'-P-CCNC: 18 U/L (ref 0–45)
BASOPHILS # BLD AUTO: 0.1 10E9/L (ref 0–0.2)
BASOPHILS NFR BLD AUTO: 0.8 %
BILIRUB SERPL-MCNC: 0.4 MG/DL (ref 0.2–1.3)
BUN SERPL-MCNC: 19 MG/DL (ref 7–30)
CALCIUM SERPL-MCNC: 9.3 MG/DL (ref 8.5–10.1)
CHLORIDE SERPL-SCNC: 110 MMOL/L (ref 94–109)
CO2 SERPL-SCNC: 27 MMOL/L (ref 20–32)
CREAT SERPL-MCNC: 0.95 MG/DL (ref 0.66–1.25)
DIFFERENTIAL METHOD BLD: ABNORMAL
EOSINOPHIL # BLD AUTO: 0.3 10E9/L (ref 0–0.7)
EOSINOPHIL NFR BLD AUTO: 5.4 %
ERYTHROCYTE [DISTWIDTH] IN BLOOD BY AUTOMATED COUNT: 13 % (ref 10–15)
GFR SERPL CREATININE-BSD FRML MDRD: 78 ML/MIN/{1.73_M2}
GLUCOSE SERPL-MCNC: 101 MG/DL (ref 70–99)
HCT VFR BLD AUTO: 35.7 % (ref 40–53)
HGB BLD-MCNC: 11.7 G/DL (ref 13.3–17.7)
IMM GRANULOCYTES # BLD: 0 10E9/L (ref 0–0.4)
IMM GRANULOCYTES NFR BLD: 0.2 %
KAPPA LC UR-MCNC: 0.7 MG/DL (ref 0.33–1.94)
KAPPA LC/LAMBDA SER: 2.41 {RATIO} (ref 0.26–1.65)
LAMBDA LC SERPL-MCNC: 0.29 MG/DL (ref 0.57–2.63)
LYMPHOCYTES # BLD AUTO: 1.2 10E9/L (ref 0.8–5.3)
LYMPHOCYTES NFR BLD AUTO: 19.8 %
MCH RBC QN AUTO: 34.2 PG (ref 26.5–33)
MCHC RBC AUTO-ENTMCNC: 32.8 G/DL (ref 31.5–36.5)
MCV RBC AUTO: 104 FL (ref 78–100)
MONOCYTES # BLD AUTO: 0.5 10E9/L (ref 0–1.3)
MONOCYTES NFR BLD AUTO: 8.3 %
NEUTROPHILS # BLD AUTO: 4 10E9/L (ref 1.6–8.3)
NEUTROPHILS NFR BLD AUTO: 65.5 %
NRBC # BLD AUTO: 0 10*3/UL
NRBC BLD AUTO-RTO: 0 /100
PLATELET # BLD AUTO: 341 10E9/L (ref 150–450)
POTASSIUM SERPL-SCNC: 3.7 MMOL/L (ref 3.4–5.3)
PROT SERPL-MCNC: 6.2 G/DL (ref 6.8–8.8)
RBC # BLD AUTO: 3.42 10E12/L (ref 4.4–5.9)
SODIUM SERPL-SCNC: 143 MMOL/L (ref 133–144)
WBC # BLD AUTO: 6.1 10E9/L (ref 4–11)

## 2021-06-01 PROCEDURE — 96401 CHEMO ANTI-NEOPL SQ/IM: CPT

## 2021-06-01 PROCEDURE — 85025 COMPLETE CBC W/AUTO DIFF WBC: CPT | Performed by: STUDENT IN AN ORGANIZED HEALTH CARE EDUCATION/TRAINING PROGRAM

## 2021-06-01 PROCEDURE — 83883 ASSAY NEPHELOMETRY NOT SPEC: CPT | Performed by: STUDENT IN AN ORGANIZED HEALTH CARE EDUCATION/TRAINING PROGRAM

## 2021-06-01 PROCEDURE — 250N000012 HC RX MED GY IP 250 OP 636 PS 637: Performed by: STUDENT IN AN ORGANIZED HEALTH CARE EDUCATION/TRAINING PROGRAM

## 2021-06-01 PROCEDURE — 84165 PROTEIN E-PHORESIS SERUM: CPT | Mod: 26 | Performed by: PATHOLOGY

## 2021-06-01 PROCEDURE — 999N001036 HC STATISTIC TOTAL PROTEIN: Performed by: STUDENT IN AN ORGANIZED HEALTH CARE EDUCATION/TRAINING PROGRAM

## 2021-06-01 PROCEDURE — 36415 COLL VENOUS BLD VENIPUNCTURE: CPT

## 2021-06-01 PROCEDURE — 84165 PROTEIN E-PHORESIS SERUM: CPT | Mod: TC | Performed by: STUDENT IN AN ORGANIZED HEALTH CARE EDUCATION/TRAINING PROGRAM

## 2021-06-01 PROCEDURE — 250N000013 HC RX MED GY IP 250 OP 250 PS 637: Performed by: STUDENT IN AN ORGANIZED HEALTH CARE EDUCATION/TRAINING PROGRAM

## 2021-06-01 PROCEDURE — 250N000011 HC RX IP 250 OP 636: Performed by: STUDENT IN AN ORGANIZED HEALTH CARE EDUCATION/TRAINING PROGRAM

## 2021-06-01 PROCEDURE — 80053 COMPREHEN METABOLIC PANEL: CPT | Performed by: STUDENT IN AN ORGANIZED HEALTH CARE EDUCATION/TRAINING PROGRAM

## 2021-06-01 RX ORDER — ACETAMINOPHEN 325 MG/1
650 TABLET ORAL ONCE
Status: COMPLETED | OUTPATIENT
Start: 2021-06-01 | End: 2021-06-01

## 2021-06-01 RX ORDER — DIPHENHYDRAMINE HCL 25 MG
50 CAPSULE ORAL ONCE
Status: COMPLETED | OUTPATIENT
Start: 2021-06-01 | End: 2021-06-01

## 2021-06-01 RX ORDER — DEXAMETHASONE 4 MG/1
20 TABLET ORAL ONCE
Status: COMPLETED | OUTPATIENT
Start: 2021-06-01 | End: 2021-06-01

## 2021-06-01 RX ADMIN — DIPHENHYDRAMINE HYDROCHLORIDE 50 MG: 25 CAPSULE ORAL at 08:39

## 2021-06-01 RX ADMIN — DARATUMUMAB AND HYALURONIDASE-FIHJ (HUMAN RECOMBINANT) 1800 MG: 1800; 30000 INJECTION SUBCUTANEOUS at 09:31

## 2021-06-01 RX ADMIN — DEXAMETHASONE 20 MG: 4 TABLET ORAL at 08:38

## 2021-06-01 RX ADMIN — ACETAMINOPHEN 650 MG: 325 TABLET ORAL at 08:39

## 2021-06-01 ASSESSMENT — PAIN SCALES - GENERAL: PAINLEVEL: NO PAIN (0)

## 2021-06-01 NOTE — PATIENT INSTRUCTIONS
Contact Numbers    [facility] Main Line: [phone]  [facility] Triage and after hours / weekends / holidays: [phone]      Please call the triage or after hours line if you experience a temperature greater than or equal to 100.4, shaking chills, have uncontrolled nausea, vomiting and/or diarrhea, dizziness, shortness of breath, chest pain, bleeding, unexplained bruising, or if you have any other new/concerning symptoms, questions or concerns.      If you are having any concerning symptoms or wish to speak to a provider before your next infusion visit, please call your care coordinator or triage to notify them so we can adequately serve you.     If you need a refill on a narcotic prescription or other medication, please call before your infusion appointment.       June 2021 Sunday Monday Tuesday Wednesday Thursday Friday Saturday             1    LAB PERIPHERAL   7:30 AM   (15 min.)   [facility] LAB DRAW   [facility]    ONC INFUSION 6 HR (360 MIN)   8:00 AM   (360 min.)    ONC INFUSION NURSE   [facility] 2     3     4     5       6     7     8     9     10     11     12       13     14     15    VIDEO VISIT RETURN   9:45 AM   (50 min.)   Marianna Gunter PA-C   [facility] 16     17     18     19       20     21     22     23     24     25     26       27     28    LAB PERIPHERAL   7:30 AM   (15 min.)   [facility] LAB DRAW   [facility]    ONC INFUSION 6 HR (360 MIN)   8:00 AM   (360 min.)    ONC INFUSION NURSE   [facility] 29 30 July 2021 Sunday Monday Tuesday Wednesday Thursday Friday Saturday                       1     2     3       4     5     6     7     8     9     10       11     12     13     14     15     16     17       18     19     20     21     22    RETURN  10:55 AM   (40 min.)   Cat Tate MD   [facility]  [facility] 23     24       25     26    LAB PERIPHERAL   7:30 AM   (15 min.)   [facility] LAB DRAW   [facility]    ONC INFUSION 6 HR (360 MIN)   8:00 AM   (360 min.)    ONC INFUSION NURSE   [facility] 27     28     29     30     31                    Recent Results (from the past 24 hour(s))   CBC with platelets differential    Collection Time: 06/01/21  8:11 AM   Result Value Ref Range    WBC 6.1 4.0 - 11.0 10e9/L    RBC Count 3.42 (L) 4.4 - 5.9 10e12/L    Hemoglobin 11.7 (L) 13.3 - 17.7 g/dL    Hematocrit 35.7 (L) 40.0 - 53.0 %     (H) 78 - 100 fl    MCH 34.2 (H) 26.5 - 33.0 pg    MCHC 32.8 31.5 - 36.5 g/dL    RDW 13.0 10.0 - 15.0 %    Platelet Count 341 150 - 450 10e9/L    Diff Method Automated Method     % Neutrophils 65.5 %    % Lymphocytes 19.8 %    % Monocytes 8.3 %    % Eosinophils 5.4 %    % Basophils 0.8 %    % Immature Granulocytes 0.2 %    Nucleated RBCs 0 0 /100    Absolute Neutrophil 4.0 1.6 - 8.3 10e9/L    Absolute Lymphocytes 1.2 0.8 - 5.3 10e9/L    Absolute Monocytes 0.5 0.0 - 1.3 10e9/L    Absolute Eosinophils 0.3 0.0 - 0.7 10e9/L    Absolute Basophils 0.1 0.0 - 0.2 10e9/L    Abs Immature Granulocytes 0.0 0 - 0.4 10e9/L    Absolute Nucleated RBC 0.0    Comprehensive metabolic panel    Collection Time: 06/01/21  8:11 AM   Result Value Ref Range    Sodium 143 133 - 144 mmol/L    Potassium 3.7 3.4 - 5.3 mmol/L    Chloride 110 (H) 94 - 109 mmol/L    Carbon Dioxide 27 20 - 32 mmol/L    Anion Gap 6 3 - 14 mmol/L    Glucose 101 (H) 70 - 99 mg/dL    Urea Nitrogen 19 7 - 30 mg/dL    Creatinine 0.95 0.66 - 1.25 mg/dL    GFR Estimate 78 >60 mL/min/[1.73_m2]    GFR Estimate If Black >90 >60 mL/min/[1.73_m2]    Calcium 9.3 8.5 - 10.1 mg/dL    Bilirubin Total 0.4 0.2 - 1.3 mg/dL    Albumin 3.6 3.4 - 5.0 g/dL    Protein Total 6.2 (L) 6.8 - 8.8 g/dL    Alkaline Phosphatase 99 40 - 150 U/L    ALT 27 0 - 70 U/L    AST 18 0 - 45  U/L

## 2021-06-01 NOTE — PROGRESS NOTES
"Infusion Nursing Note:  Rogelio Marshall presents today for maintenance Darzalex Faspro.    Patient seen by provider today: No   present during visit today: Not Applicable.    Note: pt presents to infusion today feeling generally well. Pt does state that skin over abdomen is tender to the touch and feels \"swollen\" after injections (at injection sites); pt states that the \"swollen\" feeling does not go away. Pt wonders if Darzalex injections can be given in a different site. This RN explained that Darzalex is only given in the abdomen. Pt agreeable and will continue to monitor symptoms.       Pain: denies    Intravenous Access:  No Intravenous access at this visit.    Treatment Conditions:  Lab Results   Component Value Date    HGB 11.7 06/01/2021     Lab Results   Component Value Date    WBC 6.1 06/01/2021      Lab Results   Component Value Date    ANEU 4.0 06/01/2021     Lab Results   Component Value Date     06/01/2021      Lab Results   Component Value Date     06/01/2021                   Lab Results   Component Value Date    POTASSIUM 3.7 06/01/2021           Lab Results   Component Value Date                  Lab Results   Component Value Date    CR 0.95 06/01/2021                   Lab Results   Component Value Date    MIRI 9.3 06/01/2021                Lab Results   Component Value Date    BILITOTAL 0.4 06/01/2021           Lab Results   Component Value Date    ALBUMIN 3.6 06/01/2021                    Lab Results   Component Value Date    ALT 27 06/01/2021           Lab Results   Component Value Date    AST 18 06/01/2021       Results reviewed, labs MET treatment parameters, ok to proceed with treatment.      Post Infusion Assessment:  Patient tolerated injection to right abdomen without incident.       Discharge Plan:   Prescription refills given for omeprazole and aspirin.  Copy of AVS reviewed with patient and/or family.  Patient will return 6/15 for follow up with provider and 6/28 " for next [facility] appointment.  Patient discharged in stable condition accompanied by: self.  Departure Mode: Ambulatory.    ROBIN HU RN

## 2021-06-02 LAB
ALBUMIN SERPL ELPH-MCNC: 3.9 G/DL (ref 3.7–5.1)
ALPHA1 GLOB SERPL ELPH-MCNC: 0.3 G/DL (ref 0.2–0.4)
ALPHA2 GLOB SERPL ELPH-MCNC: 0.7 G/DL (ref 0.5–0.9)
B-GLOBULIN SERPL ELPH-MCNC: 0.6 G/DL (ref 0.6–1)
GAMMA GLOB SERPL ELPH-MCNC: 0.4 G/DL (ref 0.7–1.6)
M PROTEIN SERPL ELPH-MCNC: 0.1 G/DL
PROT PATTERN SERPL ELPH-IMP: ABNORMAL

## 2021-06-05 VITALS
RESPIRATION RATE: 16 BRPM | DIASTOLIC BLOOD PRESSURE: 85 MMHG | HEART RATE: 82 BPM | TEMPERATURE: 98.9 F | WEIGHT: 139.8 LBS | OXYGEN SATURATION: 95 % | BODY MASS INDEX: 26.39 KG/M2 | HEIGHT: 61 IN | SYSTOLIC BLOOD PRESSURE: 155 MMHG

## 2021-06-05 VITALS
BODY MASS INDEX: 26.22 KG/M2 | SYSTOLIC BLOOD PRESSURE: 141 MMHG | OXYGEN SATURATION: 95 % | HEART RATE: 89 BPM | HEIGHT: 61 IN | WEIGHT: 138.9 LBS | RESPIRATION RATE: 16 BRPM | DIASTOLIC BLOOD PRESSURE: 75 MMHG | TEMPERATURE: 98.7 F

## 2021-06-05 VITALS
DIASTOLIC BLOOD PRESSURE: 92 MMHG | HEIGHT: 61 IN | TEMPERATURE: 98.9 F | OXYGEN SATURATION: 100 % | HEART RATE: 78 BPM | WEIGHT: 134.6 LBS | BODY MASS INDEX: 25.41 KG/M2 | SYSTOLIC BLOOD PRESSURE: 150 MMHG | RESPIRATION RATE: 16 BRPM

## 2021-06-05 VITALS
HEART RATE: 73 BPM | TEMPERATURE: 99.1 F | SYSTOLIC BLOOD PRESSURE: 139 MMHG | DIASTOLIC BLOOD PRESSURE: 77 MMHG | RESPIRATION RATE: 18 BRPM | OXYGEN SATURATION: 96 % | BODY MASS INDEX: 25.26 KG/M2 | WEIGHT: 133.8 LBS | HEIGHT: 61 IN

## 2021-06-05 VITALS
WEIGHT: 133.8 LBS | DIASTOLIC BLOOD PRESSURE: 89 MMHG | TEMPERATURE: 98.5 F | HEART RATE: 65 BPM | BODY MASS INDEX: 25.26 KG/M2 | OXYGEN SATURATION: 97 % | HEIGHT: 61 IN | SYSTOLIC BLOOD PRESSURE: 163 MMHG | RESPIRATION RATE: 14 BRPM

## 2021-06-06 NOTE — TELEPHONE ENCOUNTER
"Writer received call from Home Health RN who advises that Rogelio would like to speak with his RN Care Coordinator. This most recent round of treatment has been harder for him and would like advice on how he can possibly recuperate a little better. Experiencing fatigue, nausea, decreased appetite, \"not well\" per RN. His VS are stable. Routing to RN Care Coordinator for assistance.  " 21-Apr-2021

## 2021-06-07 ENCOUNTER — MEDICAL CORRESPONDENCE (OUTPATIENT)
Dept: HEALTH INFORMATION MANAGEMENT | Facility: CLINIC | Age: 76
End: 2021-06-07

## 2021-06-12 NOTE — TELEPHONE ENCOUNTER
[facility] Nurse Triage Telephone Note      Provider: LESLYE Martin  Facility: [facility]    Facility Type: TCU    Caller: Jennifer  Call Back Number:  [phone]    Allergies: Other allergy (see comments)    Reason for call: Nurse reporting Heme 2 and BMP results.  Notable meds:  hydrochlorothiazide 12.5mg daily, Ibuprofen 600mg Q 6 hours.       Verbal Order/Direction given by Provider: Check Heme 2 on 10/26/20.      Provider giving order: LESLYE Martin    Verbal order given to: Jennifer Oneal RN

## 2021-06-12 NOTE — PROGRESS NOTES
[facility]    Facility:  [facility] [[id number]]    Code Status:  FULL CODE    Chief Complaint   Patient presents with     H & P   :                    Patient Active Problem List   Diagnosis     Chronic midline low back pain without sciatica     Difficulty walking     Hyperlipidemia     Hypertension     Pathologic compression fracture of spine, sequela     Small bowel obstruction, partial (H)     Multiple myeloma, presumed at this point       History:  Rogelio Marshall  is a 75 year old male with history of hypertension, hyperlipidemia, small bowel obstruction, and now probable multiple myeloma with multiple pathologic fractures of the spine seen for admission to TCU on 10/25/2020    Hospital Course: Patient was hospitalized from October 9 through October 13 presenting with severe back pain which she had been eating on his own at home for some time.  He was diagnosed with multiple pathological fractures T1, T5, T10, T12, L2, L3.  He was seen by spine surgeon who found him to be a poor candidate for surgery recommended bracing and medical management.  Patient declined bracing at that time.    Patient was seen by hematology with suspicion for multiple myeloma as a new diagnosis with plans for bone marrow biopsy and bone biopsy at a later time.    Patient's hospitalization been turned into a pain control exercise with Tylenol ibuprofen (PPI added for GI protection) lidocaine patch, menthol patch, gabapentin, Robaxin, oxycodone.  He was also placed on stool softeners and Zofran and discharged to the TCU.    Subjective/ROS:    -augmented by discussion with facility staff involved in direct care      Patient reports that he is been up walking with a walker.  He is spending a lot of time.  How he is going to manage at home and making some plans.  He says that his back feels fine as long as he is in bed.  He has a limited amount of time to be up before the aching in the back becomes severe  enough that he has to stop.  He denies numbness weakness tingling into the legs or saddle numbness.  He reports control over his bowel and bladder.  He reports that his pain control is certainly much better than it was when he presented to the hospital and overall he satisfied.    Patient has many appropriate questions about his presumed diagnosis and treatment.  He does not have an oncology appointment and tell November.  He did go in for a biopsy today however and says he tolerated that well.    Patient uses significant alcohol on a daily basis but reports that he has never had physical social legal issues associated with that.  He says he has not missed the alcohol and seemed open to the suggestion that he was drinking more than is compatible with good health and should likely stay off alcohol completely as he is going to be on pain program in all likelihood.    Patient denies headaches change in vision speaking swallowing or hearing he says he is not having cough shortness of breath no fever sweats or chills no dysuria melena bright red blood per rectum.  Remainder negative    Past Medical History:   Diagnosis Date     Acute kidney failure (H)      Chronic midline low back pain without sciatica 10/09/2020     Difficulty walking 10/09/2020     Hyperlipidemia 10/15/2020     Hypertension 10/15/2020     Multiple myeloma (H)      Pathologic compression fracture of spine, sequela 10/09/2020     Small bowel obstruction, partial (H) 07/10/2010     Past Surgical History:   Procedure Laterality Date     INGUINAL HERNIA REPAIR       ORIF CLAVICLE FRACTURE Left           Family History   Problem Relation Age of Onset     Coronary artery disease Father         MI     Hypertension Sister      Obesity Sister    :       Social History     Socioeconomic History     Marital status: Single     Spouse name: Not on file     Number of children: Not on file     Years of education: Not on file     Highest education level: Not on file    Occupational History     Not on file   Social Needs     Financial resource strain: Not on file     Food insecurity     Worry: Not on file     Inability: Not on file     Transportation needs     Medical: Not on file     Non-medical: Not on file   Tobacco Use     Smoking status: Former Smoker     Packs/day: 0.10     Years: 30.00     Pack years: 3.00     Types: Cigarettes     Smokeless tobacco: Never Used   Substance and Sexual Activity     Alcohol use: Yes     Alcohol/week: 21.0 standard drinks     Types: 21 Standard drinks or equivalent per week     Drug use: Not on file     Sexual activity: Not on file   Lifestyle     Physical activity     Days per week: Not on file     Minutes per session: Not on file     Stress: Not on file   Relationships     Social connections     Talks on phone: Not on file     Gets together: Not on file     Attends Christian service: Not on file     Active member of club or organization: Not on file     Attends meetings of clubs or organizations: Not on file     Relationship status: Not on file     Intimate partner violence     Fear of current or ex partner: Not on file     Emotionally abused: Not on file     Physically abused: Not on file     Forced sexual activity: Not on file   Other Topics Concern     Not on file   Social History Narrative     Not on file   :    Lives alone in [facility]    Current Outpatient Medications on File Prior to Visit   Medication Sig Dispense Refill     acetaminophen (TYLENOL) 325 MG tablet Take 975 mg by mouth 3 (three) times a day.       calcium, as carbonate, (OS-MIRI) 500 mg calcium (1,250 mg) tablet Take 500 mg by mouth 3 (three) times a day with meals.       gabapentin (NEURONTIN) 100 MG capsule Take 100 mg by mouth 3 (three) times a day.       ibuprofen (ADVIL,MOTRIN) 600 MG tablet Take 600 mg by mouth every 6 (six) hours as needed.       lidocaine 4 % patch Place 2 patches on the skin every 12 (twelve) hours.       menthol 5 % PtMd Apply 1  patch topically daily.       methocarbamoL (ROBAXIN) 500 MG tablet Take 500 mg by mouth 4 (four) times a day.       omeprazole (PRILOSEC) 10 MG capsule Take 10 mg by mouth daily before breakfast.       ondansetron (ZOFRAN-ODT) 4 MG disintegrating tablet Take 4 mg by mouth every 6 (six) hours as needed.       oxyCODONE (ROXICODONE) 5 MG immediate release tablet Take 1 tablet (5 mg total) by mouth every 4 (four) hours as needed. 60 tablet 0     polyethylene glycol (GLYCOLAX) 17 gram/dose powder Take 17 g by mouth daily.       senna-docusate (SENNA-S) 8.6-50 mg tablet Take 1 tablet by mouth daily as needed.        No current facility-administered medications on file prior to visit.    :      ALLERGIES:  Other allergy (see comments)    Vitals: Blood pressure 134/80 respirations 18 temperature 98.2 heart rate 84 O2 sats 94% on room air weight is 134 pounds  Physical exam:  Patient is lying in bed looking comfortable and stating the same.  Normocephalic atraumatic sclera clear gaze is conjugate EOMI oropharynx is clear neck is supple good range of motion of his neck he has range of motion of his shoulders and distal joints in his arms and hands.  He demonstrates purposeful movement of hands and legs.  He is breathing comfortably with clear lungs no accessory muscle use or tachypnea his heart is regular S1-S2 without murmur gallop or rub abdomen is soft.  Sensation is intact to the toe tips.      Due to the 2020 Covid 19 pandemic, except as noted above, the patient was visually observed at a 6 foot plus distance.  An observational exam was performed in an effort to keep patient safe from Covid 19 and other communicable diseases.   Labs:  Lab Results   Component Value Date    WBC 6.2 10/20/2020    HGB 10.4 (L) 10/20/2020    HCT 32.2 (L) 10/20/2020     (H) 10/20/2020     (H) 10/20/2020     Results for orders placed or performed in visit on 10/20/20   Basic Metabolic Panel   Result Value Ref Range    Sodium 134  (L) 136 - 145 mmol/L    Potassium 4.0 3.5 - 5.0 mmol/L    Chloride 107 98 - 107 mmol/L    CO2 21 (L) 22 - 31 mmol/L    Anion Gap, Calculation 6 5 - 18 mmol/L    Glucose 83 70 - 125 mg/dL    Calcium 8.5 8.5 - 10.5 mg/dL    BUN 23 8 - 28 mg/dL    Creatinine 1.24 0.70 - 1.30 mg/dL    GFR MDRD Af Amer >60 >60 mL/min/1.73m2    GFR MDRD Non Af Amer 57 (L) >60 mL/min/1.73m2         No results found for: TSH  No results found for: HGBA1C  [unfilled]  No results found for: [id number]  No results found for: BNP  [unfilled]        Invalid input(s): PRINTERVAL    Results for orders placed or performed during the hospital encounter of 10/09/20   CT Lumbar Spine w/o Contrast   Narrative   Thoracic and Lumbar spine CT without contrast    History: Diffuse back pain and previous finding concerning for  multiple myeloma, neurosurgery evaluating for changes.    Comparison: CT thoracic and lumbar spine dated 9/18/2020    Technique: Axial, coronal, and sagittal multiplanar reconstructions  obtained from acquisition of thoracic and lumbar spine CT scan .    Findings:  Thoracic spine:   Diffuse osteolytic appearance of the spine and bilateral ribs. The  dextrosclerosis centered at lower thoracic spine. No significant  change in dehiscence of T5 anterior cortex. Unchanged greater than 90%  height loss in T10 vertebral body with vertebra plana appearance.  Compression deformity of T12 upper endplate and T1 anterior vertebral  body, also unchanged.    There is no acute fracture or subluxation.     There is no prevertebral mass or edema. There is no high-grade spinal  canal or foraminal stenosis at any level. No soft tissue abnormality  in the visualized paraspinous tissues anteriorly. Overall no  significant change from 9/18/2020.    Lumbar Spine:  Again noted extensive osteolytic appearance of lumbar sacral spine and  pelvic bones.   Coronal levoscoliosis centered at mid lumbar spine. Unchanged grade 1  anterolisthesis of L5 on S1. There  is no acute fracture or  subluxation. There are 5 type lumbar vertebra, coming down from T1.     Multilevel decompression deformity most prominent at L2 and L3 with  approximately 30% and 80% vertebral body height loss, overall not  significantly changed from prior.     Multilevel degenerative changes. On a level by level basis;     L1-L2: Superior L2 compression deformity. Bilateral facet hypertrophy  and ligamentum flavum thickening. Mild to moderate spinal canal  stenosis. Mild bilateral neural foraminal narrowing.    L2-L3: Severe L3 compression deformity. Bilateral facet hypertrophy  and ligamentum flavum thickening. Mild spinal canal narrowing. Mild  bilateral neural foraminal narrowing.    L3-L4: Posterior disc bulge. Bilateral facet hypertrophy and  ligamentum flavum thickening. Mild spinal canal narrowing. Mild to  moderate bilateral neural foraminal stenosis.    L4-L5: Posterior disc bulge, bilateral facet hypertrophy, and  ligamentum flavum thickening. Mild spinal canal narrowing. Moderate  right and mild left neural foraminal stenosis.    L5-S1: Circumferential disc osteophyte complex and bilateral facet  hypertrophy. Mild spinal canal narrowing. Severe bilateral neural  foraminal stenosis.    The visualized adjacent paraspinous tissues are grossly within normal  limits.  Degenerative changes of the lumbar spine including osteophytic  spurring and endplate irregularity and sclerosis changes.     Impression   Impression:   1. Since 9/18/2020, there is no significant change in diffuse  osteolytic appearance of spine, pelvis and ribs, suspicious for  multiple myeloma. No significant change in compression deformities in  the thoracic and lumbar spine. No new fracture.  2. Lumbar spondylosis most prominent at L5-S1 with severe bilateral  neuroforaminal stenosis.    I have personally reviewed the examination and initial interpretation  and I agree with the findings.    LIBRA NEUMANN MD   CT Thoracic Spine  w/o Contrast   Narrative   Thoracic and Lumbar spine CT without contrast    History: Diffuse back pain and previous finding concerning for  multiple myeloma, neurosurgery evaluating for changes.    Comparison: CT thoracic and lumbar spine dated 9/18/2020    Technique: Axial, coronal, and sagittal multiplanar reconstructions  obtained from acquisition of thoracic and lumbar spine CT scan .    Findings:  Thoracic spine:   Diffuse osteolytic appearance of the spine and bilateral ribs. The  dextrosclerosis centered at lower thoracic spine. No significant  change in dehiscence of T5 anterior cortex. Unchanged greater than 90%  height loss in T10 vertebral body with vertebra plana appearance.  Compression deformity of T12 upper endplate and T1 anterior vertebral  body, also unchanged.    There is no acute fracture or subluxation.     There is no prevertebral mass or edema. There is no high-grade spinal  canal or foraminal stenosis at any level. No soft tissue abnormality  in the visualized paraspinous tissues anteriorly. Overall no  significant change from 9/18/2020.    Lumbar Spine:  Again noted extensive osteolytic appearance of lumbar sacral spine and  pelvic bones.   Coronal levoscoliosis centered at mid lumbar spine. Unchanged grade 1  anterolisthesis of L5 on S1. There is no acute fracture or  subluxation. There are 5 type lumbar vertebra, coming down from T1.     Multilevel decompression deformity most prominent at L2 and L3 with  approximately 30% and 80% vertebral body height loss, overall not  significantly changed from prior.     Multilevel degenerative changes. On a level by level basis;     L1-L2: Superior L2 compression deformity. Bilateral facet hypertrophy  and ligamentum flavum thickening. Mild to moderate spinal canal  stenosis. Mild bilateral neural foraminal narrowing.    L2-L3: Severe L3 compression deformity. Bilateral facet hypertrophy  and ligamentum flavum thickening. Mild spinal canal narrowing.  Mild  bilateral neural foraminal narrowing.    L3-L4: Posterior disc bulge. Bilateral facet hypertrophy and  ligamentum flavum thickening. Mild spinal canal narrowing. Mild to  moderate bilateral neural foraminal stenosis.    L4-L5: Posterior disc bulge, bilateral facet hypertrophy, and  ligamentum flavum thickening. Mild spinal canal narrowing. Moderate  right and mild left neural foraminal stenosis.    L5-S1: Circumferential disc osteophyte complex and bilateral facet  hypertrophy. Mild spinal canal narrowing. Severe bilateral neural  foraminal stenosis.    The visualized adjacent paraspinous tissues are grossly within normal  limits.  Degenerative changes of the lumbar spine including osteophytic  spurring and endplate irregularity and sclerosis changes.     Impression   Impression:   1. Since 9/18/2020, there is no significant change in diffuse  osteolytic appearance of spine, pelvis and ribs, suspicious for  multiple myeloma. No significant change in compression deformities in  the thoracic and lumbar spine. No new fracture.  2. Lumbar spondylosis most prominent at L5-S1 with severe bilateral  neuroforaminal stenosis.    I have personally reviewed the examination and initial interpretation  and I agree with the findings.    LIBRA NEUMANN MD     Assessment/Plan:      ICD-10-CM    1. Pathologic compression fracture of spine, sequela  M48.50XS    2. Multiple myeloma, presumed at this point  C90.00        Pathologic compression fractures of multiple vertebral bodies  Presumed multiple myeloma awaiting tissue diagnosis   Patient does not really have an explanation for why he declined TLSO.  I discussed the possible benefits with him including the possibility that he could be up safely and/or for longer periods of time.  He says he might reconsider at some point.  -Recommend follow-up with spine surgery/TLSO bracing assuming patient becomes willing  -Patient needs oncology input ASAP, awaiting tissue  diagnosis  -Patient biopsy today, presumably bone marrow biopsy although they were planning I think a straightahead bone biopsy as well.  He is unsure and I do not have documentation of that visit at this time.  -PT, OT, .  Without bracing I am not sure how safe it is for him to be up and what he is actually able to do/what his restrictions are per neurosurgery  -Patient is generally satisfied with his polypharmaceutical pain control plan.  Tylenol, ibuprofen, lidocaine patch, menthol patch, gabapentin, Robaxin, oxycodone.  Hopefully we can proceed with gradual dose reduction once his treatment plan is in place and hopefully with decreasing pain as a result.    NSAID use  GI prophylaxis   Patient is on omeprazole and I just wanted to call out that it is not due to primary GI symptoms but rather has been started concurrent with his NSAIDs which may be long-term in the face of multiple myeloma.    CKD #2   Based on his 10/20/2020 labs with creatinine 1.24 GFR estimated 57    Alcohol use   A concern about excessive alcohol use.  I recommend alcohol cessation.  Patient says he is capable of that on his own and states an understanding of the reasoning.    Hypertension   On hydrochlorothiazide 12.5 mg now.  Blood pressure remains mildly elevated.  We have to be very cognizant of his renal function with his diagnosis, NSAID use.  Recommend rechecking periodically.  Potassium will need to be monitored as well.  Consider magnesium monitoring as well.    Constipation   Patient reports satisfaction with his current bowel program    Hyperlipidemia   By history not on therapy, outpatient follow-up recommended          Case discussed with:    Facility staff         Vel Qiu MD

## 2021-06-12 NOTE — PROGRESS NOTES
Code Status:  FULL CODE  Visit Type: Problem Visit (pain, weakness, constipation, HALLIE )     Facility:  [facility] [[id number]]        Facility Type: SNF (Skilled Nursing Facility, TCU)    History of Present Illness: Rogelio Marshall is a 75 y.o. male with a past medical history for hypertension and hyperlipidemia.  He does have a history of previous SBO in 2010.  He was recently hospitalized at [facility] due to increased weakness, low back pain and inability to function and ADLs.  He has been recently worked up by his PCP with a CT of thoracic and lumbar spine on 9/18 showing extensive osteoporotic compression fractures and suggestion of possible myeloma.  He was then seen by a spine surgeon who stated he was not a good candidate for surgery due to his poor bone quality.  He continued to do further work-up until he was admitted to the hospital.       During his hospitalization he was consulted by hematology on 10/11 who discussed the new diagnosis of multiple myeloma.  He was started on Os-Baljinder 500 mg 3 times daily and was given pamidronate x1 during his hospital stay.  He then underwent a bone marrow biopsy biopsy and neurosurgery consult.  Neurosurgery did recommend a CT which was stable and recommended a brace for comfort.  However patient declined the brace.  Pain was a limiting factor in his care and he was seen by palliative medicine.  Pain became controlled with scheduled Tylenol 3 times daily, scheduled ibuprofen every 6 hours, lidocaine patches/menthol patches, scheduled gabapentin, scheduled Robaxin and PRN oxycodone.      He did have HALLIE showing his creatinine in July was 0.76 and elevated to 1.72 on 10/5 up to 2.19 on 10/9 and then came down to 1.05 on 10/12.  He has been taking frequent doses of scheduled ibuprofen for the past 3 months.     Today, he reports he had a good day with his pain yesterday and is rating his pain 3/10 today.  He reports that he is using less oxycodone and only using it in the  morning and at night as needed.  On chart review he is only using his oxycodone about 1-2 times a day.  He does find relief after starting to use the IcyHot menthol patches yesterday.  He is able to participate in therapy however states he does not feel like he can safely manage at home.   is involved and he will be having a care conference tomorrow.    Constipation is well managed and he is requesting that the senna S to be changed to as needed and to continue with the MiraLAX daily.    Nursing concerned with elevated blood pressures however most his SBP's are in the 150s to low 160s.    Review of Systems   Patient denies fever, chills, headache, lightheadedness, dizziness, rhinorrhea, cough, congestion, shortness of breath, chest pain, palpitations, abdominal pain, n/v, diarrhea, constipation, change in appetite, dysuria, frequency, burning or pain with urination.  Other than stated in HPI all other review of systems is negative.         Physical Exam   In order to maintain social distancing during the COVID pandemic, a visual exam was completed.     Vitals:    10/19/20 1028   BP: 155/85   Pulse: 82   Resp: 16   Temp: 98.9  F (37.2  C)   SpO2: 95%        Patient is well nourished and no acute distress.  Head is AT/NC, EOM intact. Respiratory effort normal. No visible LE edema. Alert and oriented, face symmetric. No visible skin issues or rashes. Mood euthymic      Labs:  No results found for this or any previous visit (from the past 240 hour(s)).      Assessment:  1. Pathologic compression fracture of spine, sequela     2. Essential hypertension     3. Weakness     4. Constipation, unspecified constipation type         Plan:   Compression fracture: pain slightly improved with more control.  Continue with scheduled APAP, Ibuprofen, gabapentin, robaxin and prn oxycodone.  Start on omeprazole for GI protection.  Encouraged patient to use the menthol patches as this seems to be helping him not to need  Oxycodone as much.     HTN: start hydrochlorothiazide and monitor BMP and BPs.     Weakness: continue with therapies will need support at home.     Constipation: improved, change Senna S to prn.           Electronically signed by: Jessica Evangelista CNP

## 2021-06-12 NOTE — PROGRESS NOTES
Code Status:  FULL CODE  Visit Type: Hospital Visit Follow Up ( back pain, multiple myeloma with pathological spine fracture, elevated creatinine, constipation and malnutrition)     Facility:  [facility] [[id number]]      Facility Type: SNF (Skilled Nursing Facility, TCU)    History of Present Illness:   Hospital Admission Date: 10/9/20  Hospital Discharge Date: 10/13/20  Facility Admission Date: 10/13/20 from [facility]     Rogelio Marshall is a 75 y.o. male with a past medical history for hypertension and hyperlipidemia.  He does have a history of previous SBO in 2010.  He was recently hospitalized at [facility] due to increased weakness, low back pain and inability to function and ADLs.  He has been recently worked up by his PCP with a CT of thoracic and lumbar spine on 9/18 showing extensive osteoporotic compression fractures and suggestion of possible myeloma.  He was then seen by a spine surgeon who stated he was not a good candidate for surgery due to his poor bone quality.  He continued to do further work-up until he was admitted to the hospital.      During his hospitalization he was consulted by hematology on 10/11 who discussed the new diagnosis of multiple myeloma.  He was started on Os-Baljinder 500 mg 3 times daily and was given pamidronate x1 during his hospital stay.  He then underwent a bone marrow biopsy biopsy and neurosurgery consult.  Neurosurgery did recommend a CT which was stable and recommended a brace for comfort.  However patient declined the brace.  Pain was a limiting factor in his care and he was seen by palliative medicine.  Pain became controlled with scheduled Tylenol 3 times daily, scheduled ibuprofen every 6 hours, lidocaine patches/menthol patches, scheduled gabapentin, scheduled Robaxin and PRN oxycodone.     He did have HALLIE showing his creatinine in July was 0.76 and elevated to 1.72 on 10/5 up to 2.19 on 10/9 and then came down to 1.05 on 10/12.  He has been taking frequent doses of scheduled  ibuprofen for the past 3 months.    Today, he reports that his pain is constant however worsens with increased mobility like ambulation.  He says oxycodone is the only medication that does improve his pain with activity.       He does complain of constipation however is getting daily MiraLAX which does seem to help.  He does have pain due to needing to bear down to have bowel movements.      Past Medical History:   Diagnosis Date     Acute kidney failure (H)      Chronic midline low back pain without sciatica 10/09/2020     Difficulty walking 10/09/2020     Hyperlipidemia 10/15/2020     Hypertension 10/15/2020     Multiple myeloma (H)      Pathologic compression fracture of spine, sequela 10/09/2020     Small bowel obstruction, partial (H) 07/10/2010     Past Surgical History:   Procedure Laterality Date     INGUINAL HERNIA REPAIR       ORIF CLAVICLE FRACTURE Left      Family History   Problem Relation Age of Onset     Coronary artery disease Father         MI     Hypertension Sister      Obesity Sister      Social History     Socioeconomic History     Marital status: Single     Spouse name: Not on file     Number of children: Not on file     Years of education: Not on file     Highest education level: Not on file   Occupational History     Not on file   Social Needs     Financial resource strain: Not on file     Food insecurity     Worry: Not on file     Inability: Not on file     Transportation needs     Medical: Not on file     Non-medical: Not on file   Tobacco Use     Smoking status: Former Smoker     Packs/day: 0.10     Years: 30.00     Pack years: 3.00     Types: Cigarettes     Smokeless tobacco: Never Used   Substance and Sexual Activity     Alcohol use: Yes     Alcohol/week: 21.0 standard drinks     Types: 21 Standard drinks or equivalent per week     Drug use: Not on file     Sexual activity: Not on file   Lifestyle     Physical activity     Days per week: Not on file     Minutes per session: Not on file      Stress: Not on file   Relationships     Social connections     Talks on phone: Not on file     Gets together: Not on file     Attends Zoroastrian service: Not on file     Active member of club or organization: Not on file     Attends meetings of clubs or organizations: Not on file     Relationship status: Not on file     Intimate partner violence     Fear of current or ex partner: Not on file     Emotionally abused: Not on file     Physically abused: Not on file     Forced sexual activity: Not on file   Other Topics Concern     Not on file   Social History Narrative     Not on file       Current Outpatient Medications   Medication Sig Dispense Refill     acetaminophen (TYLENOL) 325 MG tablet Take 975 mg by mouth 3 (three) times a day.       calcium, as carbonate, (OS-MIRI) 500 mg calcium (1,250 mg) tablet Take 500 mg by mouth 3 (three) times a day with meals.       gabapentin (NEURONTIN) 100 MG capsule Take 100 mg by mouth 3 (three) times a day.       ibuprofen (ADVIL,MOTRIN) 600 MG tablet Take 600 mg by mouth every 6 (six) hours as needed.       lidocaine 4 % patch Place 2 patches on the skin every 12 (twelve) hours.       menthol 5 % PtMd Apply 1 patch topically daily.       methocarbamoL (ROBAXIN) 500 MG tablet Take 500 mg by mouth 4 (four) times a day.       ondansetron (ZOFRAN-ODT) 4 MG disintegrating tablet Take 4 mg by mouth every 6 (six) hours as needed.       polyethylene glycol (GLYCOLAX) 17 gram/dose powder Take 17 g by mouth daily.       senna-docusate (SENNA-S) 8.6-50 mg tablet Take 1 tablet by mouth daily as needed.        omeprazole (PRILOSEC) 10 MG capsule Take 10 mg by mouth daily before breakfast.       oxyCODONE (ROXICODONE) 5 MG immediate release tablet Take 1 tablet (5 mg total) by mouth every 4 (four) hours as needed. 60 tablet 0     No current facility-administered medications for this visit.      Allergies   Allergen Reactions     Other Allergy (See Comments)      tobasco sauce ---caused red  spots        There is no immunization history on file for this patient.    Post Discharge Medication Reconciliation Status: discharge medications reconciled and changed, per note/orders    Review of Systems   Patient denies fever, chills, headache, lightheadedness, dizziness, rhinorrhea, cough, congestion, shortness of breath, chest pain, palpitations, abdominal pain, n/v, diarrhea, dysuria, frequency, burning or pain with urination.  Other than stated in HPI all other review of systems is negative.       Physical Exam   Vitals:    10/15/20 1026   BP: 141/75   Pulse: 89   Resp: 16   Temp: 98.7  F (37.1  C)   SpO2: 95%       GENERAL APPEARANCE: Thin elderly male, in no acute distress.  HEENT: normocephalic, atraumatic  PERRL, sclerae anicteric, conjunctivae clear and moist, EOM intact  LUNGS: Lung sounds CTA, no adventitious sounds, respiratory effort normal.  CARD: RRR, S1, S2, without murmurs, gallops, rubs  ABD: Soft, distended, soft nontender to palpation.  Hyperactive bowel sounds with normal bowel sounds.   MSK: Muscle strength and tone were equal bilaterally  EXTREMITIES: No cyanosis, clubbing or edema.  Good CMS to lower extremities, positive pedal pulses bilaterally.  NEURO: Alert and oriented x 3.  Face is symmetric.  SKIN: Inspection of the skin reveals no rashes, ulcerations or petechiae.  PSYCH: euthymic        Labs:    Recent Results (from the past 240 hour(s))   Basic Metabolic Panel   Result Value Ref Range    Sodium 134 (L) 136 - 145 mmol/L    Potassium 4.0 3.5 - 5.0 mmol/L    Chloride 107 98 - 107 mmol/L    CO2 21 (L) 22 - 31 mmol/L    Anion Gap, Calculation 6 5 - 18 mmol/L    Glucose 83 70 - 125 mg/dL    Calcium 8.5 8.5 - 10.5 mg/dL    BUN 23 8 - 28 mg/dL    Creatinine 1.24 0.70 - 1.30 mg/dL    GFR MDRD Af Amer >60 >60 mL/min/1.73m2    GFR MDRD Non Af Amer 57 (L) >60 mL/min/1.73m2   HM2(CBC w/o Differential)   Result Value Ref Range    WBC 6.2 4.0 - 11.0 thou/uL    RBC 3.18 (L) 4.40 - 6.20 mill/uL     Hemoglobin 10.4 (L) 14.0 - 18.0 g/dL    Hematocrit 32.2 (L) 40.0 - 54.0 %     (H) 80 - 100 fL    MCH 32.7 27.0 - 34.0 pg    MCHC 32.3 32.0 - 36.0 g/dL    RDW 13.6 11.0 - 14.5 %    Platelets 456 (H) 140 - 440 thou/uL    MPV 9.5 8.5 - 12.5 fL   HM2(CBC w/o Differential)   Result Value Ref Range    WBC 5.4 4.0 - 11.0 thou/uL    RBC 2.77 (L) 4.40 - 6.20 mill/uL    Hemoglobin 9.2 (L) 14.0 - 18.0 g/dL    Hematocrit 28.3 (L) 40.0 - 54.0 %     (H) 80 - 100 fL    MCH 33.2 27.0 - 34.0 pg    MCHC 32.5 32.0 - 36.0 g/dL    RDW 13.5 11.0 - 14.5 %    Platelets 412 140 - 440 thou/uL    MPV 9.9 8.5 - 12.5 fL         Assessment:  1. Chronic midline low back pain without sciatica     2. Pathologic compression fracture of spine, sequela     3. Multiple myeloma not having achieved remission (H)     4. Essential hypertension     5. Hyperlipidemia, unspecified hyperlipidemia type     6. Loss of appetite     7. Drug-induced constipation         Plan:   Low back pain: Chronic secondary to multiple myeloma and pathological fractures.   He was frustrated with his pain management to over nights as he felt his oxycodone was to be scheduled.  I did explain to him that he has scheduled multiple medications to decrease the threshold of pain however oxycodone should be used for anticipated pain with therapy or for severe pain.  He reports understanding.  Will change to enteric-coated ibuprofen as not to cause any stomach issues.  Could consider starting on a PPI if enteric-coated ibuprofen is not available.  Continue gabapentin, Robaxin, ibuprofen, Tylenol, lidocaine patches, and PRN oxycodone.    Compression fracture: Continue with therapies does not tolerate TLSO.  Pain management.    Multiple myeloma: Follow-up with hematology on 11/4.  Counseled patient on pathology of multiple myeloma with bone manifestations.    Hypertension: Well-controlled without any antihypertensives.    Loss of appetite: Patient states that current  nutritional supplements are causing him intestinal gas.  We will have dietitian meet with him to discuss nutritional supplements and increasing food options.    Drug-induced constipation: Counseled patient on the effects of opiates causing constipation.  Also counseled on constipation increasing potential for low spine pain.  Counseled on the need to keep stool soft as he does not then need to bear down.  Continue with MiraLAX daily and add senna S daily counseled on effects and side effects.    40 total minutes spent with 25 minutes spent face-to-face with patient counseling coordination of the above plan of care.    Electronically signed by: Jessica Evangelista, CNP

## 2021-06-12 NOTE — PROGRESS NOTES
Code Status:  FULL CODE  Visit Type: Discharge Summary     Facility:  [facility] [[id number]]          PCP:  Wolf Stevens MD  [phone]       Admission Date to our Facility: 10/13/2020 discharge Date from our Facility: 11/3/2020    Discharge Diagnosis:    1. Pathologic compression fracture of spine, sequela     2. Multiple myeloma, presumed at this point     3. Chronic midline low back pain without sciatica     4. Essential hypertension     5. Uncomplicated alcohol dependence (H)          History of Present Illness: Rogelio Marshall is a 75 y.o. male with a past medical history for hypertension and hyperlipidemia.  He does have a history of previous SBO in 2010.  He was recently hospitalized at [facility] due to increased weakness, low back pain and inability to function and ADLs.  He has been recently worked up by his PCP with a CT of thoracic and lumbar spine on 9/18 showing extensive osteoporotic compression fractures and suggestion of possible myeloma.  He was then seen by a spine surgeon who stated he was not a good candidate for surgery due to his poor bone quality.  He continued to do further work-up until he was admitted to the hospital.       During his hospitalization he was consulted by hematology on 10/11 who discussed the new diagnosis of multiple myeloma.  He was started on Os-Baljinder 500 mg 3 times daily and was given pamidronate x1 during his hospital stay.  He then underwent a bone marrow biopsy biopsy and neurosurgery consult.  Neurosurgery did recommend a CT which was stable and recommended a brace for comfort.  However patient declined the brace.  Pain was a limiting factor in his care and he was seen by palliative medicine.  Pain became controlled with scheduled Tylenol 3 times daily, scheduled ibuprofen every 6 hours, lidocaine patches/menthol patches, scheduled gabapentin, scheduled Robaxin and PRN oxycodone.      He did have HALLIE showing his creatinine in July was 0.76 and elevated to 1.72 on  10/5 up to 2.19 on 10/9 and then came down to 1.05 on 10/12.  He has been taking frequent doses of scheduled ibuprofen for the past 3 months.    Skilled Nursing Facility Course: During his stay he did become independent with a walker with therapy.  Pain was his limiting factor however pain is well controlled with as needed oxycodone in the morning, Profen scheduled, APAP scheduled, methocarbamol scheduled and lidocaine and IcyHot patches.  Bowels are well controlled with senna S.    He did have trending up blood pressures during his stay with systolics in the upper 140s to 150s and sometimes into the 160s.  I did start him on hydrochlorothiazide initially at 12.5 mg with minor improvement and so he did increase to 25 mg daily last week.  He continues to show blood pressures with a systolic in the 150s and so likely need an additional blood pressure pill.  I did instruct him to follow-up with his primary next week for ongoing monitoring and treatment of hypertension.  BMP last week revealed a creatinine of 1.3.  I would recommend a repeat BMP if another medication is added.  Sodium is slightly low at 134 however stable from his admission.    We did  him on abstaining from alcohol once discharged.    Discharge Medications:   Current Outpatient Medications   Medication Sig Dispense Refill     acetaminophen (TYLENOL) 325 MG tablet Take 975 mg by mouth 3 (three) times a day.       calcium, as carbonate, (OS-MIRI) 500 mg calcium (1,250 mg) tablet Take 500 mg by mouth 3 (three) times a day with meals.       cholecalciferol, vitamin D3, 1,000 unit (25 mcg) tablet Take 1,000 Units by mouth daily.       gabapentin (NEURONTIN) 100 MG capsule Take 100 mg by mouth 3 (three) times a day.       hydroCHLOROthiazide (HYDRODIURIL) 12.5 MG tablet Take 25 mg by mouth daily.        ibuprofen (ADVIL,MOTRIN) 600 MG tablet Take 600 mg by mouth every 6 (six) hours as needed.       lidocaine 4 % patch Place 2 patches on the skin every  12 (twelve) hours.       menthol 5 % PtMd Apply 1 patch topically daily.       methocarbamoL (ROBAXIN) 500 MG tablet Take 500 mg by mouth 4 (four) times a day.       omeprazole (PRILOSEC) 10 MG capsule Take 10 mg by mouth daily before breakfast.       ondansetron (ZOFRAN-ODT) 4 MG disintegrating tablet Take 4 mg by mouth every 6 (six) hours as needed.       oxyCODONE (ROXICODONE) 5 MG immediate release tablet Take 1 tablet (5 mg total) by mouth every 4 (four) hours as needed. 60 tablet 0     polyethylene glycol (GLYCOLAX) 17 gram/dose powder Take 17 g by mouth daily.       senna-docusate (SENNA-S) 8.6-50 mg tablet Take 1 tablet by mouth daily as needed.        No current facility-administered medications for this visit.        For most current and accurate medication list, please contact the skilled nursing facility that this patient visit took place at.      Discharge Plan: Patient is stable to charge to home with follow-up with his PCP for his hypertension and questions about needing vitamin D.  He will also need possible tapering of medications once multiple myeloma plan is in place.  He will need to follow-up with oncology this week for biopsy results and plan development.    Review of Systems   Patient denies fever, chills, headache, lightheadedness, dizziness, rhinorrhea, cough, congestion, shortness of breath, chest pain, palpitations, abdominal pain, n/v, diarrhea, constipation, change in appetite, dysuria, frequency, burning or pain with urination.  Other than stated in HPI all other review of systems is negative.         Physical Exam   In order to maintain social distancing during the COVID pandemic, a visual exam was completed.     Vitals:    11/02/20 0945   BP: (!) 150/92   Pulse: 78   Resp: 16   Temp: 98.9  F (37.2  C)   SpO2: 100%        Patient is thin, elderly and no acute distress.  Head is AT/NC, EOM intact. Respiratory effort normal. No visible LE edema. Alert and oriented, face symmetric. No  visible skin issues or rashes. Mood euthymic      Labs:    Recent Results (from the past 240 hour(s))   HM2(CBC w/o Differential)   Result Value Ref Range    WBC 5.4 4.0 - 11.0 thou/uL    RBC 2.77 (L) 4.40 - 6.20 mill/uL    Hemoglobin 9.2 (L) 14.0 - 18.0 g/dL    Hematocrit 28.3 (L) 40.0 - 54.0 %     (H) 80 - 100 fL    MCH 33.2 27.0 - 34.0 pg    MCHC 32.5 32.0 - 36.0 g/dL    RDW 13.5 11.0 - 14.5 %    Platelets 412 140 - 440 thou/uL    MPV 9.9 8.5 - 12.5 fL   Basic Metabolic Panel   Result Value Ref Range    Sodium 134 (L) 136 - 145 mmol/L    Potassium 4.1 3.5 - 5.0 mmol/L    Chloride 108 (H) 98 - 107 mmol/L    CO2 21 (L) 22 - 31 mmol/L    Anion Gap, Calculation 5 5 - 18 mmol/L    Glucose 72 70 - 125 mg/dL    Calcium 8.3 (L) 8.5 - 10.5 mg/dL    BUN 25 8 - 28 mg/dL    Creatinine 1.31 (H) 0.70 - 1.30 mg/dL    GFR MDRD Af Amer >60 >60 mL/min/1.73m2    GFR MDRD Non Af Amer 54 (L) >60 mL/min/1.73m2     .    Assessment:  1. Pathologic compression fracture of spine, sequela     2. Multiple myeloma, presumed at this point     3. Chronic midline low back pain without sciatica     4. Essential hypertension     5. Uncomplicated alcohol dependence (H)         MEDICAL EQUIPMENT NEEDS:  Walker and wheelchair(f2f already completed)      DISCHARGE PLAN/FACE TO FACE:  I certify that services are/were furnished while this patient was under the care of a physician and that a physician or an allowed non-physician practitioner (NPP), had a face-to-face encounter that meets the physician face-to-face encounter requirements. The encounter was in whole, or in part, related to the primary reason for home health. The patient is confined to his/her home and needs intermittent skilled nursing, physical therapy, speech-language pathology, or the continued need for occupational therapy. A plan of care has been established by a physician and is periodically reviewed by a physician.    I certify that this patient is under my care and that I,  or a nurse practitioner or physician's assistant working with me, had a face-to-face encounter that meets the physician face-to-face encounter requirements with this patient.   Date of Face-to-Face Encounter: 11/2/2020    I certify that, based on my findings, the following services are medically necessary home health services: RN, PT/OT    My clinical findings support the need for the above skilled services because: RN for medication management teaching, PT/OT for ongoing endurance and strengthening.    This patient is homebound because: Patient requires maximum effort in order to get out into the community on a regular basis.    The patient is, or has been, under my care and I have initiated the establishment of the plan of care. This patient will be followed by a physician who will periodically review the plan of care.    35 total minutes spent with 20 minutes spent face-to-face with patient counseling coordination of his discharge plan.    Electronically signed by: Jessica Evangelista CNP

## 2021-06-12 NOTE — PROGRESS NOTES
Code Status:  FULL CODE  Visit Type: Follow-up (muscle spasms, anemia)     Facility:  [facility] [[id number]]        Facility Type: SNF (Skilled Nursing Facility, TCU)    History of Present Illness: Rogelio Marshall is a 75 y.o. male  with a past medical history for hypertension and hyperlipidemia.  He does have a history of previous SBO in 2010.  He was recently hospitalized at [facility] due to increased weakness, low back pain and inability to function and ADLs.  He has been recently worked up by his PCP with a CT of thoracic and lumbar spine on 9/18 showing extensive osteoporotic compression fractures and suggestion of possible myeloma.  He was then seen by a spine surgeon who stated he was not a good candidate for surgery due to his poor bone quality.  He continued to do further work-up until he was admitted to the hospital.       During his hospitalization he was consulted by hematology on 10/11 who discussed the new diagnosis of multiple myeloma.  He was started on Os-Baljinder 500 mg 3 times daily and was given pamidronate x1 during his hospital stay.  He then underwent a bone marrow biopsy biopsy and neurosurgery consult.  Neurosurgery did recommend a CT which was stable and recommended a brace for comfort.  However patient declined the brace.  Pain was a limiting factor in his care and he was seen by palliative medicine.  Pain became controlled with scheduled Tylenol 3 times daily, scheduled ibuprofen every 6 hours, lidocaine patches/menthol patches, scheduled gabapentin, scheduled Robaxin and PRN oxycodone.      He did have HALLIE showing his creatinine in July was 0.76 and elevated to 1.72 on 10/5 up to 2.19 on 10/9 and then came down to 1.05 on 10/12.  He has been taking frequent doses of scheduled ibuprofen for the past 3 months.     His pain is persistent however is well controlled with his current regimen.  He does have trending up blood pressures with systolics in the 150s to 160s.   He was started on  hydrochlorothiazide last week.    Review of Systems   Patient denies fever, chills, headache, lightheadedness, dizziness, rhinorrhea, cough, congestion, shortness of breath, chest pain, palpitations, abdominal pain, n/v, diarrhea, constipation, change in appetite, dysuria, frequency, burning or pain with urination.  Other than stated in HPI all other review of systems is negative.         Physical Exam   In order to maintain social distancing during the COVID pandemic, a visual exam was completed.     Vitals:    10/26/20 1430   BP: 163/89   Pulse: 65   Resp: 14   Temp: 98.5  F (36.9  C)   SpO2: 97%           Patient is well nourished and no acute distress.  Head is AT/NC, EOM intact. Respiratory effort normal. No visible LE edema. Alert and oriented, face symmetric. No visible skin issues or rashes. Mood euthymic      Labs:    Recent Results (from the past 240 hour(s))   Basic Metabolic Panel   Result Value Ref Range    Sodium 134 (L) 136 - 145 mmol/L    Potassium 4.0 3.5 - 5.0 mmol/L    Chloride 107 98 - 107 mmol/L    CO2 21 (L) 22 - 31 mmol/L    Anion Gap, Calculation 6 5 - 18 mmol/L    Glucose 83 70 - 125 mg/dL    Calcium 8.5 8.5 - 10.5 mg/dL    BUN 23 8 - 28 mg/dL    Creatinine 1.24 0.70 - 1.30 mg/dL    GFR MDRD Af Amer >60 >60 mL/min/1.73m2    GFR MDRD Non Af Amer 57 (L) >60 mL/min/1.73m2   HM2(CBC w/o Differential)   Result Value Ref Range    WBC 6.2 4.0 - 11.0 thou/uL    RBC 3.18 (L) 4.40 - 6.20 mill/uL    Hemoglobin 10.4 (L) 14.0 - 18.0 g/dL    Hematocrit 32.2 (L) 40.0 - 54.0 %     (H) 80 - 100 fL    MCH 32.7 27.0 - 34.0 pg    MCHC 32.3 32.0 - 36.0 g/dL    RDW 13.6 11.0 - 14.5 %    Platelets 456 (H) 140 - 440 thou/uL    MPV 9.5 8.5 - 12.5 fL   HM2(CBC w/o Differential)   Result Value Ref Range    WBC 5.4 4.0 - 11.0 thou/uL    RBC 2.77 (L) 4.40 - 6.20 mill/uL    Hemoglobin 9.2 (L) 14.0 - 18.0 g/dL    Hematocrit 28.3 (L) 40.0 - 54.0 %     (H) 80 - 100 fL    MCH 33.2 27.0 - 34.0 pg    MCHC 32.5  "32.0 - 36.0 g/dL    RDW 13.5 11.0 - 14.5 %    Platelets 412 140 - 440 thou/uL    MPV 9.9 8.5 - 12.5 fL         Assessment:  1. Essential hypertension     2. Pathologic compression fracture of spine, sequela     3. Multiple myeloma, presumed at this point     4. Weakness         Plan:   HTN: Blood pressures again trending up will increase hydrochlorothiazide to 25 mg daily.  Could consider a calcium channel blocker for vasodilation however at this time will stick to hydrochlorothiazide.  Check BMP on 10/29.    Compression fracture: Follow-up with neuro spine, continue pain management protocol with current medications.  Patient states he is using the lidocaine patches and the menthol patches.  Would like to scale back on these pain meds however at this time I do not think that is feasible.    Multiple myeloma: Awaiting oncology follow-up with biopsies.    Weakness: Patient is slotted to discharge to home on 11/2 with community support.  He does have [facility]  that is working on equipment.  Discussed with social work and nursing.      Patient has a mobility limitation that significantly impairs his ability to participate in mobility-related ADLS. Patient has weakness secondary to compressed spine and multiple myeloma (M48.50XG, M54.5, M80.08XD).  This mobility limitation cannot be sufficiently and safely resolved by use of a cane, walker or crutches, as patient cannot walk indepdently.  Patien tis fully capable to safely use a manual wheelchair and has been doing so since his admission to the TCU.  Patient requires 18\" x 16\" flori height wheelchair with full arm rests and foot rest.  He will also need a 18\"x 16\" cushion.  Patient would also greatly benefit from a wheelchair to assist in transportation to medical appointments.  Patient's functional mobility deficit can be sufficiently resolved by the use of a manual wheelchair.  Patient also requires a cushion. I certify that, based on my findings, a " hemiheight wheelchair is medically necessary for this patient.  Length of need is lifetime.     Electronically signed by: Jessica Evangelista CNP

## 2021-06-21 NOTE — LETTER
Letter by Jessica Evangelista CNP at      Author: Jessica Evangelista CNP Service: -- Author Type: --    Filed:  Encounter Date: 10/26/2020 Status: (Other)         Wolf Stevens MD  [address]  [place] [address]                                  October 26, 2020    Patient: Rogelio Marshall   MR Number: [id number]   YOB: 1945   Date of Visit: 10/26/2020     Dear Dr. Rodney MD:    Thank you for referring Rogelio Marshall to me for evaluation. Below are the relevant portions of my assessment and plan of care.    If you have questions, please do not hesitate to call me. I look forward to following Rogelio along with you.    Sincerely,        Jessica Evangelista CNP          [facility]  Jessica Evangelista CNP  10/26/2020  3:44 PM  Signed  Code Status:  FULL CODE  Visit Type: Follow-up (muscle spasms, anemia)     Facility:  [facility] [[id number]]        Facility Type: SNF (Skilled Nursing Facility, TCU)    History of Present Illness: Rogelio Marshall is a 75 y.o. male  with a past medical history for hypertension and hyperlipidemia.  He does have a history of previous SBO in 2010.  He was recently hospitalized at [facility] due to increased weakness, low back pain and inability to function and ADLs.  He has been recently worked up by his PCP with a CT of thoracic and lumbar spine on 9/18 showing extensive osteoporotic compression fractures and suggestion of possible myeloma.  He was then seen by a spine surgeon who stated he was not a good candidate for surgery due to his poor bone quality.  He continued to do further work-up until he was admitted to the hospital.       During his hospitalization he was consulted by hematology on 10/11 who discussed the new diagnosis of multiple myeloma.  He was started on Os-Baljinder 500 mg 3 times daily and was given pamidronate x1 during his hospital stay.  He then underwent a bone marrow biopsy biopsy and neurosurgery consult.  Neurosurgery did recommend  a CT which was stable and recommended a brace for comfort.  However patient declined the brace.  Pain was a limiting factor in his care and he was seen by palliative medicine.  Pain became controlled with scheduled Tylenol 3 times daily, scheduled ibuprofen every 6 hours, lidocaine patches/menthol patches, scheduled gabapentin, scheduled Robaxin and PRN oxycodone.      He did have HALLIE showing his creatinine in July was 0.76 and elevated to 1.72 on 10/5 up to 2.19 on 10/9 and then came down to 1.05 on 10/12.  He has been taking frequent doses of scheduled ibuprofen for the past 3 months.     His pain is persistent however is well controlled with his current regimen.  He does have trending up blood pressures with systolics in the 150s to 160s.   He was started on hydrochlorothiazide last week.    Review of Systems   Patient denies fever, chills, headache, lightheadedness, dizziness, rhinorrhea, cough, congestion, shortness of breath, chest pain, palpitations, abdominal pain, n/v, diarrhea, constipation, change in appetite, dysuria, frequency, burning or pain with urination.  Other than stated in HPI all other review of systems is negative.         Physical Exam   In order to maintain social distancing during the COVID pandemic, a visual exam was completed.     Vitals:    10/26/20 1430   BP: 163/89   Pulse: 65   Resp: 14   Temp: 98.5  F (36.9  C)   SpO2: 97%           Patient is well nourished and no acute distress.  Head is AT/NC, EOM intact. Respiratory effort normal. No visible LE edema. Alert and oriented, face symmetric. No visible skin issues or rashes. Mood euthymic      Labs:    Recent Results (from the past 240 hour(s))   Basic Metabolic Panel   Result Value Ref Range    Sodium 134 (L) 136 - 145 mmol/L    Potassium 4.0 3.5 - 5.0 mmol/L    Chloride 107 98 - 107 mmol/L    CO2 21 (L) 22 - 31 mmol/L    Anion Gap, Calculation 6 5 - 18 mmol/L    Glucose 83 70 - 125 mg/dL    Calcium 8.5 8.5 - 10.5 mg/dL    BUN 23 8 -  28 mg/dL    Creatinine 1.24 0.70 - 1.30 mg/dL    GFR MDRD Af Amer >60 >60 mL/min/1.73m2    GFR MDRD Non Af Amer 57 (L) >60 mL/min/1.73m2   HM2(CBC w/o Differential)   Result Value Ref Range    WBC 6.2 4.0 - 11.0 thou/uL    RBC 3.18 (L) 4.40 - 6.20 mill/uL    Hemoglobin 10.4 (L) 14.0 - 18.0 g/dL    Hematocrit 32.2 (L) 40.0 - 54.0 %     (H) 80 - 100 fL    MCH 32.7 27.0 - 34.0 pg    MCHC 32.3 32.0 - 36.0 g/dL    RDW 13.6 11.0 - 14.5 %    Platelets 456 (H) 140 - 440 thou/uL    MPV 9.5 8.5 - 12.5 fL   HM2(CBC w/o Differential)   Result Value Ref Range    WBC 5.4 4.0 - 11.0 thou/uL    RBC 2.77 (L) 4.40 - 6.20 mill/uL    Hemoglobin 9.2 (L) 14.0 - 18.0 g/dL    Hematocrit 28.3 (L) 40.0 - 54.0 %     (H) 80 - 100 fL    MCH 33.2 27.0 - 34.0 pg    MCHC 32.5 32.0 - 36.0 g/dL    RDW 13.5 11.0 - 14.5 %    Platelets 412 140 - 440 thou/uL    MPV 9.9 8.5 - 12.5 fL         Assessment:  1. Essential hypertension     2. Pathologic compression fracture of spine, sequela     3. Multiple myeloma, presumed at this point     4. Weakness         Plan:   HTN: Blood pressures again trending up will increase hydrochlorothiazide to 25 mg daily.  Could consider a calcium channel blocker for vasodilation however at this time will stick to hydrochlorothiazide.  Check BMP on 10/29.    Compression fracture: Follow-up with neuro spine, continue pain management protocol with current medications.  Patient states he is using the lidocaine patches and the menthol patches.  Would like to scale back on these pain meds however at this time I do not think that is feasible.    Multiple myeloma: Awaiting oncology follow-up with biopsies.    Weakness: Patient is slotted to discharge to home on 11/2 with community support.  He does have [facility]  that is working on equipment.  Discussed with social work and nursing.      Electronically signed by: Jessica Evangelista CNP

## 2021-06-21 NOTE — LETTER
Letter by Jessica Evangelista CNP at      Author: Jessica Evangelista CNP Service: -- Author Type: --    Filed:  Encounter Date: 10/19/2020 Status: (Other)         Patient: Rogelio Marshall   MR Number: [id number]   YOB: 1945   Date of Visit: 10/19/2020     Code Status:  FULL CODE  Visit Type: Problem Visit (pain, weakness, constipation, HALLIE )     Facility:  [facility] [[id number]]        Facility Type: SNF (Skilled Nursing Facility, TCU)    History of Present Illness: Rogelio Marshall is a 75 y.o. male with a past medical history for hypertension and hyperlipidemia.  He does have a history of previous SBO in 2010.  He was recently hospitalized at [facility] due to increased weakness, low back pain and inability to function and ADLs.  He has been recently worked up by his PCP with a CT of thoracic and lumbar spine on 9/18 showing extensive osteoporotic compression fractures and suggestion of possible myeloma.  He was then seen by a spine surgeon who stated he was not a good candidate for surgery due to his poor bone quality.  He continued to do further work-up until he was admitted to the hospital.       During his hospitalization he was consulted by hematology on 10/11 who discussed the new diagnosis of multiple myeloma.  He was started on Os-Baljinder 500 mg 3 times daily and was given pamidronate x1 during his hospital stay.  He then underwent a bone marrow biopsy biopsy and neurosurgery consult.  Neurosurgery did recommend a CT which was stable and recommended a brace for comfort.  However patient declined the brace.  Pain was a limiting factor in his care and he was seen by palliative medicine.  Pain became controlled with scheduled Tylenol 3 times daily, scheduled ibuprofen every 6 hours, lidocaine patches/menthol patches, scheduled gabapentin, scheduled Robaxin and PRN oxycodone.      He did have HALLIE showing his creatinine in July was 0.76 and elevated to 1.72 on 10/5 up to 2.19 on 10/9 and then came down to 1.05 on  10/12.  He has been taking frequent doses of scheduled ibuprofen for the past 3 months.     Today, he reports he had a good day with his pain yesterday and is rating his pain 3/10 today.  He reports that he is using less oxycodone and only using it in the morning and at night as needed.  On chart review he is only using his oxycodone about 1-2 times a day.  He does find relief after starting to use the IcyHot menthol patches yesterday.  He is able to participate in therapy however states he does not feel like he can safely manage at home.   is involved and he will be having a care conference tomorrow.    Constipation is well managed and he is requesting that the senna S to be changed to as needed and to continue with the MiraLAX daily.    Nursing concerned with elevated blood pressures however most his SBP's are in the 150s to low 160s.    Review of Systems   Patient denies fever, chills, headache, lightheadedness, dizziness, rhinorrhea, cough, congestion, shortness of breath, chest pain, palpitations, abdominal pain, n/v, diarrhea, constipation, change in appetite, dysuria, frequency, burning or pain with urination.  Other than stated in HPI all other review of systems is negative.         Physical Exam   In order to maintain social distancing during the COVID pandemic, a visual exam was completed.     Vitals:    10/19/20 1028   BP: 155/85   Pulse: 82   Resp: 16   Temp: 98.9  F (37.2  C)   SpO2: 95%        Patient is well nourished and no acute distress.  Head is AT/NC, EOM intact. Respiratory effort normal. No visible LE edema. Alert and oriented, face symmetric. No visible skin issues or rashes. Mood euthymic      Labs:  No results found for this or any previous visit (from the past 240 hour(s)).      Assessment:  1. Pathologic compression fracture of spine, sequela     2. Essential hypertension     3. Weakness     4. Constipation, unspecified constipation type         Plan:   Compression fracture:  pain slightly improved with more control.  Continue with scheduled APAP, Ibuprofen, gabapentin, robaxin and prn oxycodone.  Start on omeprazole for GI protection.  Encouraged patient to use the menthol patches as this seems to be helping him not to need Oxycodone as much.     HTN: start hydrochlorothiazide and monitor BMP and BPs.     Weakness: continue with therapies will need support at home.     Constipation: improved, change Senna S to prn.           Electronically signed by: Jessica Evangelista CNP

## 2021-06-21 NOTE — LETTER
Letter by Jessica Evangelista CNP at      Author: Jessica Evangelista CNP Service: -- Author Type: --    Filed:  Encounter Date: 11/2/2020 Status: (Other)         Wolf Stevens MD  [address] [facility] [address]  [facility] [address]                                  November 2, 2020    Patient: Rogelio Marshall   MR Number: [id number]   YOB: 1945   Date of Visit: 11/2/2020     Dear Dr. Rodney MD:    Thank you for referring Rogelio Marshall to me for evaluation. Below are the relevant portions of my assessment and plan of care.    If you have questions, please do not hesitate to call me. I look forward to following Rogelio along with you.    Sincerely,        Jessica Evangelista CNP          CC  No Recipients  Jessica Evangelista CNP  11/2/2020  2:06 PM  Signed  Code Status:  FULL CODE  Visit Type: Discharge Summary     Facility:  [facility] [[id number]]          PCP:  Wolf Stevens MD  [phone]       Admission Date to our Facility: 10/13/2020 discharge Date from our Facility: 11/3/2020    Discharge Diagnosis:    1. Pathologic compression fracture of spine, sequela     2. Multiple myeloma, presumed at this point     3. Chronic midline low back pain without sciatica     4. Essential hypertension     5. Uncomplicated alcohol dependence (H)          History of Present Illness: Rogelio Marshall is a 75 y.o. male with a past medical history for hypertension and hyperlipidemia.  He does have a history of previous SBO in 2010.  He was recently hospitalized at [facility] due to increased weakness, low back pain and inability to function and ADLs.  He has been recently worked up by his PCP with a CT of thoracic and lumbar spine on 9/18 showing extensive osteoporotic compression fractures and suggestion of possible myeloma.  He was then seen by a spine surgeon who stated he was not a good candidate for surgery due to his poor bone quality.  He continued to do further work-up until he was admitted to the hospital.       During his  hospitalization he was consulted by hematology on 10/11 who discussed the new diagnosis of multiple myeloma.  He was started on Os-Baljinder 500 mg 3 times daily and was given pamidronate x1 during his hospital stay.  He then underwent a bone marrow biopsy biopsy and neurosurgery consult.  Neurosurgery did recommend a CT which was stable and recommended a brace for comfort.  However patient declined the brace.  Pain was a limiting factor in his care and he was seen by palliative medicine.  Pain became controlled with scheduled Tylenol 3 times daily, scheduled ibuprofen every 6 hours, lidocaine patches/menthol patches, scheduled gabapentin, scheduled Robaxin and PRN oxycodone.      He did have HALLIE showing his creatinine in July was 0.76 and elevated to 1.72 on 10/5 up to 2.19 on 10/9 and then came down to 1.05 on 10/12.  He has been taking frequent doses of scheduled ibuprofen for the past 3 months.    Skilled Nursing Facility Course: During his stay he did become independent with a walker with therapy.  Pain was his limiting factor however pain is well controlled with as needed oxycodone in the morning, Profen scheduled, APAP scheduled, methocarbamol scheduled and lidocaine and IcyHot patches.  Bowels are well controlled with senna S.    He did have trending up blood pressures during his stay with systolics in the upper 140s to 150s and sometimes into the 160s.  I did start him on hydrochlorothiazide initially at 12.5 mg with minor improvement and so he did increase to 25 mg daily last week.  He continues to show blood pressures with a systolic in the 150s and so likely need an additional blood pressure pill.  I did instruct him to follow-up with his primary next week for ongoing monitoring and treatment of hypertension.  BMP last week revealed a creatinine of 1.3.  I would recommend a repeat BMP if another medication is added.  Sodium is slightly low at 134 however stable from his admission.    We did  him on  abstaining from alcohol once discharged.    Discharge Medications:   Current Outpatient Medications   Medication Sig Dispense Refill   ? acetaminophen (TYLENOL) 325 MG tablet Take 975 mg by mouth 3 (three) times a day.     ? calcium, as carbonate, (OS-MIRI) 500 mg calcium (1,250 mg) tablet Take 500 mg by mouth 3 (three) times a day with meals.     ? cholecalciferol, vitamin D3, 1,000 unit (25 mcg) tablet Take 1,000 Units by mouth daily.     ? gabapentin (NEURONTIN) 100 MG capsule Take 100 mg by mouth 3 (three) times a day.     ? hydroCHLOROthiazide (HYDRODIURIL) 12.5 MG tablet Take 25 mg by mouth daily.      ? ibuprofen (ADVIL,MOTRIN) 600 MG tablet Take 600 mg by mouth every 6 (six) hours as needed.     ? lidocaine 4 % patch Place 2 patches on the skin every 12 (twelve) hours.     ? menthol 5 % PtMd Apply 1 patch topically daily.     ? methocarbamoL (ROBAXIN) 500 MG tablet Take 500 mg by mouth 4 (four) times a day.     ? omeprazole (PRILOSEC) 10 MG capsule Take 10 mg by mouth daily before breakfast.     ? ondansetron (ZOFRAN-ODT) 4 MG disintegrating tablet Take 4 mg by mouth every 6 (six) hours as needed.     ? oxyCODONE (ROXICODONE) 5 MG immediate release tablet Take 1 tablet (5 mg total) by mouth every 4 (four) hours as needed. 60 tablet 0   ? polyethylene glycol (GLYCOLAX) 17 gram/dose powder Take 17 g by mouth daily.     ? senna-docusate (SENNA-S) 8.6-50 mg tablet Take 1 tablet by mouth daily as needed.        No current facility-administered medications for this visit.        For most current and accurate medication list, please contact the skilled nursing facility that this patient visit took place at.      Discharge Plan: Patient is stable to charge to home with follow-up with his PCP for his hypertension and questions about needing vitamin D.  He will also need possible tapering of medications once multiple myeloma plan is in place.  He will need to follow-up with oncology this week for biopsy results and  plan development.    Review of Systems   Patient denies fever, chills, headache, lightheadedness, dizziness, rhinorrhea, cough, congestion, shortness of breath, chest pain, palpitations, abdominal pain, n/v, diarrhea, constipation, change in appetite, dysuria, frequency, burning or pain with urination.  Other than stated in HPI all other review of systems is negative.         Physical Exam   In order to maintain social distancing during the COVID pandemic, a visual exam was completed.     Vitals:    11/02/20 0945   BP: (!) 150/92   Pulse: 78   Resp: 16   Temp: 98.9  F (37.2  C)   SpO2: 100%        Patient is thin, elderly and no acute distress.  Head is AT/NC, EOM intact. Respiratory effort normal. No visible LE edema. Alert and oriented, face symmetric. No visible skin issues or rashes. Mood euthymic      Labs:    Recent Results (from the past 240 hour(s))   HM2(CBC w/o Differential)   Result Value Ref Range    WBC 5.4 4.0 - 11.0 thou/uL    RBC 2.77 (L) 4.40 - 6.20 mill/uL    Hemoglobin 9.2 (L) 14.0 - 18.0 g/dL    Hematocrit 28.3 (L) 40.0 - 54.0 %     (H) 80 - 100 fL    MCH 33.2 27.0 - 34.0 pg    MCHC 32.5 32.0 - 36.0 g/dL    RDW 13.5 11.0 - 14.5 %    Platelets 412 140 - 440 thou/uL    MPV 9.9 8.5 - 12.5 fL   Basic Metabolic Panel   Result Value Ref Range    Sodium 134 (L) 136 - 145 mmol/L    Potassium 4.1 3.5 - 5.0 mmol/L    Chloride 108 (H) 98 - 107 mmol/L    CO2 21 (L) 22 - 31 mmol/L    Anion Gap, Calculation 5 5 - 18 mmol/L    Glucose 72 70 - 125 mg/dL    Calcium 8.3 (L) 8.5 - 10.5 mg/dL    BUN 25 8 - 28 mg/dL    Creatinine 1.31 (H) 0.70 - 1.30 mg/dL    GFR MDRD Af Amer >60 >60 mL/min/1.73m2    GFR MDRD Non Af Amer 54 (L) >60 mL/min/1.73m2     .    Assessment:  1. Pathologic compression fracture of spine, sequela     2. Multiple myeloma, presumed at this point     3. Chronic midline low back pain without sciatica     4. Essential hypertension     5. Uncomplicated alcohol dependence (H)         MEDICAL  EQUIPMENT NEEDS:  Walker and wheelchair(f2f already completed)      DISCHARGE PLAN/FACE TO FACE:  I certify that services are/were furnished while this patient was under the care of a physician and that a physician or an allowed non-physician practitioner (NPP), had a face-to-face encounter that meets the physician face-to-face encounter requirements. The encounter was in whole, or in part, related to the primary reason for home health. The patient is confined to his/her home and needs intermittent skilled nursing, physical therapy, speech-language pathology, or the continued need for occupational therapy. A plan of care has been established by a physician and is periodically reviewed by a physician.    I certify that this patient is under my care and that I, or a nurse practitioner or physician's assistant working with me, had a face-to-face encounter that meets the physician face-to-face encounter requirements with this patient.   Date of Face-to-Face Encounter: 11/2/2020    I certify that, based on my findings, the following services are medically necessary home health services: RN, PT/OT    My clinical findings support the need for the above skilled services because: RN for medication management teaching, PT/OT for ongoing endurance and strengthening.    This patient is homebound because: Patient requires maximum effort in order to get out into the community on a regular basis.    The patient is, or has been, under my care and I have initiated the establishment of the plan of care. This patient will be followed by a physician who will periodically review the plan of care.    35 total minutes spent with 20 minutes spent face-to-face with patient counseling coordination of his discharge plan.    Electronically signed by: Jessica Evangelista CNP

## 2021-06-21 NOTE — LETTER
Letter by Vel Qiu MD at      Author: Vel Qiu MD Service: -- Author Type: --    Filed:  Encounter Date: 10/23/2020 Status: (Other)         Patient: Rogelio Marshall   MR Number: [id number]   YOB: 1945   Date of Visit: 10/23/2020      [facility]    Facility:  [facility] [[id number]]    Code Status:  FULL CODE    Chief Complaint   Patient presents with   ? H & P   :                    Patient Active Problem List   Diagnosis   ? Chronic midline low back pain without sciatica   ? Difficulty walking   ? Hyperlipidemia   ? Hypertension   ? Pathologic compression fracture of spine, sequela   ? Small bowel obstruction, partial (H)   ? Multiple myeloma, presumed at this point       History:  Rogelio Marshall  is a 75 year old male with history of hypertension, hyperlipidemia, small bowel obstruction, and now probable multiple myeloma with multiple pathologic fractures of the spine seen for admission to TCU on 10/25/2020    Hospital Course: Patient was hospitalized from October 9 through October 13 presenting with severe back pain which she had been eating on his own at home for some time.  He was diagnosed with multiple pathological fractures T1, T5, T10, T12, L2, L3.  He was seen by spine surgeon who found him to be a poor candidate for surgery recommended bracing and medical management.  Patient declined bracing at that time.    Patient was seen by hematology with suspicion for multiple myeloma as a new diagnosis with plans for bone marrow biopsy and bone biopsy at a later time.    Patient's hospitalization been turned into a pain control exercise with Tylenol ibuprofen (PPI added for GI protection) lidocaine patch, menthol patch, gabapentin, Robaxin, oxycodone.  He was also placed on stool softeners and Zofran and discharged to the TCU.    Subjective/ROS:    -augmented by discussion with facility staff involved in direct care      Patient reports  that he is been up walking with a walker.  He is spending a lot of time.  How he is going to manage at home and making some plans.  He says that his back feels fine as long as he is in bed.  He has a limited amount of time to be up before the aching in the back becomes severe enough that he has to stop.  He denies numbness weakness tingling into the legs or saddle numbness.  He reports control over his bowel and bladder.  He reports that his pain control is certainly much better than it was when he presented to the hospital and overall he satisfied.    Patient has many appropriate questions about his presumed diagnosis and treatment.  He does not have an oncology appointment and tell November.  He did go in for a biopsy today however and says he tolerated that well.    Patient uses significant alcohol on a daily basis but reports that he has never had physical social legal issues associated with that.  He says he has not missed the alcohol and seemed open to the suggestion that he was drinking more than is compatible with good health and should likely stay off alcohol completely as he is going to be on pain program in all likelihood.    Patient denies headaches change in vision speaking swallowing or hearing he says he is not having cough shortness of breath no fever sweats or chills no dysuria melena bright red blood per rectum.  Remainder negative    Past Medical History:   Diagnosis Date   ? Acute kidney failure (H)    ? Chronic midline low back pain without sciatica 10/09/2020   ? Difficulty walking 10/09/2020   ? Hyperlipidemia 10/15/2020   ? Hypertension 10/15/2020   ? Multiple myeloma (H)    ? Pathologic compression fracture of spine, sequela 10/09/2020   ? Small bowel obstruction, partial (H) 07/10/2010     Past Surgical History:   Procedure Laterality Date   ? INGUINAL HERNIA REPAIR     ? ORIF CLAVICLE FRACTURE Left           Family History   Problem Relation Age of Onset   ? Coronary artery disease Father          MI   ? Hypertension Sister    ? Obesity Sister    :       Social History     Socioeconomic History   ? Marital status: Single     Spouse name: Not on file   ? Number of children: Not on file   ? Years of education: Not on file   ? Highest education level: Not on file   Occupational History   ? Not on file   Social Needs   ? Financial resource strain: Not on file   ? Food insecurity     Worry: Not on file     Inability: Not on file   ? Transportation needs     Medical: Not on file     Non-medical: Not on file   Tobacco Use   ? Smoking status: Former Smoker     Packs/day: 0.10     Years: 30.00     Pack years: 3.00     Types: Cigarettes   ? Smokeless tobacco: Never Used   Substance and Sexual Activity   ? Alcohol use: Yes     Alcohol/week: 21.0 standard drinks     Types: 21 Standard drinks or equivalent per week   ? Drug use: Not on file   ? Sexual activity: Not on file   Lifestyle   ? Physical activity     Days per week: Not on file     Minutes per session: Not on file   ? Stress: Not on file   Relationships   ? Social connections     Talks on phone: Not on file     Gets together: Not on file     Attends Holiness service: Not on file     Active member of club or organization: Not on file     Attends meetings of clubs or organizations: Not on file     Relationship status: Not on file   ? Intimate partner violence     Fear of current or ex partner: Not on file     Emotionally abused: Not on file     Physically abused: Not on file     Forced sexual activity: Not on file   Other Topics Concern   ? Not on file   Social History Narrative   ? Not on file   :    Lives alone in [facility]    Current Outpatient Medications on File Prior to Visit   Medication Sig Dispense Refill   ? acetaminophen (TYLENOL) 325 MG tablet Take 975 mg by mouth 3 (three) times a day.     ? calcium, as carbonate, (OS-MIRI) 500 mg calcium (1,250 mg) tablet Take 500 mg by mouth 3 (three) times a day with meals.     ? gabapentin  (NEURONTIN) 100 MG capsule Take 100 mg by mouth 3 (three) times a day.     ? ibuprofen (ADVIL,MOTRIN) 600 MG tablet Take 600 mg by mouth every 6 (six) hours as needed.     ? lidocaine 4 % patch Place 2 patches on the skin every 12 (twelve) hours.     ? menthol 5 % PtMd Apply 1 patch topically daily.     ? methocarbamoL (ROBAXIN) 500 MG tablet Take 500 mg by mouth 4 (four) times a day.     ? omeprazole (PRILOSEC) 10 MG capsule Take 10 mg by mouth daily before breakfast.     ? ondansetron (ZOFRAN-ODT) 4 MG disintegrating tablet Take 4 mg by mouth every 6 (six) hours as needed.     ? oxyCODONE (ROXICODONE) 5 MG immediate release tablet Take 1 tablet (5 mg total) by mouth every 4 (four) hours as needed. 60 tablet 0   ? polyethylene glycol (GLYCOLAX) 17 gram/dose powder Take 17 g by mouth daily.     ? senna-docusate (SENNA-S) 8.6-50 mg tablet Take 1 tablet by mouth daily as needed.        No current facility-administered medications on file prior to visit.    :      ALLERGIES:  Other allergy (see comments)    Vitals: Blood pressure 134/80 respirations 18 temperature 98.2 heart rate 84 O2 sats 94% on room air weight is 134 pounds  Physical exam:  Patient is lying in bed looking comfortable and stating the same.  Normocephalic atraumatic sclera clear gaze is conjugate EOMI oropharynx is clear neck is supple good range of motion of his neck he has range of motion of his shoulders and distal joints in his arms and hands.  He demonstrates purposeful movement of hands and legs.  He is breathing comfortably with clear lungs no accessory muscle use or tachypnea his heart is regular S1-S2 without murmur gallop or rub abdomen is soft.  Sensation is intact to the toe tips.      Due to the 2020 Covid 19 pandemic, except as noted above, the patient was visually observed at a 6 foot plus distance.  An observational exam was performed in an effort to keep patient safe from Covid 19 and other communicable diseases.   Labs:  Lab Results    Component Value Date    WBC 6.2 10/20/2020    HGB 10.4 (L) 10/20/2020    HCT 32.2 (L) 10/20/2020     (H) 10/20/2020     (H) 10/20/2020     Results for orders placed or performed in visit on 10/20/20   Basic Metabolic Panel   Result Value Ref Range    Sodium 134 (L) 136 - 145 mmol/L    Potassium 4.0 3.5 - 5.0 mmol/L    Chloride 107 98 - 107 mmol/L    CO2 21 (L) 22 - 31 mmol/L    Anion Gap, Calculation 6 5 - 18 mmol/L    Glucose 83 70 - 125 mg/dL    Calcium 8.5 8.5 - 10.5 mg/dL    BUN 23 8 - 28 mg/dL    Creatinine 1.24 0.70 - 1.30 mg/dL    GFR MDRD Af Amer >60 >60 mL/min/1.73m2    GFR MDRD Non Af Amer 57 (L) >60 mL/min/1.73m2         No results found for: TSH  No results found for: HGBA1C  [unfilled]  No results found for: TCPCQPVT83  No results found for: BNP  [unfilled]        Invalid input(s): PRINTERVAL    Results for orders placed or performed during the hospital encounter of 10/09/20   CT Lumbar Spine w/o Contrast   Narrative   Thoracic and Lumbar spine CT without contrast    History: Diffuse back pain and previous finding concerning for  multiple myeloma, neurosurgery evaluating for changes.    Comparison: CT thoracic and lumbar spine dated 9/18/2020    Technique: Axial, coronal, and sagittal multiplanar reconstructions  obtained from acquisition of thoracic and lumbar spine CT scan .    Findings:  Thoracic spine:   Diffuse osteolytic appearance of the spine and bilateral ribs. The  dextrosclerosis centered at lower thoracic spine. No significant  change in dehiscence of T5 anterior cortex. Unchanged greater than 90%  height loss in T10 vertebral body with vertebra plana appearance.  Compression deformity of T12 upper endplate and T1 anterior vertebral  body, also unchanged.    There is no acute fracture or subluxation.     There is no prevertebral mass or edema. There is no high-grade spinal  canal or foraminal stenosis at any level. No soft tissue abnormality  in the visualized paraspinous  tissues anteriorly. Overall no  significant change from 9/18/2020.    Lumbar Spine:  Again noted extensive osteolytic appearance of lumbar sacral spine and  pelvic bones.   Coronal levoscoliosis centered at mid lumbar spine. Unchanged grade 1  anterolisthesis of L5 on S1. There is no acute fracture or  subluxation. There are 5 type lumbar vertebra, coming down from T1.     Multilevel decompression deformity most prominent at L2 and L3 with  approximately 30% and 80% vertebral body height loss, overall not  significantly changed from prior.     Multilevel degenerative changes. On a level by level basis;     L1-L2: Superior L2 compression deformity. Bilateral facet hypertrophy  and ligamentum flavum thickening. Mild to moderate spinal canal  stenosis. Mild bilateral neural foraminal narrowing.    L2-L3: Severe L3 compression deformity. Bilateral facet hypertrophy  and ligamentum flavum thickening. Mild spinal canal narrowing. Mild  bilateral neural foraminal narrowing.    L3-L4: Posterior disc bulge. Bilateral facet hypertrophy and  ligamentum flavum thickening. Mild spinal canal narrowing. Mild to  moderate bilateral neural foraminal stenosis.    L4-L5: Posterior disc bulge, bilateral facet hypertrophy, and  ligamentum flavum thickening. Mild spinal canal narrowing. Moderate  right and mild left neural foraminal stenosis.    L5-S1: Circumferential disc osteophyte complex and bilateral facet  hypertrophy. Mild spinal canal narrowing. Severe bilateral neural  foraminal stenosis.    The visualized adjacent paraspinous tissues are grossly within normal  limits.  Degenerative changes of the lumbar spine including osteophytic  spurring and endplate irregularity and sclerosis changes.     Impression   Impression:   1. Since 9/18/2020, there is no significant change in diffuse  osteolytic appearance of spine, pelvis and ribs, suspicious for  multiple myeloma. No significant change in compression deformities in  the thoracic  and lumbar spine. No new fracture.  2. Lumbar spondylosis most prominent at L5-S1 with severe bilateral  neuroforaminal stenosis.    I have personally reviewed the examination and initial interpretation  and I agree with the findings.    LIBRA NEUMANN MD   CT Thoracic Spine w/o Contrast   Narrative   Thoracic and Lumbar spine CT without contrast    History: Diffuse back pain and previous finding concerning for  multiple myeloma, neurosurgery evaluating for changes.    Comparison: CT thoracic and lumbar spine dated 9/18/2020    Technique: Axial, coronal, and sagittal multiplanar reconstructions  obtained from acquisition of thoracic and lumbar spine CT scan .    Findings:  Thoracic spine:   Diffuse osteolytic appearance of the spine and bilateral ribs. The  dextrosclerosis centered at lower thoracic spine. No significant  change in dehiscence of T5 anterior cortex. Unchanged greater than 90%  height loss in T10 vertebral body with vertebra plana appearance.  Compression deformity of T12 upper endplate and T1 anterior vertebral  body, also unchanged.    There is no acute fracture or subluxation.     There is no prevertebral mass or edema. There is no high-grade spinal  canal or foraminal stenosis at any level. No soft tissue abnormality  in the visualized paraspinous tissues anteriorly. Overall no  significant change from 9/18/2020.    Lumbar Spine:  Again noted extensive osteolytic appearance of lumbar sacral spine and  pelvic bones.   Coronal levoscoliosis centered at mid lumbar spine. Unchanged grade 1  anterolisthesis of L5 on S1. There is no acute fracture or  subluxation. There are 5 type lumbar vertebra, coming down from T1.     Multilevel decompression deformity most prominent at L2 and L3 with  approximately 30% and 80% vertebral body height loss, overall not  significantly changed from prior.     Multilevel degenerative changes. On a level by level basis;     L1-L2: Superior L2 compression deformity.  Bilateral facet hypertrophy  and ligamentum flavum thickening. Mild to moderate spinal canal  stenosis. Mild bilateral neural foraminal narrowing.    L2-L3: Severe L3 compression deformity. Bilateral facet hypertrophy  and ligamentum flavum thickening. Mild spinal canal narrowing. Mild  bilateral neural foraminal narrowing.    L3-L4: Posterior disc bulge. Bilateral facet hypertrophy and  ligamentum flavum thickening. Mild spinal canal narrowing. Mild to  moderate bilateral neural foraminal stenosis.    L4-L5: Posterior disc bulge, bilateral facet hypertrophy, and  ligamentum flavum thickening. Mild spinal canal narrowing. Moderate  right and mild left neural foraminal stenosis.    L5-S1: Circumferential disc osteophyte complex and bilateral facet  hypertrophy. Mild spinal canal narrowing. Severe bilateral neural  foraminal stenosis.    The visualized adjacent paraspinous tissues are grossly within normal  limits.  Degenerative changes of the lumbar spine including osteophytic  spurring and endplate irregularity and sclerosis changes.     Impression   Impression:   1. Since 9/18/2020, there is no significant change in diffuse  osteolytic appearance of spine, pelvis and ribs, suspicious for  multiple myeloma. No significant change in compression deformities in  the thoracic and lumbar spine. No new fracture.  2. Lumbar spondylosis most prominent at L5-S1 with severe bilateral  neuroforaminal stenosis.    I have personally reviewed the examination and initial interpretation  and I agree with the findings.    LIBRA NEUMANN MD     Assessment/Plan:      ICD-10-CM    1. Pathologic compression fracture of spine, sequela  M48.50XS    2. Multiple myeloma, presumed at this point  C90.00        Pathologic compression fractures of multiple vertebral bodies  Presumed multiple myeloma awaiting tissue diagnosis   Patient does not really have an explanation for why he declined TLSO.  I discussed the possible benefits with him  including the possibility that he could be up safely and/or for longer periods of time.  He says he might reconsider at some point.  -Recommend follow-up with spine surgery/TLSO bracing assuming patient becomes willing  -Patient needs oncology input ASAP, awaiting tissue diagnosis  -Patient biopsy today, presumably bone marrow biopsy although they were planning I think a straightahead bone biopsy as well.  He is unsure and I do not have documentation of that visit at this time.  -PT, OT, .  Without bracing I am not sure how safe it is for him to be up and what he is actually able to do/what his restrictions are per neurosurgery  -Patient is generally satisfied with his polypharmaceutical pain control plan.  Tylenol, ibuprofen, lidocaine patch, menthol patch, gabapentin, Robaxin, oxycodone.  Hopefully we can proceed with gradual dose reduction once his treatment plan is in place and hopefully with decreasing pain as a result.    NSAID use  GI prophylaxis   Patient is on omeprazole and I just wanted to call out that it is not due to primary GI symptoms but rather has been started concurrent with his NSAIDs which may be long-term in the face of multiple myeloma.    CKD #2   Based on his 10/20/2020 labs with creatinine 1.24 GFR estimated 57    Alcohol use   A concern about excessive alcohol use.  I recommend alcohol cessation.  Patient says he is capable of that on his own and states an understanding of the reasoning.    Hypertension   On hydrochlorothiazide 12.5 mg now.  Blood pressure remains mildly elevated.  We have to be very cognizant of his renal function with his diagnosis, NSAID use.  Recommend rechecking periodically.  Potassium will need to be monitored as well.  Consider magnesium monitoring as well.    Constipation   Patient reports satisfaction with his current bowel program    Hyperlipidemia   By history not on therapy, outpatient follow-up recommended          Case discussed  with:    Facility staff         Vel Qiu MD

## 2021-06-21 NOTE — LETTER
Letter by Jessica Evangelista CNP at      Author: Jessica Evangelista CNP Service: -- Author Type: --    Filed:  Encounter Date: 10/15/2020 Status: (Other)         Patient: Rogelio Marshall   MR Number: [id number]   YOB: 1945   Date of Visit: 10/15/2020     Code Status:  FULL CODE  Visit Type: Hospital Visit Follow Up (chornic back pain, multiple myeloma with pathological spine fracture, elevated creatinine, constipation and malnutrition)     Facility:  [facility] [[id number]]      Facility Type: SNF (Skilled Nursing Facility, TCU)    History of Present Illness:   Hospital Admission Date: 10/9/20  Hospital Discharge Date: 10/13/20  Facility Admission Date: 10/13/20 from [facility]     Rogelio Marshall is a 75 y.o. male with a past medical history for hypertension and hyperlipidemia.  He does have a history of previous SBO in 2010.  He was recently hospitalized at [facility] due to increased weakness, low back pain and inability to function and ADLs.  He has been recently worked up by his PCP with a CT of thoracic and lumbar spine on 9/18 showing extensive osteoporotic compression fractures and suggestion of possible myeloma.  He was then seen by a spine surgeon who stated he was not a good candidate for surgery due to his poor bone quality.  He continued to do further work-up until he was admitted to the hospital.      During his hospitalization he was consulted by hematology on 10/11 who discussed the new diagnosis of multiple myeloma.  He was started on Os-Baljinder 500 mg 3 times daily and was given pamidronate x1 during his hospital stay.  He then underwent a bone marrow biopsy biopsy and neurosurgery consult.  Neurosurgery did recommend a CT which was stable and recommended a brace for comfort.  However patient declined the brace.  Pain was a limiting factor in his care and he was seen by palliative medicine.  Pain became controlled with scheduled Tylenol 3 times daily, scheduled ibuprofen every 6 hours, lidocaine  patches/menthol patches, scheduled gabapentin, scheduled Robaxin and PRN oxycodone.     He did have HALLIE showing his creatinine in July was 0.76 and elevated to 1.72 on 10/5 up to 2.19 on 10/9 and then came down to 1.05 on 10/12.  He has been taking frequent doses of scheduled ibuprofen for the past 3 months.    Today, he reports that his pain is constant however worsens with increased mobility like ambulation.  He says oxycodone is the only medication that does improve his pain with activity.       He does complain of constipation however is getting daily MiraLAX which does seem to help.  He does have pain due to needing to bear down to have bowel movements.      Past Medical History:   Diagnosis Date   ? Acute kidney failure (H)    ? Chronic midline low back pain without sciatica 10/09/2020   ? Difficulty walking 10/09/2020   ? Hyperlipidemia 10/15/2020   ? Hypertension 10/15/2020   ? Multiple myeloma (H)    ? Pathologic compression fracture of spine, sequela 10/09/2020   ? Small bowel obstruction, partial (H) 07/10/2010     Past Surgical History:   Procedure Laterality Date   ? INGUINAL HERNIA REPAIR     ? ORIF CLAVICLE FRACTURE Left      Family History   Problem Relation Age of Onset   ? Coronary artery disease Father         MI   ? Hypertension Sister    ? Obesity Sister      Social History     Socioeconomic History   ? Marital status: Single     Spouse name: Not on file   ? Number of children: Not on file   ? Years of education: Not on file   ? Highest education level: Not on file   Occupational History   ? Not on file   Social Needs   ? Financial resource strain: Not on file   ? Food insecurity     Worry: Not on file     Inability: Not on file   ? Transportation needs     Medical: Not on file     Non-medical: Not on file   Tobacco Use   ? Smoking status: Former Smoker     Packs/day: 0.10     Years: 30.00     Pack years: 3.00     Types: Cigarettes   ? Smokeless tobacco: Never Used   Substance and Sexual  Activity   ? Alcohol use: Yes     Alcohol/week: 21.0 standard drinks     Types: 21 Standard drinks or equivalent per week   ? Drug use: Not on file   ? Sexual activity: Not on file   Lifestyle   ? Physical activity     Days per week: Not on file     Minutes per session: Not on file   ? Stress: Not on file   Relationships   ? Social connections     Talks on phone: Not on file     Gets together: Not on file     Attends [employer] service: Not on file     Active member of club or organization: Not on file     Attends meetings of clubs or organizations: Not on file     Relationship status: Not on file   ? Intimate partner violence     Fear of current or ex partner: Not on file     Emotionally abused: Not on file     Physically abused: Not on file     Forced sexual activity: Not on file   Other Topics Concern   ? Not on file   Social History Narrative   ? Not on file       Current Outpatient Medications   Medication Sig Dispense Refill   ? acetaminophen (TYLENOL) 325 MG tablet Take 975 mg by mouth 3 (three) times a day.     ? calcium, as carbonate, (OS-MIRI) 500 mg calcium (1,250 mg) tablet Take 500 mg by mouth 3 (three) times a day with meals.     ? gabapentin (NEURONTIN) 100 MG capsule Take 100 mg by mouth 3 (three) times a day.     ? ibuprofen (ADVIL,MOTRIN) 600 MG tablet Take 600 mg by mouth every 6 (six) hours as needed.     ? lidocaine 4 % patch Place 2 patches on the skin every 12 (twelve) hours.     ? menthol 5 % PtMd Apply 1 patch topically daily.     ? methocarbamoL (ROBAXIN) 500 MG tablet Take 500 mg by mouth 4 (four) times a day.     ? ondansetron (ZOFRAN-ODT) 4 MG disintegrating tablet Take 4 mg by mouth every 6 (six) hours as needed.     ? oxyCODONE (ROXICODONE) 5 MG immediate release tablet Take 5 mg by mouth every 4 (four) hours as needed.     ? polyethylene glycol (GLYCOLAX) 17 gram/dose powder Take 17 g by mouth daily.     ? senna-docusate (SENNA-S) 8.6-50 mg tablet Take 1 tablet by mouth daily.       No  current facility-administered medications for this visit.      Allergies   Allergen Reactions   ? Other Allergy (See Comments)      tobasco sauce ---caused red spots        There is no immunization history on file for this patient.    Post Discharge Medication Reconciliation Status: discharge medications reconciled and changed, per note/orders    Review of Systems   Patient denies fever, chills, headache, lightheadedness, dizziness, rhinorrhea, cough, congestion, shortness of breath, chest pain, palpitations, abdominal pain, n/v, diarrhea, dysuria, frequency, burning or pain with urination.  Other than stated in HPI all other review of systems is negative.       Physical Exam   Vitals:    10/15/20 1026   BP: 141/75   Pulse: 89   Resp: 16   Temp: 98.7  F (37.1  C)   SpO2: 95%       GENERAL APPEARANCE: Thin elderly male, in no acute distress.  HEENT: normocephalic, atraumatic  PERRL, sclerae anicteric, conjunctivae clear and moist, EOM intact  LUNGS: Lung sounds CTA, no adventitious sounds, respiratory effort normal.  CARD: RRR, S1, S2, without murmurs, gallops, rubs  ABD: Soft, distended, soft nontender to palpation.  Hyperactive bowel sounds with normal bowel sounds.   MSK: Muscle strength and tone were equal bilaterally  EXTREMITIES: No cyanosis, clubbing or edema.  Good CMS to lower extremities, positive pedal pulses bilaterally.  NEURO: Alert and oriented x 3.  Face is symmetric.  SKIN: Inspection of the skin reveals no rashes, ulcerations or petechiae.  PSYCH: euthymic        Labs:    Recent Results (from the past 240 hour(s))   COVID-19 VIRUS PCR MRF    Specimen: Nasopharyngeal   Result Value Ref Range    COVID-19 VIRUS SPECIMEN SOURCE Nasopharyngeal     2019-nCOV       Test received-See reflex to IDDL test SARS CoV2 (COVID-19) Virus RT-PCR   SARS-COV-2 (COVID-19) RT-PCR-IDDL    Specimen: Nasopharyngeal   Result Value Ref Range    SARS-CoV-2 Virus Specimen Source Nasopharyngeal     SARS-CoV-2 PCR Result  NEGATIVE     SARS-COV-2 PCR COMMENT       Testing was performed using the Aptima SARS-CoV-2 Assay on the [employer] Instrument System.   Additional information about this Emergency Use Authorization (EUA) assay can be found via   the Lab Guide.         Assessment:  1. Chronic midline low back pain without sciatica     2. Pathologic compression fracture of spine, sequela     3. Multiple myeloma not having achieved remission (H)     4. Essential hypertension     5. Hyperlipidemia, unspecified hyperlipidemia type     6. Loss of appetite     7. Drug-induced constipation         Plan:   Low back pain: Chronic secondary to multiple myeloma and pathological fractures.   He was frustrated with his pain management to over nights as he felt his oxycodone was to be scheduled.  I did explain to him that he has scheduled multiple medications to decrease the threshold of pain however oxycodone should be used for anticipated pain with therapy or for severe pain.  He reports understanding.  Will change to enteric-coated ibuprofen as not to cause any stomach issues.  Could consider starting on a PPI if enteric-coated ibuprofen is not available.  Continue gabapentin, Robaxin, ibuprofen, Tylenol, lidocaine patches, and PRN oxycodone.    Compression fracture: Continue with therapies does not tolerate TLSO.  Pain management.    Multiple myeloma: Follow-up with hematology on 11/4.  Counseled patient on pathology of multiple myeloma with bone manifestations.    Hypertension: Well-controlled without any antihypertensives.    Loss of appetite: Patient states that current nutritional supplements are causing him intestinal gas.  We will have dietitian meet with him to discuss nutritional supplements and increasing food options.    Drug-induced constipation: Counseled patient on the effects of opiates causing constipation.  Also counseled on constipation increasing potential for low spine pain.  Counseled on the need to keep stool soft as he does  not then need to bear down.  Continue with MiraLAX daily and add senna S daily counseled on effects and side effects.    40 total minutes spent with 25 minutes spent face-to-face with patient counseling coordination of the above plan of care.    Electronically signed by: Jessica Evangelista CNP

## 2021-06-28 ENCOUNTER — INFUSION THERAPY VISIT (OUTPATIENT)
Dept: ONCOLOGY | Facility: CLINIC | Age: 76
End: 2021-06-28
Attending: STUDENT IN AN ORGANIZED HEALTH CARE EDUCATION/TRAINING PROGRAM
Payer: COMMERCIAL

## 2021-06-28 VITALS
DIASTOLIC BLOOD PRESSURE: 81 MMHG | OXYGEN SATURATION: 97 % | RESPIRATION RATE: 17 BRPM | BODY MASS INDEX: 24.22 KG/M2 | WEIGHT: 128.2 LBS | TEMPERATURE: 98.2 F | SYSTOLIC BLOOD PRESSURE: 133 MMHG | HEART RATE: 78 BPM

## 2021-06-28 DIAGNOSIS — C90.00 MULTIPLE MYELOMA, REMISSION STATUS UNSPECIFIED (H): Primary | ICD-10-CM

## 2021-06-28 LAB
ALBUMIN SERPL-MCNC: 3.6 G/DL (ref 3.4–5)
ALP SERPL-CCNC: 115 U/L (ref 40–150)
ALT SERPL W P-5'-P-CCNC: 22 U/L (ref 0–70)
ANION GAP SERPL CALCULATED.3IONS-SCNC: 6 MMOL/L (ref 3–14)
AST SERPL W P-5'-P-CCNC: 15 U/L (ref 0–45)
BASOPHILS # BLD AUTO: 0 10E9/L (ref 0–0.2)
BASOPHILS NFR BLD AUTO: 0.5 %
BILIRUB SERPL-MCNC: 0.3 MG/DL (ref 0.2–1.3)
BUN SERPL-MCNC: 22 MG/DL (ref 7–30)
CALCIUM SERPL-MCNC: 8.6 MG/DL (ref 8.5–10.1)
CHLORIDE SERPL-SCNC: 113 MMOL/L (ref 94–109)
CO2 SERPL-SCNC: 25 MMOL/L (ref 20–32)
CREAT SERPL-MCNC: 1.05 MG/DL (ref 0.66–1.25)
DIFFERENTIAL METHOD BLD: ABNORMAL
EOSINOPHIL # BLD AUTO: 0.6 10E9/L (ref 0–0.7)
EOSINOPHIL NFR BLD AUTO: 7.5 %
ERYTHROCYTE [DISTWIDTH] IN BLOOD BY AUTOMATED COUNT: 12.1 % (ref 10–15)
GFR SERPL CREATININE-BSD FRML MDRD: 69 ML/MIN/{1.73_M2}
GLUCOSE SERPL-MCNC: 103 MG/DL (ref 70–99)
HCT VFR BLD AUTO: 37.5 % (ref 40–53)
HGB BLD-MCNC: 12.3 G/DL (ref 13.3–17.7)
IMM GRANULOCYTES # BLD: 0 10E9/L (ref 0–0.4)
IMM GRANULOCYTES NFR BLD: 0.1 %
KAPPA LC UR-MCNC: 0.8 MG/DL (ref 0.33–1.94)
KAPPA LC/LAMBDA SER: 2.86 {RATIO} (ref 0.26–1.65)
LAMBDA LC SERPL-MCNC: 0.28 MG/DL (ref 0.57–2.63)
LYMPHOCYTES # BLD AUTO: 1.3 10E9/L (ref 0.8–5.3)
LYMPHOCYTES NFR BLD AUTO: 17.9 %
MCH RBC QN AUTO: 33.6 PG (ref 26.5–33)
MCHC RBC AUTO-ENTMCNC: 32.8 G/DL (ref 31.5–36.5)
MCV RBC AUTO: 103 FL (ref 78–100)
MONOCYTES # BLD AUTO: 0.6 10E9/L (ref 0–1.3)
MONOCYTES NFR BLD AUTO: 8.1 %
NEUTROPHILS # BLD AUTO: 4.8 10E9/L (ref 1.6–8.3)
NEUTROPHILS NFR BLD AUTO: 65.9 %
NRBC # BLD AUTO: 0 10*3/UL
NRBC BLD AUTO-RTO: 0 /100
PLATELET # BLD AUTO: 341 10E9/L (ref 150–450)
POTASSIUM SERPL-SCNC: 3.9 MMOL/L (ref 3.4–5.3)
PROT SERPL-MCNC: 6.3 G/DL (ref 6.8–8.8)
RBC # BLD AUTO: 3.66 10E12/L (ref 4.4–5.9)
SODIUM SERPL-SCNC: 144 MMOL/L (ref 133–144)
WBC # BLD AUTO: 7.3 10E9/L (ref 4–11)

## 2021-06-28 PROCEDURE — 84165 PROTEIN E-PHORESIS SERUM: CPT | Mod: 26 | Performed by: PATHOLOGY

## 2021-06-28 PROCEDURE — 85025 COMPLETE CBC W/AUTO DIFF WBC: CPT | Performed by: STUDENT IN AN ORGANIZED HEALTH CARE EDUCATION/TRAINING PROGRAM

## 2021-06-28 PROCEDURE — 83883 ASSAY NEPHELOMETRY NOT SPEC: CPT | Performed by: STUDENT IN AN ORGANIZED HEALTH CARE EDUCATION/TRAINING PROGRAM

## 2021-06-28 PROCEDURE — 80053 COMPREHEN METABOLIC PANEL: CPT | Performed by: STUDENT IN AN ORGANIZED HEALTH CARE EDUCATION/TRAINING PROGRAM

## 2021-06-28 PROCEDURE — 250N000011 HC RX IP 250 OP 636: Performed by: STUDENT IN AN ORGANIZED HEALTH CARE EDUCATION/TRAINING PROGRAM

## 2021-06-28 PROCEDURE — 96413 CHEMO IV INFUSION 1 HR: CPT

## 2021-06-28 PROCEDURE — 250N000012 HC RX MED GY IP 250 OP 636 PS 637: Performed by: STUDENT IN AN ORGANIZED HEALTH CARE EDUCATION/TRAINING PROGRAM

## 2021-06-28 PROCEDURE — 84165 PROTEIN E-PHORESIS SERUM: CPT | Mod: TC | Performed by: STUDENT IN AN ORGANIZED HEALTH CARE EDUCATION/TRAINING PROGRAM

## 2021-06-28 PROCEDURE — 999N001036 HC STATISTIC TOTAL PROTEIN: Performed by: STUDENT IN AN ORGANIZED HEALTH CARE EDUCATION/TRAINING PROGRAM

## 2021-06-28 PROCEDURE — 250N000013 HC RX MED GY IP 250 OP 250 PS 637: Performed by: STUDENT IN AN ORGANIZED HEALTH CARE EDUCATION/TRAINING PROGRAM

## 2021-06-28 PROCEDURE — 36415 COLL VENOUS BLD VENIPUNCTURE: CPT

## 2021-06-28 RX ORDER — DIPHENHYDRAMINE HCL 25 MG
50 CAPSULE ORAL ONCE
Status: COMPLETED | OUTPATIENT
Start: 2021-06-28 | End: 2021-06-28

## 2021-06-28 RX ORDER — DEXAMETHASONE 4 MG/1
20 TABLET ORAL ONCE
Status: COMPLETED | OUTPATIENT
Start: 2021-06-28 | End: 2021-06-28

## 2021-06-28 RX ORDER — ACETAMINOPHEN 325 MG/1
650 TABLET ORAL ONCE
Status: COMPLETED | OUTPATIENT
Start: 2021-06-28 | End: 2021-06-28

## 2021-06-28 RX ADMIN — DIPHENHYDRAMINE HYDROCHLORIDE 50 MG: 25 CAPSULE ORAL at 08:50

## 2021-06-28 RX ADMIN — ACETAMINOPHEN 650 MG: 325 TABLET ORAL at 08:50

## 2021-06-28 RX ADMIN — DEXAMETHASONE 20 MG: 4 TABLET ORAL at 08:50

## 2021-06-28 RX ADMIN — DARATUMUMAB AND HYALURONIDASE-FIHJ (HUMAN RECOMBINANT) 1800 MG: 1800; 30000 INJECTION SUBCUTANEOUS at 09:54

## 2021-06-28 ASSESSMENT — PAIN SCALES - GENERAL: PAINLEVEL: NO PAIN (0)

## 2021-06-28 NOTE — NURSING NOTE
Chief Complaint   Patient presents with     Blood Draw     vpt blood draw, vitals taken, checked into next appointment.     Venipuncture labs drawn from left arm.    Jacquie Doherty MA

## 2021-06-28 NOTE — PATIENT INSTRUCTIONS
[facility] Main Line: [phone]    Call triage nurse with chills and/or temperature greater than or equal to 100.4, uncontrolled nausea/vomiting, diarrhea, constipation, dizziness, shortness of breath, chest pain, bleeding, unexplained bruising, or any new/concerning symptoms, questions/concerns.   If you are having any concerning symptoms or wish to speak to a provider before your next infusion visit, please call your care coordinator or triage to notify them so we can adequately serve you.   Nurse Triage line:  [phone]    If after hours, weekends, or holidays, call main hospital  and ask for Oncology doctor on call @ [phone]      June 2021 Sunday Monday Tuesday Wednesday Thursday Friday Saturday             1    LAB PERIPHERAL   7:30 AM   (15 min.)   [facility] DRAW   [facility]    ONC INFUSION 6 HR (360 MIN)   8:00 AM   (360 min.)    ONC INFUSION NURSE   [facility] 2     3     4     5       6     7     8     9     10     11     12       13     14     15     16     17     18     19       20     21     22     23    TELEPHONE VISIT RETURN   9:30 AM   (45 min.)   Marianna Gunter PA-C   [facility] 24    TELEPHONE VISIT RETURN  12:45 PM   (45 min.)   Marianna Gunter PA-C   [facility] 25     26       27     28    LAB PERIPHERAL   7:30 AM   (15 min.)   [facility] DRAW   [facility]    ONC INFUSION 6 HR (360 MIN)   8:00 AM   (360 min.)    ONC INFUSION NURSE   [facility] 29 30 July 2021 Sunday Monday Tuesday Wednesday Thursday Friday Saturday                       1     2     3       4     5     6     7     8     9     10       11     12     13     14     15     16     17       18     19     20     21     22    RETURN  10:55 AM   (40 min.)   Cat Tate,  MD   [facility] 23     24       25     26    LAB PERIPHERAL   7:30 AM   (15 min.)   [facility] LAB DRAW   [facility]    ONC INFUSION 6 HR (360 MIN)   8:00 AM   (360 min.)    ONC INFUSION NURSE   [facility] 27     28     29     30     31                     Lab Results:  Recent Results (from the past 12 hour(s))   CBC with platelets differential    Collection Time: 06/28/21  7:49 AM   Result Value Ref Range    WBC 7.3 4.0 - 11.0 10e9/L    RBC Count 3.66 (L) 4.4 - 5.9 10e12/L    Hemoglobin 12.3 (L) 13.3 - 17.7 g/dL    Hematocrit 37.5 (L) 40.0 - 53.0 %     (H) 78 - 100 fl    MCH 33.6 (H) 26.5 - 33.0 pg    MCHC 32.8 31.5 - 36.5 g/dL    RDW 12.1 10.0 - 15.0 %    Platelet Count 341 150 - 450 10e9/L    Diff Method Automated Method     % Neutrophils 65.9 %    % Lymphocytes 17.9 %    % Monocytes 8.1 %    % Eosinophils 7.5 %    % Basophils 0.5 %    % Immature Granulocytes 0.1 %    Nucleated RBCs 0 0 /100    Absolute Neutrophil 4.8 1.6 - 8.3 10e9/L    Absolute Lymphocytes 1.3 0.8 - 5.3 10e9/L    Absolute Monocytes 0.6 0.0 - 1.3 10e9/L    Absolute Eosinophils 0.6 0.0 - 0.7 10e9/L    Absolute Basophils 0.0 0.0 - 0.2 10e9/L    Abs Immature Granulocytes 0.0 0 - 0.4 10e9/L    Absolute Nucleated RBC 0.0    Comprehensive metabolic panel    Collection Time: 06/28/21  7:49 AM   Result Value Ref Range    Sodium 144 133 - 144 mmol/L    Potassium 3.9 3.4 - 5.3 mmol/L    Chloride 113 (H) 94 - 109 mmol/L    Carbon Dioxide 25 20 - 32 mmol/L    Anion Gap 6 3 - 14 mmol/L    Glucose 103 (H) 70 - 99 mg/dL    Urea Nitrogen 22 7 - 30 mg/dL    Creatinine 1.05 0.66 - 1.25 mg/dL    GFR Estimate 69 >60 mL/min/[1.73_m2]    GFR Estimate If Black 80 >60 mL/min/[1.73_m2]    Calcium 8.6 8.5 - 10.1 mg/dL    Bilirubin Total 0.3 0.2 - 1.3 mg/dL    Albumin 3.6 3.4 - 5.0 g/dL    Protein Total 6.3 (L) 6.8 - 8.8 g/dL    Alkaline Phosphatase 115 40 - 150 U/L    ALT 22 0  - 70 U/L    AST 15 0 - 45 U/L

## 2021-06-28 NOTE — PROGRESS NOTES
Infusion Nursing Note:  Rogelio Marshall presents today for Darzalex Faspro D1 Maintenance.   Patient seen by provider today: No   present during visit today: Not Applicable.    Note: Patient reported to clinic today with no new complaints or concerns. Patient missed 2 provider appts last week. Dr Sweeney paged for plan.    TORB: 06/28/21 0805 Dr Sweeney/Marquise Brenner RN   -Ok to treat if meets parameters and is feeling well.  -Does not need another provider appt schedule with follow up with Dr Sweeney in August.    Intravenous Access:  Labs drawn without difficulty.    Treatment Conditions:  Lab Results   Component Value Date    HGB 12.3 06/28/2021     Lab Results   Component Value Date    WBC 7.3 06/28/2021      Lab Results   Component Value Date    ANEU 4.8 06/28/2021     Lab Results   Component Value Date     06/28/2021      Lab Results   Component Value Date     06/28/2021                   Lab Results   Component Value Date    POTASSIUM 3.9 06/28/2021           Lab Results   Component Value Date    MAG 2.1 12/02/2020            Lab Results   Component Value Date    CR 1.05 06/28/2021                   Lab Results   Component Value Date    MIRI 8.6 06/28/2021                Lab Results   Component Value Date    BILITOTAL 0.3 06/28/2021           Lab Results   Component Value Date    ALBUMIN 3.6 06/28/2021                    Lab Results   Component Value Date    ALT 22 06/28/2021           Lab Results   Component Value Date    AST 15 06/28/2021       Results reviewed, labs MET treatment parameters, ok to proceed with treatment.      Post Infusion Assessment:  Patient tolerated injection without incident.   One injection of Darzalex Faspro given in left side abdomen subcutaneous over 5 minutes    Discharge Plan:   Patient declined prescription refills.  Discharge instructions reviewed with: Patient.  Patient and/or family verbalized understanding of discharge instructions and all questions  answered.  Copy of AVS reviewed with patient and/or family.  Patient will return 7/26/21 for next appointment.  Patient discharged in stable condition accompanied by: self.  Departure Mode: Ambulatory.  Face to Face time: 5 minutes.      Marquise Brenner RN

## 2021-06-29 LAB
ALBUMIN SERPL ELPH-MCNC: 4.2 G/DL (ref 3.7–5.1)
ALPHA1 GLOB SERPL ELPH-MCNC: 0.3 G/DL (ref 0.2–0.4)
ALPHA2 GLOB SERPL ELPH-MCNC: 0.7 G/DL (ref 0.5–0.9)
B-GLOBULIN SERPL ELPH-MCNC: 0.7 G/DL (ref 0.6–1)
GAMMA GLOB SERPL ELPH-MCNC: 0.4 G/DL (ref 0.7–1.6)
M PROTEIN SERPL ELPH-MCNC: 0.1 G/DL
PROT PATTERN SERPL ELPH-IMP: ABNORMAL

## 2021-07-02 ENCOUNTER — TELEPHONE (OUTPATIENT)
Dept: INTERNAL MEDICINE | Facility: CLINIC | Age: 76
End: 2021-07-02

## 2021-07-02 ENCOUNTER — MEDICAL CORRESPONDENCE (OUTPATIENT)
Dept: HEALTH INFORMATION MANAGEMENT | Facility: CLINIC | Age: 76
End: 2021-07-02

## 2021-07-02 NOTE — TELEPHONE ENCOUNTER
Called [facility] and left a secure VM.  Gave verbal orders per Dr. Stevens for Order(s): Home Care Orders: Skilled Nursin time a week for 9 weeks and 3 as needed visits for medication setup and general assessment      Garrett Diaz CMA (AAMA) at 1:27 PM on 2021

## 2021-07-02 NOTE — TELEPHONE ENCOUNTER
[facility] Call Center    Phone Message    May a detailed message be left on voicemail: yes     Reason for Call: Order(s): Home Care Orders: Skilled Nursin time a week for 9 weeks and 3 as needed visits for medication setup and general assessment    Action Taken: Message routed to:  [facility] ([facility]): hans

## 2021-07-14 DIAGNOSIS — Z53.9 DIAGNOSIS NOT YET DEFINED: Primary | ICD-10-CM

## 2021-07-21 NOTE — PROGRESS NOTES
"Palliative Care Outpatient Clinic Progress Note    Patient Name:  Rogelio Marshall  Primary Provider:  Wolf Stevens    Chief Complaint/Patient ID: Follow-up   75-year-old man with multiple myeloma   -Started on treatment with VRD 11/2020, switched to daratumumab/velcade/dex 12/2020 due to poor tolerability. Stopped velcade for neuropathy.   Pain - pathologic rib fractures    Last Palliative Care Appointment: 5/24/21 - taper off methocarbamol, increase gabapentin to 300 mg BID    Interim History:  Rogelio Marshall 76 year old male returns for palliative care appointment today.     Has had positive response to chemo, continues on daratumumab/dex.     Says overall his pain has improved, no longer needing to take Tylenol for his back pain.  Taking methocarbamol a few times a day, but does not think he is having muscle spasm and is unsure why.  Neuropathy is most bothersome in his hands and feet, but feels it is gradually getting better since Velcade was stopped.  Still complains of having burning and tingling in his hands and feet throughout the day.  Increasing gabapentin to 300 twice a day helped.     Asks about several supplements.    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     Lives alone, describes himself as a \"[place]\"  Worked as a printer and deliveryman.  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    Advance Directive Status: Has not completed.  At previous conversation he said he would want his brother Dutch howard to make medical decisions as he be next of kin.  They have not been in contact in years.      Allergies   Allergen Reactions     Other Drug Allergy (See Comments)      tobasco sauce ---caused red spots      Current Outpatient Medications   Medication Sig Dispense Refill     acetaminophen (TYLENOL) 325 MG tablet Take " 3 tablets (975 mg) by mouth 3 times daily 500 tablet 4     acyclovir (ZOVIRAX) 400 MG tablet Take 1 tablet (400 mg) by mouth 2 times daily Viral Prophylaxis. 60 tablet 11     aspirin (ASA) 325 MG tablet Take 1 tablet (325 mg) by mouth daily 90 tablet 4     calcium carbonate 500 mg, elemental, (OSCAL) 500 MG tablet Take 1 tablet (500 mg) by mouth 2 times daily 200 tablet 3     cholecalciferol (VITAMIN D3) 25 mcg (1000 units) capsule Take 1 capsule (25 mcg) by mouth daily 100 capsule 3     dexamethasone (DECADRON) 4 MG tablet Take 20 mg (five tabs) by mouth on Day 2 35 tablet 0     gabapentin (NEURONTIN) 300 MG capsule Take 1 capsule (300 mg) by mouth 2 times daily 60 capsule 3     hydrochlorothiazide (MICROZIDE) 12.5 MG capsule Take 1 capsule (12.5 mg) by mouth daily (Patient not taking: Reported on 3/15/2021) 90 capsule 3     lisinopril (ZESTRIL) 10 MG tablet Take 1 tablet (10 mg) by mouth daily (Patient not taking: Reported on 5/24/2021) 30 tablet 2     LORazepam (ATIVAN) 0.5 MG tablet Take 1 tablet (0.5 mg) by mouth every 4 hours as needed (Anxiety, Nausea/Vomiting or Sleep) 30 tablet 5     methocarbamol (ROBAXIN) 500 MG tablet Take 1 tablet (500 mg) by mouth 2 times daily 60 tablet 5     mirtazapine (REMERON) 15 MG tablet Take 1 tablet (15 mg) by mouth At Bedtime 90 tablet 1     omeprazole (PRILOSEC) 20 MG DR capsule Take 1 capsule (20 mg) by mouth daily 30 capsule 3     ondansetron (ZOFRAN-ODT) 4 MG ODT tab Take 1 tablet (4 mg) by mouth every 6 hours as needed for nausea or vomiting (Patient not taking: Reported on 12/18/2020)       oxyCODONE (ROXICODONE) 5 MG tablet Take 1 tablet (5 mg) by mouth every 12 hours as needed for severe pain (Patient not taking: Reported on 6/28/2021) 30 tablet 0     polyethylene glycol (MIRALAX) 17 g packet Take 17 g by mouth daily (Patient not taking: Reported on 6/28/2021)       prochlorperazine (COMPAZINE) 10 MG tablet Take 1 tablet (10 mg) by mouth every 6 hours as needed  "(Nausea/Vomiting) (Patient not taking: Reported on 3/25/2021) 30 tablet 5     senna-docusate (SENOKOT-S/PERICOLACE) 8.6-50 MG tablet Take 1 tablet by mouth daily as needed        triamcinolone (KENALOG) 0.1 % external ointment Apply topically 2 times daily To back rash (Patient not taking: Reported on 12/18/2020) 15 g 0     VITAMIN D3 25 MCG (1000 UT) tablet          Palliative Symptom Review (0=no symptom/no concern, 1=mild, 2=moderate, 3=severe):      Pain: 1      Fatigue: 2      Nausea: 0      Constipation: 0      Diarrhea: 0      Depressive Symptoms: 0      Anxiety: 0      Drowsiness: 0      Poor Appetite: 1      Shortness of Breath: 0      Insomnia: 0      Other: 0      Overall (0 good/no concerns, 3 very poor):  0    BP (!) 151/90 (BP Location: Right arm, Patient Position: Chair, Cuff Size: Adult Regular)   Pulse 80   Temp 98  F (36.7  C)   Resp 16   Ht 1.549 m (5' 1\")   Wt 59 kg (130 lb)   SpO2 97%   BMI 24.56 kg/m      Constitutional: Sitting up in chair, comfortable-appearing, in no distress   Cardiovascular: Normal rate, regular   Respiratory:  Normal respiratory effort and breath sounds.   GI:  Soft  Musculoskeletal: No lower extremity edema.  Kyphotic with scoliosis deviation in spine, no tenderness over spine or paraspinal muscles  Neuro: Conversational, intact short term and long term memory  Psych: Alert, appropriately interactive, full affect and mood-congruent, clear sensorium.   Skin: Warm, no rash      Wt Readings from Last 10 Encounters:   07/22/21 59 kg (130 lb)   06/28/21 58.2 kg (128 lb 3.2 oz)   06/01/21 56.9 kg (125 lb 6.4 oz)   05/03/21 55.9 kg (123 lb 3.2 oz)   04/07/21 57.2 kg (126 lb 1.6 oz)   04/05/21 57.3 kg (126 lb 6.4 oz)   03/22/21 59.1 kg (130 lb 4.8 oz)   03/18/21 58.7 kg (129 lb 6.4 oz)   03/15/21 60.3 kg (133 lb)   03/01/21 62.6 kg (137 lb 14.4 oz)       Key Data Reviewed:  LABS:   Recent Labs   Lab Test 06/28/21  0749 06/01/21  0811    143   POTASSIUM 3.9 3.7 "   CHLORIDE 113* 110*   CO2 25 27   ANIONGAP 6 6   * 101*   BUN 22 19   CR 1.05 0.95   MIRI 8.6 9.3     GFR 77   Albumin 3.6  ALP, ALT, AST normal  Hb 12.3    IMAGING:   No new imaging    [place]  reviewed and fills consistent with history.     Impression & Recommendations & Counseling:  Rogelio Marshall is a 76-year-old man with multiple myeloma on daratumumab maintenance with response on labs and improved pain, seen for palliative care follow-up.  Continues to have fatigue, neuropathic pain, have been downtitrating multiple medications due to polypharmacy likely contributing to fatigue.     Back Pain - in spine, due to myeloma, pathologic fractures, and preexisting degenerative disease.    -Continue tylenol 325 mg TID PRN  - Recommend stopping methocarbamol  -Discussed exercise to preserve mobility, offered cancer PT, would prefer to do home exercises.  Given information for TXCOM exercises    Neuropathy - due to velcade, now off.  Discussed expected time course and unknown degree of possible improvement.  Offered to increase gabapentin to 300 mg 3 times a day or 300/600, he does not feel is bothersome enough to take another pill.    Appetite improved and weight is stable -continue mirtazapine.     Polypharmacy - asks about supplements -taking intermittent vitamin K2, glucosamine/chondroitin, selenium, magnesium and B6.  Counseled on the role of calcium and vitamin D for bone health and I think is important for him to continue, but I do not see a need for the others.  Encouraged to eat a varied diet with sources of protein, fruits and vegetables.    Elevated BP -says is checked weekly at home by a home nurse and is usually less than 130/80.  His not taking his hydrochlorothiazide or lisinopril.  Encouraged to continue to monitor and if blood pressure over 130/90 to restart medications.    Multiple Myeloma - currently on treatment.  Knows cancer is incurable, but does not have a sense of what to expect  with treatments.  Tolerating well with improved pain, function since starting.   -  Advanced Care Planning - not in touch with family, no close friends.  Does not know who he'd trust to make medical decisions, says would be alright with having an appointed guardian if he could not make decisions.  Have introduced health care directive.    Follow-up in 3 months    46 minutes spent including reviewing record, review of above studies, above visit with patient, and documentation.     Cat Tate MD  Palliative Medicine  Pager [phone]       (This note was transcribed using voice recognition software. While I review and edit the transcription, I may miss errors, and the software sometimes does unexpected capitalizations and formatting that I miss. Please let me know of any serious mistranscriptions and I will addend this note.)

## 2021-07-22 ENCOUNTER — OFFICE VISIT (OUTPATIENT)
Dept: PALLIATIVE CARE | Facility: CLINIC | Age: 76
End: 2021-07-22
Attending: INTERNAL MEDICINE
Payer: COMMERCIAL

## 2021-07-22 VITALS
OXYGEN SATURATION: 97 % | DIASTOLIC BLOOD PRESSURE: 90 MMHG | WEIGHT: 130 LBS | RESPIRATION RATE: 16 BRPM | TEMPERATURE: 98 F | HEART RATE: 80 BPM | SYSTOLIC BLOOD PRESSURE: 151 MMHG | HEIGHT: 61 IN | BODY MASS INDEX: 24.55 KG/M2

## 2021-07-22 DIAGNOSIS — G62.0 DRUG-INDUCED PERIPHERAL NEUROPATHY (H): ICD-10-CM

## 2021-07-22 DIAGNOSIS — G89.29 CHRONIC MIDLINE LOW BACK PAIN WITHOUT SCIATICA: Primary | ICD-10-CM

## 2021-07-22 DIAGNOSIS — Z78.9 TAKES DIETARY SUPPLEMENTS: ICD-10-CM

## 2021-07-22 DIAGNOSIS — C90.00 MULTIPLE MYELOMA, REMISSION STATUS UNSPECIFIED (H): ICD-10-CM

## 2021-07-22 DIAGNOSIS — Z79.899 POLYPHARMACY: ICD-10-CM

## 2021-07-22 DIAGNOSIS — M54.50 CHRONIC MIDLINE LOW BACK PAIN WITHOUT SCIATICA: Primary | ICD-10-CM

## 2021-07-22 PROCEDURE — G0463 HOSPITAL OUTPT CLINIC VISIT: HCPCS

## 2021-07-22 PROCEDURE — 99215 OFFICE O/P EST HI 40 MIN: CPT | Performed by: INTERNAL MEDICINE

## 2021-07-22 ASSESSMENT — MIFFLIN-ST. JEOR: SCORE: 1193.06

## 2021-07-22 NOTE — NURSING NOTE
"Oncology Rooming Note    July 22, 2021 11:25 AM   Rogelio Marshall is a 76 year old male who presents for:    Chief Complaint   Patient presents with     Oncology Clinic Visit     [facility] RETURN - MULTIPLE MYELOMA     Initial Vitals: BP (!) 151/90 (BP Location: Right arm, Patient Position: Chair, Cuff Size: Adult Regular)   Pulse 80   Temp 98  F (36.7  C)   Resp 16   Ht 1.549 m (5' 1\")   Wt 59 kg (130 lb)   SpO2 97%   BMI 24.56 kg/m   Estimated body mass index is 24.56 kg/m  as calculated from the following:    Height as of this encounter: 1.549 m (5' 1\").    Weight as of this encounter: 59 kg (130 lb). Body surface area is 1.59 meters squared.  Data Unavailable Comment: Data Unavailable   No LMP for male patient.  Allergies reviewed: Yes  Medications reviewed: Yes    Medications: Medication refills not needed today.  Pharmacy name entered into EPIC:    [facility] [place], [place] - [id number]  WRITTEN PRESCRIPTION REQUESTED  [facility] [id number] [facility] [employer] - [place], [place] - [address] [facility] - [place], [place] - [address]    Clinical concerns: No new concerns. Escue was notified.      aCm Houston LPN            "

## 2021-07-22 NOTE — PATIENT INSTRUCTIONS
Thank you for seeing the Palliative Care Clinic today.      1. Stop methocarbamol.      2. Calcium and Vitamin D are the supplements that would help your bone health, but other supplements are probably not helping.     Sample Exercises - start on page 39:  https://healthysd.gov/wp-content/uploads/2015/04/go4life-exercise-guide.pdf    Return to clinic in 3 months for a follow-up.      You can reach the Palliative Care Team during business hours at the following numbers:   -For the [facility], call [phone]  -To reach the palliative RN for questions or refills, call [phone]       To reach the Palliative Care Provider on-call After-hours or on holidays and weekends, call: [phone].  Please note that we are not able to provide pain medication refills on evenings or weekends.

## 2021-07-22 NOTE — LETTER
"7/22/2021        RE: Rogelio Marshall  [address]  [facility] [address]     Dear Colleague,    [facility] you for referring your patient, Rogelio Marshall[facility]. Please see a copy of my visit note below.    Palliative Care Outpatient Clinic Progress Note    Patient Name:  Rogelio Marshall  Primary Provider:  Wolf Stevens    Chief Complaint/Patient ID: Follow-up   75-year-old man with multiple myeloma   -Started on treatment with VRD 11/2020, switched to daratumumab/velcade/dex 12/2020 due to poor tolerability. Stopped velcade for neuropathy.   Pain - pathologic rib fractures    Last Palliative Care Appointment: 5/24/21 - taper off methocarbamol, increase gabapentin to 300 mg BID    Interim History:  Rogelio Marshall 76 year old male returns for palliative care appointment today.     Has had positive response to chemo, continues on daratumumab/dex.     Says overall his pain has improved, no longer needing to take Tylenol for his back pain.  Taking methocarbamol a few times a day, but does not think he is having muscle spasm and is unsure why.  Neuropathy is most bothersome in his hands and feet, but feels it is gradually getting better since Velcade was stopped.  Still complains of having burning and tingling in his hands and feet throughout the day.  Increasing gabapentin to 300 twice a day helped.     Asks about several supplements.    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     Lives alone, describes himself as a \"[place]\"  Worked as a printer and deliveryman.  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    Advance Directive Status: Has not completed.  At previous conversation he said he would want his brother Dutch back to " make medical decisions as he be next of kin.  They have not been in contact in years.      Allergies   Allergen Reactions     Other Drug Allergy (See Comments)      ernesto castillo ---caused red spots      Current Outpatient Medications   Medication Sig Dispense Refill     acetaminophen (TYLENOL) 325 MG tablet Take 3 tablets (975 mg) by mouth 3 times daily 500 tablet 4     acyclovir (ZOVIRAX) 400 MG tablet Take 1 tablet (400 mg) by mouth 2 times daily Viral Prophylaxis. 60 tablet 11     aspirin (ASA) 325 MG tablet Take 1 tablet (325 mg) by mouth daily 90 tablet 4     calcium carbonate 500 mg, elemental, (OSCAL) 500 MG tablet Take 1 tablet (500 mg) by mouth 2 times daily 200 tablet 3     cholecalciferol (VITAMIN D3) 25 mcg (1000 units) capsule Take 1 capsule (25 mcg) by mouth daily 100 capsule 3     dexamethasone (DECADRON) 4 MG tablet Take 20 mg (five tabs) by mouth on Day 2 35 tablet 0     gabapentin (NEURONTIN) 300 MG capsule Take 1 capsule (300 mg) by mouth 2 times daily 60 capsule 3     hydrochlorothiazide (MICROZIDE) 12.5 MG capsule Take 1 capsule (12.5 mg) by mouth daily (Patient not taking: Reported on 3/15/2021) 90 capsule 3     lisinopril (ZESTRIL) 10 MG tablet Take 1 tablet (10 mg) by mouth daily (Patient not taking: Reported on 5/24/2021) 30 tablet 2     LORazepam (ATIVAN) 0.5 MG tablet Take 1 tablet (0.5 mg) by mouth every 4 hours as needed (Anxiety, Nausea/Vomiting or Sleep) 30 tablet 5     methocarbamol (ROBAXIN) 500 MG tablet Take 1 tablet (500 mg) by mouth 2 times daily 60 tablet 5     mirtazapine (REMERON) 15 MG tablet Take 1 tablet (15 mg) by mouth At Bedtime 90 tablet 1     omeprazole (PRILOSEC) 20 MG DR capsule Take 1 capsule (20 mg) by mouth daily 30 capsule 3     ondansetron (ZOFRAN-ODT) 4 MG ODT tab Take 1 tablet (4 mg) by mouth every 6 hours as needed for nausea or vomiting (Patient not taking: Reported on 12/18/2020)       oxyCODONE (ROXICODONE) 5 MG tablet Take 1 tablet (5 mg) by mouth every  "12 hours as needed for severe pain (Patient not taking: Reported on 6/28/2021) 30 tablet 0     polyethylene glycol (MIRALAX) 17 g packet Take 17 g by mouth daily (Patient not taking: Reported on 6/28/2021)       prochlorperazine (COMPAZINE) 10 MG tablet Take 1 tablet (10 mg) by mouth every 6 hours as needed (Nausea/Vomiting) (Patient not taking: Reported on 3/25/2021) 30 tablet 5     senna-docusate (SENOKOT-S/PERICOLACE) 8.6-50 MG tablet Take 1 tablet by mouth daily as needed        triamcinolone (KENALOG) 0.1 % external ointment Apply topically 2 times daily To back rash (Patient not taking: Reported on 12/18/2020) 15 g 0     VITAMIN D3 25 MCG (1000 UT) tablet          Palliative Symptom Review (0=no symptom/no concern, 1=mild, 2=moderate, 3=severe):      Pain: 1      Fatigue: 2      Nausea: 0      Constipation: 0      Diarrhea: 0      Depressive Symptoms: 0      Anxiety: 0      Drowsiness: 0      Poor Appetite: 1      Shortness of Breath: 0      Insomnia: 0      Other: 0      Overall (0 good/no concerns, 3 very poor):  0    BP (!) 151/90 (BP Location: Right arm, Patient Position: Chair, Cuff Size: Adult Regular)   Pulse 80   Temp 98  F (36.7  C)   Resp 16   Ht 1.549 m (5' 1\")   Wt 59 kg (130 lb)   SpO2 97%   BMI 24.56 kg/m      Constitutional: Sitting up in chair, comfortable-appearing, in no distress   Cardiovascular: Normal rate, regular   Respiratory:  Normal respiratory effort and breath sounds.   GI:  Soft  Musculoskeletal: No lower extremity edema.  Kyphotic with scoliosis deviation in spine, no tenderness over spine or paraspinal muscles  Neuro: Conversational, intact short term and long term memory  Psych: Alert, appropriately interactive, full affect and mood-congruent, clear sensorium.   Skin: Warm, no rash      Wt Readings from Last 10 Encounters:   07/22/21 59 kg (130 lb)   06/28/21 58.2 kg (128 lb 3.2 oz)   06/01/21 56.9 kg (125 lb 6.4 oz)   05/03/21 55.9 kg (123 lb 3.2 oz)   04/07/21 57.2 kg " (126 lb 1.6 oz)   04/05/21 57.3 kg (126 lb 6.4 oz)   03/22/21 59.1 kg (130 lb 4.8 oz)   03/18/21 58.7 kg (129 lb 6.4 oz)   03/15/21 60.3 kg (133 lb)   03/01/21 62.6 kg (137 lb 14.4 oz)       Key Data Reviewed:  LABS:   Recent Labs   Lab Test 06/28/21  0749 06/01/21  0811    143   POTASSIUM 3.9 3.7   CHLORIDE 113* 110*   CO2 25 27   ANIONGAP 6 6   * 101*   BUN 22 19   CR 1.05 0.95   MIRI 8.6 9.3     GFR 77   Albumin 3.6  ALP, ALT, AST normal  Hb 12.3    IMAGING:   No new imaging    [place]  reviewed and fills consistent with history.     Impression & Recommendations & Counseling:  Rogelio Marshall is a 76-year-old man with multiple myeloma on daratumumab maintenance with response on labs and improved pain, seen for palliative care follow-up.  Continues to have fatigue, neuropathic pain, have been downtitrating multiple medications due to polypharmacy likely contributing to fatigue.     Back Pain - in spine, due to myeloma, pathologic fractures, and preexisting degenerative disease.    -Continue tylenol 325 mg TID PRN  - Recommend stopping methocarbamol  -Discussed exercise to preserve mobility, offered cancer PT, would prefer to do home exercises.  Given information for iyzico exercises    Neuropathy - due to velcade, now off.  Discussed expected time course and unknown degree of possible improvement.  Offered to increase gabapentin to 300 mg 3 times a day or 300/600, he does not feel is bothersome enough to take another pill.    Appetite improved and weight is stable -continue mirtazapine.     Polypharmacy - asks about supplements -taking intermittent vitamin K2, glucosamine/chondroitin, selenium, magnesium and B6.  Counseled on the role of calcium and vitamin D for bone health and I think is important for him to continue, but I do not see a need for the others.  Encouraged to eat a varied diet with sources of protein, fruits and vegetables.    Elevated BP -says is checked weekly at home by a home  nurse and is usually less than 130/80.  His not taking his hydrochlorothiazide or lisinopril.  Encouraged to continue to monitor and if blood pressure over 130/90 to restart medications.    Multiple Myeloma - currently on treatment.  Knows cancer is incurable, but does not have a sense of what to expect with treatments.  Tolerating well with improved pain, function since starting.   -  Advanced Care Planning - not in touch with family, no close friends.  Does not know who he'd trust to make medical decisions, says would be alright with having an appointed guardian if he could not make decisions.  Have introduced health care directive.    Follow-up in 3 months    46 minutes spent including reviewing record, review of above studies, above visit with patient, and documentation.     Cat Tate MD  Palliative Medicine  Pager [phone]       (This note was transcribed using voice recognition software. While I review and edit the transcription, I may miss errors, and the software sometimes does unexpected capitalizations and formatting that I miss. Please let me know of any serious mistranscriptions and I will addend this note.)      Again, thank you for allowing me to participate in the care of your patient.      Sincerely,    aCt Tate MD

## 2021-07-26 ENCOUNTER — APPOINTMENT (OUTPATIENT)
Dept: LAB | Facility: CLINIC | Age: 76
End: 2021-07-26
Attending: STUDENT IN AN ORGANIZED HEALTH CARE EDUCATION/TRAINING PROGRAM
Payer: COMMERCIAL

## 2021-07-26 ENCOUNTER — INFUSION THERAPY VISIT (OUTPATIENT)
Dept: ONCOLOGY | Facility: CLINIC | Age: 76
End: 2021-07-26
Attending: STUDENT IN AN ORGANIZED HEALTH CARE EDUCATION/TRAINING PROGRAM
Payer: COMMERCIAL

## 2021-07-26 VITALS
HEART RATE: 97 BPM | TEMPERATURE: 98.3 F | SYSTOLIC BLOOD PRESSURE: 148 MMHG | WEIGHT: 133.9 LBS | DIASTOLIC BLOOD PRESSURE: 81 MMHG | OXYGEN SATURATION: 95 % | BODY MASS INDEX: 25.3 KG/M2 | RESPIRATION RATE: 16 BRPM

## 2021-07-26 DIAGNOSIS — C90.00 MULTIPLE MYELOMA, REMISSION STATUS UNSPECIFIED (H): Primary | ICD-10-CM

## 2021-07-26 LAB
ALBUMIN SERPL-MCNC: 3.5 G/DL (ref 3.4–5)
ALP SERPL-CCNC: 120 U/L (ref 40–150)
ALT SERPL W P-5'-P-CCNC: 26 U/L (ref 0–70)
ANION GAP SERPL CALCULATED.3IONS-SCNC: 6 MMOL/L (ref 3–14)
AST SERPL W P-5'-P-CCNC: 23 U/L (ref 0–45)
BASOPHILS # BLD AUTO: 0 10E3/UL (ref 0–0.2)
BASOPHILS NFR BLD AUTO: 1 %
BILIRUB SERPL-MCNC: 0.3 MG/DL (ref 0.2–1.3)
BUN SERPL-MCNC: 19 MG/DL (ref 7–30)
CALCIUM SERPL-MCNC: 8.8 MG/DL (ref 8.5–10.1)
CHLORIDE BLD-SCNC: 111 MMOL/L (ref 94–109)
CO2 SERPL-SCNC: 25 MMOL/L (ref 20–32)
CREAT SERPL-MCNC: 1.2 MG/DL (ref 0.66–1.25)
EOSINOPHIL # BLD AUTO: 1.1 10E3/UL (ref 0–0.7)
EOSINOPHIL NFR BLD AUTO: 20 %
ERYTHROCYTE [DISTWIDTH] IN BLOOD BY AUTOMATED COUNT: 12 % (ref 10–15)
GFR SERPL CREATININE-BSD FRML MDRD: 59 ML/MIN/1.73M2
GLUCOSE BLD-MCNC: 132 MG/DL (ref 70–99)
HCT VFR BLD AUTO: 40.4 % (ref 40–53)
HGB BLD-MCNC: 12.9 G/DL (ref 13.3–17.7)
IMM GRANULOCYTES # BLD: 0 10E3/UL
IMM GRANULOCYTES NFR BLD: 0 %
LYMPHOCYTES # BLD AUTO: 1.2 10E3/UL (ref 0.8–5.3)
LYMPHOCYTES NFR BLD AUTO: 22 %
MCH RBC QN AUTO: 32.3 PG (ref 26.5–33)
MCHC RBC AUTO-ENTMCNC: 31.9 G/DL (ref 31.5–36.5)
MCV RBC AUTO: 101 FL (ref 78–100)
MONOCYTES # BLD AUTO: 0.6 10E3/UL (ref 0–1.3)
MONOCYTES NFR BLD AUTO: 12 %
NEUTROPHILS # BLD AUTO: 2.5 10E3/UL (ref 1.6–8.3)
NEUTROPHILS NFR BLD AUTO: 45 %
NRBC # BLD AUTO: 0 10E3/UL
NRBC BLD AUTO-RTO: 0 /100
PLATELET # BLD AUTO: 296 10E3/UL (ref 150–450)
POTASSIUM BLD-SCNC: 3.6 MMOL/L (ref 3.4–5.3)
PROT SERPL-MCNC: 6.7 G/DL (ref 6.8–8.8)
RBC # BLD AUTO: 4 10E6/UL (ref 4.4–5.9)
SODIUM SERPL-SCNC: 142 MMOL/L (ref 133–144)
WBC # BLD AUTO: 5.5 10E3/UL (ref 4–11)

## 2021-07-26 PROCEDURE — 250N000012 HC RX MED GY IP 250 OP 636 PS 637: Performed by: STUDENT IN AN ORGANIZED HEALTH CARE EDUCATION/TRAINING PROGRAM

## 2021-07-26 PROCEDURE — 250N000011 HC RX IP 250 OP 636: Performed by: STUDENT IN AN ORGANIZED HEALTH CARE EDUCATION/TRAINING PROGRAM

## 2021-07-26 PROCEDURE — 82040 ASSAY OF SERUM ALBUMIN: CPT | Performed by: STUDENT IN AN ORGANIZED HEALTH CARE EDUCATION/TRAINING PROGRAM

## 2021-07-26 PROCEDURE — 36415 COLL VENOUS BLD VENIPUNCTURE: CPT | Performed by: STUDENT IN AN ORGANIZED HEALTH CARE EDUCATION/TRAINING PROGRAM

## 2021-07-26 PROCEDURE — 85025 COMPLETE CBC W/AUTO DIFF WBC: CPT | Performed by: STUDENT IN AN ORGANIZED HEALTH CARE EDUCATION/TRAINING PROGRAM

## 2021-07-26 PROCEDURE — 250N000013 HC RX MED GY IP 250 OP 250 PS 637: Performed by: STUDENT IN AN ORGANIZED HEALTH CARE EDUCATION/TRAINING PROGRAM

## 2021-07-26 PROCEDURE — 96401 CHEMO ANTI-NEOPL SQ/IM: CPT

## 2021-07-26 RX ORDER — ACETAMINOPHEN 325 MG/1
650 TABLET ORAL ONCE
Status: COMPLETED | OUTPATIENT
Start: 2021-07-26 | End: 2021-07-26

## 2021-07-26 RX ORDER — DIPHENHYDRAMINE HCL 25 MG
50 CAPSULE ORAL ONCE
Status: COMPLETED | OUTPATIENT
Start: 2021-07-26 | End: 2021-07-26

## 2021-07-26 RX ORDER — DEXAMETHASONE 4 MG/1
20 TABLET ORAL ONCE
Status: COMPLETED | OUTPATIENT
Start: 2021-07-26 | End: 2021-07-26

## 2021-07-26 RX ADMIN — ACETAMINOPHEN 650 MG: 325 TABLET ORAL at 08:28

## 2021-07-26 RX ADMIN — DIPHENHYDRAMINE HYDROCHLORIDE 50 MG: 25 CAPSULE ORAL at 08:28

## 2021-07-26 RX ADMIN — DARATUMUMAB AND HYALURONIDASE-FIHJ (HUMAN RECOMBINANT) 1800 MG: 1800; 30000 INJECTION SUBCUTANEOUS at 09:14

## 2021-07-26 RX ADMIN — DEXAMETHASONE 20 MG: 4 TABLET ORAL at 08:28

## 2021-07-26 ASSESSMENT — PAIN SCALES - GENERAL: PAINLEVEL: NO PAIN (0)

## 2021-07-26 NOTE — PATIENT INSTRUCTIONS
[facility] Triage and after hours / weekends / holidays:  [phone]    Please call the triage or after hours line if you experience a temperature greater than or equal to 100.5, shaking chills, have uncontrolled nausea, vomiting and/or diarrhea, dizziness, shortness of breath, chest pain, bleeding, unexplained bruising, or if you have any other new/concerning symptoms, questions or concerns.      If you are having any concerning symptoms or wish to speak to a provider before your next infusion visit, please call your care coordinator or triage to notify them so we can adequately serve you.     If you need a refill on a narcotic prescription or other medication, please call before your infusion appointment.           July 2021 Sunday Monday Tuesday Wednesday Thursday Friday Saturday                       1     2     3       4     5     6     7     8     9     10       11     12     13     14     15     16     17       18     19     20     21     22    RETURN  10:55 AM   (40 min.)   Cat Tate MD   [facility] 23     24       25     26    LAB PERIPHERAL   7:30 AM   (15 min.)   [facility]   [facility]    ONC INFUSION 6 HR (360 MIN)   8:00 AM   (360 min.)    ONC INFUSION NURSE   [facility] 27     28     29     30     31                 August 2021 Sunday Monday Tuesday Wednesday Thursday Friday Saturday   1     2     3     4     5     6     7       8     9     10     11     12     13     14       15     16     17     18    VIDEO VISIT RETURN   1:45 PM   (30 min.)   Angela Sweeney MD   [facility] 19     20     21       22     23    LAB PERIPHERAL   7:30 AM   (15 min.)   [facility]   [facility]    ONC INFUSION 6 HR (360 MIN)   8:00 AM   (360 min.)    ONC INFUSION NURSE   [facility] 24     25     26      27     28       29     30     31                                        Recent Results (from the past 24 hour(s))   Comprehensive metabolic panel    Collection Time: 07/26/21  7:54 AM   Result Value Ref Range    Sodium 142 133 - 144 mmol/L    Potassium 3.6 3.4 - 5.3 mmol/L    Chloride 111 (H) 94 - 109 mmol/L    Carbon Dioxide (CO2) 25 20 - 32 mmol/L    Anion Gap 6 3 - 14 mmol/L    Urea Nitrogen 19 7 - 30 mg/dL    Creatinine 1.20 0.66 - 1.25 mg/dL    Calcium 8.8 8.5 - 10.1 mg/dL    Glucose 132 (H) 70 - 99 mg/dL    Alkaline Phosphatase 120 40 - 150 U/L    AST 23 0 - 45 U/L    ALT 26 0 - 70 U/L    Protein Total 6.7 (L) 6.8 - 8.8 g/dL    Albumin 3.5 3.4 - 5.0 g/dL    Bilirubin Total 0.3 0.2 - 1.3 mg/dL    GFR Estimate 59 (L) >60 mL/min/1.73m2   CBC with platelets and differential    Collection Time: 07/26/21  7:54 AM   Result Value Ref Range    WBC Count 5.5 4.0 - 11.0 10e3/uL    RBC Count 4.00 (L) 4.40 - 5.90 10e6/uL    Hemoglobin 12.9 (L) 13.3 - 17.7 g/dL    Hematocrit 40.4 40.0 - 53.0 %     (H) 78 - 100 fL    MCH 32.3 26.5 - 33.0 pg    MCHC 31.9 31.5 - 36.5 g/dL    RDW 12.0 10.0 - 15.0 %    Platelet Count 296 150 - 450 10e3/uL    % Neutrophils 45 %    % Lymphocytes 22 %    % Monocytes 12 %    % Eosinophils 20 %    % Basophils 1 %    % Immature Granulocytes 0 %    NRBCs per 100 WBC 0 <1 /100    Absolute Neutrophils 2.5 1.6 - 8.3 10e3/uL    Absolute Lymphocytes 1.2 0.8 - 5.3 10e3/uL    Absolute Monocytes 0.6 0.0 - 1.3 10e3/uL    Absolute Eosinophils 1.1 (H) 0.0 - 0.7 10e3/uL    Absolute Basophils 0.0 0.0 - 0.2 10e3/uL    Absolute Immature Granulocytes 0.0 <=0.0 10e3/uL    Absolute NRBCs 0.0 10e3/uL

## 2021-07-26 NOTE — NURSING NOTE
Chief Complaint   Patient presents with     Chemotherapy     Darzalex Faspro Maintenance     Blood Draw     Labs drawn with  in lab by RN. VS taken.     Labs drawn via venipuncture. Vital signs taken. Checked into next appointment.     Brittney Bedoya RN

## 2021-07-26 NOTE — PROGRESS NOTES
Infusion Nursing Note:  Rogelio Marshall presents today for Darzalex Faspro D1 Maintenance .    Patient spoke with provider today: No    Treatment Conditions:  Lab Results   Component Value Date    HGB 12.9 07/26/2021     Lab Results   Component Value Date    WBC 5.5 07/26/2021     Lab Results   Component Value Date    ANEU 4.8 06/28/2021     Lab Results   Component Value Date     07/26/2021     Lab Results   Component Value Date     07/26/2021     Lab Results   Component Value Date    POTASSIUM 3.6 07/26/2021     Lab Results     Lab Results   Component Value Date    CR 1.20 07/26/2021    CR 1.05 06/28/2021                   Lab Results   Component Value Date    MIRI 8.8 07/26/2021     Lab Results   Component Value Date    BILITOTAL 0.3 07/26/2021     Lab Results   Component Value Date    ALBUMIN 3.5 07/26/2021     Lab Results   Component Value Date    ALT 26 07/26/2021     Lab Results   Component Value Date    AST 23 07/26/2021       Results reviewed, labs MET treatment parameters, ok to proceed with treatment.      Note: Pt with no concerns at today's visit. Denies fever/chills/SOB or cough.    Intravenous Access:  No Intravenous access at this visit.    Post Infusion Assessment:  Patient tolerated injection to RLQ abdomen without incident.    Discharge Plan:    Prescription refills given for Gabapentin.  Discharge instructions reviewed with: Patient.  Patient and/or family verbalized understanding of discharge instructions and all questions answered.  Copy of AVS reviewed with patient and/or family.  Patient will return 8/18/21 for appointment with Dr. Sweeney and 8/23/21 for next chemotherapy appointment.  Patient discharged in stable condition accompanied by: self.  Departure Mode: Ambulatory.    Yvette Cagle RN

## 2021-08-16 NOTE — PROGRESS NOTES
"Rogelio is a 76 year old who is being evaluated via a billable telephone visit.      What phone number would you like to be contacted at? [phone]  How would you like to obtain your AVS? Eddie    I have reviewed and updated the patient's allergies and medication list.    Concerns: No new concerns.   Refills: None needed.      Vitals - Patient Reported  Weight (Patient Reported): 63.5 kg (140 lb)  Height (Patient Reported): 154.9 cm (5' 1\")  BMI (Based on Pt Reported Ht/Wt): 26.45  Pain Score: Mild Pain (2)  Pain Loc: Low Back    Fany Mayer CMA      Phone call duration: 30 minutes      Oncologic Hx:   -compression fractures and bone scan reveals multiple lytic lesions throughout his appendicular and axial skeleton. His Beta 2 microglobulin was 3.6 on 10/10/2020. Kappa chains were 73.6 on 10/5/2020 with K/L ratio of 89.9. M spike of 16.2 in urine on 10/10. Bone marrow biopsy on 10/23/2020 showed trilineage hematopoiesis and 50-60% kappa restricted plasma cells. Flow cytometry showed 20 % plasma cells which express CD19, CD38, CD45 and monotypic cytoplasmic kappa immunoglobulin light chains but lack CD20 and CD56.  FISH shows IGH-CCND1 fusion (81%; 30% had loss of IGH component from one fusion signal).      - Started 21 day cycle VRD 11/2020    - 11/27 he had a fall with rib fractures.     - Dec 2020 Started madyson-velcade-dex. Completed 5 cycles and achieved VGPR    - April 2021 started darzalex maintenance      Interval Hx: Rogelio's back has been feeling better with his new exercise regimen, leg lifts, marching, stretching, etc.  BP has been well controlled recently. He is not taking hydrochlorothiazide or lisinopril any more. He denies any heartburn and reports good appetite.    A comprehensive review of systems was completed and negative except as described above.     Pertinent PMH reviewed:   HTN    Physical Exam:   General: NAD  Resp: nonlabored breathing, no cough  Neuro: alert, conversant  Psych: appropriate " mood and affect      The rest of a comprehensive physical examination is deferred due to Public Health Emergency telephone visit restrictions      Pertinent Data Summarized:  6/28/2021 M spike 0.1    Assessment and Plan  75 yo man with IgG Kappa standard risk multiple myeloma. Despite his side effects, he has had a VGPR with therapy. Now on q 4 week darzalex faspro for maintenance.     Stop mirtazapine and omeprazole that were startedduring myeloma treatment for poor appetite and GERD respectively.      Pathologic Fractures:. Vitamin D is replete and he is on a daily supplement as well as calcium. I have asked him repeatedly to see the dentist to clear him for zometa treatment but he is very resistant to this.Reminded him again today    CKD: Baseline creatinine is 1.1 suggesting underlying CKD. He had HALLIE with treatment but it seems to have resolved      Angela Sweeney MD PhD

## 2021-08-18 ENCOUNTER — PATIENT OUTREACH (OUTPATIENT)
Dept: ONCOLOGY | Facility: CLINIC | Age: 76
End: 2021-08-18

## 2021-08-18 ENCOUNTER — VIRTUAL VISIT (OUTPATIENT)
Dept: ONCOLOGY | Facility: CLINIC | Age: 76
End: 2021-08-18
Attending: STUDENT IN AN ORGANIZED HEALTH CARE EDUCATION/TRAINING PROGRAM
Payer: COMMERCIAL

## 2021-08-18 DIAGNOSIS — C90.00 MULTIPLE MYELOMA, REMISSION STATUS UNSPECIFIED (H): Primary | ICD-10-CM

## 2021-08-18 PROCEDURE — 99214 OFFICE O/P EST MOD 30 MIN: CPT | Mod: 95 | Performed by: STUDENT IN AN ORGANIZED HEALTH CARE EDUCATION/TRAINING PROGRAM

## 2021-08-18 RX ORDER — LORAZEPAM 2 MG/ML
0.5 INJECTION INTRAMUSCULAR EVERY 4 HOURS PRN
Status: CANCELLED
Start: 2021-09-20

## 2021-08-18 RX ORDER — ALBUTEROL SULFATE 90 UG/1
1-2 AEROSOL, METERED RESPIRATORY (INHALATION)
Status: CANCELLED
Start: 2021-09-20

## 2021-08-18 RX ORDER — SODIUM CHLORIDE 9 MG/ML
1000 INJECTION, SOLUTION INTRAVENOUS CONTINUOUS PRN
Status: CANCELLED
Start: 2021-09-20

## 2021-08-18 RX ORDER — DEXAMETHASONE 4 MG/1
20 TABLET ORAL ONCE
Status: CANCELLED | OUTPATIENT
Start: 2021-09-20

## 2021-08-18 RX ORDER — DIPHENHYDRAMINE HCL 25 MG
50 CAPSULE ORAL ONCE
Status: CANCELLED | OUTPATIENT
Start: 2021-09-20

## 2021-08-18 RX ORDER — ALBUTEROL SULFATE 0.83 MG/ML
2.5 SOLUTION RESPIRATORY (INHALATION)
Status: CANCELLED | OUTPATIENT
Start: 2021-09-20

## 2021-08-18 RX ORDER — ACETAMINOPHEN 325 MG/1
650 TABLET ORAL ONCE
Status: CANCELLED | OUTPATIENT
Start: 2021-09-20

## 2021-08-18 RX ORDER — NALOXONE HYDROCHLORIDE 0.4 MG/ML
.1-.4 INJECTION, SOLUTION INTRAMUSCULAR; INTRAVENOUS; SUBCUTANEOUS
Status: CANCELLED | OUTPATIENT
Start: 2021-09-20

## 2021-08-18 RX ORDER — HEPARIN SODIUM,PORCINE 10 UNIT/ML
5 VIAL (ML) INTRAVENOUS
Status: CANCELLED | OUTPATIENT
Start: 2021-09-20

## 2021-08-18 RX ORDER — DIPHENHYDRAMINE HYDROCHLORIDE 50 MG/ML
50 INJECTION INTRAMUSCULAR; INTRAVENOUS
Status: CANCELLED
Start: 2021-09-20

## 2021-08-18 RX ORDER — HEPARIN SODIUM (PORCINE) LOCK FLUSH IV SOLN 100 UNIT/ML 100 UNIT/ML
5 SOLUTION INTRAVENOUS
Status: CANCELLED | OUTPATIENT
Start: 2021-09-20

## 2021-08-18 RX ORDER — METHYLPREDNISOLONE SODIUM SUCCINATE 125 MG/2ML
125 INJECTION, POWDER, LYOPHILIZED, FOR SOLUTION INTRAMUSCULAR; INTRAVENOUS
Status: CANCELLED
Start: 2021-09-20

## 2021-08-18 RX ORDER — EPINEPHRINE 1 MG/ML
0.3 INJECTION, SOLUTION INTRAMUSCULAR; SUBCUTANEOUS EVERY 5 MIN PRN
Status: CANCELLED | OUTPATIENT
Start: 2021-09-20

## 2021-08-18 RX ORDER — MEPERIDINE HYDROCHLORIDE 25 MG/ML
25 INJECTION INTRAMUSCULAR; INTRAVENOUS; SUBCUTANEOUS EVERY 30 MIN PRN
Status: CANCELLED | OUTPATIENT
Start: 2021-09-20

## 2021-08-18 NOTE — LETTER
"    8/18/2021         RE: Rogelio Marshall  [address]  [facility] [address]        Dear Colleague,    Thank you for referring your patient, Rogelio Marshall, to the [facility]. Please see a copy of my visit note below.    Rogelio is a 76 year old who is being evaluated via a billable telephone visit.      What phone number would you like to be contacted at? [phone]  How would you like to obtain your AVS? Edide    I have reviewed and updated the patient's allergies and medication list.    Concerns: No new concerns.   Refills: None needed.      Vitals - Patient Reported  Weight (Patient Reported): 63.5 kg (140 lb)  Height (Patient Reported): 154.9 cm (5' 1\")  BMI (Based on Pt Reported Ht/Wt): 26.45  Pain Score: Mild Pain (2)  Pain Loc: Low Back    Fany MARY Mayer      Phone call duration: 30 minutes      Oncologic Hx:   -compression fractures and bone scan reveals multiple lytic lesions throughout his appendicular and axial skeleton. His Beta 2 microglobulin was 3.6 on 10/10/2020. Kappa chains were 73.6 on 10/5/2020 with K/L ratio of 89.9. M spike of 16.2 in urine on 10/10. Bone marrow biopsy on 10/23/2020 showed trilineage hematopoiesis and 50-60% kappa restricted plasma cells. Flow cytometry showed 20 % plasma cells which express CD19, CD38, CD45 and monotypic cytoplasmic kappa immunoglobulin light chains but lack CD20 and CD56.  FISH shows IGH-CCND1 fusion (81%; 30% had loss of IGH component from one fusion signal).      - Started 21 day cycle VRD 11/2020    - 11/27 he had a fall with rib fractures.     - Dec 2020 Started madyson-velcade-dex. Completed 5 cycles and achieved VGPR    - April 2021 started darzalex maintenance      Interval Hx: Rogelio's back has been feeling better with his new exercise regimen, leg lifts, marching, stretching, etc.  BP has been well controlled recently. He is not taking hydrochlorothiazide or lisinopril any more. He denies any heartburn and " reports good appetite.    A comprehensive review of systems was completed and negative except as described above.     Pertinent PMH reviewed:   HTN    Physical Exam:   General: NAD  Resp: nonlabored breathing, no cough  Neuro: alert, conversant  Psych: appropriate mood and affect      The rest of a comprehensive physical examination is deferred due to Public Health Emergency telephone visit restrictions      Pertinent Data Summarized:  6/28/2021 M spike 0.1    Assessment and Plan  75 yo man with IgG Kappa standard risk multiple myeloma. Despite his side effects, he has had a VGPR with therapy. Now on q 4 week darzalex faspro for maintenance.     Stop mirtazapine and omeprazole that were startedduring myeloma treatment for poor appetite and GERD respectively.      Pathologic Fractures:. Vitamin D is replete and he is on a daily supplement as well as calcium. I have asked him repeatedly to see the dentist to clear him for zometa treatment but he is very resistant to this.Reminded him again today    CKD: Baseline creatinine is 1.1 suggesting underlying CKD. He had HALLIE with treatment but it seems to have resolved      Angela Sweeney MD PhD      Again, thank you for allowing me to participate in the care of your patient.        Sincerely,        Angela Sweeney MD

## 2021-08-18 NOTE — PROGRESS NOTES
Oncology RN Care Coordination Note:     Outgoing Call:  This writer called OSVALDO Grissom with [facility] to let her know Rogelio's mirtazapine and omeprazole have been discontinued.  This writer left a detailed voicemail for Praveena with this information.    Cristine Redding RN BSN   [facility]  Nurse Coordinator

## 2021-08-23 ENCOUNTER — LAB (OUTPATIENT)
Dept: LAB | Facility: CLINIC | Age: 76
End: 2021-08-23
Attending: STUDENT IN AN ORGANIZED HEALTH CARE EDUCATION/TRAINING PROGRAM
Payer: COMMERCIAL

## 2021-08-23 ENCOUNTER — INFUSION THERAPY VISIT (OUTPATIENT)
Dept: ONCOLOGY | Facility: CLINIC | Age: 76
End: 2021-08-23
Attending: STUDENT IN AN ORGANIZED HEALTH CARE EDUCATION/TRAINING PROGRAM
Payer: COMMERCIAL

## 2021-08-23 VITALS
OXYGEN SATURATION: 98 % | SYSTOLIC BLOOD PRESSURE: 135 MMHG | WEIGHT: 140.8 LBS | DIASTOLIC BLOOD PRESSURE: 83 MMHG | HEART RATE: 85 BPM | RESPIRATION RATE: 16 BRPM | TEMPERATURE: 98.2 F | BODY MASS INDEX: 26.6 KG/M2

## 2021-08-23 DIAGNOSIS — Z79.899 ENCOUNTER FOR LONG-TERM (CURRENT) USE OF MEDICATIONS: ICD-10-CM

## 2021-08-23 DIAGNOSIS — C90.00 MULTIPLE MYELOMA, REMISSION STATUS UNSPECIFIED (H): Primary | ICD-10-CM

## 2021-08-23 LAB
ALBUMIN SERPL-MCNC: 3.6 G/DL (ref 3.4–5)
ALP SERPL-CCNC: 125 U/L (ref 40–150)
ALT SERPL W P-5'-P-CCNC: 26 U/L (ref 0–70)
ANION GAP SERPL CALCULATED.3IONS-SCNC: 5 MMOL/L (ref 3–14)
AST SERPL W P-5'-P-CCNC: 20 U/L (ref 0–45)
BASOPHILS # BLD AUTO: 0.1 10E3/UL (ref 0–0.2)
BASOPHILS NFR BLD AUTO: 1 %
BILIRUB SERPL-MCNC: 0.6 MG/DL (ref 0.2–1.3)
BUN SERPL-MCNC: 21 MG/DL (ref 7–30)
CALCIUM SERPL-MCNC: 8.9 MG/DL (ref 8.5–10.1)
CHLORIDE BLD-SCNC: 109 MMOL/L (ref 94–109)
CO2 SERPL-SCNC: 29 MMOL/L (ref 20–32)
CREAT SERPL-MCNC: 1.15 MG/DL (ref 0.66–1.25)
EOSINOPHIL # BLD AUTO: 0.9 10E3/UL (ref 0–0.7)
EOSINOPHIL NFR BLD AUTO: 13 %
ERYTHROCYTE [DISTWIDTH] IN BLOOD BY AUTOMATED COUNT: 13 % (ref 10–15)
GFR SERPL CREATININE-BSD FRML MDRD: 62 ML/MIN/1.73M2
GLUCOSE BLD-MCNC: 107 MG/DL (ref 70–99)
HCT VFR BLD AUTO: 39.8 % (ref 40–53)
HGB BLD-MCNC: 13.1 G/DL (ref 13.3–17.7)
IMM GRANULOCYTES # BLD: 0 10E3/UL
IMM GRANULOCYTES NFR BLD: 0 %
LYMPHOCYTES # BLD AUTO: 1.4 10E3/UL (ref 0.8–5.3)
LYMPHOCYTES NFR BLD AUTO: 20 %
MCH RBC QN AUTO: 31.7 PG (ref 26.5–33)
MCHC RBC AUTO-ENTMCNC: 32.9 G/DL (ref 31.5–36.5)
MCV RBC AUTO: 96 FL (ref 78–100)
MONOCYTES # BLD AUTO: 0.8 10E3/UL (ref 0–1.3)
MONOCYTES NFR BLD AUTO: 11 %
NEUTROPHILS # BLD AUTO: 3.9 10E3/UL (ref 1.6–8.3)
NEUTROPHILS NFR BLD AUTO: 55 %
NRBC # BLD AUTO: 0 10E3/UL
NRBC BLD AUTO-RTO: 0 /100
PLATELET # BLD AUTO: 336 10E3/UL (ref 150–450)
POTASSIUM BLD-SCNC: 3.5 MMOL/L (ref 3.4–5.3)
PROT SERPL-MCNC: 6.6 G/DL (ref 6.8–8.8)
RBC # BLD AUTO: 4.13 10E6/UL (ref 4.4–5.9)
SODIUM SERPL-SCNC: 143 MMOL/L (ref 133–144)
TOTAL PROTEIN SERUM FOR ELP: 6.1 G/DL (ref 6.8–8.8)
WBC # BLD AUTO: 7.1 10E3/UL (ref 4–11)

## 2021-08-23 PROCEDURE — 250N000012 HC RX MED GY IP 250 OP 636 PS 637: Performed by: STUDENT IN AN ORGANIZED HEALTH CARE EDUCATION/TRAINING PROGRAM

## 2021-08-23 PROCEDURE — 85004 AUTOMATED DIFF WBC COUNT: CPT

## 2021-08-23 PROCEDURE — 84155 ASSAY OF PROTEIN SERUM: CPT

## 2021-08-23 PROCEDURE — 96401 CHEMO ANTI-NEOPL SQ/IM: CPT

## 2021-08-23 PROCEDURE — 84165 PROTEIN E-PHORESIS SERUM: CPT | Mod: TC | Performed by: STUDENT IN AN ORGANIZED HEALTH CARE EDUCATION/TRAINING PROGRAM

## 2021-08-23 PROCEDURE — 250N000013 HC RX MED GY IP 250 OP 250 PS 637: Performed by: STUDENT IN AN ORGANIZED HEALTH CARE EDUCATION/TRAINING PROGRAM

## 2021-08-23 PROCEDURE — 84165 PROTEIN E-PHORESIS SERUM: CPT | Mod: 26 | Performed by: STUDENT IN AN ORGANIZED HEALTH CARE EDUCATION/TRAINING PROGRAM

## 2021-08-23 PROCEDURE — 83883 ASSAY NEPHELOMETRY NOT SPEC: CPT

## 2021-08-23 PROCEDURE — 36415 COLL VENOUS BLD VENIPUNCTURE: CPT

## 2021-08-23 PROCEDURE — 82040 ASSAY OF SERUM ALBUMIN: CPT

## 2021-08-23 PROCEDURE — 250N000011 HC RX IP 250 OP 636: Performed by: STUDENT IN AN ORGANIZED HEALTH CARE EDUCATION/TRAINING PROGRAM

## 2021-08-23 RX ORDER — ACETAMINOPHEN 325 MG/1
650 TABLET ORAL ONCE
Status: COMPLETED | OUTPATIENT
Start: 2021-08-23 | End: 2021-08-23

## 2021-08-23 RX ORDER — DEXAMETHASONE 4 MG/1
20 TABLET ORAL ONCE
Status: COMPLETED | OUTPATIENT
Start: 2021-08-23 | End: 2021-08-23

## 2021-08-23 RX ORDER — DIPHENHYDRAMINE HCL 25 MG
50 CAPSULE ORAL ONCE
Status: COMPLETED | OUTPATIENT
Start: 2021-08-23 | End: 2021-08-23

## 2021-08-23 RX ADMIN — ACETAMINOPHEN 650 MG: 325 TABLET ORAL at 08:50

## 2021-08-23 RX ADMIN — DEXAMETHASONE 20 MG: 4 TABLET ORAL at 08:50

## 2021-08-23 RX ADMIN — DIPHENHYDRAMINE HYDROCHLORIDE 50 MG: 25 CAPSULE ORAL at 08:50

## 2021-08-23 RX ADMIN — DARATUMUMAB AND HYALURONIDASE-FIHJ (HUMAN RECOMBINANT) 1800 MG: 1800; 30000 INJECTION SUBCUTANEOUS at 09:56

## 2021-08-23 ASSESSMENT — PAIN SCALES - GENERAL: PAINLEVEL: NO PAIN (0)

## 2021-08-23 NOTE — PATIENT INSTRUCTIONS
[facility] Main Line: [phone]    Call triage nurse with chills and/or temperature greater than or equal to 100.4, uncontrolled nausea/vomiting, diarrhea, constipation, dizziness, shortness of breath, chest pain, bleeding, unexplained bruising, or any new/concerning symptoms, questions/concerns.   If you are having any concerning symptoms or wish to speak to a provider before your next infusion visit, please call your care coordinator or triage to notify them so we can adequately serve you.   Nurse Triage line:  [phone]    If after hours, weekends, or holidays, call main hospital  and ask for Oncology doctor on call @ [phone]      August 2021 Sunday Monday Tuesday Wednesday Thursday Friday Saturday   1     2     3     4     5     6     7       8     9     10     11     12     13     14       15     16     17     18    TELEPHONE VISIT RETURN   2:00 PM   (30 min.)   Angela Sweeney MD   [facility] 19     20     21       22     23    LAB PERIPHERAL   7:30 AM   (15 min.)    [facility] DRAW   [facility]    ONC INFUSION 6 HR (360 MIN)   8:00 AM   (360 min.)    ONC INFUSION NURSE   [facility] 24     25     26     27     28       29     30     31 September 2021 Sunday Monday Tuesday Wednesday Thursday Friday Saturday                  1     2     3     4       5     6     7     8     9     10     11       12     13     14     15     16     17     18       19     20    LAB PERIPHERAL   8:00 AM   (15 min.)    [facility]RAW   [facility]    ONC INFUSION 6 HR (360 MIN)   8:30 AM   (360 min.)    ONC INFUSION NURSE   [facility] 21     22     23     24     25       26     27     28     29     30                               Lab Results:  Recent Results (from the past 12 hour(s))    Comprehensive metabolic panel    Collection Time: 08/23/21  8:05 AM   Result Value Ref Range    Sodium 143 133 - 144 mmol/L    Potassium 3.5 3.4 - 5.3 mmol/L    Chloride 109 94 - 109 mmol/L    Carbon Dioxide (CO2) 29 20 - 32 mmol/L    Anion Gap 5 3 - 14 mmol/L    Urea Nitrogen 21 7 - 30 mg/dL    Creatinine 1.15 0.66 - 1.25 mg/dL    Calcium 8.9 8.5 - 10.1 mg/dL    Glucose 107 (H) 70 - 99 mg/dL    Alkaline Phosphatase 125 40 - 150 U/L    AST 20 0 - 45 U/L    ALT 26 0 - 70 U/L    Protein Total 6.6 (L) 6.8 - 8.8 g/dL    Albumin 3.6 3.4 - 5.0 g/dL    Bilirubin Total 0.6 0.2 - 1.3 mg/dL    GFR Estimate 62 >60 mL/min/1.73m2   CBC with platelets and differential    Collection Time: 08/23/21  8:05 AM   Result Value Ref Range    WBC Count 7.1 4.0 - 11.0 10e3/uL    RBC Count 4.13 (L) 4.40 - 5.90 10e6/uL    Hemoglobin 13.1 (L) 13.3 - 17.7 g/dL    Hematocrit 39.8 (L) 40.0 - 53.0 %    MCV 96 78 - 100 fL    MCH 31.7 26.5 - 33.0 pg    MCHC 32.9 31.5 - 36.5 g/dL    RDW 13.0 10.0 - 15.0 %    Platelet Count 336 150 - 450 10e3/uL    % Neutrophils 55 %    % Lymphocytes 20 %    % Monocytes 11 %    % Eosinophils 13 %    % Basophils 1 %    % Immature Granulocytes 0 %    NRBCs per 100 WBC 0 <1 /100    Absolute Neutrophils 3.9 1.6 - 8.3 10e3/uL    Absolute Lymphocytes 1.4 0.8 - 5.3 10e3/uL    Absolute Monocytes 0.8 0.0 - 1.3 10e3/uL    Absolute Eosinophils 0.9 (H) 0.0 - 0.7 10e3/uL    Absolute Basophils 0.1 0.0 - 0.2 10e3/uL    Absolute Immature Granulocytes 0.0 <=0.0 10e3/uL    Absolute NRBCs 0.0 10e3/uL

## 2021-08-23 NOTE — NURSING NOTE
Chief Complaint   Patient presents with     Blood Draw     Labs drawn via  by rn in lab. VS taken.     Labs collected from venipuncture by RN. Vitals taken. Checked in for appointment(s).    Rogelio Gong RN

## 2021-08-23 NOTE — PATIENT INSTRUCTIONS
[facility] Main Line: [phone]    Call triage nurse with chills and/or temperature greater than or equal to 100.4, uncontrolled nausea/vomiting, diarrhea, constipation, dizziness, shortness of breath, chest pain, bleeding, unexplained bruising, or any new/concerning symptoms, questions/concerns.   If you are having any concerning symptoms or wish to speak to a provider before your next infusion visit, please call your care coordinator or triage to notify them so we can adequately serve you.   Nurse Triage line:  [phone]    If after hours, weekends, or holidays, call main hospital  and ask for Oncology doctor on call @ [phone]      August 2021 Sunday Monday Tuesday Wednesday Thursday Friday Saturday   1     2     3     4     5     6     7       8     9     10     11     12     13     14       15     16     17     18    TELEPHONE VISIT RETURN   2:00 PM   (30 min.)   Angela Sweeney MD   [facility] 19     20     21       22     23    LAB PERIPHERAL   7:30 AM   (15 min.)    [facility] DRAW   [facility]    ONC INFUSION 6 HR (360 MIN)   8:00 AM   (360 min.)    ONC INFUSION NURSE   [facility] 24     25     26     27     28       29     30     31 September 2021 Sunday Monday Tuesday Wednesday Thursday Friday Saturday                  1     2     3     4       5     6     7     8     9     10     11       12     13     14     15     16     17     18       19     20    LAB PERIPHERAL   8:00 AM   (15 min.)    [facility]   [facility]    ONC INFUSION 6 HR (360 MIN)   8:30 AM   (360 min.)    ONC INFUSION NURSE   [facility] 21     22     23     24     25       26     27     28     29     30                               Lab Results:  Recent Results (from the past 12 hour(s))    Comprehensive metabolic panel    Collection Time: 08/23/21  8:05 AM   Result Value Ref Range    Sodium 143 133 - 144 mmol/L    Potassium 3.5 3.4 - 5.3 mmol/L    Chloride 109 94 - 109 mmol/L    Carbon Dioxide (CO2) 29 20 - 32 mmol/L    Anion Gap 5 3 - 14 mmol/L    Urea Nitrogen 21 7 - 30 mg/dL    Creatinine 1.15 0.66 - 1.25 mg/dL    Calcium 8.9 8.5 - 10.1 mg/dL    Glucose 107 (H) 70 - 99 mg/dL    Alkaline Phosphatase 125 40 - 150 U/L    AST 20 0 - 45 U/L    ALT 26 0 - 70 U/L    Protein Total 6.6 (L) 6.8 - 8.8 g/dL    Albumin 3.6 3.4 - 5.0 g/dL    Bilirubin Total 0.6 0.2 - 1.3 mg/dL    GFR Estimate 62 >60 mL/min/1.73m2   CBC with platelets and differential    Collection Time: 08/23/21  8:05 AM   Result Value Ref Range    WBC Count 7.1 4.0 - 11.0 10e3/uL    RBC Count 4.13 (L) 4.40 - 5.90 10e6/uL    Hemoglobin 13.1 (L) 13.3 - 17.7 g/dL    Hematocrit 39.8 (L) 40.0 - 53.0 %    MCV 96 78 - 100 fL    MCH 31.7 26.5 - 33.0 pg    MCHC 32.9 31.5 - 36.5 g/dL    RDW 13.0 10.0 - 15.0 %    Platelet Count 336 150 - 450 10e3/uL    % Neutrophils 55 %    % Lymphocytes 20 %    % Monocytes 11 %    % Eosinophils 13 %    % Basophils 1 %    % Immature Granulocytes 0 %    NRBCs per 100 WBC 0 <1 /100    Absolute Neutrophils 3.9 1.6 - 8.3 10e3/uL    Absolute Lymphocytes 1.4 0.8 - 5.3 10e3/uL    Absolute Monocytes 0.8 0.0 - 1.3 10e3/uL    Absolute Eosinophils 0.9 (H) 0.0 - 0.7 10e3/uL    Absolute Basophils 0.1 0.0 - 0.2 10e3/uL    Absolute Immature Granulocytes 0.0 <=0.0 10e3/uL    Absolute NRBCs 0.0 10e3/uL

## 2021-08-23 NOTE — PROGRESS NOTES
Infusion Nursing Note:  Rogelio Marshall presents today for D1 Maintenance Darzalex Faspro.   Patient seen by provider today: No   present during visit today: Not Applicable.    Note: Patient reported to clinic today with no new complaints or concerns.    Intravenous Access:  Labs drawn without difficulty by Lab.    Treatment Conditions:  Lab Results   Component Value Date    HGB 13.1 08/23/2021    HGB 12.3 06/28/2021     Lab Results   Component Value Date    WBC 7.1 08/23/2021    WBC 7.3 06/28/2021      Lab Results   Component Value Date    ANEU 4.8 06/28/2021     Lab Results   Component Value Date     08/23/2021     06/28/2021      Lab Results   Component Value Date     08/23/2021     06/28/2021                   Lab Results   Component Value Date    POTASSIUM 3.5 08/23/2021    POTASSIUM 3.9 06/28/2021           Lab Results   Component Value Date    MAG 2.1 12/02/2020            Lab Results   Component Value Date    CR 1.15 08/23/2021    CR 1.05 06/28/2021                   Lab Results   Component Value Date    MIRI 8.9 08/23/2021    MIRI 8.6 06/28/2021                Lab Results   Component Value Date    BILITOTAL 0.6 08/23/2021    BILITOTAL 0.3 06/28/2021           Lab Results   Component Value Date    ALBUMIN 3.6 08/23/2021    ALBUMIN 3.6 06/28/2021                    Lab Results   Component Value Date    ALT 26 08/23/2021    ALT 22 06/28/2021           Lab Results   Component Value Date    AST 20 08/23/2021    AST 15 06/28/2021       Results reviewed, labs MET treatment parameters, ok to proceed with treatment.      Post Infusion Assessment:  Patient tolerated injection without incident. One injection of Darzalex Faspro given SubQ in left side abdomen.    Discharge Plan:   Patient declined prescription refills.  Discharge instructions reviewed with: Patient.  Patient and/or family verbalized understanding of discharge instructions and all questions answered.  Copy of AVS  reviewed with patient and/or family.  Patient will return 9/20/21 for next appointment.  Patient discharged in stable condition accompanied by: self.  Departure Mode: Ambulatory.  Face to Face time: 0 minutes.      Marquise Brenner RN

## 2021-08-24 LAB
KAPPA LC FREE SER-MCNC: 0.88 MG/DL (ref 0.33–1.94)
KAPPA LC FREE/LAMBDA FREE SER NEPH: 2.75 {RATIO} (ref 0.26–1.65)
LAMBDA LC FREE SERPL-MCNC: 0.32 MG/DL (ref 0.57–2.63)

## 2021-08-25 LAB
ALBUMIN SERPL ELPH-MCNC: 4 G/DL (ref 3.7–5.1)
ALPHA1 GLOB SERPL ELPH-MCNC: 0.3 G/DL (ref 0.2–0.4)
ALPHA2 GLOB SERPL ELPH-MCNC: 0.7 G/DL (ref 0.5–0.9)
B-GLOBULIN SERPL ELPH-MCNC: 0.7 G/DL (ref 0.6–1)
GAMMA GLOB SERPL ELPH-MCNC: 0.5 G/DL (ref 0.7–1.6)
M PROTEIN SERPL ELPH-MCNC: 0.1 G/DL
PROT PATTERN SERPL ELPH-IMP: ABNORMAL

## 2021-09-05 ENCOUNTER — HEALTH MAINTENANCE LETTER (OUTPATIENT)
Age: 76
End: 2021-09-05

## 2021-09-07 DIAGNOSIS — Z53.9 DIAGNOSIS NOT YET DEFINED: Primary | ICD-10-CM

## 2021-09-07 PROCEDURE — G0179 MD RECERTIFICATION HHA PT: HCPCS | Performed by: INTERNAL MEDICINE

## 2021-09-16 ENCOUNTER — DOCUMENTATION ONLY (OUTPATIENT)
Dept: FAMILY MEDICINE | Facility: CLINIC | Age: 76
End: 2021-09-16

## 2021-09-17 RX ORDER — DIPHENHYDRAMINE HCL 50 MG
50 CAPSULE ORAL
Status: CANCELLED
Start: 2021-09-20

## 2021-09-17 RX ORDER — ACETAMINOPHEN 325 MG/1
650 TABLET ORAL
Status: CANCELLED
Start: 2021-09-20

## 2021-09-20 ENCOUNTER — APPOINTMENT (OUTPATIENT)
Dept: LAB | Facility: CLINIC | Age: 76
End: 2021-09-20
Attending: STUDENT IN AN ORGANIZED HEALTH CARE EDUCATION/TRAINING PROGRAM
Payer: COMMERCIAL

## 2021-09-20 ENCOUNTER — INFUSION THERAPY VISIT (OUTPATIENT)
Dept: ONCOLOGY | Facility: CLINIC | Age: 76
End: 2021-09-20
Attending: STUDENT IN AN ORGANIZED HEALTH CARE EDUCATION/TRAINING PROGRAM
Payer: COMMERCIAL

## 2021-09-20 VITALS
OXYGEN SATURATION: 97 % | TEMPERATURE: 98.1 F | HEART RATE: 82 BPM | SYSTOLIC BLOOD PRESSURE: 145 MMHG | DIASTOLIC BLOOD PRESSURE: 82 MMHG | WEIGHT: 140.4 LBS | RESPIRATION RATE: 16 BRPM | BODY MASS INDEX: 26.53 KG/M2

## 2021-09-20 DIAGNOSIS — C90.00 MULTIPLE MYELOMA, REMISSION STATUS UNSPECIFIED (H): Primary | ICD-10-CM

## 2021-09-20 LAB
ALBUMIN SERPL-MCNC: 3.6 G/DL (ref 3.4–5)
ALP SERPL-CCNC: 109 U/L (ref 40–150)
ALT SERPL W P-5'-P-CCNC: 27 U/L (ref 0–70)
ANION GAP SERPL CALCULATED.3IONS-SCNC: 5 MMOL/L (ref 3–14)
AST SERPL W P-5'-P-CCNC: 17 U/L (ref 0–45)
BASOPHILS # BLD AUTO: 0.1 10E3/UL (ref 0–0.2)
BASOPHILS NFR BLD AUTO: 1 %
BILIRUB SERPL-MCNC: 0.7 MG/DL (ref 0.2–1.3)
BUN SERPL-MCNC: 20 MG/DL (ref 7–30)
CALCIUM SERPL-MCNC: 9 MG/DL (ref 8.5–10.1)
CHLORIDE BLD-SCNC: 107 MMOL/L (ref 94–109)
CO2 SERPL-SCNC: 30 MMOL/L (ref 20–32)
CREAT SERPL-MCNC: 1.29 MG/DL (ref 0.66–1.25)
EOSINOPHIL # BLD AUTO: 0.6 10E3/UL (ref 0–0.7)
EOSINOPHIL NFR BLD AUTO: 11 %
ERYTHROCYTE [DISTWIDTH] IN BLOOD BY AUTOMATED COUNT: 14 % (ref 10–15)
GFR SERPL CREATININE-BSD FRML MDRD: 54 ML/MIN/1.73M2
GLUCOSE BLD-MCNC: 126 MG/DL (ref 70–99)
HCT VFR BLD AUTO: 43.5 % (ref 40–53)
HGB BLD-MCNC: 14.1 G/DL (ref 13.3–17.7)
IMM GRANULOCYTES # BLD: 0 10E3/UL
IMM GRANULOCYTES NFR BLD: 1 %
LYMPHOCYTES # BLD AUTO: 1.3 10E3/UL (ref 0.8–5.3)
LYMPHOCYTES NFR BLD AUTO: 22 %
MCH RBC QN AUTO: 31.8 PG (ref 26.5–33)
MCHC RBC AUTO-ENTMCNC: 32.4 G/DL (ref 31.5–36.5)
MCV RBC AUTO: 98 FL (ref 78–100)
MONOCYTES # BLD AUTO: 0.6 10E3/UL (ref 0–1.3)
MONOCYTES NFR BLD AUTO: 10 %
NEUTROPHILS # BLD AUTO: 3.3 10E3/UL (ref 1.6–8.3)
NEUTROPHILS NFR BLD AUTO: 55 %
NRBC # BLD AUTO: 0 10E3/UL
NRBC BLD AUTO-RTO: 0 /100
PLATELET # BLD AUTO: 315 10E3/UL (ref 150–450)
POTASSIUM BLD-SCNC: 4 MMOL/L (ref 3.4–5.3)
PROT SERPL-MCNC: 6.7 G/DL (ref 6.8–8.8)
RBC # BLD AUTO: 4.44 10E6/UL (ref 4.4–5.9)
SODIUM SERPL-SCNC: 142 MMOL/L (ref 133–144)
WBC # BLD AUTO: 5.8 10E3/UL (ref 4–11)

## 2021-09-20 PROCEDURE — 36415 COLL VENOUS BLD VENIPUNCTURE: CPT | Performed by: STUDENT IN AN ORGANIZED HEALTH CARE EDUCATION/TRAINING PROGRAM

## 2021-09-20 PROCEDURE — 80053 COMPREHEN METABOLIC PANEL: CPT | Performed by: STUDENT IN AN ORGANIZED HEALTH CARE EDUCATION/TRAINING PROGRAM

## 2021-09-20 PROCEDURE — 250N000012 HC RX MED GY IP 250 OP 636 PS 637: Performed by: STUDENT IN AN ORGANIZED HEALTH CARE EDUCATION/TRAINING PROGRAM

## 2021-09-20 PROCEDURE — 96401 CHEMO ANTI-NEOPL SQ/IM: CPT

## 2021-09-20 PROCEDURE — 250N000011 HC RX IP 250 OP 636: Performed by: STUDENT IN AN ORGANIZED HEALTH CARE EDUCATION/TRAINING PROGRAM

## 2021-09-20 PROCEDURE — 85025 COMPLETE CBC W/AUTO DIFF WBC: CPT | Performed by: STUDENT IN AN ORGANIZED HEALTH CARE EDUCATION/TRAINING PROGRAM

## 2021-09-20 PROCEDURE — 250N000013 HC RX MED GY IP 250 OP 250 PS 637: Performed by: STUDENT IN AN ORGANIZED HEALTH CARE EDUCATION/TRAINING PROGRAM

## 2021-09-20 RX ORDER — DEXAMETHASONE 4 MG/1
20 TABLET ORAL ONCE
Status: COMPLETED | OUTPATIENT
Start: 2021-09-20 | End: 2021-09-20

## 2021-09-20 RX ORDER — DIPHENHYDRAMINE HCL 25 MG
50 CAPSULE ORAL
Status: COMPLETED | OUTPATIENT
Start: 2021-09-20 | End: 2021-09-20

## 2021-09-20 RX ORDER — ACETAMINOPHEN 325 MG/1
650 TABLET ORAL
Status: COMPLETED | OUTPATIENT
Start: 2021-09-20 | End: 2021-09-20

## 2021-09-20 RX ADMIN — DEXAMETHASONE 20 MG: 4 TABLET ORAL at 09:01

## 2021-09-20 RX ADMIN — DARATUMUMAB AND HYALURONIDASE-FIHJ (HUMAN RECOMBINANT) 1800 MG: 1800; 30000 INJECTION SUBCUTANEOUS at 09:45

## 2021-09-20 RX ADMIN — ACETAMINOPHEN 650 MG: 325 TABLET ORAL at 09:01

## 2021-09-20 RX ADMIN — DIPHENHYDRAMINE HYDROCHLORIDE 50 MG: 25 CAPSULE ORAL at 09:01

## 2021-09-20 ASSESSMENT — PAIN SCALES - GENERAL: PAINLEVEL: NO PAIN (0)

## 2021-09-20 NOTE — PATIENT INSTRUCTIONS
September 2021 Sunday Monday Tuesday Wednesday Thursday Friday Saturday                  1     2     3     4       5     6     7     8     9     10     11       12     13     14     15     16     17     18       19     20    LAB PERIPHERAL   8:00 AM   (15 min.)    [facility] DRAW   [facility]    ONC INFUSION 6 HR (360 MIN)   8:30 AM   (360 min.)   [facility] INFUSION NURSE   [facility] 21 22 23     24     25       26 27 28 29 30 October 2021 Sunday Monday Tuesday Wednesday Thursday Friday Saturday                            1     2       3     4     5     6     7     8     9       10     11     12     13     14    TELEPHONE VISIT RETURN   1:45 PM   (45 min.)   Marianna Gunter PA-C   [facility] 15     16       17     18    LAB PERIPHERAL   7:30 AM   (15 min.)    [facility] DRAW   [facility]    ONC INFUSION 6 HR (360 MIN)   8:00 AM   (360 min.)    ONC INFUSION NURSE   [facility] 19     20     21     22     23       24     25     26     27     28     29     30       31                                                   Lab Results:  Recent Results (from the past 12 hour(s))   CBC with platelets and differential    Collection Time: 09/20/21  8:25 AM   Result Value Ref Range    WBC Count 5.8 4.0 - 11.0 10e3/uL    RBC Count 4.44 4.40 - 5.90 10e6/uL    Hemoglobin 14.1 13.3 - 17.7 g/dL    Hematocrit 43.5 40.0 - 53.0 %    MCV 98 78 - 100 fL    MCH 31.8 26.5 - 33.0 pg    MCHC 32.4 31.5 - 36.5 g/dL    RDW 14.0 10.0 - 15.0 %    Platelet Count 315 150 - 450 10e3/uL    % Neutrophils 55 %    % Lymphocytes 22 %    % Monocytes 10 %    % Eosinophils 11 %    % Basophils 1 %    % Immature Granulocytes 1 %    NRBCs per 100 WBC 0 <1 /100    Absolute Neutrophils 3.3 1.6 - 8.3 10e3/uL    Absolute Lymphocytes 1.3 0.8 - 5.3 10e3/uL     Absolute Monocytes 0.6 0.0 - 1.3 10e3/uL    Absolute Eosinophils 0.6 0.0 - 0.7 10e3/uL    Absolute Basophils 0.1 0.0 - 0.2 10e3/uL    Absolute Immature Granulocytes 0.0 <=0.0 10e3/uL    Absolute NRBCs 0.0 10e3/uL       [facility] Triage and after hours / weekends / holidays:  [phone]    Please call the triage or after hours line if you experience a temperature greater than or equal to 100.5, shaking chills, have uncontrolled nausea, vomiting and/or diarrhea, dizziness, shortness of breath, chest pain, bleeding, unexplained bruising, or if you have any other new/concerning symptoms, questions or concerns.      If you are having any concerning symptoms or wish to speak to a provider before your next infusion visit, please call your care coordinator or triage to notify them so we can adequately serve you.     If you need a refill on a narcotic prescription or other medication, please call before your infusion appointment.

## 2021-09-20 NOTE — PROGRESS NOTES
Infusion Nursing Note:  Rogelio Marshall presents today for Darzalex Faspro D1 Maintenance  Patient seen by provider today: No   present during visit today: Not Applicable.    Note: Rogelio presents to infusion today stating he feels well. He denies any symptoms, concerns, or pain today. Patient mentioned he started taking black cumin powder (Nigella Sativa Seed Powder) every other day with breakfast, writer mentioned this to pharmacy- they said they do not recommend taking this, patient agreed that he would stop taking it. Creatinine increased from 1.15 to 1.29, FYI IB sent to Dr. Sweeney. Confirmed with pharmacy, since patient has not reacted to Darzalex Faspro in the past he does NOT need to take Tylenol and Benadryl as premeds next infusion.      Intravenous Access:  No Intravenous access at this visit.    Treatment Conditions:  Lab Results   Component Value Date    HGB 14.1 09/20/2021    WBC 5.8 09/20/2021    ANEU 4.8 06/28/2021    ANEUTAUTO 3.3 09/20/2021     09/20/2021      Lab Results   Component Value Date     09/20/2021    POTASSIUM 4.0 09/20/2021    MAG 2.1 12/02/2020    CR 1.29 (H) 09/20/2021    MIRI 9.0 09/20/2021    BILITOTAL 0.7 09/20/2021    ALBUMIN 3.6 09/20/2021    ALT 27 09/20/2021    AST 17 09/20/2021     Results reviewed, labs MET treatment parameters, ok to proceed with treatment.      Post Infusion Assessment:  Patient tolerated injection without incident.  Site patent and intact, free from redness, edema or discomfort.  No evidence of extravasations.       Discharge Plan:   Prescription refills given for Acyclovir, Vitamin D, Omeprazole.  Copy of AVS reviewed with patient and/or family.  Patient scheduled next for ANUP appointment on 10/14 and infusion on 10/18  Patient discharged in stable condition accompanied by: self.  Departure Mode: Ambulatory.  Face to Face time: 0.      Radha Rangel RN

## 2021-09-20 NOTE — NURSING NOTE
Chief Complaint   Patient presents with     Blood Draw     Labs drawn via  by RN. VS taken.     Labs drawn with vpt by rn.  Pt tolerated well.  VS taken.  Pt checked in for next appt.    Cr Salgado RN

## 2021-09-23 RX ORDER — CHOLECALCIFEROL (VITAMIN D3) 25 MCG
TABLET ORAL
Qty: 100 TABLET | Refills: 3 | OUTPATIENT
Start: 2021-09-23

## 2021-09-25 DIAGNOSIS — C90.00 MULTIPLE MYELOMA, REMISSION STATUS UNSPECIFIED (H): ICD-10-CM

## 2021-09-25 DIAGNOSIS — M48.50XS PATHOLOGIC COMPRESSION FRACTURE OF SPINE, SEQUELA: ICD-10-CM

## 2021-09-25 DIAGNOSIS — K21.9 GASTROESOPHAGEAL REFLUX DISEASE WITHOUT ESOPHAGITIS: Primary | ICD-10-CM

## 2021-09-27 RX ORDER — ACYCLOVIR 400 MG/1
400 TABLET ORAL 2 TIMES DAILY
Qty: 60 TABLET | Refills: 11 | Status: SHIPPED | OUTPATIENT
Start: 2021-09-27

## 2021-09-27 RX ORDER — NICOTINE POLACRILEX 4 MG/1
20 GUM, CHEWING ORAL DAILY
Qty: 90 TABLET | Refills: 0 | Status: SHIPPED | OUTPATIENT
Start: 2021-09-27 | End: 2021-11-29

## 2021-09-28 NOTE — PROGRESS NOTES
"Telephone call to the client's home for annual health risk assessment.  An  was not needed.    Current situation/living environment  Client lives alone in a 1 bedroom apartment. Client describes himself as a \"lone zhang\" and prefers to keep to himself.    Activities of daily living (ADL)/instrumental activities of daily living (IADL) and functional issues  Client needs help with the following ADL's: n/a-independent with ADLs.  Client needs help with the following IADL's: cooking  Client states he is unable to perform the above due to neuropathy and lack of appetite. Client has home delivered meals in place as well as [employer] in case of falls/chemo side effects.      Health concerns for today  Client is in maintenance phase of multiple myeloma treatment. Client states his appetite has improved as his weight. Client taking gabapentin for neuropathy and feels it is helpful.   Has patient fallen 2 or more times in the last year? No  Has patient fallen with injury in the last year? Yes    Cognition/mental health  Client is A&O x3. He denies any MH symptoms.     STARS/Med Adherence  Client is non-compliant with the following quality measures: Colon cancer screening  Comments: education efforts    Client's Plan of Care consists of:  Home delivered meals (7 per week) and [employer]    Ashanti Zepeda RN  [facility] Care Coordinator  [phone]      "

## 2021-10-01 PROBLEM — C90.00 MULTIPLE MYELOMA, REMISSION STATUS UNSPECIFIED (H): Status: ACTIVE | Noted: 2020-09-18

## 2021-10-14 ENCOUNTER — VIRTUAL VISIT (OUTPATIENT)
Dept: ONCOLOGY | Facility: CLINIC | Age: 76
End: 2021-10-14
Attending: STUDENT IN AN ORGANIZED HEALTH CARE EDUCATION/TRAINING PROGRAM
Payer: COMMERCIAL

## 2021-10-14 DIAGNOSIS — C90.00 MULTIPLE MYELOMA, REMISSION STATUS UNSPECIFIED (H): Primary | ICD-10-CM

## 2021-10-14 DIAGNOSIS — G62.9 NEUROPATHY: ICD-10-CM

## 2021-10-14 DIAGNOSIS — K21.9 GASTROESOPHAGEAL REFLUX DISEASE WITHOUT ESOPHAGITIS: ICD-10-CM

## 2021-10-14 PROCEDURE — 99443 PR PHYSICIAN TELEPHONE EVALUATION 21-30 MIN: CPT | Mod: 95 | Performed by: PHYSICIAN ASSISTANT

## 2021-10-14 PROCEDURE — 999N001193 HC VIDEO/TELEPHONE VISIT; NO CHARGE

## 2021-10-14 RX ORDER — HEPARIN SODIUM,PORCINE 10 UNIT/ML
5 VIAL (ML) INTRAVENOUS
Status: CANCELLED | OUTPATIENT
Start: 2021-11-15

## 2021-10-14 RX ORDER — ACETAMINOPHEN 325 MG/1
650 TABLET ORAL
Status: CANCELLED
Start: 2021-11-15

## 2021-10-14 RX ORDER — HEPARIN SODIUM,PORCINE 10 UNIT/ML
5 VIAL (ML) INTRAVENOUS
Status: CANCELLED | OUTPATIENT
Start: 2021-10-18

## 2021-10-14 RX ORDER — ALBUTEROL SULFATE 0.83 MG/ML
2.5 SOLUTION RESPIRATORY (INHALATION)
Status: CANCELLED | OUTPATIENT
Start: 2021-11-15

## 2021-10-14 RX ORDER — DIPHENHYDRAMINE HCL 25 MG
50 CAPSULE ORAL
Status: CANCELLED
Start: 2021-11-15

## 2021-10-14 RX ORDER — ACETAMINOPHEN 325 MG/1
650 TABLET ORAL
Status: CANCELLED
Start: 2021-10-18

## 2021-10-14 RX ORDER — DIPHENHYDRAMINE HYDROCHLORIDE 50 MG/ML
50 INJECTION INTRAMUSCULAR; INTRAVENOUS
Status: CANCELLED
Start: 2021-10-18

## 2021-10-14 RX ORDER — EPINEPHRINE 1 MG/ML
0.3 INJECTION, SOLUTION INTRAMUSCULAR; SUBCUTANEOUS EVERY 5 MIN PRN
Status: CANCELLED | OUTPATIENT
Start: 2021-10-18

## 2021-10-14 RX ORDER — NALOXONE HYDROCHLORIDE 0.4 MG/ML
.1-.4 INJECTION, SOLUTION INTRAMUSCULAR; INTRAVENOUS; SUBCUTANEOUS
Status: CANCELLED | OUTPATIENT
Start: 2021-11-15

## 2021-10-14 RX ORDER — ALBUTEROL SULFATE 90 UG/1
1-2 AEROSOL, METERED RESPIRATORY (INHALATION)
Status: CANCELLED
Start: 2021-11-15

## 2021-10-14 RX ORDER — ALBUTEROL SULFATE 90 UG/1
1-2 AEROSOL, METERED RESPIRATORY (INHALATION)
Status: CANCELLED
Start: 2021-10-18

## 2021-10-14 RX ORDER — DIPHENHYDRAMINE HYDROCHLORIDE 50 MG/ML
50 INJECTION INTRAMUSCULAR; INTRAVENOUS
Status: CANCELLED
Start: 2021-11-15

## 2021-10-14 RX ORDER — DIPHENHYDRAMINE HCL 25 MG
50 CAPSULE ORAL
Status: CANCELLED
Start: 2021-10-18

## 2021-10-14 RX ORDER — DEXAMETHASONE 4 MG/1
20 TABLET ORAL ONCE
Status: CANCELLED | OUTPATIENT
Start: 2021-10-18

## 2021-10-14 RX ORDER — METHYLPREDNISOLONE SODIUM SUCCINATE 125 MG/2ML
125 INJECTION, POWDER, LYOPHILIZED, FOR SOLUTION INTRAMUSCULAR; INTRAVENOUS
Status: CANCELLED
Start: 2021-11-15

## 2021-10-14 RX ORDER — LORAZEPAM 2 MG/ML
0.5 INJECTION INTRAMUSCULAR EVERY 4 HOURS PRN
Status: CANCELLED
Start: 2021-11-15

## 2021-10-14 RX ORDER — HEPARIN SODIUM (PORCINE) LOCK FLUSH IV SOLN 100 UNIT/ML 100 UNIT/ML
5 SOLUTION INTRAVENOUS
Status: CANCELLED | OUTPATIENT
Start: 2021-10-18

## 2021-10-14 RX ORDER — LORAZEPAM 2 MG/ML
0.5 INJECTION INTRAMUSCULAR EVERY 4 HOURS PRN
Status: CANCELLED
Start: 2021-10-18

## 2021-10-14 RX ORDER — HEPARIN SODIUM (PORCINE) LOCK FLUSH IV SOLN 100 UNIT/ML 100 UNIT/ML
5 SOLUTION INTRAVENOUS
Status: CANCELLED | OUTPATIENT
Start: 2021-11-15

## 2021-10-14 RX ORDER — MEPERIDINE HYDROCHLORIDE 25 MG/ML
25 INJECTION INTRAMUSCULAR; INTRAVENOUS; SUBCUTANEOUS EVERY 30 MIN PRN
Status: CANCELLED | OUTPATIENT
Start: 2021-11-15

## 2021-10-14 RX ORDER — ALBUTEROL SULFATE 0.83 MG/ML
2.5 SOLUTION RESPIRATORY (INHALATION)
Status: CANCELLED | OUTPATIENT
Start: 2021-10-18

## 2021-10-14 RX ORDER — DEXAMETHASONE 4 MG/1
20 TABLET ORAL ONCE
Status: CANCELLED | OUTPATIENT
Start: 2021-11-15

## 2021-10-14 RX ORDER — MEPERIDINE HYDROCHLORIDE 25 MG/ML
25 INJECTION INTRAMUSCULAR; INTRAVENOUS; SUBCUTANEOUS EVERY 30 MIN PRN
Status: CANCELLED | OUTPATIENT
Start: 2021-10-18

## 2021-10-14 RX ORDER — NALOXONE HYDROCHLORIDE 0.4 MG/ML
.1-.4 INJECTION, SOLUTION INTRAMUSCULAR; INTRAVENOUS; SUBCUTANEOUS
Status: CANCELLED | OUTPATIENT
Start: 2021-10-18

## 2021-10-14 RX ORDER — EPINEPHRINE 1 MG/ML
0.3 INJECTION, SOLUTION INTRAMUSCULAR; SUBCUTANEOUS EVERY 5 MIN PRN
Status: CANCELLED | OUTPATIENT
Start: 2021-11-15

## 2021-10-14 RX ORDER — SODIUM CHLORIDE 9 MG/ML
1000 INJECTION, SOLUTION INTRAVENOUS CONTINUOUS PRN
Status: CANCELLED
Start: 2021-10-18

## 2021-10-14 RX ORDER — METHYLPREDNISOLONE SODIUM SUCCINATE 125 MG/2ML
125 INJECTION, POWDER, LYOPHILIZED, FOR SOLUTION INTRAMUSCULAR; INTRAVENOUS
Status: CANCELLED
Start: 2021-10-18

## 2021-10-14 RX ORDER — SODIUM CHLORIDE 9 MG/ML
1000 INJECTION, SOLUTION INTRAVENOUS CONTINUOUS PRN
Status: CANCELLED
Start: 2021-11-15

## 2021-10-14 NOTE — NURSING NOTE
Having a lot of indigestion after infusions.  Wondering about Vitamin K-2 can he take these or not. Wondering about MK4 .     Is having nurve pain in feet and fingers. That's when he takes the gabapentin.

## 2021-10-14 NOTE — PROGRESS NOTES
Rogelio is a 76 year old who is being evaluated via a billable telephone visit.      What phone number would you like to be contacted at? [phone]  How would you like to obtain your AVS? Eddie  Phone call duration: 30 minutes      Oncologic Hx:   -compression fractures and bone scan reveals multiple lytic lesions throughout his appendicular and axial skeleton. His Beta 2 microglobulin was 3.6 on 10/10/2020. Kappa chains were 73.6 on 10/5/2020 with K/L ratio of 89.9. M spike of 16.2 in urine on 10/10. Bone marrow biopsy on 10/23/2020 showed trilineage hematopoiesis and 50-60% kappa restricted plasma cells. Flow cytometry showed 20 % plasma cells which express CD19, CD38, CD45 and monotypic cytoplasmic kappa immunoglobulin light chains but lack CD20 and CD56.  FISH shows IGH-CCND1 fusion (81%; 30% had loss of IGH component from one fusion signal).      - Started 21 day cycle VRD 11/2020    - 11/27 he had a fall with rib fractures.     - Dec 2020 Started madyson-velcade-dex. Completed 5 cycles and achieved VGPR    - April 2021 started darzalex maintenance      Interval Hx:  Reports indigestion following his last darzalex infusion. He resumed omeprazole shortly after this with resolution of symptoms. He is considering discontinuing omeprazole again. Neuropathy in feet is stable but still bothersome. He is interested in possible supplements that may treat neuropathy symptoms. Energy level stable. He remains active during the day. Appetite is good. Denies new pain or weakness.    A comprehensive review of systems was completed and negative except as described above.     Pertinent PMH reviewed:   HTN    Physical Exam:   Unable to do physical exam 2/2 telephone visit. Well sounding in no distress. Normal speech and thought process. Good voice quality. No audible wheezing or cough.    The rest of a comprehensive physical examination is deferred due to Public Health Emergency telephone visit restrictions    Pertinent Data  Summarized:     6/28/2021 07:49 7/26/2021 07:54 8/23/2021 08:05   Kappa Free Lt Chain 0.80     Kappa Lambda Ratio 2.86 (H)     Lambda Free Lt Chain 0.28 (L)     Monoclonal Peak 0.1 (H)  0.1 (H)   Kappa Free Light Chains   0.88   LAMBDA FREE LT CHAINS   0.32 (L)   KAPPA/LAMBDA RATIO   2.75 (H)   Total Protein Serum for ELP   6.1 (L)       Assessment and Plan  75 yo man with IgG Kappa standard risk multiple myeloma. Despite his side effects, he has had a VGPR with therapy. Now on q 4 week darzalex faspro for maintenance.   -Darzalex treatment Monday 10/18/21 pending labs  -continue to follow SPEP and FLC monthly. Will get next week  -RTC for darzalex in 1 and 2 months. Will see Dr. Sweeney prior to treatment in December.    Pathologic Fractures:. Vitamin D is replete and he is on a daily supplement as well as calcium. He has not received dental clearance for Zometa infusions; did not discuss today as we spent a long time talking about his neuropathy.     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 Continues to use gabapentin with good relief. Advised against additional supplement use without first discussing with providers; had a lot of questions about supplements and use. Currently on Vitamin K supplement. Advised him to stop but if he were to continue, should NOT increase the dose    GERD: Patient would like to stop omeprazole again. Ok to stop though discussed he can resume if indigestion and GERD resume with next infusion    CKD: Baseline creatinine is 1.1 suggesting underlying CKD. Creat slightly elevated last month. Monitor with next infusion.    Sidra Winston CNP  [facility]  [address] [place] [id number]  [phone]    10 minutes spent on the date of the encounter doing chart review and documentation, in addition to 30 minutes spent on the phone with the patient.     The patient was seen in conjunction with Sidra Winston CNP who served as a scribe for today's visit. I have  reviewed and edited the above note, and agree with the above findings and plan.    Marianna Gunter PA-C

## 2021-10-14 NOTE — LETTER
10/14/2021         RE: Rogelio Marshall  [address]  [facility] [address]        Dear Colleague,    Thank you for referring your patient, Rogelio Marshall, to the [facility]. Please see a copy of my visit note below.    Rogelio is a 76 year old who is being evaluated via a billable telephone visit.      What phone number would you like to be contacted at? [phone]  How would you like to obtain your AVS? MyChart  Phone call duration: 30 minutes      Oncologic Hx:   -compression fractures and bone scan reveals multiple lytic lesions throughout his appendicular and axial skeleton. His Beta 2 microglobulin was 3.6 on 10/10/2020. Kappa chains were 73.6 on 10/5/2020 with K/L ratio of 89.9. M spike of 16.2 in urine on 10/10. Bone marrow biopsy on 10/23/2020 showed trilineage hematopoiesis and 50-60% kappa restricted plasma cells. Flow cytometry showed 20 % plasma cells which express CD19, CD38, CD45 and monotypic cytoplasmic kappa immunoglobulin light chains but lack CD20 and CD56.  FISH shows IGH-CCND1 fusion (81%; 30% had loss of IGH component from one fusion signal).      - Started 21 day cycle VRD 11/2020    - 11/27 he had a fall with rib fractures.     - Dec 2020 Started madyson-velcade-dex. Completed 5 cycles and achieved VGPR    - April 2021 started darzalex maintenance      Interval Hx:  Reports indigestion following his last darzalex infusion. He resumed omeprazole shortly after this with resolution of symptoms. He is considering discontinuing omeprazole again. Neuropathy in feet is stable but still bothersome. He is interested in possible supplements that may treat neuropathy symptoms. Energy level stable. He remains active during the day. Appetite is good. Denies new pain or weakness.    A comprehensive review of systems was completed and negative except as described above.     Pertinent PMH reviewed:   HTN    Physical Exam:   Unable to do physical exam 2/2 telephone  visit. Well sounding in no distress. Normal speech and thought process. Good voice quality. No audible wheezing or cough.    The rest of a comprehensive physical examination is deferred due to Public Health Emergency telephone visit restrictions    Pertinent Data Summarized:     6/28/2021 07:49 7/26/2021 07:54 8/23/2021 08:05   Kappa Free Lt Chain 0.80     Kappa Lambda Ratio 2.86 (H)     Lambda Free Lt Chain 0.28 (L)     Monoclonal Peak 0.1 (H)  0.1 (H)   Kappa Free Light Chains   0.88   LAMBDA FREE LT CHAINS   0.32 (L)   KAPPA/LAMBDA RATIO   2.75 (H)   Total Protein Serum for ELP   6.1 (L)       Assessment and Plan  75 yo man with IgG Kappa standard risk multiple myeloma. Despite his side effects, he has had a VGPR with therapy. Now on q 4 week darzalex faspro for maintenance.   -Darzalex treatment Monday 10/18/21 pending labs  -continue to follow SPEP and FLC monthly. Will get next week  -RTC for darzalex in 1 and 2 months. Will see Dr. Sweeney prior to treatment in December.    Pathologic Fractures:. Vitamin D is replete and he is on a daily supplement as well as calcium. He has not received dental clearance for Zometa infusions; did not discuss today as we spent a long time talking about his neuropathy.     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 Continues to use gabapentin with good relief. Advised against additional supplement use without first discussing with providers; had a lot of questions about supplements and use. Currently on Vitamin K supplement. Advised him to stop but if he were to continue, should NOT increase the dose    GERD: Patient would like to stop omeprazole again. Ok to stop though discussed he can resume if indigestion and GERD resume with next infusion    CKD: Baseline creatinine is 1.1 suggesting underlying CKD. Creat slightly elevated last month. Monitor with next infusion.    Sidra Winston CNP  [facility]  [address] [place] [address]  [id number]  [phone]    10 minutes spent on the date of the encounter doing chart review and documentation, in addition to 30 minutes spent on the phone with the patient.     The patient was seen in conjunction with Sidra Winston CNP who served as a scribe for today's visit. I have reviewed and edited the above note, and agree with the above findings and plan.    Marianna Gunter PA-C              Again, thank you for allowing me to participate in the care of your patient.        Sincerely,        Marianna Gutner PA-C

## 2021-10-18 ENCOUNTER — INFUSION THERAPY VISIT (OUTPATIENT)
Dept: ONCOLOGY | Facility: CLINIC | Age: 76
End: 2021-10-18
Attending: STUDENT IN AN ORGANIZED HEALTH CARE EDUCATION/TRAINING PROGRAM
Payer: COMMERCIAL

## 2021-10-18 ENCOUNTER — APPOINTMENT (OUTPATIENT)
Dept: LAB | Facility: CLINIC | Age: 76
End: 2021-10-18
Attending: STUDENT IN AN ORGANIZED HEALTH CARE EDUCATION/TRAINING PROGRAM
Payer: COMMERCIAL

## 2021-10-18 VITALS
SYSTOLIC BLOOD PRESSURE: 157 MMHG | OXYGEN SATURATION: 97 % | RESPIRATION RATE: 18 BRPM | DIASTOLIC BLOOD PRESSURE: 84 MMHG | BODY MASS INDEX: 27.59 KG/M2 | WEIGHT: 146 LBS | TEMPERATURE: 98.2 F | HEART RATE: 78 BPM

## 2021-10-18 DIAGNOSIS — C90.00 MULTIPLE MYELOMA, REMISSION STATUS UNSPECIFIED (H): Primary | ICD-10-CM

## 2021-10-18 LAB
ALBUMIN SERPL-MCNC: 3.6 G/DL (ref 3.4–5)
ALP SERPL-CCNC: 114 U/L (ref 40–150)
ALT SERPL W P-5'-P-CCNC: 33 U/L (ref 0–70)
ANION GAP SERPL CALCULATED.3IONS-SCNC: 8 MMOL/L (ref 3–14)
AST SERPL W P-5'-P-CCNC: 22 U/L (ref 0–45)
BASOPHILS # BLD AUTO: 0.1 10E3/UL (ref 0–0.2)
BASOPHILS NFR BLD AUTO: 1 %
BILIRUB SERPL-MCNC: 0.5 MG/DL (ref 0.2–1.3)
BUN SERPL-MCNC: 30 MG/DL (ref 7–30)
CALCIUM SERPL-MCNC: 9.1 MG/DL (ref 8.5–10.1)
CHLORIDE BLD-SCNC: 109 MMOL/L (ref 94–109)
CO2 SERPL-SCNC: 25 MMOL/L (ref 20–32)
CREAT SERPL-MCNC: 1.14 MG/DL (ref 0.66–1.25)
EOSINOPHIL # BLD AUTO: 0.8 10E3/UL (ref 0–0.7)
EOSINOPHIL NFR BLD AUTO: 11 %
ERYTHROCYTE [DISTWIDTH] IN BLOOD BY AUTOMATED COUNT: 13.4 % (ref 10–15)
GFR SERPL CREATININE-BSD FRML MDRD: 63 ML/MIN/1.73M2
GLUCOSE BLD-MCNC: 121 MG/DL (ref 70–99)
HCT VFR BLD AUTO: 42.8 % (ref 40–53)
HGB BLD-MCNC: 13.7 G/DL (ref 13.3–17.7)
IMM GRANULOCYTES # BLD: 0 10E3/UL
IMM GRANULOCYTES NFR BLD: 0 %
KAPPA LC FREE SER-MCNC: 0.7 MG/DL (ref 0.33–1.94)
KAPPA LC FREE/LAMBDA FREE SER NEPH: 2.59 {RATIO} (ref 0.26–1.65)
LAMBDA LC FREE SERPL-MCNC: 0.27 MG/DL (ref 0.57–2.63)
LYMPHOCYTES # BLD AUTO: 1.3 10E3/UL (ref 0.8–5.3)
LYMPHOCYTES NFR BLD AUTO: 18 %
MCH RBC QN AUTO: 31.7 PG (ref 26.5–33)
MCHC RBC AUTO-ENTMCNC: 32 G/DL (ref 31.5–36.5)
MCV RBC AUTO: 99 FL (ref 78–100)
MONOCYTES # BLD AUTO: 0.7 10E3/UL (ref 0–1.3)
MONOCYTES NFR BLD AUTO: 10 %
NEUTROPHILS # BLD AUTO: 4.3 10E3/UL (ref 1.6–8.3)
NEUTROPHILS NFR BLD AUTO: 60 %
NRBC # BLD AUTO: 0 10E3/UL
NRBC BLD AUTO-RTO: 0 /100
PLATELET # BLD AUTO: 288 10E3/UL (ref 150–450)
POTASSIUM BLD-SCNC: 3.8 MMOL/L (ref 3.4–5.3)
PROT SERPL-MCNC: 6.5 G/DL (ref 6.8–8.8)
RBC # BLD AUTO: 4.32 10E6/UL (ref 4.4–5.9)
SODIUM SERPL-SCNC: 142 MMOL/L (ref 133–144)
TOTAL PROTEIN SERUM FOR ELP: 6.4 G/DL (ref 6.8–8.8)
WBC # BLD AUTO: 7.1 10E3/UL (ref 4–11)

## 2021-10-18 PROCEDURE — 36415 COLL VENOUS BLD VENIPUNCTURE: CPT | Performed by: PHYSICIAN ASSISTANT

## 2021-10-18 PROCEDURE — 250N000011 HC RX IP 250 OP 636: Performed by: PHYSICIAN ASSISTANT

## 2021-10-18 PROCEDURE — 83883 ASSAY NEPHELOMETRY NOT SPEC: CPT

## 2021-10-18 PROCEDURE — 250N000012 HC RX MED GY IP 250 OP 636 PS 637: Performed by: PHYSICIAN ASSISTANT

## 2021-10-18 PROCEDURE — 85025 COMPLETE CBC W/AUTO DIFF WBC: CPT | Performed by: PHYSICIAN ASSISTANT

## 2021-10-18 PROCEDURE — 84155 ASSAY OF PROTEIN SERUM: CPT

## 2021-10-18 PROCEDURE — 80053 COMPREHEN METABOLIC PANEL: CPT | Performed by: PHYSICIAN ASSISTANT

## 2021-10-18 PROCEDURE — 84165 PROTEIN E-PHORESIS SERUM: CPT | Mod: TC | Performed by: PATHOLOGY

## 2021-10-18 PROCEDURE — 84165 PROTEIN E-PHORESIS SERUM: CPT | Mod: 26 | Performed by: PATHOLOGY

## 2021-10-18 PROCEDURE — 96401 CHEMO ANTI-NEOPL SQ/IM: CPT

## 2021-10-18 RX ORDER — DEXAMETHASONE 4 MG/1
20 TABLET ORAL ONCE
Status: COMPLETED | OUTPATIENT
Start: 2021-10-18 | End: 2021-10-18

## 2021-10-18 RX ADMIN — DARATUMUMAB AND HYALURONIDASE-FIHJ (HUMAN RECOMBINANT) 1800 MG: 1800; 30000 INJECTION SUBCUTANEOUS at 09:15

## 2021-10-18 RX ADMIN — DEXAMETHASONE 20 MG: 4 TABLET ORAL at 09:00

## 2021-10-18 ASSESSMENT — PAIN SCALES - GENERAL: PAINLEVEL: NO PAIN (0)

## 2021-10-18 NOTE — PROGRESS NOTES
Infusion Nursing Note:  Rogelio Marshall presents today for Darzalex Faspro D1 Maintenance.    Patient seen by provider today: No    Note: Patient presents to the infusion center today feeling well. Denies any pain, fevers, chills, chest pain or shortness of breath.  Patient mentioned he is still taking black cumin powder (Nigella Sativa Seed Powder) everyday with breakfast, writer mentioned this to pharmacy as they told him last visit to STOP taking this. Pharmacy came to re-educate him on the importance of NOT taking this supplement. Patient verbalized understanding and that he would stop taking it.    Patient knows to take his Dexamethasone tomorrow, on Day 2.    Intravenous Access:  No Intravenous access/labs at this visit.    Treatment Conditions:  Lab Results   Component Value Date    HGB 13.7 10/18/2021    WBC 7.1 10/18/2021    ANEU 4.8 06/28/2021    ANEUTAUTO 4.3 10/18/2021     10/18/2021      Lab Results   Component Value Date     10/18/2021    POTASSIUM 3.8 10/18/2021    MAG 2.1 12/02/2020    CR 1.14 10/18/2021    MIRI 9.1 10/18/2021    BILITOTAL 0.5 10/18/2021    ALBUMIN 3.6 10/18/2021    ALT 33 10/18/2021    AST 22 10/18/2021     Results reviewed, labs MET treatment parameters, ok to proceed with treatment.    Post Infusion Assessment:  Patient tolerated injection in LLQ ABD without incident.  Site patent and intact, free from redness, edema or discomfort.  No evidence of extravasations.     Discharge Plan:   Prescription refills given for Prilosec and Gabapentin.  Discharge instructions reviewed with: Patient.  Patient and/or family verbalized understanding of discharge instructions and all questions answered.  AVS printed and given to the patient.  Patient will return 11/15 for next appointment.   Patient discharged in stable condition accompanied by: self.  Departure Mode: Ambulatory.      Ingrid Zamudio RN

## 2021-10-18 NOTE — PATIENT INSTRUCTIONS
[facility] Triage and after hours / weekends / holidays:  [phone]    Please call the triage or after hours line if you experience a temperature greater than or equal to 100.5, shaking chills, have uncontrolled nausea, vomiting and/or diarrhea, dizziness, shortness of breath, chest pain, bleeding, unexplained bruising, or if you have any other new/concerning symptoms, questions or concerns.      If you are having any concerning symptoms or wish to speak to a provider before your next infusion visit, please call your care coordinator or triage to notify them so we can adequately serve you.     If you need a refill on a narcotic prescription or other medication, please call before your infusion appointment.                 October 2021 Sunday Monday Tuesday Wednesday Thursday Friday Saturday                            1     2       3     4     5     6     7     8     9       10     11     12     13     14    TELEPHONE VISIT RETURN   1:45 PM   (45 min.)   Marianna Gunter PA-C   [facility] 15     16       17     18    LAB PERIPHERAL   7:30 AM   (15 min.)   [facility]   [facility]    ONC INFUSION 6 HR (360 MIN)   8:00 AM   (360 min.)    ONC INFUSION NURSE   [facility] 19     20     21     22     23       24     25     26     27     28     29     30       31                                               November 2021 Sunday Monday Tuesday Wednesday Thursday Friday Saturday        1     2     3     4     5     6       7     8     9     10     11     12     13       14     15    LAB PERIPHERAL   7:45 AM   (15 min.)   [facility]   [facility]    ONC INFUSION 6 HR (360 MIN)   8:30 AM   (360 min.)    ONC INFUSION NURSE   [facility] 16     17     18     19     20       21     22     23     24     25     26     27       28     29     30                                         Recent Results (from the past 24 hour(s))   Comprehensive metabolic panel    Collection Time: 10/18/21  8:06 AM   Result Value Ref Range    Sodium 142 133 - 144 mmol/L    Potassium 3.8 3.4 - 5.3 mmol/L    Chloride 109 94 - 109 mmol/L    Carbon Dioxide (CO2) 25 20 - 32 mmol/L    Anion Gap 8 3 - 14 mmol/L    Urea Nitrogen 30 7 - 30 mg/dL    Creatinine 1.14 0.66 - 1.25 mg/dL    Calcium 9.1 8.5 - 10.1 mg/dL    Glucose 121 (H) 70 - 99 mg/dL    Alkaline Phosphatase 114 40 - 150 U/L    AST 22 0 - 45 U/L    ALT 33 0 - 70 U/L    Protein Total 6.5 (L) 6.8 - 8.8 g/dL    Albumin 3.6 3.4 - 5.0 g/dL    Bilirubin Total 0.5 0.2 - 1.3 mg/dL    GFR Estimate 63 >60 mL/min/1.73m2   CBC with platelets and differential    Collection Time: 10/18/21  8:06 AM   Result Value Ref Range    WBC Count 7.1 4.0 - 11.0 10e3/uL    RBC Count 4.32 (L) 4.40 - 5.90 10e6/uL    Hemoglobin 13.7 13.3 - 17.7 g/dL    Hematocrit 42.8 40.0 - 53.0 %    MCV 99 78 - 100 fL    MCH 31.7 26.5 - 33.0 pg    MCHC 32.0 31.5 - 36.5 g/dL    RDW 13.4 10.0 - 15.0 %    Platelet Count 288 150 - 450 10e3/uL    % Neutrophils 60 %    % Lymphocytes 18 %    % Monocytes 10 %    % Eosinophils 11 %    % Basophils 1 %    % Immature Granulocytes 0 %    NRBCs per 100 WBC 0 <1 /100    Absolute Neutrophils 4.3 1.6 - 8.3 10e3/uL    Absolute Lymphocytes 1.3 0.8 - 5.3 10e3/uL    Absolute Monocytes 0.7 0.0 - 1.3 10e3/uL    Absolute Eosinophils 0.8 (H) 0.0 - 0.7 10e3/uL    Absolute Basophils 0.1 0.0 - 0.2 10e3/uL    Absolute Immature Granulocytes 0.0 <=0.0 10e3/uL    Absolute NRBCs 0.0 10e3/uL       The CDC has recommended that individuals who are moderately to severely immunocompromised may benefit from an additional dose to make sure they have enough protection against COVID-19. We encourage you to receive a booster vaccine when you can.  [facility] is offering booster vaccines. Updated information on vaccines at [facility] can be found at:  [email or web address] . Most local pharmacies, [facility] and [facility] for example are also offering booster vaccines.       Currently, CDC is recommending that moderately to severely immunocompromised people receive an additional dose. This includes people who have:   -Been receiving active cancer treatment for tumors or cancers of the blood   -Received an organ transplant and are taking medicine to suppress the immune system   -Received a stem cell transplant within the last 2 years or are taking medicine to suppress the immune system   -Moderate or severe primary immunodeficiency (such as DiGeorge syndrome, Wiskott-Morris syndrome)   -Advanced or untreated HIV infection   -Active treatment with high-dose corticosteroids or other drugs that may suppress your immune response     The most current information can be found on the CDC website: https://www.cdc.gov/coronavirus/2019-ncov/vaccines/recommendations/immuno.html     The [facility] is leading a research study that aims to better understand the immune system s response to COVID-19 vaccines. The primary focus is to understand if immunocompromised people have different responses to the vaccine. If you are interested in learning more about the [employer] COVID-19 Vaccine Response Study, please visit: [email or web address]

## 2021-10-18 NOTE — NURSING NOTE
Chief Complaint   Patient presents with     Labs Only     venipuncture, vitals checked     Nancy Hernadez RN on 10/18/2021 at 8:08 AM

## 2021-10-19 LAB
ALBUMIN SERPL ELPH-MCNC: 4.3 G/DL (ref 3.7–5.1)
ALPHA1 GLOB SERPL ELPH-MCNC: 0.3 G/DL (ref 0.2–0.4)
ALPHA2 GLOB SERPL ELPH-MCNC: 0.7 G/DL (ref 0.5–0.9)
B-GLOBULIN SERPL ELPH-MCNC: 0.7 G/DL (ref 0.6–1)
GAMMA GLOB SERPL ELPH-MCNC: 0.4 G/DL (ref 0.7–1.6)
M PROTEIN SERPL ELPH-MCNC: 0.1 G/DL
PROT PATTERN SERPL ELPH-IMP: ABNORMAL

## 2021-10-26 DIAGNOSIS — C90.00 MULTIPLE MYELOMA, REMISSION STATUS UNSPECIFIED (H): ICD-10-CM

## 2021-10-26 RX ORDER — DEXAMETHASONE 4 MG/1
TABLET ORAL
Qty: 35 TABLET | Refills: 0 | Status: SHIPPED | OUTPATIENT
Start: 2021-10-26 | End: 2021-11-29

## 2021-10-26 NOTE — CONFIDENTIAL NOTE
Last prescribing provider: Pebbles Longo     Last clinic visit date: 10/14/21 CARISSA Gunter     Any missed appointments or no-shows since last clinic visit?: No     Recommendations for requested medication (if none, N/A): Not mentioned on last visit on 10/14/21.     Next clinic visit date: 12/8/21 Dr Sweeney     Any other pertinent information (if none, N/A): N/A

## 2021-11-01 ENCOUNTER — APPOINTMENT (OUTPATIENT)
Dept: GENERAL RADIOLOGY | Facility: CLINIC | Age: 76
End: 2021-11-01
Attending: EMERGENCY MEDICINE
Payer: COMMERCIAL

## 2021-11-01 ENCOUNTER — TELEPHONE (OUTPATIENT)
Dept: ONCOLOGY | Facility: CLINIC | Age: 76
End: 2021-11-01

## 2021-11-01 ENCOUNTER — HOSPITAL ENCOUNTER (EMERGENCY)
Facility: CLINIC | Age: 76
Discharge: LEFT BEFORE DISCHARGE | End: 2021-11-01
Attending: EMERGENCY MEDICINE | Admitting: EMERGENCY MEDICINE
Payer: COMMERCIAL

## 2021-11-01 VITALS
OXYGEN SATURATION: 97 % | BODY MASS INDEX: 27.59 KG/M2 | DIASTOLIC BLOOD PRESSURE: 79 MMHG | WEIGHT: 146 LBS | SYSTOLIC BLOOD PRESSURE: 152 MMHG | RESPIRATION RATE: 18 BRPM | HEART RATE: 98 BPM | TEMPERATURE: 99.4 F

## 2021-11-01 DIAGNOSIS — U07.1 INFECTION DUE TO 2019 NOVEL CORONAVIRUS: ICD-10-CM

## 2021-11-01 LAB
ALBUMIN SERPL-MCNC: 3.2 G/DL (ref 3.4–5)
ALP SERPL-CCNC: 92 U/L (ref 40–150)
ALT SERPL W P-5'-P-CCNC: 38 U/L (ref 0–70)
ANION GAP SERPL CALCULATED.3IONS-SCNC: 9 MMOL/L (ref 3–14)
AST SERPL W P-5'-P-CCNC: ABNORMAL U/L
ATRIAL RATE - MUSE: 115 BPM
BASOPHILS # BLD AUTO: 0 10E3/UL (ref 0–0.2)
BASOPHILS NFR BLD AUTO: 0 %
BILIRUB SERPL-MCNC: 1.1 MG/DL (ref 0.2–1.3)
BUN SERPL-MCNC: 22 MG/DL (ref 7–30)
CALCIUM SERPL-MCNC: 8.9 MG/DL (ref 8.5–10.1)
CHLORIDE BLD-SCNC: 98 MMOL/L (ref 94–109)
CO2 SERPL-SCNC: 23 MMOL/L (ref 20–32)
CREAT SERPL-MCNC: 1.06 MG/DL (ref 0.66–1.25)
DIASTOLIC BLOOD PRESSURE - MUSE: NORMAL MMHG
EOSINOPHIL # BLD AUTO: 0 10E3/UL (ref 0–0.7)
EOSINOPHIL NFR BLD AUTO: 0 %
ERYTHROCYTE [DISTWIDTH] IN BLOOD BY AUTOMATED COUNT: 13.5 % (ref 10–15)
GFR SERPL CREATININE-BSD FRML MDRD: 69 ML/MIN/1.73M2
GLUCOSE BLD-MCNC: 92 MG/DL (ref 70–99)
HCT VFR BLD AUTO: 45.5 % (ref 40–53)
HGB BLD-MCNC: 15.2 G/DL (ref 13.3–17.7)
HOLD SPECIMEN: NORMAL
IMM GRANULOCYTES # BLD: 0 10E3/UL
IMM GRANULOCYTES NFR BLD: 0 %
INTERPRETATION ECG - MUSE: NORMAL
LACTATE SERPL-SCNC: 1.2 MMOL/L (ref 0.7–2)
LYMPHOCYTES # BLD AUTO: 0.5 10E3/UL (ref 0.8–5.3)
LYMPHOCYTES NFR BLD AUTO: 5 %
MCH RBC QN AUTO: 32.3 PG (ref 26.5–33)
MCHC RBC AUTO-ENTMCNC: 33.4 G/DL (ref 31.5–36.5)
MCV RBC AUTO: 97 FL (ref 78–100)
MONOCYTES # BLD AUTO: 0.6 10E3/UL (ref 0–1.3)
MONOCYTES NFR BLD AUTO: 7 %
NEUTROPHILS # BLD AUTO: 8.2 10E3/UL (ref 1.6–8.3)
NEUTROPHILS NFR BLD AUTO: 88 %
NRBC # BLD AUTO: 0 10E3/UL
NRBC BLD AUTO-RTO: 0 /100
P AXIS - MUSE: 31 DEGREES
PLATELET # BLD AUTO: 219 10E3/UL (ref 150–450)
POTASSIUM BLD-SCNC: 5.5 MMOL/L (ref 3.4–5.3)
PR INTERVAL - MUSE: 172 MS
PROT SERPL-MCNC: 7.2 G/DL (ref 6.8–8.8)
QRS DURATION - MUSE: 74 MS
QT - MUSE: 292 MS
QTC - MUSE: 403 MS
R AXIS - MUSE: 34 DEGREES
RBC # BLD AUTO: 4.71 10E6/UL (ref 4.4–5.9)
SARS-COV-2 RNA RESP QL NAA+PROBE: POSITIVE
SODIUM SERPL-SCNC: 130 MMOL/L (ref 133–144)
SYSTOLIC BLOOD PRESSURE - MUSE: NORMAL MMHG
T AXIS - MUSE: 4 DEGREES
VENTRICULAR RATE- MUSE: 115 BPM
WBC # BLD AUTO: 9.3 10E3/UL (ref 4–11)

## 2021-11-01 PROCEDURE — U0003 INFECTIOUS AGENT DETECTION BY NUCLEIC ACID (DNA OR RNA); SEVERE ACUTE RESPIRATORY SYNDROME CORONAVIRUS 2 (SARS-COV-2) (CORONAVIRUS DISEASE [COVID-19]), AMPLIFIED PROBE TECHNIQUE, MAKING USE OF HIGH THROUGHPUT TECHNOLOGIES AS DESCRIBED BY CMS-2020-01-R: HCPCS | Performed by: EMERGENCY MEDICINE

## 2021-11-01 PROCEDURE — 99285 EMERGENCY DEPT VISIT HI MDM: CPT | Mod: 25 | Performed by: EMERGENCY MEDICINE

## 2021-11-01 PROCEDURE — 96360 HYDRATION IV INFUSION INIT: CPT | Performed by: EMERGENCY MEDICINE

## 2021-11-01 PROCEDURE — 83605 ASSAY OF LACTIC ACID: CPT | Performed by: EMERGENCY MEDICINE

## 2021-11-01 PROCEDURE — 258N000003 HC RX IP 258 OP 636: Performed by: EMERGENCY MEDICINE

## 2021-11-01 PROCEDURE — 84155 ASSAY OF PROTEIN SERUM: CPT | Performed by: EMERGENCY MEDICINE

## 2021-11-01 PROCEDURE — 93005 ELECTROCARDIOGRAM TRACING: CPT | Performed by: EMERGENCY MEDICINE

## 2021-11-01 PROCEDURE — 96361 HYDRATE IV INFUSION ADD-ON: CPT | Performed by: EMERGENCY MEDICINE

## 2021-11-01 PROCEDURE — 82247 BILIRUBIN TOTAL: CPT | Performed by: EMERGENCY MEDICINE

## 2021-11-01 PROCEDURE — 36415 COLL VENOUS BLD VENIPUNCTURE: CPT | Performed by: EMERGENCY MEDICINE

## 2021-11-01 PROCEDURE — 250N000013 HC RX MED GY IP 250 OP 250 PS 637: Performed by: EMERGENCY MEDICINE

## 2021-11-01 PROCEDURE — 87040 BLOOD CULTURE FOR BACTERIA: CPT | Performed by: EMERGENCY MEDICINE

## 2021-11-01 PROCEDURE — C9803 HOPD COVID-19 SPEC COLLECT: HCPCS | Performed by: EMERGENCY MEDICINE

## 2021-11-01 PROCEDURE — 71046 X-RAY EXAM CHEST 2 VIEWS: CPT

## 2021-11-01 PROCEDURE — 85025 COMPLETE CBC W/AUTO DIFF WBC: CPT | Performed by: EMERGENCY MEDICINE

## 2021-11-01 PROCEDURE — 93010 ELECTROCARDIOGRAM REPORT: CPT | Performed by: EMERGENCY MEDICINE

## 2021-11-01 PROCEDURE — 71046 X-RAY EXAM CHEST 2 VIEWS: CPT | Mod: 26 | Performed by: RADIOLOGY

## 2021-11-01 RX ORDER — SODIUM CHLORIDE 9 MG/ML
INJECTION, SOLUTION INTRAVENOUS CONTINUOUS
Status: DISCONTINUED | OUTPATIENT
Start: 2021-11-01 | End: 2021-11-01 | Stop reason: HOSPADM

## 2021-11-01 RX ORDER — ACETAMINOPHEN 500 MG
1000 TABLET ORAL ONCE
Status: COMPLETED | OUTPATIENT
Start: 2021-11-01 | End: 2021-11-01

## 2021-11-01 RX ADMIN — ACETAMINOPHEN 1000 MG: 500 TABLET, FILM COATED ORAL at 13:54

## 2021-11-01 RX ADMIN — SODIUM CHLORIDE 1000 ML: 9 INJECTION, SOLUTION INTRAVENOUS at 12:23

## 2021-11-01 ASSESSMENT — ACTIVITIES OF DAILY LIVING (ADL)
ADLS_ACUITY_SCORE: 9
ADLS_ACUITY_SCORE: 9

## 2021-11-01 NOTE — DISCHARGE INSTRUCTIONS
Return to the Emergency Department at any time to continue plan of treatment. Return to the emergency department if symptoms continue, get worse, there are any new symptoms or any cause for concern.

## 2021-11-01 NOTE — TELEPHONE ENCOUNTER
"[facility] Telephone Triage Note    Assessment:   Rogelio (pt) reporting the following symptoms:  Started feeling fever started Sunday morning 10/31 from chill to warmth.   Pt reports was outside in the cold weather for a [employer] lunch on Saturday and started feeling fever and not well Sunday morning.   Waited to take temperature until today  101.8F Oral  Reports \"not feeling well at all\" decrease oral intake and continues to feel hot/cold body temperature cycles.     Pt was unable to take Acyclovir with last dose 5days ago as forgot to  refill.     Last DARZALEX FASPRO was given on 10/18.       Recommendations:   This writer recommended going to emergency room for infectious disease work up r/t fever above 100.4F. oral.   This writer asked if Rogelio had family member or friend that could take pt to emergency room as pt was concerned with parking and being in Emergency room for a long time.     Rogelio stated there is no other family member or friend to assist, he states he feels he can drive to [address] since he lives in [place] and will  park his car. Denies dizziness or headache. Pt AxOx3.     9:57am This writer gave hand off report to Emergency Room nurse Corbin RILEY     Follow-Up:  Routed to care team with update.   "

## 2021-11-01 NOTE — ED PROVIDER NOTES
[place] EMERGENCY DEPARTMENT ([facility])  21  History   No chief complaint on file.    The history is provided by the patient and medical records.     Rogelio Marshall is a 76 year old male with a past medical history significant for CKD stage 3, hypertension, hyperlipidemia, multiple myeloma (on chemotherapy) who presents to the Emergency Department for evaluation of worsening cough, fever, fatigue, rhinorrhea, and decreased appetite for the past 3 days.  Patient reports a measured temperature of 101.8 this morning.  He endorses abdominal pain which has caused decreased appetite.  He has been trying to drink protein shakes instead as he feels they are easier to digest.  He is currently receiving chemotherapy for multiple myeloma with his last infusion 1 week ago.  Patient denies urinary symptoms.  He denies known sick contacts.  Patient is not COVID vaccinated. No other symptoms noted.     Past Medical History:   Diagnosis Date     Acute kidney failure (H)      Chronic midline low back pain without sciatica 10/09/2020     Difficulty walking 10/09/2020     Hyperlipidemia      Hyperlipidemia 10/15/2020     Hypertension      Hypertension 10/15/2020     Multiple myeloma (H)      Pathologic compression fracture of spine, sequela 10/09/2020     Small bowel obstruction, partial (H) 07/10/2010       Past Surgical History:   Procedure Laterality Date     HERNIA REPAIR, INGUINAL RT/LT      needed post-op intestinal repair     INGUINAL HERNIA REPAIR       OPEN REDUCTION INTERNAL FIXATION CLAVICLE Left      ORIF left clavical         Family History   Problem Relation Age of Onset     C.A.D. Father          MI age 69     Hypertension Sister         obese     Family History Negative Brother      Coronary Artery Disease Father         MI     Hypertension Sister      Obesity Sister        Social History     Tobacco Use     Smoking status: Former Smoker     Packs/day: 0.10     Years: 30.00     Pack years:  3.00     Types: Cigarettes     Smokeless tobacco: Never Used   Substance Use Topics     Alcohol use: Yes     Alcohol/week: 17.5 standard drinks     Types: 21 drink(s) per week       Current Facility-Administered Medications   Medication     0.9% sodium chloride BOLUS    Followed by     sodium chloride 0.9% infusion     Current Outpatient Medications   Medication     acetaminophen (TYLENOL) 325 MG tablet     acyclovir (ZOVIRAX) 400 MG tablet     calcium carbonate 500 mg, elemental, (OSCAL) 500 MG tablet     cholecalciferol (VITAMIN D3) 25 mcg (1000 units) capsule     dexamethasone (DECADRON) 4 MG tablet     gabapentin (NEURONTIN) 300 MG capsule     hydrochlorothiazide (MICROZIDE) 12.5 MG capsule     LORazepam (ATIVAN) 0.5 MG tablet     omeprazole 20 MG tablet     ondansetron (ZOFRAN-ODT) 4 MG ODT tab     polyethylene glycol (MIRALAX) 17 g packet     prochlorperazine (COMPAZINE) 10 MG tablet     senna-docusate (SENOKOT-S/PERICOLACE) 8.6-50 MG tablet     triamcinolone (KENALOG) 0.1 % external ointment     VITAMIN D3 25 MCG (1000 UT) tablet     Facility-Administered Medications Ordered in Other Encounters   Medication     bupivacaine 0.25% PF (perineural)        Allergies   Allergen Reactions     Other Drug Allergy (See Comments)      tobasco sauce ---caused red spots        I have reviewed the Medications, Allergies, Past Medical and Surgical History, and Social History in the Epic system.    Review of Systems  A complete review of systems was performed with pertinent positives and negatives noted in the HPI, and all other systems negative.    Physical Exam   BP: 134/81  Pulse: (!) 133  Temp: (!) 100.5  F (38.1  C)  Resp: 16  Weight: 66.2 kg (146 lb)  SpO2: 96 %      Physical Exam  Vitals and nursing note reviewed.   Constitutional:       General: He is not in acute distress.     Appearance: He is well-developed. He is not diaphoretic.   HENT:      Head: Normocephalic and atraumatic.      Mouth/Throat:      Pharynx: No  oropharyngeal exudate.   Eyes:      General: No scleral icterus.        Right eye: No discharge.         Left eye: No discharge.      Pupils: Pupils are equal, round, and reactive to light.   Cardiovascular:      Rate and Rhythm: Regular rhythm. Tachycardia present.      Heart sounds: Normal heart sounds. No murmur heard.   No friction rub. No gallop.    Pulmonary:      Effort: Pulmonary effort is normal. No respiratory distress.      Breath sounds: Normal breath sounds. No wheezing.   Chest:      Chest wall: No tenderness.   Abdominal:      General: Bowel sounds are normal. There is no distension.      Palpations: Abdomen is soft.      Tenderness: There is no abdominal tenderness.   Musculoskeletal:         General: No tenderness or deformity. Normal range of motion.      Cervical back: Normal range of motion and neck supple.   Skin:     General: Skin is warm and dry.      Coloration: Skin is not pale.      Findings: No erythema or rash.   Neurological:      Mental Status: He is alert and oriented to person, place, and time.      Cranial Nerves: No cranial nerve deficit.         ED Course     At 12:27 PM the patient was seen and examined by Tenzin Moore DO in Room ED28.        Procedures                EKG Interpretation:      Interpreted by Tenzin Moore DO  Time reviewed: 1232  Symptoms at time of EKG: generalized weakness   Rhythm: sinus tachycardia  Rate: 115  Axis: Normal  Ectopy: none  Conduction: normal  ST Segments/ T Waves: No acute ischemic changes  Q Waves: none  Comparison to prior: 1/8/2021    Clinical Impression: sinus tachycardia                 Results for orders placed or performed during the hospital encounter of 11/01/21 (from the past 24 hour(s))   CBC with platelets differential    Narrative    The following orders were created for panel order CBC with platelets differential.  Procedure                               Abnormality         Status                     ---------                                -----------         ------                     CBC with platelets and d...[[id number]]  Abnormal            Final result                 Please view results for these tests on the individual orders.   Lactic acid whole blood   Result Value Ref Range    Lactic Acid 1.2 0.7 - 2.0 mmol/L   CBC with platelets and differential   Result Value Ref Range    WBC Count 9.3 4.0 - 11.0 10e3/uL    RBC Count 4.71 4.40 - 5.90 10e6/uL    Hemoglobin 15.2 13.3 - 17.7 g/dL    Hematocrit 45.5 40.0 - 53.0 %    MCV 97 78 - 100 fL    MCH 32.3 26.5 - 33.0 pg    MCHC 33.4 31.5 - 36.5 g/dL    RDW 13.5 10.0 - 15.0 %    Platelet Count 219 150 - 450 10e3/uL    % Neutrophils 88 %    % Lymphocytes 5 %    % Monocytes 7 %    % Eosinophils 0 %    % Basophils 0 %    % Immature Granulocytes 0 %    NRBCs per 100 WBC 0 <1 /100    Absolute Neutrophils 8.2 1.6 - 8.3 10e3/uL    Absolute Lymphocytes 0.5 (L) 0.8 - 5.3 10e3/uL    Absolute Monocytes 0.6 0.0 - 1.3 10e3/uL    Absolute Eosinophils 0.0 0.0 - 0.7 10e3/uL    Absolute Basophils 0.0 0.0 - 0.2 10e3/uL    Absolute Immature Granulocytes 0.0 <=0.0 10e3/uL    Absolute NRBCs 0.0 10e3/uL   EKG 12-lead, tracing only   Result Value Ref Range    Systolic Blood Pressure  mmHg    Diastolic Blood Pressure  mmHg    Ventricular Rate 115 BPM    Atrial Rate 115 BPM    MO Interval 172 ms    QRS Duration 74 ms     ms    QTc 403 ms    P Axis 31 degrees    R AXIS 34 degrees    T Axis 4 degrees    Interpretation ECG       Sinus tachycardia  Possible Inferior infarct , age undetermined  Abnormal ECG       Medications   0.9% sodium chloride BOLUS (1,000 mLs Intravenous New Bag 11/1/21 1223)     Followed by   sodium chloride 0.9% infusion (has no administration in time range)             Assessments & Plan (with Medical Decision Making)   This is a 76-year-old male who presents with fever.  Patient is currently on chemotherapy for multiple myeloma, last chemo was 1 week ago.  He developed fever yesterday.   He notes cough fever and generally feeling unwell.  Upon arrival patient was noted to be febrile and tachycardic.  ECG shows sinus tachycardia.  Chest x-ray shows bilateral airspace opacities.  Lab work shows sodium of 130.  Potassium is 5.5 but appears to be hemolyzed.  Lactic acid is not elevated.  Covid test is positive.  Blood cultures are pending at this time.  Patient was given IV fluids and acetaminophen for symptoms.  I discussed all results with patient.  Discussed that patient is at high risk of worsening because of his age, morbidities, chemotherapy and unvaccinated status.  Remdesivir was ordered and initial plan was for admission.  Patient stating that he will need ivermectin and then that is the cure for Covid.  I discussed that this is not not indicated.  During patient stay he states he does not want to stay in the hospital and that he wants to leave.  I discussed that due to patient's comorbidities, age and vaccination status that his symptoms will likely worsen.  There is high risk of death or permanent disability.  After this discussion patient states that he still wants to leave AMA.  I discussed that patient can return to the emergency department at any time to continue plan of treatment. Rogelio Marshall has made the decision to leave the current treatment against medical advice.  He has been told that his symptoms may possibly be caused by Covid infection. He has been informed and understands the inherent risks, including death, permanent disability or worsening symptoms.  He has decided to accept the responsibility for his decision.  He has the capacity to make this informed decision.  He is alert and oriented x 3, understands these instructions, and is able to ambulate.  Rogelio Marshall and all necessary parties have been advised that they may return at any time for further evaluation or treatment.      I have reviewed the nursing notes.    I have reviewed the findings, diagnosis, plan and  need for follow up with the patient.    New Prescriptions    No medications on file       Final diagnoses:   None       ISara am serving as a trained medical scribe to document services personally performed by Tenzin Moore DO, based on the provider's statements to me.      Tenzin TREVIÑO DO, was physically present and have reviewed and verified the accuracy of this note documented by Sara Marks.     Tenzin Moore DO  11/1/2021   [facility] EMERGENCY DEPARTMENT     Tenzin Moore DO  11/01/21 2050

## 2021-11-01 NOTE — ED NOTES
"Pt left MD WILLIAM and RN went over the risks of leaving without being treated as well as + covid status. Pt stated  \"This is all a government hoax, its not real and everyone will find that out soon enough\".  IVs were removed, pt signed form, RN ensured pt had all of his belongings prior to leaving room, pt then walked to lobby per self.    "

## 2021-11-06 LAB
BACTERIA BLD CULT: NO GROWTH
BACTERIA BLD CULT: NO GROWTH

## 2021-11-15 ENCOUNTER — RESEARCH ENCOUNTER (OUTPATIENT)
Dept: ONCOLOGY | Facility: CLINIC | Age: 76
End: 2021-11-15
Payer: COMMERCIAL

## 2021-11-15 NOTE — PROGRESS NOTES
Contacted patient via phone call to attempt to consent patient to the [employer] COVID-19 clinical trial ([place]). Patient stated he was not in the mood to join a clinical trial. Patient was informed that he would not be contacted again for recruitment, but that the patient was free to contact me with any further questions.

## 2021-11-24 NOTE — PROGRESS NOTES
Reason for Visit: follow-up for multiple myeloma    Oncology HPI:   -compression fractures and bone scan reveals multiple lytic lesions throughout his appendicular and axial skeleton. His Beta 2 microglobulin was 3.6 on 10/10/2020. Kappa chains were 73.6 on 10/5/2020 with K/L ratio of 89.9. M spike of 16.2 in urine on 10/10. Bone marrow biopsy on 10/23/2020 showed trilineage hematopoiesis and 50-60% kappa restricted plasma cells. Flow cytometry showed 20 % plasma cells which express CD19, CD38, CD45 and monotypic cytoplasmic kappa immunoglobulin light chains but lack CD20 and CD56.  FISH shows IGH-CCND1 fusion (81%; 30% had loss of IGH component from one fusion signal).       - Started 21 day cycle VRD 11/2020     - 11/27 he had a fall with rib fractures.      - Dec 2020 Started madyson-velcade-dex. Completed 5 cycles and achieved VGPR     - April 2021 started darzalex maintenance    Interval history:   He presented to the ED on 11/1/21 with fever, cough, and generally feeling unwell, testing positive for COVID. He was offered hospital admission for remdesivir, but elected instead to leave [place] because he wanted ivermectin. Since that time, he states his symptoms have completely resolved.  He feels like he can take a deep breath and fully expand his lungs.  Denies CP, SOB, cough, or fever. He denies symptoms of indigestion except for in the days following madyson + dex. He had many questions today about supplements and alternative treatments including Plaquenil, ivermectin, zinc, azithromycin, high-dose vitamin D, N-acytlecystine, Vitamin K2, creatine, alpha lipoic acid, and quercetin.      Current Outpatient Medications   Medication Sig Dispense Refill     acetaminophen (TYLENOL) 325 MG tablet Take 3 tablets (975 mg) by mouth 3 times daily (Patient taking differently: Take 975 mg by mouth every 8 hours as needed ) 500 tablet 4     acyclovir (ZOVIRAX) 400 MG tablet Take 1 tablet (400 mg) by mouth 2 times daily Viral  Prophylaxis. 60 tablet 11     calcium carbonate 500 mg, elemental, (OSCAL) 500 MG tablet Take 1 tablet (500 mg) by mouth 2 times daily 200 tablet 3     cholecalciferol (VITAMIN D3) 25 mcg (1000 units) capsule Take 1 capsule (25 mcg) by mouth daily (Patient not taking: Reported on 11/1/2021) 100 capsule 3     dexamethasone (DECADRON) 4 MG tablet TAKE 5 TABLETS (20MG) BY MOUTH ONCE ON DAY 2 35 tablet 0     gabapentin (NEURONTIN) 300 MG capsule Take 1 capsule (300 mg) by mouth 2 times daily 60 capsule 3     hydrochlorothiazide (MICROZIDE) 12.5 MG capsule Take 1 capsule (12.5 mg) by mouth daily (Patient not taking: Reported on 8/23/2021) 90 capsule 3     LORazepam (ATIVAN) 0.5 MG tablet Take 1 tablet (0.5 mg) by mouth every 4 hours as needed (Anxiety, Nausea/Vomiting or Sleep) (Patient not taking: Reported on 8/23/2021) 30 tablet 5     omeprazole 20 MG tablet Take 1 tablet (20 mg) by mouth daily 90 tablet 0     ondansetron (ZOFRAN-ODT) 4 MG ODT tab Take 1 tablet (4 mg) by mouth every 6 hours as needed for nausea or vomiting (Patient not taking: Reported on 8/23/2021)       polyethylene glycol (MIRALAX) 17 g packet Take 17 g by mouth daily (Patient not taking: Reported on 8/23/2021)       prochlorperazine (COMPAZINE) 10 MG tablet Take 1 tablet (10 mg) by mouth every 6 hours as needed (Nausea/Vomiting) 30 tablet 5     senna-docusate (SENOKOT-S/PERICOLACE) 8.6-50 MG tablet Take 1 tablet by mouth daily as needed  (Patient not taking: Reported on 8/23/2021)       triamcinolone (KENALOG) 0.1 % external ointment Apply topically 2 times daily To back rash (Patient not taking: Reported on 8/23/2021) 15 g 0     VITAMIN D3 25 MCG (1000 UT) tablet Take 1 tablet by mouth daily            Exam:   Blood pressure (!) 156/96, pulse 93, temperature 98.3  F (36.8  C), temperature source Oral, resp. rate 22, weight 61.4 kg (135 lb 6.4 oz), SpO2 98 %.  Wt Readings from Last 4 Encounters:   11/29/21 61.4 kg (135 lb 6.4 oz)   11/01/21 66.2 kg  (146 lb)   10/18/21 66.2 kg (146 lb)   09/20/21 63.7 kg (140 lb 6.4 oz)     Gen: alert, pleasant and conversational, NAD  HEENT: NC/AT,EOMI w/ PERRL, anicteric sclera. OP clear. MMM.   Neck: Supple, no LAD  CV: normal S1,S2 with RRR no m/r/g  Resp: lungs CTA bilaterally with adequate air movement to bases. No wheezes or crackles  Abd: soft NTND no organomegaly or masses. BS normoactive.   Ext: warm and well perfused, no edema or cyanosis  Lymphatics: no palpable cervical, axillary, or inguinal LAD. No HSM  Skin: no concerning lesions or rashes  Neuro: A&Ox4, no lateralizing sx. Grossly nonfocal.  Psych: appropriate, reactive    Labs:   I personally reviewed the following labs:    Most Recent 3 CBC's:  Recent Labs   Lab Test 11/29/21  1117 11/01/21  1205 10/18/21  0806   WBC 7.9 9.3 7.1   HGB 14.1 15.2 13.7   MCV 97 97 99    219 288     Most Recent 3 BMP's:  Recent Labs   Lab Test 11/29/21  1117 11/01/21  1205 10/18/21  0806    130* 142   POTASSIUM 4.1 5.5* 3.8   CHLORIDE 108 98 109   CO2 28 23 25   BUN 11 22 30   CR 1.01 1.06 1.14   ANIONGAP 3 9 8   MIRI 9.9 8.9 9.1   * 92 121*     Most Recent 2 LFT's:  Recent Labs   Lab Test 11/29/21  1117 11/01/21  1205 10/18/21  0806   AST 16  --  22   ALT 30 38 33   ALKPHOS 98 92 114   BILITOTAL 0.4 1.1 0.5          Imaging:   No new imaging to review.    Impression/plan:   76 year old man with IgG Kappa standard risk multiple myeloma. Despite his side effects, he has had a VGPR with therapy. Now on q 4 week darzalex faspro for maintenance.   -Okay to proceed with Daralex today  -continue to follow SPEP and FLC monthly. These were not drawn today, unclear why.  There are standing orders available.  Will draw with his labs on 12/6  -RTC with Dr. Sweeney (telephone visit) next week.   - Hetal in 4 weeks  - Labs, visit with me, and hetal in 8 weeks.    COVID-19 positive  He appears to be recovering well from his recent COVID19 infection. Respiratory status is stable  and he is afebrile. Discussed obtaining two negative COVID-19 PCRs to confirm he has cleared the virus; he agreed to proceed with this.  He inquired about ivermectin, Plaquenil, N-acytlecystine, and azithromycin as treatment and/or prophylaxis for COVID; discussed that these treatments are not indicated for the treatment of COVID and would not be prescribed for that reason.     Pathologic Fractures:. Vitamin D is replete and he is on a daily supplement as well as calcium. He has not received dental clearance for Zometa infusions; did not discuss today as we spent a long time talking about his recent COVID infection. He has been taking Vitamin D 5000 international unit(s) daily.   -Will check a vitamin D level today.  - Recommended decreasing daily supplement to 2000 international unit(s). Previous Vitamin D level was > 155.     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 Continues to use gabapentin with good relief, refilled today. Advised against additional supplement use without first discussing with providers; had a lot of questions about supplements and use. Discouraged use of Vitamin K and creatine. Okay to try alpha lipoic acid.      GERD: Patient would like to stop omeprazole again. Discussed that his intermittent indigestion around the time of treatment is likely secondary to dexamethasone.  Recommend taking omeprazole starting 1-2 days prior to treatment, and continuing 1-2 days following last dexamethasone dose.     CKD: Baseline creatinine is 1.1 suggesting underlying CKD. Creatinine stable today.    65 minutes spent on the date of the encounter doing chart review, review of test results, patient visit and documentation       JIMBO Joseph CNP  [facility]  [address] [place] [address] [phone]

## 2021-11-29 ENCOUNTER — APPOINTMENT (OUTPATIENT)
Dept: LAB | Facility: CLINIC | Age: 76
End: 2021-11-29
Attending: STUDENT IN AN ORGANIZED HEALTH CARE EDUCATION/TRAINING PROGRAM
Payer: COMMERCIAL

## 2021-11-29 ENCOUNTER — INFUSION THERAPY VISIT (OUTPATIENT)
Dept: ONCOLOGY | Facility: CLINIC | Age: 76
End: 2021-11-29
Attending: REGISTERED NURSE
Payer: COMMERCIAL

## 2021-11-29 ENCOUNTER — ONCOLOGY VISIT (OUTPATIENT)
Dept: ONCOLOGY | Facility: CLINIC | Age: 76
End: 2021-11-29
Attending: STUDENT IN AN ORGANIZED HEALTH CARE EDUCATION/TRAINING PROGRAM
Payer: COMMERCIAL

## 2021-11-29 VITALS
TEMPERATURE: 98.3 F | DIASTOLIC BLOOD PRESSURE: 96 MMHG | BODY MASS INDEX: 25.58 KG/M2 | SYSTOLIC BLOOD PRESSURE: 156 MMHG | RESPIRATION RATE: 22 BRPM | HEART RATE: 93 BPM | OXYGEN SATURATION: 98 % | WEIGHT: 135.4 LBS

## 2021-11-29 VITALS — SYSTOLIC BLOOD PRESSURE: 149 MMHG | DIASTOLIC BLOOD PRESSURE: 80 MMHG | HEART RATE: 87 BPM

## 2021-11-29 DIAGNOSIS — U07.1 INFECTION DUE TO 2019 NOVEL CORONAVIRUS: ICD-10-CM

## 2021-11-29 DIAGNOSIS — K21.9 GASTROESOPHAGEAL REFLUX DISEASE WITHOUT ESOPHAGITIS: ICD-10-CM

## 2021-11-29 DIAGNOSIS — Z79.899 OTHER LONG TERM (CURRENT) DRUG THERAPY: ICD-10-CM

## 2021-11-29 DIAGNOSIS — C90.00 MULTIPLE MYELOMA, REMISSION STATUS UNSPECIFIED (H): Primary | ICD-10-CM

## 2021-11-29 DIAGNOSIS — G62.9 NEUROPATHY: ICD-10-CM

## 2021-11-29 LAB
ALBUMIN SERPL-MCNC: 3.3 G/DL (ref 3.4–5)
ALP SERPL-CCNC: 98 U/L (ref 40–150)
ALT SERPL W P-5'-P-CCNC: 30 U/L (ref 0–70)
ANION GAP SERPL CALCULATED.3IONS-SCNC: 3 MMOL/L (ref 3–14)
AST SERPL W P-5'-P-CCNC: 16 U/L (ref 0–45)
BASOPHILS # BLD AUTO: 0.1 10E3/UL (ref 0–0.2)
BASOPHILS NFR BLD AUTO: 1 %
BILIRUB SERPL-MCNC: 0.4 MG/DL (ref 0.2–1.3)
BUN SERPL-MCNC: 11 MG/DL (ref 7–30)
CALCIUM SERPL-MCNC: 9.9 MG/DL (ref 8.5–10.1)
CHLORIDE BLD-SCNC: 108 MMOL/L (ref 94–109)
CO2 SERPL-SCNC: 28 MMOL/L (ref 20–32)
CREAT SERPL-MCNC: 1.01 MG/DL (ref 0.66–1.25)
DEPRECATED CALCIDIOL+CALCIFEROL SERPL-MC: 77 UG/L (ref 20–75)
EOSINOPHIL # BLD AUTO: 0.3 10E3/UL (ref 0–0.7)
EOSINOPHIL NFR BLD AUTO: 3 %
ERYTHROCYTE [DISTWIDTH] IN BLOOD BY AUTOMATED COUNT: 13.8 % (ref 10–15)
GFR SERPL CREATININE-BSD FRML MDRD: 73 ML/MIN/1.73M2
GLUCOSE BLD-MCNC: 132 MG/DL (ref 70–99)
HCT VFR BLD AUTO: 43.3 % (ref 40–53)
HGB BLD-MCNC: 14.1 G/DL (ref 13.3–17.7)
IMM GRANULOCYTES # BLD: 0 10E3/UL
IMM GRANULOCYTES NFR BLD: 1 %
LYMPHOCYTES # BLD AUTO: 1.6 10E3/UL (ref 0.8–5.3)
LYMPHOCYTES NFR BLD AUTO: 20 %
MCH RBC QN AUTO: 31.5 PG (ref 26.5–33)
MCHC RBC AUTO-ENTMCNC: 32.6 G/DL (ref 31.5–36.5)
MCV RBC AUTO: 97 FL (ref 78–100)
MONOCYTES # BLD AUTO: 0.8 10E3/UL (ref 0–1.3)
MONOCYTES NFR BLD AUTO: 10 %
NEUTROPHILS # BLD AUTO: 5.1 10E3/UL (ref 1.6–8.3)
NEUTROPHILS NFR BLD AUTO: 65 %
NRBC # BLD AUTO: 0 10E3/UL
NRBC BLD AUTO-RTO: 0 /100
PLATELET # BLD AUTO: 261 10E3/UL (ref 150–450)
POTASSIUM BLD-SCNC: 4.1 MMOL/L (ref 3.4–5.3)
PROT SERPL-MCNC: 7 G/DL (ref 6.8–8.8)
RBC # BLD AUTO: 4.48 10E6/UL (ref 4.4–5.9)
SARS-COV-2 RNA RESP QL NAA+PROBE: NEGATIVE
SODIUM SERPL-SCNC: 139 MMOL/L (ref 133–144)
WBC # BLD AUTO: 7.9 10E3/UL (ref 4–11)

## 2021-11-29 PROCEDURE — 82306 VITAMIN D 25 HYDROXY: CPT | Performed by: REGISTERED NURSE

## 2021-11-29 PROCEDURE — 250N000012 HC RX MED GY IP 250 OP 636 PS 637: Performed by: PHYSICIAN ASSISTANT

## 2021-11-29 PROCEDURE — 96401 CHEMO ANTI-NEOPL SQ/IM: CPT

## 2021-11-29 PROCEDURE — U0005 INFEC AGEN DETEC AMPLI PROBE: HCPCS | Performed by: REGISTERED NURSE

## 2021-11-29 PROCEDURE — 80053 COMPREHEN METABOLIC PANEL: CPT | Performed by: PHYSICIAN ASSISTANT

## 2021-11-29 PROCEDURE — 85025 COMPLETE CBC W/AUTO DIFF WBC: CPT | Performed by: PHYSICIAN ASSISTANT

## 2021-11-29 PROCEDURE — 36415 COLL VENOUS BLD VENIPUNCTURE: CPT | Performed by: PHYSICIAN ASSISTANT

## 2021-11-29 PROCEDURE — 250N000011 HC RX IP 250 OP 636: Performed by: PHYSICIAN ASSISTANT

## 2021-11-29 PROCEDURE — 99215 OFFICE O/P EST HI 40 MIN: CPT | Performed by: REGISTERED NURSE

## 2021-11-29 RX ORDER — DEXAMETHASONE 4 MG/1
20 TABLET ORAL ONCE
Status: COMPLETED | OUTPATIENT
Start: 2021-11-29 | End: 2021-11-29

## 2021-11-29 RX ORDER — NICOTINE POLACRILEX 4 MG/1
20 GUM, CHEWING ORAL DAILY PRN
Qty: 90 TABLET | Refills: 0 | Status: SHIPPED | OUTPATIENT
Start: 2021-11-29

## 2021-11-29 RX ORDER — CHOLECALCIFEROL (VITAMIN D3) 50 MCG
1 TABLET ORAL DAILY
Qty: 90 TABLET | Refills: 3 | COMMUNITY
Start: 2021-11-29

## 2021-11-29 RX ORDER — GABAPENTIN 300 MG/1
300 CAPSULE ORAL 2 TIMES DAILY
Qty: 60 CAPSULE | Refills: 3 | Status: SHIPPED | OUTPATIENT
Start: 2021-11-29 | End: 2022-04-01

## 2021-11-29 RX ORDER — DEXAMETHASONE 4 MG/1
TABLET ORAL
Qty: 35 TABLET | Refills: 0 | Status: SHIPPED | OUTPATIENT
Start: 2021-11-29 | End: 2022-01-31

## 2021-11-29 RX ADMIN — DARATUMUMAB AND HYALURONIDASE-FIHJ (HUMAN RECOMBINANT) 1800 MG: 1800; 30000 INJECTION SUBCUTANEOUS at 13:40

## 2021-11-29 RX ADMIN — DEXAMETHASONE 20 MG: 4 TABLET ORAL at 12:52

## 2021-11-29 ASSESSMENT — PAIN SCALES - GENERAL: PAINLEVEL: NO PAIN (0)

## 2021-11-29 NOTE — PROGRESS NOTES
Infusion Nursing Note:  Rogelio Marshall presents today for Maintenance Darzalex Faspro   Patient seen by provider today: Yes: Lainey Monae CNP   present during visit today: Not Applicable.    Note: Patient presents to infusion feeling well. Patient denies pain and states no acute complaints or concerns not addressed during visit with Lainey Monae CNP. Since diagnosis of COVID, patient states he has completely recovered and has no lingering symptoms. Per COVID policy with patients history of Multiple Myeloma, patient was placed in COVID precautions and first out of 2 COVID tests obtained. Per policy patient needs 2 negative tests in order for COVID precautions to be lifted. Patient voiced understanding of policy.     Patient confirms understanding PO Dex dosing post injection today. Patient also states he has stopped taking Nigella Sativa Seed Powder as recommended.    Intravenous Access:  No Intravenous access at this visit.    Treatment Conditions:  Lab Results   Component Value Date    HGB 14.1 11/29/2021    WBC 7.9 11/29/2021    ANEU 4.8 06/28/2021    ANEUTAUTO 5.1 11/29/2021     11/29/2021      Lab Results   Component Value Date     11/29/2021    POTASSIUM 4.1 11/29/2021    MAG 2.1 12/02/2020    CR 1.01 11/29/2021    MIRI 9.9 11/29/2021    BILITOTAL 0.4 11/29/2021    ALBUMIN 3.3 (L) 11/29/2021    ALT 30 11/29/2021    AST 16 11/29/2021     Results reviewed, labs MET treatment parameters, ok to proceed with treatment.      Post Infusion Assessment:  Patient tolerated 1 subcutaneous Darzalex Faspro injection via RLQ of abdomen without incident. 23 gauge butterfly needle used without issues.      Discharge Plan:   Prescription refills given for Dex, Acyclovir, Gabapentin.  Discharge instructions reviewed with: Patient.  Patient and/or family verbalized understanding of discharge instructions and all questions answered.  Copy of AVS reviewed with patient and/or family.  Patient will return 12/27  for next appointment. Per check out orders, 12/13 would be too soon for every 28 day subcutaneous Hetal.   Patient discharged in stable condition accompanied by: self.  Departure Mode: Ambulatory.  Face to Face time: 0 minutes.      Jori Devlin RN

## 2021-11-29 NOTE — LETTER
11/29/2021         RE: Rogelio Marshall  [address]  [facility] [address]        Dear Colleague,    Thank you for referring your patient, Rogelio Marshall, to the [facility]. Please see a copy of my visit note below.    Reason for Visit: follow-up for multiple myeloma    Oncology HPI:   -compression fractures and bone scan reveals multiple lytic lesions throughout his appendicular and axial skeleton. His Beta 2 microglobulin was 3.6 on 10/10/2020. Kappa chains were 73.6 on 10/5/2020 with K/L ratio of 89.9. M spike of 16.2 in urine on 10/10. Bone marrow biopsy on 10/23/2020 showed trilineage hematopoiesis and 50-60% kappa restricted plasma cells. Flow cytometry showed 20 % plasma cells which express CD19, CD38, CD45 and monotypic cytoplasmic kappa immunoglobulin light chains but lack CD20 and CD56.  FISH shows IGH-CCND1 fusion (81%; 30% had loss of IGH component from one fusion signal).       - Started 21 day cycle VRD 11/2020     - 11/27 he had a fall with rib fractures.      - Dec 2020 Started madyson-velcade-dex. Completed 5 cycles and achieved VGPR     - April 2021 started darzalex maintenance    Interval history:   He presented to the ED on 11/1/21 with fever, cough, and generally feeling unwell, testing positive for COVID. He was offered hospital admission for remdesivir, but elected instead to leave [facility] because he wanted ivermectin. Since that time, he states his symptoms have completely resolved.  He feels like he can take a deep breath and fully expand his lungs.  Denies CP, SOB, cough, or fever. He denies symptoms of indigestion except for in the days following madyson + dex. He had many questions today about supplements and alternative treatments including Plaquenil, ivermectin, zinc, azithromycin, high-dose vitamin D, N-acytlecystine, Vitamin K2, creatine, alpha lipoic acid, and quercetin.      Current Outpatient Medications   Medication Sig Dispense Refill      acetaminophen (TYLENOL) 325 MG tablet Take 3 tablets (975 mg) by mouth 3 times daily (Patient taking differently: Take 975 mg by mouth every 8 hours as needed ) 500 tablet 4     acyclovir (ZOVIRAX) 400 MG tablet Take 1 tablet (400 mg) by mouth 2 times daily Viral Prophylaxis. 60 tablet 11     calcium carbonate 500 mg, elemental, (OSCAL) 500 MG tablet Take 1 tablet (500 mg) by mouth 2 times daily 200 tablet 3     cholecalciferol (VITAMIN D3) 25 mcg (1000 units) capsule Take 1 capsule (25 mcg) by mouth daily (Patient not taking: Reported on 11/1/2021) 100 capsule 3     dexamethasone (DECADRON) 4 MG tablet TAKE 5 TABLETS (20MG) BY MOUTH ONCE ON DAY 2 35 tablet 0     gabapentin (NEURONTIN) 300 MG capsule Take 1 capsule (300 mg) by mouth 2 times daily 60 capsule 3     hydrochlorothiazide (MICROZIDE) 12.5 MG capsule Take 1 capsule (12.5 mg) by mouth daily (Patient not taking: Reported on 8/23/2021) 90 capsule 3     LORazepam (ATIVAN) 0.5 MG tablet Take 1 tablet (0.5 mg) by mouth every 4 hours as needed (Anxiety, Nausea/Vomiting or Sleep) (Patient not taking: Reported on 8/23/2021) 30 tablet 5     omeprazole 20 MG tablet Take 1 tablet (20 mg) by mouth daily 90 tablet 0     ondansetron (ZOFRAN-ODT) 4 MG ODT tab Take 1 tablet (4 mg) by mouth every 6 hours as needed for nausea or vomiting (Patient not taking: Reported on 8/23/2021)       polyethylene glycol (MIRALAX) 17 g packet Take 17 g by mouth daily (Patient not taking: Reported on 8/23/2021)       prochlorperazine (COMPAZINE) 10 MG tablet Take 1 tablet (10 mg) by mouth every 6 hours as needed (Nausea/Vomiting) 30 tablet 5     senna-docusate (SENOKOT-S/PERICOLACE) 8.6-50 MG tablet Take 1 tablet by mouth daily as needed  (Patient not taking: Reported on 8/23/2021)       triamcinolone (KENALOG) 0.1 % external ointment Apply topically 2 times daily To back rash (Patient not taking: Reported on 8/23/2021) 15 g 0     VITAMIN D3 25 MCG (1000 UT) tablet Take 1 tablet by mouth  daily            Exam:   Blood pressure (!) 156/96, pulse 93, temperature 98.3  F (36.8  C), temperature source Oral, resp. rate 22, weight 61.4 kg (135 lb 6.4 oz), SpO2 98 %.  Wt Readings from Last 4 Encounters:   11/29/21 61.4 kg (135 lb 6.4 oz)   11/01/21 66.2 kg (146 lb)   10/18/21 66.2 kg (146 lb)   09/20/21 63.7 kg (140 lb 6.4 oz)     Gen: alert, pleasant and conversational, NAD  HEENT: NC/AT,EOMI w/ PERRL, anicteric sclera. OP clear. MMM.   Neck: Supple, no LAD  CV: normal S1,S2 with RRR no m/r/g  Resp: lungs CTA bilaterally with adequate air movement to bases. No wheezes or crackles  Abd: soft NTND no organomegaly or masses. BS normoactive.   Ext: warm and well perfused, no edema or cyanosis  Lymphatics: no palpable cervical, axillary, or inguinal LAD. No HSM  Skin: no concerning lesions or rashes  Neuro: A&Ox4, no lateralizing sx. Grossly nonfocal.  Psych: appropriate, reactive    Labs:   I personally reviewed the following labs:    Most Recent 3 CBC's:  Recent Labs   Lab Test 11/29/21  1117 11/01/21  1205 10/18/21  0806   WBC 7.9 9.3 7.1   HGB 14.1 15.2 13.7   MCV 97 97 99    219 288     Most Recent 3 BMP's:  Recent Labs   Lab Test 11/29/21  1117 11/01/21  1205 10/18/21  0806    130* 142   POTASSIUM 4.1 5.5* 3.8   CHLORIDE 108 98 109   CO2 28 23 25   BUN 11 22 30   CR 1.01 1.06 1.14   ANIONGAP 3 9 8   MIRI 9.9 8.9 9.1   * 92 121*     Most Recent 2 LFT's:  Recent Labs   Lab Test 11/29/21  1117 11/01/21  1205 10/18/21  0806   AST 16  --  22   ALT 30 38 33   ALKPHOS 98 92 114   BILITOTAL 0.4 1.1 0.5          Imaging:   No new imaging to review.    Impression/plan:   76 year old man with IgG Kappa standard risk multiple myeloma. Despite his side effects, he has had a VGPR with therapy. Now on q 4 week darzalex faspro for maintenance.   -Okay to proceed with Daralex today  -continue to follow SPEP and FLC monthly. These were not drawn today, unclear why.  There are standing orders available.   Will draw with his labs on 12/6  -[facility] with Dr. Sweeney (telephone visit) next week.   - [facility] in 4 weeks  - Labs, visit with me, and hetal in 8 weeks.    COVID-19 positive  He appears to be recovering well from his recent COVID19 infection. Respiratory status is stable and he is afebrile. Discussed obtaining two negative COVID-19 PCRs to confirm he has cleared the virus; he agreed to proceed with this.  He inquired about ivermectin, Plaquenil, N-acytlecystine, and azithromycin as treatment and/or prophylaxis for COVID; discussed that these treatments are not indicated for the treatment of COVID and would not be prescribed for that reason.     Pathologic Fractures:. Vitamin D is replete and he is on a daily supplement as well as calcium. He has not received dental clearance for Zometa infusions; did not discuss today as we spent a long time talking about his recent COVID infection. He has been taking Vitamin D 5000 international unit(s) daily.   -Will check a vitamin D level today.  - Recommended decreasing daily supplement to 2000 international unit(s). Previous Vitamin D level was > 155.     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 Continues to use gabapentin with good relief, refilled today. Advised against additional supplement use without first discussing with providers; had a lot of questions about supplements and use. Discouraged use of Vitamin K and creatine. Okay to try alpha lipoic acid.      GERD: Patient would like to stop omeprazole again. Discussed that his intermittent indigestion around the time of treatment is likely secondary to dexamethasone.  Recommend taking omeprazole starting 1-2 days prior to treatment, and continuing 1-2 days following last dexamethasone dose.     CKD: Baseline creatinine is 1.1 suggesting underlying CKD. Creatinine stable today.    65 minutes spent on the date of the encounter doing chart review, review of test results, patient visit and documentation       Lainey BENITEZ  JIMBO Monae [facility]  [address] [place], [place] [phone]

## 2021-11-29 NOTE — PATIENT INSTRUCTIONS
[facility] Triage and after hours / weekends / holidays:  [phone]    Please call the triage or after hours line if you experience a temperature greater than or equal to 100.5, shaking chills, have uncontrolled nausea, vomiting and/or diarrhea, dizziness, shortness of breath, chest pain, bleeding, unexplained bruising, or if you have any other new/concerning symptoms, questions or concerns.      If you are having any concerning symptoms or wish to speak to a provider before your next infusion visit, please call your care coordinator or triage to notify them so we can adequately serve you.     If you need a refill on a narcotic prescription or other medication, please call before your infusion appointment.                 November 2021 Sunday Monday Tuesday Wednesday Thursday Friday Saturday        1    Admission  12:02 PM   Tenzin Moore DO   [facility] Emergency Department   (Discharge: 11/1/2021)    XR CHEST 2 VIEWS  12:25 PM   (20 min.)   [id number]   [facility] Imaging 2     3     4     5     6       7     8     9     10     11     12     13       14     15     16     17     18     19     20       21     22     23     24     25     26     27       28     29    LAB PERIPHERAL  10:30 AM   (15 min.)   [facility] DRAW   [facility]    RETURN  10:45 AM   (45 min.)   Lainey Monae APRN CNP   [facility]    ONC INFUSION 1 HR (60 MIN)  12:30 PM   (60 min.)    ONC INFUSION NURSE   [facility] 30 December 2021 Sunday Monday Tuesday Wednesday Thursday Friday Saturday                  1     2     3     4       5     6    LAB PERIPHERAL   9:00 AM   (15 min.)   [facility] DRAW   [facility] 7     8    TELEPHONE VISIT RETURN   2:30 PM   (30 min.)   Angela Sweeney MD   [facility] 9     10      11       12     13    LAB PERIPHERAL   8:00 AM   (15 min.)   [facility] LAB DRAW   [facility]    ONC INFUSION 6 HR (360 MIN)   8:30 AM   (360 min.)    ONC INFUSION NURSE   [facility] 14     15     16     17     18       19     20     21     22     23     24     25       26     27     28     29     30     31                          Lab Results:  Recent Results (from the past 12 hour(s))   Comprehensive metabolic panel    Collection Time: 11/29/21 11:17 AM   Result Value Ref Range    Sodium 139 133 - 144 mmol/L    Potassium 4.1 3.4 - 5.3 mmol/L    Chloride 108 94 - 109 mmol/L    Carbon Dioxide (CO2) 28 20 - 32 mmol/L    Anion Gap 3 3 - 14 mmol/L    Urea Nitrogen 11 7 - 30 mg/dL    Creatinine 1.01 0.66 - 1.25 mg/dL    Calcium 9.9 8.5 - 10.1 mg/dL    Glucose 132 (H) 70 - 99 mg/dL    Alkaline Phosphatase 98 40 - 150 U/L    AST 16 0 - 45 U/L    ALT 30 0 - 70 U/L    Protein Total 7.0 6.8 - 8.8 g/dL    Albumin 3.3 (L) 3.4 - 5.0 g/dL    Bilirubin Total 0.4 0.2 - 1.3 mg/dL    GFR Estimate 73 >60 mL/min/1.73m2   CBC with platelets and differential    Collection Time: 11/29/21 11:17 AM   Result Value Ref Range    WBC Count 7.9 4.0 - 11.0 10e3/uL    RBC Count 4.48 4.40 - 5.90 10e6/uL    Hemoglobin 14.1 13.3 - 17.7 g/dL    Hematocrit 43.3 40.0 - 53.0 %    MCV 97 78 - 100 fL    MCH 31.5 26.5 - 33.0 pg    MCHC 32.6 31.5 - 36.5 g/dL    RDW 13.8 10.0 - 15.0 %    Platelet Count 261 150 - 450 10e3/uL    % Neutrophils 65 %    % Lymphocytes 20 %    % Monocytes 10 %    % Eosinophils 3 %    % Basophils 1 %    % Immature Granulocytes 1 %    NRBCs per 100 WBC 0 <1 /100    Absolute Neutrophils 5.1 1.6 - 8.3 10e3/uL    Absolute Lymphocytes 1.6 0.8 - 5.3 10e3/uL    Absolute Monocytes 0.8 0.0 - 1.3 10e3/uL    Absolute Eosinophils 0.3 0.0 - 0.7 10e3/uL    Absolute Basophils 0.1 0.0 - 0.2 10e3/uL    Absolute Immature Granulocytes 0.0 <=0.0 10e3/uL    Absolute NRBCs 0.0 10e3/uL

## 2021-11-29 NOTE — NURSING NOTE
"Oncology Rooming Note    November 29, 2021 11:14 AM   Rogelio Marshall is a 76 year old male who presents for:    Chief Complaint   Patient presents with     RECHECK     MM here for provider visit     Initial Vitals: BP (!) 156/96   Pulse 93   Temp 98.3  F (36.8  C) (Oral)   Resp 22   Wt 61.4 kg (135 lb 6.4 oz)   SpO2 98%   BMI 25.58 kg/m   Estimated body mass index is 25.58 kg/m  as calculated from the following:    Height as of 7/22/21: 1.549 m (5' 1\").    Weight as of this encounter: 61.4 kg (135 lb 6.4 oz). Body surface area is 1.63 meters squared.  No Pain (0) Comment: Data Unavailable   No LMP for male patient.  Allergies reviewed: Yes  Medications reviewed: Yes    Medications: MEDICATION REFILLS NEEDED TODAY. Provider was notified.  Pharmacy name entered into EPIC:    [facility] [place], [place] - [address]  WRITTEN PRESCRIPTION REQUESTED  [id number] - [place], [place] - [address] [facility] DISCHARGE - [place], [place] - [address]    Clinical concerns: None      Eamon Moya RN              "

## 2021-12-05 NOTE — PROGRESS NOTES
Oncologic Hx:   -compression fractures and bone scan reveals multiple lytic lesions throughout his appendicular and axial skeleton. His Beta 2 microglobulin was 3.6 on 10/10/2020. Kappa chains were 73.6 on 10/5/2020 with K/L ratio of 89.9. M spike of 16.2 in urine on 10/10. Bone marrow biopsy on 10/23/2020 showed trilineage hematopoiesis and 50-60% kappa restricted plasma cells. Flow cytometry showed 20 % plasma cells which express CD19, CD38, CD45 and monotypic cytoplasmic kappa immunoglobulin light chains but lack CD20 and CD56.  FISH shows IGH-CCND1 fusion (81%; 30% had loss of IGH component from one fusion signal).      - Started 21 day cycle VRD 11/2020    - 11/27 he had a fall with rib fractures.     - Dec 2020 Started madyson-velcade-dex. Completed 5 cycles and achieved VGPR    - April 2021 started darzalex maintenance      Interval Hx: Rogelio recently tested positive for COVID-19 but is feeling better now. No new bone pains, swollen glands, or rashes. He had breathing issues but these have resolved. He has many questions about ivermectin and why it wasn't prescribed for his COVID infection.     A comprehensive review of systems was completed and negative except as described above.     Pertinent PMH reviewed:   HTN    Physical Exam:   General: NAD  Resp: nonlabored breathing, no cough  Neuro: alert, conversant  Psych: appropriate mood and affect      The rest of a comprehensive physical examination is deferred due to Public Health Emergency telephone visit restrictions      Pertinent Data Summarized:  12/6/2021 M spike 0.1    Assessment and Plan  75 yo man with IgG Kappa standard risk multiple myeloma. He got a VGPR with therapy. Now on q 4 week darzalex faspro for maintenance. Taking omeprazole for indigestion with darzalex.Many questions today about vitamins, minerals, and supplements.     Pathologic Fractures:. Vitamin D is replete and he is on a daily supplement as well as calcium. He does not want to do  zometa due to dental concerns.     CKD: Baseline creatinine is 1.0 suggesting underlying CKD.     More than half of this 25 min visit was spent in counseling.     Angela Sweeney MD PhD

## 2021-12-06 ENCOUNTER — LAB (OUTPATIENT)
Dept: LAB | Facility: CLINIC | Age: 76
End: 2021-12-06
Attending: STUDENT IN AN ORGANIZED HEALTH CARE EDUCATION/TRAINING PROGRAM
Payer: COMMERCIAL

## 2021-12-06 VITALS
WEIGHT: 138 LBS | OXYGEN SATURATION: 97 % | RESPIRATION RATE: 18 BRPM | SYSTOLIC BLOOD PRESSURE: 156 MMHG | DIASTOLIC BLOOD PRESSURE: 86 MMHG | BODY MASS INDEX: 26.07 KG/M2 | HEART RATE: 76 BPM | TEMPERATURE: 97.4 F

## 2021-12-06 DIAGNOSIS — C90.00 MULTIPLE MYELOMA, REMISSION STATUS UNSPECIFIED (H): ICD-10-CM

## 2021-12-06 LAB
ALBUMIN SERPL-MCNC: 3.2 G/DL (ref 3.4–5)
ALP SERPL-CCNC: 103 U/L (ref 40–150)
ALT SERPL W P-5'-P-CCNC: 51 U/L (ref 0–70)
ANION GAP SERPL CALCULATED.3IONS-SCNC: 9 MMOL/L (ref 3–14)
AST SERPL W P-5'-P-CCNC: 21 U/L (ref 0–45)
BASOPHILS # BLD AUTO: 0 10E3/UL (ref 0–0.2)
BASOPHILS NFR BLD AUTO: 0 %
BILIRUB SERPL-MCNC: 0.4 MG/DL (ref 0.2–1.3)
BUN SERPL-MCNC: 22 MG/DL (ref 7–30)
CALCIUM SERPL-MCNC: 9.6 MG/DL (ref 8.5–10.1)
CHLORIDE BLD-SCNC: 106 MMOL/L (ref 94–109)
CO2 SERPL-SCNC: 26 MMOL/L (ref 20–32)
CREAT SERPL-MCNC: 1.04 MG/DL (ref 0.66–1.25)
EOSINOPHIL # BLD AUTO: 0.9 10E3/UL (ref 0–0.7)
EOSINOPHIL NFR BLD AUTO: 8 %
ERYTHROCYTE [DISTWIDTH] IN BLOOD BY AUTOMATED COUNT: 14.5 % (ref 10–15)
GFR SERPL CREATININE-BSD FRML MDRD: 70 ML/MIN/1.73M2
GLUCOSE BLD-MCNC: 119 MG/DL (ref 70–99)
HCT VFR BLD AUTO: 41.9 % (ref 40–53)
HGB BLD-MCNC: 13.6 G/DL (ref 13.3–17.7)
IMM GRANULOCYTES # BLD: 0.1 10E3/UL
IMM GRANULOCYTES NFR BLD: 1 %
LYMPHOCYTES # BLD AUTO: 1.6 10E3/UL (ref 0.8–5.3)
LYMPHOCYTES NFR BLD AUTO: 16 %
MCH RBC QN AUTO: 31.9 PG (ref 26.5–33)
MCHC RBC AUTO-ENTMCNC: 32.5 G/DL (ref 31.5–36.5)
MCV RBC AUTO: 98 FL (ref 78–100)
MONOCYTES # BLD AUTO: 1.1 10E3/UL (ref 0–1.3)
MONOCYTES NFR BLD AUTO: 10 %
NEUTROPHILS # BLD AUTO: 6.8 10E3/UL (ref 1.6–8.3)
NEUTROPHILS NFR BLD AUTO: 65 %
NRBC # BLD AUTO: 0 10E3/UL
NRBC BLD AUTO-RTO: 0 /100
PLATELET # BLD AUTO: 304 10E3/UL (ref 150–450)
POTASSIUM BLD-SCNC: 3.5 MMOL/L (ref 3.4–5.3)
PROT SERPL-MCNC: 6.6 G/DL (ref 6.8–8.8)
RBC # BLD AUTO: 4.26 10E6/UL (ref 4.4–5.9)
SODIUM SERPL-SCNC: 141 MMOL/L (ref 133–144)
TOTAL PROTEIN SERUM FOR ELP: 6.4 G/DL (ref 6.8–8.8)
WBC # BLD AUTO: 10.5 10E3/UL (ref 4–11)

## 2021-12-06 PROCEDURE — 84155 ASSAY OF PROTEIN SERUM: CPT

## 2021-12-06 PROCEDURE — 80053 COMPREHEN METABOLIC PANEL: CPT

## 2021-12-06 PROCEDURE — 83883 ASSAY NEPHELOMETRY NOT SPEC: CPT

## 2021-12-06 PROCEDURE — 84165 PROTEIN E-PHORESIS SERUM: CPT | Mod: 26 | Performed by: PATHOLOGY

## 2021-12-06 PROCEDURE — 84165 PROTEIN E-PHORESIS SERUM: CPT | Mod: TC | Performed by: PATHOLOGY

## 2021-12-06 PROCEDURE — 85025 COMPLETE CBC W/AUTO DIFF WBC: CPT

## 2021-12-06 PROCEDURE — 36415 COLL VENOUS BLD VENIPUNCTURE: CPT

## 2021-12-06 ASSESSMENT — PAIN SCALES - GENERAL: PAINLEVEL: NO PAIN (0)

## 2021-12-06 NOTE — NURSING NOTE
Chief Complaint   Patient presents with     Labs Only     venipuncture, vitals checked     Nancy Hernadez RN on 12/6/2021 at 9:23 AM

## 2021-12-07 LAB
ALBUMIN SERPL ELPH-MCNC: 4 G/DL (ref 3.7–5.1)
ALPHA1 GLOB SERPL ELPH-MCNC: 0.4 G/DL (ref 0.2–0.4)
ALPHA2 GLOB SERPL ELPH-MCNC: 0.9 G/DL (ref 0.5–0.9)
B-GLOBULIN SERPL ELPH-MCNC: 0.7 G/DL (ref 0.6–1)
GAMMA GLOB SERPL ELPH-MCNC: 0.5 G/DL (ref 0.7–1.6)
KAPPA LC FREE SER-MCNC: 0.69 MG/DL (ref 0.33–1.94)
KAPPA LC FREE/LAMBDA FREE SER NEPH: 1.47 {RATIO} (ref 0.26–1.65)
LAMBDA LC FREE SERPL-MCNC: 0.47 MG/DL (ref 0.57–2.63)
M PROTEIN SERPL ELPH-MCNC: 0.1 G/DL
PROT PATTERN SERPL ELPH-IMP: ABNORMAL

## 2021-12-08 ENCOUNTER — VIRTUAL VISIT (OUTPATIENT)
Dept: ONCOLOGY | Facility: CLINIC | Age: 76
End: 2021-12-08
Attending: STUDENT IN AN ORGANIZED HEALTH CARE EDUCATION/TRAINING PROGRAM
Payer: COMMERCIAL

## 2021-12-08 DIAGNOSIS — C90.00 MULTIPLE MYELOMA, REMISSION STATUS UNSPECIFIED (H): Primary | ICD-10-CM

## 2021-12-08 PROCEDURE — 999N001193 HC VIDEO/TELEPHONE VISIT; NO CHARGE

## 2021-12-08 PROCEDURE — 99214 OFFICE O/P EST MOD 30 MIN: CPT | Mod: 95 | Performed by: STUDENT IN AN ORGANIZED HEALTH CARE EDUCATION/TRAINING PROGRAM

## 2021-12-08 RX ORDER — MEPERIDINE HYDROCHLORIDE 25 MG/ML
25 INJECTION INTRAMUSCULAR; INTRAVENOUS; SUBCUTANEOUS EVERY 30 MIN PRN
Status: CANCELLED | OUTPATIENT
Start: 2021-12-13

## 2021-12-08 RX ORDER — NALOXONE HYDROCHLORIDE 0.4 MG/ML
.1-.4 INJECTION, SOLUTION INTRAMUSCULAR; INTRAVENOUS; SUBCUTANEOUS
Status: CANCELLED | OUTPATIENT
Start: 2021-12-13

## 2021-12-08 RX ORDER — DIPHENHYDRAMINE HCL 25 MG
50 CAPSULE ORAL
Status: CANCELLED
Start: 2021-12-13

## 2021-12-08 RX ORDER — LORAZEPAM 2 MG/ML
0.5 INJECTION INTRAMUSCULAR EVERY 4 HOURS PRN
Status: CANCELLED
Start: 2021-12-13

## 2021-12-08 RX ORDER — DEXAMETHASONE 4 MG/1
20 TABLET ORAL ONCE
Status: CANCELLED | OUTPATIENT
Start: 2021-12-13

## 2021-12-08 RX ORDER — ALBUTEROL SULFATE 0.83 MG/ML
2.5 SOLUTION RESPIRATORY (INHALATION)
Status: CANCELLED | OUTPATIENT
Start: 2021-12-13

## 2021-12-08 RX ORDER — METHYLPREDNISOLONE SODIUM SUCCINATE 125 MG/2ML
125 INJECTION, POWDER, LYOPHILIZED, FOR SOLUTION INTRAMUSCULAR; INTRAVENOUS
Status: CANCELLED
Start: 2021-12-13

## 2021-12-08 RX ORDER — ACETAMINOPHEN 325 MG/1
650 TABLET ORAL
Status: CANCELLED
Start: 2021-12-13

## 2021-12-08 RX ORDER — SODIUM CHLORIDE 9 MG/ML
1000 INJECTION, SOLUTION INTRAVENOUS CONTINUOUS PRN
Status: CANCELLED
Start: 2021-12-13

## 2021-12-08 RX ORDER — EPINEPHRINE 1 MG/ML
0.3 INJECTION, SOLUTION INTRAMUSCULAR; SUBCUTANEOUS EVERY 5 MIN PRN
Status: CANCELLED | OUTPATIENT
Start: 2021-12-13

## 2021-12-08 RX ORDER — HEPARIN SODIUM,PORCINE 10 UNIT/ML
5 VIAL (ML) INTRAVENOUS
Status: CANCELLED | OUTPATIENT
Start: 2021-12-13

## 2021-12-08 RX ORDER — DIPHENHYDRAMINE HYDROCHLORIDE 50 MG/ML
50 INJECTION INTRAMUSCULAR; INTRAVENOUS
Status: CANCELLED
Start: 2021-12-13

## 2021-12-08 RX ORDER — HEPARIN SODIUM (PORCINE) LOCK FLUSH IV SOLN 100 UNIT/ML 100 UNIT/ML
5 SOLUTION INTRAVENOUS
Status: CANCELLED | OUTPATIENT
Start: 2021-12-13

## 2021-12-08 RX ORDER — ALBUTEROL SULFATE 90 UG/1
1-2 AEROSOL, METERED RESPIRATORY (INHALATION)
Status: CANCELLED
Start: 2021-12-13

## 2021-12-08 NOTE — LETTER
12/8/2021         RE: Rogelio Marshall  [address]  [facility] [address]        Dear Colleague,    Thank you for referring your patient, Rogelio Marshall, to the [facility]. Please see a copy of my visit note below.      Oncologic Hx:   -compression fractures and bone scan reveals multiple lytic lesions throughout his appendicular and axial skeleton. His Beta 2 microglobulin was 3.6 on 10/10/2020. Kappa chains were 73.6 on 10/5/2020 with K/L ratio of 89.9. M spike of 16.2 in urine on 10/10. Bone marrow biopsy on 10/23/2020 showed trilineage hematopoiesis and 50-60% kappa restricted plasma cells. Flow cytometry showed 20 % plasma cells which express CD19, CD38, CD45 and monotypic cytoplasmic kappa immunoglobulin light chains but lack CD20 and CD56.  FISH shows IGH-CCND1 fusion (81%; 30% had loss of IGH component from one fusion signal).      - Started 21 day cycle VRD 11/2020    - 11/27 he had a fall with rib fractures.     - Dec 2020 Started madyson-velcade-dex. Completed 5 cycles and achieved VGPR    - April 2021 started darzalex maintenance      Interval Hx: Rogelio recently tested positive for COVID-19 but is feeling better now. No new bone pains, swollen glands, or rashes. He had breathing issues but these have resolved. He has many questions about ivermectin and why it wasn't prescribed for his COVID infection.     A comprehensive review of systems was completed and negative except as described above.     Pertinent PMH reviewed:   HTN    Physical Exam:   General: NAD  Resp: nonlabored breathing, no cough  Neuro: alert, conversant  Psych: appropriate mood and affect      The rest of a comprehensive physical examination is deferred due to Public Health Emergency telephone visit restrictions      Pertinent Data Summarized:  12/6/2021 M spike 0.1    Assessment and Plan  77 yo man with IgG Kappa standard risk multiple myeloma. He got a VGPR with therapy. Now on q 4 week darzalex  faspro for maintenance. Taking omeprazole for indigestion with darzalex.Many questions today about vitamins, minerals, and supplements.     Pathologic Fractures:. Vitamin D is replete and he is on a daily supplement as well as calcium. He does not want to do zometa due to dental concerns.     CKD: Baseline creatinine is 1.0 suggesting underlying CKD.     More than half of this 25 min visit was spent in counseling.     Angela Sweeney MD PhD

## 2021-12-08 NOTE — PROGRESS NOTES
Rogelio is a 76 year old who is being evaluated via a billable telephone visit.      What phone number would you like to be contacted at? [phone]  How would you like to obtain your AVS? Eddie Mendez   Phone call duration: 25 minutes

## 2021-12-27 ENCOUNTER — INFUSION THERAPY VISIT (OUTPATIENT)
Dept: ONCOLOGY | Facility: CLINIC | Age: 76
End: 2021-12-27
Attending: STUDENT IN AN ORGANIZED HEALTH CARE EDUCATION/TRAINING PROGRAM
Payer: COMMERCIAL

## 2021-12-27 ENCOUNTER — APPOINTMENT (OUTPATIENT)
Dept: LAB | Facility: CLINIC | Age: 76
End: 2021-12-27
Attending: STUDENT IN AN ORGANIZED HEALTH CARE EDUCATION/TRAINING PROGRAM
Payer: COMMERCIAL

## 2021-12-27 VITALS
SYSTOLIC BLOOD PRESSURE: 138 MMHG | RESPIRATION RATE: 16 BRPM | WEIGHT: 142.3 LBS | OXYGEN SATURATION: 97 % | HEART RATE: 80 BPM | BODY MASS INDEX: 26.89 KG/M2 | TEMPERATURE: 98.2 F | DIASTOLIC BLOOD PRESSURE: 85 MMHG

## 2021-12-27 DIAGNOSIS — C90.00 MULTIPLE MYELOMA, REMISSION STATUS UNSPECIFIED (H): Primary | ICD-10-CM

## 2021-12-27 LAB
ALBUMIN SERPL-MCNC: 3.8 G/DL (ref 3.4–5)
ALP SERPL-CCNC: 99 U/L (ref 40–150)
ALT SERPL W P-5'-P-CCNC: 26 U/L (ref 0–70)
ANION GAP SERPL CALCULATED.3IONS-SCNC: 7 MMOL/L (ref 3–14)
AST SERPL W P-5'-P-CCNC: 15 U/L (ref 0–45)
BASOPHILS # BLD AUTO: 0.1 10E3/UL (ref 0–0.2)
BASOPHILS NFR BLD AUTO: 1 %
BILIRUB SERPL-MCNC: 0.6 MG/DL (ref 0.2–1.3)
BUN SERPL-MCNC: 23 MG/DL (ref 7–30)
CALCIUM SERPL-MCNC: 9.2 MG/DL (ref 8.5–10.1)
CHLORIDE BLD-SCNC: 112 MMOL/L (ref 94–109)
CO2 SERPL-SCNC: 24 MMOL/L (ref 20–32)
CREAT SERPL-MCNC: 1.2 MG/DL (ref 0.66–1.25)
EOSINOPHIL # BLD AUTO: 0.4 10E3/UL (ref 0–0.7)
EOSINOPHIL NFR BLD AUTO: 5 %
ERYTHROCYTE [DISTWIDTH] IN BLOOD BY AUTOMATED COUNT: 15.4 % (ref 10–15)
GFR SERPL CREATININE-BSD FRML MDRD: 63 ML/MIN/1.73M2
GLUCOSE BLD-MCNC: 114 MG/DL (ref 70–99)
HCT VFR BLD AUTO: 42.4 % (ref 40–53)
HGB BLD-MCNC: 13.8 G/DL (ref 13.3–17.7)
IMM GRANULOCYTES # BLD: 0.1 10E3/UL
IMM GRANULOCYTES NFR BLD: 1 %
KAPPA LC FREE SER-MCNC: 0.78 MG/DL (ref 0.33–1.94)
KAPPA LC FREE/LAMBDA FREE SER NEPH: 2.23 {RATIO} (ref 0.26–1.65)
LAMBDA LC FREE SERPL-MCNC: 0.35 MG/DL (ref 0.57–2.63)
LYMPHOCYTES # BLD AUTO: 1.4 10E3/UL (ref 0.8–5.3)
LYMPHOCYTES NFR BLD AUTO: 16 %
MCH RBC QN AUTO: 31.3 PG (ref 26.5–33)
MCHC RBC AUTO-ENTMCNC: 32.5 G/DL (ref 31.5–36.5)
MCV RBC AUTO: 96 FL (ref 78–100)
MONOCYTES # BLD AUTO: 0.7 10E3/UL (ref 0–1.3)
MONOCYTES NFR BLD AUTO: 9 %
NEUTROPHILS # BLD AUTO: 5.8 10E3/UL (ref 1.6–8.3)
NEUTROPHILS NFR BLD AUTO: 68 %
NRBC # BLD AUTO: 0 10E3/UL
NRBC BLD AUTO-RTO: 0 /100
PLATELET # BLD AUTO: 333 10E3/UL (ref 150–450)
POTASSIUM BLD-SCNC: 4 MMOL/L (ref 3.4–5.3)
PROT SERPL-MCNC: 6.8 G/DL (ref 6.8–8.8)
RBC # BLD AUTO: 4.41 10E6/UL (ref 4.4–5.9)
SARS-COV-2 RNA RESP QL NAA+PROBE: NEGATIVE
SODIUM SERPL-SCNC: 143 MMOL/L (ref 133–144)
TOTAL PROTEIN SERUM FOR ELP: 6.4 G/DL (ref 6.8–8.8)
WBC # BLD AUTO: 8.5 10E3/UL (ref 4–11)

## 2021-12-27 PROCEDURE — 82040 ASSAY OF SERUM ALBUMIN: CPT | Performed by: STUDENT IN AN ORGANIZED HEALTH CARE EDUCATION/TRAINING PROGRAM

## 2021-12-27 PROCEDURE — 86334 IMMUNOFIX E-PHORESIS SERUM: CPT

## 2021-12-27 PROCEDURE — 96401 CHEMO ANTI-NEOPL SQ/IM: CPT

## 2021-12-27 PROCEDURE — 36415 COLL VENOUS BLD VENIPUNCTURE: CPT

## 2021-12-27 PROCEDURE — 250N000011 HC RX IP 250 OP 636: Performed by: STUDENT IN AN ORGANIZED HEALTH CARE EDUCATION/TRAINING PROGRAM

## 2021-12-27 PROCEDURE — 250N000012 HC RX MED GY IP 250 OP 636 PS 637: Performed by: STUDENT IN AN ORGANIZED HEALTH CARE EDUCATION/TRAINING PROGRAM

## 2021-12-27 PROCEDURE — 83883 ASSAY NEPHELOMETRY NOT SPEC: CPT

## 2021-12-27 PROCEDURE — 84165 PROTEIN E-PHORESIS SERUM: CPT | Mod: TC | Performed by: PATHOLOGY

## 2021-12-27 PROCEDURE — 84165 PROTEIN E-PHORESIS SERUM: CPT | Mod: 26 | Performed by: PATHOLOGY

## 2021-12-27 PROCEDURE — 85025 COMPLETE CBC W/AUTO DIFF WBC: CPT | Performed by: STUDENT IN AN ORGANIZED HEALTH CARE EDUCATION/TRAINING PROGRAM

## 2021-12-27 PROCEDURE — 84155 ASSAY OF PROTEIN SERUM: CPT | Mod: 91

## 2021-12-27 PROCEDURE — U0003 INFECTIOUS AGENT DETECTION BY NUCLEIC ACID (DNA OR RNA); SEVERE ACUTE RESPIRATORY SYNDROME CORONAVIRUS 2 (SARS-COV-2) (CORONAVIRUS DISEASE [COVID-19]), AMPLIFIED PROBE TECHNIQUE, MAKING USE OF HIGH THROUGHPUT TECHNOLOGIES AS DESCRIBED BY CMS-2020-01-R: HCPCS

## 2021-12-27 PROCEDURE — 86334 IMMUNOFIX E-PHORESIS SERUM: CPT | Mod: 26 | Performed by: PATHOLOGY

## 2021-12-27 RX ORDER — DEXAMETHASONE 4 MG/1
20 TABLET ORAL ONCE
Status: COMPLETED | OUTPATIENT
Start: 2021-12-27 | End: 2021-12-27

## 2021-12-27 RX ADMIN — DEXAMETHASONE 20 MG: 4 TABLET ORAL at 09:06

## 2021-12-27 RX ADMIN — DARATUMUMAB AND HYALURONIDASE-FIHJ (HUMAN RECOMBINANT) 1800 MG: 1800; 30000 INJECTION SUBCUTANEOUS at 10:03

## 2021-12-27 ASSESSMENT — PAIN SCALES - GENERAL: PAINLEVEL: NO PAIN (0)

## 2021-12-27 NOTE — PROGRESS NOTES
Infusion Nursing Note:  Bruno Alford presents today for Darzalex Faspro maintence.    Patient seen by provider today: No    Note: Patient presents to the infusion center today feeling well. Reports ongoing fatigue and neuropathy, baseline. No pain, fevers, chills, chest pain or shortness of breath.    Confirmed patient has Dexamethasone to take tomorrow and pt verbalized understanding.     Intravenous Access:  No Intravenous access.    Treatment Conditions:   Results for BRUNO ALFORD (MRN [id number]) as of 12/27/2021 09:39   Ref. Range 12/27/2021 08:33   Sodium Latest Ref Range: 133 - 144 mmol/L 143   Potassium Latest Ref Range: 3.4 - 5.3 mmol/L 4.0   Chloride Latest Ref Range: 94 - 109 mmol/L 112 (H)   Carbon Dioxide Latest Ref Range: 20 - 32 mmol/L 24   Urea Nitrogen Latest Ref Range: 7 - 30 mg/dL 23   Creatinine Latest Ref Range: 0.66 - 1.25 mg/dL 1.20   GFR Estimate Latest Ref Range: >60 mL/min/1.73m2 63   Calcium Latest Ref Range: 8.5 - 10.1 mg/dL 9.2   Anion Gap Latest Ref Range: 3 - 14 mmol/L 7   Albumin Latest Ref Range: 3.4 - 5.0 g/dL 3.8   Protein Total Latest Ref Range: 6.8 - 8.8 g/dL 6.8   Bilirubin Total Latest Ref Range: 0.2 - 1.3 mg/dL 0.6   Alkaline Phosphatase Latest Ref Range: 40 - 150 U/L 99   ALT Latest Ref Range: 0 - 70 U/L 26   AST Latest Ref Range: 0 - 45 U/L 15   Glucose Latest Ref Range: 70 - 99 mg/dL 114 (H)   WBC Latest Ref Range: 4.0 - 11.0 10e3/uL 8.5   Hemoglobin Latest Ref Range: 13.3 - 17.7 g/dL 13.8   Hematocrit Latest Ref Range: 40.0 - 53.0 % 42.4   Platelet Count Latest Ref Range: 150 - 450 10e3/uL 333   RBC Count Latest Ref Range: 4.40 - 5.90 10e6/uL 4.41   MCV Latest Ref Range: 78 - 100 fL 96   MCH Latest Ref Range: 26.5 - 33.0 pg 31.3   MCHC Latest Ref Range: 31.5 - 36.5 g/dL 32.5   RDW Latest Ref Range: 10.0 - 15.0 % 15.4 (H)   % Neutrophils Latest Units: % 68   % Lymphocytes Latest Units: % 16   % Monocytes Latest Units: % 9   % Eosinophils Latest Units: % 5   %  Basophils Latest Units: % 1   Absolute Basophils Latest Ref Range: 0.0 - 0.2 10e3/uL 0.1   Absolute Eosinophils Latest Ref Range: 0.0 - 0.7 10e3/uL 0.4   Absolute Immature Granulocytes Latest Ref Range: <=0.4 10e3/uL 0.1   Absolute Lymphocytes Latest Ref Range: 0.8 - 5.3 10e3/uL 1.4   Absolute Monocytes Latest Ref Range: 0.0 - 1.3 10e3/uL 0.7   % Immature Granulocytes Latest Units: % 1   Absolute Neutrophils Latest Ref Range: 1.6 - 8.3 10e3/uL 5.8   Absolute NRBCs Latest Units: 10e3/uL 0.0   NRBCs per 100 WBC Latest Ref Range: <1 /100 0     Results reviewed, labs MET treatment parameters, ok to proceed with treatment.    Post Infusion Assessment:  Patient tolerated ONE injection in LLQ without incident.  Site patent and intact, free from redness, edema or discomfort.  No evidence of extravasations.     Discharge Plan:   Patient declined prescription refills.  Discharge instructions reviewed with: Patient.  Patient and/or family verbalized understanding of discharge instructions and all questions answered.  AVS to patient via [employer].  Patient will return 1/24 for next appointment.   Patient discharged in stable condition accompanied by: self.  Departure Mode: Ambulatory.      Ingrid Zamudio RN

## 2021-12-27 NOTE — PATIENT INSTRUCTIONS
Dear Patients and Caregivers,    Each day we work diligently to eliminate any barriers to your care including working with your insurance coverage to preauthorize your facility administered medication treatment. Our goal is to be transparent with any anticipated concerns with coverage for your treatment. At the beginning of every year this goal is particularly challenging because of changes to insurance coverage.    How can you help?    Provide any new insurance card to the infusion nurse at your next appointment or enter the information into your [email or web address] account prior to January 1st 2022.     It is vital that if a  reaches out that you return their phone call in a timely manner. Due to regulations, the team is unable to leave detailed voicemails. When you return the finance teams call they will be able to inform you of the details so a decision about your appointment can be made.    Also please understand:    We go through this process each year and each year offers new challenges.    Insurance companies will not allow the reauthorization process to begin until 2022, not allowing us to work in advance on this process.    Even if you are not changing insurance plans, insurance companies often update their policies requiring all treatments to be reauthorized.    As institutions across the country work to reauthorize treatments, insurance companies sometimes have longer than usual wait times in their call centers and leads to delayed response to prior authorization requests.     Thank you for your patience as we work this process. We do strive to keep you updated throughout the process if there are any delays or if the process is taking longer than anticipated. Lastly please feel free to speak with one of our infusion finance specialists at your next appointment or via phone ([phone]) if you have any questions. Thank you for choosing [facility] for your care.    [facility] Triage and after  hours / weekends / holidays:  [phone]    Please call the triage or after hours line if you experience a temperature greater than or equal to 100.5, shaking chills, have uncontrolled nausea, vomiting and/or diarrhea, dizziness, shortness of breath, chest pain, bleeding, unexplained bruising, or if you have any other new/concerning symptoms, questions or concerns.      If you are having any concerning symptoms or wish to speak to a provider before your next infusion visit, please call your care coordinator or triage to notify them so we can adequately serve you.     If you need a refill on a narcotic prescription or other medication, please call before your infusion appointment.                 December 2021 Sunday Monday Tuesday Wednesday Thursday Friday Saturday                  1     2     3     4       5     6    LAB PERIPHERAL   9:00 AM   (15 min.)   [facility] DRAW   [facility] 7     8    TELEPHONE VISIT RETURN   2:30 PM   (30 min.)   Angela Sweeney MD   [facility] 9     10     11       12     13     14     15     16     17     18       19     20     21     22     23     24     25       26     27    LAB PERIPHERAL   8:00 AM   (15 min.)   [facility] DRAW   [facility]    ONC INFUSION 6 HR (360 MIN)   8:30 AM   (360 min.)    ONC INFUSION NURSE   [facility] 28 29 30 31 January 2022 Sunday Monday Tuesday Wednesday Thursday Friday Saturday                                 1       2     3     4     5     6     7     8       9     10     11     12     13     14     15       16     17     18     19     20     21     22       23     24    LAB PERIPHERAL  10:15 AM   (15 min.)   [facility]   [facility]    RETURN  10:45 AM   (45 min.)   Lainey Monae APRN CNP   [facility]    ONC  INFUSION 1 HR (60 MIN)  12:00 PM   (60 min.)    ONC INFUSION NURSE   [facility] 25     26     27     28     29       30     31                                             Recent Results (from the past 24 hour(s))   Comprehensive metabolic panel    Collection Time: 12/27/21  8:33 AM   Result Value Ref Range    Sodium 143 133 - 144 mmol/L    Potassium 4.0 3.4 - 5.3 mmol/L    Chloride 112 (H) 94 - 109 mmol/L    Carbon Dioxide (CO2) 24 20 - 32 mmol/L    Anion Gap 7 3 - 14 mmol/L    Urea Nitrogen 23 7 - 30 mg/dL    Creatinine 1.20 0.66 - 1.25 mg/dL    Calcium 9.2 8.5 - 10.1 mg/dL    Glucose 114 (H) 70 - 99 mg/dL    Alkaline Phosphatase 99 40 - 150 U/L    AST 15 0 - 45 U/L    ALT 26 0 - 70 U/L    Protein Total 6.8 6.8 - 8.8 g/dL    Albumin 3.8 3.4 - 5.0 g/dL    Bilirubin Total 0.6 0.2 - 1.3 mg/dL    GFR Estimate 63 >60 mL/min/1.73m2   CBC with platelets and differential    Collection Time: 12/27/21  8:33 AM   Result Value Ref Range    WBC Count 8.5 4.0 - 11.0 10e3/uL    RBC Count 4.41 4.40 - 5.90 10e6/uL    Hemoglobin 13.8 13.3 - 17.7 g/dL    Hematocrit 42.4 40.0 - 53.0 %    MCV 96 78 - 100 fL    MCH 31.3 26.5 - 33.0 pg    MCHC 32.5 31.5 - 36.5 g/dL    RDW 15.4 (H) 10.0 - 15.0 %    Platelet Count 333 150 - 450 10e3/uL    % Neutrophils 68 %    % Lymphocytes 16 %    % Monocytes 9 %    % Eosinophils 5 %    % Basophils 1 %    % Immature Granulocytes 1 %    NRBCs per 100 WBC 0 <1 /100    Absolute Neutrophils 5.8 1.6 - 8.3 10e3/uL    Absolute Lymphocytes 1.4 0.8 - 5.3 10e3/uL    Absolute Monocytes 0.7 0.0 - 1.3 10e3/uL    Absolute Eosinophils 0.4 0.0 - 0.7 10e3/uL    Absolute Basophils 0.1 0.0 - 0.2 10e3/uL    Absolute Immature Granulocytes 0.1 <=0.4 10e3/uL    Absolute NRBCs 0.0 10e3/uL

## 2021-12-27 NOTE — NURSING NOTE
Chief Complaint   Patient presents with     Blood Draw     Labs drawn via  by RN in lab. VS taken.      Labs collected from venipuncture by RN. Vitals taken. Checked in for appointment(s).    Tosha Colmenares RN

## 2021-12-28 LAB — PROT PATTERN SERPL IFE-IMP: NORMAL

## 2021-12-29 LAB
ALBUMIN SERPL ELPH-MCNC: 4.1 G/DL (ref 3.7–5.1)
ALPHA1 GLOB SERPL ELPH-MCNC: 0.3 G/DL (ref 0.2–0.4)
ALPHA2 GLOB SERPL ELPH-MCNC: 0.8 G/DL (ref 0.5–0.9)
B-GLOBULIN SERPL ELPH-MCNC: 0.8 G/DL (ref 0.6–1)
GAMMA GLOB SERPL ELPH-MCNC: 0.4 G/DL (ref 0.7–1.6)
M PROTEIN SERPL ELPH-MCNC: 0.1 G/DL
PROT PATTERN SERPL ELPH-IMP: ABNORMAL

## 2022-01-24 ENCOUNTER — APPOINTMENT (OUTPATIENT)
Dept: LAB | Facility: CLINIC | Age: 77
End: 2022-01-24
Attending: REGISTERED NURSE
Payer: COMMERCIAL

## 2022-01-24 ENCOUNTER — ANCILLARY PROCEDURE (OUTPATIENT)
Dept: GENERAL RADIOLOGY | Facility: CLINIC | Age: 77
End: 2022-01-24
Attending: REGISTERED NURSE
Payer: COMMERCIAL

## 2022-01-24 ENCOUNTER — ONCOLOGY VISIT (OUTPATIENT)
Dept: ONCOLOGY | Facility: CLINIC | Age: 77
End: 2022-01-24
Attending: REGISTERED NURSE
Payer: COMMERCIAL

## 2022-01-24 VITALS
SYSTOLIC BLOOD PRESSURE: 159 MMHG | OXYGEN SATURATION: 95 % | TEMPERATURE: 96.8 F | RESPIRATION RATE: 18 BRPM | WEIGHT: 144 LBS | BODY MASS INDEX: 27.21 KG/M2 | DIASTOLIC BLOOD PRESSURE: 84 MMHG | HEART RATE: 89 BPM

## 2022-01-24 DIAGNOSIS — R05.9 COUGH: ICD-10-CM

## 2022-01-24 DIAGNOSIS — C90.00 MULTIPLE MYELOMA, REMISSION STATUS UNSPECIFIED (H): Primary | ICD-10-CM

## 2022-01-24 DIAGNOSIS — K21.9 GASTROESOPHAGEAL REFLUX DISEASE WITHOUT ESOPHAGITIS: ICD-10-CM

## 2022-01-24 PROCEDURE — 71046 X-RAY EXAM CHEST 2 VIEWS: CPT | Performed by: RADIOLOGY

## 2022-01-24 PROCEDURE — U0003 INFECTIOUS AGENT DETECTION BY NUCLEIC ACID (DNA OR RNA); SEVERE ACUTE RESPIRATORY SYNDROME CORONAVIRUS 2 (SARS-COV-2) (CORONAVIRUS DISEASE [COVID-19]), AMPLIFIED PROBE TECHNIQUE, MAKING USE OF HIGH THROUGHPUT TECHNOLOGIES AS DESCRIBED BY CMS-2020-01-R: HCPCS | Performed by: REGISTERED NURSE

## 2022-01-24 PROCEDURE — 99214 OFFICE O/P EST MOD 30 MIN: CPT | Performed by: REGISTERED NURSE

## 2022-01-24 PROCEDURE — G0463 HOSPITAL OUTPT CLINIC VISIT: HCPCS

## 2022-01-24 RX ORDER — DIPHENHYDRAMINE HCL 25 MG
50 CAPSULE ORAL
Status: CANCELLED
Start: 2022-02-28

## 2022-01-24 RX ORDER — NALOXONE HYDROCHLORIDE 0.4 MG/ML
.1-.4 INJECTION, SOLUTION INTRAMUSCULAR; INTRAVENOUS; SUBCUTANEOUS
Status: CANCELLED | OUTPATIENT
Start: 2022-02-28

## 2022-01-24 RX ORDER — LORAZEPAM 2 MG/ML
0.5 INJECTION INTRAMUSCULAR EVERY 4 HOURS PRN
Status: CANCELLED
Start: 2022-01-24

## 2022-01-24 RX ORDER — DEXAMETHASONE 4 MG/1
20 TABLET ORAL ONCE
Status: CANCELLED | OUTPATIENT
Start: 2022-01-24

## 2022-01-24 RX ORDER — SODIUM CHLORIDE 9 MG/ML
1000 INJECTION, SOLUTION INTRAVENOUS CONTINUOUS PRN
Status: CANCELLED
Start: 2022-02-28

## 2022-01-24 RX ORDER — DIPHENHYDRAMINE HYDROCHLORIDE 50 MG/ML
50 INJECTION INTRAMUSCULAR; INTRAVENOUS
Status: CANCELLED
Start: 2022-02-28

## 2022-01-24 RX ORDER — HEPARIN SODIUM (PORCINE) LOCK FLUSH IV SOLN 100 UNIT/ML 100 UNIT/ML
5 SOLUTION INTRAVENOUS
Status: CANCELLED | OUTPATIENT
Start: 2022-02-28

## 2022-01-24 RX ORDER — HEPARIN SODIUM,PORCINE 10 UNIT/ML
5 VIAL (ML) INTRAVENOUS
Status: CANCELLED | OUTPATIENT
Start: 2022-02-28

## 2022-01-24 RX ORDER — DIPHENHYDRAMINE HCL 25 MG
50 CAPSULE ORAL
Status: CANCELLED
Start: 2022-01-24

## 2022-01-24 RX ORDER — HEPARIN SODIUM,PORCINE 10 UNIT/ML
5 VIAL (ML) INTRAVENOUS
Status: CANCELLED | OUTPATIENT
Start: 2022-01-24

## 2022-01-24 RX ORDER — DIPHENHYDRAMINE HYDROCHLORIDE 50 MG/ML
50 INJECTION INTRAMUSCULAR; INTRAVENOUS
Status: CANCELLED
Start: 2022-01-24

## 2022-01-24 RX ORDER — ALBUTEROL SULFATE 0.83 MG/ML
2.5 SOLUTION RESPIRATORY (INHALATION)
Status: CANCELLED | OUTPATIENT
Start: 2022-01-24

## 2022-01-24 RX ORDER — EPINEPHRINE 1 MG/ML
0.3 INJECTION, SOLUTION INTRAMUSCULAR; SUBCUTANEOUS EVERY 5 MIN PRN
Status: CANCELLED | OUTPATIENT
Start: 2022-02-28

## 2022-01-24 RX ORDER — ACETAMINOPHEN 325 MG/1
650 TABLET ORAL
Status: CANCELLED
Start: 2022-01-24

## 2022-01-24 RX ORDER — LORAZEPAM 2 MG/ML
0.5 INJECTION INTRAMUSCULAR EVERY 4 HOURS PRN
Status: CANCELLED
Start: 2022-02-28

## 2022-01-24 RX ORDER — NALOXONE HYDROCHLORIDE 0.4 MG/ML
.1-.4 INJECTION, SOLUTION INTRAMUSCULAR; INTRAVENOUS; SUBCUTANEOUS
Status: CANCELLED | OUTPATIENT
Start: 2022-01-24

## 2022-01-24 RX ORDER — HEPARIN SODIUM (PORCINE) LOCK FLUSH IV SOLN 100 UNIT/ML 100 UNIT/ML
5 SOLUTION INTRAVENOUS
Status: CANCELLED | OUTPATIENT
Start: 2022-01-24

## 2022-01-24 RX ORDER — METHYLPREDNISOLONE SODIUM SUCCINATE 125 MG/2ML
125 INJECTION, POWDER, LYOPHILIZED, FOR SOLUTION INTRAMUSCULAR; INTRAVENOUS
Status: CANCELLED
Start: 2022-02-28

## 2022-01-24 RX ORDER — MEPERIDINE HYDROCHLORIDE 25 MG/ML
25 INJECTION INTRAMUSCULAR; INTRAVENOUS; SUBCUTANEOUS EVERY 30 MIN PRN
Status: CANCELLED | OUTPATIENT
Start: 2022-02-28

## 2022-01-24 RX ORDER — EPINEPHRINE 1 MG/ML
0.3 INJECTION, SOLUTION INTRAMUSCULAR; SUBCUTANEOUS EVERY 5 MIN PRN
Status: CANCELLED | OUTPATIENT
Start: 2022-01-24

## 2022-01-24 RX ORDER — ALBUTEROL SULFATE 0.83 MG/ML
2.5 SOLUTION RESPIRATORY (INHALATION)
Status: CANCELLED | OUTPATIENT
Start: 2022-02-28

## 2022-01-24 RX ORDER — SODIUM CHLORIDE 9 MG/ML
1000 INJECTION, SOLUTION INTRAVENOUS CONTINUOUS PRN
Status: CANCELLED
Start: 2022-01-24

## 2022-01-24 RX ORDER — MEPERIDINE HYDROCHLORIDE 25 MG/ML
25 INJECTION INTRAMUSCULAR; INTRAVENOUS; SUBCUTANEOUS EVERY 30 MIN PRN
Status: CANCELLED | OUTPATIENT
Start: 2022-01-24

## 2022-01-24 RX ORDER — ALBUTEROL SULFATE 90 UG/1
1-2 AEROSOL, METERED RESPIRATORY (INHALATION)
Status: CANCELLED
Start: 2022-02-28

## 2022-01-24 RX ORDER — METHYLPREDNISOLONE SODIUM SUCCINATE 125 MG/2ML
125 INJECTION, POWDER, LYOPHILIZED, FOR SOLUTION INTRAMUSCULAR; INTRAVENOUS
Status: CANCELLED
Start: 2022-01-24

## 2022-01-24 RX ORDER — ACETAMINOPHEN 325 MG/1
650 TABLET ORAL
Status: CANCELLED
Start: 2022-02-28

## 2022-01-24 RX ORDER — DEXAMETHASONE 4 MG/1
20 TABLET ORAL ONCE
Status: CANCELLED | OUTPATIENT
Start: 2022-02-28

## 2022-01-24 RX ORDER — ALBUTEROL SULFATE 90 UG/1
1-2 AEROSOL, METERED RESPIRATORY (INHALATION)
Status: CANCELLED
Start: 2022-01-24

## 2022-01-24 ASSESSMENT — PAIN SCALES - GENERAL: PAINLEVEL: NO PAIN (0)

## 2022-01-24 NOTE — NURSING NOTE
"Oncology Rooming Note    January 24, 2022 11:16 AM   Rogelio Marshall is a 76 year old male who presents for:    Chief Complaint   Patient presents with     Labs Only     venipuncture, vitals checked     Oncology Clinic Visit     Return; Multiple Myeloma     Initial Vitals: BP (!) 159/84   Pulse 89   Temp 96.8  F (36  C)   Resp 18   Wt 65.3 kg (144 lb)   SpO2 95%   BMI 27.21 kg/m   Estimated body mass index is 27.21 kg/m  as calculated from the following:    Height as of 7/22/21: 1.549 m (5' 1\").    Weight as of this encounter: 65.3 kg (144 lb). Body surface area is 1.68 meters squared.  No Pain (0) Comment: Data Unavailable   No LMP for male patient.  Allergies reviewed: Yes  Medications reviewed: Yes    Medications: Medication refills not needed today.  Pharmacy name entered into EPIC:    [facility] [place], [place] - [id number]  WRITTEN PRESCRIPTION REQUESTED  [id number] - [place], [place] - [address] [facility] - [place], [place] - [address]    Clinical concerns:        Alyssia Vu CMA              "

## 2022-01-24 NOTE — NURSING NOTE
Chief Complaint   Patient presents with     Labs Only     venipuncture, vitals checked     Nancy Hernadez RN on 1/24/2022 at 10:30 AM

## 2022-01-24 NOTE — LETTER
"    1/24/2022         RE: Rogelio Marshall  [address]  [facility] [address]        Dear Colleague,    Thank you for referring your patient, oRgelio Marshall, to the [facility]. Please see a copy of my visit note below.    Reason for Visit: follow-up for multiple myeloma    Oncology HPI:   -compression fractures and bone scan reveals multiple lytic lesions throughout his appendicular and axial skeleton. His Beta 2 microglobulin was 3.6 on 10/10/2020. Kappa chains were 73.6 on 10/5/2020 with K/L ratio of 89.9. M spike of 16.2 in urine on 10/10. Bone marrow biopsy on 10/23/2020 showed trilineage hematopoiesis and 50-60% kappa restricted plasma cells. Flow cytometry showed 20 % plasma cells which express CD19, CD38, CD45 and monotypic cytoplasmic kappa immunoglobulin light chains but lack CD20 and CD56.  FISH shows IGH-CCND1 fusion (81%; 30% had loss of IGH component from one fusion signal).       - Started 21 day cycle VRD 11/2020     - 11/27 he had a fall with rib fractures.      - Dec 2020 Started madyson-velcade-dex. Completed 5 cycles and achieved VGPR     - April 2021 started darzalex maintenance    Interval history:   He reports a \"cold\" with a cough that has been \"pretty persistent\" over the past month. Cough is non-productive, denies pain in his chest or shortness of breath.  He has a mild headache and he has been feeling fatigued. Sinus drainage is clear and thin. Denies fevers or chills. Denies nausea, vomiting, diarrhea, or constipation.  He continues to feel a \"tense\" feeling in his stomach that affects his appetite, relieved with omeprazole.  No new bony pain.      Current Outpatient Medications   Medication Sig Dispense Refill     acetaminophen (TYLENOL) 325 MG tablet Take 3 tablets (975 mg) by mouth 3 times daily (Patient not taking: Reported on 12/27/2021) 500 tablet 4     acyclovir (ZOVIRAX) 400 MG tablet Take 1 tablet (400 mg) by mouth 2 times daily Viral " Prophylaxis. 60 tablet 11     calcium carbonate 500 mg, elemental, (OSCAL) 500 MG tablet Take 1 tablet (500 mg) by mouth 2 times daily 200 tablet 3     dexamethasone (DECADRON) 4 MG tablet TAKE 5 TABLETS (20MG) BY MOUTH ONCE ON DAY 2 35 tablet 0     gabapentin (NEURONTIN) 300 MG capsule Take 1 capsule (300 mg) by mouth 2 times daily 60 capsule 3     hydrochlorothiazide (MICROZIDE) 12.5 MG capsule Take 1 capsule (12.5 mg) by mouth daily (Patient not taking: Reported on 8/23/2021) 90 capsule 3     LORazepam (ATIVAN) 0.5 MG tablet Take 1 tablet (0.5 mg) by mouth every 4 hours as needed (Anxiety, Nausea/Vomiting or Sleep) (Patient not taking: Reported on 12/27/2021) 30 tablet 5     omeprazole 20 MG tablet Take 1 tablet (20 mg) by mouth daily as needed (indigestion/reflux associated with madyson/dex) 90 tablet 0     ondansetron (ZOFRAN-ODT) 4 MG ODT tab Take 1 tablet (4 mg) by mouth every 6 hours as needed for nausea or vomiting (Patient not taking: Reported on 8/23/2021)       polyethylene glycol (MIRALAX) 17 g packet Take 17 g by mouth daily (Patient not taking: Reported on 8/23/2021)       prochlorperazine (COMPAZINE) 10 MG tablet Take 1 tablet (10 mg) by mouth every 6 hours as needed (Nausea/Vomiting) (Patient not taking: Reported on 11/29/2021) 30 tablet 5     senna-docusate (SENOKOT-S/PERICOLACE) 8.6-50 MG tablet Take 1 tablet by mouth daily as needed  (Patient not taking: Reported on 8/23/2021)       triamcinolone (KENALOG) 0.1 % external ointment Apply topically 2 times daily To back rash (Patient not taking: Reported on 8/23/2021) 15 g 0     vitamin D3 (CHOLECALCIFEROL) 50 mcg (2000 units) tablet Take 1 tablet (50 mcg) by mouth daily 90 tablet 3     VITAMIN D3 25 MCG (1000 UT) tablet Take 1 tablet by mouth daily            Exam:   Blood pressure (!) 159/84, pulse 89, temperature 96.8  F (36  C), resp. rate 18, weight 65.3 kg (144 lb), SpO2 95 %.  Wt Readings from Last 4 Encounters:   12/27/21 64.5 kg (142 lb 4.8  oz)   12/06/21 62.6 kg (138 lb)   11/29/21 61.4 kg (135 lb 6.4 oz)   11/01/21 66.2 kg (146 lb)     Gen: alert, pleasant and conversational, NAD  HEENT: NC/AT,EOMI w/ PERRL, anicteric sclera. OP clear. MMM.   Neck: Supple, no LAD  CV: normal S1,S2 with RRR no m/r/g  Resp: lungs CTA bilaterally with adequate air movement to bases. No wheezes or crackles  Abd: soft NTND no organomegaly or masses. BS normoactive.   Ext: warm and well perfused, no edema or cyanosis  Lymphatics: no palpable cervical, axillary, or inguinal LAD. No HSM  Skin: no concerning lesions or rashes  Neuro: A&Ox4, no lateralizing sx. Grossly nonfocal.  Psych: appropriate, reactive    Labs:   I personally reviewed the following labs:    Most Recent 3 CBC's:  Recent Labs   Lab Test 01/24/22  1036 12/27/21  0833 12/06/21  0928   WBC 5.6 8.5 10.5   HGB 14.6 13.8 13.6   MCV 97 96 98    333 304     Most Recent 3 BMP's:  Recent Labs   Lab Test 01/24/22  1036 12/27/21  0833 12/06/21  0928    143 141   POTASSIUM 3.7 4.0 3.5   CHLORIDE 104 112* 106   CO2 24 24 26   BUN 16 23 22   CR 1.13 1.20 1.04   ANIONGAP 12 7 9   MIRI 9.3 9.2 9.6   GLC 97 114* 119*     Most Recent 2 LFT's:  Recent Labs   Lab Test 01/24/22  1036 12/27/21  0833   AST 22 15   ALT 31 26   ALKPHOS 96 99   BILITOTAL 0.8 0.6          Imaging:   Chest 2 views     INDICATION: Cough     COMPARISON: 11/1/2021     FINDINGS: Mild gentle midthoracic dextrocurvature. Improved aeration  of the lungs with resolution of previous right perihilar streaky  opacities. There is a faint somewhat rounded vague density in the  right mid upper lung which is more conspicuous no previous comment  given the resolution of previous surrounding opacities. Most recent  chest CT with pulmonary angiographic study of 11/30/2020 and did not  show a nodule at that time.                                                                      IMPRESSION: Vague possible nodular density in the right mid to upper  lung.  "Recommend upright PA and lateral examination when clinical  condition permits to determine if this is an actual density rather  than due to superimposed structures. Improved aeration in the right  perihilar lung region.    Impression/plan:   76 year old man with IgG Kappa standard risk multiple myeloma. Despite his side effects, he has had a VGPR with therapy. Now on q 4 week darzalex faspro for maintenance.   -Will delay today's Daralex 1 week due to cough.  -continue to follow SPEP and FLC monthly.   - RTC next week for clearance to proceed with Hetal    COVID-19 positive  He had recovered well from his recent COVID19 infection and had one negative swab. He has a cough today that he reports has been persistent for 1 month. Less likely to be COVID due to recent infection, but will swab again today for confirmation.  He needs a second negative swab to have the infection precautions lifted anyway.    Cough  He has had a non-productive cough for 1 month. Will obtain COVID swab today as well as a chest x-ray, as he is at risk for a post-URI pneumonia. If chest x-ray is negative, will treat as bronchitis.  He has been afebrile, SpO2 is 95% today, HR WNL. Hetal will be delayed one week while he recovers his respiratory status.  ADDENDUM: Chest x-ray is mostly reassuring.  There is one area of \"vague possible nodular density\" in the right mid/upper lung, but no opacities noted. In the absence of any other concerning symptoms (no fever, HR and oxygen WNL), I am suspicious that his cough may be from his GERD, in the setting of cutting back on the regularity of his omeprazole. Also on the differential is an extended episode of bronchitis. Will advise he take his omeprazole regularly and can also try symptomatic relief with Robitussin.    Pathologic Fractures:. Vitamin D is replete and he is on a daily supplement as well as calcium. He has not received dental clearance for Zometa infusions; did not discuss today as we spent a " lot of time discussing his respiratory status.  -Vitamin D level 77 on 11/29/21. Recommend continuing Vitamin D 2000 international unit(s) daily.     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 Continues to use gabapentin with good relief     GERD: Continues on omeprazole intermittently for management. Recommend he increase to regular use (omeprazole 20 mg daily) to see if it helps with his persistent cough.     CKD: Baseline creatinine is 1.1 suggesting underlying CKD. Creatinine stable today.    40  minutes spent on the date of the encounter doing chart review, review of test results, patient visit and documentation           Again, thank you for allowing me to participate in the care of your patient.      Sincerely,    JIMBO Joseph CNP

## 2022-01-24 NOTE — PROGRESS NOTES
"Reason for Visit: follow-up for multiple myeloma    Oncology HPI:   -compression fractures and bone scan reveals multiple lytic lesions throughout his appendicular and axial skeleton. His Beta 2 microglobulin was 3.6 on 10/10/2020. Kappa chains were 73.6 on 10/5/2020 with K/L ratio of 89.9. M spike of 16.2 in urine on 10/10. Bone marrow biopsy on 10/23/2020 showed trilineage hematopoiesis and 50-60% kappa restricted plasma cells. Flow cytometry showed 20 % plasma cells which express CD19, CD38, CD45 and monotypic cytoplasmic kappa immunoglobulin light chains but lack CD20 and CD56.  FISH shows IGH-CCND1 fusion (81%; 30% had loss of IGH component from one fusion signal).       - Started 21 day cycle VRD 11/2020     - 11/27 he had a fall with rib fractures.      - Dec 2020 Started madyson-velcade-dex. Completed 5 cycles and achieved VGPR     - April 2021 started darzalex maintenance    Interval history:   He reports a \"cold\" with a cough that has been \"pretty persistent\" over the past month. Cough is non-productive, denies pain in his chest or shortness of breath.  He has a mild headache and he has been feeling fatigued. Sinus drainage is clear and thin. Denies fevers or chills. Denies nausea, vomiting, diarrhea, or constipation.  He continues to feel a \"tense\" feeling in his stomach that affects his appetite, relieved with omeprazole.  No new bony pain.      Current Outpatient Medications   Medication Sig Dispense Refill     acetaminophen (TYLENOL) 325 MG tablet Take 3 tablets (975 mg) by mouth 3 times daily (Patient not taking: Reported on 12/27/2021) 500 tablet 4     acyclovir (ZOVIRAX) 400 MG tablet Take 1 tablet (400 mg) by mouth 2 times daily Viral Prophylaxis. 60 tablet 11     calcium carbonate 500 mg, elemental, (OSCAL) 500 MG tablet Take 1 tablet (500 mg) by mouth 2 times daily 200 tablet 3     dexamethasone (DECADRON) 4 MG tablet TAKE 5 TABLETS (20MG) BY MOUTH ONCE ON DAY 2 35 tablet 0     gabapentin " (NEURONTIN) 300 MG capsule Take 1 capsule (300 mg) by mouth 2 times daily 60 capsule 3     hydrochlorothiazide (MICROZIDE) 12.5 MG capsule Take 1 capsule (12.5 mg) by mouth daily (Patient not taking: Reported on 8/23/2021) 90 capsule 3     LORazepam (ATIVAN) 0.5 MG tablet Take 1 tablet (0.5 mg) by mouth every 4 hours as needed (Anxiety, Nausea/Vomiting or Sleep) (Patient not taking: Reported on 12/27/2021) 30 tablet 5     omeprazole 20 MG tablet Take 1 tablet (20 mg) by mouth daily as needed (indigestion/reflux associated with madyson/dex) 90 tablet 0     ondansetron (ZOFRAN-ODT) 4 MG ODT tab Take 1 tablet (4 mg) by mouth every 6 hours as needed for nausea or vomiting (Patient not taking: Reported on 8/23/2021)       polyethylene glycol (MIRALAX) 17 g packet Take 17 g by mouth daily (Patient not taking: Reported on 8/23/2021)       prochlorperazine (COMPAZINE) 10 MG tablet Take 1 tablet (10 mg) by mouth every 6 hours as needed (Nausea/Vomiting) (Patient not taking: Reported on 11/29/2021) 30 tablet 5     senna-docusate (SENOKOT-S/PERICOLACE) 8.6-50 MG tablet Take 1 tablet by mouth daily as needed  (Patient not taking: Reported on 8/23/2021)       triamcinolone (KENALOG) 0.1 % external ointment Apply topically 2 times daily To back rash (Patient not taking: Reported on 8/23/2021) 15 g 0     vitamin D3 (CHOLECALCIFEROL) 50 mcg (2000 units) tablet Take 1 tablet (50 mcg) by mouth daily 90 tablet 3     VITAMIN D3 25 MCG (1000 UT) tablet Take 1 tablet by mouth daily            Exam:   Blood pressure (!) 159/84, pulse 89, temperature 96.8  F (36  C), resp. rate 18, weight 65.3 kg (144 lb), SpO2 95 %.  Wt Readings from Last 4 Encounters:   12/27/21 64.5 kg (142 lb 4.8 oz)   12/06/21 62.6 kg (138 lb)   11/29/21 61.4 kg (135 lb 6.4 oz)   11/01/21 66.2 kg (146 lb)     Gen: alert, pleasant and conversational, NAD  HEENT: NC/AT,EOMI w/ PERRL, anicteric sclera. OP clear. MMM.   Neck: Supple, no LAD  CV: normal S1,S2 with RRR no  m/r/g  Resp: lungs CTA bilaterally with adequate air movement to bases. No wheezes or crackles  Abd: soft NTND no organomegaly or masses. BS normoactive.   Ext: warm and well perfused, no edema or cyanosis  Lymphatics: no palpable cervical, axillary, or inguinal LAD. No HSM  Skin: no concerning lesions or rashes  Neuro: A&Ox4, no lateralizing sx. Grossly nonfocal.  Psych: appropriate, reactive    Labs:   I personally reviewed the following labs:    Most Recent 3 CBC's:  Recent Labs   Lab Test 01/24/22  1036 12/27/21  0833 12/06/21  0928   WBC 5.6 8.5 10.5   HGB 14.6 13.8 13.6   MCV 97 96 98    333 304     Most Recent 3 BMP's:  Recent Labs   Lab Test 01/24/22  1036 12/27/21  0833 12/06/21  0928    143 141   POTASSIUM 3.7 4.0 3.5   CHLORIDE 104 112* 106   CO2 24 24 26   BUN 16 23 22   CR 1.13 1.20 1.04   ANIONGAP 12 7 9   MIRI 9.3 9.2 9.6   GLC 97 114* 119*     Most Recent 2 LFT's:  Recent Labs   Lab Test 01/24/22  1036 12/27/21  0833   AST 22 15   ALT 31 26   ALKPHOS 96 99   BILITOTAL 0.8 0.6          Imaging:   Chest 2 views     INDICATION: Cough     COMPARISON: 11/1/2021     FINDINGS: Mild gentle midthoracic dextrocurvature. Improved aeration  of the lungs with resolution of previous right perihilar streaky  opacities. There is a faint somewhat rounded vague density in the  right mid upper lung which is more conspicuous no previous comment  given the resolution of previous surrounding opacities. Most recent  chest CT with pulmonary angiographic study of 11/30/2020 and did not  show a nodule at that time.                                                                      IMPRESSION: Vague possible nodular density in the right mid to upper  lung. Recommend upright PA and lateral examination when clinical  condition permits to determine if this is an actual density rather  than due to superimposed structures. Improved aeration in the right  perihilar lung region.    Impression/plan:   76 year old man  "with IgG Kappa standard risk multiple myeloma. Despite his side effects, he has had a VGPR with therapy. Now on q 4 week darzalex faspro for maintenance.   -Will delay today's Daralex 1 week due to cough.  -continue to follow SPEP and FLC monthly.   - RTC next week for clearance to proceed with Hetal    COVID-19 positive  He had recovered well from his recent COVID19 infection and had one negative swab. He has a cough today that he reports has been persistent for 1 month. Less likely to be COVID due to recent infection, but will swab again today for confirmation.  He needs a second negative swab to have the infection precautions lifted anyway.    Cough  He has had a non-productive cough for 1 month. Will obtain COVID swab today as well as a chest x-ray, as he is at risk for a post-URI pneumonia. If chest x-ray is negative, will treat as bronchitis.  He has been afebrile, SpO2 is 95% today, HR WNL. Hetal will be delayed one week while he recovers his respiratory status.  ADDENDUM: Chest x-ray is mostly reassuring.  There is one area of \"vague possible nodular density\" in the right mid/upper lung, but no opacities noted. In the absence of any other concerning symptoms (no fever, HR and oxygen WNL), I am suspicious that his cough may be from his GERD, in the setting of cutting back on the regularity of his omeprazole. Also on the differential is an extended episode of bronchitis. Will advise he take his omeprazole regularly and can also try symptomatic relief with Robitussin.    Pathologic Fractures:. Vitamin D is replete and he is on a daily supplement as well as calcium. He has not received dental clearance for Zometa infusions; did not discuss today as we spent a lot of time discussing his respiratory status.  -Vitamin D level 77 on 11/29/21. Recommend continuing Vitamin D 2000 international unit(s) daily.     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 Continues to use gabapentin with good relief     GERD: " Continues on omeprazole intermittently for management. Recommend he increase to regular use (omeprazole 20 mg daily) to see if it helps with his persistent cough.     CKD: Baseline creatinine is 1.1 suggesting underlying CKD. Creatinine stable today.    40  minutes spent on the date of the encounter doing chart review, review of test results, patient visit and documentation       JIMBO Joseph CNP  [facility]  [address] [place] [id number] [phone]

## 2022-01-25 LAB — SARS-COV-2 RNA RESP QL NAA+PROBE: NORMAL

## 2022-01-26 ENCOUNTER — TELEPHONE (OUTPATIENT)
Dept: SCHEDULING | Facility: CLINIC | Age: 77
End: 2022-01-26
Payer: COMMERCIAL

## 2022-01-26 LAB — SARS-COV-2 RNA RESP QL NAA+PROBE: DETECTED

## 2022-01-26 NOTE — TELEPHONE ENCOUNTER
Patient classified as COVID treatment eligible by Epic high risk algorithm:  Yes     Coronavirus (COVID-19) Notification    Reason for call  Notify of POSITIVE COVID-19 lab result, assess symptoms,  review [facility] recommendations    Lab Result   Lab test for 2019-nCoV rRt-PCR or SARS-COV-2 PCR  Oropharyngeal AND/OR nasopharyngeal swabs were POSITIVE for 2019-nCoV RNA [OR] SARS-COV-2 RNA (COVID-19) RNA     We have been unable to reach patient by phone at this time to notify of their Positive COVID-19 result.    Left voicemail message requesting a call back to [phone] [facility] for results.        A Positive COVID-19 letter will be sent via [employer] or the mail. (Exception, no letters sent to Presurgerical/Preprocedure Patients)    Sidra Crystal

## 2022-01-31 ENCOUNTER — APPOINTMENT (OUTPATIENT)
Dept: LAB | Facility: CLINIC | Age: 77
End: 2022-01-31
Attending: REGISTERED NURSE
Payer: COMMERCIAL

## 2022-01-31 ENCOUNTER — INFUSION THERAPY VISIT (OUTPATIENT)
Dept: ONCOLOGY | Facility: CLINIC | Age: 77
End: 2022-01-31
Attending: REGISTERED NURSE
Payer: COMMERCIAL

## 2022-01-31 VITALS
HEART RATE: 89 BPM | TEMPERATURE: 98.1 F | RESPIRATION RATE: 16 BRPM | DIASTOLIC BLOOD PRESSURE: 83 MMHG | SYSTOLIC BLOOD PRESSURE: 153 MMHG

## 2022-01-31 DIAGNOSIS — C90.00 MULTIPLE MYELOMA, REMISSION STATUS UNSPECIFIED (H): ICD-10-CM

## 2022-01-31 DIAGNOSIS — K21.9 GASTROESOPHAGEAL REFLUX DISEASE WITHOUT ESOPHAGITIS: ICD-10-CM

## 2022-01-31 DIAGNOSIS — U07.1 INFECTION DUE TO 2019 NOVEL CORONAVIRUS: ICD-10-CM

## 2022-01-31 DIAGNOSIS — C90.00 MULTIPLE MYELOMA, REMISSION STATUS UNSPECIFIED (H): Primary | ICD-10-CM

## 2022-01-31 DIAGNOSIS — R05.9 COUGH: Primary | ICD-10-CM

## 2022-01-31 LAB
ALBUMIN SERPL-MCNC: 3.4 G/DL (ref 3.4–5)
ALP SERPL-CCNC: 88 U/L (ref 40–150)
ALT SERPL W P-5'-P-CCNC: 23 U/L (ref 0–70)
ANION GAP SERPL CALCULATED.3IONS-SCNC: 5 MMOL/L (ref 3–14)
AST SERPL W P-5'-P-CCNC: 14 U/L (ref 0–45)
BASOPHILS # BLD AUTO: 0.1 10E3/UL (ref 0–0.2)
BASOPHILS NFR BLD AUTO: 1 %
BILIRUB SERPL-MCNC: 0.4 MG/DL (ref 0.2–1.3)
BUN SERPL-MCNC: 22 MG/DL (ref 7–30)
CALCIUM SERPL-MCNC: 9.2 MG/DL (ref 8.5–10.1)
CHLORIDE BLD-SCNC: 108 MMOL/L (ref 94–109)
CO2 SERPL-SCNC: 27 MMOL/L (ref 20–32)
CREAT SERPL-MCNC: 1.09 MG/DL (ref 0.66–1.25)
EOSINOPHIL # BLD AUTO: 0.7 10E3/UL (ref 0–0.7)
EOSINOPHIL NFR BLD AUTO: 8 %
ERYTHROCYTE [DISTWIDTH] IN BLOOD BY AUTOMATED COUNT: 14.7 % (ref 10–15)
GFR SERPL CREATININE-BSD FRML MDRD: 71 ML/MIN/1.73M2
GLUCOSE BLD-MCNC: 122 MG/DL (ref 70–99)
HCT VFR BLD AUTO: 40.7 % (ref 40–53)
HGB BLD-MCNC: 13.5 G/DL (ref 13.3–17.7)
IMM GRANULOCYTES # BLD: 0 10E3/UL
IMM GRANULOCYTES NFR BLD: 1 %
LYMPHOCYTES # BLD AUTO: 1.8 10E3/UL (ref 0.8–5.3)
LYMPHOCYTES NFR BLD AUTO: 21 %
MCH RBC QN AUTO: 32 PG (ref 26.5–33)
MCHC RBC AUTO-ENTMCNC: 33.2 G/DL (ref 31.5–36.5)
MCV RBC AUTO: 96 FL (ref 78–100)
MONOCYTES # BLD AUTO: 0.7 10E3/UL (ref 0–1.3)
MONOCYTES NFR BLD AUTO: 8 %
NEUTROPHILS # BLD AUTO: 5.2 10E3/UL (ref 1.6–8.3)
NEUTROPHILS NFR BLD AUTO: 61 %
NRBC # BLD AUTO: 0 10E3/UL
NRBC BLD AUTO-RTO: 0 /100
PLATELET # BLD AUTO: 345 10E3/UL (ref 150–450)
POTASSIUM BLD-SCNC: 4 MMOL/L (ref 3.4–5.3)
PROT SERPL-MCNC: 6.4 G/DL (ref 6.8–8.8)
RBC # BLD AUTO: 4.22 10E6/UL (ref 4.4–5.9)
SODIUM SERPL-SCNC: 140 MMOL/L (ref 133–144)
WBC # BLD AUTO: 8.4 10E3/UL (ref 4–11)

## 2022-01-31 PROCEDURE — 80053 COMPREHEN METABOLIC PANEL: CPT | Performed by: REGISTERED NURSE

## 2022-01-31 PROCEDURE — 36415 COLL VENOUS BLD VENIPUNCTURE: CPT | Performed by: REGISTERED NURSE

## 2022-01-31 PROCEDURE — 96401 CHEMO ANTI-NEOPL SQ/IM: CPT

## 2022-01-31 PROCEDURE — 99214 OFFICE O/P EST MOD 30 MIN: CPT | Performed by: REGISTERED NURSE

## 2022-01-31 PROCEDURE — 250N000011 HC RX IP 250 OP 636: Performed by: STUDENT IN AN ORGANIZED HEALTH CARE EDUCATION/TRAINING PROGRAM

## 2022-01-31 PROCEDURE — 85025 COMPLETE CBC W/AUTO DIFF WBC: CPT | Performed by: REGISTERED NURSE

## 2022-01-31 PROCEDURE — 82040 ASSAY OF SERUM ALBUMIN: CPT | Performed by: REGISTERED NURSE

## 2022-01-31 RX ORDER — DEXAMETHASONE 4 MG/1
TABLET ORAL
Qty: 30 TABLET | Refills: 1 | Status: SHIPPED | OUTPATIENT
Start: 2022-01-31

## 2022-01-31 RX ORDER — DEXAMETHASONE 4 MG/1
20 TABLET ORAL ONCE
Status: COMPLETED | OUTPATIENT
Start: 2022-01-31 | End: 2022-01-31

## 2022-01-31 RX ADMIN — DARATUMUMAB AND HYALURONIDASE-FIHJ (HUMAN RECOMBINANT) 1800 MG: 1800; 30000 INJECTION SUBCUTANEOUS at 16:13

## 2022-01-31 RX ADMIN — DEXAMETHASONE 20 MG: 4 TABLET ORAL at 15:13

## 2022-01-31 ASSESSMENT — PAIN SCALES - GENERAL: PAINLEVEL: NO PAIN (0)

## 2022-01-31 NOTE — LETTER
1/31/2022         RE: Rogelio Marshall  [address]  [facility] [address]        Dear Colleague,    Thank you for referring your patient, Rogelio Marshall, to the [facility]. Please see a copy of my visit note below.    Reason for Visit: follow-up for multiple myeloma    Oncology HPI:   -compression fractures and bone scan reveals multiple lytic lesions throughout his appendicular and axial skeleton. His Beta 2 microglobulin was 3.6 on 10/10/2020. Kappa chains were 73.6 on 10/5/2020 with K/L ratio of 89.9. M spike of 16.2 in urine on 10/10. Bone marrow biopsy on 10/23/2020 showed trilineage hematopoiesis and 50-60% kappa restricted plasma cells. Flow cytometry showed 20 % plasma cells which express CD19, CD38, CD45 and monotypic cytoplasmic kappa immunoglobulin light chains but lack CD20 and CD56.  FISH shows IGH-CCND1 fusion (81%; 30% had loss of IGH component from one fusion signal).       - Started 21 day cycle VRD 11/2020     - 11/27 he had a fall with rib fractures.      - Dec 2020 Started madyson-velcade-dex. Completed 5 cycles and achieved VGPR     - April 2021 started darzalex maintenance    Interval history:   He reports ongoing, non-productive cough that has not changed or has maybe slightly improved over the past week. Denies pain in his chest or shortness of breath.   Denies fevers or chills. Denies nausea, vomiting, diarrhea, or constipation.        Current Outpatient Medications   Medication Sig Dispense Refill     dexamethasone (DECADRON) 4 MG tablet TAKE 5 TABLETS (20MG) BY MOUTH ONCE ON DAY 2 30 tablet 1     acetaminophen (TYLENOL) 325 MG tablet Take 3 tablets (975 mg) by mouth 3 times daily (Patient not taking: Reported on 12/27/2021) 500 tablet 4     acyclovir (ZOVIRAX) 400 MG tablet Take 1 tablet (400 mg) by mouth 2 times daily Viral Prophylaxis. 60 tablet 11     calcium carbonate 500 mg, elemental, (OSCAL) 500 MG tablet Take 1 tablet (500 mg) by mouth 2  times daily 200 tablet 3     gabapentin (NEURONTIN) 300 MG capsule Take 1 capsule (300 mg) by mouth 2 times daily 60 capsule 3     hydrochlorothiazide (MICROZIDE) 12.5 MG capsule Take 1 capsule (12.5 mg) by mouth daily (Patient not taking: Reported on 8/23/2021) 90 capsule 3     LORazepam (ATIVAN) 0.5 MG tablet Take 1 tablet (0.5 mg) by mouth every 4 hours as needed (Anxiety, Nausea/Vomiting or Sleep) (Patient not taking: Reported on 12/27/2021) 30 tablet 5     omeprazole 20 MG tablet Take 1 tablet (20 mg) by mouth daily as needed (indigestion/reflux associated with madyson/dex) 90 tablet 0     ondansetron (ZOFRAN-ODT) 4 MG ODT tab Take 1 tablet (4 mg) by mouth every 6 hours as needed for nausea or vomiting (Patient not taking: Reported on 8/23/2021)       polyethylene glycol (MIRALAX) 17 g packet Take 17 g by mouth daily (Patient not taking: Reported on 8/23/2021)       prochlorperazine (COMPAZINE) 10 MG tablet Take 1 tablet (10 mg) by mouth every 6 hours as needed (Nausea/Vomiting) (Patient not taking: Reported on 11/29/2021) 30 tablet 5     senna-docusate (SENOKOT-S/PERICOLACE) 8.6-50 MG tablet Take 1 tablet by mouth daily as needed  (Patient not taking: Reported on 8/23/2021)       triamcinolone (KENALOG) 0.1 % external ointment Apply topically 2 times daily To back rash (Patient not taking: Reported on 8/23/2021) 15 g 0     Vitamin D-Vitamin K (VITAMIN K2-VITAMIN D3)  MCG-UNIT CAPS        vitamin D3 (CHOLECALCIFEROL) 50 mcg (2000 units) tablet Take 1 tablet (50 mcg) by mouth daily 90 tablet 3     VITAMIN D3 25 MCG (1000 UT) tablet Take 1 tablet by mouth daily  (Patient not taking: Reported on 1/24/2022)           Exam:   /83   HR 89   Temp 98.1    RR 16  Wt Readings from Last 4 Encounters:   01/24/22 65.3 kg (144 lb)   12/27/21 64.5 kg (142 lb 4.8 oz)   12/06/21 62.6 kg (138 lb)   11/29/21 61.4 kg (135 lb 6.4 oz)     Gen: alert, pleasant and conversational, NAD  HEENT: NC/AT,EOMI w/ PERRL, anicteric  "sclera. OP clear. MMM.   Neck: Supple, no LAD  CV: normal S1,S2 with RRR no m/r/g  Resp: lungs CTA bilaterally with adequate air movement to bases. No wheezes or crackles  Abd: soft NTND no organomegaly or masses. BS normoactive.   Ext: warm and well perfused, no edema or cyanosis  Lymphatics: no palpable cervical, axillary, or inguinal LAD. No HSM  Skin: no concerning lesions or rashes  Neuro: A&Ox4, no lateralizing sx. Grossly nonfocal.  Psych: appropriate, reactive    Labs:   I personally reviewed the following labs:    Most Recent 3 CBC's:  Recent Labs   Lab Test 01/31/22  1433 01/24/22  1036 12/27/21  0833   WBC 8.4 5.6 8.5   HGB 13.5 14.6 13.8   MCV 96 97 96    278 333     Most Recent 3 BMP's:  Recent Labs   Lab Test 01/31/22  1433 01/24/22  1036 12/27/21  0833    140 143   POTASSIUM 4.0 3.7 4.0   CHLORIDE 108 104 112*   CO2 27 24 24   BUN 22 16 23   CR 1.09 1.13 1.20   ANIONGAP 5 12 7   MIRI 9.2 9.3 9.2   * 97 114*     Most Recent 2 LFT's:  Recent Labs   Lab Test 01/31/22  1433 01/24/22  1036   AST 14 22   ALT 23 31   ALKPHOS 88 96   BILITOTAL 0.4 0.8          Imaging:   No new imaging to review.    Impression/plan:   76 year old man with IgG Kappa standard risk multiple myeloma. Despite his side effects, he has had a VGPR with therapy. Now on q 4 week darzalex faspro for maintenance.   -Okay to proceed with daratumumab today  -continue to follow SPEP and FLC monthly.   - RTC in one month for next cycle of maintenance daratumumab    COVID-19 positive  He had recovered well from his recent COVID19 infection and had one negative swab. He reported a cough last week that had been persistent for 1 month. COVID PCR was performed and was positive.  Discussed monoclonal antibody infusion today, and he is not interested.  He feels like his cough is getting better and wants to let things \"run their course.\"     Pathologic Fractures:. Vitamin D is replete and he is on a daily supplement as well as " calcium. He has not received dental clearance for Zometa infusions; did not discuss today as we spent a lot of time discussing his respiratory status.  -Vitamin D level 77 on 11/29/21. Recommend continuing Vitamin D 2000 international unit(s) daily.     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 Continues to use gabapentin with good relief     GERD: Continues on omeprazole intermittently for management. Recommend he increase to regular use (omeprazole 20 mg daily) to see if it helps with his persistent cough.     CKD: Baseline creatinine is 1.1 suggesting underlying CKD. Creatinine stable today.    36 minutes spent on the date of the encounter doing chart review, review of test results, patient visit and documentation           Again, thank you for allowing me to participate in the care of your patient.      Sincerely,    JIMBO Joseph CNP

## 2022-01-31 NOTE — PROGRESS NOTES
Infusion Nursing Note:  Rogelio Marshall presents today for maintenance darzalex faspro.    Patient seen by provider today: Yes: Lainey Monae CNP   present during visit today: Not Applicable.    Note:   Patient has no questions or concerns after his appointment with Lainey Monae.     Intravenous Access:  No Intravenous access at this visit.    Treatment Conditions:  Lab Results   Component Value Date    HGB 13.5 01/31/2022    WBC 8.4 01/31/2022    ANEU 4.8 06/28/2021    ANEUTAUTO 5.2 01/31/2022     01/31/2022      Lab Results   Component Value Date     01/31/2022    POTASSIUM 4.0 01/31/2022    MAG 2.1 12/02/2020    CR 1.09 01/31/2022    MIRI 9.2 01/31/2022    BILITOTAL 0.4 01/31/2022    ALBUMIN 3.4 01/31/2022    ALT 23 01/31/2022    AST 14 01/31/2022     Results reviewed, labs MET treatment parameters, ok to proceed with treatment.      Post Infusion Assessment:  Patient tolerated injection into right lower abdomenwithout incident.       Discharge Plan:   Prescription refills given for dexamethasone.  Discharge instructions reviewed with: Patient.  Patient and/or family verbalized understanding of discharge instructions and all questions answered.  AVS to patient via [employer].  Patient will return 2/28 for next appointment, scheduling working on, patient aware.   Patient discharged in stable condition accompanied by: self.  Departure Mode: Ambulatory.      Sandra Henao RN

## 2022-01-31 NOTE — PATIENT INSTRUCTIONS
[facility] Triage and after hours / weekends / holidays:  [phone]    Please call the triage or after hours line if you experience a temperature greater than or equal to 100.5, shaking chills, have uncontrolled nausea, vomiting and/or diarrhea, dizziness, shortness of breath, chest pain, bleeding, unexplained bruising, or if you have any other new/concerning symptoms, questions or concerns.      If you are having any concerning symptoms or wish to speak to a provider before your next infusion visit, please call your care coordinator or triage to notify them so we can adequately serve you.     If you need a refill on a narcotic prescription or other medication, please call before your infusion appointment.       January 2022 Sunday Monday Tuesday Wednesday Thursday Friday Saturday                                 1       2     3     4     5     6     7     8       9     10     11     12     13     14     15       16     17     18     19     20     21     22       23     24    LAB PERIPHERAL  10:15 AM   (15 min.)   [facility] LAB DRAW   [facility]    RETURN  10:45 AM   (45 min.)   Lainey Monae APRN CNP   [facility]    XR CHEST 2 VIEWS  12:15 PM   (20 min.)   [id number]   [facility] Xray [place] 25     26     27     28     29       30     31    LAB PERIPHERAL   1:45 PM   (15 min.)   [facility] LAB DRAW   [facility]    RETURN   2:15 PM   (45 min.)   Lainey Monae APRN CNP   [facility]    ONC INFUSION 1 HR (60 MIN)   2:30 PM   (60 min.)    ONC INFUSION NURSE   [facility]                                      February 2022 Sunday Monday Tuesday Wednesday Thursday Friday Saturday             1     2     3     4     5       6     7     8  Happy Birthday!     9     10     11     12       13     14     15     16     17     18     19        20     21     22     23     24     25     26       27     28                                             Recent Results (from the past 24 hour(s))   Comprehensive metabolic panel    Collection Time: 01/31/22  2:33 PM   Result Value Ref Range    Sodium 140 133 - 144 mmol/L    Potassium 4.0 3.4 - 5.3 mmol/L    Chloride 108 94 - 109 mmol/L    Carbon Dioxide (CO2) 27 20 - 32 mmol/L    Anion Gap 5 3 - 14 mmol/L    Urea Nitrogen 22 7 - 30 mg/dL    Creatinine 1.09 0.66 - 1.25 mg/dL    Calcium 9.2 8.5 - 10.1 mg/dL    Glucose 122 (H) 70 - 99 mg/dL    Alkaline Phosphatase 88 40 - 150 U/L    AST 14 0 - 45 U/L    ALT 23 0 - 70 U/L    Protein Total 6.4 (L) 6.8 - 8.8 g/dL    Albumin 3.4 3.4 - 5.0 g/dL    Bilirubin Total 0.4 0.2 - 1.3 mg/dL    GFR Estimate 71 >60 mL/min/1.73m2   CBC with platelets and differential    Collection Time: 01/31/22  2:33 PM   Result Value Ref Range    WBC Count 8.4 4.0 - 11.0 10e3/uL    RBC Count 4.22 (L) 4.40 - 5.90 10e6/uL    Hemoglobin 13.5 13.3 - 17.7 g/dL    Hematocrit 40.7 40.0 - 53.0 %    MCV 96 78 - 100 fL    MCH 32.0 26.5 - 33.0 pg    MCHC 33.2 31.5 - 36.5 g/dL    RDW 14.7 10.0 - 15.0 %    Platelet Count 345 150 - 450 10e3/uL    % Neutrophils 61 %    % Lymphocytes 21 %    % Monocytes 8 %    % Eosinophils 8 %    % Basophils 1 %    % Immature Granulocytes 1 %    NRBCs per 100 WBC 0 <1 /100    Absolute Neutrophils 5.2 1.6 - 8.3 10e3/uL    Absolute Lymphocytes 1.8 0.8 - 5.3 10e3/uL    Absolute Monocytes 0.7 0.0 - 1.3 10e3/uL    Absolute Eosinophils 0.7 0.0 - 0.7 10e3/uL    Absolute Basophils 0.1 0.0 - 0.2 10e3/uL    Absolute Immature Granulocytes 0.0 <=0.4 10e3/uL    Absolute NRBCs 0.0 10e3/uL

## 2022-01-31 NOTE — PROGRESS NOTES
Reason for Visit: follow-up for multiple myeloma    Oncology HPI:   -compression fractures and bone scan reveals multiple lytic lesions throughout his appendicular and axial skeleton. His Beta 2 microglobulin was 3.6 on 10/10/2020. Kappa chains were 73.6 on 10/5/2020 with K/L ratio of 89.9. M spike of 16.2 in urine on 10/10. Bone marrow biopsy on 10/23/2020 showed trilineage hematopoiesis and 50-60% kappa restricted plasma cells. Flow cytometry showed 20 % plasma cells which express CD19, CD38, CD45 and monotypic cytoplasmic kappa immunoglobulin light chains but lack CD20 and CD56.  FISH shows IGH-CCND1 fusion (81%; 30% had loss of IGH component from one fusion signal).       - Started 21 day cycle VRD 11/2020     - 11/27 he had a fall with rib fractures.      - Dec 2020 Started madyson-velcade-dex. Completed 5 cycles and achieved VGPR     - April 2021 started darzalex maintenance    Interval history:   He reports ongoing, non-productive cough that has not changed or has maybe slightly improved over the past week. Denies pain in his chest or shortness of breath.   Denies fevers or chills. Denies nausea, vomiting, diarrhea, or constipation.        Current Outpatient Medications   Medication Sig Dispense Refill     dexamethasone (DECADRON) 4 MG tablet TAKE 5 TABLETS (20MG) BY MOUTH ONCE ON DAY 2 30 tablet 1     acetaminophen (TYLENOL) 325 MG tablet Take 3 tablets (975 mg) by mouth 3 times daily (Patient not taking: Reported on 12/27/2021) 500 tablet 4     acyclovir (ZOVIRAX) 400 MG tablet Take 1 tablet (400 mg) by mouth 2 times daily Viral Prophylaxis. 60 tablet 11     calcium carbonate 500 mg, elemental, (OSCAL) 500 MG tablet Take 1 tablet (500 mg) by mouth 2 times daily 200 tablet 3     gabapentin (NEURONTIN) 300 MG capsule Take 1 capsule (300 mg) by mouth 2 times daily 60 capsule 3     hydrochlorothiazide (MICROZIDE) 12.5 MG capsule Take 1 capsule (12.5 mg) by mouth daily (Patient not taking: Reported on 8/23/2021) 90  capsule 3     LORazepam (ATIVAN) 0.5 MG tablet Take 1 tablet (0.5 mg) by mouth every 4 hours as needed (Anxiety, Nausea/Vomiting or Sleep) (Patient not taking: Reported on 12/27/2021) 30 tablet 5     omeprazole 20 MG tablet Take 1 tablet (20 mg) by mouth daily as needed (indigestion/reflux associated with madyson/dex) 90 tablet 0     ondansetron (ZOFRAN-ODT) 4 MG ODT tab Take 1 tablet (4 mg) by mouth every 6 hours as needed for nausea or vomiting (Patient not taking: Reported on 8/23/2021)       polyethylene glycol (MIRALAX) 17 g packet Take 17 g by mouth daily (Patient not taking: Reported on 8/23/2021)       prochlorperazine (COMPAZINE) 10 MG tablet Take 1 tablet (10 mg) by mouth every 6 hours as needed (Nausea/Vomiting) (Patient not taking: Reported on 11/29/2021) 30 tablet 5     senna-docusate (SENOKOT-S/PERICOLACE) 8.6-50 MG tablet Take 1 tablet by mouth daily as needed  (Patient not taking: Reported on 8/23/2021)       triamcinolone (KENALOG) 0.1 % external ointment Apply topically 2 times daily To back rash (Patient not taking: Reported on 8/23/2021) 15 g 0     Vitamin D-Vitamin K (VITAMIN K2-VITAMIN D3)  MCG-UNIT CAPS        vitamin D3 (CHOLECALCIFEROL) 50 mcg (2000 units) tablet Take 1 tablet (50 mcg) by mouth daily 90 tablet 3     VITAMIN D3 25 MCG (1000 UT) tablet Take 1 tablet by mouth daily  (Patient not taking: Reported on 1/24/2022)           Exam:   /83   HR 89   Temp 98.1    RR 16  Wt Readings from Last 4 Encounters:   01/24/22 65.3 kg (144 lb)   12/27/21 64.5 kg (142 lb 4.8 oz)   12/06/21 62.6 kg (138 lb)   11/29/21 61.4 kg (135 lb 6.4 oz)     Gen: alert, pleasant and conversational, NAD  HEENT: NC/AT,EOMI w/ PERRL, anicteric sclera. OP clear. MMM.   Neck: Supple, no LAD  CV: normal S1,S2 with RRR no m/r/g  Resp: lungs CTA bilaterally with adequate air movement to bases. No wheezes or crackles  Abd: soft NTND no organomegaly or masses. BS normoactive.   Ext: warm and well perfused, no  "edema or cyanosis  Lymphatics: no palpable cervical, axillary, or inguinal LAD. No HSM  Skin: no concerning lesions or rashes  Neuro: A&Ox4, no lateralizing sx. Grossly nonfocal.  Psych: appropriate, reactive    Labs:   I personally reviewed the following labs:    Most Recent 3 CBC's:  Recent Labs   Lab Test 01/31/22  1433 01/24/22  1036 12/27/21  0833   WBC 8.4 5.6 8.5   HGB 13.5 14.6 13.8   MCV 96 97 96    278 333     Most Recent 3 BMP's:  Recent Labs   Lab Test 01/31/22  1433 01/24/22  1036 12/27/21  0833    140 143   POTASSIUM 4.0 3.7 4.0   CHLORIDE 108 104 112*   CO2 27 24 24   BUN 22 16 23   CR 1.09 1.13 1.20   ANIONGAP 5 12 7   MIRI 9.2 9.3 9.2   * 97 114*     Most Recent 2 LFT's:  Recent Labs   Lab Test 01/31/22  1433 01/24/22  1036   AST 14 22   ALT 23 31   ALKPHOS 88 96   BILITOTAL 0.4 0.8          Imaging:   No new imaging to review.    Impression/plan:   76 year old man with IgG Kappa standard risk multiple myeloma. Despite his side effects, he has had a VGPR with therapy. Now on q 4 week darzalex faspro for maintenance.   -Okay to proceed with daratumumab today  -continue to follow SPEP and FLC monthly.   - RTC in one month for next cycle of maintenance daratumumab    COVID-19 positive  He had recovered well from his recent COVID19 infection and had one negative swab. He reported a cough last week that had been persistent for 1 month. COVID PCR was performed and was positive.  Discussed monoclonal antibody infusion today, and he is not interested.  He feels like his cough is getting better and wants to let things \"run their course.\"     Pathologic Fractures:. Vitamin D is replete and he is on a daily supplement as well as calcium. He has not received dental clearance for Zometa infusions; did not discuss today as we spent a lot of time discussing his respiratory status.  -Vitamin D level 77 on 11/29/21. Recommend continuing Vitamin D 2000 international unit(s) daily.     Neuropathy: " 2/2 previous velcade, persistent in feet. Continues to use gabapentin with good relief     GERD: Continues on omeprazole intermittently for management. Recommend he increase to regular use (omeprazole 20 mg daily) to see if it helps with his persistent cough.     CKD: Baseline creatinine is 1.1 suggesting underlying CKD. Creatinine stable today.    36 minutes spent on the date of the encounter doing chart review, review of test results, patient visit and documentation       JIMBO Joseph [facility]  [address] [place] [address] [phone]

## 2022-02-25 NOTE — PROGRESS NOTES
"Reason for Visit: follow-up for multiple myeloma    Oncology HPI:   -compression fractures and bone scan reveals multiple lytic lesions throughout his appendicular and axial skeleton. His Beta 2 microglobulin was 3.6 on 10/10/2020. Kappa chains were 73.6 on 10/5/2020 with K/L ratio of 89.9. M spike of 16.2 in urine on 10/10. Bone marrow biopsy on 10/23/2020 showed trilineage hematopoiesis and 50-60% kappa restricted plasma cells. Flow cytometry showed 20 % plasma cells which express CD19, CD38, CD45 and monotypic cytoplasmic kappa immunoglobulin light chains but lack CD20 and CD56.  FISH shows IGH-CCND1 fusion (81%; 30% had loss of IGH component from one fusion signal).       - Started 21 day cycle VRD 11/2020     - 11/27 he had a fall with rib fractures.      - Dec 2020 Started madyson-velcade-dex. Completed 5 cycles and achieved VGPR     - April 2021 started darzalex maintenance    Interval history:   States he has been feeling well until yesterday when he feels like he had \"food poisoning.\"  States he had three episodes of vomiting and no appetite.  He was able to eat a light breakfast this morning and feels a little better but still doesn't feel like his appetite has fully returned. His cough has improved but states he does still have a slight lingering cough. Also continues to have some sinus congestion that comes and goes.    Denies pain in his chest or shortness of breath.   Denies fevers or chills. Denies  diarrhea, or constipation.        Current Outpatient Medications   Medication Sig Dispense Refill     acyclovir (ZOVIRAX) 400 MG tablet Take 1 tablet (400 mg) by mouth 2 times daily Viral Prophylaxis. 60 tablet 11     calcium carbonate 500 mg, elemental, (OSCAL) 500 MG tablet Take 1 tablet (500 mg) by mouth 2 times daily 200 tablet 3     dexamethasone (DECADRON) 4 MG tablet TAKE 5 TABLETS (20MG) BY MOUTH ONCE ON DAY 2 30 tablet 1     gabapentin (NEURONTIN) 300 MG capsule Take 1 capsule (300 mg) by mouth 2 " times daily 60 capsule 3     omeprazole 20 MG tablet Take 1 tablet (20 mg) by mouth daily as needed (indigestion/reflux associated with madyson/dex) 90 tablet 0     Vitamin D-Vitamin K (VITAMIN K2-VITAMIN D3)  MCG-UNIT CAPS        vitamin D3 (CHOLECALCIFEROL) 50 mcg (2000 units) tablet Take 1 tablet (50 mcg) by mouth daily 90 tablet 3     acetaminophen (TYLENOL) 325 MG tablet Take 3 tablets (975 mg) by mouth 3 times daily (Patient not taking: Reported on 12/27/2021) 500 tablet 4     hydrochlorothiazide (MICROZIDE) 12.5 MG capsule Take 1 capsule (12.5 mg) by mouth daily (Patient not taking: Reported on 8/23/2021) 90 capsule 3     LORazepam (ATIVAN) 0.5 MG tablet Take 1 tablet (0.5 mg) by mouth every 4 hours as needed (Anxiety, Nausea/Vomiting or Sleep) (Patient not taking: Reported on 12/27/2021) 30 tablet 5     ondansetron (ZOFRAN-ODT) 4 MG ODT tab Take 1 tablet (4 mg) by mouth every 6 hours as needed for nausea or vomiting (Patient not taking: Reported on 8/23/2021)       polyethylene glycol (MIRALAX) 17 g packet Take 17 g by mouth daily (Patient not taking: Reported on 8/23/2021)       prochlorperazine (COMPAZINE) 10 MG tablet Take 1 tablet (10 mg) by mouth every 6 hours as needed (Nausea/Vomiting) (Patient not taking: Reported on 11/29/2021) 30 tablet 5     senna-docusate (SENOKOT-S/PERICOLACE) 8.6-50 MG tablet Take 1 tablet by mouth daily as needed  (Patient not taking: Reported on 8/23/2021)       triamcinolone (KENALOG) 0.1 % external ointment Apply topically 2 times daily To back rash (Patient not taking: Reported on 8/23/2021) 15 g 0     VITAMIN D3 25 MCG (1000 UT) tablet Take 1 tablet by mouth daily  (Patient not taking: Reported on 1/24/2022)           Exam:   BP (!) 144/93 (BP Location: Right arm)   Pulse 91   Temp 98  F (36.7  C) (Oral)   Resp 16   Wt 65.2 kg (143 lb 11.2 oz)   SpO2 96%   BMI 27.15 kg/m      Wt Readings from Last 4 Encounters:   02/28/22 65.2 kg (143 lb 11.2 oz)   01/24/22 65.3  kg (144 lb)   12/27/21 64.5 kg (142 lb 4.8 oz)   12/06/21 62.6 kg (138 lb)     Gen: alert, pleasant and conversational, NAD  HEENT: NC/AT,EOMI w/ PERRL, anicteric sclera. OP clear. MMM.   Neck: Supple, no LAD  CV: normal S1,S2 with RRR no m/r/g  Resp: lungs CTA bilaterally with adequate air movement to bases. No wheezes or crackles  Abd: soft NTND no organomegaly or masses. BS normoactive.   Ext: warm and well perfused, no edema or cyanosis  Lymphatics: no palpable cervical, axillary, or inguinal LAD. No HSM  Skin: no concerning lesions or rashes  Neuro: A&Ox4, no lateralizing sx. Grossly nonfocal.  Psych: appropriate, reactive    Labs:   I personally reviewed the following labs:    Most Recent 3 CBC's:  Recent Labs   Lab Test 02/28/22  1329 01/31/22  1433 01/24/22  1036   WBC 8.7 8.4 5.6   HGB 14.2 13.5 14.6   MCV 96 96 97    345 278     Most Recent 3 BMP's:  Recent Labs   Lab Test 02/28/22  1329 01/31/22  1433 01/24/22  1036 12/27/21  0833    140 140 143   POTASSIUM 3.9 4.0 3.7 4.0   CHLORIDE 104 108 104 112*   CO2  --  27 24 24   BUN  --  22 16 23   CR  --  1.09 1.13 1.20   ANIONGAP  --  5 12 7   MIRI  --  9.2 9.3 9.2   GLC  --  122* 97 114*     Most Recent 2 LFT's:  Recent Labs   Lab Test 01/31/22  1433 01/24/22  1036   AST 14 22   ALT 23 31   ALKPHOS 88 96   BILITOTAL 0.4 0.8          Imaging:   No new imaging to review.    Impression/plan:   77 year old man with IgG Kappa standard risk multiple myeloma. Despite his side effects, he has had a VGPR with therapy. Now on q 4 week darzalex faspro for maintenance.   -Will delay today's scheduled madyson infusion one week due to his acute GI illness yesterday.  -continue to follow SPEP and FLC monthly.   - RTC in one week for next cycle of maintenance daratumumab; does not need to see me again - okay to proceed as long as he has not had any further episodes of vomiting and no fever.    COVID-19 positive  He had recovered well from his recent COVID19  "infection and had one negative swab. He then reported a cough in January that had been persistent for 1 month. COVID PCR was performed and was positive.  Discussed monoclonal antibody infusion and he was not interested.  He feels like his cough is getting better although is still present in the mornings. Will repeat COVID PCR today, as he will need 2 negatives in order to be removed from precautions.    Pathologic Fractures:. Vitamin D is replete and he is on a daily supplement as well as calcium. He has not received dental clearance for Zometa infusions; did not discuss today as we spent a lot of time discussing his respiratory status.  -Vitamin D level 77 on 11/29/21. Recommend continuing Vitamin D 2000 international unit(s) daily.     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 Continues to use gabapentin with good relief     GERD: Continues on omeprazole intermittently for management.      CKD: Baseline creatinine is 1.1 suggesting underlying CKD. Creatinine stable today.    Vomiting: He had three episodes of sudden, violent vomiting yesterday. He believes this may have been related to some corn beef hash he ate that had been in his fridge \"for a while.\" No fevers, no diarrhea.  He had a light breakfast today with no vomiting. This could likely be an episode of food-borne illness and/or a brief gastroenteritis. He is able to eat and drink today. Electrolytes are within normal limits.  Will delay treatment by one week to allow his body time to recover.    29 minutes spent on the date of the encounter doing chart review, review of test results, patient visit and documentation       JIMBO Joseph CNP  [facility]  [address] [place] [address] [id number] [phone]  "

## 2022-02-28 ENCOUNTER — ONCOLOGY VISIT (OUTPATIENT)
Dept: ONCOLOGY | Facility: CLINIC | Age: 77
End: 2022-02-28
Attending: STUDENT IN AN ORGANIZED HEALTH CARE EDUCATION/TRAINING PROGRAM
Payer: COMMERCIAL

## 2022-02-28 ENCOUNTER — APPOINTMENT (OUTPATIENT)
Dept: LAB | Facility: CLINIC | Age: 77
End: 2022-02-28
Attending: STUDENT IN AN ORGANIZED HEALTH CARE EDUCATION/TRAINING PROGRAM
Payer: COMMERCIAL

## 2022-02-28 VITALS
TEMPERATURE: 98 F | DIASTOLIC BLOOD PRESSURE: 93 MMHG | WEIGHT: 143.7 LBS | HEART RATE: 91 BPM | RESPIRATION RATE: 16 BRPM | OXYGEN SATURATION: 96 % | SYSTOLIC BLOOD PRESSURE: 144 MMHG | BODY MASS INDEX: 27.15 KG/M2

## 2022-02-28 DIAGNOSIS — R11.11 NON-INTRACTABLE VOMITING WITHOUT NAUSEA, UNSPECIFIED VOMITING TYPE: ICD-10-CM

## 2022-02-28 DIAGNOSIS — U07.1 INFECTION DUE TO 2019 NOVEL CORONAVIRUS: Primary | ICD-10-CM

## 2022-02-28 DIAGNOSIS — U07.1 INFECTION DUE TO 2019 NOVEL CORONAVIRUS: ICD-10-CM

## 2022-02-28 DIAGNOSIS — R05.9 COUGH: ICD-10-CM

## 2022-02-28 DIAGNOSIS — C90.00 MULTIPLE MYELOMA, REMISSION STATUS UNSPECIFIED (H): Primary | ICD-10-CM

## 2022-02-28 PROCEDURE — U0003 INFECTIOUS AGENT DETECTION BY NUCLEIC ACID (DNA OR RNA); SEVERE ACUTE RESPIRATORY SYNDROME CORONAVIRUS 2 (SARS-COV-2) (CORONAVIRUS DISEASE [COVID-19]), AMPLIFIED PROBE TECHNIQUE, MAKING USE OF HIGH THROUGHPUT TECHNOLOGIES AS DESCRIBED BY CMS-2020-01-R: HCPCS | Performed by: REGISTERED NURSE

## 2022-02-28 PROCEDURE — 99213 OFFICE O/P EST LOW 20 MIN: CPT | Performed by: REGISTERED NURSE

## 2022-02-28 RX ORDER — ALBUTEROL SULFATE 0.83 MG/ML
2.5 SOLUTION RESPIRATORY (INHALATION)
Status: CANCELLED | OUTPATIENT
Start: 2022-03-07

## 2022-02-28 RX ORDER — DIPHENHYDRAMINE HYDROCHLORIDE 50 MG/ML
50 INJECTION INTRAMUSCULAR; INTRAVENOUS
Status: CANCELLED
Start: 2022-03-07

## 2022-02-28 RX ORDER — ACETAMINOPHEN 325 MG/1
650 TABLET ORAL
Status: CANCELLED
Start: 2022-03-07

## 2022-02-28 RX ORDER — ALBUTEROL SULFATE 90 UG/1
1-2 AEROSOL, METERED RESPIRATORY (INHALATION)
Status: CANCELLED
Start: 2022-03-07

## 2022-02-28 RX ORDER — LORAZEPAM 2 MG/ML
0.5 INJECTION INTRAMUSCULAR EVERY 4 HOURS PRN
Status: CANCELLED
Start: 2022-03-07

## 2022-02-28 RX ORDER — HEPARIN SODIUM (PORCINE) LOCK FLUSH IV SOLN 100 UNIT/ML 100 UNIT/ML
5 SOLUTION INTRAVENOUS
Status: CANCELLED | OUTPATIENT
Start: 2022-03-07

## 2022-02-28 RX ORDER — NALOXONE HYDROCHLORIDE 0.4 MG/ML
.1-.4 INJECTION, SOLUTION INTRAMUSCULAR; INTRAVENOUS; SUBCUTANEOUS
Status: CANCELLED | OUTPATIENT
Start: 2022-03-07

## 2022-02-28 RX ORDER — DIPHENHYDRAMINE HCL 25 MG
50 CAPSULE ORAL
Status: CANCELLED
Start: 2022-03-07

## 2022-02-28 RX ORDER — SODIUM CHLORIDE 9 MG/ML
1000 INJECTION, SOLUTION INTRAVENOUS CONTINUOUS PRN
Status: CANCELLED
Start: 2022-03-07

## 2022-02-28 RX ORDER — METHYLPREDNISOLONE SODIUM SUCCINATE 125 MG/2ML
125 INJECTION, POWDER, LYOPHILIZED, FOR SOLUTION INTRAMUSCULAR; INTRAVENOUS
Status: CANCELLED
Start: 2022-03-07

## 2022-02-28 RX ORDER — HEPARIN SODIUM,PORCINE 10 UNIT/ML
5 VIAL (ML) INTRAVENOUS
Status: CANCELLED | OUTPATIENT
Start: 2022-03-07

## 2022-02-28 RX ORDER — MEPERIDINE HYDROCHLORIDE 25 MG/ML
25 INJECTION INTRAMUSCULAR; INTRAVENOUS; SUBCUTANEOUS EVERY 30 MIN PRN
Status: CANCELLED | OUTPATIENT
Start: 2022-03-07

## 2022-02-28 RX ORDER — DEXAMETHASONE 4 MG/1
20 TABLET ORAL ONCE
Status: CANCELLED | OUTPATIENT
Start: 2022-03-07

## 2022-02-28 RX ORDER — EPINEPHRINE 1 MG/ML
0.3 INJECTION, SOLUTION INTRAMUSCULAR; SUBCUTANEOUS EVERY 5 MIN PRN
Status: CANCELLED | OUTPATIENT
Start: 2022-03-07

## 2022-02-28 ASSESSMENT — PAIN SCALES - GENERAL: PAINLEVEL: NO PAIN (0)

## 2022-02-28 NOTE — LETTER
"    2/28/2022         RE: Rogelio Marshall  [address]  [facility] [address]        Dear Colleague,    Thank you for referring your patient, Rogelio Marshall, to the [facility]. Please see a copy of my visit note below.    Reason for Visit: follow-up for multiple myeloma    Oncology HPI:   -compression fractures and bone scan reveals multiple lytic lesions throughout his appendicular and axial skeleton. His Beta 2 microglobulin was 3.6 on 10/10/2020. Kappa chains were 73.6 on 10/5/2020 with K/L ratio of 89.9. M spike of 16.2 in urine on 10/10. Bone marrow biopsy on 10/23/2020 showed trilineage hematopoiesis and 50-60% kappa restricted plasma cells. Flow cytometry showed 20 % plasma cells which express CD19, CD38, CD45 and monotypic cytoplasmic kappa immunoglobulin light chains but lack CD20 and CD56.  FISH shows IGH-CCND1 fusion (81%; 30% had loss of IGH component from one fusion signal).       - Started 21 day cycle VRD 11/2020     - 11/27 he had a fall with rib fractures.      - Dec 2020 Started madyson-velcade-dex. Completed 5 cycles and achieved VGPR     - April 2021 started darzalex maintenance    Interval history:   States he has been feeling well until yesterday when he feels like he had \"food poisoning.\"  States he had three episodes of vomiting and no appetite.  He was able to eat a light breakfast this morning and feels a little better but still doesn't feel like his appetite has fully returned. His cough has improved but states he does still have a slight lingering cough. Also continues to have some sinus congestion that comes and goes.    Denies pain in his chest or shortness of breath.   Denies fevers or chills. Denies  diarrhea, or constipation.        Current Outpatient Medications   Medication Sig Dispense Refill     acyclovir (ZOVIRAX) 400 MG tablet Take 1 tablet (400 mg) by mouth 2 times daily Viral Prophylaxis. 60 tablet 11     calcium carbonate 500 mg, " elemental, (OSCAL) 500 MG tablet Take 1 tablet (500 mg) by mouth 2 times daily 200 tablet 3     dexamethasone (DECADRON) 4 MG tablet TAKE 5 TABLETS (20MG) BY MOUTH ONCE ON DAY 2 30 tablet 1     gabapentin (NEURONTIN) 300 MG capsule Take 1 capsule (300 mg) by mouth 2 times daily 60 capsule 3     omeprazole 20 MG tablet Take 1 tablet (20 mg) by mouth daily as needed (indigestion/reflux associated with madyson/dex) 90 tablet 0     Vitamin D-Vitamin K (VITAMIN K2-VITAMIN D3)  MCG-UNIT CAPS        vitamin D3 (CHOLECALCIFEROL) 50 mcg (2000 units) tablet Take 1 tablet (50 mcg) by mouth daily 90 tablet 3     acetaminophen (TYLENOL) 325 MG tablet Take 3 tablets (975 mg) by mouth 3 times daily (Patient not taking: Reported on 12/27/2021) 500 tablet 4     hydrochlorothiazide (MICROZIDE) 12.5 MG capsule Take 1 capsule (12.5 mg) by mouth daily (Patient not taking: Reported on 8/23/2021) 90 capsule 3     LORazepam (ATIVAN) 0.5 MG tablet Take 1 tablet (0.5 mg) by mouth every 4 hours as needed (Anxiety, Nausea/Vomiting or Sleep) (Patient not taking: Reported on 12/27/2021) 30 tablet 5     ondansetron (ZOFRAN-ODT) 4 MG ODT tab Take 1 tablet (4 mg) by mouth every 6 hours as needed for nausea or vomiting (Patient not taking: Reported on 8/23/2021)       polyethylene glycol (MIRALAX) 17 g packet Take 17 g by mouth daily (Patient not taking: Reported on 8/23/2021)       prochlorperazine (COMPAZINE) 10 MG tablet Take 1 tablet (10 mg) by mouth every 6 hours as needed (Nausea/Vomiting) (Patient not taking: Reported on 11/29/2021) 30 tablet 5     senna-docusate (SENOKOT-S/PERICOLACE) 8.6-50 MG tablet Take 1 tablet by mouth daily as needed  (Patient not taking: Reported on 8/23/2021)       triamcinolone (KENALOG) 0.1 % external ointment Apply topically 2 times daily To back rash (Patient not taking: Reported on 8/23/2021) 15 g 0     VITAMIN D3 25 MCG (1000 UT) tablet Take 1 tablet by mouth daily  (Patient not taking: Reported on  1/24/2022)           Exam:   BP (!) 144/93 (BP Location: Right arm)   Pulse 91   Temp 98  F (36.7  C) (Oral)   Resp 16   Wt 65.2 kg (143 lb 11.2 oz)   SpO2 96%   BMI 27.15 kg/m      Wt Readings from Last 4 Encounters:   02/28/22 65.2 kg (143 lb 11.2 oz)   01/24/22 65.3 kg (144 lb)   12/27/21 64.5 kg (142 lb 4.8 oz)   12/06/21 62.6 kg (138 lb)     Gen: alert, pleasant and conversational, NAD  HEENT: NC/AT,EOMI w/ PERRL, anicteric sclera. OP clear. MMM.   Neck: Supple, no LAD  CV: normal S1,S2 with RRR no m/r/g  Resp: lungs CTA bilaterally with adequate air movement to bases. No wheezes or crackles  Abd: soft NTND no organomegaly or masses. BS normoactive.   Ext: warm and well perfused, no edema or cyanosis  Lymphatics: no palpable cervical, axillary, or inguinal LAD. No HSM  Skin: no concerning lesions or rashes  Neuro: A&Ox4, no lateralizing sx. Grossly nonfocal.  Psych: appropriate, reactive    Labs:   I personally reviewed the following labs:    Most Recent 3 CBC's:  Recent Labs   Lab Test 02/28/22  1329 01/31/22  1433 01/24/22  1036   WBC 8.7 8.4 5.6   HGB 14.2 13.5 14.6   MCV 96 96 97    345 278     Most Recent 3 BMP's:  Recent Labs   Lab Test 02/28/22  1329 01/31/22  1433 01/24/22  1036 12/27/21  0833    140 140 143   POTASSIUM 3.9 4.0 3.7 4.0   CHLORIDE 104 108 104 112*   CO2  --  27 24 24   BUN  --  22 16 23   CR  --  1.09 1.13 1.20   ANIONGAP  --  5 12 7   MIRI  --  9.2 9.3 9.2   GLC  --  122* 97 114*     Most Recent 2 LFT's:  Recent Labs   Lab Test 01/31/22  1433 01/24/22  1036   AST 14 22   ALT 23 31   ALKPHOS 88 96   BILITOTAL 0.4 0.8          Imaging:   No new imaging to review.    Impression/plan:   77 year old man with IgG Kappa standard risk multiple myeloma. Despite his side effects, he has had a VGPR with therapy. Now on q 4 week darzalex faspro for maintenance.   -Will delay today's scheduled madyson infusion one week due to his acute GI illness yesterday.  -continue to follow SPEP  "and FLC monthly.   - RTC in one week for next cycle of maintenance daratumumab; does not need to see me again - okay to proceed as long as he has not had any further episodes of vomiting and no fever.    COVID-19 positive  He had recovered well from his recent COVID19 infection and had one negative swab. He then reported a cough in January that had been persistent for 1 month. COVID PCR was performed and was positive.  Discussed monoclonal antibody infusion and he was not interested.  He feels like his cough is getting better although is still present in the mornings. Will repeat COVID PCR today, as he will need 2 negatives in order to be removed from precautions.    Pathologic Fractures:. Vitamin D is replete and he is on a daily supplement as well as calcium. He has not received dental clearance for Zometa infusions; did not discuss today as we spent a lot of time discussing his respiratory status.  -Vitamin D level 77 on 11/29/21. Recommend continuing Vitamin D 2000 international unit(s) daily.     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 Continues to use gabapentin with good relief     GERD: Continues on omeprazole intermittently for management.      CKD: Baseline creatinine is 1.1 suggesting underlying CKD. Creatinine stable today.    Vomiting: He had three episodes of sudden, violent vomiting yesterday. He believes this may have been related to some corn beef hash he ate that had been in his fridge \"for a while.\" No fevers, no diarrhea.  He had a light breakfast today with no vomiting. This could likely be an episode of food-borne illness and/or a brief gastroenteritis. He is able to eat and drink today. Electrolytes are within normal limits.  Will delay treatment by one week to allow his body time to recover.    29 minutes spent on the date of the encounter doing chart review, review of test results, patient visit and documentation           Again, thank you for allowing me to participate in the " care of your patient.      Sincerely,    JIMBO Joseph CNP

## 2022-02-28 NOTE — NURSING NOTE
Chief Complaint   Patient presents with     Blood Draw     Labs drawn via  by RN. Vitals taken.     Labs collected from venipuncture by RN. Vitals taken. Checked in for appointment(s).    Beckie Bueno RN

## 2022-02-28 NOTE — NURSING NOTE
Nasal swab collected in clinic, pt taina Thomason sent to lab.  Ami Neff CMA on 2/28/2022 at 2:23 PM

## 2022-02-28 NOTE — NURSING NOTE
"Oncology Rooming Note    February 28, 2022 1:37 PM   Rogelio Marshall is a 77 year old male who presents for:    Chief Complaint   Patient presents with     Blood Draw     Labs drawn via  by RN. Vitals taken.     Oncology Clinic Visit     Multiple myeloma, remission status unspecified (H)     Initial Vitals: BP (!) 144/93 (BP Location: Right arm)   Pulse 91   Temp 98  F (36.7  C) (Oral)   Resp 16   Wt 65.2 kg (143 lb 11.2 oz)   SpO2 96%   BMI 27.15 kg/m   Estimated body mass index is 27.15 kg/m  as calculated from the following:    Height as of 7/22/21: 1.549 m (5' 1\").    Weight as of this encounter: 65.2 kg (143 lb 11.2 oz). Body surface area is 1.68 meters squared.  No Pain (0) Comment: Data Unavailable   No LMP for male patient.  Allergies reviewed: Yes  Medications reviewed: Yes    Medications: MEDICATION REFILLS NEEDED TODAY. Provider was notified.  Pharmacy name entered into [facility]:    [facility] [place], [place] - [id number]  WRITTEN PRESCRIPTION REQUESTED  [id number] - [place], [place] - [address] [facility] - [place], [place] - [address]    Clinical concerns: Patient reports feeling like he had food poisoning last night.        Johana Luz LPN February 28, 2022 1:38 PM                "

## 2022-03-01 LAB — SARS-COV-2 RNA RESP QL NAA+PROBE: NEGATIVE

## 2022-03-07 ENCOUNTER — INFUSION THERAPY VISIT (OUTPATIENT)
Dept: ONCOLOGY | Facility: CLINIC | Age: 77
End: 2022-03-07
Attending: REGISTERED NURSE
Payer: COMMERCIAL

## 2022-03-07 ENCOUNTER — APPOINTMENT (OUTPATIENT)
Dept: LAB | Facility: CLINIC | Age: 77
End: 2022-03-07
Attending: REGISTERED NURSE
Payer: COMMERCIAL

## 2022-03-07 VITALS
OXYGEN SATURATION: 98 % | TEMPERATURE: 98.2 F | DIASTOLIC BLOOD PRESSURE: 82 MMHG | SYSTOLIC BLOOD PRESSURE: 152 MMHG | HEART RATE: 68 BPM | RESPIRATION RATE: 18 BRPM

## 2022-03-07 DIAGNOSIS — C90.00 MULTIPLE MYELOMA, REMISSION STATUS UNSPECIFIED (H): Primary | ICD-10-CM

## 2022-03-07 LAB
ALBUMIN SERPL-MCNC: 3.8 G/DL (ref 3.4–5)
ALP SERPL-CCNC: 91 U/L (ref 40–150)
ALT SERPL W P-5'-P-CCNC: 28 U/L (ref 0–70)
ANION GAP SERPL CALCULATED.3IONS-SCNC: 9 MMOL/L (ref 3–14)
AST SERPL W P-5'-P-CCNC: 20 U/L (ref 0–45)
BASOPHILS # BLD AUTO: 0.1 10E3/UL (ref 0–0.2)
BASOPHILS NFR BLD AUTO: 1 %
BILIRUB SERPL-MCNC: 0.4 MG/DL (ref 0.2–1.3)
BUN SERPL-MCNC: 21 MG/DL (ref 7–30)
CALCIUM SERPL-MCNC: 9.2 MG/DL (ref 8.5–10.1)
CHLORIDE BLD-SCNC: 109 MMOL/L (ref 94–109)
CO2 SERPL-SCNC: 24 MMOL/L (ref 20–32)
CREAT SERPL-MCNC: 1.27 MG/DL (ref 0.66–1.25)
EOSINOPHIL # BLD AUTO: 0.3 10E3/UL (ref 0–0.7)
EOSINOPHIL NFR BLD AUTO: 5 %
ERYTHROCYTE [DISTWIDTH] IN BLOOD BY AUTOMATED COUNT: 12.8 % (ref 10–15)
GFR SERPL CREATININE-BSD FRML MDRD: 59 ML/MIN/1.73M2
GLUCOSE BLD-MCNC: 100 MG/DL (ref 70–99)
HCT VFR BLD AUTO: 44.5 % (ref 40–53)
HGB BLD-MCNC: 14.5 G/DL (ref 13.3–17.7)
IMM GRANULOCYTES # BLD: 0 10E3/UL
IMM GRANULOCYTES NFR BLD: 1 %
LYMPHOCYTES # BLD AUTO: 1.4 10E3/UL (ref 0.8–5.3)
LYMPHOCYTES NFR BLD AUTO: 21 %
MCH RBC QN AUTO: 32.2 PG (ref 26.5–33)
MCHC RBC AUTO-ENTMCNC: 32.6 G/DL (ref 31.5–36.5)
MCV RBC AUTO: 99 FL (ref 78–100)
MONOCYTES # BLD AUTO: 0.5 10E3/UL (ref 0–1.3)
MONOCYTES NFR BLD AUTO: 8 %
NEUTROPHILS # BLD AUTO: 4.1 10E3/UL (ref 1.6–8.3)
NEUTROPHILS NFR BLD AUTO: 64 %
NRBC # BLD AUTO: 0 10E3/UL
NRBC BLD AUTO-RTO: 0 /100
PLATELET # BLD AUTO: 296 10E3/UL (ref 150–450)
POTASSIUM BLD-SCNC: 4.5 MMOL/L (ref 3.4–5.3)
PROT SERPL-MCNC: 6.8 G/DL (ref 6.8–8.8)
RBC # BLD AUTO: 4.5 10E6/UL (ref 4.4–5.9)
SARS-COV-2 RNA RESP QL NAA+PROBE: NEGATIVE
SODIUM SERPL-SCNC: 142 MMOL/L (ref 133–144)
TOTAL PROTEIN SERUM FOR ELP: 6.7 G/DL (ref 6.8–8.8)
WBC # BLD AUTO: 6.4 10E3/UL (ref 4–11)

## 2022-03-07 PROCEDURE — 86334 IMMUNOFIX E-PHORESIS SERUM: CPT | Mod: 26 | Performed by: PATHOLOGY

## 2022-03-07 PROCEDURE — 80053 COMPREHEN METABOLIC PANEL: CPT | Performed by: STUDENT IN AN ORGANIZED HEALTH CARE EDUCATION/TRAINING PROGRAM

## 2022-03-07 PROCEDURE — 36415 COLL VENOUS BLD VENIPUNCTURE: CPT

## 2022-03-07 PROCEDURE — 86334 IMMUNOFIX E-PHORESIS SERUM: CPT | Performed by: PATHOLOGY

## 2022-03-07 PROCEDURE — 85004 AUTOMATED DIFF WBC COUNT: CPT | Performed by: STUDENT IN AN ORGANIZED HEALTH CARE EDUCATION/TRAINING PROGRAM

## 2022-03-07 PROCEDURE — 250N000011 HC RX IP 250 OP 636: Performed by: STUDENT IN AN ORGANIZED HEALTH CARE EDUCATION/TRAINING PROGRAM

## 2022-03-07 PROCEDURE — U0003 INFECTIOUS AGENT DETECTION BY NUCLEIC ACID (DNA OR RNA); SEVERE ACUTE RESPIRATORY SYNDROME CORONAVIRUS 2 (SARS-COV-2) (CORONAVIRUS DISEASE [COVID-19]), AMPLIFIED PROBE TECHNIQUE, MAKING USE OF HIGH THROUGHPUT TECHNOLOGIES AS DESCRIBED BY CMS-2020-01-R: HCPCS

## 2022-03-07 PROCEDURE — 84155 ASSAY OF PROTEIN SERUM: CPT | Mod: 91

## 2022-03-07 PROCEDURE — 84165 PROTEIN E-PHORESIS SERUM: CPT | Mod: TC | Performed by: PATHOLOGY

## 2022-03-07 PROCEDURE — 96401 CHEMO ANTI-NEOPL SQ/IM: CPT

## 2022-03-07 PROCEDURE — 84165 PROTEIN E-PHORESIS SERUM: CPT | Mod: 26 | Performed by: PATHOLOGY

## 2022-03-07 PROCEDURE — 83521 IG LIGHT CHAINS FREE EACH: CPT

## 2022-03-07 RX ORDER — DEXAMETHASONE 4 MG/1
20 TABLET ORAL ONCE
Status: COMPLETED | OUTPATIENT
Start: 2022-03-07 | End: 2022-03-07

## 2022-03-07 RX ADMIN — DEXAMETHASONE 20 MG: 4 TABLET ORAL at 09:35

## 2022-03-07 RX ADMIN — DARATUMUMAB AND HYALURONIDASE-FIHJ (HUMAN RECOMBINANT) 1800 MG: 1800; 30000 INJECTION SUBCUTANEOUS at 10:22

## 2022-03-07 NOTE — PROGRESS NOTES
Infusion Nursing Note:  Rogelio Marshall presents today for maintenance darzalex faspro.    Patient seen by provider today: No   present during visit today: Not Applicable.    Note: Patient saw Lainey Monae last week.  Denies any new changes since seeing her. Has not had any nausea or vomiting since her visit. Denies any fevers or chills. States his cough is improving.  Neuropathy is at baseline.  Patient confirms that he will take dexamethasone tomorrow as prescribed      Intravenous Access:  Lab draw site right AC, Needle type butterfly, Gauge 23g.  Covid swab collected    Treatment Conditions:  Lab Results   Component Value Date    HGB 14.5 03/07/2022    WBC 6.4 03/07/2022    ANEU 4.8 06/28/2021    ANEUTAUTO 4.1 03/07/2022     03/07/2022      Lab Results   Component Value Date     03/07/2022    POTASSIUM 4.5 03/07/2022    MAG 2.1 12/02/2020    CR 1.27 (H) 03/07/2022    MIRI 9.2 03/07/2022    BILITOTAL 0.4 03/07/2022    ALBUMIN 3.8 03/07/2022    ALT 28 03/07/2022    AST 20 03/07/2022     Results reviewed, labs MET treatment parameters, ok to proceed with treatment.      Post Infusion Assessment:  Patient tolerated injection without incident. Darzalex injection given subcutaneously into LEFT abdomen       Discharge Plan:   Patient declined prescription refills.  Discharge instructions reviewed with: Patient.  Patient and/or family verbalized understanding of discharge instructions and all questions answered.  AVS to patient via [employer].  Patient will return 4/4/2022 for next appointment.   Patient discharged in stable condition accompanied by: self.  Departure Mode: Ambulatory.  Face to Face time: 0.      Tosha Levy RN

## 2022-03-07 NOTE — PATIENT INSTRUCTIONS
[facility] Triage and after hours / weekends / holidays:  [phone]    Please call the triage or after hours line if you experience a temperature greater than or equal to 100.4, shaking chills, have uncontrolled nausea, vomiting and/or diarrhea, dizziness, shortness of breath, chest pain, bleeding, unexplained bruising, or if you have any other new/concerning symptoms, questions or concerns.      If you are having any concerning symptoms or wish to speak to a provider before your next infusion visit, please call your care coordinator or triage to notify them so we can adequately serve you.     If you need a refill on a narcotic prescription or other medication, please call before your infusion appointment.           March 2022 Sunday Monday Tuesday Wednesday Thursday Friday Saturday             1     2     3     4     5       6     7    LAB PERIPHERAL   8:15 AM   (15 min.)    [facility] DRAW   [facility]    ONC INFUSION 0.5 HR (30 MIN)   9:00 AM   (90 min.)    ONC INFUSION NURSE   [facility] 8     9     10     11     12       13     14     15     16     17     18     19       20     21     22     23     24     25     26       27     28     29     30     31                           April 2022 Sunday Monday Tuesday Wednesday Thursday Friday Saturday                            1     2       3     4    LAB PERIPHERAL   2:45 PM   (15 min.)   [facility]   [facility]    ONC INFUSION 1 HR (60 MIN)   3:00 PM   (60 min.)    ONC INFUSION NURSE   [facility] 5     6    LAB PERIPHERAL   2:30 PM   (15 min.)   [facility]   [facility]    RETURN   2:45 PM   (30 min.)   Angela Sweeney MD   [facility] 7     8     9       10     11     12     13     14     15     16       17     18     19     20     21     22     23        24     25     26     27     28     29     30                    Recent Results (from the past 24 hour(s))   Comprehensive metabolic panel    Collection Time: 03/07/22  9:03 AM   Result Value Ref Range    Sodium 142 133 - 144 mmol/L    Potassium 4.5 3.4 - 5.3 mmol/L    Chloride 109 94 - 109 mmol/L    Carbon Dioxide (CO2) 24 20 - 32 mmol/L    Anion Gap 9 3 - 14 mmol/L    Urea Nitrogen 21 7 - 30 mg/dL    Creatinine 1.27 (H) 0.66 - 1.25 mg/dL    Calcium 9.2 8.5 - 10.1 mg/dL    Glucose 100 (H) 70 - 99 mg/dL    Alkaline Phosphatase 91 40 - 150 U/L    AST 20 0 - 45 U/L    ALT 28 0 - 70 U/L    Protein Total 6.8 6.8 - 8.8 g/dL    Albumin 3.8 3.4 - 5.0 g/dL    Bilirubin Total 0.4 0.2 - 1.3 mg/dL    GFR Estimate 59 (L) >60 mL/min/1.73m2   CBC with platelets and differential    Collection Time: 03/07/22  9:03 AM   Result Value Ref Range    WBC Count 6.4 4.0 - 11.0 10e3/uL    RBC Count 4.50 4.40 - 5.90 10e6/uL    Hemoglobin 14.5 13.3 - 17.7 g/dL    Hematocrit 44.5 40.0 - 53.0 %    MCV 99 78 - 100 fL    MCH 32.2 26.5 - 33.0 pg    MCHC 32.6 31.5 - 36.5 g/dL    RDW 12.8 10.0 - 15.0 %    Platelet Count 296 150 - 450 10e3/uL    % Neutrophils 64 %    % Lymphocytes 21 %    % Monocytes 8 %    % Eosinophils 5 %    % Basophils 1 %    % Immature Granulocytes 1 %    NRBCs per 100 WBC 0 <1 /100    Absolute Neutrophils 4.1 1.6 - 8.3 10e3/uL    Absolute Lymphocytes 1.4 0.8 - 5.3 10e3/uL    Absolute Monocytes 0.5 0.0 - 1.3 10e3/uL    Absolute Eosinophils 0.3 0.0 - 0.7 10e3/uL    Absolute Basophils 0.1 0.0 - 0.2 10e3/uL    Absolute Immature Granulocytes 0.0 <=0.4 10e3/uL    Absolute NRBCs 0.0 10e3/uL

## 2022-03-08 LAB
ALBUMIN SERPL ELPH-MCNC: 4.6 G/DL (ref 3.7–5.1)
ALPHA1 GLOB SERPL ELPH-MCNC: 0.3 G/DL (ref 0.2–0.4)
ALPHA2 GLOB SERPL ELPH-MCNC: 0.6 G/DL (ref 0.5–0.9)
B-GLOBULIN SERPL ELPH-MCNC: 0.8 G/DL (ref 0.6–1)
GAMMA GLOB SERPL ELPH-MCNC: 0.5 G/DL (ref 0.7–1.6)
KAPPA LC FREE SER-MCNC: 0.75 MG/DL (ref 0.33–1.94)
KAPPA LC FREE/LAMBDA FREE SER NEPH: 2.27 {RATIO} (ref 0.26–1.65)
LAMBDA LC FREE SERPL-MCNC: 0.33 MG/DL (ref 0.57–2.63)
M PROTEIN SERPL ELPH-MCNC: 0.1 G/DL
PROT PATTERN SERPL ELPH-IMP: ABNORMAL
PROT PATTERN SERPL IFE-IMP: NORMAL

## 2022-03-31 DIAGNOSIS — G62.9 NEUROPATHY: ICD-10-CM

## 2022-04-01 DIAGNOSIS — C90.00 MULTIPLE MYELOMA, REMISSION STATUS UNSPECIFIED (H): Primary | ICD-10-CM

## 2022-04-01 RX ORDER — DIPHENHYDRAMINE HYDROCHLORIDE 50 MG/ML
50 INJECTION INTRAMUSCULAR; INTRAVENOUS
Status: CANCELLED
Start: 2022-04-04

## 2022-04-01 RX ORDER — EPINEPHRINE 1 MG/ML
0.3 INJECTION, SOLUTION INTRAMUSCULAR; SUBCUTANEOUS EVERY 5 MIN PRN
Status: CANCELLED | OUTPATIENT
Start: 2022-04-04

## 2022-04-01 RX ORDER — ALBUTEROL SULFATE 0.83 MG/ML
2.5 SOLUTION RESPIRATORY (INHALATION)
Status: CANCELLED | OUTPATIENT
Start: 2022-04-04

## 2022-04-01 RX ORDER — MEPERIDINE HYDROCHLORIDE 25 MG/ML
25 INJECTION INTRAMUSCULAR; INTRAVENOUS; SUBCUTANEOUS EVERY 30 MIN PRN
Status: CANCELLED | OUTPATIENT
Start: 2022-04-04

## 2022-04-01 RX ORDER — HEPARIN SODIUM,PORCINE 10 UNIT/ML
5 VIAL (ML) INTRAVENOUS
Status: CANCELLED | OUTPATIENT
Start: 2022-04-04

## 2022-04-01 RX ORDER — ALBUTEROL SULFATE 90 UG/1
1-2 AEROSOL, METERED RESPIRATORY (INHALATION)
Status: CANCELLED
Start: 2022-04-04

## 2022-04-01 RX ORDER — NALOXONE HYDROCHLORIDE 0.4 MG/ML
.1-.4 INJECTION, SOLUTION INTRAMUSCULAR; INTRAVENOUS; SUBCUTANEOUS
Status: CANCELLED | OUTPATIENT
Start: 2022-04-04

## 2022-04-01 RX ORDER — METHYLPREDNISOLONE SODIUM SUCCINATE 125 MG/2ML
125 INJECTION, POWDER, LYOPHILIZED, FOR SOLUTION INTRAMUSCULAR; INTRAVENOUS
Status: CANCELLED
Start: 2022-04-04

## 2022-04-01 RX ORDER — GABAPENTIN 300 MG/1
CAPSULE ORAL
Qty: 60 CAPSULE | Refills: 3 | Status: SHIPPED | OUTPATIENT
Start: 2022-04-01 | End: 2022-08-22

## 2022-04-01 RX ORDER — LORAZEPAM 2 MG/ML
0.5 INJECTION INTRAMUSCULAR EVERY 4 HOURS PRN
Status: CANCELLED
Start: 2022-04-04

## 2022-04-01 RX ORDER — ACETAMINOPHEN 325 MG/1
650 TABLET ORAL
Status: CANCELLED
Start: 2022-04-04

## 2022-04-01 RX ORDER — DIPHENHYDRAMINE HCL 25 MG
50 CAPSULE ORAL
Status: CANCELLED
Start: 2022-04-04

## 2022-04-01 RX ORDER — HEPARIN SODIUM (PORCINE) LOCK FLUSH IV SOLN 100 UNIT/ML 100 UNIT/ML
5 SOLUTION INTRAVENOUS
Status: CANCELLED | OUTPATIENT
Start: 2022-04-04

## 2022-04-01 RX ORDER — DEXAMETHASONE 4 MG/1
20 TABLET ORAL ONCE
Status: CANCELLED | OUTPATIENT
Start: 2022-04-04

## 2022-04-01 RX ORDER — SODIUM CHLORIDE 9 MG/ML
1000 INJECTION, SOLUTION INTRAVENOUS CONTINUOUS PRN
Status: CANCELLED
Start: 2022-04-04

## 2022-04-01 NOTE — CONFIDENTIAL NOTE
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 Continues to use gabapentin with good relief      Next clinic visit date: 4/6/22 Dr Sweeney     Any other pertinent information (if none, N/A): N/A

## 2022-04-04 ENCOUNTER — INFUSION THERAPY VISIT (OUTPATIENT)
Dept: ONCOLOGY | Facility: CLINIC | Age: 77
End: 2022-04-04
Attending: STUDENT IN AN ORGANIZED HEALTH CARE EDUCATION/TRAINING PROGRAM
Payer: COMMERCIAL

## 2022-04-04 ENCOUNTER — APPOINTMENT (OUTPATIENT)
Dept: LAB | Facility: CLINIC | Age: 77
End: 2022-04-04
Attending: STUDENT IN AN ORGANIZED HEALTH CARE EDUCATION/TRAINING PROGRAM
Payer: COMMERCIAL

## 2022-04-04 VITALS
SYSTOLIC BLOOD PRESSURE: 142 MMHG | DIASTOLIC BLOOD PRESSURE: 87 MMHG | HEART RATE: 98 BPM | BODY MASS INDEX: 27.85 KG/M2 | WEIGHT: 147.4 LBS | RESPIRATION RATE: 16 BRPM | TEMPERATURE: 98.1 F | OXYGEN SATURATION: 99 %

## 2022-04-04 DIAGNOSIS — C90.00 MULTIPLE MYELOMA, REMISSION STATUS UNSPECIFIED (H): Primary | ICD-10-CM

## 2022-04-04 LAB
ALBUMIN SERPL-MCNC: 3.7 G/DL (ref 3.4–5)
ALP SERPL-CCNC: 97 U/L (ref 40–150)
ALT SERPL W P-5'-P-CCNC: 33 U/L (ref 0–70)
ANION GAP SERPL CALCULATED.3IONS-SCNC: 8 MMOL/L (ref 3–14)
AST SERPL W P-5'-P-CCNC: 24 U/L (ref 0–45)
BASOPHILS # BLD AUTO: 0.1 10E3/UL (ref 0–0.2)
BASOPHILS NFR BLD AUTO: 1 %
BILIRUB SERPL-MCNC: 0.4 MG/DL (ref 0.2–1.3)
BUN SERPL-MCNC: 21 MG/DL (ref 7–30)
CALCIUM SERPL-MCNC: 8.9 MG/DL (ref 8.5–10.1)
CHLORIDE BLD-SCNC: 109 MMOL/L (ref 94–109)
CO2 SERPL-SCNC: 24 MMOL/L (ref 20–32)
CREAT SERPL-MCNC: 1.3 MG/DL (ref 0.66–1.25)
EOSINOPHIL # BLD AUTO: 0.4 10E3/UL (ref 0–0.7)
EOSINOPHIL NFR BLD AUTO: 4 %
ERYTHROCYTE [DISTWIDTH] IN BLOOD BY AUTOMATED COUNT: 12.1 % (ref 10–15)
GFR SERPL CREATININE-BSD FRML MDRD: 57 ML/MIN/1.73M2
GLUCOSE BLD-MCNC: 106 MG/DL (ref 70–99)
HCT VFR BLD AUTO: 43.2 % (ref 40–53)
HGB BLD-MCNC: 14.2 G/DL (ref 13.3–17.7)
IMM GRANULOCYTES # BLD: 0 10E3/UL
IMM GRANULOCYTES NFR BLD: 0 %
LYMPHOCYTES # BLD AUTO: 1.9 10E3/UL (ref 0.8–5.3)
LYMPHOCYTES NFR BLD AUTO: 23 %
MCH RBC QN AUTO: 31.8 PG (ref 26.5–33)
MCHC RBC AUTO-ENTMCNC: 32.9 G/DL (ref 31.5–36.5)
MCV RBC AUTO: 97 FL (ref 78–100)
MONOCYTES # BLD AUTO: 0.8 10E3/UL (ref 0–1.3)
MONOCYTES NFR BLD AUTO: 10 %
NEUTROPHILS # BLD AUTO: 4.9 10E3/UL (ref 1.6–8.3)
NEUTROPHILS NFR BLD AUTO: 62 %
NRBC # BLD AUTO: 0 10E3/UL
NRBC BLD AUTO-RTO: 0 /100
PLATELET # BLD AUTO: 315 10E3/UL (ref 150–450)
POTASSIUM BLD-SCNC: 3.8 MMOL/L (ref 3.4–5.3)
PROT SERPL-MCNC: 7 G/DL (ref 6.8–8.8)
RBC # BLD AUTO: 4.46 10E6/UL (ref 4.4–5.9)
SODIUM SERPL-SCNC: 141 MMOL/L (ref 133–144)
TOTAL PROTEIN SERUM FOR ELP: 6.5 G/DL (ref 6.8–8.8)
WBC # BLD AUTO: 8 10E3/UL (ref 4–11)

## 2022-04-04 PROCEDURE — 250N000011 HC RX IP 250 OP 636: Performed by: STUDENT IN AN ORGANIZED HEALTH CARE EDUCATION/TRAINING PROGRAM

## 2022-04-04 PROCEDURE — 84155 ASSAY OF PROTEIN SERUM: CPT | Mod: 91

## 2022-04-04 PROCEDURE — 36415 COLL VENOUS BLD VENIPUNCTURE: CPT | Performed by: STUDENT IN AN ORGANIZED HEALTH CARE EDUCATION/TRAINING PROGRAM

## 2022-04-04 PROCEDURE — 83521 IG LIGHT CHAINS FREE EACH: CPT

## 2022-04-04 PROCEDURE — 86334 IMMUNOFIX E-PHORESIS SERUM: CPT | Performed by: PATHOLOGY

## 2022-04-04 PROCEDURE — 84165 PROTEIN E-PHORESIS SERUM: CPT | Mod: TC | Performed by: PATHOLOGY

## 2022-04-04 PROCEDURE — 96401 CHEMO ANTI-NEOPL SQ/IM: CPT

## 2022-04-04 PROCEDURE — 84165 PROTEIN E-PHORESIS SERUM: CPT | Mod: 26 | Performed by: PATHOLOGY

## 2022-04-04 PROCEDURE — 86334 IMMUNOFIX E-PHORESIS SERUM: CPT | Mod: 26 | Performed by: PATHOLOGY

## 2022-04-04 PROCEDURE — 85018 HEMOGLOBIN: CPT

## 2022-04-04 PROCEDURE — 80053 COMPREHEN METABOLIC PANEL: CPT | Performed by: STUDENT IN AN ORGANIZED HEALTH CARE EDUCATION/TRAINING PROGRAM

## 2022-04-04 RX ORDER — DEXAMETHASONE 4 MG/1
20 TABLET ORAL ONCE
Status: COMPLETED | OUTPATIENT
Start: 2022-04-04 | End: 2022-04-04

## 2022-04-04 RX ADMIN — DARATUMUMAB AND HYALURONIDASE-FIHJ (HUMAN RECOMBINANT) 1800 MG: 1800; 30000 INJECTION SUBCUTANEOUS at 16:18

## 2022-04-04 RX ADMIN — DEXAMETHASONE 20 MG: 4 TABLET ORAL at 15:24

## 2022-04-04 ASSESSMENT — PAIN SCALES - GENERAL: PAINLEVEL: NO PAIN (0)

## 2022-04-04 NOTE — PROGRESS NOTES
Infusion Nursing Note:  Rogelio Marshall presents today for Maintenance Darzalex Faspro.    Patient seen by provider today: No   present during visit today: Not Applicable.    Note: Patient endorse baseline bilateral hand neuropathy, not worsening. Denies fever/chills. Denies nausea/vomiting nor chest and abdominal discomfort. No new concerns made. Otherwise well.    Instruction given to patient as per provider. Verbalized understanding.       Intravenous Access:  No Intravenous access/labs at this visit.    Treatment Conditions:  Lab Results   Component Value Date    HGB 14.2 04/04/2022    WBC 8.0 04/04/2022    ANEU 4.8 06/28/2021    ANEUTAUTO 4.9 04/04/2022     04/04/2022      Lab Results   Component Value Date     04/04/2022    POTASSIUM 3.8 04/04/2022    MAG 2.1 12/02/2020    CR 1.30 (H) 04/04/2022    MIRI 8.9 04/04/2022    BILITOTAL 0.4 04/04/2022    ALBUMIN 3.7 04/04/2022    ALT 33 04/04/2022    AST 24 04/04/2022     Results reviewed, labs MET treatment parameters, ok to proceed with treatment.    TORB: 4/4/22/Lainey Monae NP/Irina Griffin RN/ Creatinine 1.30, please instruct patient to increase his fluid intake to 60-80 oz daily for the next two days and we will recheck on Wed when he sees Dr Sweeney. Good to go ahead.       Post Infusion Assessment:  Patient tolerated one Darzalex injection on right lower abdomen without incident.  Site patent and intact, free from redness, edema or discomfort.  No evidence of extravasations.  Access discontinued per protocol.       Discharge Plan:   Patient declined prescription refills. Have enough supply of Dexamethasone  Discharge instructions reviewed with: Patient.  Patient and/or family verbalized understanding of discharge instructions and all questions answered.  Copy of AVS reviewed with patient and/or family.  Patient will return 4/6 for next provider appointment.  Patient discharged in stable condition accompanied by: self.  Departure  Mode: Ambulatory.      GEORGE MORIN, OSVALDO

## 2022-04-04 NOTE — PATIENT INSTRUCTIONS
Contact Numbers  [facility]: [phone]    After Hours:  [phone]  Triage: [phone]    Please call the [facility] Triage line if you experience a temperature greater than or equal to 100.5, shaking chills, have uncontrolled nausea, vomiting and/or diarrhea, dizziness, shortness of breath, chest pain, bleeding, unexplained bruising, or if you have any other new/concerning symptoms, questions or concerns.     If it is after hours, weekends, or holidays, please call the main hospital  at  [phone] and ask to speak to the Oncology doctor on call.     If you are having any concerning symptoms or wish to speak to a provider before your next infusion visit, please call your care coordinator or triage to notify them so we can adequately serve you.     If you need a refill on a narcotic prescription or other medication, please call triage before your infusion appointment.         April 2022 Sunday Monday Tuesday Wednesday Thursday Friday Saturday                            1     2       3     4    LAB PERIPHERAL   2:45 PM   (15 min.)   [facility] LAB DRAW   [facility]    ONC INFUSION 1 HR (60 MIN)   3:00 PM   (60 min.)    ONC INFUSION NURSE   [facility] 5     6    LAB PERIPHERAL   2:30 PM   (15 min.)   [facility]   [facility]    RETURN   2:45 PM   (30 min.)   Angela Sweeney MD   [facility] 7     8     9       10     11     12     13     14     15     16       17     18     19     20     21     22     23       24     25     26     27     28     29     30                 May 2022      [place] Monday Tuesday Wednesday Thursday Friday Saturday   1     2    LAB PERIPHERAL   1:00 PM   (15 min.)   [facility]   [facility]    RETURN   1:15 PM   (45 min.)   Lainey Monae, APRN CNP   [facility]    ONC  INFUSION 1 HR (60 MIN)   2:30 PM   (60 min.)    ONC INFUSION NURSE   [facility] 3     4     5     6     7       8     9     10     11     12     13     14       15     16     17     18     19     20     21       22     23     24     25     26     27     28       29     30     31                                         Lab Results:  Recent Results (from the past 12 hour(s))   Comprehensive metabolic panel    Collection Time: 04/04/22  3:05 PM   Result Value Ref Range    Sodium 141 133 - 144 mmol/L    Potassium 3.8 3.4 - 5.3 mmol/L    Chloride 109 94 - 109 mmol/L    Carbon Dioxide (CO2) 24 20 - 32 mmol/L    Anion Gap 8 3 - 14 mmol/L    Urea Nitrogen 21 7 - 30 mg/dL    Creatinine 1.30 (H) 0.66 - 1.25 mg/dL    Calcium 8.9 8.5 - 10.1 mg/dL    Glucose 106 (H) 70 - 99 mg/dL    Alkaline Phosphatase 97 40 - 150 U/L    AST 24 0 - 45 U/L    ALT 33 0 - 70 U/L    Protein Total 7.0 6.8 - 8.8 g/dL    Albumin 3.7 3.4 - 5.0 g/dL    Bilirubin Total 0.4 0.2 - 1.3 mg/dL    GFR Estimate 57 (L) >60 mL/min/1.73m2   CBC with platelets and differential    Collection Time: 04/04/22  3:05 PM   Result Value Ref Range    WBC Count 8.0 4.0 - 11.0 10e3/uL    RBC Count 4.46 4.40 - 5.90 10e6/uL    Hemoglobin 14.2 13.3 - 17.7 g/dL    Hematocrit 43.2 40.0 - 53.0 %    MCV 97 78 - 100 fL    MCH 31.8 26.5 - 33.0 pg    MCHC 32.9 31.5 - 36.5 g/dL    RDW 12.1 10.0 - 15.0 %    Platelet Count 315 150 - 450 10e3/uL    % Neutrophils 62 %    % Lymphocytes 23 %    % Monocytes 10 %    % Eosinophils 4 %    % Basophils 1 %    % Immature Granulocytes 0 %    NRBCs per 100 WBC 0 <1 /100    Absolute Neutrophils 4.9 1.6 - 8.3 10e3/uL    Absolute Lymphocytes 1.9 0.8 - 5.3 10e3/uL    Absolute Monocytes 0.8 0.0 - 1.3 10e3/uL    Absolute Eosinophils 0.4 0.0 - 0.7 10e3/uL    Absolute Basophils 0.1 0.0 - 0.2 10e3/uL    Absolute Immature Granulocytes 0.0 <=0.4 10e3/uL    Absolute NRBCs 0.0 10e3/uL

## 2022-04-04 NOTE — NURSING NOTE
Chief Complaint   Patient presents with     Blood Draw     labs drawn with  by rn.  vs taken     Labs drawn with  by rn.  Pt tolerated well.  VS taken.  Pt checked in for next appt.    Nimo Pinedo RN

## 2022-04-05 LAB
ALBUMIN SERPL ELPH-MCNC: 4.5 G/DL (ref 3.7–5.1)
ALPHA1 GLOB SERPL ELPH-MCNC: 0.2 G/DL (ref 0.2–0.4)
ALPHA2 GLOB SERPL ELPH-MCNC: 0.6 G/DL (ref 0.5–0.9)
B-GLOBULIN SERPL ELPH-MCNC: 0.7 G/DL (ref 0.6–1)
GAMMA GLOB SERPL ELPH-MCNC: 0.4 G/DL (ref 0.7–1.6)
KAPPA LC FREE SER-MCNC: 0.76 MG/DL (ref 0.33–1.94)
KAPPA LC FREE/LAMBDA FREE SER NEPH: 2.62 {RATIO} (ref 0.26–1.65)
LAMBDA LC FREE SERPL-MCNC: 0.29 MG/DL (ref 0.57–2.63)
M PROTEIN SERPL ELPH-MCNC: 0.1 G/DL
PROT PATTERN SERPL ELPH-IMP: ABNORMAL
PROT PATTERN SERPL IFE-IMP: NORMAL

## 2022-04-06 ENCOUNTER — APPOINTMENT (OUTPATIENT)
Dept: LAB | Facility: CLINIC | Age: 77
End: 2022-04-06
Attending: STUDENT IN AN ORGANIZED HEALTH CARE EDUCATION/TRAINING PROGRAM
Payer: COMMERCIAL

## 2022-04-06 ENCOUNTER — ONCOLOGY VISIT (OUTPATIENT)
Dept: ONCOLOGY | Facility: CLINIC | Age: 77
End: 2022-04-06
Attending: STUDENT IN AN ORGANIZED HEALTH CARE EDUCATION/TRAINING PROGRAM
Payer: COMMERCIAL

## 2022-04-06 VITALS
SYSTOLIC BLOOD PRESSURE: 161 MMHG | BODY MASS INDEX: 27.76 KG/M2 | HEART RATE: 71 BPM | DIASTOLIC BLOOD PRESSURE: 86 MMHG | TEMPERATURE: 97.6 F | WEIGHT: 146.9 LBS | OXYGEN SATURATION: 99 % | RESPIRATION RATE: 18 BRPM

## 2022-04-06 DIAGNOSIS — Z79.899 ENCOUNTER FOR LONG-TERM (CURRENT) USE OF MEDICATIONS: ICD-10-CM

## 2022-04-06 DIAGNOSIS — C90.00 MULTIPLE MYELOMA, REMISSION STATUS UNSPECIFIED (H): ICD-10-CM

## 2022-04-06 LAB
ALBUMIN SERPL-MCNC: 3.9 G/DL (ref 3.4–5)
ALP SERPL-CCNC: 83 U/L (ref 40–150)
ALT SERPL W P-5'-P-CCNC: 28 U/L (ref 0–70)
ANION GAP SERPL CALCULATED.3IONS-SCNC: 7 MMOL/L (ref 3–14)
AST SERPL W P-5'-P-CCNC: 12 U/L (ref 0–45)
BASOPHILS # BLD AUTO: 0 10E3/UL (ref 0–0.2)
BASOPHILS NFR BLD AUTO: 0 %
BILIRUB SERPL-MCNC: 0.4 MG/DL (ref 0.2–1.3)
BUN SERPL-MCNC: 30 MG/DL (ref 7–30)
CALCIUM SERPL-MCNC: 9.4 MG/DL (ref 8.5–10.1)
CHLORIDE BLD-SCNC: 107 MMOL/L (ref 94–109)
CO2 SERPL-SCNC: 26 MMOL/L (ref 20–32)
CREAT SERPL-MCNC: 1.36 MG/DL (ref 0.66–1.25)
EOSINOPHIL # BLD AUTO: 0 10E3/UL (ref 0–0.7)
EOSINOPHIL NFR BLD AUTO: 0 %
ERYTHROCYTE [DISTWIDTH] IN BLOOD BY AUTOMATED COUNT: 12.1 % (ref 10–15)
GFR SERPL CREATININE-BSD FRML MDRD: 54 ML/MIN/1.73M2
GLUCOSE BLD-MCNC: 110 MG/DL (ref 70–99)
HCT VFR BLD AUTO: 41.6 % (ref 40–53)
HGB BLD-MCNC: 14 G/DL (ref 13.3–17.7)
IMM GRANULOCYTES # BLD: 0.1 10E3/UL
IMM GRANULOCYTES NFR BLD: 1 %
LYMPHOCYTES # BLD AUTO: 1.1 10E3/UL (ref 0.8–5.3)
LYMPHOCYTES NFR BLD AUTO: 6 %
MCH RBC QN AUTO: 31.7 PG (ref 26.5–33)
MCHC RBC AUTO-ENTMCNC: 33.7 G/DL (ref 31.5–36.5)
MCV RBC AUTO: 94 FL (ref 78–100)
MONOCYTES # BLD AUTO: 1.5 10E3/UL (ref 0–1.3)
MONOCYTES NFR BLD AUTO: 8 %
NEUTROPHILS # BLD AUTO: 16 10E3/UL (ref 1.6–8.3)
NEUTROPHILS NFR BLD AUTO: 85 %
NRBC # BLD AUTO: 0 10E3/UL
NRBC BLD AUTO-RTO: 0 /100
PLATELET # BLD AUTO: 313 10E3/UL (ref 150–450)
POTASSIUM BLD-SCNC: 3.5 MMOL/L (ref 3.4–5.3)
PROT SERPL-MCNC: 7.2 G/DL (ref 6.8–8.8)
RBC # BLD AUTO: 4.41 10E6/UL (ref 4.4–5.9)
SODIUM SERPL-SCNC: 140 MMOL/L (ref 133–144)
WBC # BLD AUTO: 18.9 10E3/UL (ref 4–11)

## 2022-04-06 PROCEDURE — 36415 COLL VENOUS BLD VENIPUNCTURE: CPT | Performed by: STUDENT IN AN ORGANIZED HEALTH CARE EDUCATION/TRAINING PROGRAM

## 2022-04-06 PROCEDURE — G0463 HOSPITAL OUTPT CLINIC VISIT: HCPCS

## 2022-04-06 PROCEDURE — 85004 AUTOMATED DIFF WBC COUNT: CPT | Performed by: STUDENT IN AN ORGANIZED HEALTH CARE EDUCATION/TRAINING PROGRAM

## 2022-04-06 PROCEDURE — 99214 OFFICE O/P EST MOD 30 MIN: CPT | Performed by: STUDENT IN AN ORGANIZED HEALTH CARE EDUCATION/TRAINING PROGRAM

## 2022-04-06 PROCEDURE — 80053 COMPREHEN METABOLIC PANEL: CPT | Performed by: STUDENT IN AN ORGANIZED HEALTH CARE EDUCATION/TRAINING PROGRAM

## 2022-04-06 ASSESSMENT — PAIN SCALES - GENERAL: PAINLEVEL: NO PAIN (0)

## 2022-04-06 NOTE — LETTER
4/6/2022         RE: Rogelio Marshall  [address]  [place] [address]        Dear Colleague,    Thank you for referring your patient, Rogelio Marshall, to the [facility]. Please see a copy of my visit note below.    Reason for Visit: follow-up for multiple myeloma    Oncology HPI:   -compression fractures and bone scan reveals multiple lytic lesions throughout his appendicular and axial skeleton. His Beta 2 microglobulin was 3.6 on 10/10/2020. Kappa chains were 73.6 on 10/5/2020 with K/L ratio of 89.9. M spike of 16.2 in urine on 10/10. Bone marrow biopsy on 10/23/2020 showed trilineage hematopoiesis and 50-60% kappa restricted plasma cells. Flow cytometry showed 20 % plasma cells which express CD19, CD38, CD45 and monotypic cytoplasmic kappa immunoglobulin light chains but lack CD20 and CD56.  FISH shows IGH-CCND1 fusion (81%; 30% had loss of IGH component from one fusion signal).       - Started 21 day cycle VRD 11/2020     - 11/27 he had a fall with rib fractures.      - Dec 2020 Started madyson-velcade-dex. Completed 5 cycles and achieved VGPR     - April 2021 started darzalex maintenance    Interval history:   Rogelio is here today to discuss Vit K2 and bone strengthening. He also is taking gabapentin for neuropathy. No new bone pains, fevers, nor chills. His BP is elevated and he is not seeing PCP. Not clear if he is taking his hydrochlorothiazide or not. He has not yet seen the dentist to get clearance to start zometa.       Current Outpatient Medications   Medication Sig Dispense Refill     acetaminophen (TYLENOL) 325 MG tablet Take 3 tablets (975 mg) by mouth 3 times daily (Patient not taking: Reported on 12/27/2021) 500 tablet 4     acyclovir (ZOVIRAX) 400 MG tablet Take 1 tablet (400 mg) by mouth 2 times daily Viral Prophylaxis. 60 tablet 11     calcium carbonate 500 mg, elemental, (OSCAL) 500 MG tablet Take 1 tablet (500 mg) by mouth 2 times daily 200 tablet 3      dexamethasone (DECADRON) 4 MG tablet TAKE 5 TABLETS (20MG) BY MOUTH ONCE ON DAY 2 30 tablet 1     gabapentin (NEURONTIN) 300 MG capsule TAKE ONE CAPSULE (300MG) BY MOUTH TWICE A DAY 60 capsule 3     hydrochlorothiazide (MICROZIDE) 12.5 MG capsule Take 1 capsule (12.5 mg) by mouth daily (Patient not taking: Reported on 8/23/2021) 90 capsule 3     LORazepam (ATIVAN) 0.5 MG tablet Take 1 tablet (0.5 mg) by mouth every 4 hours as needed (Anxiety, Nausea/Vomiting or Sleep) (Patient not taking: Reported on 12/27/2021) 30 tablet 5     omeprazole 20 MG tablet Take 1 tablet (20 mg) by mouth daily as needed (indigestion/reflux associated with madyson/dex) 90 tablet 0     ondansetron (ZOFRAN-ODT) 4 MG ODT tab Take 1 tablet (4 mg) by mouth every 6 hours as needed for nausea or vomiting (Patient not taking: Reported on 8/23/2021)       polyethylene glycol (MIRALAX) 17 g packet Take 17 g by mouth daily (Patient not taking: Reported on 8/23/2021)       prochlorperazine (COMPAZINE) 10 MG tablet Take 1 tablet (10 mg) by mouth every 6 hours as needed (Nausea/Vomiting) (Patient not taking: Reported on 11/29/2021) 30 tablet 5     senna-docusate (SENOKOT-S/PERICOLACE) 8.6-50 MG tablet Take 1 tablet by mouth daily as needed  (Patient not taking: Reported on 8/23/2021)       triamcinolone (KENALOG) 0.1 % external ointment Apply topically 2 times daily To back rash (Patient not taking: Reported on 8/23/2021) 15 g 0     Vitamin D-Vitamin K (VITAMIN K2-VITAMIN D3)  MCG-UNIT CAPS        vitamin D3 (CHOLECALCIFEROL) 50 mcg (2000 units) tablet Take 1 tablet (50 mcg) by mouth daily 90 tablet 3     VITAMIN D3 25 MCG (1000 UT) tablet Take 1 tablet by mouth daily            Exam:   BP (!) 161/86 (BP Location: Right arm, Patient Position: Sitting, Cuff Size: Adult Regular)   Pulse 71   Temp 97.6  F (36.4  C) (Oral)   Resp 18   Wt 66.6 kg (146 lb 14.4 oz)   SpO2 99%   BMI 27.76 kg/m    Constitutional: NAD  Eyes: no scleral icterus  ENT:   Several broken teeth noted  Pulm: nonlabored resp  MSK: No edema in the extremities  Neuro: alert, conversant, normal gait  Psych: appropriate mood and affect      Labs:   I personally reviewed the following labs:    Most Recent 3 CBC's:  Recent Labs   Lab Test 04/04/22  1505 03/07/22  0903 02/28/22  1329   WBC 8.0 6.4 8.7   HGB 14.2 14.5 14.2   MCV 97 99 96    296 349     Most Recent 3 BMP's:  Recent Labs   Lab Test 04/04/22  1505 03/07/22  0903 02/28/22  1329    142 141   POTASSIUM 3.8 4.5 3.9   CHLORIDE 109 109 104   CO2 24 24 25   BUN 21 21 18   CR 1.30* 1.27* 1.16   ANIONGAP 8 9 12   MIRI 8.9 9.2 9.2   * 100* 118*     Most Recent 2 LFT's:  Recent Labs   Lab Test 04/04/22  1505 03/07/22  0903   AST 24 20   ALT 33 28   ALKPHOS 97 91   BILITOTAL 0.4 0.4        Impression/plan:   77 year old man with IgG Kappa standard risk multiple myeloma. Despite his side effects, he has had a VGPR with therapy. Now on q 4 week darzalex faspro for maintenance.   -continue to follow SPEP and FLC monthly.     HTN and CKD: recommended he see PCP. He states he does not like his current PCP. Internal medicine referral sent    Pathologic Fractures:. Vitamin D is replete and he is on a daily supplement as well as calcium. He has not received dental clearance for Zometa infusions; recommended this again today.   Recommend continuing Vitamin D 2000 international unit(s) daily.      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 Continues to use gabapentin with good relief.     More than half of this 35 min visit was spend counseling the pt        Again, thank you for allowing me to participate in the care of your patient.      Sincerely,    Angela Sweeney MD

## 2022-04-06 NOTE — PROGRESS NOTES
Reason for Visit: follow-up for multiple myeloma    Oncology HPI:   -compression fractures and bone scan reveals multiple lytic lesions throughout his appendicular and axial skeleton. His Beta 2 microglobulin was 3.6 on 10/10/2020. Kappa chains were 73.6 on 10/5/2020 with K/L ratio of 89.9. M spike of 16.2 in urine on 10/10. Bone marrow biopsy on 10/23/2020 showed trilineage hematopoiesis and 50-60% kappa restricted plasma cells. Flow cytometry showed 20 % plasma cells which express CD19, CD38, CD45 and monotypic cytoplasmic kappa immunoglobulin light chains but lack CD20 and CD56.  FISH shows IGH-CCND1 fusion (81%; 30% had loss of IGH component from one fusion signal).       - Started 21 day cycle VRD 11/2020     - 11/27 he had a fall with rib fractures.      - Dec 2020 Started madyson-velcade-dex. Completed 5 cycles and achieved VGPR     - April 2021 started darzalex maintenance    Interval history:   Rogelio is here today to discuss Vit K2 and bone strengthening. He also is taking gabapentin for neuropathy. No new bone pains, fevers, nor chills. His BP is elevated and he is not seeing PCP. Not clear if he is taking his hydrochlorothiazide or not. He has not yet seen the dentist to get clearance to start zometa.       Current Outpatient Medications   Medication Sig Dispense Refill     acetaminophen (TYLENOL) 325 MG tablet Take 3 tablets (975 mg) by mouth 3 times daily (Patient not taking: Reported on 12/27/2021) 500 tablet 4     acyclovir (ZOVIRAX) 400 MG tablet Take 1 tablet (400 mg) by mouth 2 times daily Viral Prophylaxis. 60 tablet 11     calcium carbonate 500 mg, elemental, (OSCAL) 500 MG tablet Take 1 tablet (500 mg) by mouth 2 times daily 200 tablet 3     dexamethasone (DECADRON) 4 MG tablet TAKE 5 TABLETS (20MG) BY MOUTH ONCE ON DAY 2 30 tablet 1     gabapentin (NEURONTIN) 300 MG capsule TAKE ONE CAPSULE (300MG) BY MOUTH TWICE A DAY 60 capsule 3     hydrochlorothiazide (MICROZIDE) 12.5 MG capsule Take 1 capsule  (12.5 mg) by mouth daily (Patient not taking: Reported on 8/23/2021) 90 capsule 3     LORazepam (ATIVAN) 0.5 MG tablet Take 1 tablet (0.5 mg) by mouth every 4 hours as needed (Anxiety, Nausea/Vomiting or Sleep) (Patient not taking: Reported on 12/27/2021) 30 tablet 5     omeprazole 20 MG tablet Take 1 tablet (20 mg) by mouth daily as needed (indigestion/reflux associated with madyson/dex) 90 tablet 0     ondansetron (ZOFRAN-ODT) 4 MG ODT tab Take 1 tablet (4 mg) by mouth every 6 hours as needed for nausea or vomiting (Patient not taking: Reported on 8/23/2021)       polyethylene glycol (MIRALAX) 17 g packet Take 17 g by mouth daily (Patient not taking: Reported on 8/23/2021)       prochlorperazine (COMPAZINE) 10 MG tablet Take 1 tablet (10 mg) by mouth every 6 hours as needed (Nausea/Vomiting) (Patient not taking: Reported on 11/29/2021) 30 tablet 5     senna-docusate (SENOKOT-S/PERICOLACE) 8.6-50 MG tablet Take 1 tablet by mouth daily as needed  (Patient not taking: Reported on 8/23/2021)       triamcinolone (KENALOG) 0.1 % external ointment Apply topically 2 times daily To back rash (Patient not taking: Reported on 8/23/2021) 15 g 0     Vitamin D-Vitamin K (VITAMIN K2-VITAMIN D3)  MCG-UNIT CAPS        vitamin D3 (CHOLECALCIFEROL) 50 mcg (2000 units) tablet Take 1 tablet (50 mcg) by mouth daily 90 tablet 3     VITAMIN D3 25 MCG (1000 UT) tablet Take 1 tablet by mouth daily            Exam:   BP (!) 161/86 (BP Location: Right arm, Patient Position: Sitting, Cuff Size: Adult Regular)   Pulse 71   Temp 97.6  F (36.4  C) (Oral)   Resp 18   Wt 66.6 kg (146 lb 14.4 oz)   SpO2 99%   BMI 27.76 kg/m    Constitutional: NAD  Eyes: no scleral icterus  ENT:  Several broken teeth noted  Pulm: nonlabored resp  MSK: No edema in the extremities  Neuro: alert, conversant, normal gait  Psych: appropriate mood and affect      Labs:   I personally reviewed the following labs:    Most Recent 3 CBC's:  Recent Labs   Lab Test  04/04/22  1505 03/07/22  0903 02/28/22  1329   WBC 8.0 6.4 8.7   HGB 14.2 14.5 14.2   MCV 97 99 96    296 349     Most Recent 3 BMP's:  Recent Labs   Lab Test 04/04/22  1505 03/07/22  0903 02/28/22  1329    142 141   POTASSIUM 3.8 4.5 3.9   CHLORIDE 109 109 104   CO2 24 24 25   BUN 21 21 18   CR 1.30* 1.27* 1.16   ANIONGAP 8 9 12   MIRI 8.9 9.2 9.2   * 100* 118*     Most Recent 2 LFT's:  Recent Labs   Lab Test 04/04/22  1505 03/07/22  0903   AST 24 20   ALT 33 28   ALKPHOS 97 91   BILITOTAL 0.4 0.4            Impression/plan:   77 year old man with IgG Kappa standard risk multiple myeloma. Despite his side effects, he has had a VGPR with therapy. Now on q 4 week darzalex faspro for maintenance.   -continue to follow SPEP and FLC monthly.     HTN and CKD: recommended he see PCP. He states he does not like his current PCP. Internal medicine referral sent    Pathologic Fractures:. Vitamin D is replete and he is on a daily supplement as well as calcium. He has not received dental clearance for Zometa infusions; recommended this again today.   Recommend continuing Vitamin D 2000 international unit(s) daily.      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 Continues to use gabapentin with good relief.     More than half of this 35 min visit was spend counseling the pt      Angela Sweeney MD PhD

## 2022-04-06 NOTE — NURSING NOTE
"Oncology Rooming Note    April 6, 2022 3:12 PM   Rogelio Marshall is a 77 year old male who presents for:    Chief Complaint   Patient presents with     Blood Draw     Labs drawn via  by RN in lab. VS taken.      Oncology Clinic Visit     Rtn Multiple Myeloma Remmision     Initial Vitals: BP (!) 161/86 (BP Location: Right arm, Patient Position: Sitting, Cuff Size: Adult Regular)   Pulse 71   Temp 97.6  F (36.4  C) (Oral)   Resp 18   Wt 66.6 kg (146 lb 14.4 oz)   SpO2 99%   BMI 27.76 kg/m   Estimated body mass index is 27.76 kg/m  as calculated from the following:    Height as of 7/22/21: 1.549 m (5' 1\").    Weight as of this encounter: 66.6 kg (146 lb 14.4 oz). Body surface area is 1.69 meters squared.  No Pain (0) Comment: Data Unavailable   No LMP for male patient.  Allergies reviewed: Yes  Medications reviewed: Yes    Medications: Medication refills not needed today.  Pharmacy name entered into EPIC:    [facility] [place], [place] - [id number]  WRITTEN PRESCRIPTION REQUESTED  [id number] - [place], [place] - [address] [facility] DISCHARGE - [place], [place] - [address]    Clinical concerns: Pt has a list of questions but wants to talk about refilling Vitamin K      Shawna Pratt, EMT on April 6, 2022 at 3:14 PM            "
Chief Complaint   Patient presents with     Blood Draw     Labs drawn via  by RN in lab. VS taken.      Labs collected from venipuncture by RN. Vitals taken. Checked in for appointment(s).    Tosha Colmenares RN    
shortness of breath

## 2022-04-17 ENCOUNTER — HEALTH MAINTENANCE LETTER (OUTPATIENT)
Age: 77
End: 2022-04-17

## 2022-04-28 NOTE — PROGRESS NOTES
Reason for Visit: follow-up for multiple myeloma    Oncology HPI:   -compression fractures and bone scan reveals multiple lytic lesions throughout his appendicular and axial skeleton. His Beta 2 microglobulin was 3.6 on 10/10/2020. Kappa chains were 73.6 on 10/5/2020 with K/L ratio of 89.9. M spike of 16.2 in urine on 10/10. Bone marrow biopsy on 10/23/2020 showed trilineage hematopoiesis and 50-60% kappa restricted plasma cells. Flow cytometry showed 20 % plasma cells which express CD19, CD38, CD45 and monotypic cytoplasmic kappa immunoglobulin light chains but lack CD20 and CD56.  FISH shows IGH-CCND1 fusion (81%; 30% had loss of IGH component from one fusion signal).       - Started 21 day cycle VRD 11/2020     - 11/27 he had a fall with rib fractures.      - Dec 2020 Started madyson-velcade-dex. Completed 5 cycles and achieved VGPR     - April 2021 started darzalex maintenance    Interval history:   Continues to try to avoid using omeprazole, so he gets occasional indigestion (probably about once weekly).   He has not yet seen the dentist to get clearance to start zometa. Denies fevers, chills, night sweats, unexplained weight changes, headaches, dizziness, vision or hearing changes, new lumps or bumps, chest pain, shortness of breath, cough, abdominal pain, nausea, vomiting, changes to bowel or bladder, swelling of extremities, bleeding issues, or rash.        Current Outpatient Medications   Medication Sig Dispense Refill     acetaminophen (TYLENOL) 325 MG tablet Take 3 tablets (975 mg) by mouth 3 times daily (Patient not taking: Reported on 12/27/2021) 500 tablet 4     acyclovir (ZOVIRAX) 400 MG tablet Take 1 tablet (400 mg) by mouth 2 times daily Viral Prophylaxis. 60 tablet 11     calcium carbonate 500 mg, elemental, (OSCAL) 500 MG tablet Take 1 tablet (500 mg) by mouth 2 times daily 200 tablet 3     dexamethasone (DECADRON) 4 MG tablet TAKE 5 TABLETS (20MG) BY MOUTH ONCE ON DAY 2 (Patient not taking: Reported  on 4/6/2022) 30 tablet 1     gabapentin (NEURONTIN) 300 MG capsule TAKE ONE CAPSULE (300MG) BY MOUTH TWICE A DAY 60 capsule 3     hydrochlorothiazide (MICROZIDE) 12.5 MG capsule Take 1 capsule (12.5 mg) by mouth daily (Patient not taking: Reported on 8/23/2021) 90 capsule 3     LORazepam (ATIVAN) 0.5 MG tablet Take 1 tablet (0.5 mg) by mouth every 4 hours as needed (Anxiety, Nausea/Vomiting or Sleep) (Patient not taking: Reported on 12/27/2021) 30 tablet 5     omeprazole 20 MG tablet Take 1 tablet (20 mg) by mouth daily as needed (indigestion/reflux associated with madyson/dex) 90 tablet 0     ondansetron (ZOFRAN-ODT) 4 MG ODT tab Take 1 tablet (4 mg) by mouth every 6 hours as needed for nausea or vomiting (Patient not taking: Reported on 8/23/2021)       polyethylene glycol (MIRALAX) 17 g packet Take 17 g by mouth daily (Patient not taking: Reported on 8/23/2021)       prochlorperazine (COMPAZINE) 10 MG tablet Take 1 tablet (10 mg) by mouth every 6 hours as needed (Nausea/Vomiting) (Patient not taking: Reported on 11/29/2021) 30 tablet 5     senna-docusate (SENOKOT-S/PERICOLACE) 8.6-50 MG tablet Take 1 tablet by mouth daily as needed  (Patient not taking: Reported on 8/23/2021)       triamcinolone (KENALOG) 0.1 % external ointment Apply topically 2 times daily To back rash (Patient not taking: Reported on 8/23/2021) 15 g 0     Vitamin D-Vitamin K (VITAMIN K2-VITAMIN D3)  MCG-UNIT CAPS        vitamin D3 (CHOLECALCIFEROL) 50 mcg (2000 units) tablet Take 1 tablet (50 mcg) by mouth daily (Patient not taking: Reported on 4/6/2022) 90 tablet 3     VITAMIN D3 25 MCG (1000 UT) tablet Take 2 tablets by mouth daily            Exam:   /81   Pulse 88   Temp 98  F (36.7  C)   Resp 18   Wt 66.9 kg (147 lb 8 oz)   SpO2 91%   BMI 27.87 kg/m    Constitutional: NAD  Eyes: no scleral icterus  ENT:  Several broken teeth noted  Pulm: nonlabored resp  MSK: No edema in the extremities  Neuro: alert, conversant, normal  gait  Psych: appropriate mood and affect      Labs:   I personally reviewed the following labs:    Most Recent 3 CBC's:  Recent Labs   Lab Test 05/02/22  1348 04/06/22  1451 04/04/22  1505   WBC 8.1 18.9* 8.0   HGB 13.8 14.0 14.2   MCV 96 94 97    313 315     Most Recent 3 BMP's:  Recent Labs   Lab Test 05/02/22  1348 04/06/22  1451 04/04/22  1505    140 141   POTASSIUM 3.7 3.5 3.8   CHLORIDE 108 107 109   CO2 26 26 24   BUN 16 30 21   CR 1.29* 1.36* 1.30*   ANIONGAP 8 7 8   MIRI 9.2 9.4 8.9   * 110* 106*     Most Recent 2 LFT's:  Recent Labs   Lab Test 05/02/22  1348 04/06/22  1451   AST 19 12   ALT 28 28   ALKPHOS 84 83   BILITOTAL 0.5 0.4            Impression/plan:   77 year old man with IgG Kappa standard risk multiple myeloma. Despite his side effects, he has had a VGPR with therapy. Now on q 4 week darzalex faspro for maintenance.   -continue to follow SPEP and FLC monthly.     HTN and CKD: recommended he see PCP. He states he does not like his current PCP. Internal medicine referral sent    Pathologic Fractures:. Vitamin D is replete and he is on a daily supplement as well as calcium. He has not received dental clearance for Zometa infusions; recommended this again today but he is not interested due to the risk of osteonecrosis of the jaw.   Recommend continuing Vitamin D 2000 international unit(s) daily.  Last DEXA in Oct 2020 showed osteoporosis.  Will repeat in Oct 2022.      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 Continues to use gabapentin with good relief.     32 minutes spent on the date of the encounter doing chart review, review of test results, patient visit and documentation     JIMBO Joseph CNP  [facility]  [address], [place] [id number] [phone]

## 2022-05-02 ENCOUNTER — APPOINTMENT (OUTPATIENT)
Dept: LAB | Facility: CLINIC | Age: 77
End: 2022-05-02
Attending: STUDENT IN AN ORGANIZED HEALTH CARE EDUCATION/TRAINING PROGRAM
Payer: COMMERCIAL

## 2022-05-02 ENCOUNTER — INFUSION THERAPY VISIT (OUTPATIENT)
Dept: ONCOLOGY | Facility: CLINIC | Age: 77
End: 2022-05-02
Attending: REGISTERED NURSE
Payer: COMMERCIAL

## 2022-05-02 ENCOUNTER — ONCOLOGY VISIT (OUTPATIENT)
Dept: ONCOLOGY | Facility: CLINIC | Age: 77
End: 2022-05-02
Attending: STUDENT IN AN ORGANIZED HEALTH CARE EDUCATION/TRAINING PROGRAM
Payer: COMMERCIAL

## 2022-05-02 VITALS
TEMPERATURE: 98 F | RESPIRATION RATE: 18 BRPM | SYSTOLIC BLOOD PRESSURE: 127 MMHG | WEIGHT: 147.5 LBS | HEART RATE: 88 BPM | BODY MASS INDEX: 27.87 KG/M2 | DIASTOLIC BLOOD PRESSURE: 81 MMHG | OXYGEN SATURATION: 91 %

## 2022-05-02 DIAGNOSIS — C90.00 MULTIPLE MYELOMA, REMISSION STATUS UNSPECIFIED (H): Primary | ICD-10-CM

## 2022-05-02 DIAGNOSIS — M81.0 OSTEOPOROSIS, UNSPECIFIED OSTEOPOROSIS TYPE, UNSPECIFIED PATHOLOGICAL FRACTURE PRESENCE: ICD-10-CM

## 2022-05-02 LAB
ALBUMIN SERPL-MCNC: 3.5 G/DL (ref 3.4–5)
ALP SERPL-CCNC: 84 U/L (ref 40–150)
ALT SERPL W P-5'-P-CCNC: 28 U/L (ref 0–70)
ANION GAP SERPL CALCULATED.3IONS-SCNC: 8 MMOL/L (ref 3–14)
AST SERPL W P-5'-P-CCNC: 19 U/L (ref 0–45)
BASOPHILS # BLD AUTO: 0.1 10E3/UL (ref 0–0.2)
BASOPHILS NFR BLD AUTO: 1 %
BILIRUB SERPL-MCNC: 0.5 MG/DL (ref 0.2–1.3)
BUN SERPL-MCNC: 16 MG/DL (ref 7–30)
CALCIUM SERPL-MCNC: 9.2 MG/DL (ref 8.5–10.1)
CHLORIDE BLD-SCNC: 108 MMOL/L (ref 94–109)
CO2 SERPL-SCNC: 26 MMOL/L (ref 20–32)
CREAT SERPL-MCNC: 1.29 MG/DL (ref 0.66–1.25)
EOSINOPHIL # BLD AUTO: 0.5 10E3/UL (ref 0–0.7)
EOSINOPHIL NFR BLD AUTO: 6 %
ERYTHROCYTE [DISTWIDTH] IN BLOOD BY AUTOMATED COUNT: 12.7 % (ref 10–15)
GFR SERPL CREATININE-BSD FRML MDRD: 58 ML/MIN/1.73M2
GLUCOSE BLD-MCNC: 106 MG/DL (ref 70–99)
HCT VFR BLD AUTO: 42.8 % (ref 40–53)
HGB BLD-MCNC: 13.8 G/DL (ref 13.3–17.7)
IMM GRANULOCYTES # BLD: 0 10E3/UL
IMM GRANULOCYTES NFR BLD: 1 %
LYMPHOCYTES # BLD AUTO: 1.8 10E3/UL (ref 0.8–5.3)
LYMPHOCYTES NFR BLD AUTO: 22 %
MCH RBC QN AUTO: 30.9 PG (ref 26.5–33)
MCHC RBC AUTO-ENTMCNC: 32.2 G/DL (ref 31.5–36.5)
MCV RBC AUTO: 96 FL (ref 78–100)
MONOCYTES # BLD AUTO: 0.6 10E3/UL (ref 0–1.3)
MONOCYTES NFR BLD AUTO: 7 %
NEUTROPHILS # BLD AUTO: 5.2 10E3/UL (ref 1.6–8.3)
NEUTROPHILS NFR BLD AUTO: 63 %
NRBC # BLD AUTO: 0 10E3/UL
NRBC BLD AUTO-RTO: 0 /100
PLATELET # BLD AUTO: 358 10E3/UL (ref 150–450)
POTASSIUM BLD-SCNC: 3.7 MMOL/L (ref 3.4–5.3)
PROT SERPL-MCNC: 6.4 G/DL (ref 6.8–8.8)
RBC # BLD AUTO: 4.47 10E6/UL (ref 4.4–5.9)
SODIUM SERPL-SCNC: 142 MMOL/L (ref 133–144)
TOTAL PROTEIN SERUM FOR ELP: 6.2 G/DL (ref 6.8–8.8)
WBC # BLD AUTO: 8.1 10E3/UL (ref 4–11)

## 2022-05-02 PROCEDURE — 36415 COLL VENOUS BLD VENIPUNCTURE: CPT | Performed by: REGISTERED NURSE

## 2022-05-02 PROCEDURE — 84165 PROTEIN E-PHORESIS SERUM: CPT | Mod: TC | Performed by: PATHOLOGY

## 2022-05-02 PROCEDURE — 96401 CHEMO ANTI-NEOPL SQ/IM: CPT

## 2022-05-02 PROCEDURE — 250N000011 HC RX IP 250 OP 636: Performed by: STUDENT IN AN ORGANIZED HEALTH CARE EDUCATION/TRAINING PROGRAM

## 2022-05-02 PROCEDURE — 85025 COMPLETE CBC W/AUTO DIFF WBC: CPT | Performed by: REGISTERED NURSE

## 2022-05-02 PROCEDURE — 80053 COMPREHEN METABOLIC PANEL: CPT | Performed by: REGISTERED NURSE

## 2022-05-02 PROCEDURE — 86334 IMMUNOFIX E-PHORESIS SERUM: CPT | Performed by: PATHOLOGY

## 2022-05-02 PROCEDURE — G0463 HOSPITAL OUTPT CLINIC VISIT: HCPCS

## 2022-05-02 PROCEDURE — 83521 IG LIGHT CHAINS FREE EACH: CPT | Performed by: STUDENT IN AN ORGANIZED HEALTH CARE EDUCATION/TRAINING PROGRAM

## 2022-05-02 PROCEDURE — 86334 IMMUNOFIX E-PHORESIS SERUM: CPT | Mod: 26

## 2022-05-02 PROCEDURE — 84165 PROTEIN E-PHORESIS SERUM: CPT | Mod: 26

## 2022-05-02 PROCEDURE — 99213 OFFICE O/P EST LOW 20 MIN: CPT | Performed by: REGISTERED NURSE

## 2022-05-02 PROCEDURE — 84155 ASSAY OF PROTEIN SERUM: CPT | Performed by: STUDENT IN AN ORGANIZED HEALTH CARE EDUCATION/TRAINING PROGRAM

## 2022-05-02 RX ORDER — HEPARIN SODIUM (PORCINE) LOCK FLUSH IV SOLN 100 UNIT/ML 100 UNIT/ML
5 SOLUTION INTRAVENOUS
Status: CANCELLED | OUTPATIENT
Start: 2022-05-30

## 2022-05-02 RX ORDER — ALBUTEROL SULFATE 90 UG/1
1-2 AEROSOL, METERED RESPIRATORY (INHALATION)
Status: CANCELLED
Start: 2022-05-30

## 2022-05-02 RX ORDER — DEXAMETHASONE 4 MG/1
20 TABLET ORAL ONCE
Status: CANCELLED | OUTPATIENT
Start: 2022-05-02

## 2022-05-02 RX ORDER — HEPARIN SODIUM,PORCINE 10 UNIT/ML
5 VIAL (ML) INTRAVENOUS
Status: CANCELLED | OUTPATIENT
Start: 2022-05-02

## 2022-05-02 RX ORDER — EPINEPHRINE 1 MG/ML
0.3 INJECTION, SOLUTION INTRAMUSCULAR; SUBCUTANEOUS EVERY 5 MIN PRN
Status: CANCELLED | OUTPATIENT
Start: 2022-05-02

## 2022-05-02 RX ORDER — ALBUTEROL SULFATE 0.83 MG/ML
2.5 SOLUTION RESPIRATORY (INHALATION)
Status: CANCELLED | OUTPATIENT
Start: 2022-05-30

## 2022-05-02 RX ORDER — DEXAMETHASONE 4 MG/1
20 TABLET ORAL ONCE
Status: CANCELLED | OUTPATIENT
Start: 2022-05-30

## 2022-05-02 RX ORDER — ACETAMINOPHEN 325 MG/1
650 TABLET ORAL
Status: CANCELLED
Start: 2022-05-30

## 2022-05-02 RX ORDER — HEPARIN SODIUM (PORCINE) LOCK FLUSH IV SOLN 100 UNIT/ML 100 UNIT/ML
5 SOLUTION INTRAVENOUS
Status: CANCELLED | OUTPATIENT
Start: 2022-06-27

## 2022-05-02 RX ORDER — METHYLPREDNISOLONE SODIUM SUCCINATE 125 MG/2ML
125 INJECTION, POWDER, LYOPHILIZED, FOR SOLUTION INTRAMUSCULAR; INTRAVENOUS
Status: CANCELLED
Start: 2022-05-30

## 2022-05-02 RX ORDER — LORAZEPAM 2 MG/ML
0.5 INJECTION INTRAMUSCULAR EVERY 4 HOURS PRN
Status: CANCELLED
Start: 2022-05-02

## 2022-05-02 RX ORDER — NALOXONE HYDROCHLORIDE 0.4 MG/ML
.1-.4 INJECTION, SOLUTION INTRAMUSCULAR; INTRAVENOUS; SUBCUTANEOUS
Status: CANCELLED | OUTPATIENT
Start: 2022-06-27

## 2022-05-02 RX ORDER — LORAZEPAM 2 MG/ML
0.5 INJECTION INTRAMUSCULAR EVERY 4 HOURS PRN
Status: CANCELLED
Start: 2022-05-30

## 2022-05-02 RX ORDER — ALBUTEROL SULFATE 0.83 MG/ML
2.5 SOLUTION RESPIRATORY (INHALATION)
Status: CANCELLED | OUTPATIENT
Start: 2022-06-27

## 2022-05-02 RX ORDER — HEPARIN SODIUM (PORCINE) LOCK FLUSH IV SOLN 100 UNIT/ML 100 UNIT/ML
5 SOLUTION INTRAVENOUS
Status: CANCELLED | OUTPATIENT
Start: 2022-05-02

## 2022-05-02 RX ORDER — DIPHENHYDRAMINE HYDROCHLORIDE 50 MG/ML
50 INJECTION INTRAMUSCULAR; INTRAVENOUS
Status: CANCELLED
Start: 2022-06-27

## 2022-05-02 RX ORDER — DIPHENHYDRAMINE HYDROCHLORIDE 50 MG/ML
50 INJECTION INTRAMUSCULAR; INTRAVENOUS
Status: CANCELLED
Start: 2022-05-02

## 2022-05-02 RX ORDER — SODIUM CHLORIDE 9 MG/ML
1000 INJECTION, SOLUTION INTRAVENOUS CONTINUOUS PRN
Status: CANCELLED
Start: 2022-05-02

## 2022-05-02 RX ORDER — SODIUM CHLORIDE 9 MG/ML
1000 INJECTION, SOLUTION INTRAVENOUS CONTINUOUS PRN
Status: CANCELLED
Start: 2022-06-27

## 2022-05-02 RX ORDER — ACETAMINOPHEN 325 MG/1
650 TABLET ORAL
Status: CANCELLED
Start: 2022-06-27

## 2022-05-02 RX ORDER — LORAZEPAM 2 MG/ML
0.5 INJECTION INTRAMUSCULAR EVERY 4 HOURS PRN
Status: CANCELLED
Start: 2022-06-27

## 2022-05-02 RX ORDER — HEPARIN SODIUM,PORCINE 10 UNIT/ML
5 VIAL (ML) INTRAVENOUS
Status: CANCELLED | OUTPATIENT
Start: 2022-05-30

## 2022-05-02 RX ORDER — DIPHENHYDRAMINE HYDROCHLORIDE 50 MG/ML
50 INJECTION INTRAMUSCULAR; INTRAVENOUS
Status: CANCELLED
Start: 2022-05-30

## 2022-05-02 RX ORDER — EPINEPHRINE 1 MG/ML
0.3 INJECTION, SOLUTION INTRAMUSCULAR; SUBCUTANEOUS EVERY 5 MIN PRN
Status: CANCELLED | OUTPATIENT
Start: 2022-06-27

## 2022-05-02 RX ORDER — MEPERIDINE HYDROCHLORIDE 25 MG/ML
25 INJECTION INTRAMUSCULAR; INTRAVENOUS; SUBCUTANEOUS EVERY 30 MIN PRN
Status: CANCELLED | OUTPATIENT
Start: 2022-05-30

## 2022-05-02 RX ORDER — ALBUTEROL SULFATE 90 UG/1
1-2 AEROSOL, METERED RESPIRATORY (INHALATION)
Status: CANCELLED
Start: 2022-05-02

## 2022-05-02 RX ORDER — DIPHENHYDRAMINE HCL 25 MG
50 CAPSULE ORAL
Status: CANCELLED
Start: 2022-05-02

## 2022-05-02 RX ORDER — METHYLPREDNISOLONE SODIUM SUCCINATE 125 MG/2ML
125 INJECTION, POWDER, LYOPHILIZED, FOR SOLUTION INTRAMUSCULAR; INTRAVENOUS
Status: CANCELLED
Start: 2022-06-27

## 2022-05-02 RX ORDER — NALOXONE HYDROCHLORIDE 0.4 MG/ML
.1-.4 INJECTION, SOLUTION INTRAMUSCULAR; INTRAVENOUS; SUBCUTANEOUS
Status: CANCELLED | OUTPATIENT
Start: 2022-05-02

## 2022-05-02 RX ORDER — ALBUTEROL SULFATE 90 UG/1
1-2 AEROSOL, METERED RESPIRATORY (INHALATION)
Status: CANCELLED
Start: 2022-06-27

## 2022-05-02 RX ORDER — ACETAMINOPHEN 325 MG/1
650 TABLET ORAL
Status: CANCELLED
Start: 2022-05-02

## 2022-05-02 RX ORDER — DEXAMETHASONE 4 MG/1
20 TABLET ORAL ONCE
Status: COMPLETED | OUTPATIENT
Start: 2022-05-02 | End: 2022-05-02

## 2022-05-02 RX ORDER — HEPARIN SODIUM,PORCINE 10 UNIT/ML
5 VIAL (ML) INTRAVENOUS
Status: CANCELLED | OUTPATIENT
Start: 2022-06-27

## 2022-05-02 RX ORDER — NALOXONE HYDROCHLORIDE 0.4 MG/ML
.1-.4 INJECTION, SOLUTION INTRAMUSCULAR; INTRAVENOUS; SUBCUTANEOUS
Status: CANCELLED | OUTPATIENT
Start: 2022-05-30

## 2022-05-02 RX ORDER — DEXAMETHASONE 4 MG/1
20 TABLET ORAL ONCE
Status: CANCELLED | OUTPATIENT
Start: 2022-06-27

## 2022-05-02 RX ORDER — MEPERIDINE HYDROCHLORIDE 25 MG/ML
25 INJECTION INTRAMUSCULAR; INTRAVENOUS; SUBCUTANEOUS EVERY 30 MIN PRN
Status: CANCELLED | OUTPATIENT
Start: 2022-06-27

## 2022-05-02 RX ORDER — ALBUTEROL SULFATE 0.83 MG/ML
2.5 SOLUTION RESPIRATORY (INHALATION)
Status: CANCELLED | OUTPATIENT
Start: 2022-05-02

## 2022-05-02 RX ORDER — EPINEPHRINE 1 MG/ML
0.3 INJECTION, SOLUTION INTRAMUSCULAR; SUBCUTANEOUS EVERY 5 MIN PRN
Status: CANCELLED | OUTPATIENT
Start: 2022-05-30

## 2022-05-02 RX ORDER — MEPERIDINE HYDROCHLORIDE 25 MG/ML
25 INJECTION INTRAMUSCULAR; INTRAVENOUS; SUBCUTANEOUS EVERY 30 MIN PRN
Status: CANCELLED | OUTPATIENT
Start: 2022-05-02

## 2022-05-02 RX ORDER — DIPHENHYDRAMINE HCL 25 MG
50 CAPSULE ORAL
Status: CANCELLED
Start: 2022-06-27

## 2022-05-02 RX ORDER — METHYLPREDNISOLONE SODIUM SUCCINATE 125 MG/2ML
125 INJECTION, POWDER, LYOPHILIZED, FOR SOLUTION INTRAMUSCULAR; INTRAVENOUS
Status: CANCELLED
Start: 2022-05-02

## 2022-05-02 RX ORDER — DIPHENHYDRAMINE HCL 25 MG
50 CAPSULE ORAL
Status: CANCELLED
Start: 2022-05-30

## 2022-05-02 RX ORDER — SODIUM CHLORIDE 9 MG/ML
1000 INJECTION, SOLUTION INTRAVENOUS CONTINUOUS PRN
Status: CANCELLED
Start: 2022-05-30

## 2022-05-02 RX ADMIN — DEXAMETHASONE 20 MG: 4 TABLET ORAL at 14:36

## 2022-05-02 RX ADMIN — DARATUMUMAB AND HYALURONIDASE-FIHJ (HUMAN RECOMBINANT) 1800 MG: 1800; 30000 INJECTION SUBCUTANEOUS at 15:30

## 2022-05-02 ASSESSMENT — PAIN SCALES - GENERAL: PAINLEVEL: NO PAIN (0)

## 2022-05-02 NOTE — NURSING NOTE
"Oncology Rooming Note    May 2, 2022 1:46 PM   Rogelio Marshall is a 77 year old male who presents for:    Chief Complaint   Patient presents with     Oncology Clinic Visit     Multiple myeloma; remission status unspecified      Blood Draw     Labs drawn by RN via , vitals taken.     Initial Vitals: /81   Pulse 88   Temp 98  F (36.7  C)   Resp 18   Wt 66.9 kg (147 lb 8 oz)   SpO2 91%   BMI 27.87 kg/m   Estimated body mass index is 27.87 kg/m  as calculated from the following:    Height as of 7/22/21: 1.549 m (5' 1\").    Weight as of this encounter: 66.9 kg (147 lb 8 oz). Body surface area is 1.7 meters squared.  No Pain (0) Comment: Data Unavailable   No LMP for male patient.  Allergies reviewed: Yes  Medications reviewed: Yes    Medications: Medication refills not needed today.  Pharmacy name entered into EPIC:    [facility] - [place], [place] - [id number]  WRITTEN PRESCRIPTION REQUESTED  [id number] - [place], [place] - [address] [facility] DISCHARGE - [place], [place] - [address]    Clinical concerns: none       Mynor Scales            "

## 2022-05-02 NOTE — NURSING NOTE
Chief Complaint   Patient presents with     Oncology Clinic Visit     Multiple myeloma; remission status unspecified      Blood Draw     Labs drawn by RN via , vitals taken.     Labs collected from venipuncture by RN. Vitals taken. Checked in for appointment(s).    Leanna Martines RN

## 2022-05-02 NOTE — LETTER
5/2/2022         RE: Rogelio Marshall  [address]  [facility] [address]        Dear Colleague,    Thank you for referring your patient, Rogelio Marshall, to the [facility]. Please see a copy of my visit note below.    Reason for Visit: follow-up for multiple myeloma    Oncology HPI:   -compression fractures and bone scan reveals multiple lytic lesions throughout his appendicular and axial skeleton. His Beta 2 microglobulin was 3.6 on 10/10/2020. Kappa chains were 73.6 on 10/5/2020 with K/L ratio of 89.9. M spike of 16.2 in urine on 10/10. Bone marrow biopsy on 10/23/2020 showed trilineage hematopoiesis and 50-60% kappa restricted plasma cells. Flow cytometry showed 20 % plasma cells which express CD19, CD38, CD45 and monotypic cytoplasmic kappa immunoglobulin light chains but lack CD20 and CD56.  FISH shows IGH-CCND1 fusion (81%; 30% had loss of IGH component from one fusion signal).       - Started 21 day cycle VRD 11/2020     - 11/27 he had a fall with rib fractures.      - Dec 2020 Started madyson-velcade-dex. Completed 5 cycles and achieved VGPR     - April 2021 started darzalex maintenance    Interval history:   Continues to try to avoid using omeprazole, so he gets occasional indigestion (probably about once weekly).   He has not yet seen the dentist to get clearance to start zometa. Denies fevers, chills, night sweats, unexplained weight changes, headaches, dizziness, vision or hearing changes, new lumps or bumps, chest pain, shortness of breath, cough, abdominal pain, nausea, vomiting, changes to bowel or bladder, swelling of extremities, bleeding issues, or rash.        Current Outpatient Medications   Medication Sig Dispense Refill     acetaminophen (TYLENOL) 325 MG tablet Take 3 tablets (975 mg) by mouth 3 times daily (Patient not taking: Reported on 12/27/2021) 500 tablet 4     acyclovir (ZOVIRAX) 400 MG tablet Take 1 tablet (400 mg) by mouth 2 times daily Viral  Prophylaxis. 60 tablet 11     calcium carbonate 500 mg, elemental, (OSCAL) 500 MG tablet Take 1 tablet (500 mg) by mouth 2 times daily 200 tablet 3     dexamethasone (DECADRON) 4 MG tablet TAKE 5 TABLETS (20MG) BY MOUTH ONCE ON DAY 2 (Patient not taking: Reported on 4/6/2022) 30 tablet 1     gabapentin (NEURONTIN) 300 MG capsule TAKE ONE CAPSULE (300MG) BY MOUTH TWICE A DAY 60 capsule 3     hydrochlorothiazide (MICROZIDE) 12.5 MG capsule Take 1 capsule (12.5 mg) by mouth daily (Patient not taking: Reported on 8/23/2021) 90 capsule 3     LORazepam (ATIVAN) 0.5 MG tablet Take 1 tablet (0.5 mg) by mouth every 4 hours as needed (Anxiety, Nausea/Vomiting or Sleep) (Patient not taking: Reported on 12/27/2021) 30 tablet 5     omeprazole 20 MG tablet Take 1 tablet (20 mg) by mouth daily as needed (indigestion/reflux associated with madyson/dex) 90 tablet 0     ondansetron (ZOFRAN-ODT) 4 MG ODT tab Take 1 tablet (4 mg) by mouth every 6 hours as needed for nausea or vomiting (Patient not taking: Reported on 8/23/2021)       polyethylene glycol (MIRALAX) 17 g packet Take 17 g by mouth daily (Patient not taking: Reported on 8/23/2021)       prochlorperazine (COMPAZINE) 10 MG tablet Take 1 tablet (10 mg) by mouth every 6 hours as needed (Nausea/Vomiting) (Patient not taking: Reported on 11/29/2021) 30 tablet 5     senna-docusate (SENOKOT-S/PERICOLACE) 8.6-50 MG tablet Take 1 tablet by mouth daily as needed  (Patient not taking: Reported on 8/23/2021)       triamcinolone (KENALOG) 0.1 % external ointment Apply topically 2 times daily To back rash (Patient not taking: Reported on 8/23/2021) 15 g 0     Vitamin D-Vitamin K (VITAMIN K2-VITAMIN D3)  MCG-UNIT CAPS        vitamin D3 (CHOLECALCIFEROL) 50 mcg (2000 units) tablet Take 1 tablet (50 mcg) by mouth daily (Patient not taking: Reported on 4/6/2022) 90 tablet 3     VITAMIN D3 25 MCG (1000 UT) tablet Take 2 tablets by mouth daily            Exam:   /81   Pulse 88    Temp 98  F (36.7  C)   Resp 18   Wt 66.9 kg (147 lb 8 oz)   SpO2 91%   BMI 27.87 kg/m    Constitutional: NAD  Eyes: no scleral icterus  ENT:  Several broken teeth noted  Pulm: nonlabored resp  MSK: No edema in the extremities  Neuro: alert, conversant, normal gait  Psych: appropriate mood and affect      Labs:   I personally reviewed the following labs:    Most Recent 3 CBC's:  Recent Labs   Lab Test 05/02/22  1348 04/06/22  1451 04/04/22  1505   WBC 8.1 18.9* 8.0   HGB 13.8 14.0 14.2   MCV 96 94 97    313 315     Most Recent 3 BMP's:  Recent Labs   Lab Test 05/02/22  1348 04/06/22  1451 04/04/22  1505    140 141   POTASSIUM 3.7 3.5 3.8   CHLORIDE 108 107 109   CO2 26 26 24   BUN 16 30 21   CR 1.29* 1.36* 1.30*   ANIONGAP 8 7 8   MIRI 9.2 9.4 8.9   * 110* 106*     Most Recent 2 LFT's:  Recent Labs   Lab Test 05/02/22  1348 04/06/22  1451   AST 19 12   ALT 28 28   ALKPHOS 84 83   BILITOTAL 0.5 0.4            Impression/plan:   77 year old man with IgG Kappa standard risk multiple myeloma. Despite his side effects, he has had a VGPR with therapy. Now on q 4 week darzalex faspro for maintenance.   -continue to follow SPEP and FLC monthly.     HTN and CKD: recommended he see PCP. He states he does not like his current PCP. Internal medicine referral sent    Pathologic Fractures:. Vitamin D is replete and he is on a daily supplement as well as calcium. He has not received dental clearance for Zometa infusions; recommended this again today but he is not interested due to the risk of osteonecrosis of the jaw.   Recommend continuing Vitamin D 2000 international unit(s) daily.  Last DEXA in Oct 2020 showed osteoporosis.  Will repeat in Oct 2022.      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 Continues to use gabapentin with good relief.     32 minutes spent on the date of the encounter doing chart review, review of test results, patient visit and documentation         Again, thank you for  allowing me to participate in the care of your patient.      Sincerely,    JIMBO Joseph CNP

## 2022-05-02 NOTE — PATIENT INSTRUCTIONS
[facility] Triage and after hours / weekends / holidays:  [phone]    Please call the triage or after hours line if you experience a temperature greater than or equal to 100.4, shaking chills, have uncontrolled nausea, vomiting and/or diarrhea, dizziness, shortness of breath, chest pain, bleeding, unexplained bruising, or if you have any other new/concerning symptoms, questions or concerns.      If you are having any concerning symptoms or wish to speak to a provider before your next infusion visit, please call your care coordinator or triage to notify them so we can adequately serve you.     If you need a refill on a narcotic prescription or other medication, please call before your infusion appointment.

## 2022-05-02 NOTE — PROGRESS NOTES
Infusion Nursing Note:  Rogelio Marshall presents today for maintenance darzalex faspro injection.    Patient seen by provider today: Yes: Lainey Monae   present during visit today: Not Applicable.    Note: Patient was seen in clinic by a provider, offers no other concerns.      Intravenous Access:  No Intravenous access/labs at this visit.    Treatment Conditions:  Lab Results   Component Value Date    HGB 13.8 05/02/2022    WBC 8.1 05/02/2022    ANEU 4.8 06/28/2021    ANEUTAUTO 5.2 05/02/2022     05/02/2022      Lab Results   Component Value Date     05/02/2022    POTASSIUM 3.7 05/02/2022    MAG 2.1 12/02/2020    CR 1.29 (H) 05/02/2022    MIRI 9.2 05/02/2022    BILITOTAL 0.5 05/02/2022    ALBUMIN 3.5 05/02/2022    ALT 28 05/02/2022    AST 19 05/02/2022     Results reviewed, labs MET treatment parameters, ok to proceed with treatment.      Post Infusion Assessment:  Patient tolerated injection without incident.  Darzalex administered  subcutaneous LEFT abdomen.      Discharge Plan:   Patient declined prescription refills.  Patient and/or family verbalized understanding of discharge instructions and all questions answered.  AVS to patient via [employer].  Patient will return in approximately one month for next infusion appointment. Has not been scheduled yet but there is an active appointment request.  Patient discharged in stable condition accompanied by: self.  Departure Mode: Ambulatory.      Claudia He RN

## 2022-05-03 LAB
ALBUMIN SERPL ELPH-MCNC: 4.1 G/DL (ref 3.7–5.1)
ALPHA1 GLOB SERPL ELPH-MCNC: 0.3 G/DL (ref 0.2–0.4)
ALPHA2 GLOB SERPL ELPH-MCNC: 0.7 G/DL (ref 0.5–0.9)
B-GLOBULIN SERPL ELPH-MCNC: 0.7 G/DL (ref 0.6–1)
GAMMA GLOB SERPL ELPH-MCNC: 0.4 G/DL (ref 0.7–1.6)
KAPPA LC FREE SER-MCNC: 0.93 MG/DL (ref 0.33–1.94)
KAPPA LC FREE/LAMBDA FREE SER NEPH: 2.33 {RATIO} (ref 0.26–1.65)
LAMBDA LC FREE SERPL-MCNC: 0.4 MG/DL (ref 0.57–2.63)
M PROTEIN SERPL ELPH-MCNC: 0.1 G/DL
PROT PATTERN SERPL ELPH-IMP: ABNORMAL
PROT PATTERN SERPL IFE-IMP: NORMAL

## 2022-05-31 ENCOUNTER — APPOINTMENT (OUTPATIENT)
Dept: LAB | Facility: CLINIC | Age: 77
End: 2022-05-31
Attending: STUDENT IN AN ORGANIZED HEALTH CARE EDUCATION/TRAINING PROGRAM
Payer: COMMERCIAL

## 2022-05-31 ENCOUNTER — INFUSION THERAPY VISIT (OUTPATIENT)
Dept: ONCOLOGY | Facility: CLINIC | Age: 77
End: 2022-05-31
Attending: STUDENT IN AN ORGANIZED HEALTH CARE EDUCATION/TRAINING PROGRAM
Payer: COMMERCIAL

## 2022-05-31 VITALS
DIASTOLIC BLOOD PRESSURE: 77 MMHG | SYSTOLIC BLOOD PRESSURE: 140 MMHG | RESPIRATION RATE: 16 BRPM | TEMPERATURE: 98.2 F | BODY MASS INDEX: 28.06 KG/M2 | WEIGHT: 148.5 LBS | OXYGEN SATURATION: 97 % | HEART RATE: 84 BPM

## 2022-05-31 DIAGNOSIS — M48.50XS PATHOLOGIC COMPRESSION FRACTURE OF SPINE, SEQUELA: ICD-10-CM

## 2022-05-31 DIAGNOSIS — C90.00 MULTIPLE MYELOMA, REMISSION STATUS UNSPECIFIED (H): Primary | ICD-10-CM

## 2022-05-31 LAB
ALBUMIN SERPL-MCNC: 3.7 G/DL (ref 3.4–5)
ALP SERPL-CCNC: 94 U/L (ref 40–150)
ALT SERPL W P-5'-P-CCNC: 21 U/L (ref 0–70)
ANION GAP SERPL CALCULATED.3IONS-SCNC: 7 MMOL/L (ref 3–14)
AST SERPL W P-5'-P-CCNC: 19 U/L (ref 0–45)
BASOPHILS # BLD AUTO: 0.1 10E3/UL (ref 0–0.2)
BASOPHILS NFR BLD AUTO: 1 %
BILIRUB SERPL-MCNC: 0.6 MG/DL (ref 0.2–1.3)
BUN SERPL-MCNC: 22 MG/DL (ref 7–30)
CALCIUM SERPL-MCNC: 9.2 MG/DL (ref 8.5–10.1)
CHLORIDE BLD-SCNC: 111 MMOL/L (ref 94–109)
CO2 SERPL-SCNC: 25 MMOL/L (ref 20–32)
CREAT SERPL-MCNC: 1.37 MG/DL (ref 0.66–1.25)
EOSINOPHIL # BLD AUTO: 0.8 10E3/UL (ref 0–0.7)
EOSINOPHIL NFR BLD AUTO: 9 %
ERYTHROCYTE [DISTWIDTH] IN BLOOD BY AUTOMATED COUNT: 14 % (ref 10–15)
GFR SERPL CREATININE-BSD FRML MDRD: 54 ML/MIN/1.73M2
GLUCOSE BLD-MCNC: 110 MG/DL (ref 70–99)
HCT VFR BLD AUTO: 43.7 % (ref 40–53)
HGB BLD-MCNC: 14.5 G/DL (ref 13.3–17.7)
IMM GRANULOCYTES # BLD: 0 10E3/UL
IMM GRANULOCYTES NFR BLD: 0 %
LYMPHOCYTES # BLD AUTO: 1.7 10E3/UL (ref 0.8–5.3)
LYMPHOCYTES NFR BLD AUTO: 19 %
MCH RBC QN AUTO: 31.1 PG (ref 26.5–33)
MCHC RBC AUTO-ENTMCNC: 33.2 G/DL (ref 31.5–36.5)
MCV RBC AUTO: 94 FL (ref 78–100)
MONOCYTES # BLD AUTO: 1.1 10E3/UL (ref 0–1.3)
MONOCYTES NFR BLD AUTO: 12 %
NEUTROPHILS # BLD AUTO: 5.2 10E3/UL (ref 1.6–8.3)
NEUTROPHILS NFR BLD AUTO: 59 %
NRBC # BLD AUTO: 0 10E3/UL
NRBC BLD AUTO-RTO: 0 /100
PLATELET # BLD AUTO: 297 10E3/UL (ref 150–450)
POTASSIUM BLD-SCNC: 3.7 MMOL/L (ref 3.4–5.3)
PROT SERPL-MCNC: 6.8 G/DL (ref 6.8–8.8)
RBC # BLD AUTO: 4.66 10E6/UL (ref 4.4–5.9)
SODIUM SERPL-SCNC: 143 MMOL/L (ref 133–144)
TOTAL PROTEIN SERUM FOR ELP: 6.3 G/DL (ref 6.8–8.8)
WBC # BLD AUTO: 8.8 10E3/UL (ref 4–11)

## 2022-05-31 PROCEDURE — 96401 CHEMO ANTI-NEOPL SQ/IM: CPT

## 2022-05-31 PROCEDURE — 84155 ASSAY OF PROTEIN SERUM: CPT | Mod: 91

## 2022-05-31 PROCEDURE — 80053 COMPREHEN METABOLIC PANEL: CPT | Performed by: STUDENT IN AN ORGANIZED HEALTH CARE EDUCATION/TRAINING PROGRAM

## 2022-05-31 PROCEDURE — 83521 IG LIGHT CHAINS FREE EACH: CPT

## 2022-05-31 PROCEDURE — 85025 COMPLETE CBC W/AUTO DIFF WBC: CPT | Performed by: STUDENT IN AN ORGANIZED HEALTH CARE EDUCATION/TRAINING PROGRAM

## 2022-05-31 PROCEDURE — 36415 COLL VENOUS BLD VENIPUNCTURE: CPT

## 2022-05-31 PROCEDURE — 250N000011 HC RX IP 250 OP 636: Performed by: STUDENT IN AN ORGANIZED HEALTH CARE EDUCATION/TRAINING PROGRAM

## 2022-05-31 PROCEDURE — 86334 IMMUNOFIX E-PHORESIS SERUM: CPT | Mod: 26

## 2022-05-31 PROCEDURE — 84165 PROTEIN E-PHORESIS SERUM: CPT | Mod: 26

## 2022-05-31 PROCEDURE — 84165 PROTEIN E-PHORESIS SERUM: CPT | Mod: TC | Performed by: PATHOLOGY

## 2022-05-31 PROCEDURE — 86334 IMMUNOFIX E-PHORESIS SERUM: CPT | Performed by: PATHOLOGY

## 2022-05-31 RX ORDER — DEXAMETHASONE 4 MG/1
20 TABLET ORAL ONCE
Status: COMPLETED | OUTPATIENT
Start: 2022-05-31 | End: 2022-05-31

## 2022-05-31 RX ADMIN — DEXAMETHASONE 20 MG: 4 TABLET ORAL at 15:47

## 2022-05-31 RX ADMIN — DARATUMUMAB AND HYALURONIDASE-FIHJ (HUMAN RECOMBINANT) 1800 MG: 1800; 30000 INJECTION SUBCUTANEOUS at 16:19

## 2022-05-31 ASSESSMENT — PAIN SCALES - GENERAL: PAINLEVEL: NO PAIN (0)

## 2022-05-31 NOTE — PROGRESS NOTES
Infusion Nursing Note:  Rogelio Marshall presents today for Maintenance Darzalex Faspro   Patient seen by provider today: No   present during visit today: Not Applicable.    Note: Patient presents to infusion feeling well. Patient denies acute discomfort and states no acute complaints or concerns needing to be addressed today. Specifically, patient denies s/s of infection such as fever, sore throat, cough, chest pain, shortness of breath, or changes in taste/smell. Patient voices understanding to take PO Dex tomorrow per treatment regimen.    Lainey Monae CNP made aware of creatinine of 1.37  TORB. 5/31/2022. 1553. Lainey Monae CNP. Jori Devlin RN. proceed with Darzalex today. Creatinine noted.      Intravenous Access:  No Intravenous access at this visit.    Treatment Conditions:  Lab Results   Component Value Date    HGB 14.5 05/31/2022    WBC 8.8 05/31/2022    ANEU 4.8 06/28/2021    ANEUTAUTO 5.2 05/31/2022     05/31/2022      Lab Results   Component Value Date     05/31/2022    POTASSIUM 3.7 05/31/2022    MAG 2.1 12/02/2020    CR 1.37 (H) 05/31/2022    MIRI 9.2 05/31/2022    BILITOTAL 0.6 05/31/2022    ALBUMIN 3.7 05/31/2022    ALT 21 05/31/2022    AST 19 05/31/2022     Results reviewed, labs MET treatment parameters, ok to proceed with treatment.      Post Infusion Assessment:  Patient tolerated 1 Darzalex subcutaneous injection via RLQ of abdomen without incident. 25 gauge butterfly used without issues.      Discharge Plan:   Patient received Acyclovir as refill  Discharge instructions reviewed with: Patient.  Patient and/or family verbalized understanding of discharge instructions and all questions answered.  Copy of AVS reviewed with patient and/or family.  Patient will return 6/27 for next appointment.  Patient discharged in stable condition accompanied by: self.  Departure Mode: Ambulatory.  Face to Face time: 0 minutes.      Jori Devlin RN

## 2022-05-31 NOTE — NURSING NOTE
Chief Complaint   Patient presents with     Blood Draw     Labs drawn via  by RN. Vitals taken.     Labs drawn with  by RN. Vitals taken. Patient checked into next appointment.    Meliza Kelly RN

## 2022-05-31 NOTE — PATIENT INSTRUCTIONS
[facility] Triage and after hours / weekends / holidays:  [phone]    Please call the triage or after hours line if you experience a temperature greater than or equal to 100.4, shaking chills, have uncontrolled nausea, vomiting and/or diarrhea, dizziness, shortness of breath, chest pain, bleeding, unexplained bruising, or if you have any other new/concerning symptoms, questions or concerns.      If you are having any concerning symptoms or wish to speak to a provider before your next infusion visit, please call your care coordinator or triage to notify them so we can adequately serve you.     If you need a refill on a narcotic prescription or other medication, please call before your infusion appointment.               May 2022      [place] Monday Tuesday Wednesday Thursday Friday Saturday   1     2    LAB PERIPHERAL   1:00 PM   (15 min.)   [facility] DRAW   [facility]    RETURN   1:15 PM   (45 min.)   Lainey Monae APRN CNP   [facility]    ONC INFUSION 1 HR (60 MIN)   2:30 PM   (60 min.)    ONC INFUSION NURSE   [facility] 3     4     5     6     7       8     9     10     11     12     13     14       15     16     17     18     19     20     21       22     23     24     25     26     27     28       29     30     31    LAB PERIPHERAL   2:45 PM   (15 min.)   [facility] DRAW   [facility]    ONC INFUSION 2 HR (120 MIN)   3:30 PM   (120 min.)    ONC INFUSION NURSE   [facility]                                   June 2022 Sunday Monday Tuesday Wednesday Thursday Friday Saturday                  1     2     3     4       5     6     7     8     9     10     11       12     13     14     15     16     17     18       19     20     21     22     23     24     25       26     27    LAB PERIPHERAL   2:00 PM   (15 min.)   [facility] DRAW   [facility]    RETURN   2:15 PM   (45 min.)   Lainey Monae APRN CNP   [facility]    ONC INFUSION 1 HR (60 MIN)   2:30 PM   (60 min.)   [facility] ONC INFUSION NURSE   [facility] 28     29     30                                 Lab Results:  Recent Results (from the past 12 hour(s))   Comprehensive metabolic panel    Collection Time: 05/31/22  3:12 PM   Result Value Ref Range    Sodium 143 133 - 144 mmol/L    Potassium 3.7 3.4 - 5.3 mmol/L    Chloride 111 (H) 94 - 109 mmol/L    Carbon Dioxide (CO2) 25 20 - 32 mmol/L    Anion Gap 7 3 - 14 mmol/L    Urea Nitrogen 22 7 - 30 mg/dL    Creatinine 1.37 (H) 0.66 - 1.25 mg/dL    Calcium 9.2 8.5 - 10.1 mg/dL    Glucose 110 (H) 70 - 99 mg/dL    Alkaline Phosphatase 94 40 - 150 U/L    AST 19 0 - 45 U/L    ALT 21 0 - 70 U/L    Protein Total 6.8 6.8 - 8.8 g/dL    Albumin 3.7 3.4 - 5.0 g/dL    Bilirubin Total 0.6 0.2 - 1.3 mg/dL    GFR Estimate 54 (L) >60 mL/min/1.73m2   CBC with platelets and differential    Collection Time: 05/31/22  3:12 PM   Result Value Ref Range    WBC Count 8.8 4.0 - 11.0 10e3/uL    RBC Count 4.66 4.40 - 5.90 10e6/uL    Hemoglobin 14.5 13.3 - 17.7 g/dL    Hematocrit 43.7 40.0 - 53.0 %    MCV 94 78 - 100 fL    MCH 31.1 26.5 - 33.0 pg    MCHC 33.2 31.5 - 36.5 g/dL    RDW 14.0 10.0 - 15.0 %    Platelet Count 297 150 - 450 10e3/uL    % Neutrophils 59 %    % Lymphocytes 19 %    % Monocytes 12 %    % Eosinophils 9 %    % Basophils 1 %    % Immature Granulocytes 0 %    NRBCs per 100 WBC 0 <1 /100    Absolute Neutrophils 5.2 1.6 - 8.3 10e3/uL    Absolute Lymphocytes 1.7 0.8 - 5.3 10e3/uL    Absolute Monocytes 1.1 0.0 - 1.3 10e3/uL    Absolute Eosinophils 0.8 (H) 0.0 - 0.7 10e3/uL    Absolute Basophils 0.1 0.0 - 0.2 10e3/uL    Absolute Immature Granulocytes 0.0 <=0.4 10e3/uL    Absolute NRBCs 0.0 10e3/uL

## 2022-06-01 LAB
ALBUMIN SERPL ELPH-MCNC: 4.2 G/DL (ref 3.7–5.1)
ALPHA1 GLOB SERPL ELPH-MCNC: 0.3 G/DL (ref 0.2–0.4)
ALPHA2 GLOB SERPL ELPH-MCNC: 0.7 G/DL (ref 0.5–0.9)
B-GLOBULIN SERPL ELPH-MCNC: 0.7 G/DL (ref 0.6–1)
GAMMA GLOB SERPL ELPH-MCNC: 0.4 G/DL (ref 0.7–1.6)
KAPPA LC FREE SER-MCNC: 0.93 MG/DL (ref 0.33–1.94)
KAPPA LC FREE/LAMBDA FREE SER NEPH: 3.32 {RATIO} (ref 0.26–1.65)
LAMBDA LC FREE SERPL-MCNC: 0.28 MG/DL (ref 0.57–2.63)
M PROTEIN SERPL ELPH-MCNC: 0.1 G/DL
PROT PATTERN SERPL ELPH-IMP: ABNORMAL
PROT PATTERN SERPL IFE-IMP: NORMAL

## 2022-06-06 RX ORDER — CALCIUM CARBONATE 500(1250)
1 TABLET ORAL 2 TIMES DAILY
Qty: 200 TABLET | Refills: 0 | Status: SHIPPED | OUTPATIENT
Start: 2022-06-06

## 2022-06-06 NOTE — TELEPHONE ENCOUNTER
calcium carbonate 500 mg, elemental, (OSCAL) 500 MG tablet    Last Written Prescription Date:  12/8/20  Last Fill Quantity: 200,   # refills: 3  Last Office Visit : 11/9/20  Future Office visit: none    Scheduling has been notified to contact the pt for appointment.      Routing refill request to provider for review/approval because: kylah 11/20. No pcp.

## 2022-06-06 NOTE — TELEPHONE ENCOUNTER
Left message with Primary Care clinic number requesting patient to call back to schedule annual physical appointment as last visit was on 11/09/2020.

## 2022-06-22 ENCOUNTER — PATIENT OUTREACH (OUTPATIENT)
Dept: ONCOLOGY | Facility: CLINIC | Age: 77
End: 2022-06-22

## 2022-06-22 NOTE — PROGRESS NOTES
Completed chart audit to assign Oncology Care Coordination enrollment status.           Chepe Howard  RN Care Coordinator   [facility]

## 2022-06-27 ENCOUNTER — PATIENT OUTREACH (OUTPATIENT)
Dept: ONCOLOGY | Facility: CLINIC | Age: 77
End: 2022-06-27

## 2022-06-27 DIAGNOSIS — C90.00 MULTIPLE MYELOMA, REMISSION STATUS UNSPECIFIED (H): Primary | ICD-10-CM

## 2022-06-27 NOTE — PROGRESS NOTES
[facility]: Cancer Care                                                                                          Patient outreach    Patient missed appointment with provider and infusion.    Unlike patient to miss appointment. Called and left two voicemails on separate days, unable to leave a voicemail on 3rd day. Sister, Virginia is listed as emergency contact, however [place]'s number is not in service.    [place] [employer] was called to do a welfare check.      Signature:  Chepe Howard RN

## 2022-07-22 NOTE — PROGRESS NOTES
[facility]: Cancer Care                                                                                          Patient has an infusion appt on 07/25, calling patient to check in with him.    Still unable to reach patient after a month, tried calling emergency contact (Saba Marshall) again, number still invalid.    [place] Police called for a welfare check again. Welfare check done 06/29; police dept reported no one opened the door when they knocked on the door.    Signature:  Chepe Howard RN

## 2022-07-23 DIAGNOSIS — C90.00 MULTIPLE MYELOMA, REMISSION STATUS UNSPECIFIED (H): Primary | ICD-10-CM

## 2022-07-23 RX ORDER — MEPERIDINE HYDROCHLORIDE 25 MG/ML
25 INJECTION INTRAMUSCULAR; INTRAVENOUS; SUBCUTANEOUS EVERY 30 MIN PRN
Status: CANCELLED | OUTPATIENT
Start: 2022-09-19

## 2022-07-23 RX ORDER — HEPARIN SODIUM,PORCINE 10 UNIT/ML
5 VIAL (ML) INTRAVENOUS
Status: CANCELLED | OUTPATIENT
Start: 2022-08-22

## 2022-07-23 RX ORDER — ACETAMINOPHEN 325 MG/1
650 TABLET ORAL
Status: CANCELLED
Start: 2022-07-25

## 2022-07-23 RX ORDER — DIPHENHYDRAMINE HYDROCHLORIDE 50 MG/ML
50 INJECTION INTRAMUSCULAR; INTRAVENOUS
Status: CANCELLED
Start: 2022-08-22

## 2022-07-23 RX ORDER — EPINEPHRINE 1 MG/ML
0.3 INJECTION, SOLUTION INTRAMUSCULAR; SUBCUTANEOUS EVERY 5 MIN PRN
Status: CANCELLED | OUTPATIENT
Start: 2022-10-17

## 2022-07-23 RX ORDER — ALBUTEROL SULFATE 90 UG/1
1-2 AEROSOL, METERED RESPIRATORY (INHALATION)
Status: CANCELLED
Start: 2022-10-17

## 2022-07-23 RX ORDER — HEPARIN SODIUM (PORCINE) LOCK FLUSH IV SOLN 100 UNIT/ML 100 UNIT/ML
5 SOLUTION INTRAVENOUS
Status: CANCELLED | OUTPATIENT
Start: 2022-10-17

## 2022-07-23 RX ORDER — ALBUTEROL SULFATE 90 UG/1
1-2 AEROSOL, METERED RESPIRATORY (INHALATION)
Status: CANCELLED
Start: 2022-08-22

## 2022-07-23 RX ORDER — DEXAMETHASONE 4 MG/1
20 TABLET ORAL ONCE
Status: CANCELLED | OUTPATIENT
Start: 2022-10-17

## 2022-07-23 RX ORDER — HEPARIN SODIUM,PORCINE 10 UNIT/ML
5 VIAL (ML) INTRAVENOUS
Status: CANCELLED | OUTPATIENT
Start: 2022-07-25

## 2022-07-23 RX ORDER — DIPHENHYDRAMINE HCL 25 MG
50 CAPSULE ORAL
Status: CANCELLED
Start: 2022-10-17

## 2022-07-23 RX ORDER — DIPHENHYDRAMINE HYDROCHLORIDE 50 MG/ML
50 INJECTION INTRAMUSCULAR; INTRAVENOUS
Status: CANCELLED
Start: 2022-07-25

## 2022-07-23 RX ORDER — LORAZEPAM 2 MG/ML
0.5 INJECTION INTRAMUSCULAR EVERY 4 HOURS PRN
Status: CANCELLED
Start: 2022-09-19

## 2022-07-23 RX ORDER — HEPARIN SODIUM (PORCINE) LOCK FLUSH IV SOLN 100 UNIT/ML 100 UNIT/ML
5 SOLUTION INTRAVENOUS
Status: CANCELLED | OUTPATIENT
Start: 2022-08-22

## 2022-07-23 RX ORDER — NALOXONE HYDROCHLORIDE 0.4 MG/ML
.1-.4 INJECTION, SOLUTION INTRAMUSCULAR; INTRAVENOUS; SUBCUTANEOUS
Status: CANCELLED | OUTPATIENT
Start: 2022-07-25

## 2022-07-23 RX ORDER — ALBUTEROL SULFATE 90 UG/1
1-2 AEROSOL, METERED RESPIRATORY (INHALATION)
Status: CANCELLED
Start: 2022-07-25

## 2022-07-23 RX ORDER — EPINEPHRINE 1 MG/ML
0.3 INJECTION, SOLUTION INTRAMUSCULAR; SUBCUTANEOUS EVERY 5 MIN PRN
Status: CANCELLED | OUTPATIENT
Start: 2022-09-19

## 2022-07-23 RX ORDER — DIPHENHYDRAMINE HYDROCHLORIDE 50 MG/ML
50 INJECTION INTRAMUSCULAR; INTRAVENOUS
Status: CANCELLED
Start: 2022-09-19

## 2022-07-23 RX ORDER — ALBUTEROL SULFATE 90 UG/1
1-2 AEROSOL, METERED RESPIRATORY (INHALATION)
Status: CANCELLED
Start: 2022-09-19

## 2022-07-23 RX ORDER — DEXAMETHASONE 4 MG/1
20 TABLET ORAL ONCE
Status: CANCELLED | OUTPATIENT
Start: 2022-08-22

## 2022-07-23 RX ORDER — DIPHENHYDRAMINE HCL 25 MG
50 CAPSULE ORAL
Status: CANCELLED
Start: 2022-09-19

## 2022-07-23 RX ORDER — DIPHENHYDRAMINE HYDROCHLORIDE 50 MG/ML
50 INJECTION INTRAMUSCULAR; INTRAVENOUS
Status: CANCELLED
Start: 2022-10-17

## 2022-07-23 RX ORDER — NALOXONE HYDROCHLORIDE 0.4 MG/ML
.1-.4 INJECTION, SOLUTION INTRAMUSCULAR; INTRAVENOUS; SUBCUTANEOUS
Status: CANCELLED | OUTPATIENT
Start: 2022-09-19

## 2022-07-23 RX ORDER — DIPHENHYDRAMINE HCL 25 MG
50 CAPSULE ORAL
Status: CANCELLED
Start: 2022-07-25

## 2022-07-23 RX ORDER — MEPERIDINE HYDROCHLORIDE 25 MG/ML
25 INJECTION INTRAMUSCULAR; INTRAVENOUS; SUBCUTANEOUS EVERY 30 MIN PRN
Status: CANCELLED | OUTPATIENT
Start: 2022-07-25

## 2022-07-23 RX ORDER — DIPHENHYDRAMINE HCL 25 MG
50 CAPSULE ORAL
Status: CANCELLED
Start: 2022-08-22

## 2022-07-23 RX ORDER — ALBUTEROL SULFATE 0.83 MG/ML
2.5 SOLUTION RESPIRATORY (INHALATION)
Status: CANCELLED | OUTPATIENT
Start: 2022-07-25

## 2022-07-23 RX ORDER — LORAZEPAM 2 MG/ML
0.5 INJECTION INTRAMUSCULAR EVERY 4 HOURS PRN
Status: CANCELLED
Start: 2022-08-22

## 2022-07-23 RX ORDER — METHYLPREDNISOLONE SODIUM SUCCINATE 125 MG/2ML
125 INJECTION, POWDER, LYOPHILIZED, FOR SOLUTION INTRAMUSCULAR; INTRAVENOUS
Status: CANCELLED
Start: 2022-10-17

## 2022-07-23 RX ORDER — ALBUTEROL SULFATE 0.83 MG/ML
2.5 SOLUTION RESPIRATORY (INHALATION)
Status: CANCELLED | OUTPATIENT
Start: 2022-08-22

## 2022-07-23 RX ORDER — EPINEPHRINE 1 MG/ML
0.3 INJECTION, SOLUTION INTRAMUSCULAR; SUBCUTANEOUS EVERY 5 MIN PRN
Status: CANCELLED | OUTPATIENT
Start: 2022-07-25

## 2022-07-23 RX ORDER — EPINEPHRINE 1 MG/ML
0.3 INJECTION, SOLUTION INTRAMUSCULAR; SUBCUTANEOUS EVERY 5 MIN PRN
Status: CANCELLED | OUTPATIENT
Start: 2022-08-22

## 2022-07-23 RX ORDER — METHYLPREDNISOLONE SODIUM SUCCINATE 125 MG/2ML
125 INJECTION, POWDER, LYOPHILIZED, FOR SOLUTION INTRAMUSCULAR; INTRAVENOUS
Status: CANCELLED
Start: 2022-08-22

## 2022-07-23 RX ORDER — MEPERIDINE HYDROCHLORIDE 25 MG/ML
25 INJECTION INTRAMUSCULAR; INTRAVENOUS; SUBCUTANEOUS EVERY 30 MIN PRN
Status: CANCELLED | OUTPATIENT
Start: 2022-08-22

## 2022-07-23 RX ORDER — ACETAMINOPHEN 325 MG/1
650 TABLET ORAL
Status: CANCELLED
Start: 2022-10-17

## 2022-07-23 RX ORDER — METHYLPREDNISOLONE SODIUM SUCCINATE 125 MG/2ML
125 INJECTION, POWDER, LYOPHILIZED, FOR SOLUTION INTRAMUSCULAR; INTRAVENOUS
Status: CANCELLED
Start: 2022-09-19

## 2022-07-23 RX ORDER — LORAZEPAM 2 MG/ML
0.5 INJECTION INTRAMUSCULAR EVERY 4 HOURS PRN
Status: CANCELLED
Start: 2022-10-17

## 2022-07-23 RX ORDER — HEPARIN SODIUM (PORCINE) LOCK FLUSH IV SOLN 100 UNIT/ML 100 UNIT/ML
5 SOLUTION INTRAVENOUS
Status: CANCELLED | OUTPATIENT
Start: 2022-07-25

## 2022-07-23 RX ORDER — ACETAMINOPHEN 325 MG/1
650 TABLET ORAL
Status: CANCELLED
Start: 2022-08-22

## 2022-07-23 RX ORDER — ALBUTEROL SULFATE 0.83 MG/ML
2.5 SOLUTION RESPIRATORY (INHALATION)
Status: CANCELLED | OUTPATIENT
Start: 2022-09-19

## 2022-07-23 RX ORDER — NALOXONE HYDROCHLORIDE 0.4 MG/ML
.1-.4 INJECTION, SOLUTION INTRAMUSCULAR; INTRAVENOUS; SUBCUTANEOUS
Status: CANCELLED | OUTPATIENT
Start: 2022-10-17

## 2022-07-23 RX ORDER — HEPARIN SODIUM (PORCINE) LOCK FLUSH IV SOLN 100 UNIT/ML 100 UNIT/ML
5 SOLUTION INTRAVENOUS
Status: CANCELLED | OUTPATIENT
Start: 2022-09-19

## 2022-07-23 RX ORDER — MEPERIDINE HYDROCHLORIDE 25 MG/ML
25 INJECTION INTRAMUSCULAR; INTRAVENOUS; SUBCUTANEOUS EVERY 30 MIN PRN
Status: CANCELLED | OUTPATIENT
Start: 2022-10-17

## 2022-07-23 RX ORDER — HEPARIN SODIUM,PORCINE 10 UNIT/ML
5 VIAL (ML) INTRAVENOUS
Status: CANCELLED | OUTPATIENT
Start: 2022-09-19

## 2022-07-23 RX ORDER — SODIUM CHLORIDE 9 MG/ML
1000 INJECTION, SOLUTION INTRAVENOUS CONTINUOUS PRN
Status: CANCELLED
Start: 2022-08-22

## 2022-07-23 RX ORDER — ALBUTEROL SULFATE 0.83 MG/ML
2.5 SOLUTION RESPIRATORY (INHALATION)
Status: CANCELLED | OUTPATIENT
Start: 2022-10-17

## 2022-07-23 RX ORDER — METHYLPREDNISOLONE SODIUM SUCCINATE 125 MG/2ML
125 INJECTION, POWDER, LYOPHILIZED, FOR SOLUTION INTRAMUSCULAR; INTRAVENOUS
Status: CANCELLED
Start: 2022-07-25

## 2022-07-23 RX ORDER — LORAZEPAM 2 MG/ML
0.5 INJECTION INTRAMUSCULAR EVERY 4 HOURS PRN
Status: CANCELLED
Start: 2022-07-25

## 2022-07-23 RX ORDER — SODIUM CHLORIDE 9 MG/ML
1000 INJECTION, SOLUTION INTRAVENOUS CONTINUOUS PRN
Status: CANCELLED
Start: 2022-10-17

## 2022-07-23 RX ORDER — HEPARIN SODIUM,PORCINE 10 UNIT/ML
5 VIAL (ML) INTRAVENOUS
Status: CANCELLED | OUTPATIENT
Start: 2022-10-17

## 2022-07-23 RX ORDER — ACETAMINOPHEN 325 MG/1
650 TABLET ORAL
Status: CANCELLED
Start: 2022-09-19

## 2022-07-23 RX ORDER — DEXAMETHASONE 4 MG/1
20 TABLET ORAL ONCE
Status: CANCELLED | OUTPATIENT
Start: 2022-09-19

## 2022-07-23 RX ORDER — NALOXONE HYDROCHLORIDE 0.4 MG/ML
.1-.4 INJECTION, SOLUTION INTRAMUSCULAR; INTRAVENOUS; SUBCUTANEOUS
Status: CANCELLED | OUTPATIENT
Start: 2022-08-22

## 2022-07-23 RX ORDER — SODIUM CHLORIDE 9 MG/ML
1000 INJECTION, SOLUTION INTRAVENOUS CONTINUOUS PRN
Status: CANCELLED
Start: 2022-09-19

## 2022-07-23 RX ORDER — DEXAMETHASONE 4 MG/1
20 TABLET ORAL ONCE
Status: CANCELLED | OUTPATIENT
Start: 2022-07-25

## 2022-07-23 RX ORDER — SODIUM CHLORIDE 9 MG/ML
1000 INJECTION, SOLUTION INTRAVENOUS CONTINUOUS PRN
Status: CANCELLED
Start: 2022-07-25

## 2022-08-15 ENCOUNTER — TRANSFERRED RECORDS (OUTPATIENT)
Dept: HEALTH INFORMATION MANAGEMENT | Facility: CLINIC | Age: 77
End: 2022-08-15

## 2022-08-16 DIAGNOSIS — G62.9 NEUROPATHY: ICD-10-CM

## 2022-08-22 RX ORDER — GABAPENTIN 300 MG/1
CAPSULE ORAL
Qty: 60 CAPSULE | Refills: 3 | Status: SHIPPED | OUTPATIENT
Start: 2022-08-22

## 2022-08-22 NOTE — TELEPHONE ENCOUNTER
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 Continues to use gabapentin with good relief.      Next clinic visit date: 9/21/22 Lainey Dov     Any other pertinent information (if none, N/A): N/A

## 2022-09-21 ENCOUNTER — PATIENT OUTREACH (OUTPATIENT)
Dept: ONCOLOGY | Facility: CLINIC | Age: 77
End: 2022-09-21

## 2022-09-21 NOTE — PROGRESS NOTES
[facility]: Cancer Care                                                                                          [place] [facility] [place] that Rogelio picked up his 8/22/2022 prescription for Gabapentin    I attempted to call Rogelio regarding his missed appointment today with JIMBO Mclain CNP and infusion appointment.    Call was not answered and option was not available to leave a message    Signature:  Priya Gottlieb RN

## 2022-10-13 ENCOUNTER — PATIENT OUTREACH (OUTPATIENT)
Dept: ONCOLOGY | Facility: CLINIC | Age: 77
End: 2022-10-13

## 2022-10-13 NOTE — LETTER
[facility]  [address] [id number] [phone]    October 13, 2022    Rogelio Marshall  [address]  [place] [address]      Dear Rogelio Marshall:    Your health is important to us and we missed you at your recent appointments. Please call us if you need to reschedule your appointment for another date and time.   We have attempted to call you but have been unable to reach you or leave a message  Any time you are unable to keep your appointment we kindly ask that you call us at least two hours in advance to let us know. This will allow us to offer the time to another patient.  You have appointments scheduled on Monday October 17, 2022 starting at 2pm.    If you did not attend your appointment because of concerns over your ability to pay for care, please contact me so we can discuss payment options.   If you have any questions regarding this notice, please contact me at  [phone].   Sincerely,        Svetlana Gottlieb RN Care Coordinator

## 2022-10-13 NOTE — LETTER
October 13, 2022      TO: Rogelio Marshall  [address]  [place] [address]         Dear Rogelio,    Your health is important to us and we missed you at your recent appointments. Please call us if you need to reschedule your appointment for another date and time.   We have attempted to call you but have been unable to reach you or leave a message    Any time you are unable to keep your appointment we kindly ask that you call us at least two hours in advance to let us know. This will allow us to offer the time to another patient.  Your next appointments are scheduled on Monday October 17, 2022 starting at 2pm.      If you did not attend your appointment because of concerns over your ability to pay for care, please contact me so we can discuss options.     If you have any questions regarding this notice, please contact me at  [phone].     Sincerely,        Svetlana Gottlieb RN Care Coordinator

## 2022-10-13 NOTE — PROGRESS NOTES
[facility]: Cancer Care                                                                                          Attempted to call Rogelio regarding his upcoming appointment on 10/17/2022  Unable to leave a message.  Letter will be mailed to patient regarding previous missed appointments and request to call us    Signature:  Priya Gottlieb RN

## 2022-10-29 ENCOUNTER — HEALTH MAINTENANCE LETTER (OUTPATIENT)
Age: 77
End: 2022-10-29

## 2022-12-26 DIAGNOSIS — C90.00 MULTIPLE MYELOMA, REMISSION STATUS UNSPECIFIED (H): ICD-10-CM

## 2022-12-26 RX ORDER — ACYCLOVIR 400 MG/1
TABLET ORAL
Qty: 60 TABLET | Refills: 11 | OUTPATIENT
Start: 2022-12-26

## 2022-12-26 NOTE — TELEPHONE ENCOUNTER
acyclovir (ZOVIRAX) Refill   Last prescribing provider: Dr barnett     Last clinic visit date: 5/2/22 Lainey Diop     Recommendations for requested medication (if none, N/A): Copied from chart note 5/2/22 Lainey Diop     acyclovir (ZOVIRAX) 400 MG tablet Take 1 tablet (400 mg) by mouth 2 times daily Viral Prophylaxis. 60 tablet 11       Any other pertinent information (if none, N/A): N/A    Refilled: Y/N, if NO, why?

## 2022-12-26 NOTE — TELEPHONE ENCOUNTER
Not seen in clinic since May 2022.  Will need to schedule follow-up in order to renew medications.

## 2023-01-10 DIAGNOSIS — G62.9 NEUROPATHY: ICD-10-CM

## 2023-01-10 RX ORDER — GABAPENTIN 300 MG/1
CAPSULE ORAL
Qty: 60 CAPSULE | Refills: 3 | OUTPATIENT
Start: 2023-01-10

## 2023-01-10 NOTE — TELEPHONE ENCOUNTER
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 Continues to use gabapentin with good relief.      Any other pertinent information (if none, N/A): NA    Refilled: Y/N, if NO, why?

## 2023-01-10 NOTE — TELEPHONE ENCOUNTER
Not seen in clinic recently, needs follow-up appointment before any meds will be refilled.  We have tried contacting him many times with no success.

## 2023-01-20 DIAGNOSIS — C90.00 MULTIPLE MYELOMA, REMISSION STATUS UNSPECIFIED (H): ICD-10-CM

## 2023-06-01 ENCOUNTER — HEALTH MAINTENANCE LETTER (OUTPATIENT)
Age: 78
End: 2023-06-01

## 2023-10-11 NOTE — PROGRESS NOTES
"ESTABLISHED PATIENT FOLLOW-UP:  Follow Up of the Lower Back       HISTORY OF PRESENT ILLNESS  Mr. Marshall is a pleasant 74 year old year old male who presents to clinic today for follow-up of low back pain.     Date of injury: 5/20/19  Date last seen: 6/9/19  Following Therapeutic Plan: Yes  Pain: Improving  Function: Improved walking capacity  Interval History:Patient has had additional two visits of PT.  Has been progressing.  Few exercises involving core work that do cause pain including resistance bands - holding in hand and flexing core.  Notes improvement of low back pain.  Easier to get out of bed.  Increased right sided flank pain however only with ambulation- prolonged walking.  Immediately resolves with resting, leaning on bicycle rack, or laying.  No pain at rest or when not engaging core muscles.  No longer needing any tramadol.    Purchased a new bed/box spring from [employer]  Has been helpful and no longer using inflatable mattress.    Additional medical/Social/Surgical histories reviewed in [facility] and updated as appropriate.    REVIEW OF SYSTEMS (7/9/2019)  CONSTITUTIONAL: Denies fever and weight loss  GASTROINTESTINAL: Denies abdominal pain, nausea, vomiting  MUSCULOSKELETAL: See HPI  SKIN: Denies any recent rash or lesion  NEUROLOGICAL: Denies numbness or focal weakness     PHYSICAL EXAM  BP (!) 163/112   Pulse 86   Ht 1.6 m (5' 3\")   Wt 64 kg (141 lb)   BMI 24.98 kg/m      General  - normal appearance, in no obvious distress  CV  - normal peripheral perfusion  Pulm  - normal respiratory pattern, non-labored  ABD  -Protuberant abdomen. Nontender to deep palpation, no guarding, no rididity.  Musculoskeletal - lumbar spine  - stance: slow to rise and sit  - inspection: normal bone and joint alignment, thoracic kyphosis  - palpation: No tenderness to palpation of lumbar paraspinals or quadratus muscles. Focal region of tenderness to palpation at right lateral flank along external abdominal obliques. "  Superficially tender.    - ROM: Painless bilateral rotation, flexion, extension, side bending or rotation. Limited range of motion however remains.  - strength: lower extremities 5/5 in all planes  Neuro  - patellar and Achilles DTRs 2+ bilaterally, no sensory or motor deficit, grossly normal coordination, normal muscle tone  Skin  - no ecchymosis, erythema, warmth, or induration, no obvious rash  Psych  - interactive, appropriate, normal mood and affect     ASSESSMENT & PLAN  Mr. Marshall is a 74 year old year old male who presents to clinic today for reevaluation of low back pain without radiculopathy. Continues to improve and progressing in PT as expected. Discussed continuing conservative measures. Additional strain of external abdominal obliquis.  Discussed following up PCP if pain persists without walking; possible intraabdominal pathology. Will consider MRI if no resolution in the future.    1. Acetaminophen PRN  2. Consider icy hot with lidocaine patch for oblique  3. Avoid painful core work   4. Continue PT/HEP  5. Ice therapy discussed  6. Follow up PRN 6 weeks    It was a pleasure seeing Gabrielle Brooks DO, CAM  Primary Care Sports Medicine         negative - no cough

## 2024-06-08 ENCOUNTER — HEALTH MAINTENANCE LETTER (OUTPATIENT)
Age: 79
End: 2024-06-08

## 2024-11-18 NOTE — PROGRESS NOTES
"Rogelio Marshall is a 75 year old male patient.  1. Chronic midline low back pain without sciatica    2. Difficulty walking    3. Pathologic compression fracture of spine, sequela    4. Elevated serum creatinine    5. Multiple myeloma not having achieved remission (H)      Past Medical History:   Diagnosis Date     Hyperlipidemia      Hypertension      No current outpatient medications on file.     No Known Allergies  Active Problems:    Difficulty walking    Pathologic compression fracture of spine, sequela    Chronic midline low back pain without sciatica    Blood pressure (!) 133/93, pulse 68, temperature 97.9  F (36.6  C), temperature source Oral, resp. rate 16, height 1.549 m (5' 1\"), weight 61 kg (134 lb 7.7 oz), SpO2 99 %.    Subjective:  Symptoms:  Improved.  (Back pain).    Diet:  Adequate intake.    Activity level: Impaired due to pain.    Pain:  He complains of pain that is mild.  He reports pain is improving.  Pain is well controlled.      Objective:  General Appearance:  Comfortable.    Vital signs: (most recent): Blood pressure (!) 133/93, pulse 68, temperature 97.9  F (36.6  C), temperature source Oral, resp. rate 16, height 1.549 m (5' 1\"), weight 61 kg (134 lb 7.7 oz), SpO2 99 %.  Vital signs are normal.    Output: Producing urine.    HEENT: Normal HEENT exam.    Lungs:  Normal effort and normal respiratory rate.  Breath sounds clear to auscultation.    Heart: Normal rate.  Regular rhythm.  S1 normal and S2 normal.    Abdomen: Abdomen is soft.  Bowel sounds are normal.     Extremities: Normal range of motion.    Pulses: Distal pulses are intact.    Neurological: Patient is alert.    Pupils:  Pupils are equal, round, and reactive to light.    Skin:  Warm.      Assessment:    Condition: In stable condition.  Improving.   (75 yom presents with back pain and decreased ADL, found to have myeloma like lesions, Heme consult done-BM scheduled.    TCU placement today.).       Marya Alcala MD, " MD  10/13/2020    The pt was seen and examined by myself. The case was reviewed and the plan was discussed with the ANUP.   impairments found/anticipated discharge recommendation

## 2025-01-16 ENCOUNTER — TRANSFERRED RECORDS (OUTPATIENT)
Dept: HEALTH INFORMATION MANAGEMENT | Facility: CLINIC | Age: 80
End: 2025-01-16

## 2025-06-15 ENCOUNTER — HEALTH MAINTENANCE LETTER (OUTPATIENT)
Age: 80
End: 2025-06-15